# Patient Record
Sex: MALE | Race: ASIAN | NOT HISPANIC OR LATINO | Employment: OTHER | URBAN - METROPOLITAN AREA
[De-identification: names, ages, dates, MRNs, and addresses within clinical notes are randomized per-mention and may not be internally consistent; named-entity substitution may affect disease eponyms.]

---

## 2019-11-17 ENCOUNTER — HOSPITAL ENCOUNTER (EMERGENCY)
Facility: MEDICAL CENTER | Age: 24
End: 2019-11-17
Attending: EMERGENCY MEDICINE
Payer: MEDICAID

## 2019-11-17 VITALS
WEIGHT: 171.96 LBS | HEART RATE: 74 BPM | OXYGEN SATURATION: 96 % | SYSTOLIC BLOOD PRESSURE: 106 MMHG | DIASTOLIC BLOOD PRESSURE: 86 MMHG

## 2019-11-17 DIAGNOSIS — Z43.0 TRACHEOSTOMY CARE (HCC): ICD-10-CM

## 2019-11-17 PROCEDURE — 99283 EMERGENCY DEPT VISIT LOW MDM: CPT

## 2019-11-17 NOTE — ED NOTES
Trache re-placed by RT, cleaned prior to reinsertion. ERP at BS, family education given on trache reinsertion

## 2019-11-17 NOTE — DISCHARGE INSTRUCTIONS
Keep the area around the trach clean and dry.  Call first thing Monday morning to establish a doctor-patient relationship so that he has follow-up and further care.  Continue current medications.

## 2019-11-17 NOTE — ED PROVIDER NOTES
CHIEF COMPLAINT  Chief Complaint   Patient presents with   • Tracheostomy Tube Change     Pt coughing while sleeping and accidentally pulled out trache.       HPI  Rey Medina is a 24 y.o. male who presents tonight via [facility] from home with his mother and family members secondary to tracheostomy displacement.  Apparently the patient was coughing and accidentally pulled his trach out.  They do not know the timeframe of how long it had been out in the mother did not know how to put it back in.  Patient had a hemorrhagic stroke from an AV malformation and has indwelling tracheostomy and PEG tube for that reason.  They just moved here from the [facility] and have not established medical care here yet.    REVIEW OF SYSTEMS  See HPI for further details. All other system reviews are negative.    PAST MEDICAL HISTORY  Cerebrovascular accident secondary to AV malformation  Indwelling tracheostomy      FAMILY HISTORY  No family history on file.    SOCIAL HISTORY  Social History     Socioeconomic History   • Marital status: Single     Spouse name: Not on file   • Number of children: Not on file   • Years of education: Not on file   • Highest education level: Not on file   Occupational History   • Not on file   Social Needs   • Financial resource strain: Not on file   • Food insecurity:     Worry: Not on file     Inability: Not on file   • Transportation needs:     Medical: Not on file     Non-medical: Not on file   Tobacco Use   • Smoking status: Not on file   Substance and Sexual Activity   • Alcohol use: Not on file   • Drug use: Not on file   • Sexual activity: Not on file   Lifestyle   • Physical activity:     Days per week: Not on file     Minutes per session: Not on file   • Stress: Not on file   Relationships   • Social connections:     Talks on phone: Not on file     Gets together: Not on file     Attends Gnosticist service: Not on file     Active member of club or organization: Not on file     Attends meetings  of clubs or organizations: Not on file     Relationship status: Not on file   • Intimate partner violence:     Fear of current or ex partner: Not on file     Emotionally abused: Not on file     Physically abused: Not on file     Forced sexual activity: Not on file   Other Topics Concern   • Not on file   Social History Narrative   • Not on file       SURGICAL HISTORY  Tracheostomy  SRS  PEG tube   shunt  CURRENT MEDICATIONS  See nurse's note    ALLERGIES  No known drug allergies    PHYSICAL EXAM  VITAL SIGNS: /87   Pulse 83   Wt 78 kg (171 lb 15.3 oz)   SpO2 96%     Constitutional: Patient is well developed, well nourished in mild distress.  HENT: Normocephalic, atraumatic, Oropharynx moist, nose normal with no drainage.   Eyes: PERRL, EOMI, Conjunctiva without erythema.   Neck: Supple with tracheostomy present.  The trach itself was displaced.  There is no bleeding around the ostomy site.  It does appear to be narrowed with moderate difficulty replacing the trach.  Normal range of motion in flexion, extension and lateral rotation.  Lymphatic: No lymphadenopathy noted.   Cardiovascular: Normal heart rate and rhythm. No murmur  Thorax & Lungs: Clear and equal breath sounds with good excursion.  No rhonchi or wheezing.  Abdomen: Bowel sounds normal in all four quadrants. Soft,nontender.  Skin: Warm, Dry, No rashes.   Back: No cervical, thoracic, or lumbosacral tenderness.  Extremities: Peripheral pulses 4/4 No edema, No tenderness.  Neurologic: Alert , patient is nonverbal at this time and bedridden.  He has motor deficits secondary to previous stroke from AV malformation  Psychiatric: Affect     COURSE & MEDICAL DECISION MAKING  Pertinent Labs & Imaging studies reviewed. (See chart for details)  Tracheostomy was replaced by respiratory therapy and education was given to the patient's mother.  He will be discharged home via [facility] in stable and improved condition.    FINAL IMPRESSION  1.  Tracheostomy  displacement with replacement  2.  History of hemorrhagic stroke secondary to AV malformation  3.         Electronically signed by: Tammi Del Valle, 11/17/2019 2:41 [facility] Provider Note

## 2019-11-17 NOTE — ED TRIAGE NOTES
Chief Complaint   Patient presents with   • Tracheostomy Tube Change     Pt coughing while sleeping and accidentally pulled out trache.     /87   Pulse 62   Wt 78 kg (171 lb 15.3 oz)     Pt BIB [facility] for above concern, pt placed on 10L oxy mask over stoma by EMS, some suction done by EMS; pt at baseline for condition - non verbal but follows commands - pt family is at BS    Pt has hx of CVA that occurred in April of 2019.

## 2019-11-17 NOTE — ED NOTES
Pt's family given d/c paperwork and f/u instruction. Pt family verbalized understanding all information given. Pt awaiting transport back to home via [facility], family aware of d/c plan

## 2020-01-13 ENCOUNTER — HOSPITAL ENCOUNTER (OUTPATIENT)
Dept: RADIOLOGY | Facility: MEDICAL CENTER | Age: 25
End: 2020-01-13
Attending: PHYSICIAN ASSISTANT
Payer: MEDICAID

## 2020-01-13 DIAGNOSIS — G91.9 INTERNAL HYDROCEPHALUS (HCC): ICD-10-CM

## 2020-01-13 PROCEDURE — 72040 X-RAY EXAM NECK SPINE 2-3 VW: CPT

## 2020-01-13 PROCEDURE — 70250 X-RAY EXAM OF SKULL: CPT

## 2020-01-13 PROCEDURE — 71045 X-RAY EXAM CHEST 1 VIEW: CPT

## 2020-01-13 PROCEDURE — 74018 RADEX ABDOMEN 1 VIEW: CPT

## 2020-01-24 ENCOUNTER — OFFICE VISIT (OUTPATIENT)
Dept: PHYSICAL MEDICINE AND REHAB | Facility: REHABILITATION | Age: 25
End: 2020-01-24
Payer: MEDICAID

## 2020-01-24 VITALS
SYSTOLIC BLOOD PRESSURE: 128 MMHG | DIASTOLIC BLOOD PRESSURE: 82 MMHG | BODY MASS INDEX: 26.99 KG/M2 | OXYGEN SATURATION: 95 % | HEIGHT: 67 IN | HEART RATE: 91 BPM | WEIGHT: 171.96 LBS | TEMPERATURE: 99.1 F

## 2020-01-24 DIAGNOSIS — G31.89 COGNITIVE AND NEUROBEHAVIORAL DYSFUNCTION FOLLOWING BRAIN INJURY (HCC): ICD-10-CM

## 2020-01-24 DIAGNOSIS — Z93.0 TRACHEOSTOMY DEPENDENT (HCC): ICD-10-CM

## 2020-01-24 DIAGNOSIS — R39.81 URINARY INCONTINENCE DUE TO COGNITIVE IMPAIRMENT: ICD-10-CM

## 2020-01-24 DIAGNOSIS — Z93.1 GASTROSTOMY TUBE DEPENDENT (HCC): ICD-10-CM

## 2020-01-24 DIAGNOSIS — F09 COGNITIVE AND NEUROBEHAVIORAL DYSFUNCTION FOLLOWING BRAIN INJURY (HCC): ICD-10-CM

## 2020-01-24 DIAGNOSIS — I69.191 DYSPHAGIA FOLLOWING NONTRAUMATIC INTRACEREBRAL HEMORRHAGE: ICD-10-CM

## 2020-01-24 DIAGNOSIS — S06.9XAS COGNITIVE AND NEUROBEHAVIORAL DYSFUNCTION FOLLOWING BRAIN INJURY (HCC): ICD-10-CM

## 2020-01-24 DIAGNOSIS — I61.5 NONTRAUMATIC INTRAVENTRICULAR INTRACEREBRAL HEMORRHAGE, UNSPECIFIED LATERALITY (HCC): ICD-10-CM

## 2020-01-24 PROCEDURE — 99203 OFFICE O/P NEW LOW 30 MIN: CPT | Performed by: PHYSICAL MEDICINE & REHABILITATION

## 2020-01-24 RX ORDER — MONTELUKAST SODIUM 10 MG/1
10 TABLET ORAL
Status: ON HOLD | COMMUNITY
Start: 2020-01-09 | End: 2020-03-12

## 2020-01-24 RX ORDER — LANSOPRAZOLE 30 MG/1
CAPSULE, DELAYED RELEASE ORAL
Status: ON HOLD | COMMUNITY
Start: 2019-11-25 | End: 2020-03-12

## 2020-01-24 RX ORDER — CALCIUM/D3/MAG OX/COP/MANG/ZN 300 MG-20
TABLET ORAL
Status: ON HOLD | COMMUNITY
Start: 2019-11-25 | End: 2020-03-12

## 2020-01-24 RX ORDER — IVABRADINE 5 MG/1
TABLET, FILM COATED ORAL
Status: ON HOLD | COMMUNITY
Start: 2020-01-09 | End: 2020-03-12

## 2020-01-24 RX ORDER — BROMOCRIPTINE MESYLATE 2.5 MG/1
TABLET ORAL
Status: ON HOLD | COMMUNITY
Start: 2019-11-25 | End: 2020-03-12

## 2020-01-24 RX ORDER — POLYETHYLENE GLYCOL 3350 17 G/17G
17 POWDER, FOR SOLUTION ORAL DAILY
Status: ON HOLD | COMMUNITY
End: 2020-03-12 | Stop reason: SDUPTHER

## 2020-01-24 RX ORDER — APIXABAN 2.5 MG/1
2.5 TABLET, FILM COATED ORAL
Status: ON HOLD | COMMUNITY
Start: 2020-01-09 | End: 2020-03-12

## 2020-01-24 RX ORDER — LANSOPRAZOLE 30 MG/1
30 CAPSULE, DELAYED RELEASE ORAL
Status: ON HOLD | COMMUNITY
Start: 2020-01-09 | End: 2020-03-12

## 2020-01-24 RX ORDER — MONTELUKAST SODIUM 10 MG/1
TABLET ORAL
Status: ON HOLD | COMMUNITY
Start: 2019-11-25 | End: 2020-03-12 | Stop reason: SDUPTHER

## 2020-01-24 RX ORDER — LEVETIRACETAM 500 MG/1
TABLET ORAL
Status: ON HOLD | COMMUNITY
Start: 2020-01-09 | End: 2020-03-12 | Stop reason: SDUPTHER

## 2020-01-24 RX ORDER — BROMOCRIPTINE MESYLATE 2.5 MG/1
TABLET ORAL
Status: ON HOLD | COMMUNITY
Start: 2020-01-13 | End: 2020-03-12

## 2020-01-24 RX ORDER — LEVETIRACETAM 500 MG/1
TABLET ORAL
Status: ON HOLD | COMMUNITY
Start: 2019-11-25 | End: 2020-03-12

## 2020-01-24 SDOH — HEALTH STABILITY: MENTAL HEALTH: HOW OFTEN DO YOU HAVE A DRINK CONTAINING ALCOHOL?: NEVER

## 2020-01-24 ASSESSMENT — PATIENT HEALTH QUESTIONNAIRE - PHQ9: CLINICAL INTERPRETATION OF PHQ2 SCORE: 0

## 2020-01-24 NOTE — PROGRESS NOTES
[facility], [place]  [facility]  [facility] [place] [address]  Ph: [phone]    NEW PATIENT EVALUATION    Patient Name: Rey Medina   Patient : [id number]  Patient Age: 24 y.o.   PCP: Nirmala Man M.D.    Referring Physician: No referring provider defined for this encounter.    Reason for Referral: intracerebral hemorrhage   Examining Physician: Nasim García MD  Date of Service: 2020      SUBJECTIVE:     Patient Identification: Rey Medina is a 24 y.o. RHD male with no sig PMH, in [facility] [place] suffered a vermian intracerebral hemorrhage with intraventricular bleeding and hydrocephalus on 2019, s/p  shunt placement. He was initially taken to [facility] for additional care, then moved back to  in 2019.       Chief Complaint: Post stroke complications      Accompanied by Today: Mother      History of Present Illness:   # Stroke:  vermian intracerebral hemorrhage with intraventricular bleeding and hydrocephalus on 2019. In [place], had some home therapies, but never inpatient hospital   -Mother reports that his function has been slowly improving, but hopeful that with professional therapists his progress can improve faster; has not been able to see any physical therapist and occupational therapist since 2019, when they moved from [facility] to the ; she has been very stable with home exercises with Rey  -Intermittently follows commands  -Able to extend his right arm to 0 degrees volitionally, does not respond to request for moving left arm/lower extremities today  -Following commands slightly improves with repetition  -Had PT, OT, speech therapy evaluation around 2019.  All 3 disciplines found deficits that can improve with inpatient therapy    # Respiratory: Continues with a tracheostomy due to prolonged ventilator use, no longer on vent.  Mother has been using the PMV on and off with good  tolerance.  -Would like to progress to decannulation, but needs assistance    # Nutrition: Has a G-tube, n.p.o.  -Speech recommended modified barium swallow study    # Spasticity: No difficulty with exercises or tone    # Other providers:  -Pending neurosurgery follow-up, second visit, on February 5, 2020  -Repeat MRI reportedly pending    # Bowel: suppository, about every 3-4 days; miralax every day and dulocolax every 3-4 days    # Bladder: improvement in ability to communicate need to use the urinal; continues on diaper    # Seizures: As far as mother is concerned, he's never had any seizures; currently on Keppra    # Skin: Reports no no pressure ulcers, some pimples on chest and back of the ear    # Sleep: doing okay, no complaints    # Pain: Denies any pain, mother denies any grimacing palpation or other similar pain related behaviors    # Living: mom with patient 24/7, sister is sometimes there, 1 story apartment, 0 steps to enter          Review of Systems: Unable to obtain, patient is nonverbal    Per mother:   - No previous medical history prior to this intra cerebral hemorrhage  - No blood pressure or cholesterol issues  - Denies any symptoms of sweating, fever, chills      Past Medical History:  -vermian intracerebral hemorrhage with intraventricular bleeding and hydrocephalus on April 21, 2019, s/p  shunt placement       Current Outpatient Medications:   •  ELIQUIS 2.5 MG Tab, Take 2.5 mg by mouth., Disp: , Rfl:   •  bromocriptine (PARLODEL) 2.5 MG Tab, BROMOCRIPTINE MESYLATE 2.5 MG TABS, Disp: , Rfl:   •  bromocriptine (PARLODEL) 2.5 MG Tab, , Disp: , Rfl:   •  Incontinence Supply Disposable (ASSURANCE FITTED BRIEF LARGE) [facility] ASSURANCE FITTED BRIEF LARGE, Disp: , Rfl:   •  Incontinence Supply Disposable (ADVOCATE UNDERPADS) Misc, ADVOCATE UNDERPADS, Disp: , Rfl:   •  CORLANOR 5 MG Tab tablet, TAKE 1 TABLET BY MOUTH ONCE DAILY AFTER MEALS, Disp: , Rfl:   •  ivabradine (CORLANOR) 5 MG Tab tablet,  CORLANOR 5 MG TABS, Disp: , Rfl:   •  lansoprazole (PREVACID) 30 MG CAPSULE DELAYED RELEASE, LANSOPRAZOLE 30 MG CPDR, Disp: , Rfl:   •  lansoprazole (PREVACID) 30 MG CAPSULE DELAYED RELEASE, Take 30 mg by mouth. ONCE DAILY BEFORE MEALS, Disp: , Rfl:   •  levETIRAcetam (KEPPRA) 500 MG Tab, KEPPRA 500 MG TABS, Disp: , Rfl:   •  montelukast (SINGULAIR) 10 MG Tab, Take 10 mg by mouth., Disp: , Rfl:   •  polyethylene glycol/lytes (MIRALAX) Pack, Take 17 g by mouth every day., Disp: , Rfl:   •  vitamin D (CHOLECALCIFEROL) 1000 UNIT Tab, Take 1,000 Units by mouth every day., Disp: , Rfl:   •  apixaban (ELIQUIS) 2.5mg Tab, ELIQUIS 2.5 MG TABS, Disp: , Rfl:   •  Calcium Carbonate-Vit D-Min (CALTRATE MINIS PLUS MINERALS) 300-800 MG-UNIT Tab, CALTRATE MINIS PLUS MINERALS 300-800 MG-UNIT TABS, Disp: , Rfl:   •  levETIRAcetam (KEPPRA) 500 MG Tab, TAKE 1 TABLET BY MOUTH TWICE DAILY AFTER MEALS, Disp: , Rfl:   •  montelukast (SINGULAIR) 10 MG Tab, MONTELUKAST SODIUM 10 MG TABS, Disp: , Rfl:   Allergies   Allergen Reactions   • Vancomycin Swelling        Past Social History:  Social History     Socioeconomic History   • Marital status: Single     Spouse name: Not on file   • Number of children: Not on file   • Years of education: Not on file   • Highest education level: Not on file   Occupational History   • Not on file   Social Needs   • Financial resource strain: Not on file   • Food insecurity:     Worry: Not on file     Inability: Not on file   • Transportation needs:     Medical: Not on file     Non-medical: Not on file   Tobacco Use   • Smoking status: Not on file   Substance and Sexual Activity   • Alcohol use: Not on file   • Drug use: Not on file   • Sexual activity: Not on file   Lifestyle   • Physical activity:     Days per week: Not on file     Minutes per session: Not on file   • Stress: Not on file   Relationships   • Social connections:     Talks on phone: Not on file     Gets together: Not on file     Attends  [employer] service: Not on file     Active member of club or organization: Not on file     Attends meetings of clubs or organizations: Not on file     Relationship status: Not on file   • Intimate partner violence:     Fear of current or ex partner: Not on file     Emotionally abused: Not on file     Physically abused: Not on file     Forced sexual activity: Not on file   Other Topics Concern   • Not on file   Social History Narrative   • Not on file        Family History: Not relevant to today's visit      OBJECTIVE:   Vital Signs:  Vitals:    01/24/20 1435   BP: 128/82   Pulse: 91   Temp: 37.3 °C (99.1 °F)   SpO2: 95%          Physical Exam:   Gen: not appear to be in distress, well dressed, thin male; non verbal, inconsistently able to follow simple commands  HEENT: mostly keeps his eyes closed, intermittently moves his head side to side  Skin: pimple like lesion on his chest  CV: well perfused extremities  Pulm: unlabored on room air, tracheostomy in place; intermittent coughing  GI: G tube in place, soft abdomen  MSK:  - no spasticity noted in all 4 limbs  - able to move his right upper extremity flexion/extension with repeated requests, able to extend to 0 deg  - able to hold his wrist in extension at request  - does not follow directions with his left upper extremity or bilateral lower extremities  Neuro:   - Negative Paredes's bilaterally  - Negative clonus bilaterally  - Good trunk control sitting balance  - Unable to reliably test his sensation  - At least 3/5 with right elbow flexion/extension, unable to test strength of other limbs         ASSESSMENT/PLAN: Rey Medina  is a 24 y.o. male with rehabilitation history significant for intracerebral hemorrhage, in PM&R clinic on 1/24/2020 for inpatient rehab candidacy evaluation. The following plan was discussed with the patient who is in agreement.     Visit Diagnoses     ICD-10-CM   1. Nontraumatic intraventricular intracerebral hemorrhage,  unspecified laterality (HCC) I61.5   2. 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 Dysphagia following nontraumatic intracerebral hemorrhage I69.191   4. Gastrostomy tube dependent ([facility]) Z93.1   5. Urinary incontinence due to cognitive impairment R39.81   6. 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    F09    S06.9X0S        Rehab/Neuro:   - Recommend inpatient acute rehabilitation, given that patient has both rehab and medical care needs (see below for additional details)  - Outpatient 1x time PT, OT, SLP evaluation from 12/2019 to 1/2020 also support inpatient rehabilitation  - No concern for storming, spasticity, or neuropathic pain at this time  - Keppra: likely prophylaxis per patient's mother, never told patient had seizures; refer to epilepsy neuro clinic for evaluation to assess if he continues to need this prophylaxis       Neurogenic Bladder/Incontinence: Currently using diaper  - Timed void and PVR monitoring to be monitored during inpatient rehab      Neurogenic Bowel: Constipated with bowel movements every 3-4 days  - Continue with miralax and dulcolax suppository  - Consider adding docusate or senna S for a goal of BM every 1-2 days      GI/Diet: Currently NPO and on G tube, needs additional assessment  - SLP evaluation and consider modified Barium swallow, as recommended by outpatient SLP evaluation  - Improve oral feeding abilities and goal of removing G tube, if possible      Skin: Fortunately no active issues at this time        Discussed with patient about recommendations for and plan for rehabilitation as follows. Patient with multiple co-morbidities(including but not limited to NPO/dysphagia, tracheostomy/respiratory management, bladder incontinence, and constipation); with cognitive/swallowing/speech deficits and functional deficits in mobility/self-care, and Moderate de-conditioning.     This patient lives in a single level apartment with 0 steps to enter,  mother is able to help 24/7. The patient was evaluated by acute care Physical Therapy, Occupational Therapy and Speech Language Pathology; currently requiring maximal assistance for mobility and maximal assistance for ADLs, also with ongoing cognitive, speech and swallowing deficits.     The patient is a Very Good candidate for an acute inpatient rehabilitation program with a coordinated program of care at an intensity and frequency not available at a lower level of care.     This recommendation is substantiated by the patient's current medical condition with intervention and assessment of medical issues requiring an acute level of care for patient's safety and maximum outcome. A coordinated program of care will be provided by an interdisciplinary team including physical therapy, occupational therapy, speech language pathology, hospitalist, physiatry, rehab nursing and rehab psychology. Rehab goals include improved cognition, speech and swallowing, mobility, self-care management, strength and conditioning/endurance, pain management, bowel and bladder management, mood and affect, and safety with independent home management including caregiver training.     Estimated length of stay is approximately 21 days. Rehab potential: Very Good. Disposition: to pre-morbid independent living setting with supportive care of patient's mother.           Nasim García MD  Department of Physical Medicine & Rehabilitation  Neuro Rehabilitation Clinic  [facility]  1/24/2020 2:10 PM

## 2020-02-03 PROCEDURE — 99358 PROLONG SERVICE W/O CONTACT: CPT | Performed by: PHYSICAL MEDICINE & REHABILITATION

## 2020-02-05 ENCOUNTER — HOSPITAL ENCOUNTER (INPATIENT)
Facility: REHABILITATION | Age: 25
LOS: 16 days | DRG: 056 | End: 2020-02-21
Attending: PHYSICAL MEDICINE & REHABILITATION | Admitting: PHYSICAL MEDICINE & REHABILITATION
Payer: MEDICAID

## 2020-02-05 PROCEDURE — 94640 AIRWAY INHALATION TREATMENT: CPT

## 2020-02-05 PROCEDURE — 770010 HCHG ROOM/CARE - REHAB SEMI PRIVAT*

## 2020-02-05 PROCEDURE — 700102 HCHG RX REV CODE 250 W/ 637 OVERRIDE(OP): Performed by: PHYSICAL MEDICINE & REHABILITATION

## 2020-02-05 PROCEDURE — 99223 1ST HOSP IP/OBS HIGH 75: CPT | Performed by: PHYSICAL MEDICINE & REHABILITATION

## 2020-02-05 PROCEDURE — A9270 NON-COVERED ITEM OR SERVICE: HCPCS | Performed by: PHYSICAL MEDICINE & REHABILITATION

## 2020-02-05 PROCEDURE — 94760 N-INVAS EAR/PLS OXIMETRY 1: CPT

## 2020-02-05 RX ORDER — MONTELUKAST SODIUM 10 MG/1
10 TABLET ORAL DAILY
Status: DISCONTINUED | OUTPATIENT
Start: 2020-02-05 | End: 2020-02-05

## 2020-02-05 RX ORDER — ONDANSETRON 2 MG/ML
4 INJECTION INTRAMUSCULAR; INTRAVENOUS 4 TIMES DAILY PRN
Status: DISCONTINUED | OUTPATIENT
Start: 2020-02-05 | End: 2020-02-21 | Stop reason: HOSPADM

## 2020-02-05 RX ORDER — POLYVINYL ALCOHOL 14 MG/ML
1 SOLUTION/ DROPS OPHTHALMIC PRN
Status: DISCONTINUED | OUTPATIENT
Start: 2020-02-05 | End: 2020-02-21 | Stop reason: HOSPADM

## 2020-02-05 RX ORDER — ONDANSETRON 4 MG/1
4 TABLET, ORALLY DISINTEGRATING ORAL 4 TIMES DAILY PRN
Status: DISCONTINUED | OUTPATIENT
Start: 2020-02-05 | End: 2020-02-21 | Stop reason: HOSPADM

## 2020-02-05 RX ORDER — TRAZODONE HYDROCHLORIDE 50 MG/1
50 TABLET ORAL
Status: DISCONTINUED | OUTPATIENT
Start: 2020-02-05 | End: 2020-02-21 | Stop reason: HOSPADM

## 2020-02-05 RX ORDER — BISACODYL 10 MG
10 SUPPOSITORY, RECTAL RECTAL
Status: DISCONTINUED | OUTPATIENT
Start: 2020-02-05 | End: 2020-02-11

## 2020-02-05 RX ORDER — OMEPRAZOLE 20 MG/1
20 CAPSULE, DELAYED RELEASE ORAL DAILY
Status: DISCONTINUED | OUTPATIENT
Start: 2020-02-05 | End: 2020-02-07

## 2020-02-05 RX ORDER — MONTELUKAST SODIUM 10 MG/1
10 TABLET ORAL NIGHTLY
Status: DISCONTINUED | OUTPATIENT
Start: 2020-02-05 | End: 2020-02-21 | Stop reason: HOSPADM

## 2020-02-05 RX ORDER — IPRATROPIUM BROMIDE AND ALBUTEROL SULFATE 2.5; .5 MG/3ML; MG/3ML
3 SOLUTION RESPIRATORY (INHALATION)
Status: ACTIVE | OUTPATIENT
Start: 2020-02-05 | End: 2020-02-06

## 2020-02-05 RX ORDER — ECHINACEA PURPUREA EXTRACT 125 MG
2 TABLET ORAL PRN
Status: DISCONTINUED | OUTPATIENT
Start: 2020-02-05 | End: 2020-02-21 | Stop reason: HOSPADM

## 2020-02-05 RX ORDER — TRAMADOL HYDROCHLORIDE 50 MG/1
50 TABLET ORAL EVERY 4 HOURS PRN
Status: DISCONTINUED | OUTPATIENT
Start: 2020-02-05 | End: 2020-02-21 | Stop reason: HOSPADM

## 2020-02-05 RX ORDER — BACLOFEN 10 MG/1
5 TABLET ORAL 3 TIMES DAILY
Status: DISCONTINUED | OUTPATIENT
Start: 2020-02-05 | End: 2020-02-11

## 2020-02-05 RX ORDER — ACETAMINOPHEN 325 MG/1
650 TABLET ORAL EVERY 4 HOURS PRN
Status: DISCONTINUED | OUTPATIENT
Start: 2020-02-05 | End: 2020-02-21 | Stop reason: HOSPADM

## 2020-02-05 RX ORDER — HYDRALAZINE HYDROCHLORIDE 25 MG/1
25 TABLET, FILM COATED ORAL EVERY 8 HOURS PRN
Status: DISCONTINUED | OUTPATIENT
Start: 2020-02-05 | End: 2020-02-21 | Stop reason: HOSPADM

## 2020-02-05 RX ORDER — VITAMIN B COMPLEX
1000 TABLET ORAL DAILY
Status: DISCONTINUED | OUTPATIENT
Start: 2020-02-05 | End: 2020-02-21 | Stop reason: HOSPADM

## 2020-02-05 RX ORDER — ALUMINA, MAGNESIA, AND SIMETHICONE 2400; 2400; 240 MG/30ML; MG/30ML; MG/30ML
20 SUSPENSION ORAL
Status: DISCONTINUED | OUTPATIENT
Start: 2020-02-05 | End: 2020-02-21 | Stop reason: HOSPADM

## 2020-02-05 RX ORDER — POLYETHYLENE GLYCOL 3350 17 G/17G
1 POWDER, FOR SOLUTION ORAL
Status: DISCONTINUED | OUTPATIENT
Start: 2020-02-05 | End: 2020-02-05

## 2020-02-05 RX ORDER — LANSOPRAZOLE 30 MG/1
30 CAPSULE, DELAYED RELEASE ORAL DAILY
Status: DISCONTINUED | OUTPATIENT
Start: 2020-02-06 | End: 2020-02-07

## 2020-02-05 RX ORDER — LEVETIRACETAM 500 MG/1
500 TABLET ORAL 2 TIMES DAILY
Status: DISCONTINUED | OUTPATIENT
Start: 2020-02-05 | End: 2020-02-07

## 2020-02-05 RX ORDER — AMOXICILLIN 250 MG
2 CAPSULE ORAL 2 TIMES DAILY
Status: DISCONTINUED | OUTPATIENT
Start: 2020-02-05 | End: 2020-02-05

## 2020-02-05 RX ORDER — POLYETHYLENE GLYCOL 3350 17 G/17G
1 POWDER, FOR SOLUTION ORAL EVERY 24 HOURS
Status: DISCONTINUED | OUTPATIENT
Start: 2020-02-05 | End: 2020-02-11

## 2020-02-05 RX ORDER — BISACODYL 10 MG
10 SUPPOSITORY, RECTAL RECTAL
Status: DISCONTINUED | OUTPATIENT
Start: 2020-02-05 | End: 2020-02-05

## 2020-02-05 RX ORDER — LANSOPRAZOLE 30 MG/1
30 CAPSULE, DELAYED RELEASE ORAL DAILY
Status: DISCONTINUED | OUTPATIENT
Start: 2020-02-05 | End: 2020-02-05

## 2020-02-05 RX ORDER — IPRATROPIUM BROMIDE AND ALBUTEROL SULFATE 2.5; .5 MG/3ML; MG/3ML
3 SOLUTION RESPIRATORY (INHALATION)
Status: DISCONTINUED | OUTPATIENT
Start: 2020-02-05 | End: 2020-02-21 | Stop reason: HOSPADM

## 2020-02-05 RX ADMIN — POLYETHYLENE GLYCOL 3350 1 PACKET: 17 POWDER, FOR SOLUTION ORAL at 21:13

## 2020-02-05 RX ADMIN — BACLOFEN 5 MG: 10 TABLET ORAL at 21:06

## 2020-02-05 RX ADMIN — MONTELUKAST 10 MG: 10 TABLET, FILM COATED ORAL at 21:06

## 2020-02-05 RX ADMIN — BACLOFEN 5 MG: 10 TABLET ORAL at 16:24

## 2020-02-05 RX ADMIN — APIXABAN 2.5 MG: 5 TABLET, FILM COATED ORAL at 21:06

## 2020-02-05 RX ADMIN — LEVETIRACETAM 500 MG: 500 TABLET ORAL at 21:07

## 2020-02-05 ASSESSMENT — PATIENT HEALTH QUESTIONNAIRE - PHQ9
1. LITTLE INTEREST OR PLEASURE IN DOING THINGS: NOT AT ALL
SUM OF ALL RESPONSES TO PHQ9 QUESTIONS 1 AND 2: 0
2. FEELING DOWN, DEPRESSED, IRRITABLE, OR HOPELESS: NOT AT ALL

## 2020-02-05 ASSESSMENT — LIFESTYLE VARIABLES: EVER_SMOKED: NEVER

## 2020-02-05 ASSESSMENT — COPD QUESTIONNAIRES
COPD SCREENING SCORE: 1
DO YOU EVER COUGH UP ANY MUCUS OR PHLEGM?: NO/ONLY WITH OCCASIONAL COLDS OR INFECTIONS
HAVE YOU SMOKED AT LEAST 100 CIGARETTES IN YOUR ENTIRE LIFE: NO/DON'T KNOW
DURING THE PAST 4 WEEKS HOW MUCH DID YOU FEEL SHORT OF BREATH: SOME OF THE TIME

## 2020-02-05 NOTE — PROGRESS NOTES
1105 Pt arrived at [facility] from home via w/c. Dr David to follow for diagnosis of hemorrhagic stroke. Initial assessment initiated. Pt oriented to room and facility routine and safety measures; pt education binder provided and discussed. Pt alert, continent/incontinent of bowel and bladder. Max x 2  assist for transfers. All wounds photographed and documented; photos uploaded to . Pt denies pain or discomfort at this time. Pt positioned for comfort in bed. Call light within reach, safety measures in place. Will continue to monitor.

## 2020-02-05 NOTE — REHAB-RESPIRATORY IDT TEAM NOTE
Respiratory Therapy   Respiratory Therapy      Section completed by:  Kathie Bosch, RRT   new admit, protocol done. Pt resting well on room air at this time. Will place pt on aerosol 28% 4 liters at noc per protocol.  Pts trache was placed in the [facility] in may 2019. Mother is at bedside and helps with care, states its a size 7. Her goal is to get it downsized and decannulated. Placed him on snv prn.

## 2020-02-05 NOTE — H&P
REHABILITATION HISTORY AND PHYSICAL/POST ADMISSION PHYSICAL EVALUATION    Date of Admission: 2/5/2020  Rey Medina  [id number]    [facility] Code / Diagnosis to Support: 01.3 Bilateral Involvement  Etiologic Diagnosis: Intracerebral hemorrhage, intraventricular (HCC)    CC: Trach dependent, Dysphagia, minimally responsive    HPI:  The patient is a 24 y.o. 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  Patient was living on [facility] at the time. Per patient's mom she does not know all that happened after his AVM ruptured including why he is on Eliquis. She reports he had a tracheostomy and G tube placed two weeks later. He was initially getting care in the [facility] and then moved to the  in 11/19. She reports she thinks he takes bromocriptine for muscle spasms. She reports occasional small amounts of dried blood are coughed up from the tracheostomy. She has not had the trach exchanged since she has been in the . She has a PMV valve but he often coughs it off. One of his mothers goals is to get the trach removed. He has been on tube feeds which are given via bolus. Patient is non-verbal but recently he has been grabbing himself when he needs to void and she can place a urinal.  She reports otherwise they use a diaper. Patient can follow some simple commands on the right intermittently. He has been getting PT and OT at home. He was evaluated in our PM&R office on 1/24/20 and thought to benefit from inpatient rehabilitation to work on trach, swallow and trial of neurostimulants. Mother does not know if he was ever tried on neurostimulants (bromocriptine is often used in [place] as a neurostimulant).     Patient not following commands, actively resisting opening his eyes.       REVIEW OF SYSTEMS:     Patient non-verbal    PMH:  Past Medical History:   Diagnosis Date   • Stroke (HCC)        PSH:  History  reviewed. No pertinent surgical history.    FAMILY HISTORY:  Family History   Problem Relation Age of Onset   • Hypertension Mother    • Hypertension Brother    No family history of AVM rupture      MEDICATIONS:  Current Facility-Administered Medications   Medication Dose   • vitamin D (cholecalciferol) tablet 1,000 Units  1,000 Units   • levETIRAcetam (KEPPRA) tablet 500 mg  500 mg   • ivabradine (CORLANOR) tablet 5 mg  5 mg   • apixaban (ELIQUIS) tablet 2.5 mg  2.5 mg   • Respiratory Care per Protocol     • Pharmacy Consult Request ...Pain Management Review 1 Each  1 Each   • tramadol (ULTRAM) 50 MG tablet 50 mg  50 mg   • hydrALAZINE (APRESOLINE) tablet 25 mg  25 mg   • acetaminophen (TYLENOL) tablet 650 mg  650 mg   • artificial tears ophthalmic solution 1 Drop  1 Drop   • benzocaine-menthol (CEPACOL) lozenge 1 Lozenge  1 Lozenge   • mag hydrox-al hydrox-simeth (MAALOX PLUS ES or MYLANTA DS) suspension 20 mL  20 mL   • ondansetron (ZOFRAN ODT) dispertab 4 mg  4 mg    Or   • ondansetron (ZOFRAN) syringe/vial injection 4 mg  4 mg   • traZODone (DESYREL) tablet 50 mg  50 mg   • sodium chloride (OCEAN) 0.65 % nasal spray 2 Spray  2 Spray   • omeprazole (PRILOSEC) capsule 20 mg  20 mg   • ipratropium-albuterol (DUONEB) nebulizer solution  3 mL   • [START ON 2/6/2020] lansoprazole (PREVACID) capsule 30 mg  30 mg   • ipratropium-albuterol (DUONEB) nebulizer solution  3 mL   • polyethylene glycol/lytes (MIRALAX) PACKET 1 Packet  1 Packet    And   • magnesium hydroxide (MILK OF MAGNESIA) suspension 30 mL  30 mL    And   • bisacodyl (DULCOLAX) suppository 10 mg  10 mg   • montelukast (SINGULAIR) tablet 10 mg  10 mg       ALLERGIES:  Vancomycin    PSYCHOSOCIAL HISTORY:  Living Site:  Home  Living With:  family  Caregiver's availability:  Mom  Number of stairs:  Not Applicable  Substance use history:  None    LEVEL OF FUNCTION PRIOR TO DISABILTY:  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 Mod-maxA for transfers.  He reportedly would follow 1 step commands with PT at times.  OT note he is also mod-maxA for all ADLs    CURRENT LEVEL OF FUNCTION:   Same as level of function prior to admission to [facility]    PHYSICAL EXAM:     VITAL SIGNS:   height is 1.702 m (5' 7\") and weight is 75 kg (165 lb 5.5 oz). His temporal temperature is 37.1 °C (98.8 °F). His blood pressure is 123/80 and his pulse is 80. His respiration is 18 and oxygen saturation is 96%.     GENERAL: No apparent distress  HEENT: Normocephalic/atraumatic,  size 7 trach in place  CARDIAC: Regular rate and rhythm, normal S1, S2   LUNGS: Clear to auscultation   ABDOMINAL: bowel sounds present, soft and nontender    EXTREMITIES: no contractures, spasticity, or edema.  No calf tenderness bilaterally  NEURO:  Mental status: Does not follow commands, actively resists opening eyes   Actively moved RUE x1. Mom reports he moves all extremities.    RADIOLOGY:  No recent imaging.    LABS:          Pending admission labs          PRIMARY REHAB DIAGNOSIS:    This patient is a 24 y.o. male admitted for acute inpatient rehabilitation with Intracerebral hemorrhage, intraventricular (HCC).    IMPAIRMENTS:   Cognitive  ADLs/IADLs  Mobility  Speech  Swallow    SECONDARY DIAGNOSIS/MEDICAL CO-MORBIDITIES AFFECTING FUNCTION:  Dysphagia  Tracheostomy  GERD  Seizure?    RELEVANT CHANGES SINCE PREADMISSION EVALUATION:    Status unchanged    The patient's rehabilitation potential is Fair  The patient's medical prognosis is fair    PLAN:   Discussion and Recommendations:   1. The patient requires an acute inpatient rehabilitation program with a coordinated program of care at an intensity and frequency not available at a lower level of care. This recommendation is substantiated by the patient's medical physicians who recommend that the " patient's intervention and assessment of medical issues needs to be done at an acute level of care for patient's safety and maximum outcome.   2. A coordinated program of care will be supplied by an interdisciplinary team of physical therapy, occupational therapy, rehab physician, rehab nursing, and, if needed, speech therapy and rehab psychology. Rehab team presents a patient-specific rehabilitation and education program concentrating on prevention of future problems related to accessibility, mobility, skin, bowel, bladder, sexuality, and psychosocial and medical/surgical problems.   3. Need for Rehabilitation Physician: The rehab physician will be evaluating the patient on a multi-weekly basis to help coordinate the program of care. The rehab physician communicates between medical physicians, therapists, and nurses to maximize the patient's potential outcome. Specific areas in which the rehab physician will be providing daily assessment include the following:   A. Assessing the patient's heart rate and blood pressure response (vitals monitoring) to activity and making adjustments in medications or conservative measures as needed.   B. The rehab physician will be assessing the frequency at which the program can be increased to allow the patient to reach optimal functional outcome.   C. The rehab physician will also provide assessments in daily skin care, especially in light of patient's impairments in mobility.   D. The rehab physician will provide special expertise in understanding how to work with functional impairment and recommend appropriate interventions, compensatory techniques, and education that will facilitate the patient's outcome.   4. Rehab R.N.   The rehab RN will be working with patient to carry over in room mobility and activities of daily living when the patient is not in 3 hours of skilled therapy. Rehab nursing will be working in conjunction with rehab physician to address all the medical issues  above and continue to assess laboratory work and discuss abnormalities with the treating physicians, assess vitals, and response to activity, and discuss and report abnormalities with the rehab physician. Rehab RN will also continue daily skin care, supervise bladder/bowel program, instruct in medication administration, and ensure patient safety.   5. Rehab Therapy: Therapies to treat at intensity and frequency of (may change after completion of evaluation by all therapeutic disciplines):       PT:  Physical therapy to address mobility, transfer, gait training and evaluation for adaptive equipment needs 1 hour/day at least 5 days/week for the duration of the ELOS (see below)       OT:  Occupational therapy to address ADLs, self-care, home management training, functional mobility/transfers and assistive device evaluation, and community re-integration 1  hour/day at least 5 days/week for the duration of the ELOS (see below).        ST/Dysphagia:  Speech therapy to address speech, language, and cognitive deficits as well as swallowing difficulties with retraining/dysphagia management and community re-integration with comprehension, expression, cognitive training 1  hour/day at least 5 days/week for the duration of the ELOS (see below).     Medical management / Rehabilitation Issues/ Adverse Potential as part of rehabilitation plan     REHABILITATION ISSUES/ADVERSE POTENTIAL:  1. AVM rupture - s/p AVM repair in the [facility] s/p  Shunt.  Patient demonstrates functional deficits in cognition, behavior, strength, balance, coordination, and ADL's. The patient requires therapy to correct these deficits prior to discharge. Patient is admitted to [facility] for comprehensive rehabilitation therapy, including physical, occupational and speech therapy.     Rehabilitation nursing monitors bowel and bladder control, educates on medication administration, co-morbidities and monitors patient safety.    2.   Neurostimulants: None at this time but continue to assess daily for need to initiate should status change.    3.  DVT prophylaxis:  Patient is on Eliquis for anticoagulation upon transfer. Encourage OOB. Monitor daily for signs and symptoms of DVT including but not limited to swelling and pain to prevent the development of DVT that may interfere with therapies.    4.  GI prophylaxis:  On prilosec to prevent gastritis/dyspepsia which may interfere with therapies.    5.  Pain: No issues with pain currently / Controlled with APAP/Tramadol    6.  Nutrition/Dysphagia: Dietician monitors nutrient intake, recommend supplements prn and provide nutrition education to pt/family to promote optimal nutrition for wound healing/recovery.     7.  Bladder/bowel:  Start bowel and bladder program as described below, to prevent constipation, urinary retention (which may lead to UTI), and urinary incontinence (which will impact upon pt's functional independence).   - TV Q3h while awake with post void bladder scans, I&O cath for PVRs >400  - up to commode after meal     8.  Skin/dermal ulcer prophylaxis: Monitor for new skin conditions with q.2 h. turns as required to prevent the development of skin breakdown.     9.  Cognition/Behavior:  Psychologist Dr. Sainz provides adjustment counseling to illness and psychosocial barriers that may be potential barriers to rehabilitation.     10. Respiratory therapy: RT performs O2 management prn, breathing retraining, pulmonary hygiene and bronchospasm management prn to optimize participation in therapies.     MEDICAL CO-MORBIDITIES/ADVERSE POTENTIAL AFFECTING FUNCTION:  AVM rupture - s/p AVM repair in the [facility] s/p  Shunt. Patient very low functioning but per mother becoming more purposeful  -PT and OT for mobility and ADLs  -SLP for cognition   -Will need trial of neurostimulants. Will get baseline therapy evaluations first  -Continue Keppra. Unclear if had seizure or was continued on  prophylaxis    Muscle spasms - mother claims Bromocriptine helps, typically used for dopamine side effects/neurostimulant. Will start low dose Baclofen and monitor    Dysphagia - Patient with G Tube in place. SLP for swallow. Would benefit from MBSS    CV - Patient is on Ivabradine 5 mg daily. Unclear reason why    Respiratory - Patient with size 7 trach on admission. Patient on Duonebs and Singulair  -Plan for capping trials and decannulation if possible.     GI Ppx - Patient on Prilosec and Prevacid. Unclear why two PPIs    DVT Ppx - Patient on Eliquis 2.5 mg BID. Unknown reason why.     I personally performed a complete drug regimen review and no potential clinically significant medication issues were identified.     Pt was seen today for 73 min, and entire time spent in face-to-face contact was >50% in counseling and coordination of care as detailed in A/P above.        GOALS/EXPECTED LEVEL OF FUNCTION BASED ON CURRENT MEDICAL AND FUNCTIONAL STATUS (may change based on patient's medical status and rate of impairment recovery):  Transfers:   Moderate Assistance  Mobility/Gait:   Moderate Assistance  ADL's:   Moderate Assistance  Cognition:  maxA  Swallowing:  Dysphagia 1     DISPOSITION: Discharge to pre-morbid independent living setting with the supportive care of patient's family.    ELOS: 2-3 weeks

## 2020-02-05 NOTE — CARE PLAN
Problem: Safety  Goal: Will remain free from injury  Note:   Patient is alert, non-verbal at this time has a trach in place. Patient and Mother Melinda oriented to unit and unit routine. Oriented to call light and how to call for assistance when assistance is needed as Melinda will be staying and helping with patient care. Patient has non-skid socks in place, bed in low position, alarms set on bed and w/c, patient is close to the nurse's station, frequently rounded on to make sure needs are being met. Will continue to educate as needed.     Problem: Skin Integrity  Goal: Risk for impaired skin integrity will decrease  Intervention: Assess risk factors for impaired skin integrity and/or pressure ulcers  Note:   2 RN skin check with Robert. Patient skin in general is clean, dry and intact. Patient has a trach in place that is clean and dry, RT Toyin came in and performed trach care. Patient has G-tube in place with clean dressing around site.

## 2020-02-06 LAB
25(OH)D3 SERPL-MCNC: 30 NG/ML (ref 30–100)
ALBUMIN SERPL BCP-MCNC: 4.5 G/DL (ref 3.2–4.9)
ALBUMIN/GLOB SERPL: 1.3 G/DL
ALP SERPL-CCNC: 93 U/L (ref 30–99)
ALT SERPL-CCNC: 31 U/L (ref 2–50)
ANION GAP SERPL CALC-SCNC: 9 MMOL/L (ref 0–11.9)
AST SERPL-CCNC: 12 U/L (ref 12–45)
BASOPHILS # BLD AUTO: 1.3 % (ref 0–1.8)
BASOPHILS # BLD: 0.12 K/UL (ref 0–0.12)
BILIRUB SERPL-MCNC: 0.5 MG/DL (ref 0.1–1.5)
BUN SERPL-MCNC: 9 MG/DL (ref 8–22)
CALCIUM SERPL-MCNC: 9.4 MG/DL (ref 8.5–10.5)
CHLORIDE SERPL-SCNC: 105 MMOL/L (ref 96–112)
CO2 SERPL-SCNC: 24 MMOL/L (ref 20–33)
CREAT SERPL-MCNC: 0.73 MG/DL (ref 0.5–1.4)
EOSINOPHIL # BLD AUTO: 0.18 K/UL (ref 0–0.51)
EOSINOPHIL NFR BLD: 2 % (ref 0–6.9)
ERYTHROCYTE [DISTWIDTH] IN BLOOD BY AUTOMATED COUNT: 43.2 FL (ref 35.9–50)
EST. AVERAGE GLUCOSE BLD GHB EST-MCNC: 117 MG/DL
GLOBULIN SER CALC-MCNC: 3.4 G/DL (ref 1.9–3.5)
GLUCOSE SERPL-MCNC: 93 MG/DL (ref 65–99)
HBA1C MFR BLD: 5.7 % (ref 0–5.6)
HCT VFR BLD AUTO: 48.8 % (ref 42–52)
HGB BLD-MCNC: 16.3 G/DL (ref 14–18)
IMM GRANULOCYTES # BLD AUTO: 0.04 K/UL (ref 0–0.11)
IMM GRANULOCYTES NFR BLD AUTO: 0.4 % (ref 0–0.9)
LYMPHOCYTES # BLD AUTO: 2.42 K/UL (ref 1–4.8)
LYMPHOCYTES NFR BLD: 27 % (ref 22–41)
MCH RBC QN AUTO: 27.3 PG (ref 27–33)
MCHC RBC AUTO-ENTMCNC: 33.4 G/DL (ref 33.7–35.3)
MCV RBC AUTO: 81.6 FL (ref 81.4–97.8)
MONOCYTES # BLD AUTO: 0.56 K/UL (ref 0–0.85)
MONOCYTES NFR BLD AUTO: 6.3 % (ref 0–13.4)
NEUTROPHILS # BLD AUTO: 5.64 K/UL (ref 1.82–7.42)
NEUTROPHILS NFR BLD: 63 % (ref 44–72)
NRBC # BLD AUTO: 0 K/UL
NRBC BLD-RTO: 0 /100 WBC
PLATELET # BLD AUTO: 286 K/UL (ref 164–446)
PMV BLD AUTO: 11.3 FL (ref 9–12.9)
POTASSIUM SERPL-SCNC: 3.8 MMOL/L (ref 3.6–5.5)
PROT SERPL-MCNC: 7.9 G/DL (ref 6–8.2)
RBC # BLD AUTO: 5.98 M/UL (ref 4.7–6.1)
SODIUM SERPL-SCNC: 138 MMOL/L (ref 135–145)
TSH SERPL DL<=0.005 MIU/L-ACNC: 2.85 UIU/ML (ref 0.38–5.33)
WBC # BLD AUTO: 9 K/UL (ref 4.8–10.8)

## 2020-02-06 PROCEDURE — 92610 EVALUATE SWALLOWING FUNCTION: CPT

## 2020-02-06 PROCEDURE — 80053 COMPREHEN METABOLIC PANEL: CPT

## 2020-02-06 PROCEDURE — 97163 PT EVAL HIGH COMPLEX 45 MIN: CPT

## 2020-02-06 PROCEDURE — 700102 HCHG RX REV CODE 250 W/ 637 OVERRIDE(OP): Performed by: PHYSICAL MEDICINE & REHABILITATION

## 2020-02-06 PROCEDURE — 97530 THERAPEUTIC ACTIVITIES: CPT

## 2020-02-06 PROCEDURE — 770010 HCHG ROOM/CARE - REHAB SEMI PRIVAT*

## 2020-02-06 PROCEDURE — 84443 ASSAY THYROID STIM HORMONE: CPT

## 2020-02-06 PROCEDURE — 94760 N-INVAS EAR/PLS OXIMETRY 1: CPT

## 2020-02-06 PROCEDURE — 83036 HEMOGLOBIN GLYCOSYLATED A1C: CPT

## 2020-02-06 PROCEDURE — 85025 COMPLETE CBC W/AUTO DIFF WBC: CPT

## 2020-02-06 PROCEDURE — A9270 NON-COVERED ITEM OR SERVICE: HCPCS | Performed by: PHYSICAL MEDICINE & REHABILITATION

## 2020-02-06 PROCEDURE — 36415 COLL VENOUS BLD VENIPUNCTURE: CPT

## 2020-02-06 PROCEDURE — 94640 AIRWAY INHALATION TREATMENT: CPT

## 2020-02-06 PROCEDURE — 92523 SPEECH SOUND LANG COMPREHEN: CPT

## 2020-02-06 PROCEDURE — 82306 VITAMIN D 25 HYDROXY: CPT

## 2020-02-06 PROCEDURE — 97167 OT EVAL HIGH COMPLEX 60 MIN: CPT

## 2020-02-06 PROCEDURE — 99233 SBSQ HOSP IP/OBS HIGH 50: CPT | Performed by: PHYSICAL MEDICINE & REHABILITATION

## 2020-02-06 RX ORDER — AMANTADINE HYDROCHLORIDE 100 MG/1
100 CAPSULE, GELATIN COATED ORAL 2 TIMES DAILY
Status: DISCONTINUED | OUTPATIENT
Start: 2020-02-07 | End: 2020-02-07

## 2020-02-06 RX ADMIN — MELATONIN 1000 UNITS: at 09:40

## 2020-02-06 RX ADMIN — BACLOFEN 5 MG: 10 TABLET ORAL at 09:40

## 2020-02-06 RX ADMIN — LEVETIRACETAM 500 MG: 500 TABLET ORAL at 09:41

## 2020-02-06 RX ADMIN — OMEPRAZOLE 20 MG: 20 CAPSULE, DELAYED RELEASE ORAL at 09:40

## 2020-02-06 RX ADMIN — LEVETIRACETAM 500 MG: 500 TABLET ORAL at 21:29

## 2020-02-06 RX ADMIN — POLYETHYLENE GLYCOL 3350 1 PACKET: 17 POWDER, FOR SOLUTION ORAL at 21:32

## 2020-02-06 RX ADMIN — MONTELUKAST 10 MG: 10 TABLET, FILM COATED ORAL at 21:28

## 2020-02-06 RX ADMIN — APIXABAN 2.5 MG: 5 TABLET, FILM COATED ORAL at 21:28

## 2020-02-06 RX ADMIN — LANSOPRAZOLE 30 MG: 30 CAPSULE, DELAYED RELEASE ORAL at 09:00

## 2020-02-06 RX ADMIN — BACLOFEN 5 MG: 10 TABLET ORAL at 14:38

## 2020-02-06 RX ADMIN — APIXABAN 2.5 MG: 5 TABLET, FILM COATED ORAL at 09:40

## 2020-02-06 RX ADMIN — BACLOFEN 5 MG: 10 TABLET ORAL at 21:29

## 2020-02-06 ASSESSMENT — BRIEF INTERVIEW FOR MENTAL STATUS (BIMS)
GENERAL MEMORY AND RECALL ABILITY: NONE OF THE GIVEN OPTIONS
GENERAL MEMORY AND RECALL ABILITY: NONE OF THE GIVEN OPTIONS

## 2020-02-06 ASSESSMENT — ACTIVITIES OF DAILY LIVING (ADL): TOILETING: REQUIRES ASSIST

## 2020-02-06 NOTE — CARE PLAN
Problem: Safety  Goal: Will remain free from injury  Note:   Informed mother to call for assistance and not attempt to transfer on her own.     Problem: Infection  Goal: Will remain free from infection  Note:   Education reinforced to wash hands thoroughly after using the restroom, prior to eating and throughout the day to minimize the potential of acquiring germs while in a facility setting. Patient engaged in conversation and verbalizes understanding.

## 2020-02-06 NOTE — CARE PLAN
Problem: Other Problem (see comments)  Goal: Other Goal  Description  Nutrition Support tolerated and meeting greater than 85% of estimated needs.   Outcome: MET   Pt tolerating bolus feeds of 325 ml plus water flushes.

## 2020-02-06 NOTE — THERAPY
Speech Language Pathology   Initial Assessment     Patient Name: Rey Medina  AGE:  24 y.o., SEX:  male  Medical Record #: [id number]  Today's Date: 2/6/2020     Subjective    Patient is a 24 y.o. 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  Patient was living on [facility] and subsequently moved to [place] where he has been living with his mother.    Objective       02/06/20 1032   Prior Living Situation   Prior Services Continuous (24 Hour) Care Giving Family;Skilled Home Health Services   Housing / Facility 1 Story Apartment / Condo   Lives with - Patient's Self Care Capacity Parents   Prior Level Of Function   Communication Within Functional Limits   Swallow Within Functional Limits   Dentition Intact   Dentures None   Hearing Within Functional Limits for Evaluation   Hearing Aid None   Vision Within Functional Limits for Evaluation   Patient's Primary Language Bhutanese/Tag   Occupation (Pre-Hospital Vocational) Other (Comments)   Receptive Language / Auditory Comprehension   Answers Yes / No Personal Questions Moderate (3)   Follows One Unit Commands Severe (2)   Expressive Language   Automatic Language Appropriate Profound (1)   Cognition   Rancho Scale Level 3   IRF-MAX:  Cognitive Pattern Assessment   Cognitive Pattern Assessment Used Staff Assessment   IRF-MAX:  Staff Assessment for Mental Status   Memory/Recall Ability (3-Day Assessment Period) None of the given options   Social / Pragmatic Communication   Eye Contact Profound (1)   Tracheostomy   Tracheostomy Yes   Speech Mechanisms / Voice Production   Voice Quality Other (Comment)   Labial Function   Labial Structure At Rest Within Functional Limits   Lingual Function   Lingual Structure At Rest Within Functional Limits   Lingual Protrude Within Functional Limits   Lingual Retract Within Functional Limits   Laryngeal Function   Voice Quality Severe   Volutional Cough Within Functional  Limits   Excursion Upon Swallow Complete   Swallowing   Ice Chips Minimal   Puree Minimal   Dysphagia Strategies / Recommendations   Compensatory Strategies To Be Assessed;Single Sips / Bites   Diet / Liquid Recommendation NPO;Pre-Feeding Trials with SLP Only   Medication Administration  Via Gastric Tube   Therapy Interventions Dysphagia Therapy By Speech Language Pathologist;MBSS Evaluation   Follow Up SLP Evaluation MBSS   Barriers To Oral Feeding   Barriers To Oral Feeding Impaired Cognition / Attention  (Rancho level 3)   Cervical Ausculatation Not Tested   Pre-Feeding Oral Stimulation Trial Intact   Problem List   Problem List Cognitive-Linguistic Deficits;Dysphagia   Current Discharge Plan   Current Discharge Plan Return to Prior Living Situation   Benefit   Therapy Benefit Patient would benefit from Inpatient Rehab Speech-Language Pathology to address above identified deficits.   Interdisciplinary Plan of Care Collaboration   IDT Collaboration with  Physician;Respiratory Therapist;Physical Therapist   Collaboration Comments POC   Speech Language Pathologist Assigned   Assigned SLP / Pager # [id number]/MP [phone]+   SLP Total Time Spent   SLP Individual Total Time Spent (Mins) 90   SLP Charge Group   SLP Speech Language Evaluation Speech Sound Language Comprehension   SLP Oral Pharyngeal Evaluation Oral Pharyngeal Evaluation       FIM Eating Score:  1 - Total Assistance  Eating Description:       FIM Comprehension Score:  2 - Max Assistance  Comprehension Description:       FIM Expression Score:  1 - Total Assistance  Expression Description:       FIM Social Interaction Score:  2 - Max Assistance  Social Interaction Description:       FIM Problem Solving Score:  1 - Total Assistance  Problem Solving Description:       FIM Memory Score:  1 - Total Assistance  Memory Description:       Assessment    Patient is 24 y.o. male with a diagnosis of ICH.  Additional factors influencing patient status/progress (ie: cognitive  "factors, co-morbidities, social support, etc): supportive family.      Patient presents at Rancho level 3. Intermittently able to open eyes on command and able to follow some basic oral motor directives (open, close mouth, stick out tongue, swallow). With cues was able to squeeze hand for \"yes\" response to basic personal questions with 60% accuracy. PMSV was placed, however patient did not provide any vocalizations or voicing when placed.     Patient participated in clinical swallow evaluation. PMSV was placed for entirely of swallow evaluation and was tolerated for 50 minutes without a noted change in vitals. Patient followed basic oral motor directives and oral musculature appears WFL. Given oral stimulation, patient was able to provide swallow response. Patient was assessed with PO trials of ice chips and 1/4 tsp purees tinged blue. Was able to consistently provide swallows with all trials and intermittently followed command to perform second swallow. Consistent coughing post trials, however patient was able to provide strong productive cough. Recommend continue NPO with trials 1:1 with SLP only as tolerated. Patient would benefit from MBSS early next week to further assess swallow function, monitor for blue tinged secretion from trache.     Plan  Recommend Speech Therapy  minutes per day 5-6 days per week for 2-3 weeks for the following treatments:  SLP Speech Language Treatment, SLP Swallowing Dysfunction Treatment, SLP Oral Pharyngeal Evaluation, SLP Video Swallow Evaluation, SLP Self Care / ADL Training , SLP Cognitive Skill Development and SLP Group Treatment.    Goals:  Long term and short term goals have been discussed with patient and mother and they are in agreement.    Speech Therapy Problems     Problem: Comprehension STGs     Dates: Start: 02/06/20       Description:     Goal: STG-Within one week, patient will     Dates: Start: 02/06/20       Description: 1) Individualized goal:  Answer personal " y/n questions with use of hand squeeze with 75% accuracy, given mod verbal cues.   2) Interventions:  SLP Speech Language Treatment, SLP Self Care / ADL Training , SLP Cognitive Skill Development and SLP Group Treatment                   Problem: Expression STGs     Dates: Start: 02/06/20       Description:     Goal: STG-Within one week, patient will     Dates: Start: 02/06/20       Description: 1) Individualized goal: Imitate vowel sounds on 50% of attempts during PMSV placement.   2) Interventions:  SLP Speech Language Treatment, SLP Self Care / ADL Training , SLP Cognitive Skill Development and SLP Group Treatment                 Problem: Speech/Swallowing LTGs     Dates: Start: 02/06/20       Description:     Goal: LTG-By discharge, patient will safely swallow     Dates: Start: 02/06/20       Description: 1) Individualized goal:  Dysphagia I/NTL diet without clinical s/sx of aspiration  2) Interventions:  SLP Speech Language Treatment, SLP Swallowing Dysfunction Treatment, SLP Oral Pharyngeal Evaluation, SLP Video Swallow Evaluation, SLP Self Care / ADL Training , SLP Cognitive Skill Development and SLP Group Treatment             Goal: LTG-By discharge, patient will     Dates: Start: 02/06/20       Description:                 Problem: Swallowing STGs     Dates: Start: 02/06/20       Description:     Goal: STG-Within one week, patient will     Dates: Start: 02/06/20       Description: 1) Individualized goal: participate in MBSS to further assess swallow function and rule out silent aspiration  2) Interventions:  SLP Speech Language Treatment, SLP Swallowing Dysfunction Treatment, SLP Oral Pharyngeal Evaluation, SLP Video Swallow Evaluation, SLP Self Care / ADL Training , SLP Cognitive Skill Development and SLP Group Treatment           Goal: STG-Within one week, patient will     Dates: Start: 02/06/20       Description: 1) Individualized goal: tolerate prefeeding trials of puree textures without overt s/sx of  aspiration.   2) Interventions:  SLP Speech Language Treatment, SLP Swallowing Dysfunction Treatment, SLP Oral Pharyngeal Evaluation, SLP Video Swallow Evaluation, SLP Self Care / ADL Training , SLP Cognitive Skill Development and SLP Group Treatment

## 2020-02-06 NOTE — THERAPY
Occupational Therapy   Initial Evaluation     Patient Name: Rey Medina  Age:  24 y.o., Sex:  male  Medical Record #: [id number]  Today's Date: 2/6/2020     Subjective    Patient in bed, asleep when undersigned therapist arrived. Non-verbal, Rancho Level 3. Patient's mother assisted with answering questions re: PLOF, home set-up, etc.      Objective     02/06/20 0701   Prior Living Situation   Prior Services Continuous (24 Hour) Care Giving Family  (Mother assisted patient with all ADLs)   Housing / Facility 1 Story Apartment / Condo  (Ground floor)   Steps Into Home 0   Steps In Home 0   Bathroom Set up Bathtub / Shower Combination;Shower Curtain  (per mother, she assisted pt with completing sponge baths)   Equipment Owned Other (Comments)  (Transport wheelchair)   Lives with - Patient's Self Care Capacity Other (Comments)  (mother)   Prior Level of ADL Function   Self Feeding Dependent   Grooming / Hygiene Requires Assist   Bathing Dependent   Dressing Requires Assist   Toileting Requires Assist   Prior Level of IADL Function   Medication Management Dependent   Laundry Dependent   Kitchen Mobility Dependent   Finances Dependent   Home Management Dependent   Shopping Dependent   Prior Level Of Mobility   (w/c dependent)   Driving / Transportation Other (Comments)  (Per mother, patient has not driven since AVM rupture)   Occupation (Pre-Hospital Vocational) Other (Comments)  (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, patient worked in IT prior to AVM rupture)   Leisure Interests Other (Comments);Computer  (Video games)   IRF-MAX:  Prior Functioning: Everyday Activities   Self Care Dependent   Indoor Mobility (Ambulation) Dependent   Stairs Dependent   Functional Cognition Dependent   Prior Device Use Manual wheelchair   Non Verbal Descriptors   Non Verbal Scale  Sleeping;Calm   Cognition    Level of Consciousness   (Non-verbal, eyes closed majority of session)   Ability To Follow Commands Unable to Follow 1 Step Commands  (inconsistently  followed 1-step command)   R[place]o Scale Level 3   IRF-MAX:  Cognitive Pattern Assessment   Cognitive Pattern Assessment Used Staff Assessment   IRF-MAX:  Staff Assessment for Mental Status   Memory/Recall Ability (3-Day Assessment Period) None of the given options   Vision Screen   Vision Not tested  (UA to participate in formal testing, per mother wears glasse)   Passive ROM Upper Body   Passive ROM Upper Body WDL   Active ROM Upper Body   Dominant Hand Right   Comments Unable to follow testing procedures   Strength Upper Body   Upper Body Strength  Not Tested   Comments Appears significantly impaired based on functional observation   Balance Assessment   Sitting Balance (Static) Poor  (while seated EOB)   Sitting Balance (Dynamic) Dependent   Bed Mobility    Supine to Sit Total Assist   Sit to Supine Total Assist   Scooting Total Assist   IRF-MAX:  Eating   Assistance Needed physical assistance   [place] Physical Assistance Level Total assistance   CARE Score 1   Discharge Goal:  Assistance Needed Physical assistance   Discharge Goal:  Physical Assistance Level 25%-49%   Discharge Goal:  Score 3   IRF-MAX:  Oral Hygiene   Assistance Needed Physical assistance   Physical Assistance Level Total assistance   CARE Score 1   Discharge Goal:  Assistance Needed Physical assistance   Discharge Goal:  Physical Assistance Level 25%-49%   Discharge Goal:  Score 3   IRF-MXA:  Shower/Bathe Self   Reason if not Attempted Medical concerns   CARE Score 88   Discharge Goal:  Assistance Needed Physical assistance   Discharge Goal:  Physical Assistance Level 25%-49%   Discharge Goal Score 3   IRF-MAX:  Upper Body Dressing   Assistance Needed Physical assistance   Physical Assistance Level Total assistance   CARE Score 1   Discharge Goal:  Assistance Needed Physical assistance   Discharge Goal:  Physical Assistance Level 25%-49%   Dischage Goal:  Score 3   IRF-MAX:  Lower Body Dressing   Assistance Needed Physical assistance    Physical Assistance Level Total assistance   CARE Score 1   Discharge Goal:  Assistance Needed Physical assistance   Discharge Goal:  Physical Assistance Level 25%-49%   Discharge Goal:  Score 3   IRF MAX:  Putting On/Taking Off Footwear   Assistance Needed Physical assistance   Physical Assistance Level Total assistance   CARE Score 1   Discharge Goal:  Assistance Needed Physical assistance   Discharge Goal:  Physical Assistance Level 25%-49%   Discharge Goal:  Score 3   IRF-MAX:  Toileting Hygiene   Assistance Needed Physical assistance   Physical Assistance Level Total assistance   CARE Score 1   Discharge Goal:  Assistance Needed Physical assistance   Discharge Goal:  Physical Assistance Level 25%-49%   Discharge Goal:  Score 3   IRF-MAX:  Toilet Transfer   Reason if not Attempted Medical concerns   CARE Score 88   IRF-MAX:  Hearing, Speech, and Vision   Expression of Ideas and Wants 1   Understanding Verbal and Non-Verbal Content 1   Problem List   Problem List Decreased Active Daily Living Skills;Decreased Homemaking Skills;Decreased Upper Extremity Strength Right;Decreased Upper Extremity AROM Right;Decreased Upper Extremity AROM Left;Decreased Upper Extremity Strength Left;Decreased Functional Mobility;Decreased Activity Tolerance;Safety Awareness Deficits / Cognition;Impaired Posture / Trunk Alignment;Impaired Coordination Right Upper Extremity;Impaired Coordination Left Upper Extremity;Impaired Cognitive Function;Impaired Upper Extremity Tone Right;Impaired Upper Extremity Tone Left;Impaired Postural Control / Balance   Precautions   Precautions Other (See Comments);Fall Risk;Tracheostomy ;PEG Tube   Comments Non-verbal   Current Discharge Plan   Current Discharge Plan Return to Prior Living Situation   Benefit    Therapy Benefit Patient Would Benefit from Inpatient Rehab Occupational Therapy to Maximize [place] with ADLs, IADLs and Functional Mobility.   Interdisciplinary Plan of Care Collaboration    IDT Collaboration with  Nursing;Family / Caregiver;Respiratory Therapist   Patient Position at End of Therapy Family / Friend in Room;In Bed   Collaboration Comments RT performed suctioning, Mother answered questions re: PLOF/CLOF   Equipment Needs   Assistive Device / DME Other (Comments)  (TBA)   Adaptive Equipment Other (Comments)  (TBA)   OT Total Time Spent   OT Individual Total Time Spent (Mins) 60   OT Charge Group   OT Evaluation OT Evaluation High       FIM Eating Score:  1 - Total Assistance  Eating Description:  Set-up of equipment or meal/tube feeding, Staff administers tube feed/parenteral nutrition/IVF    FIM Grooming Score:  1 - Total Assistance  Grooming Description:  (Total assist for washing face)    FIM Bathing Score:  0 - Not tested, medical condition  Bathing Description:       FIM Upper Body Dressin - Total Assistance  Upper Body Dressing Description:  Set-up of equipment, Supervision for safety, Increased time    FIM Lower Body Dressing Score:  1 - Total Assistance  Lower Body Dressing Description:  Requires 2 people to assist, Increased time, Initial preparation for task, Set-up of equipment(Total assist (2 person assist) to don elastic waist pants in bed)    FIM Toileting Body Dressin - Total Assistance  Toileting Description:  Increased time, Set-up of equipment(Total assist for toileting in bed (using urinal))    FIM Tub/Shower Transfers Score:  0 - Not tested,unsafe activity  Tub/Shower Transfers Description:       Assessment  Patient is 24 y.o. 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     Plan  Recommend Occupational Therapy  minutes per day 5-7  days per week for 2-3 weeks for the following treatments:  OT Group Therapy, OT Self Care/ADL, OT Cognitive Skill Dev, OT Community Reintegration, OT Manual Ther Technique, OT Neuro Re-Ed/Balance, OT Sensory Int Techniques, OT Evaluation and OT Therapeutic Exercise.    Goals:  Long term and short term goals have been discussed with patient and they are in agreement.    Occupational Therapy Goals     Problem: Dressing     Dates: Start: 02/06/20       Description:     Goal: STG-Within one week, patient will dress UB     Dates: Start: 02/06/20       Description: 1) Individualized Goal:  Max assist   2) Interventions:  OT Group Therapy, OT Self Care/ADL, OT Cognitive Skill Dev, OT Manual Ther Technique, OT Neuro Re-Ed/Balance, OT Sensory Int Techniques, OT Therapeutic Activity, OT Evaluation and OT Therapeutic Exercise                 Problem: Functional Cognition     Dates: Start: 02/06/20       Description:           Problem: Grooming     Dates: Start: 02/06/20       Description:     Goal: STG-Within one week, patient will complete grooming     Dates: Start: 02/06/20       Description: 1) Individualized Goal:  Max assist  2) Interventions:  OT Group Therapy, OT Self Care/ADL, OT Cognitive Skill Dev, OT Manual Ther Technique, OT Neuro Re-Ed/Balance, OT Sensory Int Techniques, OT Therapeutic Activity, OT Evaluation and OT Therapeutic Exercise                   Problem: OT Long Term Goals     Dates: Start: 02/06/20       Description:     Goal: LTG-By discharge, patient will complete basic self care tasks     Dates: Start: 02/06/20       Description: 1) Individualized Goal:  Mod assist with AE as needed  2) Interventions:  OT Group Therapy, OT Self Care/ADL, OT Cognitive Skill Dev, OT Manual Ther Technique, OT Neuro Re-Ed/Balance, OT Sensory Int Techniques, OT Therapeutic Activity, OT Evaluation and OT Therapeutic Exercise           Goal: LTG-By discharge, patient will perform bathroom transfers     Dates: Start: 02/06/20        Description: 1) Individualized Goal:  Mod assist with AE as needed    2) Interventions:  OT Group Therapy, OT Self Care/ADL, OT Cognitive Skill Dev, OT Manual Ther Technique, OT Neuro Re-Ed/Balance, OT Sensory Int Techniques, OT Therapeutic Activity, OT Evaluation and OT Therapeutic Exercise

## 2020-02-06 NOTE — THERAPY
"Physical Therapy   Initial Evaluation     Patient Name: Rey Medina  Age:  24 y.o., Sex:  male  Medical Record #: [id number]  Today's Date: 2/6/2020     Subjective    Pt in bed at start of therapy, mother present for PT session and agreeable to participating. Pt's mother: \"Give me a kiss, Rey.\" Pt leaned his head forward to touch his mouth to her cheek. Pt's mother reports that he was previously spending ~4 hours/day in his lightweight transport chair.     Objective       02/06/20 0931   Prior Living Situation   Prior Services Continuous (24 Hour) Care Giving Family;Skilled Home Health Services  (pt's mother was providing care alongside home health nursing)   Housing / Facility 1 Story Apartment / Condo  (ground floor)   Steps Into Home 0   Steps In Home 0   Lives with - Patient's Self Care Capacity Other (Comments)  (lives with mother)   Prior Level of Functional Mobility   Bed Mobility Dependent   Transfer Status Dependent   Ambulation Dependent   Assistive Devices Used Wheelchair   Wheelchair Dependent   Stairs Dependent   Comments Independent PLOF before April 2019; Pt has been dependent and living with his mother since November 2019   IRF-MAX:  Prior Functioning: Everyday Activities   Self Care Dependent   Indoor Mobility (Ambulation) Dependent   Stairs Dependent   Functional Cognition Dependent   Prior Device Use Manual wheelchair  (lightweight transfer WC)   Cognition    Level of Consciousness Obtunded   Ability To Follow Commands Other (See Comments)  (able to follow some 1 step commands, unresponsive to others)   Rancho Scale Level 3   Passive ROM Lower Body   Passive ROM Lower Body WDL   Active ROM Lower Body    Active ROM Lower Body  Not Tested   Strength Lower Body   Lower Body Strength  Not Tested   Sensation Lower Body   Lower Extremity Sensation   Not Tested   Lower Body Muscle Tone   Lower Body Muscle Tone  WDL   Balance Assessment   Sitting Balance (Static) Poor +   Sitting Balance (Dynamic) " Dependent   Standing Balance (Static) Dependent   Standing Balance (Dynamic) Dependent   Bed Mobility    Supine to Sit Total Assist X 2   Sit to Stand Total Assist X 2   Scooting Total Assist   Neurological Concerns   Neurological Concerns Yes   Clonus Intermittent   Coordination Lower Body    Coordination Lower Body  Not Tested   IRF-MAX:  Roll Left and Right   Assistance Needed Physical assistance   Physical Assistance Level Total assistance   CARE Score 1   Discharge Goal:  Assistance Needed Physical assistance   Discharge Goal:  Physical Assistance Level 50%-74%   Discharge Goal:  Score 2   IRF-MAX:  Sit to Lying   Assistance Needed Physical assistance   Physical Assistance Level Total assistance   CARE Score 1   Discharge Goal:  Assistance Needed Physical assistance   Discharge Goal:  Physical Assistance Level 50%-74%   Discharge Goal:  Score 2   IRF-MAX:  Lying to Sitting on Side of Bed   Assistance Needed Physical assistance   Physical Assistance Level Total assistance   CARE Score 1   Discharge Goal:  Assistance Needed Physical assistance   Discharge Goal:  Physical Assistance Level 50%-74%   Discharge Goal:  Score 2   IRF-MAX:  Sit to Stand   Assistance Needed Physical assistance   Physical Assistance Level Total assistance   CARE Score 1   Discharge Goal:  Assistance Needed Physical assistance   Discharge Goal:  Physical Assistance Level 50%-74%   Discahrge Goal:  Score 2   IRF-MAX:  Chair/Bed-to-Chair Transfer   Assistance Needed Physical assistance   Physical Assistance Level Total assistance   CARE Score 1   Discharge Goal:  Assistance Needed Physical assistance   Discharge Goal:  Physical Assistance Level 50%-74%   Discharge Goal:  Score 2   IRF-MAX:  Car Transfer   Reason if not Attempted Safety concerns   CARE Score 88   Discharge Goal:  Assistance Needed Physical assistance   Discharge Goal:  Physical Assistance Level 50%-74%   Discharge Goal:  Score 2   IRF MAX:  Walking   Does the Patient Walk? No    Is Walking a Clinically Indicated Goal? Yes   Uses a Wheelchair/Scooter? Yes   IRF MAX:  Walk 10 Feet   Reason if not Attempted Safety concerns   CARE Score 88   Discharge Goal:  Assistance Needed Adaptive equipment;Physical assistance   Discharge Goal:  Physical Assistance Level 50%-74%   Discharge Goal:  Score 2   IRF-MAX:  Walk 50 Feet with Two Turns   Reason if not Attempted Safety concerns   CARE Score 88   Discharge Goal:  Assistance Needed Adaptive equipment   Discharge Goal:  Physical Assistance Level 50%-74%   Discharge Goal:  Score 2   IRF-MAX:  Walk 150 Feet   Reason if not Attempted Safety concerns   CARE Score 88   Discharge Goal:  Assistance Needed Physical assistance;Adaptive equipment   Discharge Goal:  Physical Assistance Level 50%-74%   Discharge Goal:  Score 2   IRF MAX:  Walking 10 Feet on Uneven Surfaces   Reason if not Attempted Safety concerns   CARE Score 88   Discharge Goal:  Assistance Needed Adaptive equipment;Physical assistance   Discharge Goal:  Physical Assistance Level 50%-74%   Discharge Goal:  Score 2   IRF MAX:  1 Step (Curb)   Reason if not Attempted Safety concerns   CARE Score 88   Discharge Goal:  Assistance Needed Physical assistance;Adaptive equipment   Discharge Goal:  Physical Assistance Level 50%-74%   Discharge Goal:  Score 2   IRF-MAX:  4 Steps   Reason if not Attempted Safety concerns   CARE Score 88   Discharge Goal:  Assistance Needed Physical assistance;Adaptive equipment   Discharge Goal:  Physical Assistance Level 50%-74%   Discharge Goal:  Score 2   IRF MAX:  12 Steps   Reason if not Attempted Safety concerns   CARE Score 88   Discharge Goal:  Assistance Needed Physical assistance;Adaptive equipment   Discharge Goal:  Physical Assistance Level 75% or more   Discharge Goal:  Score 2   IRF MAX:  Picking Up Object   Reason if not Attempted Safety concerns   CARE Score 88   Discharge Goal:  Assistance Needed Adaptive equipment;Physical assistance   Discharge Goal:   Physical Assistance Level 50%-74%   Discharge Goal:  Score 2   IRF-MAX:  Wheel 50 Feet with Two Turns   Indicate the Type of Wheelchair or Scooter Used Manual   Reason if not Attempted Safety concerns   CARE Score 88   Discharge Goal:  Assistance Needed Supervision   Discharge Goal:  Physical Assistance Level No physical assistance or only touching/steadying assist   Discharge Goal:  Score 4   IRF-MAX:  Wheel 150 Feet   Indicate the Type of Wheelchair or Scooter Used Manual   Reason if not Attempted Safety concerns   CARE Score 88   Discharge Goal:  Assistance Needed Adaptive equipment;Supervision   Discharge Goal:  Physical Assistance Level No physical assistance or only touching/steadying assist   Discharge Goal:  Score 4   Problem List    Problems Unable To Determine At This Time   Precautions   Precautions PEG Tube;Tracheostomy ;Fall Risk;Other (See Comments)   Comments Non-verbal   Current Discharge Plan   Current Discharge Plan Return to Prior Living Situation   Interdisciplinary Plan of Care Collaboration   IDT Collaboration with  Nursing;Family / Caregiver;Physician;Respiratory Therapist   Patient Position at End of Therapy Seated;Self Releasing Lap Belt Applied;Family / Friend in Room   Collaboration Comments Nursing giving meds and assisting with toileting needs, pt's mother in room for session and speaking for the pt, MD present for pt transfer, respiratory therapy educating staff on pt's humidifier and trach care during therapy   Benefit   Therapy Benefit Patient Would Benefit from Inpatient Rehabilitation Physical Therapy to Maximize Functional [place] with ADLs, IADLs and Mobility.     Pt transferred transport chair > WC, 1 person mod A and 1 person min A SPT without AD.     Pt provided a standard WC and pressure relieving cushion. Mother educated on importance of pressure relief.    FIM Bed/Chair/Wheelchair Transfers Score: 1 - Total Assistance  Bed/Chair/Wheelchair Transfers Description:   Increased time, Supervision for safety, Verbal cueing, Set-up of equipment, Assist with two limbs, Requires 2 people to assist(bed > transfer WC mod-max assist x2, B LE assist for supine>sit )    FIM Walking Score:  0 - Not tested,unsafe activity  Walking Description:       FIM Wheelchair Score:  0 - Not tested,unsafe activity  Wheelchair Description:       FIM Stairs Score:  0 - Not tested,unsafe activity  Stairs Description:       Assessment  Patient is 24 y.o. male with a diagnosis of AVM rupture/ intracranial hemorrhage on 4/21/19 s/p AVM repair.  Additional factors influencing patient status / progress (ie: cognitive factors, co-morbidities, social support, etc): PMH significant for hydrocephalus s/p  shunt, tracheostomy and G tube placement. He lived independently in the [facility] prior to his AVM rupture and worked in IT. He was brought from [facility] by his mother in November and has been dependent on her care and home health services since. His mother is very supportive and motivated to participate in his rehabilitation. Since he is currently non-verbal, she provided all subjective information at this time.    Plan  Recommend Physical Therapy 30-60 minutes per day 5-7 days per week for 2-3 weeks for the following treatments:  PT E Stim Attended, PT Orthotics Training, PT Gait Training, PT Self Care/Home Eval, PT Therapeutic Exercises, PT Ultrasound, PT TENS Application, PT Neuro Re-Ed/Balance, PT Therapeutic Activity, PT Manual Therapy and PT Evaluation.    Goals:  Long term and short term goals have been discussed with patient and his mother and they are in agreement.    Physical Therapy Problems     Problem: Balance     Dates: Start: 02/06/20       Description:     Goal: STG-Within one week, patient will maintain static sitting     Dates: Start: 02/06/20       Description: 1) Individualized goal:  SBA for >3 min   2) Interventions:  PT E Stim Attended, PT Orthotics Training, PT Gait  Training, PT Self Care/Home Eval, PT Therapeutic Exercises, PT Ultrasound, PT TENS Application, PT Neuro Re-Ed/Balance, PT Therapeutic Activity, PT Manual Therapy and PT Evaluation                   Problem: Mobility Transfers     Dates: Start: 02/06/20       Description:     Goal: STG-Within one week, patient will roll     Dates: Start: 02/06/20       Description: 1) Individualized goal:  To left or right, mod A with use of bed features as needed.  2) Interventions:  PT E Stim Attended, PT Orthotics Training, PT Gait Training, PT Self Care/Home Eval, PT Therapeutic Exercises, PT Ultrasound, PT TENS Application, PT Neuro Re-Ed/Balance, PT Therapeutic Activity, PT Manual Therapy and PT Evaluation             Goal: STG-Within one week, patient will sit to stand     Dates: Start: 02/06/20       Description: 1) Individualized goal:  Min A with use of AD/bed rail as needed  2) Interventions: PT E Stim Attended, PT Orthotics Training, PT Gait Training, PT Self Care/Home Eval, PT Therapeutic Exercises, PT Ultrasound, PT TENS Application, PT Neuro Re-Ed/Balance, PT Therapeutic Activity, PT Manual Therapy and PT Evaluation             Goal: STG-Within one week, patient will transfer bed to chair     Dates: Start: 02/06/20       Description: 1) Individualized goal:  Mod A with LRAD  2) Interventions:  PT E Stim Attended, PT Orthotics Training, PT Gait Training, PT Self Care/Home Eval, PT Therapeutic Exercises, PT Ultrasound, PT TENS Application, PT Neuro Re-Ed/Balance, PT Therapeutic Activity, PT Manual Therapy and PT Evaluation                   Problem: PT-Long Term Goals     Dates: Start: 02/06/20       Description:     Goal: LTG-By discharge, patient will maintain balance     Dates: Start: 02/06/20       Description: 1) Individualized goal:  In sitting with use of UE for assist at a SPV level.  2) Interventions:  PT E Stim Attended, PT Orthotics Training, PT Gait Training, PT Self Care/Home Eval, PT Therapeutic Exercises,  PT Ultrasound, PT TENS Application, PT Neuro Re-Ed/Balance, PT Therapeutic Activity, PT Manual Therapy and PT Evaluation             Goal: LTG-By discharge, patient will tolerate standing     Dates: Start: 02/06/20       Description: 1) Individualized goal:  2 min, mod A with use of LRAD  2) Interventions:  PT E Stim Attended, PT Orthotics Training, PT Gait Training, PT Self Care/Home Eval, PT Therapeutic Exercises, PT Ultrasound, PT TENS Application, PT Neuro Re-Ed/Balance, PT Therapeutic Activity, PT Manual Therapy and PT Evaluation           Goal: LTG-By discharge, patient will ambulate     Dates: Start: 02/06/20       Description: 1) Individualized goal:  10 ft, mod A with LRAD  2) Interventions:  PT E Stim Attended, PT Orthotics Training, PT Gait Training, PT Self Care/Home Eval, PT Therapeutic Exercises, PT Ultrasound, PT TENS Application, PT Neuro Re-Ed/Balance, PT Therapeutic Activity, PT Manual Therapy and PT Evaluation             Goal: LTG-By discharge, patient will transfer one surface to another     Dates: Start: 02/06/20       Description: 1) Individualized goal:  Min A with LRAD  2) Interventions:  PT E Stim Attended, PT Orthotics Training, PT Gait Training, PT Self Care/Home Eval, PT Therapeutic Exercises, PT Ultrasound, PT TENS Application, PT Neuro Re-Ed/Balance, PT Therapeutic Activity, PT Manual Therapy and PT Evaluation

## 2020-02-06 NOTE — PROGRESS NOTES
"Rehab Progress Note     Encounter Date: 2/6/2020    CC: ICH, AMS    Interval Events (Subjective)  Patient working with therapy during evaluation. Patient intermittently following commands such as when prompted to stand he was able to ballistically stand upright from EOB. Otherwise he is not communicative. Discussed with RT and plan to switch to Shiley size 6. He did require some suction. Discussed would start on capping trial. Otherwise discussed labs with mother which were all normal.    ROS: Unable to perform    Objective:  VITAL SIGNS: /73   Pulse 98   Temp 36.9 °C (98.4 °F) (Temporal)   Resp 18   Ht 1.702 m (5' 7\")   Wt 75 kg (165 lb 5.5 oz)   SpO2 96%   BMI 25.90 kg/m²   Gen: NAD  Psych: Mood and affect flat  CV: RRR, no edema  Resp: CTAB, no upper airway sounds  Abd: NTND  Neuro: following occasional commands, refusing to open eyes at time    Recent Results (from the past 72 hour(s))   CBC with Differential    Collection Time: 02/06/20  6:07 AM   Result Value Ref Range    WBC 9.0 4.8 - 10.8 K/uL    RBC 5.98 4.70 - 6.10 M/uL    Hemoglobin 16.3 14.0 - 18.0 g/dL    Hematocrit 48.8 42.0 - 52.0 %    MCV 81.6 81.4 - 97.8 fL    MCH 27.3 27.0 - 33.0 pg    MCHC 33.4 (L) 33.7 - 35.3 g/dL    RDW 43.2 35.9 - 50.0 fL    Platelet Count 286 164 - 446 K/uL    MPV 11.3 9.0 - 12.9 fL    Neutrophils-Polys 63.00 44.00 - 72.00 %    Lymphocytes 27.00 22.00 - 41.00 %    Monocytes 6.30 0.00 - 13.40 %    Eosinophils 2.00 0.00 - 6.90 %    Basophils 1.30 0.00 - 1.80 %    Immature Granulocytes 0.40 0.00 - 0.90 %    Nucleated RBC 0.00 /100 WBC    Neutrophils (Absolute) 5.64 1.82 - 7.42 K/uL    Lymphs (Absolute) 2.42 1.00 - 4.80 K/uL    Monos (Absolute) 0.56 0.00 - 0.85 K/uL    Eos (Absolute) 0.18 0.00 - 0.51 K/uL    Baso (Absolute) 0.12 0.00 - 0.12 K/uL    Immature Granulocytes (abs) 0.04 0.00 - 0.11 K/uL    NRBC (Absolute) 0.00 K/uL   Comp Metabolic Panel (CMP)    Collection Time: 02/06/20  6:07 AM   Result Value Ref Range "    Sodium 138 135 - 145 mmol/L    Potassium 3.8 3.6 - 5.5 mmol/L    Chloride 105 96 - 112 mmol/L    Co2 24 20 - 33 mmol/L    Anion Gap 9.0 0.0 - 11.9    Glucose 93 65 - 99 mg/dL    Bun 9 8 - 22 mg/dL    Creatinine 0.73 0.50 - 1.40 mg/dL    Calcium 9.4 8.5 - 10.5 mg/dL    AST(SGOT) 12 12 - 45 U/L    ALT(SGPT) 31 2 - 50 U/L    Alkaline Phosphatase 93 30 - 99 U/L    Total Bilirubin 0.5 0.1 - 1.5 mg/dL    Albumin 4.5 3.2 - 4.9 g/dL    Total Protein 7.9 6.0 - 8.2 g/dL    Globulin 3.4 1.9 - 3.5 g/dL    A-G Ratio 1.3 g/dL   TSH with Reflex to FT4    Collection Time: 02/06/20  6:07 AM   Result Value Ref Range    TSH 2.850 0.380 - 5.330 uIU/mL   Vitamin D, 25-hydroxy (blood)    Collection Time: 02/06/20  6:07 AM   Result Value Ref Range    25-Hydroxy   Vitamin D 25 30 30 - 100 ng/mL   ESTIMATED GFR    Collection Time: 02/06/20  6:07 AM   Result Value Ref Range    GFR If African American >60 >60 mL/min/1.73 m 2    GFR If Non African American >60 >60 mL/min/1.73 m 2       Current Facility-Administered Medications   Medication Frequency   • vitamin D (cholecalciferol) tablet 1,000 Units DAILY   • levETIRAcetam (KEPPRA) tablet 500 mg BID   • ivabradine (CORLANOR) tablet 5 mg DAILY   • apixaban (ELIQUIS) tablet 2.5 mg BID   • Respiratory Care per Protocol Continuous RT   • Pharmacy Consult Request ...Pain Management Review 1 Each PHARMACY TO DOSE   • tramadol (ULTRAM) 50 MG tablet 50 mg Q4HRS PRN   • hydrALAZINE (APRESOLINE) tablet 25 mg Q8HRS PRN   • acetaminophen (TYLENOL) tablet 650 mg Q4HRS PRN   • artificial tears ophthalmic solution 1 Drop PRN   • benzocaine-menthol (CEPACOL) lozenge 1 Lozenge Q2HRS PRN   • mag hydrox-al hydrox-simeth (MAALOX PLUS ES or MYLANTA DS) suspension 20 mL Q2HRS PRN   • ondansetron (ZOFRAN ODT) dispertab 4 mg 4X/DAY PRN    Or   • ondansetron (ZOFRAN) syringe/vial injection 4 mg 4X/DAY PRN   • traZODone (DESYREL) tablet 50 mg QHS PRN   • sodium chloride (OCEAN) 0.65 % nasal spray 2 Spray PRN   •  omeprazole (PRILOSEC) capsule 20 mg DAILY   • lansoprazole (PREVACID) capsule 30 mg DAILY   • ipratropium-albuterol (DUONEB) nebulizer solution Q4H PRN (RT)   • polyethylene glycol/lytes (MIRALAX) PACKET 1 Packet Q24HR    And   • magnesium hydroxide (MILK OF MAGNESIA) suspension 30 mL QDAY PRN    And   • bisacodyl (DULCOLAX) suppository 10 mg QDAY PRN   • montelukast (SINGULAIR) tablet 10 mg Nightly   • baclofen (LIORESAL) tablet 5 mg TID       Orders Placed This Encounter   Procedures   • Diet NPO     Standing Status:   Standing     Number of Occurrences:   1     Order Specific Question:   Restrict to:     Answer:   Strict [1]       Assessment:  Active Hospital Problems    Diagnosis   • *Intracerebral hemorrhage, intraventricular (HCC)   • 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 Patient very low functioning but per mother becoming more purposeful  -PT and OT for mobility and ADLs  -SLP for cognition   -Start Neurostimulants, amantadine 100 mg BID (0800 and 1300)  -Continue Keppra. Unclear if had seizure or was continued on prophylaxis     Muscle spasms - mother claims Bromocriptine helps, typically used for dopamine side effects/neurostimulant. Will start low dose Baclofen and monitor    Dysphagia - Patient with G Tube in place. SLP for swallow. Would benefit from MBSS if following more commands     CV - Patient is on Ivabradine 5 mg daily. Unclear reason why     Respiratory - Patient with size 7 trach on admission. Patient on Duonebs and Singulair  -Exchanged for size 6 shiley. Will start capping trials. Discussed with RT at length     GI Ppx - Patient on Prilosec and Prevacid. Unclear why two PPIs     DVT Ppx - Patient on Eliquis 2.5 mg BID. Unknown  reason why.     Total time:  35 minutes.  I spent greater than 50% of the time for patient care and coordination on this date, including unit/floor time, and face-to-face time with the patient as per assessment and plan above.  Discussion included trach plan including exchange, start Amantadine BID and reviewed admission labs with mother.     Greer David M.D.

## 2020-02-06 NOTE — REHAB-DIETARY IDT TEAM NOTE
"Dietary   Nutrition  Dietary Problems     Problem: Other Problem (see comments)     Description:     Goal: Other Goal     Description: Nutrition Support tolerated and meeting greater than 85% of estimated needs.                   Nutrition Support Assessment:  Day 0 of admit.  Rey Medina is a 24 y.o. male with admitting DX of ICH     Current problem list:  1. Dysphagia  2. Tracheostomy  3. GERD  4. Seizure?  5. 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 Hydrocephalus S/P  shunt     Assessment:  Estimated Nutritional Needs based on:   Height: 170.2 cm (5' 7\")  Weight: 75 kg (165 lb 5.5 oz)  Weight to Use in Calculations: 75 kg (165 lb 5.5 oz)  Ideal Body Weight: 67.1 kg (148 lb)  Percent Ideal Body Weight: 111.7  Body mass index is 25.9 kg/m²., BMI classification: Overweight    Calculation/Equation: REE with MSJ = 1694 kcal  Total Calories / day: 1695 - 1875  (Calories / k.6 - 25)  Total Grams Protein / day: 75.1 - 90  (Grams Protein / k - 1.2)     Evaluation:   1. Pt NPO due to dysphagia.  2. G - tube in place.  3. Pt's home tube feed ordered with [employer], 300 ml q 4 hours from 0600 to 1000, plus one scoop beneprotein with each feed.  4. Pt's Mother at bedside and requesting to continue the home tube feed formula.  She plans to provide the formula during admission.  Pt's Mother states they were providing Nutren for bolus feeds, but changed to Carina [employer] in December.  She states pt has tolerated this formula well and his previous problem with constipation resolved.  She states he has regular stools, no diarrhea or constipation.  5. She provides 5 feeds per day of 300 ml.  This provides 1500 kcal's.  She is receptive to providing the complete carton of 325 ml at each feed as she noted pt has possibly lost some weight.  She states his weight was 78 kg in November, and she noted his current weight on bed scale is 75 kg.  Five full cartons per day will provide 1625 kcal, 80 gm protein, and " 1235 ml free water.    6. She provides the additional beneprotein at home with the first 4 feeds, but not the last feed.  The additional beneprotein will provide 24 gm protein.  7. She provides free water flushes 120 ml before and after each feed, for a total of 1200 ml per day.  8. No labs yet available to assess.  9. Medications include keppra, prilosec, Vit D, miralax.   10. Skin: intact per nursing note.     Malnutrition Risk: Criteria not met.     Recommendations/Plan:  1. Continue home tube feed as desired by family: Carina Sarkar, 325 ml bolus q 4 hours (5 x day), plus additional scoop of beneprotein with first 4 feeds.  Total will provide 1725 kcal, 104 gm protein, 1235 ml free water.  2. Continue 240 ml free water q 4 hours.  3. Monitor weight, labs.  4. RD following.    Section completed by:  Amy Chapman R.D.

## 2020-02-06 NOTE — FLOWSHEET NOTE
02/06/20 0750   Events/Summary/Plan   Events/Summary/Plan Aerosol check, suction   Interdisciplinary Plan of Care-Goals (Indications)   Bronchodilator Indications History / Diagnosis;Physical Exam / Hyperinflation / Wheezing (bronchospasm)   Bronchopulmonary Hygiene Indications Difficulty with Secretion Clearance   Interdisciplinary Plan of Care-Outcomes    Bronchodilator Outcome Diminished Wheezing and Volume of Air Movement Increased;Improvement in Airflow (peak flow, PFT)   Bronchopulmonary Hygiene Outcome Optimal Hydration with Moderate or Less Sputum Production;Breath Sounds from Diminished to Adventitious with Rhonchi Cleared by Cough   Education   Education Yes - Pt. / Family has been Instructed in use of Respiratory Equipment;Yes - Pt. / Family has been Instructed in use of Respiratory Medications and Adverse Reactions   RT Assessment of Delivered Medications   Evaluation of Medication Delivery Daily Yes-- Pt /Family has been Instructed in use of Respiratory Medications and Adverse Reactions   Aerosol Therapy / Airway Management   #Aerosol Therapy / Airway Management Trache Collar   Aerosol Humidity Temp (celsius) 35   Chest Exam   Respiration 18   Pulse 98   Secretions   Cough Productive   Sputum Amount Moderate   Sputum Color White   Sputum Consistency Thick   Suction Frequency Suctioned Once or Twice Per Encounter   Airway  7.0   No Placement Date or Time found.   LDA Placed Prior to Arrival: Yes  Airway Type: (c)   Airway Size: 7.0   Site Assessment Clean;Dry;Intact   Airway Tube Secured Other (Comment)  (Fabric trach tie)   Extra Tracheostomy Tube at Bedside Yes   Oximetry   #Pulse Oximetry (Single Determination) Yes   Oxygen   Home O2 Use Prior To Admission? No   Pulse Oximetry 96 %   FiO2% 28 %

## 2020-02-06 NOTE — PROGRESS NOTES
-----------Non-Face-to-Face------------  Prolonged non-face-to-face time was spent on 2/3/2020 from 1605 to 1650 by this reviewer.  This time included medical chart review, medication review, and decision making for the appropriateness of transfer to inpatient rehabilitation.  Notified by outpatient clinic of patient for possible acute rehabilitation admission, reviewed faxed paperwork from the [facility], reviewed clinic note from 1/24/20, and reviewed ER progress notes from last year.  Limited records available from prior to [facility] the [facility] [place].  Accepted to [facility], plan for admission on 2/5/20 from home. In addition to the above, time was spent coordinating care with case management as well as transitional care coordination team in the acceptance and transfer of the patient to the inpatient rehabilitation facility setting.

## 2020-02-06 NOTE — CARE PLAN
Problem: Other Problem (see comments)  Goal: Other Goal  Description  Nutrition Support tolerated and meeting greater than 85% of estimated needs.   Outcome: PROGRESSING AS EXPECTED

## 2020-02-07 PROCEDURE — 700101 HCHG RX REV CODE 250: Performed by: PHYSICAL MEDICINE & REHABILITATION

## 2020-02-07 PROCEDURE — 700102 HCHG RX REV CODE 250 W/ 637 OVERRIDE(OP): Performed by: PHYSICAL MEDICINE & REHABILITATION

## 2020-02-07 PROCEDURE — 99356 PR PROLONGED SVC I/P OR OBS SETTING 1ST HOUR: CPT | Performed by: PHYSICAL MEDICINE & REHABILITATION

## 2020-02-07 PROCEDURE — 99233 SBSQ HOSP IP/OBS HIGH 50: CPT | Mod: 25 | Performed by: PHYSICAL MEDICINE & REHABILITATION

## 2020-02-07 PROCEDURE — 97530 THERAPEUTIC ACTIVITIES: CPT

## 2020-02-07 PROCEDURE — 92507 TX SP LANG VOICE COMM INDIV: CPT

## 2020-02-07 PROCEDURE — 770010 HCHG ROOM/CARE - REHAB SEMI PRIVAT*

## 2020-02-07 PROCEDURE — 97112 NEUROMUSCULAR REEDUCATION: CPT

## 2020-02-07 PROCEDURE — A9270 NON-COVERED ITEM OR SERVICE: HCPCS | Performed by: PHYSICAL MEDICINE & REHABILITATION

## 2020-02-07 PROCEDURE — 97535 SELF CARE MNGMENT TRAINING: CPT

## 2020-02-07 PROCEDURE — 94760 N-INVAS EAR/PLS OXIMETRY 1: CPT

## 2020-02-07 PROCEDURE — 94640 AIRWAY INHALATION TREATMENT: CPT

## 2020-02-07 RX ORDER — LEVETIRACETAM 100 MG/ML
500 SOLUTION ORAL EVERY 12 HOURS
Status: DISCONTINUED | OUTPATIENT
Start: 2020-02-07 | End: 2020-02-21 | Stop reason: HOSPADM

## 2020-02-07 RX ADMIN — BACLOFEN 5 MG: 10 TABLET ORAL at 10:57

## 2020-02-07 RX ADMIN — AMANTADINE HYDROCHLORIDE 100 MG: 50 SOLUTION ORAL at 10:55

## 2020-02-07 RX ADMIN — AMANTADINE HYDROCHLORIDE 100 MG: 50 SOLUTION ORAL at 15:52

## 2020-02-07 RX ADMIN — APIXABAN 2.5 MG: 5 TABLET, FILM COATED ORAL at 20:19

## 2020-02-07 RX ADMIN — MELATONIN 1000 UNITS: at 10:58

## 2020-02-07 RX ADMIN — OMEPRAZOLE 40 MG: KIT at 10:54

## 2020-02-07 RX ADMIN — LEVETIRACETAM 500 MG: 500 SOLUTION ORAL at 10:54

## 2020-02-07 RX ADMIN — POLYETHYLENE GLYCOL 3350 1 PACKET: 17 POWDER, FOR SOLUTION ORAL at 20:16

## 2020-02-07 RX ADMIN — IPRATROPIUM BROMIDE AND ALBUTEROL SULFATE 3 ML: .5; 3 SOLUTION RESPIRATORY (INHALATION) at 10:06

## 2020-02-07 RX ADMIN — MONTELUKAST 10 MG: 10 TABLET, FILM COATED ORAL at 20:19

## 2020-02-07 RX ADMIN — BACLOFEN 5 MG: 10 TABLET ORAL at 15:52

## 2020-02-07 RX ADMIN — BACLOFEN 5 MG: 10 TABLET ORAL at 20:20

## 2020-02-07 RX ADMIN — LEVETIRACETAM 500 MG: 500 SOLUTION ORAL at 20:18

## 2020-02-07 RX ADMIN — APIXABAN 2.5 MG: 5 TABLET, FILM COATED ORAL at 10:57

## 2020-02-07 NOTE — FLOWSHEET NOTE
02/06/20 1530   Events/Summary/Plan   Events/Summary/Plan Suctioned in anticipation of decannulation.  pt regurgitated feeding.  Will wait till tomorrow to decannulate.  Dr. shahid

## 2020-02-07 NOTE — FLOWSHEET NOTE
02/07/20 1103   Type of Assessment   Reassessment Yes   Patient History   Pulmonary Diagnosis Post decannulation   Level Of Consciousness   Level of Consciousness   (Can follow some commands)   Chest Exam   Respiration 16   Pulse 72   Breath Sounds   RUL Breath Sounds Clear   RML Breath Sounds Clear   RLL Breath Sounds Clear   FORTINO Breath Sounds Clear   LLL Breath Sounds Clear   Secretions   Cough Moist;Strong   Oximetry   #Pulse Oximetry (Single Determination) Yes   Oxygen   Home O2 Use Prior To Admission? No   Pulse Oximetry 95 %   O2 Daily Delivery Respiratory  Room Air with O2 Available   Bronchodilator Protocol   Med Order With RCP No   Is this an exacerbation of COPD/Asthma? No   Bronchodilator Indications History / Diagnosis   Breath Sounds Criteria 0    Pulse Criteria 0    Respiratory Rate Criteria 0    S.O.B. Criteria 0    Point Values 0-3 - PRN   Bronchodilator Goals/Outcome Diminished Wheezing and Volume of Air Movement Increased

## 2020-02-07 NOTE — FLOWSHEET NOTE
02/07/20 1006   Events/Summary/Plan   Events/Summary/Plan SVN   Interdisciplinary Plan of Care-Goals (Indications)   Bronchodilator Indications History / Diagnosis   Bronchopulmonary Hygiene Indications Difficulty with Secretion Clearance   Interdisciplinary Plan of Care-Outcomes    Bronchodilator Outcome Diminished Wheezing and Volume of Air Movement Increased   Bronchopulmonary Hygiene Outcome Optimal Hydration with Moderate or Less Sputum Production   Aerosol Therapy / Airway Management   #Aerosol Therapy / Airway Management Trache Collar   Aerosol Humidity Temp (celsius) 34   Trache/Stoma Care   Trache/Stoma Care Yes   Chest Exam   Respiration 18   Pulse 84   Breath Sounds   RUL Breath Sounds Clear   RML Breath Sounds Clear   RLL Breath Sounds Clear   FORTINO Breath Sounds Clear   LLL Breath Sounds Clear   Secretions   Cough Non Productive   Oximetry   #Pulse Oximetry (Single Determination) Yes   Oxygen   Pulse Oximetry 97 %   FiO2% 28 %

## 2020-02-07 NOTE — DISCHARGE PLANNING
CASE MANAGEMENT INITIAL ASSESSMENT    Admit Date:  2/5/2020     I met with patient to discuss role of case management / discharge planning / team conference. Patient is a  24 y.o. male admitted from home.  He and his mother recently moved to [place] from the [facility].  He suffered a AVM rupture and had surgery there.  His mother has scheduled with a local neurosurgeon and gastroenterologist.  He has a trach and peg that she has been managing at home.  She states he has improved to the point that they no longer need to use the sai lift.  She is hopeful that he can be decanulated during this admission.    Diagnosis: 02.1 non traumatic  ICH (intracerebral hemorrhage) (Former[facility])    Co-morbidities:   Patient Active Problem List    Diagnosis Date Noted   • Intracerebral hemorrhage, intraventricular (HCC) 01/24/2020   • Tracheostomy dependent ([facility]) 01/24/2020   • Dysphagia following nontraumatic intracerebral hemorrhage 01/24/2020   • Gastrostomy tube dependent ([facility]) 01/24/2020   • Urinary incontinence due to cognitive impairment 01/24/2020   • Cognitive and neurobehavioral dysfunction following brain injury ([facility]) 01/24/2020     Prior Living Situation:  Housing / Facility: 1 Story Apartment / Condo  Lives with - Patient's Self Care Capacity: Parents    Prior Level of Function:  Medication Management: Dependent  Finances: Dependent  Home Management: Dependent  Shopping: Dependent  Prior Level Of Mobility: (w/c dependent)  Driving / Transportation: Other (Comments)(Per mother, patient has not driven since AVM rupture)    Support Systems:  Primary : Mother is Melinda Mansfield at [phone]  Other support systems: aunt is Kasandra Goddard at [phone]     Previous Services Utilized:   Equipment Owned: Wheelchair, Portable Patient Lift, Hospital Bed(transport)  Prior Services: Continuous (24 Hour) Care Giving Family    Other Information:  Occupation (Pre-Hospital Vocational): Other (Comments)(currently  disabled)  Primary Payor Source: Medicaid(anthem)  Primary Care Practitioner : Nirmala Man M.D.  Other MDs: Dr. Walton for neurosurgery    Additional Case Management Questions:  Have you ever received case management services for yourself or a family member? Yes, Patient's mother is working with a  from division of aging.    Do you feel you have and an understanding of what services  provide? yes    Do you have any additional questions regarding case management?    Not at this time      CASE MANAGEMENT PLAN OF CARE   Individualized Goals:   1. Patient's mother hopes he can be decanulated before discharge  2. Patient's mother wants to obtain something with handles for the toilet.  3. Patient's mother would like to obtain PCS services from Medicaid.    Barriers:   1. Severe deficits.    Patient / Family Goal:  Patient / Family Goal: Patient will return home with his mother.  She is his full time caregiver.  She has applied for Medicaid PCS services.  They will likely need a tub transfer bench and drop arm comode.  She obtains his trach supplies from [employer].  He does not require home oxygen.    Plan:  1. Continue to follow patient through hospitalization and provide discharge planning in collaboration with patient, family, physicians and ancillary services.     2. Utilize community resources to ensure a safe discharge.

## 2020-02-07 NOTE — FLOWSHEET NOTE
02/07/20 1030   Events/Summary/Plan   Events/Summary/Plan Decannulation - Trach tube removed with Dr. David in room. Trach removed with some resistance at the cuff exiting the stoma.  After removal, there was no evidence of bleeding. Procedure tolerated well.    Trache/Stoma Care   Trache/Stoma Care Yes  (Stoma site clean, no bleeding, covered with tagaderm/gauze)   Chest Exam   Work Of Breathing / Effort Mild   Respiration 16   Pulse 72   Breath Sounds   Pre/Post Intervention Post Intervention Assessment  (Post decannulation)   RUL Breath Sounds Clear   RML Breath Sounds Clear   RLL Breath Sounds Clear   FORTINO Breath Sounds Clear   LLL Breath Sounds Clear   Secretions   Cough Moist;Strong   Sputum Amount Unable to Evaluate   Oximetry   #Pulse Oximetry (Single Determination) Yes   Oxygen   Pulse Oximetry 95 %   O2 Daily Delivery Respiratory  Room Air with O2 Available

## 2020-02-07 NOTE — PROGRESS NOTES
Received patient during shift change, report rec'd from day shift RN. Resting in bed, VS stable on room air. Per report incontinent of B&B, max assist for transfers. Alert, follows some commands, non-verbal Mother at bedside. Bed in low position, call light within reach.

## 2020-02-07 NOTE — THERAPY
"Physical Therapy   Daily Treatment     Patient Name: Rey Medina  Age:  24 y.o., Sex:  male  Medical Record #: [id number]  Today's Date: 2/7/2020     Precautions  Precautions: PEG Tube, Tracheostomy , Fall Risk  Comments: Non verbal    Subjective    Pt seated in room with mother present, able to squeeze PT's hand in agreement to therapy.      Objective       02/07/20 0831   Precautions   Precautions PEG Tube;Tracheostomy ;Fall Risk   Vitals   Pulse 85  (post standing)   Pulse Oximetry 95 %  (post standing)   Bed Mobility    Sit to Stand Total Assist X 2  (min A x2, intermittent need for 2 person assist for trunk)   Neuro-Muscular Treatments   Neuro-Muscular Treatments Weight Shift Right;Weight Shift Left;Tactile Cuing;Verbal Cuing;Postural Changes   Comments Seated balance activities while seated EOM: 1 min static sitting without UE support and close SBA, active assist shoulder flexion with medium \"beach ball\" in UEs 2x10, modified \"sit-ups\" with physioball posterior and min A for anterior lean 1x10    Interdisciplinary Plan of Care Collaboration   IDT Collaboration with  Family / Caregiver   Patient Position at End of Therapy Seated;Family / Friend in Room;Self Releasing Lap Belt Applied   Collaboration Comments pt's mother present for PT session    PT Total Time Spent   PT Individual Total Time Spent (Mins) 60   PT Charge Group   PT Neuromuscular Re-Education / Balance 2   PT Therapeutic Activities 2     Standing in // bars ~30 sec with min A. Sit <> stand 3x with min- mod A in // bars and 3x from EOM with min-mod A x2 (second person needed for trunk stability without // bars).    FIM Walking Score:  1 - Total Assistance  Walking Description:  Requires wheelchair follow, Extra time, Two hand rails(5 steps in // bars with min- mod A for balance and WC follow (poor foot clearance, \"sliding\" feet on floor)      Assessment    Pt able to inconsistently follow simple commands this session, improved from evaluation " yesterday. Fatigues quickly with standing and seated balance.      Plan    Continue standing tolerance in // bars, sit <>  // bars and from EOM, seated balance and core stability exercises, ambulation and LE strengthening in // bars.

## 2020-02-07 NOTE — CARE PLAN
Problem: Safety  Goal: Will remain free from injury  Outcome: PROGRESSING AS EXPECTED     Problem: Bowel/Gastric:  Goal: Normal bowel function is maintained or improved  Outcome: PROGRESSING AS EXPECTED     Problem: Skin Integrity  Goal: Risk for impaired skin integrity will decrease  Outcome: PROGRESSING AS EXPECTED     Problem: Respiratory:  Goal: Respiratory status will improve  Outcome: PROGRESSING AS EXPECTED     Problem: Urinary Elimination:  Goal: Ability to reestablish a normal urinary elimination pattern will improve  Outcome: PROGRESSING AS EXPECTED

## 2020-02-07 NOTE — FLOWSHEET NOTE
02/07/20 0705   Events/Summary/Plan   Events/Summary/Plan Aerosol check, suction, SpO2 check   Aerosol Therapy / Airway Management   #Aerosol Therapy / Airway Management Trache Collar   Aerosol Humidity Temp (celsius) 34   Chest Exam   Respiration 18   Pulse 68   Secretions   Cough Productive   How Sputum Obtained Suction   Sputum Amount Small   Sputum Color White   Sputum Consistency Thick   Suction Frequency Suctioned Once or Twice Per Encounter   Airway  7.0   No Placement Date or Time found.   LDA Placed Prior to Arrival: Yes  Airway Type: (c)   Airway Size: 7.0   Site Assessment Clean;Dry;Intact   Airway Tube Secured   (Fabric tie)   Date Securing Device changed? 02/06/20   Date Securing Device to be changed (Q 7 days) 02/13/20   Cuff Pressure cmH2O (R.C.)   (Fully deflated)   Tracheostomy/Cannula Changed (Date) 02/06/20   Next Tracheostomy/Cannula Change Due (Date) 02/07/20   Oximetry   #Pulse Oximetry (Single Determination) Yes   Oxygen   Home O2 Use Prior To Admission? No   Pulse Oximetry 96 %   FiO2%   (Room air during Occupational therapy)

## 2020-02-07 NOTE — PROGRESS NOTES
"Rehab Progress Note     Encounter Date: 2/7/2020    CC: I[facility], AMS    Interval Events (Subjective)  Patient sitting up in wheelchair. Mom reports he is doing well with therapy. Patient with nice strong cough. Discussed that we did not decannulate yesterday as he regurgitated his feedings on suctioning prior to removal.  Discussed that we held his morning feed and would decannulate when RT available. She reports she is encouraged by his progress. Otherwise she has questions about the amantadine that was started this morning. Discussed will trial throughout the weekend and increase on Monday if no improvement. Discussed about dopamine effect. Discussed can also trial other classes including Ritalin and Modafinil.      ROS: Unable to perform    1000: RT available for decannulation, decannulated with some difficulty due to crusting around cuff. Patient's trach was fenestrated. Patient with good cough reflex, clear lungs on examination afterwards. Covered in tegaderm.  Notified by mother that [place] [facility] [place] RT to monitor for an hour and then will be cleared for transport.  Discussed with mother about precautions and replacing bandage if he coughs it off.     Objective:  VITAL SIGNS: /76   Pulse 72   Temp 36.3 °C (97.3 °F) (Temporal)   Resp 16   Ht 1.702 m (5' 7\")   Wt 75 kg (165 lb 5.5 oz)   SpO2 95%   BMI 25.90 kg/m²   Gen: NAD  Psych: Mood and affect flat  CV: RRR, no edema  Resp: CTAB, no upper airway sounds  Abd: NTND  Neuro: following occasional commands, refusing to open eyes at time  Unchanged from 2/6/20    Recent Results (from the past 72 hour(s))   CBC with Differential    Collection Time: 02/06/20  6:07 AM   Result Value Ref Range    WBC 9.0 4.8 - 10.8 K/uL    RBC 5.98 4.70 - 6.10 M/uL    Hemoglobin 16.3 14.0 - 18.0 g/dL    Hematocrit 48.8 42.0 - 52.0 %    MCV 81.6 81.4 - 97.8 fL    MCH 27.3 27.0 - 33.0 pg    MCHC 33.4 (L) 33.7 - 35.3 g/dL    RDW 43.2 35.9 - 50.0 fL    " Platelet Count 286 164 - 446 K/uL    MPV 11.3 9.0 - 12.9 fL    Neutrophils-Polys 63.00 44.00 - 72.00 %    Lymphocytes 27.00 22.00 - 41.00 %    Monocytes 6.30 0.00 - 13.40 %    Eosinophils 2.00 0.00 - 6.90 %    Basophils 1.30 0.00 - 1.80 %    Immature Granulocytes 0.40 0.00 - 0.90 %    Nucleated RBC 0.00 /100 WBC    Neutrophils (Absolute) 5.64 1.82 - 7.42 K/uL    Lymphs (Absolute) 2.42 1.00 - 4.80 K/uL    Monos (Absolute) 0.56 0.00 - 0.85 K/uL    Eos (Absolute) 0.18 0.00 - 0.51 K/uL    Baso (Absolute) 0.12 0.00 - 0.12 K/uL    Immature Granulocytes (abs) 0.04 0.00 - 0.11 K/uL    NRBC (Absolute) 0.00 K/uL   Comp Metabolic Panel (CMP)    Collection Time: 02/06/20  6:07 AM   Result Value Ref Range    Sodium 138 135 - 145 mmol/L    Potassium 3.8 3.6 - 5.5 mmol/L    Chloride 105 96 - 112 mmol/L    Co2 24 20 - 33 mmol/L    Anion Gap 9.0 0.0 - 11.9    Glucose 93 65 - 99 mg/dL    Bun 9 8 - 22 mg/dL    Creatinine 0.73 0.50 - 1.40 mg/dL    Calcium 9.4 8.5 - 10.5 mg/dL    AST(SGOT) 12 12 - 45 U/L    ALT(SGPT) 31 2 - 50 U/L    Alkaline Phosphatase 93 30 - 99 U/L    Total Bilirubin 0.5 0.1 - 1.5 mg/dL    Albumin 4.5 3.2 - 4.9 g/dL    Total Protein 7.9 6.0 - 8.2 g/dL    Globulin 3.4 1.9 - 3.5 g/dL    A-G Ratio 1.3 g/dL   HEMOGLOBIN A1C    Collection Time: 02/06/20  6:07 AM   Result Value Ref Range    Glycohemoglobin 5.7 (H) 0.0 - 5.6 %    Est Avg Glucose 117 mg/dL   TSH with Reflex to FT4    Collection Time: 02/06/20  6:07 AM   Result Value Ref Range    TSH 2.850 0.380 - 5.330 uIU/mL   Vitamin D, 25-hydroxy (blood)    Collection Time: 02/06/20  6:07 AM   Result Value Ref Range    25-Hydroxy   Vitamin D 25 30 30 - 100 ng/mL   ESTIMATED GFR    Collection Time: 02/06/20  6:07 AM   Result Value Ref Range    GFR If African American >60 >60 mL/min/1.73 m 2    GFR If Non African American >60 >60 mL/min/1.73 m 2       Current Facility-Administered Medications   Medication Frequency   • levETIRAcetam (KEPPRA) 100 MG/ML solution 500 mg  Q12HRS   • omeprazole (FIRST-OMEPRAZOLE) 2 mg/mL oral susp 40 mg DAILY   • amantadine (SYMMETREL) 50 MG/5ML syrup 100 mg BID   • vitamin D (cholecalciferol) tablet 1,000 Units DAILY   • ivabradine (CORLANOR) tablet 5 mg DAILY   • apixaban (ELIQUIS) tablet 2.5 mg BID   • Respiratory Care per Protocol Continuous RT   • tramadol (ULTRAM) 50 MG tablet 50 mg Q4HRS PRN   • hydrALAZINE (APRESOLINE) tablet 25 mg Q8HRS PRN   • acetaminophen (TYLENOL) tablet 650 mg Q4HRS PRN   • artificial tears ophthalmic solution 1 Drop PRN   • benzocaine-menthol (CEPACOL) lozenge 1 Lozenge Q2HRS PRN   • mag hydrox-al hydrox-simeth (MAALOX PLUS ES or MYLANTA DS) suspension 20 mL Q2HRS PRN   • ondansetron (ZOFRAN ODT) dispertab 4 mg 4X/DAY PRN    Or   • ondansetron (ZOFRAN) syringe/vial injection 4 mg 4X/DAY PRN   • traZODone (DESYREL) tablet 50 mg QHS PRN   • sodium chloride (OCEAN) 0.65 % nasal spray 2 Spray PRN   • ipratropium-albuterol (DUONEB) nebulizer solution Q4H PRN (RT)   • polyethylene glycol/lytes (MIRALAX) PACKET 1 Packet Q24HR    And   • magnesium hydroxide (MILK OF MAGNESIA) suspension 30 mL QDAY PRN    And   • bisacodyl (DULCOLAX) suppository 10 mg QDAY PRN   • montelukast (SINGULAIR) tablet 10 mg Nightly   • baclofen (LIORESAL) tablet 5 mg TID       Orders Placed This Encounter   Procedures   • Diet NPO     Standing Status:   Standing     Number of Occurrences:   1     Order Specific Question:   Restrict to:     Answer:   Strict [1]       Assessment:  Active Hospital Problems    Diagnosis   • *Intracerebral hemorrhage, intraventricular (HCC)   • 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 Patient very low functioning but per mother becoming more purposeful  -PT and OT  for mobility and ADLs  -SLP for cognition   -Start Neurostimulants, amantadine 100 mg BID (0800 and 1300). Monitor throughout weekend. Discussed about trial with mother including alternatives.  -Continue Keppra. Unclear if had seizure or was continued on prophylaxis  -NSG appointment on 2/7/20 to evaluation  shunt.      Muscle spasms - mother claims Bromocriptine helps, typically used for dopamine side effects/neurostimulant. Will start low dose Baclofen and monitor    Dysphagia - Patient with G Tube in place. SLP for swallow. Would benefit from MBSS if following more commands     CV - Patient is on Ivabradine 5 mg daily. Unclear reason why     Respiratory - Patient with size 7 trach on admission. Patient on Duonebs and Singulair  -Exchange aborted on 2/6/20 due to emesis on suction. Tolerated overnight. Decannulated AM 2/7/20     GI Ppx - Patient on Prilosec and Prevacid. Unclear why two PPIs     DVT Ppx - Patient on Eliquis 2.5 mg BID. Unknown reason why, mother denies clot or DVT.     Total time:  35 minutes.  I spent greater than 50% of the time for patient care, counseling, and coordination on this date, including unit/floor time, and face-to-face time with the patient as per interval events and assessment and plan above. Topics discussed included no change on amantadine, discussed neurostimulants and plan to decannulate with mother.     Greer David M.D.    Additional Time: 1000 to 1037 spent face to face with patient. Oversaw decannulation by RT, stoma clean without bleeding. Mother reports a little dried blood so will continue to monitor. Tegaderm placed.

## 2020-02-07 NOTE — THERAPY
Occupational Therapy  Daily Treatment     Patient Name: Rey Medina  Age:  24 y.o., Sex:  male  Medical Record #: [id number]  Today's Date: 2020     Precautions  Precautions: PEG Tube, Tracheostomy , Fall Risk, Other (See Comments)  Comments: Non verbal    Safety   ADL Safety : Requires Physical Assist for Safety  Bathroom Safety: Requires Physical Assist for Safety    Subjective    Patient presents non-verbal, Rancho Level 3.      Objective     20 0701   Precautions   Precautions PEG Tube;Tracheostomy ;Fall Risk;Other (See Comments)   Comments Non verbal   Safety    ADL Safety  Requires Physical Assist for Safety   Bathroom Safety Requires Physical Assist for Safety   Cognition    Rancho Scale Level 3   Interdisciplinary Plan of Care Collaboration   IDT Collaboration with  Other (See Comments);Family / Caregiver;Respiratory Therapist   Patient Position at End of Therapy Seated;Self Releasing Lap Belt Applied;Family / Friend in Room   Collaboration Comments Mother present during session, additional OT (in training) present during session, RT performed suctioning at start of session   OT Total Time Spent   OT Individual Total Time Spent (Mins) 60   OT Charge Group   OT Self Care / ADL 3   OT Therapy Activity 1     FIM Grooming Score:  1 - Total Assistance  Grooming Description:  Increased time, Verbal cueing, Set-up of equipment, Supervision for safety, Initial preparation for task(Patient required total assist to brush teeth and wash face while seated. )    FIM Upper Body Dressin - Total Assistance  Upper Body Dressing Description:  Increased time, Supervision for safety, Set-up of equipment, Initial preparation for task(Patient required total assist to don t-shirt while seated in w/c.  )    FIM Lower Body Dressing Score:  1 - Total Assistance  Lower Body Dressing Description:  Increased time, Supervision for safety, Initial preparation for task(Patient required total assist to don elastic waist  pants, socks and lace shoes .)    PROM: patient tolerated PROM exercises to BUEs (proximal and distal). No indication of pain noted. PROM WFLs bilaterally.     Assessment    Patient tolerated OT session well with focus on ADLs and PROM. No pain noted during PROM exercises; PROM WFLs in BUEs. Patient demonstrates ability to inconsistently follow 1-step commands (ie squeeze hand, stand-up, straighten leg/arm).     Plan    Low-stim environment, consistent schedule, sensory stimulation, ADLs, sitting balance

## 2020-02-07 NOTE — FLOWSHEET NOTE
02/06/20 1415   Events/Summary/Plan   Events/Summary/Plan Aerosol check   Aerosol Therapy / Airway Management   #Aerosol Therapy / Airway Management Trache Collar   Aerosol Humidity Temp (celsius) 34   Trache/Stoma Care   Trache/Stoma Care Yes   Chest Exam   Respiration 16   Pulse 86   Breath Sounds   RUL Breath Sounds Clear   RML Breath Sounds Clear   RLL Breath Sounds Clear   FORTINO Breath Sounds Clear   LLL Breath Sounds Clear   Secretions   Cough Productive;Moist   Sputum Amount Small   Sputum Color White   Sputum Consistency Thick   Suction Frequency Suctioned Once or Twice Per Encounter   Airway  7.0   No Placement Date or Time found.   LDA Placed Prior to Arrival: Yes  Airway Type: (c)   Airway Size: 7.0   Site Assessment Clean;Dry;Intact   Airway Tube Secured   (Fabric tie)   Cuffless No   Tracheostomy/Stoma Care Clean Stoma Site;Inner Cannula Cleaned   Extra Tracheostomy Tube at Bedside Yes   Oximetry   #Pulse Oximetry (Single Determination) Yes   Oxygen   Home O2 Use Prior To Admission? No   Pulse Oximetry 95 %   FiO2% 28 %

## 2020-02-08 PROCEDURE — 770010 HCHG ROOM/CARE - REHAB SEMI PRIVAT*

## 2020-02-08 PROCEDURE — 700102 HCHG RX REV CODE 250 W/ 637 OVERRIDE(OP): Performed by: PHYSICAL MEDICINE & REHABILITATION

## 2020-02-08 PROCEDURE — A9270 NON-COVERED ITEM OR SERVICE: HCPCS | Performed by: PHYSICAL MEDICINE & REHABILITATION

## 2020-02-08 RX ADMIN — LEVETIRACETAM 500 MG: 500 SOLUTION ORAL at 20:29

## 2020-02-08 RX ADMIN — POLYETHYLENE GLYCOL 3350 1 PACKET: 17 POWDER, FOR SOLUTION ORAL at 20:33

## 2020-02-08 RX ADMIN — AMANTADINE HYDROCHLORIDE 100 MG: 50 SOLUTION ORAL at 09:02

## 2020-02-08 RX ADMIN — MONTELUKAST 10 MG: 10 TABLET, FILM COATED ORAL at 20:29

## 2020-02-08 RX ADMIN — APIXABAN 2.5 MG: 5 TABLET, FILM COATED ORAL at 20:30

## 2020-02-08 RX ADMIN — LEVETIRACETAM 500 MG: 500 SOLUTION ORAL at 09:02

## 2020-02-08 RX ADMIN — APIXABAN 2.5 MG: 5 TABLET, FILM COATED ORAL at 09:02

## 2020-02-08 RX ADMIN — BACLOFEN 5 MG: 10 TABLET ORAL at 20:30

## 2020-02-08 RX ADMIN — BACLOFEN 5 MG: 10 TABLET ORAL at 15:57

## 2020-02-08 RX ADMIN — OMEPRAZOLE 40 MG: KIT at 09:03

## 2020-02-08 RX ADMIN — MELATONIN 1000 UNITS: at 09:05

## 2020-02-08 RX ADMIN — BACLOFEN 5 MG: 10 TABLET ORAL at 09:02

## 2020-02-08 RX ADMIN — AMANTADINE HYDROCHLORIDE 100 MG: 50 SOLUTION ORAL at 12:43

## 2020-02-08 NOTE — PROGRESS NOTES
Received patient during shift change, report rec'd from day shift RN. Resting in bed, VS stable on room air. Incontinent of B&B, using urinal at times. Max assist for transfers. Non-verbal, mother at bedside, no s/s of distress. Bed in low position, call light within reach.

## 2020-02-08 NOTE — FLOWSHEET NOTE
02/08/20 1030   Events/Summary/Plan   Events/Summary/Plan Trach  stoma care   Trache/Stoma Care   Trache/Stoma Care Yes  (Stoma closing, no redness, swelling.)   Breath Sounds   Pre/Post Intervention Post Intervention Assessment   RUL Breath Sounds Rhonchi   RML Breath Sounds Rhonchi   RLL Breath Sounds Rhonchi   FORTINO Breath Sounds Clear   LLL Breath Sounds Clear   Secretions   Cough Moist   How Sputum Obtained   (Lge amt of bronchial secretions collected under stoma dress)

## 2020-02-08 NOTE — CARE PLAN
Problem: Respiratory:  Goal: Respiratory status will improve  Outcome: PROGRESSING AS EXPECTED  Decanulated successfully by RT and MD today without any compllications

## 2020-02-08 NOTE — THERAPY
"Speech Language Pathology  Daily Treatment     Patient Name: Rey Medina  Age:  24 y.o., Sex:  male  Medical Record #: [id number]  Today's Date: 2/7/2020     Subjective    Pt up in chair for tx.  Pt decannulated this day.  Pt consistently blinking eyes.       Objective       02/07/20 1101   Receptive Language / Auditory Comprehension   Answers Yes / No Personal Questions Moderate (3)   Follows One Unit Commands Severe (2)   Social / Pragmatic Communication   Eye Contact Profound (1)   Interdisciplinary Plan of Care Collaboration   IDT Collaboration with  Nursing;Family / Caregiver;Respiratory Therapist   Collaboration Comments regarding stoma; respiratory status, communication.    SLP Total Time Spent   SLP Individual Total Time Spent (Mins) 60   Charge Group   SLP Treatment - Individual Speech Language Treatment - Individual       FIM Comprehension Score:  2 - Max Assistance  Comprehension Description:       FIM Expression Score:  2 - Max Assistance  Expression Description:  (squeezes hand for \"yes\" response)    FIM Social Interaction Score:  2 - Max Assistance  Social Interaction Description:       FIM Problem Solving Score:  1 - Total Assistance  Problem Solving Description:       FIM Memory Score:  1 - Total Assistance  Memory Description:         Assessment    Pt scored a 2 on the Coma Recovery Scale.  Pt demonstrated response to auditory startle, and demonstrated approximately 70% accuracy when asked yes/no questions.  Pt answers a yes/no questions by squeezing hand for yes, and 'no response' for \"no.\"   Hand over hand cues required for 1 step directives (ex: touch your ear, nose, etc. ).  Oral motor ROM maxA with use of tongue blade, and sucker (for taste).      Plan    Continue Coma Recovery Scale, Oral motor stimulation.     "

## 2020-02-08 NOTE — CARE PLAN
Problem: Safety  Goal: Will remain free from injury  Outcome: PROGRESSING AS EXPECTED     Problem: Bowel/Gastric:  Goal: Normal bowel function is maintained or improved  Outcome: PROGRESSING AS EXPECTED     Problem: Skin Integrity  Goal: Risk for impaired skin integrity will decrease  Outcome: PROGRESSING AS EXPECTED     Problem: Respiratory:  Goal: Respiratory status will improve  Outcome: PROGRESSING AS EXPECTED

## 2020-02-09 PROCEDURE — 92507 TX SP LANG VOICE COMM INDIV: CPT

## 2020-02-09 PROCEDURE — 97112 NEUROMUSCULAR REEDUCATION: CPT

## 2020-02-09 PROCEDURE — 97530 THERAPEUTIC ACTIVITIES: CPT

## 2020-02-09 PROCEDURE — 97535 SELF CARE MNGMENT TRAINING: CPT

## 2020-02-09 PROCEDURE — 770010 HCHG ROOM/CARE - REHAB SEMI PRIVAT*

## 2020-02-09 PROCEDURE — 700102 HCHG RX REV CODE 250 W/ 637 OVERRIDE(OP): Performed by: PHYSICAL MEDICINE & REHABILITATION

## 2020-02-09 PROCEDURE — 94760 N-INVAS EAR/PLS OXIMETRY 1: CPT

## 2020-02-09 PROCEDURE — A9270 NON-COVERED ITEM OR SERVICE: HCPCS | Performed by: PHYSICAL MEDICINE & REHABILITATION

## 2020-02-09 PROCEDURE — 92526 ORAL FUNCTION THERAPY: CPT

## 2020-02-09 RX ADMIN — APIXABAN 2.5 MG: 5 TABLET, FILM COATED ORAL at 21:15

## 2020-02-09 RX ADMIN — MONTELUKAST 10 MG: 10 TABLET, FILM COATED ORAL at 21:14

## 2020-02-09 RX ADMIN — BACLOFEN 5 MG: 10 TABLET ORAL at 21:14

## 2020-02-09 RX ADMIN — AMANTADINE HYDROCHLORIDE 100 MG: 50 SOLUTION ORAL at 14:05

## 2020-02-09 RX ADMIN — BACLOFEN 5 MG: 10 TABLET ORAL at 14:07

## 2020-02-09 RX ADMIN — MELATONIN 1000 UNITS: at 09:42

## 2020-02-09 RX ADMIN — BACLOFEN 5 MG: 10 TABLET ORAL at 09:42

## 2020-02-09 RX ADMIN — POLYETHYLENE GLYCOL 3350 1 PACKET: 17 POWDER, FOR SOLUTION ORAL at 21:14

## 2020-02-09 RX ADMIN — LEVETIRACETAM 500 MG: 500 SOLUTION ORAL at 09:48

## 2020-02-09 RX ADMIN — LEVETIRACETAM 500 MG: 500 SOLUTION ORAL at 21:14

## 2020-02-09 RX ADMIN — OMEPRAZOLE 40 MG: KIT at 09:41

## 2020-02-09 RX ADMIN — APIXABAN 2.5 MG: 5 TABLET, FILM COATED ORAL at 09:42

## 2020-02-09 RX ADMIN — AMANTADINE HYDROCHLORIDE 100 MG: 50 SOLUTION ORAL at 09:41

## 2020-02-09 NOTE — THERAPY
Physical Therapy   Daily Treatment     Patient Name: Rey Medina  Age:  24 y.o., Sex:  male  Medical Record #: [id number]  Today's Date: 2/9/2020     Precautions  Precautions: (P) PEG Tube, Tracheostomy , Fall Risk  Comments: (P) Non verbal    Subjective    Per mother, patient is ready for exercises. Per nursing coughing is normal and expected during exercises.     Objective     02/09/20 0931   Precautions   Precautions PEG Tube;Tracheostomy ;Fall Risk   Comments Non verbal   Vitals   Pulse 97  (during exercise)   Pulse Oximetry 92 %   Room Air Oximetry 92   O2 (LPM) 0   O2 Delivery None (Room Air)   Non Verbal Descriptors   Non Verbal Scale  Calm;Unlabored Breathing   Cognition    Level of Consciousness Alert   Ability To Follow Commands 1 Step   Comments delayed response to instructions   Sitting Lower Body Exercises   Other Exercises unsupported sitting EOM x 3, 30 sec reps; sitting EOM x 22 min with Mikhail for support as needed during exercsies; reciprocal reaching, shoulder flexion and sit ups with ball- Mikhail for support posteriorly and anteriorly   Standing Lower Body Exercises   Other Exercises standing reps in // bars x 3: 30 sec, 45 sec, 60 sec with Mikhail with gait belt   Neuro-Muscular Treatments   Neuro-Muscular Treatments Weight Shift Right;Weight Shift Left;Tactile Cuing;Sequencing;Postural Facilitation;Verbal Cuing   Comments during EOM activities, transfers, // bars    Interdisciplinary Plan of Care Collaboration   IDT Collaboration with  Nursing;Family / Caregiver;Speech Therapist   Patient Position at End of Therapy Seated;Call Light within Reach;Self Releasing Lap Belt Applied   Collaboration Comments CLOF   PT Total Time Spent   PT Individual Total Time Spent (Mins) 60   PT Charge Group   PT Neuromuscular Re-Education / Balance 2   PT Therapeutic Activities 2     Core stability exercises completed EOM followed by // bar activities. Vitals monitored throughout and stable during exercises.    FIM  Walking Score:  1 - Total Assistance  Walking Description:  Two hand rails, Verbal cueing, Extra time, Requires wheelchair follow(2 ft in // bars with wc follow during standing reps, vc for sequecing of stepping)    FIM Wheelchair Score:  1 - Total Assistance  Wheelchair Description:         Assessment    Unsupported sitting balance improved with exercises and time spent edge of mat. Improved sit-ups and reaching with reps. Patient able to communicate via squeezing and minor vocalization. Good endurance demonstrated in unsupported sitting. Second assist required for occasional LOB and to allow patient to get warmed up for exercises and sitting.    Plan    Continue standing tolerance in // bars, sit <>  // bars and from EOM, seated balance and core stability exercises, ambulation and LE strengthening in // bars.

## 2020-02-09 NOTE — CARE PLAN
Problem: Bowel/Gastric:  Goal: Normal bowel function is maintained or improved  Outcome: PROGRESSING AS EXPECTED  Goal: Will not experience complications related to bowel motility  Outcome: PROGRESSING AS EXPECTED

## 2020-02-09 NOTE — FLOWSHEET NOTE
02/09/20 0849   Events/Summary/Plan   Events/Summary/Plan Trach stoma care, 02 spot check   Interdisciplinary Plan of Care-Goals (Indications)   Bronchopulmonary Hygiene Indications Difficulty with Secretion Clearance   Interdisciplinary Plan of Care-Outcomes    Bronchopulmonary Hygiene Outcome Optimal Hydration with Moderate or Less Sputum Production   Education   Education Yes - Pt. / Family has been Instructed in Trach Care;Yes - Pt. / Family has been Instructed in use of Respiratory Equipment   Trache/Stoma Care   Trache/Stoma Care Yes  (stoma clean, some discharge, no redness)   Respiratory WDL   Respiratory (WDL) X   Chest Exam   Respiration 16   Pulse 74   Breath Sounds   RUL Breath Sounds Coarse Crackles   RML Breath Sounds Diminished   RLL Breath Sounds Diminished   FORTINO Breath Sounds Coarse Crackles   LLL Breath Sounds Diminished   Secretions   Cough Non Productive   Oximetry   #Pulse Oximetry (Single Determination) Yes   Oxygen   Home O2 Use Prior To Admission? No   Pulse Oximetry 92 %   O2 Daily Delivery Respiratory  Room Air with O2 Available

## 2020-02-09 NOTE — PROGRESS NOTES
Received patient during shift change, report rec'd from day shift RN. Resting in bed, VS stable on room air. Continent of B&B w/episodes of incontinence. Max assist for transfers. Non-verbal, mother at bedside, no s/s of distress.Bed in low position, call light within reach.

## 2020-02-10 ENCOUNTER — APPOINTMENT (OUTPATIENT)
Dept: PAIN MANAGEMENT | Facility: REHABILITATION | Age: 25
DRG: 056 | End: 2020-02-10
Attending: PHYSICAL MEDICINE & REHABILITATION
Payer: MEDICAID

## 2020-02-10 ENCOUNTER — APPOINTMENT (OUTPATIENT)
Dept: RADIOLOGY | Facility: REHABILITATION | Age: 25
DRG: 056 | End: 2020-02-10
Attending: PHYSICAL MEDICINE & REHABILITATION
Payer: MEDICAID

## 2020-02-10 PROCEDURE — A9270 NON-COVERED ITEM OR SERVICE: HCPCS | Performed by: PHYSICAL MEDICINE & REHABILITATION

## 2020-02-10 PROCEDURE — 92526 ORAL FUNCTION THERAPY: CPT

## 2020-02-10 PROCEDURE — 99232 SBSQ HOSP IP/OBS MODERATE 35: CPT | Performed by: PHYSICAL MEDICINE & REHABILITATION

## 2020-02-10 PROCEDURE — 74230 X-RAY XM SWLNG FUNCJ C+: CPT

## 2020-02-10 PROCEDURE — 92507 TX SP LANG VOICE COMM INDIV: CPT

## 2020-02-10 PROCEDURE — 97112 NEUROMUSCULAR REEDUCATION: CPT

## 2020-02-10 PROCEDURE — 97530 THERAPEUTIC ACTIVITIES: CPT

## 2020-02-10 PROCEDURE — 92611 MOTION FLUOROSCOPY/SWALLOW: CPT

## 2020-02-10 PROCEDURE — 770010 HCHG ROOM/CARE - REHAB SEMI PRIVAT*

## 2020-02-10 PROCEDURE — 700102 HCHG RX REV CODE 250 W/ 637 OVERRIDE(OP): Performed by: PHYSICAL MEDICINE & REHABILITATION

## 2020-02-10 RX ADMIN — LEVETIRACETAM 500 MG: 500 SOLUTION ORAL at 20:44

## 2020-02-10 RX ADMIN — AMANTADINE HYDROCHLORIDE 100 MG: 50 SOLUTION ORAL at 13:46

## 2020-02-10 RX ADMIN — BACLOFEN 5 MG: 10 TABLET ORAL at 07:54

## 2020-02-10 RX ADMIN — APIXABAN 2.5 MG: 5 TABLET, FILM COATED ORAL at 20:44

## 2020-02-10 RX ADMIN — LEVETIRACETAM 500 MG: 500 SOLUTION ORAL at 07:54

## 2020-02-10 RX ADMIN — MELATONIN 1000 UNITS: at 07:54

## 2020-02-10 RX ADMIN — BACLOFEN 5 MG: 10 TABLET ORAL at 20:44

## 2020-02-10 RX ADMIN — APIXABAN 2.5 MG: 5 TABLET, FILM COATED ORAL at 07:54

## 2020-02-10 RX ADMIN — BACLOFEN 5 MG: 10 TABLET ORAL at 15:06

## 2020-02-10 RX ADMIN — AMANTADINE HYDROCHLORIDE 100 MG: 50 SOLUTION ORAL at 07:54

## 2020-02-10 RX ADMIN — OMEPRAZOLE 40 MG: KIT at 07:54

## 2020-02-10 RX ADMIN — MONTELUKAST 10 MG: 10 TABLET, FILM COATED ORAL at 20:44

## 2020-02-10 RX ADMIN — POLYETHYLENE GLYCOL 3350 1 PACKET: 17 POWDER, FOR SOLUTION ORAL at 20:44

## 2020-02-10 NOTE — CARE PLAN
Problem: Communication  Goal: The ability to communicate needs accurately and effectively will improve  Note:   Encouraged to make needs known to staff and to mother who stays with patient and to use call light for staff assist.  Pt makes gestures to let mother know his needs and with toileting.     Problem: Safety  Goal: Will remain free from falls  Note:   Call light kept within reach and encouraged to use for assistance and with toileting needs. Encouraged to call staff and to wait for staff before transfers.  Bed alarm is in place.  Room close to nurse's station.  Pt is tv monitored.  Hourly rounding in effect.

## 2020-02-10 NOTE — THERAPY
Speech Language Pathology  Daily Treatment     Patient Name: Rey Medina  Age:  24 y.o., Sex:  male  Medical Record #: [id number]  Today's Date: 2/10/2020     Subjective    Pt pleasant and cooperative during tx.  Pt's mother present and supportive.      Objective       02/10/20 0831   Receptive Language / Auditory Comprehension   Answers Yes / No Personal Questions Moderate (3)   Follows One Unit Commands Severe (2)   Social / Pragmatic Communication   Eye Contact Severe (2)   Dysphagia    Other Treatments pre-feeding trials of NTL/puree by spoon.  Lingual ROM.     Diet / Liquid Recommendation Pre-Feeding Trials with SLP Only;NPO   SLP Total Time Spent   SLP Individual Total Time Spent (Mins) 60   Charge Group   SLP Treatment - Individual Speech Language Treatment - Individual   SLP Swallowing Dysfunction Treatment Swallowing Dysfunction Treatment         Assessment    Pt scored 5/23 on the Coma Recovery Scale (decreased from 12).  Pt was less consistent with eye opening this day, and was unable to visual locate objects on the table (which he demonstrated yesterday).  With use of sucker, pt demonstrated lingual protrusion, elevation, and lateralization to achieve contact with sucker.  Pt tolerated NTL, and pureed textures, by spoon without overt s/s of aspiration.      Plan    [facility] [place] scheduled this day.

## 2020-02-10 NOTE — THERAPY
"Physical Therapy   Daily Treatment     Patient Name: Rey Medina  Age:  24 y.o., Sex:  male  Medical Record #: [id number]  Today's Date: 2/10/2020     Precautions  Precautions: Fall Risk, PEG Tube  Comments: non verbal, recent decannulation of tracheostomy     Subjective    Pt seated in room with mother present, attempting to \"use\" urinal and unwilling to let go of urinal and transport to gym.      Objective       02/10/20 1031   Precautions   Precautions Fall Risk;PEG Tube   Comments non verbal, recent decannulation of tracheostomy    Standing Lower Body Exercises   Other Exercises Standing tolerance in // bars: 0:30, 1:00 and 1:15 with BUE support on // bars and min A, seated rest breaks between reps; therapist verbally and tactilly cuing for upright posture    Neuro-Muscular Treatments   Neuro-Muscular Treatments Weight Shift Right;Weight Shift Left;Postural Changes;Verbal Cuing;Tactile Cuing   Comments Pt sat EOB with SBA- min A for stability while participating in medium \"beach ball\" lift SBA- min a for lift of ball after max A for hand placement on ball.    Interdisciplinary Plan of Care Collaboration   IDT Collaboration with  Certified Nursing Assistant;Nursing;Family / Caregiver   Patient Position at End of Therapy Seated;Call Light within Reach;Tray Table within Reach   Collaboration Comments CNA aware pt in room alone after PT session, RN and mother assisted with attempt to convince pt to participate at beginning of session    Therapy Missed   Missed Therapy (Minutes) 30   Reason For Missed Therapy Medical - Patient Agitated  (Pt unwilling to let go of urinal )   PT Total Time Spent   PT Individual Total Time Spent (Mins) 30   PT Charge Group   PT Neuromuscular Re-Education / Balance 2       FIM Bed/Chair/Wheelchair Transfers Score: 1 - Total Assistance  Bed/Chair/Wheelchair Transfers Description:  Increased time, Set-up of equipment, Requires 2 people to assist(WC <> seated EOM, min A with second " person for trunk stability management )      Assessment    Pt participatory with second half of therapy session after initially not participating due to mild agitation. Improved eye opening and following single step commands compared to last week. L knee hyperextension with standing. Inability to stand with full urpight posture despite verbal and tactile cuing.     Plan    Continue standing tolerance, Trial sit <> stands with FWW and no UE support, gait training in // bars as able, seated balance and core strengthening.

## 2020-02-10 NOTE — THERAPY
Occupational Therapy  Daily Treatment     Patient Name: Rey Medina  Age:  24 y.o., Sex:  male  Medical Record #: [id number]  Today's Date: 2/9/2020     Precautions  Precautions: (P) PEG Tube, Tracheostomy , Fall Risk  Comments: (P) Non verbal    Safety   ADL Safety : (P) Requires Physical Assist for Safety  Bathroom Safety: (P) Requires Physical Assist for Safety    Subjective    Patient attentive to treatment with consistent following of 1 step verbal commands.       Objective       02/09/20 1331   Precautions   Precautions PEG Tube;Tracheostomy ;Fall Risk   Comments Non verbal   Safety    ADL Safety  Requires Physical Assist for Safety   Bathroom Safety Requires Physical Assist for Safety   Cognition    Level of Consciousness Alert   Ability To Follow Commands 1 Step   Rancho Scale Level 3   Sitting Upper Body Exercises   Sitting Upper Body Exercises Yes   Upper Extremity Bike Minutes / Rest Breaks (See Comments)  (10/1)   Comments Motomed completed with incidental wrist support, 44/56 arm symmetry, 87% active participation   Neuro-Muscular Treatments   Neuro-Muscular Treatments Weight Shift Right;Weight Shift Left;Tactile Cuing;Sequencing;Postural Facilitation;Verbal Cuing   Comments Slight displacement (~10 degree) in right, left, anterior, posterior direction seated EOM with verbal cues and tactile assurance of body in space. Min A to lower to bilateral elbow and return to upright position.  2 person assist for safety during initial transition and during // bar static stand and R/L weight shift.    Balance   Sitting Balance (Static) Poor +   Sitting Balance (Dynamic) Poor   Standing Balance (Static) Poor -   Standing Balance (Dynamic) Trace +   Weight Shift Sitting Poor   Weight Shift Standing Poor   Skilled Intervention Tactile Cuing;Verbal Cuing;Postural Facilitation   Comments Patient demonstrated unsupported static seated balance EOM for 3x30 seconds at a close SBA level  CGA static standing in //  bars for 2x1 minutes.    Bed Mobility    Supine to Sit Maximal Assist   Scooting Maximal Assist   Interdisciplinary Plan of Care Collaboration   IDT Collaboration with  Nursing;Family / Caregiver   Patient Position at End of Therapy In Bed;Bed Alarm On   Collaboration Comments CLOF    OT Total Time Spent   OT Individual Total Time Spent (Mins) 60   OT Charge Group   Charges Yes   OT Self Care / ADL 1   OT Therapy Activity 3       FIM Eating Score:     Eating Description:       FIM Grooming Score:  3 - Moderate Assistance  Grooming Description:  Verbal cueing, Set-up of equipment, Increased time, Supervision for safety(Initial hand over hand provided with patietnt completing hand/face washing with single verbal cue)    FIM Bathing Score:     Bathing Description:       FIM Upper Body Dressing:     Upper Body Dressing Description:       FIM Lower Body Dressing Score:     Lower Body Dressing Description:       FIM Toileting Body Dressing:     Toileting Description:       FIM Bed/Chair/Wheelchair Transfers Score: 3 - Moderate Assistance  Bed/Chair/Wheelchair Transfers Description:  Increased time, Supervision for safety, Verbal cueing(Therapist directed patient to utilize gait belt on therapist to initiate/complete SPT from w/c to EOB.  Patient able to maintain BUE gross grasp and initiate stand with therapist command at a Min A level.  Mod A to complete pivot transfer. )    FIM Toilet Transfer Score:     Toilet Transfer Description:       FIM Tub/Shower Transfers Score:     Tub/Shower Transfers Description:         Assessment    Patient tolerated unsupported EOM, BUE closed kinetic chain, and standing balance activities with consistent adherence to 1-step verbal commands.  Hand over hand used to initiate rote hand washing/face washing seated at sinkside with patient completing tasks without external cues.  During functional transfers, patient is able to grasp gait belt at therapist waist to increase patient's awareness  of body in space and decrease posterior weight shift during STS transition.  Right left weight shift with directional tapping and bilateral knee block utilized to increase patient's safety with SPT without use of second person assist at a Mod A level due to extent of cues.       Plan  Low-stim environment,  sensory stimulation, ADLs, sitting balance/standing balance

## 2020-02-10 NOTE — THERAPY
"Speech Language Pathology  Daily Treatment     Patient Name: Rey Medina  Age:  24 y.o., Sex:  male  Medical Record #: [id number]  Today's Date: 2/9/2020     Subjective    Pt more alert this day.  More consistent in keeping eyes open. (previously eyes would be closed or continuously blink).      Objective       02/09/20 1031   Receptive Language / Auditory Comprehension   Answers Yes / No Personal Questions Moderate (3)   Follows One Unit Commands Severe (2)   Social / Pragmatic Communication   Eye Contact Severe (2)   Dysphagia    Other Treatments initiation of swallow   Diet / Liquid Recommendation Pre-Feeding Trials with SLP Only   SLP Total Time Spent   SLP Individual Total Time Spent (Mins) 60   Charge Group   SLP Treatment - Individual Speech Language Treatment - Individual   SLP Swallowing Dysfunction Treatment Swallowing Dysfunction Treatment       FIM Comprehension Score:  2 - Max Assistance  Comprehension Description:  Verbal cues    FIM Expression Score:  2 - Max Assistance  Expression Description:       FIM Social Interaction Score:  2 - Max Assistance  Social Interaction Description:  Increased time, Verbal cues    FIM Problem Solving Score:  1 - Total Assistance  Problem Solving Description:       FIM Memory Score:  1 - Total Assistance  Memory Description:         Assessment    Pt scored 12/23 on the CRS this day (improved from 2/23).  Pt presented with oral motor response to sucker (lingual protrusion).  Pt completed volitional swallow, and completed a swallow following presentation of sucker.   Yes/no (with hand squeeze): 20% mod max (decreased from 70%).   Pt located object on table.  Pt picked up a cup from the table and brought it to his mouth (functional object use).  When arm was raised by SLP, Pt able to touch \"nose\" \"mouth\" and \"chin\" with auditory stimuli.  Oral care completed via toothette.     Plan    Continue CRS.  Dysphagia management.      "

## 2020-02-10 NOTE — THERAPY
"Occupational Therapy  Daily Treatment     Patient Name: Rey Medina  Age:  24 y.o., Sex:  male  Medical Record #: [id number]  Today's Date: 2/10/2020     Precautions  Precautions: Fall Risk, PEG Tube  Comments: non verbal, recent decannulation of tracheostomy     Safety   ADL Safety : (P) Requires Physical Assist for Safety  Bathroom Safety: (P) Requires Physical Assist for Safety    Subjective    Patient seated in w/c upon therapist arrival with patient's mother present. Patient continues to present non-verbal.      Objective     02/10/20 0931   Precautions   Precautions Fall Risk;PEG Tube   Comments Non-verbal, recently decannulated    Safety    ADL Safety  Requires Physical Assist for Safety   Bathroom Safety Requires Physical Assist for Safety   Cognition    Ability To Follow Commands Other (See Comments)  (Able to follow one-step commands inconsistently)   Rancho Scale Level 3   Interdisciplinary Plan of Care Collaboration   IDT Collaboration with  Family / Caregiver   Patient Position at End of Therapy Seated;Family / Friend in Room  (with mother present)   Collaboration Comments Patient's mother present during OT session.    OT Total Time Spent   OT Individual Total Time Spent (Mins) 30   OT Charge Group   OT Therapy Activity 2     Session completed in patient's room with minimal lighting to promote low-stim environment. Vibration applied to BUEs during this session ( levator scapulae/upper trap area, biceps, triceps, wrist flexors and extensors) to facilitate sensory input and BUE muscle activation.  Patient instructed to \"bend, straighten, shrug, etc\" as vibration was applied to each muscle group. Initially undersigned therapist provided total assist with during vibration-assist PROM exercises however after continuous repetition, patient began actively engaging L biceps and triceps (against gravity).     Patient demonstrated ability to squeeze undersigned therapist's hand when instructed to (x5). "     Assessment    Patient tolerated OT session well with focus on sensory stimulation and BUE muscle activation. Increased level of alertness noted during today's session (compared to last week) and improved consistency with following 1-step directions.     Plan    Low-stim environment,  sensory stimulation, ADLs, sitting balance/standing balance

## 2020-02-10 NOTE — PROGRESS NOTES
Received bedside shift report from Moon YOUSSEF RN regarding patient and assumed care. Patient awake, calm and stable, currently positioned in bed for comfort and safety; call light within reach. Denies pain or discomfort at this time. Will continue to monitor.

## 2020-02-10 NOTE — FLOWSHEET NOTE
02/10/20 0945   Events/Summary/Plan   Events/Summary/Plan trach stoma care   Interdisciplinary Plan of Care-Goals (Indications)   Bronchopulmonary Hygiene Indications Difficulty with Secretion Clearance   Interdisciplinary Plan of Care-Outcomes    Bronchopulmonary Hygiene Outcome Breath Sounds from Diminished to Adventitious with Rhonchi Cleared by Cough   Education   Education Yes - Pt. / Family has been Instructed in Trach Care;Yes - Pt. / Family has been Instructed in use of Respiratory Equipment   Trache/Stoma Care   Trache/Stoma Care Yes  (clear thick discharge but 0 redness)   Respiratory WDL   Respiratory (WDL) X   Chest Exam   Respiration 20   Oxygen   O2 Daily Delivery Respiratory  Room Air with O2 Available

## 2020-02-11 PROCEDURE — 700102 HCHG RX REV CODE 250 W/ 637 OVERRIDE(OP): Performed by: PHYSICAL MEDICINE & REHABILITATION

## 2020-02-11 PROCEDURE — 770010 HCHG ROOM/CARE - REHAB SEMI PRIVAT*

## 2020-02-11 PROCEDURE — 97535 SELF CARE MNGMENT TRAINING: CPT

## 2020-02-11 PROCEDURE — 99233 SBSQ HOSP IP/OBS HIGH 50: CPT | Performed by: PHYSICAL MEDICINE & REHABILITATION

## 2020-02-11 PROCEDURE — 81001 URINALYSIS AUTO W/SCOPE: CPT

## 2020-02-11 PROCEDURE — 97530 THERAPEUTIC ACTIVITIES: CPT

## 2020-02-11 PROCEDURE — A9270 NON-COVERED ITEM OR SERVICE: HCPCS | Performed by: PHYSICAL MEDICINE & REHABILITATION

## 2020-02-11 PROCEDURE — 92526 ORAL FUNCTION THERAPY: CPT

## 2020-02-11 PROCEDURE — 94760 N-INVAS EAR/PLS OXIMETRY 1: CPT

## 2020-02-11 PROCEDURE — 92507 TX SP LANG VOICE COMM INDIV: CPT

## 2020-02-11 RX ORDER — BACLOFEN 10 MG/1
5 TABLET ORAL 3 TIMES DAILY
Status: DISCONTINUED | OUTPATIENT
Start: 2020-02-11 | End: 2020-02-21 | Stop reason: HOSPADM

## 2020-02-11 RX ORDER — LANOLIN ALCOHOL/MO/W.PET/CERES
CREAM (GRAM) TOPICAL 3 TIMES DAILY PRN
Status: DISCONTINUED | OUTPATIENT
Start: 2020-02-11 | End: 2020-02-21 | Stop reason: HOSPADM

## 2020-02-11 RX ORDER — BISACODYL 10 MG
10 SUPPOSITORY, RECTAL RECTAL
Status: DISCONTINUED | OUTPATIENT
Start: 2020-02-11 | End: 2020-02-13

## 2020-02-11 RX ORDER — POLYETHYLENE GLYCOL 3350 17 G/17G
1 POWDER, FOR SOLUTION ORAL EVERY 24 HOURS
Status: DISCONTINUED | OUTPATIENT
Start: 2020-02-11 | End: 2020-02-13

## 2020-02-11 RX ADMIN — BACLOFEN 5 MG: 10 TABLET ORAL at 20:21

## 2020-02-11 RX ADMIN — APIXABAN 2.5 MG: 5 TABLET, FILM COATED ORAL at 20:21

## 2020-02-11 RX ADMIN — LEVETIRACETAM 500 MG: 500 SOLUTION ORAL at 20:22

## 2020-02-11 RX ADMIN — MONTELUKAST 10 MG: 10 TABLET, FILM COATED ORAL at 20:20

## 2020-02-11 RX ADMIN — BACLOFEN 5 MG: 10 TABLET ORAL at 08:04

## 2020-02-11 RX ADMIN — BACLOFEN 5 MG: 10 TABLET ORAL at 15:59

## 2020-02-11 RX ADMIN — MELATONIN 1000 UNITS: at 08:04

## 2020-02-11 RX ADMIN — LEVETIRACETAM 500 MG: 500 SOLUTION ORAL at 08:06

## 2020-02-11 RX ADMIN — OMEPRAZOLE 40 MG: KIT at 08:05

## 2020-02-11 RX ADMIN — AMANTADINE HYDROCHLORIDE 200 MG: 50 SOLUTION ORAL at 13:00

## 2020-02-11 RX ADMIN — AMANTADINE HYDROCHLORIDE 200 MG: 50 SOLUTION ORAL at 08:05

## 2020-02-11 RX ADMIN — POLYETHYLENE GLYCOL 3350 1 PACKET: 17 POWDER, FOR SOLUTION ORAL at 20:20

## 2020-02-11 RX ADMIN — APIXABAN 2.5 MG: 5 TABLET, FILM COATED ORAL at 08:04

## 2020-02-11 ASSESSMENT — PATIENT HEALTH QUESTIONNAIRE - PHQ9
1. LITTLE INTEREST OR PLEASURE IN DOING THINGS: NOT AT ALL
2. FEELING DOWN, DEPRESSED, IRRITABLE, OR HOPELESS: NOT AT ALL
SUM OF ALL RESPONSES TO PHQ9 QUESTIONS 1 AND 2: 0

## 2020-02-11 NOTE — FLOWSHEET NOTE
02/11/20 1225   Events/Summary/Plan   Events/Summary/Plan rapid response for emesis, VSS, no current signs or symptoms of aspiration.  Re-do of soiled trach stoma covering   Trache/Stoma Care   Trache/Stoma Care Yes   Respiratory WDL   Respiratory (WDL) X   Chest Exam   Respiration 20   Pulse (!) 105   Secretions   Cough Dry   Oximetry   #Pulse Oximetry (Single Determination) Yes   Oxygen   Pulse Oximetry 95 %   O2 Daily Delivery Respiratory  Room Air with O2 Available

## 2020-02-11 NOTE — REHAB-RESPIRATORY IDT TEAM NOTE
Respiratory Therapy   Respiratory Therapy      Pt is stable on RA with SATS  In mid 90s.  He is not coughing up any secretions.  Trach stoma is healing slowly with less discharge but still has air leak.  Section completed by:  Ana Becker, RRT

## 2020-02-11 NOTE — THERAPY
Missed Therapy     Patient Name: Rey Medina  Age:  24 y.o., Sex:  male  Medical Record #: [id number]  Today's Date: 2/11/2020    Discussed missed therapy with Dr David, pt placed on medical hold due to agitation.

## 2020-02-11 NOTE — PROGRESS NOTES
Received bedside shift report from Alejo ROWE RN regarding patient and assumed care. Patient awake, calm and stable, currently positioned in bed for comfort and safety; call light within reach. Denies pain or discomfort at this time. Will continue to monitor.

## 2020-02-11 NOTE — THERAPY
Occupational Therapy  Daily Treatment     Patient Name: Rey Medina  Age:  24 y.o., Sex:  male  Medical Record #: [id number]  Today's Date: 2/11/2020     Precautions  Precautions: Fall Risk, PEG Tube, Swallow Precautions ( See Comments)  Comments: non verbal, recent decannulation of tracheostomy, Dys 1 with NTL     Safety   ADL Safety : Requires Physical Assist for Safety  Bathroom Safety: Requires Physical Assist for Safety    Subjective    Patient presents non-verbal, Rancho level 3.     Objective       02/11/20 0901   Precautions   Precautions Fall Risk;PEG Tube;Swallow Precautions ( See Comments)   Comments non verbal, recent decannulation of tracheostomy, Dys 1 with NTL    Interdisciplinary Plan of Care Collaboration   IDT Collaboration with  Family / Caregiver;Therapy Tech   Patient Position at End of Therapy Seated;Self Releasing Lap Belt Applied   Collaboration Comments Patient's mother present at start of session, therapy tech assisted during self-feeding session   OT Total Time Spent   OT Individual Total Time Spent (Mins) 30   OT Charge Group   OT Self Care / ADL 2     FIM Eating Score:  2 - Max Assistance  Eating Description:  Increased time, Modified diet, Supervision for safety, Verbal cueing, Set-up of equipment or meal/tube feeding, Adaptive equipment(Patient performed self-feeding tasks with max assist, max VCs, and AE (built-up utensil) using dominant RUE. )    Received patient in T-Dine, mother present. Patient participated in self-feeding session for the first time with OT. Initially patient required total assist to perform self-feeding task with hand-over -hand assist, however with repetition, tactile and verbal cues, patient began actively bringing spoon to mouth (with min-mod assist).    Assessment    Patient tolerated OT session well with focus on progression with self-feeding independence. Improved initiation of automatic tasks noted during this session (given repetitive hand-over-hand  assist).     Plan    Low-stim environment,  sensory stimulation, ADLs, sitting balance/standing balance

## 2020-02-11 NOTE — REHAB-CM IDT TEAM NOTE
Case Management    DC Planning  DC destination/dispostion:  Patient lives with his mother in a single level apartment.    Referrals: Medicaid PCS services.    DC Needs: Patient has been decannulated.  He has a peg tube.  He has only a hospital bed and transfer chair at home.  He will likely need a tub transfer bench and possible other dme.  He will likely need home health therapy and PCS services.     Barriers to discharge:   Limited family members in the area.    Strengths: mother is proficient in his care.    Section completed by:  Anahi Anderson R.N.

## 2020-02-11 NOTE — THERAPY
"Speech Language Pathology  Daily Treatment     Patient Name: Rey Medina  Age:  24 y.o., Sex:  male  Medical Record #: [id number]  Today's Date: 2/11/2020     Subjective    Patient was seen in his room with mother present.      Objective       02/11/20 1432   Receptive Language / Auditory Comprehension   Answers Yes / No Personal Questions Minimal (4)   Follows One Unit Commands Moderate (3)   Expressive Language   Automatic Language Appropriate Severe (2)   SLP Total Time Spent   SLP Individual Total Time Spent (Mins) 30   Charge Group   SLP Treatment - Individual Speech Language Treatment - Individual       Assessment    Patient was able to reach for objects (spoon, brush, cup) and demonstrate use after command. Shook head for \"no\" response and began verbalizations towards end of session. Able to verbalize name, mother's and sister's names. Verbalized \"yes\" and \"no\" responses to personal questions with 90% accuracy. Opened eyes on command but did not track mirror.   CRS completed with an improved score of 16.     Plan    Continue CRS, target verbal expression and following directives.     "

## 2020-02-11 NOTE — PROGRESS NOTES
"Rehab Progress Note     Encounter Date: 2/11/2020    CC: [facility], AMS    Interval Events (Subjective)  Patient sitting up in chair. Patient's mom does not notice any difference on higher dose of Amantadine. She reports he does have a little more spasms in his right leg. She reports he continues to want to put the urinal up to him to see if he can pee. She reports he sometimes leaves it there for hours. Discussed that may be uncomfortable or may be unaware that it is still there.     Patient with emesis at lunch time brought back to room (ate estimated 50%). Tachycardic in low 100s. Rapid response called. Discussed baseline status and oxygen remained in high 90s. RT to suction mouth and stoma. No changes in lung fields.     ROS: Cannot participate in conversation. No grimacing.     IDT Team Meeting 2/11/2020    IGreer M.D., was present and led the interdisciplinary team conference on 2/11/2020.  I led the IDT conference and agree with the IDT conference documentation and plan of care as noted below.     RN:  Diet PEG tube/ started on diet   % Meal  75% then emesis   Pain    Sleep    Bowel Incontinent   Bladder Incontinent   In's & Out's      PT:  Bed Mobility totA   Transfers modA   Mobility totA 2 feet in parallel bars   Stairs    Limited by fixation on holding urinal  Legs are strong, balance poor    OT:  Eating    Grooming modA   Bathing    UB Dressing totA   LB Dressing totA   Toileting totA   Shower & Transfer    Increased alertness; vibration   Trial of self feeding    SLP:  maxA for comprehension  maxA for expression  totA for executive  MBS - Dysphagia 1 with NTL, emesis in T dine  Follows some direction, 70% for hand squeeze    Resp:  Trach stoma healing slowly  Limited secretions    CM:  Continues to work on disposition and DME needs.      DC/Disposition:  2/26/20    Objective:  VITAL SIGNS: /80   Pulse 92   Temp 36.1 °C (97 °F) (Temporal)   Resp 20   Ht 1.702 m (5' 7\")   Wt 74.5 " kg (164 lb 3.9 oz)   SpO2 95%   BMI 25.72 kg/m²   Gen: NAD  Psych: Mood and affect flat  CV: RRR, no edema  Resp: CTAB, no upper airway sounds  Abd: NTND  Neuro: non-verbal, rarely opens eyes, will do automatic things like chewing and swallowing  Unchanged from 2/10/20    No results found for this or any previous visit (from the past 72 hour(s)).    Current Facility-Administered Medications   Medication Frequency   • baclofen (LIORESAL) tablet 5 mg TID   • magnesium hydroxide (MILK OF MAGNESIA) suspension 30 mL QDAY PRN    And   • polyethylene glycol/lytes (MIRALAX) PACKET 1 Packet Q24HR    And   • bisacodyl (DULCOLAX) suppository 10 mg QDAY PRN   • eucerin cream TID PRN   • amantadine (SYMMETREL) 50 MG/5ML syrup 200 mg BID   • levETIRAcetam (KEPPRA) 100 MG/ML solution 500 mg Q12HRS   • omeprazole (FIRST-OMEPRAZOLE) 2 mg/mL oral susp 40 mg DAILY   • vitamin D (cholecalciferol) tablet 1,000 Units DAILY   • ivabradine (CORLANOR) tablet 5 mg DAILY   • apixaban (ELIQUIS) tablet 2.5 mg BID   • Respiratory Care per Protocol Continuous RT   • tramadol (ULTRAM) 50 MG tablet 50 mg Q4HRS PRN   • hydrALAZINE (APRESOLINE) tablet 25 mg Q8HRS PRN   • acetaminophen (TYLENOL) tablet 650 mg Q4HRS PRN   • artificial tears ophthalmic solution 1 Drop PRN   • benzocaine-menthol (CEPACOL) lozenge 1 Lozenge Q2HRS PRN   • mag hydrox-al hydrox-simeth (MAALOX PLUS ES or MYLANTA DS) suspension 20 mL Q2HRS PRN   • ondansetron (ZOFRAN ODT) dispertab 4 mg 4X/DAY PRN    Or   • ondansetron (ZOFRAN) syringe/vial injection 4 mg 4X/DAY PRN   • traZODone (DESYREL) tablet 50 mg QHS PRN   • sodium chloride (OCEAN) 0.65 % nasal spray 2 Spray PRN   • ipratropium-albuterol (DUONEB) nebulizer solution Q4H PRN (RT)   • montelukast (SINGULAIR) tablet 10 mg Nightly       Orders Placed This Encounter   Procedures   • Diet Order Regular     Standing Status:   Standing     Number of Occurrences:   1     Order Specific Question:   Diet:     Answer:   Regular [1]      Order Specific Question:   Texture/Fiber modifications:     Answer:   Dysphagia 1(Pureed)specify fluid consistency(question 6) [1]     Comments:   1:1 feeding meds crushed with puree or via g-tube.      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 Patient very low functioning but per mother becoming more purposeful  -PT and OT for mobility and ADLs  -SLP for cognition   -Start Neurostimulants, amantadine 100 mg BID (0800 and 1300). Monitor throughout weekend. Increase to 200 mg BID, unchanged except more spasms in RLE  -Continue Keppra. Unclear if had seizure or was continued on prophylaxis  -NSG appointment on 2/7/20 to evaluation  shunt. Will follow-up in 2 months      Muscle spasms - mother claims Bromocriptine helps, typically used for dopamine side effects/neurostimulant. Will start low dose Baclofen and monitor. Increased spasms on higher dose Amantadine    Dysphagia - Patient with G Tube in place. SLP for swallow. MBSS on 2/10/20, start on dysphagia 1 with NTL  -Tolerated first meal but emesis on 2nd meal at higher percentage. May be due to distention, no lung changes. Continue to monitor.      CV - Patient is on Ivabradine 5 mg daily. Unclear reason why     Respiratory - Patient with size 7 trach on admission. Patient on Duonebs and Singulair  -Exchange aborted on 2/6/20 due to emesis on suction. Tolerated overnight. Decannulated AM 2/7/20. Tolerated and stoma closing.      GI Ppx - Patient on Prilosec and Prevacid. Unclear why two PPIs.  Discontinue Prevacid.      DVT Ppx - Patient on Eliquis 2.5 mg BID. Unknown reason why, mother denies clot or DVT.     Total time:  37 minutes.  I spent greater than 50% of the time for patient care, counseling, and coordination on this date, including unit/floor time, and face-to-face time with the patient as per interval events and assessment and plan above. Topics discussed included discharge planning, new emesis with rapid response, oxygen status fine, and monitor input from feedings. Patient was discussed separately in IDT today; please see details above.      Greer David M.D.

## 2020-02-11 NOTE — REHAB-NURSING IDT TEAM NOTE
Nursing   Nursing  Diet/Nutrition:  Regular, Dysphagia I-Pureed, Nectar Thick Liquids and Tube Feed  Medication Administration:  Crush all Medications in Puree and Via Gastric Tube  % consumed at meals in last 24 hours:  Consumed ate < 25% for dinner 2/10/20 of meals per documentation.  New to diet, still on tube feedings.  Meal/Snack Consumption for the past 24 hrs:   Oral Nutrition   02/10/20 1800 Dinner;Less than 25% Consumed     Snack schedule:  None  Supplement:  Other: N/A  Appetite:  Fair  Fluid Intake/Output in past 24 hours: In: 695 [P.O.:30; Other:340]  Out: 1191   Admit Weight:  Weight: 75 kg (165 lb 5.5 oz)  Weight Last 7 Days   [74.5 kg (164 lb 3.9 oz)-75 kg (165 lb 5.5 oz)] 74.5 kg (164 lb 3.9 oz) (02/09 0400)    Pain Issues:    Location:  --  --         Severity:  Denies   Scheduled pain medications:  None     PRN pain medications used in last 24 hours:  None   Non Pharmacologic Interventions:  Denies Pain  Effectiveness of pain management plan:  Denies Pain  Bowel:    Bowel Assist Initial Score:  1 - Total Assistance  Bowel Assist Current Score:  1 - Total Assistance  Bowl Accidents in last 7 days:     Last bowel movement: 02/10/20  Stool Description: Large, Soft     Usual bowel pattern:  daily  Scheduled bowel medications:  polyethylene glycol/lytes (MIRALAX)   PRN bowel medications used in last 24 hours:  None  Nursing Interventions:  Increased time, Scheduled medication, Positioning on commode/toilet, Brief, Supervision, Verbal cueing, Set-up, Requires 2 people to assist  Effectiveness of bowel program:   good=regular, predictable, controlled emptying of bowel  Bladder:    Bladder Assist Initial Score:  1 - Total Assistance  Bladder Assist Current Score:  1 - Total Assistance  Bladder Accidents in last 7 days:     PVR range for past 24-48 hours: -- ()  Intermittent Catheterization:  N/A  Medications affecting bladder:  None    Time void schedule/voiding pattern:  Voiding every 2-4  hours  Interventions:  Increased time, Verbal cueing, Emptying of device, Urinal, Brief, Time void patient initiated, Supervision  Effectiveness of bladder training:  Good=regular, predictable, emptying of bladder, patient initiates time voiding    Sleep/wake cycle:   Average hours slept:  Sleeps 3-4 hours without waking  Sleep medication usage:  None    Patient/Family Training/Education:  Bladder Management/Training, Bowel Management/Training, Diet/Nutrition, Fall Prevention, General Self Care, Medication Management, Safe Transfers and Safety  Discharge Supply Recommendations:  Feeding Tube Supplies and Other: Adaptive equipment.  Strengths: Able to follow instructions and Supportive family   Barriers:   Generalized weakness, Limited mobility and Tube feeding       Nursing Problems     Problem: Bowel/Gastric:     Description:     Goal: Normal bowel function is maintained or improved     Description:           Goal: Will not experience complications related to bowel motility     Description:                 Problem: Communication     Description:     Goal: The ability to communicate needs accurately and effectively will improve     Description:                 Problem: Discharge Barriers/Planning     Description:     Goal: Patient's continuum of care needs will be met     Description:                 Problem: Infection     Description:     Goal: Will remain free from infection     Description:                 Problem: Knowledge Deficit     Description:     Goal: Knowledge of disease process/condition, treatment plan, diagnostic tests, and medications will improve     Description:           Goal: Knowledge of the prescribed therapeutic regimen will improve     Description:                 Problem: Mobility     Description:     Goal: Risk for activity intolerance will decrease     Description:                 Problem: Pain Management     Description:     Goal: Pain level will decrease to patient's comfort goal      Description:                 Problem: Psychosocial Needs:     Description:     Goal: Level of anxiety will decrease     Description:                 Problem: Respiratory:     Description:     Goal: Respiratory status will improve     Description:                 Problem: Safety     Description:     Goal: Will remain free from injury     Description:           Goal: Will remain free from falls     Description:                 Problem: Skin Integrity     Description:     Goal: Risk for impaired skin integrity will decrease     Description:                 Problem: Urinary Elimination:     Description:     Goal: Ability to reestablish a normal urinary elimination pattern will improve     Description:                 Problem: Venous Thromboembolism (VTW)/Deep Vein Thrombosis (DVT) Prevention:     Description:     Goal: Patient will participate in Venous Thrombosis (VTE)/Deep Vein Thrombosis (DVT)Prevention Measures     Description:                        Long Term Goals:   At discharge patient will be able to function safely at home and in the community with support.    Section completed by:  Nimco Das L.P.N. 2

## 2020-02-11 NOTE — THERAPY
Speech Language Pathology  Daily Treatment     Patient Name: Rey Medina  Age:  24 y.o., Sex:  male  Medical Record #: [id number]  Today's Date: 2/10/2020     Subjective    Pt pleasant and cooperative.  Pt's mother present and supportive.       Objective       02/10/20 1501   Dysphagia    Other Treatments dysphagia education   SLP Total Time Spent   SLP Individual Total Time Spent (Mins) 30   Charge Group   SLP Swallowing Dysfunction Treatment Swallowing Dysfunction Treatment         Assessment    Tx focused on dysphagia education this day.  Pt, and mother, were shown MBS video.  Pt was lethargic, and did not respond.  Pt's mother verbalized understanding of the video, and recommendations for diet initiation.  Recommend dys1/NTL by spoon, meds crushed with puree, or via g-tube 1:1 feed (pt unable to feed self at this time)     Plan    Continue Coma response scale, and dysphagia management.

## 2020-02-11 NOTE — THERAPY
"Physical Therapy   Daily Treatment     Patient Name: Rey Medina  Age:  24 y.o., Sex:  male  Medical Record #: [id number]  Today's Date: 2/11/2020     Precautions  Precautions: Fall Risk, PEG Tube, Swallow Precautions ( See Comments)  Comments: non verbal, recent decannulation of tracheostomy, Dys 1 with NTL     Subjective    Pt seated in room with mom present, mom reports \"He just fed himself a little bit!\" Agreeable to PT family training session.      Objective       02/11/20 0931   Precautions   Precautions Fall Risk;PEG Tube;Swallow Precautions ( See Comments)   Comments non verbal, recent decannulation of tracheostomy, Dys 1 with NTL    Neuro-Muscular Treatments   Neuro-Muscular Treatments Postural Changes;Tactile Cuing;Verbal Cuing   Comments Pt completed sit <> stand from WC 2x with use of FWW and max A, 2x with  on PT's forearms and mod A    Interdisciplinary Plan of Care Collaboration   IDT Collaboration with  Family / Caregiver   Patient Position at End of Therapy Seated;Family / Friend in Room   Collaboration Comments pt's mother participatory in family training session   PT Total Time Spent   PT Individual Total Time Spent (Mins) 30   PT Charge Group   PT Therapeutic Activities 2     Family training/ education provided to mother regarding safe transfer techniques. PT recommended to eliminate use of pt pulling on neck and attempt forearm  instead. Mother not comfortable with forearm  technique and completed transfer with pt holding onto hips instead. Also provided education on POC and progression to more independence with transfers; possible use of FWW to increase independence.     FIM Bed/Chair/Wheelchair Transfers Score: 1 - Total Assistance  Bed/Chair/Wheelchair Transfers Description:  Verbal cueing, Set-up of equipment, Assist with two limbs(Mom completed SPT without AD mod- max A with PT close SBA- CGA)      Assessment    Pt able to follow single step commands this session. Pt's " mother receptive to education provided.     Plan    Standing tolerance and posture, Sit <> stand training with use of FWW or no AD, seated balance/ core stability exercise, continue transfer training with focus on increasing independence, gait training in // bars as pt is able.

## 2020-02-11 NOTE — CARE PLAN
Problem: Bowel/Gastric:  Goal: Normal bowel function is maintained or improved  Outcome: PROGRESSING AS EXPECTED  Note:   Patient having regular bowel movements; last BM 2/10.  Denies s/s constipation; bowel meds available if needed.  Will continue to monitor.      Problem: Skin Integrity  Goal: Risk for impaired skin integrity will decrease  Outcome: PROGRESSING AS EXPECTED  Note:   Patient educated on the importance of skin care and repositioning to maintain skin integrity. Patient understands the importance and is receptive to interventions such as repositioning and lotion/barrier cream.

## 2020-02-11 NOTE — THERAPY
Speech Language Pathology  Daily Treatment     Patient Name: Rey Medina  Age:  24 y.o., Sex:  male  Medical Record #: [id number]  Today's Date: 2/11/2020     Subjective    Patient arrived on time to  with assistance.      Objective       02/11/20 1132   Dysphagia    Diet / Liquid Recommendation Dysphagia I;Nectar Thick Liquid   Nutritional Liquid Intake Rating Scale 2   Nutritional Food Intake Rating Scale 4   Tracheostomy   Tracheostomy No   SLP Total Time Spent   SLP Individual Total Time Spent (Mins) 30   Charge Group   SLP Swallowing Dysfunction Treatment Swallowing Dysfunction Treatment         Assessment    Patient was assessed with current dysphagia I/NTL diet. One strong cough noted during meal, otherwise, no overt s/sx of aspiration were noted. With use of angled spoon, patient engaged in self-feeding with max tactile cues.      Plan    Continue dysphagia I/NTL diet.

## 2020-02-11 NOTE — CARE PLAN
Problem: Bowel/Gastric:  Goal: Normal bowel function is maintained or improved  2/11/2020 0800 by Alejo Ferguson REzioNEzio  Outcome: PROGRESSING AS EXPECTED  Note:   Patient having regular bowel movements; last BM 2/10.  Denies s/s constipation; bowel meds available if needed.  Will continue to monitor.        Problem: Skin Integrity  Goal: Risk for impaired skin integrity will decrease  2/11/2020 0800 by Alejo Ferguson REzioN.  Outcome: PROGRESSING AS EXPECTED    Note:   Patient educated on the importance of skin care and repositioning to maintain skin integrity. Patient understands the importance and is receptive to interventions such as repositioning and lotion/barrier cream.

## 2020-02-11 NOTE — REHAB-SLP IDT TEAM NOTE
"Speech Therapy   Cognitive Linquistic Functions  Comprehension Initial:  2 - Max Assistance  Comprehension Current:  2 - Max Assistance   Comprehension Description:  Verbal cues  Expression Initial:  1 - Total Assistance  Expression Current:  2 - Max Assistance   Expression Description:  (squeezes hand for \"yes\" response)  Social Interaction Initial:  2 - Max Assistance  Social Interaction Current:  2 - Max Assistance   Social Interaction Description:  Increased time, Verbal cues  Problem Solving Initial:  1 - Total Assistance  Problem Solving Current:  1 - Total Assistance   Problem Solving Description:     Memory Initial:  1 - Total Assistance  Memory Current:  1 - Total Assistance   Memory Description:     Executive Functioning / Organization Initial:     Executive Functioning / Organization Current:      Executive Functioning Desciption:  Total A  Swallowing  Swallowing Status:  Dysphagia I/NTL. MBSS completed  Orders Placed This Encounter   Procedures   • Diet Order Regular     Standing Status:   Standing     Number of Occurrences:   1     Order Specific Question:   Diet:     Answer:   Regular [1]     Order Specific Question:   Texture/Fiber modifications:     Answer:   Dysphagia 1(Pureed)specify fluid consistency(question 6) [1]     Comments:   1:1 feeding meds crushed with puree or via g-tube.      Order Specific Question:   Consistency/Fluid modifications:     Answer:   Nectar Thick [2]     Behavior Modification Plan  Keep the environment simple to avoid over stimulatiom/agitation, Allow for rest time, Be calm and Analyze tasks (break down into smaller steps)  Assistive Technology  Low tech: Calendar, planner, schedule, alarms/timers, pill organizer, post-it notes, lists  Family Training/Education:  Ongoing with mother  DC Recommendations:    speech therapy  Strengths:  Making steady progress towards goals and Supportive family  Barriers:  Aspiration risk, Unable to follow instructions, Generalized " weakness, Poor activity tolerance and Poor carryover of learning  # of short term goals set=2  # of short term goals met=3  Speech Therapy Problems     Problem: Comprehension STGs     Dates: Start: 02/06/20       Description:     Goal: STG-Within one week, patient will     Dates: Start: 02/06/20       Description: 1) Individualized goal:  Answer personal y/n questions with use of hand squeeze with 75% accuracy, given mod verbal cues.   2) Interventions:  SLP Speech Language Treatment, SLP Self Care / ADL Training , SLP Cognitive Skill Development and SLP Group Treatment                   Problem: Expression STGs     Dates: Start: 02/11/20       Description:     Goal: STG-Within one week, patient will     Dates: Start: 02/11/20       Description: 1) Individualized goal:  Imitate vowel sounds on 50% of attempts.   2) Interventions:  SLP Speech Language Treatment, SLP Swallowing Dysfunction Treatment, SLP Oral Pharyngeal Evaluation, SLP Prosthesis Checkout, SLP Self Care / ADL Training , SLP Cognitive Skill Development and SLP Group Treatment                   Problem: Speech/Swallowing LTGs     Dates: Start: 02/06/20       Description:     Goal: LTG-By discharge, patient will safely swallow     Dates: Start: 02/06/20       Description: 1) Individualized goal:  Dysphagia I/NTL diet without clinical s/sx of aspiration  2) Interventions:  SLP Speech Language Treatment, SLP Swallowing Dysfunction Treatment, SLP Oral Pharyngeal Evaluation, SLP Video Swallow Evaluation, SLP Self Care / ADL Training , SLP Cognitive Skill Development and SLP Group Treatment             Goal: LTG-By discharge, patient will     Dates: Start: 02/06/20       Description:                        Section completed by:  Katia Doss MS,CCC-SLP

## 2020-02-11 NOTE — REHAB-PT IDT TEAM NOTE
Physical Therapy   Mobility  Bed mobility:   Total x2 (at eval, needs to be reassessed)   Bed /Chair/Wheelchair Transfer Initial:  1 - Total Assistance  Bed /Chair/Wheelchair Transfer Current:  1 - Total Assistance   Bed/Chair/Wheelchair Transfer Description:  Increased time, Set-up of equipment, Requires 2 people to assist(WC <> seated EOM, min A with second person for trunk stability management )  Walk Initial:  0 - Not tested,unsafe activity  Walk Current:  1 - Total Assistance   Walk Description:  Two hand rails, Verbal cueing, Extra time, Requires wheelchair follow(2 ft in // bars with wc follow during standing reps, vc for sequecing of stepping)  Wheelchair Initial:  0 - Not tested,unsafe activity  Wheelchair Current:  1 - Total Assistance   Wheelchair Description:     Stairs Initial:  0 - Not tested,unsafe activity  Stairs Current: 0 - Not tested,unsafe activity   Stairs Description:    Patient/Family Training/Education:  Ongoing with mother   DME/DC Recommendations:  Manual WC, possible FWW  Strengths:  Adequate strength and Supportive family  Barriers:   Limited mobility, Poor activity tolerance, Poor balance and Other: inconsistent ability to follow single step commands, UE weakness, non-verbal, intermittent agitation   # of short term goals set= 4  # of short term goals met=1  Physical Therapy Problems     Problem: Mobility Transfers     Dates: Start: 02/06/20       Description:     Goal: STG-Within one week, patient will roll     Dates: Start: 02/06/20       Description: 1) Individualized goal:  To left or right, max A with use of bed features as needed.  2) Interventions:  PT E Stim Attended, PT Orthotics Training, PT Gait Training, PT Self Care/Home Eval, PT Therapeutic Exercises, PT Ultrasound, PT TENS Application, PT Neuro Re-Ed/Balance, PT Therapeutic Activity, PT Manual Therapy and PT Evaluation        Note:     Goal Note filed on 02/11/20 0815 by Charmaine Chawla, PT    To be assessed                   Goal: STG-Within one week, patient will sit to stand     Dates: Start: 02/06/20       Description: 1) Individualized goal:  Min A x1 with use of AD/bed rail as needed  2) Interventions: PT E Stim Attended, PT Orthotics Training, PT Gait Training, PT Self Care/Home Eval, PT Therapeutic Exercises, PT Ultrasound, PT TENS Application, PT Neuro Re-Ed/Balance, PT Therapeutic Activity, PT Manual Therapy and PT Evaluation        Note:     Goal Note filed on 02/11/20 0815 by Charmaine Chawla, PT    2nd person for safety SBA- min A                   Goal: STG-Within one week, patient will transfer bed to chair     Dates: Start: 02/06/20       Description: 1) Individualized goal:  Mod A x1 with LRAD  2) Interventions:  PT E Stim Attended, PT Orthotics Training, PT Gait Training, PT Self Care/Home Eval, PT Therapeutic Exercises, PT Ultrasound, PT TENS Application, PT Neuro Re-Ed/Balance, PT Therapeutic Activity, PT Manual Therapy and PT Evaluation        Note:     Goal Note filed on 02/11/20 0815 by Charmaine Chawla, PT    2nd person for safety SBA - min A                         Problem: PT-Long Term Goals     Dates: Start: 02/06/20       Description:     Goal: LTG-By discharge, patient will maintain balance     Dates: Start: 02/06/20       Description: 1) Individualized goal:  In sitting with use of UE for assist at a SPV level, 5 min   2) Interventions:  PT E Stim Attended, PT Orthotics Training, PT Gait Training, PT Self Care/Home Eval, PT Therapeutic Exercises, PT Ultrasound, PT TENS Application, PT Neuro Re-Ed/Balance, PT Therapeutic Activity, PT Manual Therapy and PT Evaluation              Goal: LTG-By discharge, patient will tolerate standing     Dates: Start: 02/06/20       Description: 1) Individualized goal:  2 min, SBA with use of LRAD  2) Interventions:  PT E Stim Attended, PT Orthotics Training, PT Gait Training, PT Self Care/Home Eval, PT Therapeutic Exercises, PT Ultrasound, PT TENS Application, PT Neuro Re-Ed/Balance, PT  Therapeutic Activity, PT Manual Therapy and PT Evaluation            Goal: LTG-By discharge, patient will ambulate     Dates: Start: 02/06/20       Description: 1) Individualized goal:  10 ft, mod A with LRAD  2) Interventions:  PT E Stim Attended, PT Orthotics Training, PT Gait Training, PT Self Care/Home Eval, PT Therapeutic Exercises, PT Ultrasound, PT TENS Application, PT Neuro Re-Ed/Balance, PT Therapeutic Activity, PT Manual Therapy and PT Evaluation             Goal: LTG-By discharge, patient will transfer one surface to another     Dates: Start: 02/06/20       Description: 1) Individualized goal:  Min A with LRAD  2) Interventions:  PT E Stim Attended, PT Orthotics Training, PT Gait Training, PT Self Care/Home Eval, PT Therapeutic Exercises, PT Ultrasound, PT TENS Application, PT Neuro Re-Ed/Balance, PT Therapeutic Activity, PT Manual Therapy and PT Evaluation                         Section completed by:  Charmaine Chawla, PT, DPT

## 2020-02-11 NOTE — PROGRESS NOTES
"Rehab Progress Note     Encounter Date: 2/10/2020    CC: I[facility], AMS    Interval Events (Subjective)  Patient sitting up in room. He remains non-verbal. No cognitive changes seen over the weekend with amantadine. Discussed increasing which his mother is in agreement. Discussed MBSS with speech and he can be started on a diet with dysphagia 1 with NTL. His mother is very encouraged.    ROS: Cannot participate in conversation. No grimacing.     Objective:  VITAL SIGNS: /89   Pulse (!) 111   Temp 36.6 °C (97.9 °F) (Temporal)   Resp 18   Ht 1.702 m (5' 7\")   Wt 74.5 kg (164 lb 3.9 oz)   SpO2 97%   BMI 25.72 kg/m²   Gen: NAD  Psych: Mood and affect flat  CV: RRR, no edema  Resp: CTAB, no upper airway sounds  Abd: NTND  Neuro: non-verbal, rarely opens eyes, will do automatic things like chewing and swallowing    No results found for this or any previous visit (from the past 72 hour(s)).    Current Facility-Administered Medications   Medication Frequency   • levETIRAcetam (KEPPRA) 100 MG/ML solution 500 mg Q12HRS   • omeprazole (FIRST-OMEPRAZOLE) 2 mg/mL oral susp 40 mg DAILY   • amantadine (SYMMETREL) 50 MG/5ML syrup 100 mg BID   • vitamin D (cholecalciferol) tablet 1,000 Units DAILY   • ivabradine (CORLANOR) tablet 5 mg DAILY   • apixaban (ELIQUIS) tablet 2.5 mg BID   • Respiratory Care per Protocol Continuous RT   • tramadol (ULTRAM) 50 MG tablet 50 mg Q4HRS PRN   • hydrALAZINE (APRESOLINE) tablet 25 mg Q8HRS PRN   • acetaminophen (TYLENOL) tablet 650 mg Q4HRS PRN   • artificial tears ophthalmic solution 1 Drop PRN   • benzocaine-menthol (CEPACOL) lozenge 1 Lozenge Q2HRS PRN   • mag hydrox-al hydrox-simeth (MAALOX PLUS ES or MYLANTA DS) suspension 20 mL Q2HRS PRN   • ondansetron (ZOFRAN ODT) dispertab 4 mg 4X/DAY PRN    Or   • ondansetron (ZOFRAN) syringe/vial injection 4 mg 4X/DAY PRN   • traZODone (DESYREL) tablet 50 mg QHS PRN   • sodium chloride (OCEAN) 0.65 % nasal spray 2 Spray PRN   • " ipratropium-albuterol (DUONEB) nebulizer solution Q4H PRN (RT)   • polyethylene glycol/lytes (MIRALAX) PACKET 1 Packet Q24HR    And   • magnesium hydroxide (MILK OF MAGNESIA) suspension 30 mL QDAY PRN    And   • bisacodyl (DULCOLAX) suppository 10 mg QDAY PRN   • montelukast (SINGULAIR) tablet 10 mg Nightly   • baclofen (LIORESAL) tablet 5 mg TID       Orders Placed This Encounter   Procedures   • Diet Order Regular     Standing Status:   Standing     Number of Occurrences:   1     Order Specific Question:   Diet:     Answer:   Regular [1]     Order Specific Question:   Texture/Fiber modifications:     Answer:   Dysphagia 1(Pureed)specify fluid consistency(question 6) [1]     Comments:   1:1 feeding meds crushed with puree or via g-tube.      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 Patient very low functioning but per mother becoming more purposeful  -PT and OT for mobility and ADLs  -SLP for cognition   -Start Neurostimulants, amantadine 100 mg BID (0800 and 1300). Monitor throughout weekend. Increase to 200 mg BID  -Continue Keppra. Unclear if had seizure or was continued on prophylaxis  -NSG appointment on 2/7/20 to evaluation  shunt. Will follow-up in 2 months      Muscle spasms - mother claims Bromocriptine helps, typically used for dopamine side effects/neurostimulant. Will start low dose Baclofen and monitor    Dysphagia - Patient with G Tube in place. SLP for swallow. MBSS on 2/10/20, start on dysphagia 1 with NTL     CV - Patient is on  Ivabradine 5 mg daily. Unclear reason why     Respiratory - Patient with size 7 trach on admission. Patient on Duonebs and Singulair  -Exchange aborted on 2/6/20 due to emesis on suction. Tolerated overnight. Decannulated AM 2/7/20. Tolerated and stoma closing.      GI Ppx - Patient on Prilosec and Prevacid. Unclear why two PPIs. Discontinue Prevacid.      DVT Ppx - Patient on Eliquis 2.5 mg BID. Unknown reason why, mother denies clot or DVT.     Total time:  26 minutes.  I spent greater than 50% of the time for patient care, counseling, and coordination on this date, including unit/floor time, and face-to-face time with the patient as per interval events and assessment and plan above. Topics discussed included no change in cognition, increase Amantadine to 200 mg, MBSS, and advance diet. Continue PEG tube for now.     Greer David M.D.

## 2020-02-11 NOTE — REHAB-PHARMACY IDT TEAM NOTE
Pharmacy   Pharmacy  Antibiotics/Antifungals/Antivirals:  Medication:      Active Orders (From admission, onward)    None        Route:        NA  Stop Date:  NA  Reason:      NA  Antihypertensives/Cardiac:  Medication:    Orders (72h ago, onward)     Start     Ordered    02/05/20 1200  ivabradine (CORLANOR) tablet 5 mg  DAILY      02/05/20 1141    02/05/20 1141  hydrALAZINE (APRESOLINE) tablet 25 mg  EVERY 8 HOURS PRN      02/05/20 1141              Patient Vitals for the past 24 hrs:   BP Pulse   02/10/20 1500 132/89 (!) 111   02/10/20 1031 -- (!) 110   02/10/20 0648 138/85 99   02/09/20 1835 136/92 (!) 113     Anticoagulation:  Medication: Eliquis    Other key medications: A review of the medication list reveals no issues at this time.    Section completed by: Nemesio Chris [facility]

## 2020-02-11 NOTE — REHAB-OT IDT TEAM NOTE
Occupational Therapy   Activities of Daily Living  Eating Initial:  1 - Total Assistance  Eating Current:  2 - Max Assistance  Eating Description:  Increased time, Modified diet, Supervision for safety, Verbal cueing, Set-up of equipment or meal/tube feeding, Adaptive equipment(Patient performed self-feeding tasks with max assist, max VCs, and AE (built-up utensil) using dominant RUE. )  Grooming Initial:  1 - Total Assistance  Grooming Current:  3 - Moderate Assistance   Grooming Description:  Verbal cueing, Set-up of equipment, Increased time, Supervision for safety(Initial hand over hand provided with patietnt completing hand/face washing with single verbal cue)  Bathing Initial:  0 - Not tested, medical condition  Bathing Current:  0 - Not tested, medical condition   Bathing Description:     Upper Body Dressing Initial:  1 - Total Assistance  Upper Body Dressing Current:  1 - Total Assistance   Upper Body Dressing Description:  Increased time, Supervision for safety, Set-up of equipment, Initial preparation for task(Patient required total assist to don t-shirt while seated in w/c.  )  Lower Body Dressing Initial:  1 - Total Assistance  Lower Body Dressing Current:  1 - Total Assistance   Lower Body Dressing Description:  1 - Total Assistance  Toileting Initial:  1 - Total Assistance  Toileting Current:  1 - Total Assistance   Toileting Description:  Increased time, Supervision for safety, Set-up of equipment  Toilet Transfer Initial:     Toilet Transfer Current:      Toilet Transfer Description:     Tub / Shower Transfer Initial:  0 - Not tested,unsafe activity  Tub / Shower Transfer Current:  0 - Not tested,unsafe activity   Tub / Shower Transfer Description:     IADL:  NA at this time, [facility] level 3  Family Training/Education:  Ongoing with patient's mother   DME/DC Recommendations:  TBD; new patient      Strengths:  Supportive family  Barriers:  Other: Inconsistent ability to follow 1-step instructions,  non-verbal, [place] level 3, decreased strength, decreased balance, decreased functional mobility     # of short term goals set= 2   # of short term goals met= 1  Occupational Therapy Goals     Problem: Dressing     Dates: Start: 02/06/20       Description:     Goal: STG-Within one week, patient will dress UB     Dates: Start: 02/06/20       Description: 1) Individualized Goal:  Max assist   2) Interventions:  OT Group Therapy, OT Self Care/ADL, OT Cognitive Skill Dev, OT Manual Ther Technique, OT Neuro Re-Ed/Balance, OT Sensory Int Techniques, OT Therapeutic Activity, OT Evaluation and OT Therapeutic Exercise                 Problem: Functional Cognition     Dates: Start: 02/06/20       Description:           Problem: OT Long Term Goals     Dates: Start: 02/06/20       Description:     Goal: LTG-By discharge, patient will complete basic self care tasks     Dates: Start: 02/06/20       Description: 1) Individualized Goal:  Mod assist with AE as needed  2) Interventions:  OT Group Therapy, OT Self Care/ADL, OT Cognitive Skill Dev, OT Manual Ther Technique, OT Neuro Re-Ed/Balance, OT Sensory Int Techniques, OT Therapeutic Activity, OT Evaluation and OT Therapeutic Exercise           Goal: LTG-By discharge, patient will perform bathroom transfers     Dates: Start: 02/06/20       Description: 1) Individualized Goal:  Mod assist with AE as needed    2) Interventions:  OT Group Therapy, OT Self Care/ADL, OT Cognitive Skill Dev, OT Manual Ther Technique, OT Neuro Re-Ed/Balance, OT Sensory Int Techniques, OT Therapeutic Activity, OT Evaluation and OT Therapeutic Exercise                       Section completed by:  Minnie Luke MS,OTR/L

## 2020-02-11 NOTE — REHAB-DIETARY IDT TEAM NOTE
Dietary   Nutrition  Dietary Problems     Problem: Other Problem (see comments)     Description:     Goal: Other Goal (Resolved)     Description: Nutrition Support tolerated and meeting greater than 85% of estimated needs.                     Nutrition support weekly update:  Day 6 of admit.  Rey Medina is a 24 y.o. male with admitting DX of non-traumatic ICH (intracerebral hemorrhage).  Pt was receiving tube feed prior to admit with AVM rupture occurring on 4/21/19. Current TF via G-tube is tolerated and at goal.    Assessment:  Weight 74.5 kg. Weight has been stable since admit  Re-estimate of nutritional needs as indicated: not indicated     Diet Order/intake: regular, dysphagia 1 with NTL/<25% x 2 meals    Evaluation:   1. RD met with pt's mother who is providing tube feed boluses and water flushes. She is bringing in pt's formula, [employer], from home and providing boluses of 325 ml 5x per day at 0600,1000,1400, 1800 and 2200. He is also receiving 240 ml water every 4 hours. Pt is now receiving a diet with SLP. Recorded intake at meals has been < 25% so far. RD and pt's mother discussed tube feed plan. Pt will receive his full tube feed bolus of 325 ml with 1 scoop of Beneprotein at 0600 and 2200. If pt eats > 50% of his meals, he will receive 1/2 of a carton of tube feed. If he eats < 50% he will receive the full bolus. Water flushes will continue as ordered. He will receive 1 scoop of Beneprotein mixed in his water BID. Total scoops of Beneprotein per day is 4. Based on pt's PO intake, pt will receive between 4272-3177 kcals per day,  gm of protein and 866-1235 ml free water from tube feed. With water flushes, pt will receive between 0685-2556 ml water per day.   2. Pt's weight has been stable since admit.   3. GI: LBM on 2/10/20 (large, soft)  4. Labs: reviewed  5. Meds: Miralax ordered today, vitamin D, omeprazole    Malnutrition risk: Criteria not met    Recommendations/Plan:  1. Continue  with boluses of 1 carton of [employer] TF as ordered 5 x a day at 0600, 1000, 1400, 1800, 2200. If PO is > 50% provide 1/2 a bolus at feeding following the meal (either 1000, 1400, 1800). If PO is < 50%, provide whole bolus. Continue with 1 scoop of Beneprotein QID.  2. Continue with water flushes of 240 ml every 4 hours.  3. Monitor weights  4. Diet advancement per SLP  5. RD will monitor.             Section completed by:  Ayana Jolley R.D.

## 2020-02-11 NOTE — THERAPY
Speech Language Pathology  Video Swallow     Patient Name:  Rey Medina  AGE:  24 y.o., SEX:  male  Medical Record #:  [id number]  Today's Date: 2/10/2020     Objective    Pt pleasant and cooperative during MBS.    Assessment       02/10/20 1301   History / Background Information   Prior Level of Function for Eating / Swallowing Regular/thin   Diagnosis dysphagia   Dysphagia Symptoms Warranting Video Swallow intermittent throat clear/cough following p.o. trials.    Dentition Intact   Procedure   Patient Seated in  WC   Seated at (Degrees) 90   Views Completed Lateral   Consistencies / Presentation Method   Puree Teaspoon   Nectar Thick Liquids Cup;Teaspoon   Thin Liquids Cup;Teaspoon   Oral Phase   Puree Delayed Oral Transit;Premature Spillage Into Valleculae;Premature Spillage Into Pyriform Sinus   Nectar Thick Liquids Premature Spillage Into Valleculae;Premature Spillage Into Lateral Channels;Premature Spillage Into Pyriform Sinus;Delayed Oral Transit   Thin Liquids Premature Spillage Into Valleculea;Premature Spillage Into Lateral Channels;Premature Spillage Into  Pyriform Sinus;Delayed Oral Transit   Pharyngeal Phase   Puree Residue In Valleculae;Residue In Pyriform Sinus   Nectar Thick Liquids Residue In Valleculae;Residue In Pyriform Sinus   Thin Liquids Penetration During Swallow;Delayed Cough Response to Penetration / Aspiration   (immediate cough response to penetration level of vocal folds)   Compensatory Strategies Attempted   Compensatory Strategies Attempted Yes   Multiple Swallows effective in clearing residue in valleculae and pyriform sinuses   Impression   Dysphagia Present Yes   Oral - Pharyngeal Mild Impairment   Prognosis   Prognosis for Improvement Good   Barriers to Improvement decreased alertness; decressed comprehension of instructions   Positive Indicators for Improvement limited deficit; strong family support.   Recommendations   Diet / Liquid Recommendation Dysphagia I;Nectar Thick  Liquid   Medication Administration  Crush all Medications in Puree;Via Gastric Tube   Strategies / Precautions Small Bites;Small Sips;Other (See Comments)   Interventions Dysphagia Therapy by SLP;Therapeutic Dining Program for Meals;Pharyngeal - Laryngeal Strengthening Exercises;Patient / Caregiver Education / Training   SLP Contact Information (Name / Extension) MP [phone]   Video Tape Number [id number]   Interdisciplinary Plan of Care Collaboration   IDT Collaboration with  Physician;Nursing;Family / Caregiver   SLP Total Time Spent   SLP Individual Total Time Spent (Mins) 30   Charge Group   SLP Video Swallow / FEES Videofluoroscopic Evaluation     Modified Barium Swallow study completed this day.  Pt presented with mild destiny-pharyngeal dysphagia.  Oral dysphagia was characterized by decreased oral transit time, and premature spillage to the valleculae and pyriform sinuses.  Pharyngeal dysphagia was characterized by decreased hyolaryngeal excursion.  Pt benefits from cued second swallow to clear residue in valleculae and pyriform sinuses.  Pt presented with deep penetration x1 of three trials, following thin liquid by cup.  Pt presented with immediate cough, and effectively cleared residue from the laryngeal vestibule.  No aspiration or penetration observed following thin/NTL or puree by spoon. No aspiration or penetration observed following NTL by cup.  Recommend upgrade to dys1/NTL by spoon, meds crushed with puree, or via g-tube.  Results and recommendations discussed with MD, nursing, and mother.  MD in agreement with recommendations.     Plan    Recommend upgrade to dys1/NTL by spoon, meds crushed with puree, or via g-tube.

## 2020-02-11 NOTE — REHAB-COLLABORATIVE ONGOING IDT TEAM NOTE
Weekly Interdisciplinary Team Conference Note    Weekly Interdisciplinary Team Conference # 1  Date:  2/11/2020    Clinicians present and reporting at team conference include the following:   MD: CHARLENE David MD    RN:  Chu Nagy RN   PT:   Charmaine Chawla PT, DPT  OT:  [place] Shayna OTD, OTR/L   ST:  Katia Doss MS, CCC-SLP  CM:  Anahi Anderson RN [facility]  REC:  None  RT:  Ana Becker RRT  RPh:  Ro Villasenor [facility]  Other:   None  All reporting clinicians have a working knowledge of this patient's plan of care.    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 **        Nursing  Diet/Nutrition:  Regular, Dysphagia I-Pureed, Nectar Thick Liquids and Tube Feed  Medication Administration:  Crush all Medications in Puree and Via Gastric Tube  % consumed at meals in last 24 hours:  Consumed ate < 25% for dinner 2/10/20 of meals per documentation.  New to diet, still on tube feedings.  Meal/Snack Consumption for the past 24 hrs:   Oral Nutrition   02/10/20 1800 Dinner;Less than 25% Consumed     Snack schedule:  None  Supplement:  Other: N/A  Appetite:  Fair  Fluid Intake/Output in past 24 hours: In: 695 [P.O.:30; Other:340]  Out: 1191   Admit Weight:  Weight: 75 kg (165 lb 5.5 oz)  Weight Last 7 Days   [74.5 kg (164 lb 3.9 oz)-75 kg (165 lb 5.5 oz)] 74.5 kg (164 lb 3.9 oz) (02/09 0400)    Pain Issues:    Location:  --  --         Severity:  Denies   Scheduled pain medications:  None     PRN pain medications used in last 24 hours:   None   Non Pharmacologic Interventions:  Denies Pain  Effectiveness of pain management plan:  Denies Pain  Bowel:    Bowel Assist Initial Score:  1 - Total Assistance  Bowel Assist Current Score:  1 - Total Assistance  Bowl Accidents in last 7 days:     Last bowel movement: 02/10/20  Stool Description: Large, Soft     Usual bowel pattern:  daily  Scheduled bowel medications:  polyethylene glycol/lytes (MIRALAX)   PRN bowel medications used in last 24 hours:  None  Nursing Interventions:  Increased time, Scheduled medication, Positioning on commode/toilet, Brief, Supervision, Verbal cueing, Set-up, Requires 2 people to assist  Effectiveness of bowel program:   good=regular, predictable, controlled emptying of bowel  Bladder:    Bladder Assist Initial Score:  1 - Total Assistance  Bladder Assist Current Score:  1 - Total Assistance  Bladder Accidents in last 7 days:     PVR range for past 24-48 hours: -- ()  Intermittent Catheterization:  N/A  Medications affecting bladder:  None    Time void schedule/voiding pattern:  Voiding every 2-4 hours  Interventions:  Increased time, Verbal cueing, Emptying of device, Urinal, Brief, Time void patient initiated, Supervision  Effectiveness of bladder training:  Good=regular, predictable, emptying of bladder, patient initiates time voiding    Sleep/wake cycle:   Average hours slept:  Sleeps 3-4 hours without waking  Sleep medication usage:  None    Patient/Family Training/Education:  Bladder Management/Training, Bowel Management/Training, Diet/Nutrition, Fall Prevention, General Self Care, Medication Management, Safe Transfers and Safety  Discharge Supply Recommendations:  Feeding Tube Supplies and Other: Adaptive equipment.  Strengths: Able to follow instructions and Supportive family   Barriers:   Generalized weakness, Limited mobility and Tube feeding       Nursing Problems     Problem: Bowel/Gastric:     Description:     Goal: Normal bowel function is maintained or improved      Description:           Goal: Will not experience complications related to bowel motility     Description:                 Problem: Communication     Description:     Goal: The ability to communicate needs accurately and effectively will improve     Description:                 Problem: Discharge Barriers/Planning     Description:     Goal: Patient's continuum of care needs will be met     Description:                 Problem: Infection     Description:     Goal: Will remain free from infection     Description:                 Problem: Knowledge Deficit     Description:     Goal: Knowledge of disease process/condition, treatment plan, diagnostic tests, and medications will improve     Description:           Goal: Knowledge of the prescribed therapeutic regimen will improve     Description:                 Problem: Mobility     Description:     Goal: Risk for activity intolerance will decrease     Description:                 Problem: Pain Management     Description:     Goal: Pain level will decrease to patient's comfort goal     Description:                 Problem: Psychosocial Needs:     Description:     Goal: Level of anxiety will decrease     Description:                 Problem: Respiratory:     Description:     Goal: Respiratory status will improve     Description:                 Problem: Safety     Description:     Goal: Will remain free from injury     Description:           Goal: Will remain free from falls     Description:                 Problem: Skin Integrity     Description:     Goal: Risk for impaired skin integrity will decrease     Description:                 Problem: Urinary Elimination:     Description:     Goal: Ability to reestablish a normal urinary elimination pattern will improve     Description:                 Problem: Venous Thromboembolism (VTW)/Deep Vein Thrombosis (DVT) Prevention:     Description:     Goal: Patient will participate in Venous Thrombosis (VTE)/Deep Vein Thrombosis  (DVT)Prevention Measures     Description:                        Long Term Goals:   At discharge patient will be able to function safely at home and in the community with support.    Section completed by:  Nimco Das L.P.N. 2       Respiratory Therapy      Pt is stable on RA with SATS  In mid 90s.  He is not coughing up any secretions.  Trach stoma is healing slowly with less discharge but still has air leak.  Section completed by:  Ana Becker, RRT    Mobility  Bed mobility:   Total x2 (at eval, needs to be reassessed)   Bed /Chair/Wheelchair Transfer Initial:  1 - Total Assistance  Bed /Chair/Wheelchair Transfer Current:  1 - Total Assistance   Bed/Chair/Wheelchair Transfer Description:  Increased time, Set-up of equipment, Requires 2 people to assist(WC <> seated EOM, min A with second person for trunk stability management )  Walk Initial:  0 - Not tested,unsafe activity  Walk Current:  1 - Total Assistance   Walk Description:  Two hand rails, Verbal cueing, Extra time, Requires wheelchair follow(2 ft in // bars with wc follow during standing reps, vc for sequecing of stepping)  Wheelchair Initial:  0 - Not tested,unsafe activity  Wheelchair Current:  1 - Total Assistance   Wheelchair Description:     Stairs Initial:  0 - Not tested,unsafe activity  Stairs Current: 0 - Not tested,unsafe activity   Stairs Description:    Patient/Family Training/Education:  Ongoing with mother   DME/DC Recommendations:  Manual WC, possible FWW  Strengths:  Adequate strength and Supportive family  Barriers:   Limited mobility, Poor activity tolerance, Poor balance and Other: inconsistent ability to follow single step commands, UE weakness, non-verbal, intermittent agitation   # of short term goals set= 4  # of short term goals met=1  Physical Therapy Problems     Problem: Mobility Transfers     Dates: Start: 02/06/20       Description:     Goal: STG-Within one week, patient will roll     Dates: Start: 02/06/20        Description: 1) Individualized goal:  To left or right, max A with use of bed features as needed.  2) Interventions:  PT E Stim Attended, PT Orthotics Training, PT Gait Training, PT Self Care/Home Eval, PT Therapeutic Exercises, PT Ultrasound, PT TENS Application, PT Neuro Re-Ed/Balance, PT Therapeutic Activity, PT Manual Therapy and PT Evaluation        Note:     Goal Note filed on 02/11/20 0815 by Charmaine Chawla, PT    To be assessed                  Goal: STG-Within one week, patient will sit to stand     Dates: Start: 02/06/20       Description: 1) Individualized goal:  Min A x1 with use of AD/bed rail as needed  2) Interventions: PT E Stim Attended, PT Orthotics Training, PT Gait Training, PT Self Care/Home Eval, PT Therapeutic Exercises, PT Ultrasound, PT TENS Application, PT Neuro Re-Ed/Balance, PT Therapeutic Activity, PT Manual Therapy and PT Evaluation        Note:     Goal Note filed on 02/11/20 0815 by Charmaine Chawla, PT    2nd person for safety SBA- min A                   Goal: STG-Within one week, patient will transfer bed to chair     Dates: Start: 02/06/20       Description: 1) Individualized goal:  Mod A x1 with LRAD  2) Interventions:  PT E Stim Attended, PT Orthotics Training, PT Gait Training, PT Self Care/Home Eval, PT Therapeutic Exercises, PT Ultrasound, PT TENS Application, PT Neuro Re-Ed/Balance, PT Therapeutic Activity, PT Manual Therapy and PT Evaluation        Note:     Goal Note filed on 02/11/20 0815 by Charmaine Chawla, PT    2nd person for safety SBA - min A                         Problem: PT-Long Term Goals     Dates: Start: 02/06/20       Description:     Goal: LTG-By discharge, patient will maintain balance     Dates: Start: 02/06/20       Description: 1) Individualized goal:  In sitting with use of UE for assist at a SPV level, 5 min   2) Interventions:  PT E Stim Attended, PT Orthotics Training, PT Gait Training, PT Self Care/Home Eval, PT Therapeutic Exercises, PT Ultrasound, PT TENS  Application, PT Neuro Re-Ed/Balance, PT Therapeutic Activity, PT Manual Therapy and PT Evaluation              Goal: LTG-By discharge, patient will tolerate standing     Dates: Start: 02/06/20       Description: 1) Individualized goal:  2 min, SBA with use of LRAD  2) Interventions:  PT E Stim Attended, PT Orthotics Training, PT Gait Training, PT Self Care/Home Eval, PT Therapeutic Exercises, PT Ultrasound, PT TENS Application, PT Neuro Re-Ed/Balance, PT Therapeutic Activity, PT Manual Therapy and PT Evaluation            Goal: LTG-By discharge, patient will ambulate     Dates: Start: 02/06/20       Description: 1) Individualized goal:  10 ft, mod A with LRAD  2) Interventions:  PT E Stim Attended, PT Orthotics Training, PT Gait Training, PT Self Care/Home Eval, PT Therapeutic Exercises, PT Ultrasound, PT TENS Application, PT Neuro Re-Ed/Balance, PT Therapeutic Activity, PT Manual Therapy and PT Evaluation             Goal: LTG-By discharge, patient will transfer one surface to another     Dates: Start: 02/06/20       Description: 1) Individualized goal:  Min A with LRAD  2) Interventions:  PT E Stim Attended, PT Orthotics Training, PT Gait Training, PT Self Care/Home Eval, PT Therapeutic Exercises, PT Ultrasound, PT TENS Application, PT Neuro Re-Ed/Balance, PT Therapeutic Activity, PT Manual Therapy and PT Evaluation                         Section completed by:  Charmaine Chawla, PT, DPT    Activities of Daily Living  Eating Initial:  1 - Total Assistance  Eating Current:  2 - Max Assistance  Eating Description:  Increased time, Modified diet, Supervision for safety, Verbal cueing, Set-up of equipment or meal/tube feeding, Adaptive equipment(Patient performed self-feeding tasks with max assist, max VCs, and AE (built-up utensil) using dominant RUE. )  Grooming Initial:  1 - Total Assistance  Grooming Current:  3 - Moderate Assistance   Grooming Description:  Verbal cueing, Set-up of equipment, Increased time,  Supervision for safety(Initial hand over hand provided with patietnt completing hand/face washing with single verbal cue)  Bathing Initial:  0 - Not tested, medical condition  Bathing Current:  0 - Not tested, medical condition   Bathing Description:     Upper Body Dressing Initial:  1 - Total Assistance  Upper Body Dressing Current:  1 - Total Assistance   Upper Body Dressing Description:  Increased time, Supervision for safety, Set-up of equipment, Initial preparation for task(Patient required total assist to don t-shirt while seated in w/c.  )  Lower Body Dressing Initial:  1 - Total Assistance  Lower Body Dressing Current:  1 - Total Assistance   Lower Body Dressing Description:  1 - Total Assistance  Toileting Initial:  1 - Total Assistance  Toileting Current:  1 - Total Assistance   Toileting Description:  Increased time, Supervision for safety, Set-up of equipment  Toilet Transfer Initial:     Toilet Transfer Current:      Toilet Transfer Description:     Tub / Shower Transfer Initial:  0 - Not tested,unsafe activity  Tub / Shower Transfer Current:  0 - Not tested,unsafe activity   Tub / Shower Transfer Description:     IADL:  NA at this time, Rancho level 3  Family Training/Education:  Ongoing with patient's mother   DME/DC Recommendations:  TBD; new patient      Strengths:  Supportive family  Barriers:  Other: Inconsistent ability to follow 1-step instructions, non-verbal, Rancho level 3, decreased strength, decreased balance, decreased functional mobility     # of short term goals set= 2   # of short term goals met= 1  Occupational Therapy Goals     Problem: Dressing     Dates: Start: 02/06/20       Description:     Goal: STG-Within one week, patient will dress UB     Dates: Start: 02/06/20       Description: 1) Individualized Goal:  Max assist   2) Interventions:  OT Group Therapy, OT Self Care/ADL, OT Cognitive Skill Dev, OT Manual Ther Technique, OT Neuro Re-Ed/Balance, OT Sensory Int Techniques, OT  "Therapeutic Activity, OT Evaluation and OT Therapeutic Exercise                 Problem: Functional Cognition     Dates: Start: 02/06/20       Description:           Problem: OT Long Term Goals     Dates: Start: 02/06/20       Description:     Goal: LTG-By discharge, patient will complete basic self care tasks     Dates: Start: 02/06/20       Description: 1) Individualized Goal:  Mod assist with AE as needed  2) Interventions:  OT Group Therapy, OT Self Care/ADL, OT Cognitive Skill Dev, OT Manual Ther Technique, OT Neuro Re-Ed/Balance, OT Sensory Int Techniques, OT Therapeutic Activity, OT Evaluation and OT Therapeutic Exercise           Goal: LTG-By discharge, patient will perform bathroom transfers     Dates: Start: 02/06/20       Description: 1) Individualized Goal:  Mod assist with AE as needed    2) Interventions:  OT Group Therapy, OT Self Care/ADL, OT Cognitive Skill Dev, OT Manual Ther Technique, OT Neuro Re-Ed/Balance, OT Sensory Int Techniques, OT Therapeutic Activity, OT Evaluation and OT Therapeutic Exercise                       Section completed by:  Minnie Luke MS,OTR/L    Cognitive Linquistic Functions  Comprehension Initial:  2 - Max Assistance  Comprehension Current:  2 - Max Assistance   Comprehension Description:  Verbal cues  Expression Initial:  1 - Total Assistance  Expression Current:  2 - Max Assistance   Expression Description:  (squeezes hand for \"yes\" response)  Social Interaction Initial:  2 - Max Assistance  Social Interaction Current:  2 - Max Assistance   Social Interaction Description:  Increased time, Verbal cues  Problem Solving Initial:  1 - Total Assistance  Problem Solving Current:  1 - Total Assistance   Problem Solving Description:     Memory Initial:  1 - Total Assistance  Memory Current:  1 - Total Assistance   Memory Description:     Executive Functioning / Organization Initial:     Executive Functioning / Organization Current:      Executive Functioning Desciption:  " Total A  Swallowing  Swallowing Status:  Dysphagia I/NTL. [facility]  Orders Placed This Encounter   Procedures   • Diet Order Regular     Standing Status:   Standing     Number of Occurrences:   1     Order Specific Question:   Diet:     Answer:   Regular [1]     Order Specific Question:   Texture/Fiber modifications:     Answer:   Dysphagia 1(Pureed)specify fluid consistency(question 6) [1]     Comments:   1:1 feeding meds crushed with puree or via g-tube.      Order Specific Question:   Consistency/Fluid modifications:     Answer:   Nectar Thick [2]     Behavior Modification Plan  Keep the environment simple to avoid over stimulatiom/agitation, Allow for rest time, Be calm and Analyze tasks (break down into smaller steps)  Assistive Technology  Low tech: Calendar, planner, schedule, alarms/timers, pill organizer, post-it notes, lists  Family Training/Education:  Ongoing with mother  DC Recommendations:    speech therapy  Strengths:  Making steady progress towards goals and Supportive family  Barriers:  Aspiration risk, Unable to follow instructions, Generalized weakness, Poor activity tolerance and Poor carryover of learning  # of short term goals set=2  # of short term goals met=3  Speech Therapy Problems     Problem: Comprehension STGs     Dates: Start: 02/06/20       Description:     Goal: STG-Within one week, patient will     Dates: Start: 02/06/20       Description: 1) Individualized goal:  Answer personal y/n questions with use of hand squeeze with 75% accuracy, given mod verbal cues.   2) Interventions:  SLP Speech Language Treatment, SLP Self Care / ADL Training , SLP Cognitive Skill Development and SLP Group Treatment                   Problem: Expression STGs     Dates: Start: 02/11/20       Description:     Goal: STG-Within one week, patient will     Dates: Start: 02/11/20       Description: 1) Individualized goal:  Imitate vowel sounds on 50% of attempts.   2) Interventions:  SLP Speech Language  Treatment, SLP Swallowing Dysfunction Treatment, SLP Oral Pharyngeal Evaluation, SLP Prosthesis Checkout, SLP Self Care / ADL Training , SLP Cognitive Skill Development and SLP Group Treatment                   Problem: Speech/Swallowing LTGs     Dates: Start: 02/06/20       Description:     Goal: LTG-By discharge, patient will safely swallow     Dates: Start: 02/06/20       Description: 1) Individualized goal:  Dysphagia I/NTL diet without clinical s/sx of aspiration  2) Interventions:  SLP Speech Language Treatment, SLP Swallowing Dysfunction Treatment, SLP Oral Pharyngeal Evaluation, SLP Video Swallow Evaluation, SLP Self Care / ADL Training , SLP Cognitive Skill Development and SLP Group Treatment             Goal: LTG-By discharge, patient will     Dates: Start: 02/06/20       Description:                        Section completed by:  Katia Doss MS,CCC-SLP       Nutrition  Dietary Problems     Problem: Other Problem (see comments)     Description:     Goal: Other Goal (Resolved)     Description: Nutrition Support tolerated and meeting greater than 85% of estimated needs.                     Nutrition support weekly update:  Day 6 of admit.  Rey Medina is a 24 y.o. male with admitting DX of non-traumatic ICH (intracerebral hemorrhage).  Pt was receiving tube feed prior to admit with AVM rupture occurring on 4/21/19. Current TF via G-tube is tolerated and at goal.    Assessment:  Weight 74.5 kg. Weight has been stable since admit  Re-estimate of nutritional needs as indicated: not indicated     Diet Order/intake: regular, dysphagia 1 with NTL/<25% x 2 meals    Evaluation:   1. RD met with pt's mother who is providing tube feed boluses and water flushes. She is bringing in pt's formula, Victiv, from home and providing boluses of 325 ml 5x per day at 0600,1000,1400, 1800 and 2200. He is also receiving 240 ml water every 4 hours. Pt is now receiving a diet with SLP. Recorded intake at meals  has been < 25% so far. RD and pt's mother discussed tube feed plan. Pt will receive his full tube feed bolus of 325 ml with 1 scoop of Beneprotein at 0600 and 2200. If pt eats > 50% of his meals, he will receive 1/2 of a carton of tube feed. If he eats < 50% he will receive the full bolus. Water flushes will continue as ordered. He will receive 1 scoop of Beneprotein mixed in his water BID. Total scoops of Beneprotein per day is 4. Based on pt's PO intake, pt will receive between 4366-2712 kcals per day,  gm of protein and 866-1235 ml free water from tube feed. With water flushes, pt will receive between 2756-3586 ml water per day.   2. Pt's weight has been stable since admit.   3. GI: LBM on 2/10/20 (large, soft)  4. Labs: reviewed  5. Meds: Miralax ordered today, vitamin D, omeprazole    Malnutrition risk: Criteria not met    Recommendations/Plan:  1. Continue with boluses of 1 carton of [employer] TF as ordered 5 x a day at 0600, 1000, 1400, 1800, 2200. If PO is > 50% provide 1/2 a bolus at feeding following the meal (either 1000, 1400, 1800). If PO is < 50%, provide whole bolus. Continue with 1 scoop of Beneprotein QID.  2. Continue with water flushes of 240 ml every 4 hours.  3. Monitor weights  4. Diet advancement per SLP  5. RD will monitor.             Section completed by:  Ayana Jolley R.D.    REHAB-Pharmacy IDT Team Note by Nemesio Chris RPH at 2/10/2020  5:43 PM  Version 1 of 1    Author:  Nemesio Chris RPH Service:  -- Author Type:  Pharmacist    Filed:  2/10/2020  5:44 PM Date of Service:  2/10/2020  5:43 PM Status:  Signed    :  Nemesio Chris RPH (Pharmacist)         Pharmacy[AW.1]   Pharmacy  Antibiotics/Antifungals/Antivirals:  Medication:      Active Orders (From admission, onward)    None        Route:        NA  Stop Date:  NA  Reason:      NA  Antihypertensives/Cardiac:  Medication:    Orders (72h ago, onward)     Start     Ordered    02/05/20 1200  ivabradine  (CORLANOR) tablet 5 mg  DAILY      02/05/20 1141    02/05/20 1141  hydrALAZINE (APRESOLINE) tablet 25 mg  EVERY 8 HOURS PRN      02/05/20 1141              Patient Vitals for the past 24 hrs:   BP Pulse   02/10/20 1500 132/89 (!) 111   02/10/20 1031 -- (!) 110   02/10/20 0648 138/85 99   02/09/20 1835 136/92 (!) 113     Anticoagulation:  Medication: Eliquis    Other key medications: A review of the medication list reveals no issues at this time.    Section completed by: Nemesio Chris [facility][AW.2]     Attribution [employer]     AW.1 - Nemesio Chris [facility] on 2/10/2020  5:44 PM   AW.2 - Nemesio Chris [facility] on 2/10/2020  5:43 PM                  DC Planning  DC destination/dispostion:  Patient lives with his mother in a single level apartment.    Referrals: Medicaid PCS services.    DC Needs: Patient has been decannulated.  He has a peg tube.  He has only a hospital bed and transfer chair at home.  He will likely need a tub transfer bench and possible other dme.  He will likely need home health therapy and PCS services.     Barriers to discharge:   Limited family members in the area.    Strengths: mother is proficient in his care.    Section completed by:  Anahi Anderson R.N.      Physician Summary  CHARLENE David MD  participated and led team conference discussion.

## 2020-02-11 NOTE — FLOWSHEET NOTE
02/11/20 1012   Events/Summary/Plan   Events/Summary/Plan RN did trach stoma care this morning.  02 spot check   Interdisciplinary Plan of Care-Goals (Indications)   Bronchopulmonary Hygiene Indications Difficulty with Secretion Clearance   Interdisciplinary Plan of Care-Outcomes    Bronchopulmonary Hygiene Outcome Breath Sounds from Diminished to Adventitious with Rhonchi Cleared by Cough   Education   Education Yes - Pt. / Family has been Instructed in use of Respiratory Equipment   Respiratory WDL   Respiratory (WDL) X   Chest Exam   Respiration 20   Pulse 92   Oximetry   #Pulse Oximetry (Single Determination) Yes   Oxygen   Pulse Oximetry 95 %   O2 Daily Delivery Respiratory  Room Air with O2 Available

## 2020-02-11 NOTE — CARE PLAN
Problem: Dressing  Goal: STG-Within one week, patient will dress UB  Description  1) Individualized Goal:  Max assist   2) Interventions:  OT Group Therapy, OT Self Care/ADL, OT Cognitive Skill Dev, OT Manual Ther Technique, OT Neuro Re-Ed/Balance, OT Sensory Int Techniques, OT Therapeutic Activity, OT Evaluation and OT Therapeutic Exercise   Outcome: NOT MET     Problem: Grooming  Goal: STG-Within one week, patient will complete grooming  Description  1) Individualized Goal:  Max assist  2) Interventions:  OT Group Therapy, OT Self Care/ADL, OT Cognitive Skill Dev, OT Manual Ther Technique, OT Neuro Re-Ed/Balance, OT Sensory Int Techniques, OT Therapeutic Activity, OT Evaluation and OT Therapeutic Exercise     Outcome: MET  Note:   Mod assist for washing face, with initial hand over hand

## 2020-02-12 LAB
APPEARANCE UR: CLEAR
BACTERIA #/AREA URNS HPF: NEGATIVE /HPF
BILIRUB UR QL STRIP.AUTO: NEGATIVE
CAOX CRY #/AREA URNS HPF: ABNORMAL /HPF
COLOR UR: YELLOW
EPI CELLS #/AREA URNS HPF: NEGATIVE /HPF
GLUCOSE UR STRIP.AUTO-MCNC: NEGATIVE MG/DL
KETONES UR STRIP.AUTO-MCNC: NEGATIVE MG/DL
LEUKOCYTE ESTERASE UR QL STRIP.AUTO: NEGATIVE
MICRO URNS: ABNORMAL
NITRITE UR QL STRIP.AUTO: NEGATIVE
PH UR STRIP.AUTO: 7 [PH] (ref 5–8)
PROT UR QL STRIP: NEGATIVE MG/DL
RBC # URNS HPF: ABNORMAL /HPF
RBC UR QL AUTO: ABNORMAL
SP GR UR STRIP.AUTO: 1.02
UROBILINOGEN UR STRIP.AUTO-MCNC: 1 MG/DL
WBC #/AREA URNS HPF: ABNORMAL /HPF

## 2020-02-12 PROCEDURE — 97110 THERAPEUTIC EXERCISES: CPT

## 2020-02-12 PROCEDURE — A9270 NON-COVERED ITEM OR SERVICE: HCPCS | Performed by: PHYSICAL MEDICINE & REHABILITATION

## 2020-02-12 PROCEDURE — 97530 THERAPEUTIC ACTIVITIES: CPT

## 2020-02-12 PROCEDURE — 97535 SELF CARE MNGMENT TRAINING: CPT

## 2020-02-12 PROCEDURE — 700102 HCHG RX REV CODE 250 W/ 637 OVERRIDE(OP): Performed by: PHYSICAL MEDICINE & REHABILITATION

## 2020-02-12 PROCEDURE — 770010 HCHG ROOM/CARE - REHAB SEMI PRIVAT*

## 2020-02-12 PROCEDURE — 94760 N-INVAS EAR/PLS OXIMETRY 1: CPT

## 2020-02-12 PROCEDURE — 99233 SBSQ HOSP IP/OBS HIGH 50: CPT | Performed by: PHYSICAL MEDICINE & REHABILITATION

## 2020-02-12 PROCEDURE — 92507 TX SP LANG VOICE COMM INDIV: CPT

## 2020-02-12 PROCEDURE — 92526 ORAL FUNCTION THERAPY: CPT

## 2020-02-12 RX ADMIN — MELATONIN 1000 UNITS: at 09:00

## 2020-02-12 RX ADMIN — POLYETHYLENE GLYCOL 3350 1 PACKET: 17 POWDER, FOR SOLUTION ORAL at 21:06

## 2020-02-12 RX ADMIN — BACLOFEN 5 MG: 10 TABLET ORAL at 13:57

## 2020-02-12 RX ADMIN — AMANTADINE HYDROCHLORIDE 200 MG: 50 SOLUTION ORAL at 09:00

## 2020-02-12 RX ADMIN — APIXABAN 2.5 MG: 5 TABLET, FILM COATED ORAL at 09:00

## 2020-02-12 RX ADMIN — ACETAMINOPHEN 650 MG: 325 TABLET, FILM COATED ORAL at 02:39

## 2020-02-12 RX ADMIN — OMEPRAZOLE 40 MG: KIT at 09:02

## 2020-02-12 RX ADMIN — LEVETIRACETAM 500 MG: 500 SOLUTION ORAL at 21:06

## 2020-02-12 RX ADMIN — BACLOFEN 5 MG: 10 TABLET ORAL at 09:00

## 2020-02-12 RX ADMIN — MONTELUKAST 10 MG: 10 TABLET, FILM COATED ORAL at 21:06

## 2020-02-12 RX ADMIN — BACLOFEN 5 MG: 10 TABLET ORAL at 21:06

## 2020-02-12 RX ADMIN — LEVETIRACETAM 500 MG: 500 SOLUTION ORAL at 09:00

## 2020-02-12 ASSESSMENT — PATIENT HEALTH QUESTIONNAIRE - PHQ9
2. FEELING DOWN, DEPRESSED, IRRITABLE, OR HOPELESS: NOT AT ALL
1. LITTLE INTEREST OR PLEASURE IN DOING THINGS: NOT AT ALL
SUM OF ALL RESPONSES TO PHQ9 QUESTIONS 1 AND 2: 0

## 2020-02-12 NOTE — THERAPY
Missed Therapy     Patient Name: Rey Medina  Age:  24 y.o., Sex:  male  Medical Record #: [id number]  Today's Date: 2/11/2020    Discussed missed therapy with MD and noted on scheduling communication board.       02/11/20 1331   Precautions   Precautions Fall Risk;PEG Tube;Swallow Precautions ( See Comments)   Comments non verbal, recent decannulation of tracheostomy, Dys 1 with NTL    Therapy Missed   Missed Therapy (Minutes) 30   Reason For Missed Therapy Medical - Patient on Hold from Therapy  (Pt UA to participate/be redirected from grasping urinal)

## 2020-02-12 NOTE — PROGRESS NOTES
"Rehab Progress Note     Encounter Date: 2/12/2020    CC: ICH, AMS    Interval Events (Subjective)  Patient doing well today. Patient is now answering short questions including his name and how many fingers are being held up. Patient is somewhat more agitated per his mom and is obsessively holding with the urinal. Discussed that we checked a UA and it was negative. His PVRs have also been minimal.  Discussed would hold the amantadine for this afternoon and reduce back to 100 mg in AM. Mother also has questions about the Eliquis. Discussed it was most likely for protection from clots and can be stopped as he is 10 months post-bleed. She is in agreement.  Discussed we are also holding the tube feeds as he is eating more and had emesis most likely from distended stomach.     ROS: Cannot participate in conversation. No grimacing.     IDT Team Meeting 2/11/2020  DC/Disposition:  2/26/20    Objective:  VITAL SIGNS: /88   Pulse 100   Temp 37.6 °C (99.7 °F) (Temporal)   Resp 18   Ht 1.702 m (5' 7\")   Wt 74.5 kg (164 lb 3.9 oz)   SpO2 95%   BMI 25.72 kg/m²   Gen: NAD  Psych: Mood and affect appropriate  CV: RRR, no edema  Resp: CTAB, no upper airway sounds  Abd: NTND  Neuro: following simple commands, mostly keeps eye closed, but will answer his name    Recent Results (from the past 72 hour(s))   URINALYSIS    Collection Time: 02/11/20 10:30 PM   Result Value Ref Range    Color Yellow     Character Clear     Specific Gravity 1.025 <1.035    Ph 7.0 5.0 - 8.0    Glucose Negative Negative mg/dL    Ketones Negative Negative mg/dL    Protein Negative Negative mg/dL    Bilirubin Negative Negative    Urobilinogen, Urine 1.0 Negative    Nitrite Negative Negative    Leukocyte Esterase Negative Negative    Occult Blood Trace (A) Negative    Micro Urine Req Microscopic    URINE MICROSCOPIC (W/UA)    Collection Time: 02/11/20 10:30 PM   Result Value Ref Range    WBC 0-2 (A) /hpf    RBC 0-2 (A) /hpf    Bacteria Negative None " /hpf    Epithelial Cells Negative /hpf    Ca Oxalate Crystal Many /hpf       Current Facility-Administered Medications   Medication Frequency   • [START ON 2/13/2020] amantadine (SYMMETREL) 50 MG/5ML syrup 100 mg BID   • baclofen (LIORESAL) tablet 5 mg TID   • magnesium hydroxide (MILK OF MAGNESIA) suspension 30 mL QDAY PRN    And   • polyethylene glycol/lytes (MIRALAX) PACKET 1 Packet Q24HR    And   • bisacodyl (DULCOLAX) suppository 10 mg QDAY PRN   • eucerin cream TID PRN   • levETIRAcetam (KEPPRA) 100 MG/ML solution 500 mg Q12HRS   • omeprazole (FIRST-OMEPRAZOLE) 2 mg/mL oral susp 40 mg DAILY   • vitamin D (cholecalciferol) tablet 1,000 Units DAILY   • ivabradine (CORLANOR) tablet 5 mg DAILY   • Respiratory Care per Protocol Continuous RT   • tramadol (ULTRAM) 50 MG tablet 50 mg Q4HRS PRN   • hydrALAZINE (APRESOLINE) tablet 25 mg Q8HRS PRN   • acetaminophen (TYLENOL) tablet 650 mg Q4HRS PRN   • artificial tears ophthalmic solution 1 Drop PRN   • benzocaine-menthol (CEPACOL) lozenge 1 Lozenge Q2HRS PRN   • mag hydrox-al hydrox-simeth (MAALOX PLUS ES or MYLANTA DS) suspension 20 mL Q2HRS PRN   • ondansetron (ZOFRAN ODT) dispertab 4 mg 4X/DAY PRN    Or   • ondansetron (ZOFRAN) syringe/vial injection 4 mg 4X/DAY PRN   • traZODone (DESYREL) tablet 50 mg QHS PRN   • sodium chloride (OCEAN) 0.65 % nasal spray 2 Spray PRN   • ipratropium-albuterol (DUONEB) nebulizer solution Q4H PRN (RT)   • montelukast (SINGULAIR) tablet 10 mg Nightly       Orders Placed This Encounter   Procedures   • Diet Order Regular     Standing Status:   Standing     Number of Occurrences:   1     Order Specific Question:   Diet:     Answer:   Regular [1]     Order Specific Question:   Texture/Fiber modifications:     Answer:   Dysphagia 1(Pureed)specify fluid consistency(question 6) [1]     Comments:   1:1 feeding meds crushed with puree or via g-tube.      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 Patient very low functioning but per mother becoming more purposeful  -PT and OT for mobility and ADLs  -SLP for cognition   -Start Neurostimulants, amantadine 100 mg BID (0800 and 1300). Monitor throughout weekend. Reduce back to 100 mg and hold today. Patient too agitated on higher dose  -Continue Keppra. Unclear if had seizure or was continued on prophylaxis  -NSG appointment on 2/7/20 to evaluation  shunt. Will follow-up in 2 months      Muscle spasms - mother claims Bromocriptine helps, typically used for dopamine side effects/neurostimulant. Will start low dose Baclofen and monitor. Increased spasms on higher dose Amantadine, reduce amantadine    Dysphagia - Patient with G Tube in place. SLP for swallow. MBSS on 2/10/20, start on dysphagia 1 with NTL  -Tolerated first meal but emesis on 2nd meal at higher percentage. May be due to distention, no lung changes. Continue to monitor.      CV - Patient is on Ivabradine 5 mg daily. Unclear reason why     Respiratory - Patient with size 7 trach on admission. Patient on Duonebs and Singulair  -Exchange aborted on 2/6/20 due to emesis on suction. Tolerated overnight. Decannulated AM 2/7/20. Tolerated and stoma closing.      GI Ppx - Patient on Prilosec and Prevacid. Unclear why two PPIs. Discontinue Prevacid.      DVT Ppx - Patient on Eliquis 2.5 mg BID. Unknown reason why, mother denies clot or DVT. Discontinue Eliquis as 10 months post-ICH    Total time:  36 minutes.  I spent greater than 50% of the time for patient care, counseling, and  coordination on this date, including unit/floor time, and face-to-face time with the patient as per interval events and assessment and plan above. Topics discussed included increased agitation, decrease Amantadine, monitor cognition, and discontinue Eliquis. Long discussion with mother about Eliquis and DVTs.     Greer David M.D.

## 2020-02-12 NOTE — THERAPY
Speech Language Pathology  Daily Treatment     Patient Name: Rey Medina  Age:  24 y.o., Sex:  male  Medical Record #: [id number]  Today's Date: 2/12/2020     Subjective    Pt had his right hand fixated on his groin this entire session, unable to self feed.  Pt with his eyes closed the majority of this session with occasional coughing, gagging and gasping for air.  O2 stats remained above 90% this session (ranged between 90 and 95) with his heart rate ranging between 125 and 135.  Pt's mother was present, nursing and RT were called to assess pt's current status.  PT's mother and RT reported that pt looks as if he is at his baseline (grunting, occasional coughing and holly heart rate normal for pt per RT and pt's mother).       Objective       02/12/20 0801   Interdisciplinary Plan of Care Collaboration   IDT Collaboration with  Family / Caregiver;Nursing;Respiratory Therapist   Patient Position at End of Therapy Seated   Collaboration Comments Nursing and RT present during this session to assess pt as his heart rate ranged between 125-135 throughout this session    SLP Total Time Spent   SLP Individual Total Time Spent (Mins) 30   Charge Group   SLP Swallowing Dysfunction Treatment Swallowing Dysfunction Treatment       Assessment    Pt was seen in t-dine with dysphagia 1 textures and NTL's.  Pt required total assistance for self-feeding as his right hand was fixated on his groin area this session as mentioned above.  Pt demonstrated coughing x2 during PO; however pt was noted to cough on secretions before he began eating.  Pt had his eyes closed the majority of this session.  O2 and heart rate were monitored throughout this meal, as mentioned above O2 ranged between 90% and 95% and heart rate ranged from 125-135.     Plan    Continue dysphagia 1 and NTL's

## 2020-02-12 NOTE — REHAB-DIETARY IDT TEAM NOTE
Dietary   Nutrition  Dietary Problems     Problem: Other Problem (see comments)     Description:     Goal: Other Goal (Resolved)     Description: Nutrition Support tolerated and meeting greater than 85% of estimated needs.                   Nutrition support update:  Day 7 of admit.  Rey Medina is a 24 y.o. male with admitting DX of non traumatic, ICH (intracerebral hemorrhage).     Per nurse and medical record, pt with large emesis yesterday right after lunch. Pt ate 65% of his lunch prior to emesis. Mom provided bolus at 1000 prior to pt's lunch as planned (PO at breakfast was recorded as < 25%). Emesis may be related to pt being too full due to bolus of TF and PO of lunch. RD discussed with pt's mom and nurse. Based on discussion, TF order changed to boluses QID at meal time and HS with bolus held if PO is > 50% of meal. Thickened Boost Plus will be added to pt's meals TID for additional kcals and protein. PO intake at dinner last night was %. At this rate, PO intake has been adequate. Water flushes will continue as ordered.     Recommendations/Plan:  1. Change TF to boluses of 1 carton of [employer] tube feed QID at meals and HS. Hold bolus if PO is > 50%.   2. Add thickened Boost Plus to meals TID.  3. Continue with water flushes of 240 ml every 4 hours  4. Monitor weights  5. Diet texture advancement per SLP.   6. RD will continue to monitor.                 Section completed by:  Ayana Jolley R.D.

## 2020-02-12 NOTE — DISCHARGE PLANNING
Met with patient and his mother.  Reviewed team conference discussion.  She tells me he was able to speak for the first time today.  Discussed GI appointment.  She is agreeable with cancelling this per Dr. David recommendation.  I have cancelled this appointment per her request.

## 2020-02-12 NOTE — THERAPY
"Occupational Therapy  Daily Treatment     Patient Name: Rey Medina  Age:  24 y.o., Sex:  male  Medical Record #: [id number]  Today's Date: 2020     Precautions  Precautions: Fall Risk, PEG Tube, Swallow Precautions ( See Comments)  Comments:  recent decannulation of tracheostomy, Dys 1 with NTL     Safety   ADL Safety : Requires Physical Assist for Safety  Bathroom Safety: Requires Physical Assist for Safety    Subjective    \"Rey,\" patient stated when asked \"what is your name?\"    \"Melinda\" patient stated when asked \"what is your mom's name?\"    \"Five, two, four,\" patient stated when asked \"how many fingers am I holding?\" Patient able to correctly identify how many fingers therapist holding in front of him (12\"- 18\" away) with 100 % accuracy     Objective     20 0701   Precautions   Precautions Fall Risk;PEG Tube;Swallow Precautions ( See Comments)   Comments  recent decannulation of tracheostomy, Dys 1 with NTL    Safety    ADL Safety  Requires Physical Assist for Safety   Bathroom Safety Requires Physical Assist for Safety   Interdisciplinary Plan of Care Collaboration   IDT Collaboration with  Speech Therapist;Nursing;Family / Caregiver   Patient Position at End of Therapy Seated;Self Releasing Lap Belt Applied;Family / Friend in Room   Collaboration Comments RN assisted with bed>w/c txfr; mother present during OT session; transferred care to    OT Total Time Spent   OT Individual Total Time Spent (Mins) 60   OT Charge Group   OT Self Care / ADL 4     FIM Upper Body Dressin - Total Assistance  Upper Body Dressing Description:  (Total assist to don undershirt/t-shirt while seated EOB (due to poor sitting balance))    FIM Lower Body Dressing Score:  2 - Max Assistance  Lower Body Dressing Description:  Set-up of equipment, Verbal cueing, Supervision for safety, Increased time(Patient required max-total assist to don underwear and elastic waist shorts (for threading BLEs through " underwear/shorts, and mod-max assist for underwear/pants over hips pursuits), Able to don socks with min assist and max cues.)    Patient in bed when undersigned therapist arrived with BUEs fixated on holding urinal. Unable to redirect initially, however when asked patient to give therapist high five, he was able to follow this one-step command and therapist able to remove urinal. Patient verbalized for the first time during OT session today, stating his and mother's name, able to repeat therapists name, and able to identify how many fingers therapist was holding (with 100% accuracy).     Assessment    Patient tolerated OT session well with focus on progression with ADLs. Significant improvement noted with level of alertness and initiation of automatic/procedural tasks (ie donning socks) with tactile/verbal cues.     Plan    Low-stim environment,  sensory stimulation, ADLs, sitting balance/standing balance

## 2020-02-12 NOTE — THERAPY
Speech Language Pathology  Daily Treatment     Patient Name: Rey Medina  Age:  24 y.o., Sex:  male  Medical Record #: [id number]  Today's Date: 2/12/2020     Subjective    Assumed care from PT. Pt in w/c, family member present.      Objective     02/12/20 1504   Receptive Language / Auditory Comprehension   Answers Yes / No Personal Questions Minimal (4)   Expressive Language   Expressive Language (WDL) X   Gestures Minimal (4)   Naming Minimal (4)   Repeats Speech Accurately Minimal (4)   Automatic Language Appropriate Moderate (3)   Interdisciplinary Plan of Care Collaboration   IDT Collaboration with  Family / Caregiver;Physical Therapist   Patient Position at End of Therapy Seated;Family / Friend in Room   Collaboration Comments family member present for session   SLP Total Time Spent   SLP Individual Total Time Spent (Mins) 30   Charge Group   SLP Treatment - Individual Speech Language Treatment - Individual         Assessment    CRS administered, pt received score of 19 (improvement from 16 day prior). Pt able to scan appropriate to locate objects in FO2 in 4/4 trials, pt reaching for objects appropriately as well as demonstrate/gesture use. Pt independently naming objects (used objects from WAB-R) 15/20 trials. With model pt able to increase to 17/20. Pt appropriately answering Y/N questions via head nod or verbal response. Pt able to shake hand and give this SLP high five at end of session.     Plan    Continue administration of CRS, continue targeting simple attention, expressive language, following single step commands.

## 2020-02-12 NOTE — PROGRESS NOTES
At 1215 nursing was called into T-dining room to assess pt as he was vomiting up his lunch. Pt's airway did not appear to be obstructed, LOC unaltered. Primary nurse and care team brought pt back to his room to perform suction and assess vitals. When in pt's room pt continued to vomit. Vitals WNR other than HR which read 222 BPM on vitals cart. The primary nurse called a rapid response. The hospital team retook pt's vital and HR was 113 BPM. MD cleared rapid. Charge nurse notified.

## 2020-02-12 NOTE — THERAPY
Speech Language Pathology  Daily Treatment     Patient Name: Rey Medina  Age:  24 y.o., Sex:  male  Medical Record #: [id number]  Today's Date: 2/12/2020     Subjective    Pt in w/c in room with mother present. Pt's eyes open, not tracking. Pt participatory for first 30 minutes of session, however, pt with increased agitation noted, perseveration on grabbing groin area, finger tapping on table, grunting. Pt returned to room, urinal placed, pt minimally voiding,  Unwilling to let go of urinal for remainder of session.      Objective     02/12/20 1104   Receptive Language / Auditory Comprehension   Follows One Unit Commands Moderate (3)   Expressive Language   Expressive Language (WDL) X   Automatic Language Appropriate Severe (2)   Dysphagia    Diet / Liquid Recommendation Mildly Thick (2) - (Nectar Thick);Puree (4)   Nutritional Liquid Intake Rating Scale Thickened beverages (mildly thick unless otherwise specified)   Nutritional Food Intake Rating Scale Tube dependent with consistent oral intake   Interdisciplinary Plan of Care Collaboration   IDT Collaboration with  Family / Caregiver;Therapy Tech;Nursing   Patient Position at End of Therapy Seated;Family / Friend in Room   Collaboration Comments CLOF with nursing, Transfer of care to therapy tech and pt's mother   SLP Total Time Spent   SLP Individual Total Time Spent (Mins) 60   Charge Group   SLP Treatment - Individual Speech Language Treatment - Individual   SLP Swallowing Dysfunction Treatment Swallowing Dysfunction Treatment         Assessment    Pt consumed 3 oz NT water via green handled cup with straw handle and nosey cup. Pt verbalizing his own name, mother's name, 2 sister's names when prompted by SLP or mother on 2 occassions. Pt able to follow single step commands with hand over hand assist for bringing cup to mouth, eliciting 2nd swallow during oral intake of Dys 1 (IDDSI Level 4) and NTL (IDDSI level 2) when prompted by SLP in 50% of  attempts. Pt fixated on use of urinal, gripping with both hands, pressing into groin area. Verbal cues and encouragement from mother helpful this session in decreasing agitation, however, pt unwilling to let go of urinal for remainder of session once back in room.     Plan    Continue administration of CRS

## 2020-02-13 PROCEDURE — 700102 HCHG RX REV CODE 250 W/ 637 OVERRIDE(OP): Performed by: PHYSICAL MEDICINE & REHABILITATION

## 2020-02-13 PROCEDURE — 99232 SBSQ HOSP IP/OBS MODERATE 35: CPT | Performed by: PHYSICAL MEDICINE & REHABILITATION

## 2020-02-13 PROCEDURE — 770010 HCHG ROOM/CARE - REHAB SEMI PRIVAT*

## 2020-02-13 PROCEDURE — 92507 TX SP LANG VOICE COMM INDIV: CPT

## 2020-02-13 PROCEDURE — A9270 NON-COVERED ITEM OR SERVICE: HCPCS | Performed by: PHYSICAL MEDICINE & REHABILITATION

## 2020-02-13 PROCEDURE — 92526 ORAL FUNCTION THERAPY: CPT

## 2020-02-13 RX ORDER — BISACODYL 10 MG
10 SUPPOSITORY, RECTAL RECTAL
Status: DISCONTINUED | OUTPATIENT
Start: 2020-02-13 | End: 2020-02-21 | Stop reason: HOSPADM

## 2020-02-13 RX ORDER — POLYETHYLENE GLYCOL 3350 17 G/17G
1 POWDER, FOR SOLUTION ORAL
Status: DISCONTINUED | OUTPATIENT
Start: 2020-02-13 | End: 2020-02-21 | Stop reason: HOSPADM

## 2020-02-13 RX ADMIN — BACLOFEN 5 MG: 10 TABLET ORAL at 09:30

## 2020-02-13 RX ADMIN — BACLOFEN 5 MG: 10 TABLET ORAL at 16:10

## 2020-02-13 RX ADMIN — LEVETIRACETAM 500 MG: 500 SOLUTION ORAL at 09:30

## 2020-02-13 RX ADMIN — MELATONIN 1000 UNITS: at 09:30

## 2020-02-13 RX ADMIN — MONTELUKAST 10 MG: 10 TABLET, FILM COATED ORAL at 21:27

## 2020-02-13 RX ADMIN — OMEPRAZOLE 40 MG: KIT at 09:31

## 2020-02-13 RX ADMIN — AMANTADINE HYDROCHLORIDE 100 MG: 50 SOLUTION ORAL at 09:30

## 2020-02-13 RX ADMIN — BACLOFEN 5 MG: 10 TABLET ORAL at 21:27

## 2020-02-13 RX ADMIN — LEVETIRACETAM 500 MG: 500 SOLUTION ORAL at 21:28

## 2020-02-13 NOTE — THERAPY
Speech Language Pathology  Daily Treatment     Patient Name: Rey Medina  Age:  24 y.o., Sex:  male  Medical Record #: [id number]  Today's Date: 2/13/2020     Subjective    Patient arrived on time to  with his mother.      Objective       02/13/20 0802   Social / Pragmatic Communication   Eye Contact Severe (2)   Dysphagia    Diet / Liquid Recommendation Mildly Thick (2) - (Nectar Thick);Puree (4)   Nutritional Liquid Intake Rating Scale Thickened beverages (mildly thick unless otherwise specified)   Nutritional Food Intake Rating Scale Tube dependent with consistent oral intake   SLP Total Time Spent   SLP Individual Total Time Spent (Mins) 60   Charge Group   SLP Swallowing Dysfunction Treatment Swallowing Dysfunction Treatment         Assessment    Patient would not open eyes on command during session, not following any single step directives. Clenched jaw and would not release given max verbal and tactile cues. Did not consume any of meal. Oral care and stimulation was not successful in relaxing oral musculature. Patient did provide spontaneous swallows after throat clears.    CRS with a score of 6 decreased from yesterday's score of 19.     Plan    Continue to assess diet tolerance, CRS.

## 2020-02-13 NOTE — FLOWSHEET NOTE
02/13/20 0745   Events/Summary/Plan   Events/Summary/Plan Trach stoma care   Trache/Stoma Care   Trache/Stoma Care Yes  (incomplete closure, no redness, swelling, or discharge)

## 2020-02-13 NOTE — FLOWSHEET NOTE
02/12/20 0934   Events/Summary/Plan   Events/Summary/Plan 02 spot check, RN already did trach care   Respiratory WDL   Respiratory (WDL) X   Chest Exam   Respiration 18   Pulse (!) 104   Breath Sounds   RUL Breath Sounds Coarse Crackles   RML Breath Sounds Diminished   RLL Breath Sounds Diminished   FORTINO Breath Sounds Diminished   LLL Breath Sounds Diminished   Secretions   Cough Dry;Non Productive   Oximetry   $ Pulse Oximetry (Spot Check) Yes   Oxygen   O2 Delivery Device None - Room Air   Pulse Oximetry 96 %   O2 Daily Delivery Respiratory  Room Air with O2 Available

## 2020-02-13 NOTE — CARE PLAN
Problem: Safety  Goal: Will remain free from injury  Note: Pt remains free of accidental injury at this time.Pt's mom at bedside. Bed rails x2 secured for safety. Call light and personal belongings within reach. Hourly rounds done by staff to ensure safety and check if needs are met. Pt's room in close proximity to nurses station. Will continue to assess and monitor for safety.       Problem: Bowel/Gastric:  Goal: Normal bowel function is maintained or improved  Note: Pt continent of bowels this shift. Bowel meds given as scheduled. Bowel sounds present in all quadrants.      Problem: NUTRITION  Goal: Adequate Food and Fluid Intake  Note: Pt on Regular dys 1 NTL diet and ate more than 50% of meals today. Pt's mom refuses for staff to give pt's HS bolus and refuses to give the every 4 hours gastric flushes. Educated pt's mom regarding the order but still refuses as she dont want her son to feel bloated. Charge RN informed. Meds crushed via PEG tube and maintained HOB >30 degrees to prevent aspiration.

## 2020-02-13 NOTE — CARE PLAN
Problem: Safety  Goal: Will remain free from injury  Note: Pt remains free of accidental injury at this time.Pt's mom at bedside. Bed rails x2 secured for safety. Call light and personal belongings within reach. Hourly rounds done by staff to ensure safety and check if needs are met. Pt's room in close proximity to nurses station. Will continue to assess and monitor for safety.       Problem: Bowel/Gastric:  Goal: Normal bowel function is maintained or improved  Note: Pt continent of bowels this shift. Bowel meds given as scheduled. Bowel sounds present in all quadrants.

## 2020-02-13 NOTE — CARE PLAN
Problem: Communication  Goal: The ability to communicate needs accurately and effectively will improve  Outcome: PROGRESSING AS EXPECTED  Note: Patient is nonverbal. Able to follow simple commands. Mother is by bedside.      Problem: Bowel/Gastric:  Goal: Normal bowel function is maintained or improved  Outcome: PROGRESSING AS EXPECTED  Note: Patient was assisted to bathroom and had BM in toilet. No s/s of constipation.

## 2020-02-13 NOTE — THERAPY
Physical Therapy   Daily Treatment     Patient Name: Rey Medina  Age:  24 y.o., Sex:  male  Medical Record #: [id number]  Today's Date: 2/12/2020     Precautions  Precautions: PEG Tube, Fall Risk, Swallow Precautions ( See Comments)  Comments: recent decannulation of tracheostomy, Dys 1 with NTL     Subjective    Pt seated in room with mother present, naked from waist down and holding urinal between legs, mother reports he has not let go of urinal for last 2 hours.      Objective       02/12/20 1401   Precautions   Precautions PEG Tube;Fall Risk;Swallow Precautions ( See Comments)   Comments recent decannulation of tracheostomy, Dys 1 with NTL    Sitting Lower Body Exercises   Other Exercises Motomed for BLEs: 8 min, gear 1, 0.07 miles active, 0.37 miles passive   Bed Mobility    Sit to Stand Total Assist X 2  (mod A x1 with second person min A for trunk stability)   Interdisciplinary Plan of Care Collaboration   IDT Collaboration with  Physician;Nursing;Speech Therapist   Patient Position at End of Therapy Seated;Self Releasing Lap Belt Applied   Collaboration Comments Spoke with Dr David and RN regarding missed minutes and medical hold. Pt care passed to SLP    Therapy Missed   Missed Therapy (Minutes) 30   Reason For Missed Therapy Medical - Patient on Hold from Therapy  (secondary to agitation )   PT Total Time Spent   PT Individual Total Time Spent (Mins) 30   PT Charge Group   PT Therapeutic Exercise 1   PT Therapeutic Activities 1     Pt stood holding onto PT's forearms with 2 person assist and maintained standing with 1 person min A while second person donned underwear and pants.      Assessment    Pt not willing to participate and become agitated with PT's attempt to encourage first half of session. Agreeable for second half, however decreased command following and cooperation compared to yesterday.     Plan    Standing tolerance and posture, Sit <> stand training with use of FWW or no AD, seated  balance/ core stability exercise, continue transfer training with focus on increasing independence, gait training in // bars as pt is able.

## 2020-02-13 NOTE — THERAPY
Missed Therapy     Patient Name: Rey Medina  Age:  24 y.o., Sex:  male  Medical Record #: [id number]  Today's Date: 2/13/2020    Discussed missed therapy with  and MD.      02/13/20 1301   Precautions   Precautions PEG Tube;Fall Risk;Swallow Precautions ( See Comments)   Comments recent decannulation of tracheostomy, Dys 1 with NTL    Therapy Missed   Missed Therapy (Minutes) 60   Reason For Missed Therapy Medical - Patient on Hold from Therapy  (Agitated, unable to participate, Medical hold per MD)

## 2020-02-14 PROCEDURE — 700102 HCHG RX REV CODE 250 W/ 637 OVERRIDE(OP): Performed by: PHYSICAL MEDICINE & REHABILITATION

## 2020-02-14 PROCEDURE — 97110 THERAPEUTIC EXERCISES: CPT

## 2020-02-14 PROCEDURE — 92526 ORAL FUNCTION THERAPY: CPT

## 2020-02-14 PROCEDURE — 99232 SBSQ HOSP IP/OBS MODERATE 35: CPT | Performed by: PHYSICAL MEDICINE & REHABILITATION

## 2020-02-14 PROCEDURE — 97530 THERAPEUTIC ACTIVITIES: CPT

## 2020-02-14 PROCEDURE — 770010 HCHG ROOM/CARE - REHAB SEMI PRIVAT*

## 2020-02-14 PROCEDURE — 92507 TX SP LANG VOICE COMM INDIV: CPT

## 2020-02-14 PROCEDURE — A9270 NON-COVERED ITEM OR SERVICE: HCPCS | Performed by: PHYSICAL MEDICINE & REHABILITATION

## 2020-02-14 PROCEDURE — 97112 NEUROMUSCULAR REEDUCATION: CPT

## 2020-02-14 PROCEDURE — 97535 SELF CARE MNGMENT TRAINING: CPT

## 2020-02-14 RX ORDER — MODAFINIL 100 MG/1
100 TABLET ORAL EVERY MORNING
Status: DISCONTINUED | OUTPATIENT
Start: 2020-02-14 | End: 2020-02-14

## 2020-02-14 RX ORDER — MODAFINIL 100 MG/1
100 TABLET ORAL EVERY MORNING
Status: DISCONTINUED | OUTPATIENT
Start: 2020-02-15 | End: 2020-02-17

## 2020-02-14 RX ADMIN — BACLOFEN 5 MG: 10 TABLET ORAL at 20:24

## 2020-02-14 RX ADMIN — MONTELUKAST 10 MG: 10 TABLET, FILM COATED ORAL at 20:24

## 2020-02-14 RX ADMIN — BACLOFEN 5 MG: 10 TABLET ORAL at 14:33

## 2020-02-14 RX ADMIN — BACLOFEN 5 MG: 10 TABLET ORAL at 08:48

## 2020-02-14 RX ADMIN — LEVETIRACETAM 500 MG: 500 SOLUTION ORAL at 08:48

## 2020-02-14 RX ADMIN — OMEPRAZOLE 40 MG: KIT at 08:48

## 2020-02-14 RX ADMIN — MELATONIN 1000 UNITS: at 08:48

## 2020-02-14 RX ADMIN — LEVETIRACETAM 500 MG: 500 SOLUTION ORAL at 20:24

## 2020-02-14 ASSESSMENT — PATIENT HEALTH QUESTIONNAIRE - PHQ9
SUM OF ALL RESPONSES TO PHQ9 QUESTIONS 1 AND 2: 0
1. LITTLE INTEREST OR PLEASURE IN DOING THINGS: NOT AT ALL
2. FEELING DOWN, DEPRESSED, IRRITABLE, OR HOPELESS: NOT AT ALL

## 2020-02-14 NOTE — THERAPY
Speech Language Pathology  Daily Treatment     Patient Name: Rey Medina  Age:  24 y.o., Sex:  male  Medical Record #: [id number]  Today's Date: 2/14/2020     Subjective    Pt upright in w/c in room, mother present. Mother reports participation has been minimal the past few days.      Objective     02/14/20 1334   Receptive Language / Auditory Comprehension   Follows One Unit Commands Profound (1)   Social / Pragmatic Communication   Eye Contact Severe (2)   Interdisciplinary Plan of Care Collaboration   IDT Collaboration with  Family / Caregiver   Patient Position at End of Therapy Seated;Self Releasing Lap Belt Applied;Tray Table within Reach;Family / Friend in Room   Collaboration Comments mother in room for session   SLP Total Time Spent   SLP Individual Total Time Spent (Mins) 60   Charge Group   SLP Treatment - Individual Speech Language Treatment - Individual   SLP Swallowing Dysfunction Treatment Swallowing Dysfunction Treatment         Assessment    CRS score of 5 this date (decrease from day prior of 6). Pt with intermittent eye opening, not responding to cues from SLP. Pt with clenched LUE, with MAX cues (tactile, verbal) pt able to relax and open fingers on one occasion. Unable to repeat.     Plan    Continue CRS

## 2020-02-14 NOTE — CARE PLAN
Problem: Bowel/Gastric:  Goal: Normal bowel function is maintained or improved  Outcome: PROGRESSING SLOWER THAN EXPECTED  Note: Patient was coughing while RT was providing stoma care. He then had a small amount of tube feeding like emesis while coughing. Patient appeared better after stoma care was done. Will continue to monitor.     Problem: Knowledge Deficit  Goal: Knowledge of disease process/condition, treatment plan, diagnostic tests, and medications will improve  Outcome: PROGRESSING SLOWER THAN EXPECTED  Note: This am, patient was in bed holding urinal for quite a while. Less agitated compared to yesterday. Refused to eat breakfast and lunch. Bolus feeding given for eating < 50%. Water flushes given. Will continue to monitor.

## 2020-02-14 NOTE — PROGRESS NOTES
"Rehab Progress Note     Encounter Date: 2/13/2020    CC: ICH, AMS    Interval Events (Subjective)  Patient sitting up in chair. Per family he is much more agitated again today. They report he had a fine morning at first. They are concerned it is the stimulant. Discussed will discontinue stimulant for now and possibly try another one in a day or so. Otherwise he continues to fixate on holding urine to his groin.  Does not appear to be in pain. No recent emesis.    ROS: Cannot participate in conversation. No grimacing.     IDT Team Meeting 2/11/2020  DC/Disposition:  2/26/20    Objective:  VITAL SIGNS: /83   Pulse 88   Temp 36.9 °C (98.4 °F) (Temporal)   Resp 18   Ht 1.702 m (5' 7\")   Wt 74.5 kg (164 lb 3.9 oz)   SpO2 94%   BMI 25.72 kg/m²   Gen: NAD  Psych: Mood and affect appropriate  CV: RRR, no edema  Resp: CTAB, no upper airway sounds  Abd: NTND  Neuro: AOx1, does not follow commands, holds urinal to groin    Recent Results (from the past 72 hour(s))   URINALYSIS    Collection Time: 02/11/20 10:30 PM   Result Value Ref Range    Color Yellow     Character Clear     Specific Gravity 1.025 <1.035    Ph 7.0 5.0 - 8.0    Glucose Negative Negative mg/dL    Ketones Negative Negative mg/dL    Protein Negative Negative mg/dL    Bilirubin Negative Negative    Urobilinogen, Urine 1.0 Negative    Nitrite Negative Negative    Leukocyte Esterase Negative Negative    Occult Blood Trace (A) Negative    Micro Urine Req Microscopic    URINE MICROSCOPIC (W/UA)    Collection Time: 02/11/20 10:30 PM   Result Value Ref Range    WBC 0-2 (A) /hpf    RBC 0-2 (A) /hpf    Bacteria Negative None /hpf    Epithelial Cells Negative /hpf    Ca Oxalate Crystal Many /hpf       Current Facility-Administered Medications   Medication Frequency   • baclofen (LIORESAL) tablet 5 mg TID   • magnesium hydroxide (MILK OF MAGNESIA) suspension 30 mL QDAY PRN    And   • polyethylene glycol/lytes (MIRALAX) PACKET 1 Packet Q24HR    And   • " bisacodyl (DULCOLAX) suppository 10 mg QDAY PRN   • eucerin cream TID PRN   • levETIRAcetam (KEPPRA) 100 MG/ML solution 500 mg Q12HRS   • omeprazole (FIRST-OMEPRAZOLE) 2 mg/mL oral susp 40 mg DAILY   • vitamin D (cholecalciferol) tablet 1,000 Units DAILY   • ivabradine (CORLANOR) tablet 5 mg DAILY   • Respiratory Care per Protocol Continuous RT   • tramadol (ULTRAM) 50 MG tablet 50 mg Q4HRS PRN   • hydrALAZINE (APRESOLINE) tablet 25 mg Q8HRS PRN   • acetaminophen (TYLENOL) tablet 650 mg Q4HRS PRN   • artificial tears ophthalmic solution 1 Drop PRN   • benzocaine-menthol (CEPACOL) lozenge 1 Lozenge Q2HRS PRN   • mag hydrox-al hydrox-simeth (MAALOX PLUS ES or MYLANTA DS) suspension 20 mL Q2HRS PRN   • ondansetron (ZOFRAN ODT) dispertab 4 mg 4X/DAY PRN    Or   • ondansetron (ZOFRAN) syringe/vial injection 4 mg 4X/DAY PRN   • traZODone (DESYREL) tablet 50 mg QHS PRN   • sodium chloride (OCEAN) 0.65 % nasal spray 2 Spray PRN   • ipratropium-albuterol (DUONEB) nebulizer solution Q4H PRN (RT)   • montelukast (SINGULAIR) tablet 10 mg Nightly       Orders Placed This Encounter   Procedures   • Diet Order Regular     Standing Status:   Standing     Number of Occurrences:   1     Order Specific Question:   Diet:     Answer:   Regular [1]     Order Specific Question:   Texture/Fiber modifications:     Answer:   Dysphagia 1(Pureed)specify fluid consistency(question 6) [1]     Comments:   1:1 feeding meds crushed with puree or via g-tube.      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 Patient very low functioning but per mother becoming more purposeful  -PT and OT for mobility and ADLs  -SLP for cognition   -Start Neurostimulants, amantadine 100 mg BID (0800 and 1300). Monitor throughout weekend. Reduce back to 100 mg and hold today. Patient too agitated, will discontinue Amantadine for now.   -Continue Keppra. Unclear if had seizure or was continued on prophylaxis  -NSG appointment on 2/7/20 to evaluation  shunt. Will follow-up in 2 months      Muscle spasms - mother claims Bromocriptine helps, typically used for dopamine side effects/neurostimulant. Will start low dose Baclofen and monitor. Increased spasms on higher dose Amantadine, reduce amantadine    Dysphagia - Patient with G Tube in place. SLP for swallow. MBSS on 2/10/20, start on dysphagia 1 with NTL  -Tolerated first meal but emesis on 2nd meal at higher percentage. May be due to distention, no lung changes. Continue to monitor.      CV - Patient is on Ivabradine 5 mg daily. Unclear reason why     Respiratory - Patient with size 7 trach on admission. Patient on Duonebs and Singulair  -Exchange aborted on 2/6/20 due to emesis on suction. Tolerated overnight. Decannulated AM 2/7/20. Tolerated and stoma closing.      GI Ppx - Patient on Prilosec and Prevacid. Unclear why two PPIs. Discontinue Prevacid.      DVT Ppx - Patient on Eliquis 2.5 mg BID. Unknown reason why, mother denies clot or DVT. Discontinue Eliquis as 10 months post-ICH    Total time:  26 minutes.  I spent greater than 50% of the time for patient care, counseling, and coordination on this date, including unit/floor time, and face-to-face time with the patient as per interval events and assessment and plan above. Topics discussed included ongoing agitation back on amantadine, discussed with mother, and discontinue Amantadine.     Greer David M.D.

## 2020-02-14 NOTE — THERAPY
Occupational Therapy  Daily Treatment     Patient Name: Rey Medina  Age:  24 y.o., Sex:  male  Medical Record #: [id number]  Today's Date: 2020     Precautions  Precautions: Fall Risk, Swallow Precautions ( See Comments), PEG Tube  Comments: recent decannulation of tracheostomy, Dys 1 with NTL     Safety   ADL Safety : Requires Physical Assist for Safety  Bathroom Safety: Requires Physical Assist for Safety  Comments: See FIMs for additional ADL performance details    Subjective    Patient non-verbal during this session.      Objective       20 1001   Precautions   Precautions Fall Risk;Swallow Precautions ( See Comments);PEG Tube   Comments recent decannulation of tracheostomy, Dys 1 with NTL    Safety    ADL Safety  Requires Physical Assist for Safety   Bathroom Safety Requires Physical Assist for Safety   Comments See FIMs for additional ADL performance details   Neuro-Muscular Treatments   Neuro-Muscular Treatments Vibration;Tapping;Verbal Cuing;Facilitation;Tactile Cuing   Comments Vibration applied to all major muscle groups in BUEs and patient instructed to activate each muscle group as vibration was applied. No active motor control observed during this session/patient unable to follow 1-step commands.    Interdisciplinary Plan of Care Collaboration   Patient Position at End of Therapy Seated;Self Releasing Lap Belt Applied   OT Total Time Spent   OT Individual Total Time Spent (Mins) 60   OT Charge Group   OT Self Care / ADL 2   OT Neuromuscular Re-education / Balance 2       FIM Bathing Score:  1 - Total Assistance  Bathing Description:       FIM Upper Body Dressin - Total Assistance  Upper Body Dressing Description:  Increased time, Set-up of equipment, Initial preparation for task, Verbal cueing(Patient required total assist to don t-shirt and undershirt  while seated in w/c)    FIM Lower Body Dressing Score:  1 - Total Assistance  Lower Body Dressing Description:  Increased time,  Set-up of equipment, Initial preparation for task, Verbal cueing, Requires 2 people to assist(Total assist of two to don underwear, pajama bottom, socks and lace shoes. )    Patient required total assist x 2 to perform bed mobility and bed > w/c transfer.     Assessment    Patient presented non-verbal during this session and unable to follow any 1-step directions. Initially presented mildly agitated and undersigned therapist unable to remove urinal (due to firm grasp), however able to remove during sponge bath.     Plan    Low-stim environment,  sensory stimulation, ADLs, sitting balance/standing balance

## 2020-02-14 NOTE — PROGRESS NOTES
"Rehab Progress Note     Encounter Date: 2/14/2020    CC: ICH, AMS    Interval Events (Subjective)  Patient sitting up in wheelchair. Appears less agitated today. Patient's mother reports he is less agitated and fixated on the urinal today but was not as interactive in therapy sessions. Discussed with therapists who agree that he is not as interactive. Discussed with mother and agree to trial different neurostimulant, Modafinil.  Discussed with mother that would be given in the morning only at first but could be increased. Patient does not phonate to questions. No grimacing.     ROS: Cannot participate in conversation. No grimacing.     IDT Team Meeting 2/11/2020  DC/Disposition:  2/26/20    Objective:  VITAL SIGNS: /73   Pulse 86   Temp 36.7 °C (98 °F) (Temporal)   Resp 18   Ht 1.702 m (5' 7\")   Wt 74.5 kg (164 lb 3.9 oz)   SpO2 90%   BMI 25.72 kg/m²   Gen: NAD  Psych: Mood and affect appropriate  CV: RRR, no edema  Resp: CTAB, no upper airway sounds  Abd: NTND  Neuro: Not responding to questions, withdraws from attempts at ROM    Recent Results (from the past 72 hour(s))   URINALYSIS    Collection Time: 02/11/20 10:30 PM   Result Value Ref Range    Color Yellow     Character Clear     Specific Gravity 1.025 <1.035    Ph 7.0 5.0 - 8.0    Glucose Negative Negative mg/dL    Ketones Negative Negative mg/dL    Protein Negative Negative mg/dL    Bilirubin Negative Negative    Urobilinogen, Urine 1.0 Negative    Nitrite Negative Negative    Leukocyte Esterase Negative Negative    Occult Blood Trace (A) Negative    Micro Urine Req Microscopic    URINE MICROSCOPIC (W/UA)    Collection Time: 02/11/20 10:30 PM   Result Value Ref Range    WBC 0-2 (A) /hpf    RBC 0-2 (A) /hpf    Bacteria Negative None /hpf    Epithelial Cells Negative /hpf    Ca Oxalate Crystal Many /hpf       Current Facility-Administered Medications   Medication Frequency   • [START ON 2/15/2020] modafinil (PROVIGIL) tablet 100 mg QAM   • " polyethylene glycol/lytes (MIRALAX) PACKET 1 Packet Q24HRS PRN    And   • magnesium hydroxide (MILK OF MAGNESIA) suspension 30 mL QDAY PRN    And   • bisacodyl (DULCOLAX) suppository 10 mg QDAY PRN   • baclofen (LIORESAL) tablet 5 mg TID   • eucerin cream TID PRN   • levETIRAcetam (KEPPRA) 100 MG/ML solution 500 mg Q12HRS   • omeprazole (FIRST-OMEPRAZOLE) 2 mg/mL oral susp 40 mg DAILY   • vitamin D (cholecalciferol) tablet 1,000 Units DAILY   • ivabradine (CORLANOR) tablet 5 mg DAILY   • Respiratory Care per Protocol Continuous RT   • tramadol (ULTRAM) 50 MG tablet 50 mg Q4HRS PRN   • hydrALAZINE (APRESOLINE) tablet 25 mg Q8HRS PRN   • acetaminophen (TYLENOL) tablet 650 mg Q4HRS PRN   • artificial tears ophthalmic solution 1 Drop PRN   • benzocaine-menthol (CEPACOL) lozenge 1 Lozenge Q2HRS PRN   • mag hydrox-al hydrox-simeth (MAALOX PLUS ES or MYLANTA DS) suspension 20 mL Q2HRS PRN   • ondansetron (ZOFRAN ODT) dispertab 4 mg 4X/DAY PRN    Or   • ondansetron (ZOFRAN) syringe/vial injection 4 mg 4X/DAY PRN   • traZODone (DESYREL) tablet 50 mg QHS PRN   • sodium chloride (OCEAN) 0.65 % nasal spray 2 Spray PRN   • ipratropium-albuterol (DUONEB) nebulizer solution Q4H PRN (RT)   • montelukast (SINGULAIR) tablet 10 mg Nightly       Orders Placed This Encounter   Procedures   • Diet Order Regular     Standing Status:   Standing     Number of Occurrences:   1     Order Specific Question:   Diet:     Answer:   Regular [1]     Order Specific Question:   Texture/Fiber modifications:     Answer:   Dysphagia 1(Pureed)specify fluid consistency(question 6) [1]     Comments:   1:1 feeding meds crushed with puree or via g-tube.      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 Patient very low functioning but per mother becoming more purposeful  -PT and OT for mobility and ADLs  -SLP for cognition   -Start Neurostimulants, amantadine 100 mg BID (0800 and 1300). Monitor throughout weekend. Reduce back to 100 mg and hold today. Patient too agitated, will discontinue Amantadine on 2/13  -Trial of Modafinil 100 mg only in the morning over the weekend.   -Continue Keppra. Unclear if had seizure or was continued on prophylaxis  -NSG appointment on 2/7/20 to evaluation  shunt. Will follow-up in 2 months      Muscle spasms - mother claims Bromocriptine helps, typically used for dopamine side effects/neurostimulant. Will start low dose Baclofen and monitor.     Dysphagia - Patient with G Tube in place. SLP for swallow. MBSS on 2/10/20, start on dysphagia 1 with NTL  -Tolerated first meal but emesis on 2nd meal at higher percentage. May be due to distention, no lung changes. Continue to monitor.   -Decreased oral intake on less stimulant, mother is monitoring his intake.      CV - Patient is on Ivabradine 5 mg daily. Unclear reason why     Respiratory - Patient with size 7 trach on admission. Patient on Duonebs and Singulair  -Exchange aborted on 2/6/20 due to emesis on suction. Tolerated overnight. Decannulated AM 2/7/20. Tolerated and stoma closing.      GI Ppx - Patient on Prilosec and Prevacid. Unclear why two PPIs. Discontinue Prevacid.      DVT Ppx - Patient on Eliquis 2.5 mg BID. Unknown reason why, mother denies clot or DVT. Discontinue Eliquis as 10 months post-ICH    Total time:  28 minutes.  I spent greater than 50% of the time for patient care, counseling, and coordination on this date, including unit/floor time, and face-to-face time with the patient as per interval events and  assessment and plan above. Topics discussed included decreased interaction on no stimulant, trial of Modafinil over the weekend, and monitor for agitation.     Greer David M.D.

## 2020-02-14 NOTE — CARE PLAN
Problem: Psychosocial Needs:  Goal: Level of anxiety will decrease  Outcome: PROGRESSING SLOWER THAN EXPECTED  Became very agitated today and missed 2 therapies. Dr. David notified and new orders received.

## 2020-02-14 NOTE — CARE PLAN
Problem: Communication  Goal: The ability to communicate needs accurately and effectively will improve  Outcome: PROGRESSING AS EXPECTED  Patient is non-verbal.  His mother is with him, she states that he answers yes and no questions appropriately fo rher.       Problem: Safety  Goal: Will remain free from injury  Outcome: PROGRESSING AS EXPECTED  Patient is located in a room close to the nurses station.       Problem: NUTRITION  Goal: Adequate Food and Fluid Intake  Outcome: PROGRESSING AS EXPECTED  Tube feedings per orders.  Tolerates well.

## 2020-02-15 ENCOUNTER — APPOINTMENT (OUTPATIENT)
Dept: RADIOLOGY | Facility: REHABILITATION | Age: 25
DRG: 056 | End: 2020-02-15
Attending: PHYSICAL MEDICINE & REHABILITATION
Payer: MEDICAID

## 2020-02-15 PROBLEM — A41.9 SEPSIS (HCC): Status: ACTIVE | Noted: 2020-02-15

## 2020-02-15 LAB
ALBUMIN SERPL BCP-MCNC: 4.3 G/DL (ref 3.2–4.9)
ALBUMIN/GLOB SERPL: 1.1 G/DL
ALP SERPL-CCNC: 96 U/L (ref 30–99)
ALT SERPL-CCNC: 46 U/L (ref 2–50)
AMORPH CRY #/AREA URNS HPF: PRESENT /HPF
ANION GAP SERPL CALC-SCNC: 11 MMOL/L (ref 0–11.9)
APPEARANCE UR: ABNORMAL
AST SERPL-CCNC: 17 U/L (ref 12–45)
BACTERIA #/AREA URNS HPF: NEGATIVE /HPF
BASOPHILS # BLD AUTO: 0.6 % (ref 0–1.8)
BASOPHILS # BLD: 0.1 K/UL (ref 0–0.12)
BILIRUB SERPL-MCNC: 0.6 MG/DL (ref 0.1–1.5)
BILIRUB UR QL STRIP.AUTO: NEGATIVE
BUN SERPL-MCNC: 13 MG/DL (ref 8–22)
CALCIUM SERPL-MCNC: 9.1 MG/DL (ref 8.5–10.5)
CHLORIDE SERPL-SCNC: 103 MMOL/L (ref 96–112)
CO2 SERPL-SCNC: 21 MMOL/L (ref 20–33)
COLOR UR: YELLOW
CREAT SERPL-MCNC: 0.6 MG/DL (ref 0.5–1.4)
EOSINOPHIL # BLD AUTO: 0.04 K/UL (ref 0–0.51)
EOSINOPHIL NFR BLD: 0.2 % (ref 0–6.9)
EPI CELLS #/AREA URNS HPF: ABNORMAL /HPF
ERYTHROCYTE [DISTWIDTH] IN BLOOD BY AUTOMATED COUNT: 42.5 FL (ref 35.9–50)
GLOBULIN SER CALC-MCNC: 3.8 G/DL (ref 1.9–3.5)
GLUCOSE SERPL-MCNC: 126 MG/DL (ref 65–99)
GLUCOSE UR STRIP.AUTO-MCNC: NEGATIVE MG/DL
HCT VFR BLD AUTO: 48.6 % (ref 42–52)
HGB BLD-MCNC: 16.2 G/DL (ref 14–18)
IMM GRANULOCYTES # BLD AUTO: 0.05 K/UL (ref 0–0.11)
IMM GRANULOCYTES NFR BLD AUTO: 0.3 % (ref 0–0.9)
KETONES UR STRIP.AUTO-MCNC: NEGATIVE MG/DL
LEUKOCYTE ESTERASE UR QL STRIP.AUTO: NEGATIVE
LYMPHOCYTES # BLD AUTO: 1.27 K/UL (ref 1–4.8)
LYMPHOCYTES NFR BLD: 7.1 % (ref 22–41)
MCH RBC QN AUTO: 27 PG (ref 27–33)
MCHC RBC AUTO-ENTMCNC: 33.3 G/DL (ref 33.7–35.3)
MCV RBC AUTO: 81.1 FL (ref 81.4–97.8)
MICRO URNS: ABNORMAL
MONOCYTES # BLD AUTO: 1.15 K/UL (ref 0–0.85)
MONOCYTES NFR BLD AUTO: 6.4 % (ref 0–13.4)
NEUTROPHILS # BLD AUTO: 15.32 K/UL (ref 1.82–7.42)
NEUTROPHILS NFR BLD: 85.4 % (ref 44–72)
NITRITE UR QL STRIP.AUTO: NEGATIVE
NRBC # BLD AUTO: 0 K/UL
NRBC BLD-RTO: 0 /100 WBC
PH UR STRIP.AUTO: 8.5 [PH] (ref 5–8)
PLATELET # BLD AUTO: 304 K/UL (ref 164–446)
PMV BLD AUTO: 11 FL (ref 9–12.9)
POTASSIUM SERPL-SCNC: 4.1 MMOL/L (ref 3.6–5.5)
PROT SERPL-MCNC: 8.1 G/DL (ref 6–8.2)
PROT UR QL STRIP: NEGATIVE MG/DL
RBC # BLD AUTO: 5.99 M/UL (ref 4.7–6.1)
RBC # URNS HPF: ABNORMAL /HPF
RBC UR QL AUTO: ABNORMAL
SODIUM SERPL-SCNC: 135 MMOL/L (ref 135–145)
SP GR UR STRIP.AUTO: 1.01
UROBILINOGEN UR STRIP.AUTO-MCNC: 0.2 MG/DL
WBC # BLD AUTO: 17.9 K/UL (ref 4.8–10.8)
WBC #/AREA URNS HPF: ABNORMAL /HPF

## 2020-02-15 PROCEDURE — 99233 SBSQ HOSP IP/OBS HIGH 50: CPT | Performed by: PHYSICAL MEDICINE & REHABILITATION

## 2020-02-15 PROCEDURE — A9270 NON-COVERED ITEM OR SERVICE: HCPCS | Performed by: PHYSICAL MEDICINE & REHABILITATION

## 2020-02-15 PROCEDURE — A9270 NON-COVERED ITEM OR SERVICE: HCPCS | Performed by: HOSPITALIST

## 2020-02-15 PROCEDURE — 74018 RADEX ABDOMEN 1 VIEW: CPT

## 2020-02-15 PROCEDURE — 71045 X-RAY EXAM CHEST 1 VIEW: CPT

## 2020-02-15 PROCEDURE — 700111 HCHG RX REV CODE 636 W/ 250 OVERRIDE (IP): Performed by: HOSPITALIST

## 2020-02-15 PROCEDURE — 700102 HCHG RX REV CODE 250 W/ 637 OVERRIDE(OP): Performed by: HOSPITALIST

## 2020-02-15 PROCEDURE — 94760 N-INVAS EAR/PLS OXIMETRY 1: CPT

## 2020-02-15 PROCEDURE — 99255 IP/OBS CONSLTJ NEW/EST HI 80: CPT | Performed by: HOSPITALIST

## 2020-02-15 PROCEDURE — 80053 COMPREHEN METABOLIC PANEL: CPT

## 2020-02-15 PROCEDURE — 85025 COMPLETE CBC W/AUTO DIFF WBC: CPT

## 2020-02-15 PROCEDURE — 87040 BLOOD CULTURE FOR BACTERIA: CPT

## 2020-02-15 PROCEDURE — 700101 HCHG RX REV CODE 250: Performed by: PHYSICAL MEDICINE & REHABILITATION

## 2020-02-15 PROCEDURE — 81001 URINALYSIS AUTO W/SCOPE: CPT

## 2020-02-15 PROCEDURE — 700105 HCHG RX REV CODE 258: Performed by: HOSPITALIST

## 2020-02-15 PROCEDURE — 770010 HCHG ROOM/CARE - REHAB SEMI PRIVAT*

## 2020-02-15 PROCEDURE — 700102 HCHG RX REV CODE 250 W/ 637 OVERRIDE(OP): Performed by: PHYSICAL MEDICINE & REHABILITATION

## 2020-02-15 RX ORDER — QUETIAPINE FUMARATE 25 MG/1
25 TABLET, FILM COATED ORAL ONCE
Status: DISCONTINUED | OUTPATIENT
Start: 2020-02-15 | End: 2020-02-15

## 2020-02-15 RX ORDER — SODIUM CHLORIDE 9 MG/ML
INJECTION, SOLUTION INTRAVENOUS CONTINUOUS
Status: DISCONTINUED | OUTPATIENT
Start: 2020-02-15 | End: 2020-02-17

## 2020-02-15 RX ORDER — LEVOFLOXACIN 250 MG/1
500 TABLET, FILM COATED ORAL DAILY
Status: DISCONTINUED | OUTPATIENT
Start: 2020-02-15 | End: 2020-02-15

## 2020-02-15 RX ORDER — QUETIAPINE FUMARATE 25 MG/1
25 TABLET, FILM COATED ORAL 3 TIMES DAILY PRN
Status: DISCONTINUED | OUTPATIENT
Start: 2020-02-15 | End: 2020-02-21 | Stop reason: HOSPADM

## 2020-02-15 RX ORDER — SIMETHICONE 80 MG
120 TABLET,CHEWABLE ORAL 3 TIMES DAILY
Status: DISCONTINUED | OUTPATIENT
Start: 2020-02-15 | End: 2020-02-21 | Stop reason: HOSPADM

## 2020-02-15 RX ORDER — METRONIDAZOLE 500 MG/1
500 TABLET ORAL EVERY 8 HOURS
Status: DISCONTINUED | OUTPATIENT
Start: 2020-02-15 | End: 2020-02-15

## 2020-02-15 RX ADMIN — AMPICILLIN SODIUM AND SULBACTAM SODIUM 3 G: 2; 1 INJECTION, POWDER, FOR SOLUTION INTRAMUSCULAR; INTRAVENOUS at 23:17

## 2020-02-15 RX ADMIN — BACLOFEN 5 MG: 10 TABLET ORAL at 15:35

## 2020-02-15 RX ADMIN — BACLOFEN 5 MG: 10 TABLET ORAL at 21:42

## 2020-02-15 RX ADMIN — SIMETHICONE CHEW TAB 80 MG 120 MG: 80 TABLET ORAL at 15:35

## 2020-02-15 RX ADMIN — LEVETIRACETAM 500 MG: 500 SOLUTION ORAL at 10:07

## 2020-02-15 RX ADMIN — MODAFINIL 100 MG: 100 TABLET ORAL at 10:08

## 2020-02-15 RX ADMIN — MELATONIN 1000 UNITS: at 10:08

## 2020-02-15 RX ADMIN — SODIUM CHLORIDE: 9 INJECTION, SOLUTION INTRAVENOUS at 16:16

## 2020-02-15 RX ADMIN — METRONIDAZOLE 500 MG: 500 TABLET, FILM COATED ORAL at 13:59

## 2020-02-15 RX ADMIN — ACETAMINOPHEN 650 MG: 325 TABLET, FILM COATED ORAL at 23:10

## 2020-02-15 RX ADMIN — OMEPRAZOLE 40 MG: KIT at 10:08

## 2020-02-15 RX ADMIN — LEVETIRACETAM 500 MG: 500 SOLUTION ORAL at 21:41

## 2020-02-15 RX ADMIN — SIMETHICONE CHEW TAB 80 MG 120 MG: 80 TABLET ORAL at 21:42

## 2020-02-15 RX ADMIN — BACLOFEN 5 MG: 10 TABLET ORAL at 10:08

## 2020-02-15 RX ADMIN — MONTELUKAST 10 MG: 10 TABLET, FILM COATED ORAL at 21:42

## 2020-02-15 RX ADMIN — AMPICILLIN SODIUM AND SULBACTAM SODIUM 3 G: 2; 1 INJECTION, POWDER, FOR SOLUTION INTRAMUSCULAR; INTRAVENOUS at 17:33

## 2020-02-15 RX ADMIN — ACETAMINOPHEN 650 MG: 325 TABLET, FILM COATED ORAL at 14:35

## 2020-02-15 RX ADMIN — METOPROLOL TARTRATE 25 MG: 25 TABLET ORAL at 17:39

## 2020-02-15 RX ADMIN — IPRATROPIUM BROMIDE AND ALBUTEROL SULFATE 3 ML: .5; 3 SOLUTION RESPIRATORY (INHALATION) at 03:03

## 2020-02-15 RX ADMIN — LEVOFLOXACIN 500 MG: 250 TABLET, FILM COATED ORAL at 13:59

## 2020-02-15 NOTE — ASSESSMENT & PLAN NOTE
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, s/p AVM repair  Also had hydrocephalus, s/p  shunt  On Keppra  Now on Amantadine

## 2020-02-15 NOTE — FLOWSHEET NOTE
02/14/20 2024   Events/Summary/Plan   Events/Summary/Plan Trach stoma Care, clean, no swelling, normal discharge from airway on sponge

## 2020-02-15 NOTE — FLOWSHEET NOTE
02/15/20 0850   Events/Summary/Plan   Events/Summary/Plan Ct, stoma care, 02check   Vital Signs   Pulse (!) 128   Respiration (!) 24   Pulse Oximetry 96 %   $ Pulse Oximetry (Spot Check) Yes   Respiratory Assessment   Level of Consciousness Lethargic   Breath Sounds   RUL Breath Sounds Diminished   RML Breath Sounds Diminished   RLL Breath Sounds Diminished   FORTINO Breath Sounds Diminished   LLL Breath Sounds Diminished   Secretions   Cough Dry;Non Productive   Oxygen   O2 (LPM) 0

## 2020-02-15 NOTE — THERAPY
Physical Therapy   Daily Treatment     Patient Name: Rey Medina  Age:  24 y.o., Sex:  male  Medical Record #: [id number]  Today's Date: 2/14/2020     Precautions  Precautions: Fall Risk, Swallow Precautions ( See Comments), PEG Tube  Comments: recent decannulation of tracheostomy, Dys 1 with NTL     Subjective    Pt seated in room with mother present.      Objective       02/14/20 1501   Precautions   Precautions Fall Risk;Swallow Precautions ( See Comments);PEG Tube   Comments recent decannulation of tracheostomy, Dys 1 with NTL    Sitting Lower Body Exercises   Other Exercises Motomed for BLEs: 15 min, 0.9 miles passive    Interdisciplinary Plan of Care Collaboration   IDT Collaboration with  Family / Caregiver   Patient Position at End of Therapy In Bed;Call Light within Reach;Family / Friend in Room   Collaboration Comments mother with pt before and after PT session   PT Total Time Spent   PT Individual Total Time Spent (Mins) 60   PT Charge Group   PT Therapeutic Exercise 1   PT Therapeutic Activities 3     FIM Bed/Chair/Wheelchair Transfers Score: 1 - Total Assistance  Bed/Chair/Wheelchair Transfers Description:  Increased time, Set-up of equipment, Requires 2 people to assist(WC <> seated EOM and WC > supine in bed, max A x2 squat pivot method )    Attempted standing in // bars, pt able to complete 3/4 stand with max A x2 and RUE support on bar after 3 attempts with max A x2.    Attempted seated balance EOM, total A to maintain upright position, unable to hold self upright and pushing posterior.     Assessment    Pt not following commands and not able to participate physically. Passive movement only on motomed.     Plan    Standing tolerance and posture, Sit <> stand training with use of FWW or no AD, seated balance/ core stability exercise, continue transfer training with focus on increasing independence, gait training in // bars as pt is able.

## 2020-02-15 NOTE — PROGRESS NOTES
Patient has had several episodes of coughing.  Seems to have a hard time clearing secretions.  Course Lung sounds noted in upper lobes and some wheezing in Left .  Nebulizer treatment given.  Wheezing and cough notably better after Neb tx.  Mother states patient has a history of asthma and she used neb txs at home for him.

## 2020-02-15 NOTE — CARE PLAN
Problem: Communication  Goal: The ability to communicate needs accurately and effectively will improve  Outcome: PROGRESSING AS EXPECTED  Patient is non verbal.  Unable to assess orientation.       Problem: Safety  Goal: Will remain free from injury  Outcome: PROGRESSING AS EXPECTED  Alarms in place.  Patient in room close to the nurses station.      Problem: Bowel/Gastric:  Goal: Normal bowel function is maintained or improved  Outcome: PROGRESSING AS EXPECTED  Bowel sounds are present in all quadrants.       Problem: Skin Integrity  Goal: Risk for impaired skin integrity will decrease  Outcome: PROGRESSING AS EXPECTED  Skin is intact.   Trach stoma site, dressing changed, area cleaned by RT.  Area healing.   Picture taken an uploaded to chart.

## 2020-02-15 NOTE — ASSESSMENT & PLAN NOTE
UA 0-2 WBC  BCx x 2 NGTD  CXR +basilar opacities, possible PNA  Elevated WBC improved on Zosyn and Zyvox  Vancomycin allergy noted  F/U CXR negative acute  But would continue current abx for total 5 days  Follow WBC to resolution

## 2020-02-15 NOTE — CONSULTS
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  Patient was living on [facility] at the time.  Per patient's mom she does not know all that happened after his AVM ruptured including why he is on Eliquis.  She reports he had a tracheostomy and G tube placed two weeks later. He was initially getting care in the [facility] and then moved to the  in 11/19.  Patient's mom reports occasional small amounts of dried blood are coughed up from the tracheostomy. She has not had the trach exchanged since she has been in the [place].  She has a PMV valve but he often coughs it off.  One of his mothers goals is to get the trach removed.  He has been on tube feeds which are given via bolus.  Patient is non-verbal but recently he has been grabbing himself when he needs to void and she can place a urinal.  Patient can follow some simple commands on the right intermittently.  He has been getting PT and OT at home.  He was evaluated in the PM&R office on 1/24/20 and thought to benefit from inpatient rehabilitation to work on trach and swallow.    Because of the patient's weakness and debility, Rehab was consulted, evaluated the patient, and was deemed a good Rehab candidate.  The patient was transferred over to the Rehab facility on 2/5/2020.      REVIEW OF SYSTEMS: could not be obtained 2nd to medical condition.  All review of systems are negative pre AMA and CMS criteria except for that stated in the HPI.    PAST MEDICAL HISTORY:  Past Medical History:   Diagnosis Date   • Stroke (HCC)        PAST SURGICAL HISTORY:  History reviewed. No pertinent surgical history.    Allergies   Allergen Reactions   • Vancomycin Swelling    • Other Misc Rash     Allergic to name band.       CURRENT MEDICATIONS:    Current Facility-Administered Medications:   •  modafinil  •  [DISCONTINUED] senna-docusate **AND** polyethylene glycol/lytes **AND** magnesium hydroxide **AND** bisacodyl  •  baclofen  •  eucerin  •  levETIRAcetam  •  omeprazole  •  vitamin D  •  ivabradine  •  Respiratory Therapy Consult  •  tramadol  •  hydrALAZINE  •  acetaminophen  •  artificial tears  •  benzocaine-menthol  •  mag hydrox-al hydrox-simeth  •  ondansetron **OR** ondansetron  •  traZODone  •  sodium chloride  •  ipratropium-albuterol  •  montelukast    Social History     Socioeconomic History   • Marital status: Single     Spouse name: Not on file   • Number of children: Not on file   • Years of education: Not on file   • Highest education level: Not on file   Occupational History   • Not on file   Social Needs   • Financial resource strain: Not on file   • Food insecurity     Worry: Not on file     Inability: Not on file   • Transportation needs     Medical: Not on file     Non-medical: Not on file   Tobacco Use   • Smoking status: Never Smoker   • Smokeless tobacco: Never Used   Substance and Sexual Activity   • Alcohol use: Never     Frequency: Never   • Drug use: Never   • Sexual activity: Not on file   Lifestyle   • Physical activity     Days per week: Not on file     Minutes per session: Not on file   • Stress: Not on file   Relationships   • Social connections     Talks on phone: Not on file     Gets together: Not on file     Attends Taoist service: Not on file     Active member of club or organization: Not on file     Attends meetings of clubs or organizations: Not on file     Relationship status: Not on file   • Intimate partner violence     Fear of current or ex partner: Not on file     Emotionally abused: Not on file     Physically abused: Not on file     Forced sexual activity: Not on file   Other Topics Concern   •  Service No   • Blood  Transfusions No   • Caffeine Concern No   • Occupational Exposure No   • Hobby Hazards No   • Sleep Concern No   • Stress Concern No   • Weight Concern No   • Special Diet Yes   • Back Care No   • Exercise Yes   • Bike Helmet No   • Seat Belt Yes   • Self-Exams No   Social History Narrative   • Not on file       FAMILY HISTORY:  was reviewed and is not pertinent to this consultation.    PHYSICAL EXAMINATION:  VITAL SIGNS:  Temp is 99.4, blood pressure is 141/48, heart rate is , respiratory rate is 17-24.  O2 sats good on RA.  Has a cough.  GENERAL:  Patient was lying in bed in no distress.  HEENT:  Pupils were equal, round and reactive to light and accomodation.  Oral mucosa was pink and moist.  NECK:  Soft.  Supple.  No JVD.  HEART:  Tacycardic.  Normal S1 and S2.  No murmurs were appreciated.  LUNGS:  Shows some coarse breath sounds.  ABDOMEN:  Soft, non tender, non distended.  Bowels sound were positive in all four quadrants.  EXTREMITIES:  No clubbing, cyanosis.  There was no lower extremity edema.  NEUROLOGIC:  Cranial nerves two through twelve were grossly intact.    LABS:  Lab Results   Component Value Date/Time    SODIUM 135 02/15/2020 09:45 AM    POTASSIUM 4.1 02/15/2020 09:45 AM    CHLORIDE 103 02/15/2020 09:45 AM    CO2 21 02/15/2020 09:45 AM    GLUCOSE 126 (H) 02/15/2020 09:45 AM    BUN 13 02/15/2020 09:45 AM    CREATININE 0.60 02/15/2020 09:45 AM      Lab Results   Component Value Date/Time    WBC 17.9 (H) 02/15/2020 09:45 AM    RBC 5.99 02/15/2020 09:45 AM    HEMOGLOBIN 16.2 02/15/2020 09:45 AM    HEMATOCRIT 48.6 02/15/2020 09:45 AM    MCV 81.1 (L) 02/15/2020 09:45 AM    MCH 27.0 02/15/2020 09:45 AM    MCHC 33.3 (L) 02/15/2020 09:45 AM    MPV 11.0 02/15/2020 09:45 AM    NEUTSPOLYS 85.40 (H) 02/15/2020 09:45 AM    LYMPHOCYTES 7.10 (L) 02/15/2020 09:45 AM    MONOCYTES 6.40 02/15/2020 09:45 AM    EOSINOPHILS 0.20 02/15/2020 09:45 AM    BASOPHILS 0.60 02/15/2020 09:45 AM      No results found for:  PROTHROMBTM, INR       Sepsis (HCC)  Has been having on & off low grade fevers  Had 1 episode of N/V recently  Has a cough  Has leukocytosis: 9.0 (2/6) --> 17.9 (2/15)  Has tachycardia:   O2 sats good on RA  No anemia  Bun/Cr wnl  TSH (2/6): wnl  AXR (2/15): nonobstructive bowel gas pattern  CXR (2/15): hypoaeration changes with hazy basilar opacities likely atelectasis  F/U U/A  F/U BC x 2  Gave Levaquin & Flagyl po x 1 -- 2nd to hard to obtain an IV access (2/15) --> IV access obtained --> now on Unasyn (2/15)  Will give IVF's NS: 125 cc/hr  Will give Lopressor: 25 mg bid  Will give Simethicone  Suspect 2nd to aspiration pneumonia (has dysphagia and N/V)  Monitor closely    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    Thank you for the consultation.  Will follow the patient with you.

## 2020-02-15 NOTE — THERAPY
Missed Therapy     Patient Name: Rey Medina  Age:  24 y.o., Sex:  male  Medical Record #: [id number]  Today's Date: 2/15/2020    Discussed missed therapy with therapy tech, nursing, and Dr. Perez.       02/15/20 1331   Therapy Missed   Missed Therapy (Minutes) 60   Reason For Missed Therapy Medical - Patient on Hold from Therapy

## 2020-02-16 LAB
BASOPHILS # BLD AUTO: 1 % (ref 0–1.8)
BASOPHILS # BLD: 0.13 K/UL (ref 0–0.12)
EOSINOPHIL # BLD AUTO: 0.09 K/UL (ref 0–0.51)
EOSINOPHIL NFR BLD: 0.7 % (ref 0–6.9)
ERYTHROCYTE [DISTWIDTH] IN BLOOD BY AUTOMATED COUNT: 45 FL (ref 35.9–50)
HCT VFR BLD AUTO: 50.7 % (ref 42–52)
HGB BLD-MCNC: 15.9 G/DL (ref 14–18)
IMM GRANULOCYTES # BLD AUTO: 0.08 K/UL (ref 0–0.11)
IMM GRANULOCYTES NFR BLD AUTO: 0.6 % (ref 0–0.9)
LYMPHOCYTES # BLD AUTO: 2.02 K/UL (ref 1–4.8)
LYMPHOCYTES NFR BLD: 15.8 % (ref 22–41)
MCH RBC QN AUTO: 26.4 PG (ref 27–33)
MCHC RBC AUTO-ENTMCNC: 31.4 G/DL (ref 33.7–35.3)
MCV RBC AUTO: 84.2 FL (ref 81.4–97.8)
MONOCYTES # BLD AUTO: 0.57 K/UL (ref 0–0.85)
MONOCYTES NFR BLD AUTO: 4.5 % (ref 0–13.4)
NEUTROPHILS # BLD AUTO: 9.91 K/UL (ref 1.82–7.42)
NEUTROPHILS NFR BLD: 77.4 % (ref 44–72)
NRBC # BLD AUTO: 0 K/UL
NRBC BLD-RTO: 0 /100 WBC
PLATELET # BLD AUTO: 294 K/UL (ref 164–446)
PMV BLD AUTO: 11.2 FL (ref 9–12.9)
RBC # BLD AUTO: 6.02 M/UL (ref 4.7–6.1)
WBC # BLD AUTO: 12.8 K/UL (ref 4.8–10.8)

## 2020-02-16 PROCEDURE — 770010 HCHG ROOM/CARE - REHAB SEMI PRIVAT*

## 2020-02-16 PROCEDURE — 700102 HCHG RX REV CODE 250 W/ 637 OVERRIDE(OP): Performed by: PHYSICAL MEDICINE & REHABILITATION

## 2020-02-16 PROCEDURE — 36415 COLL VENOUS BLD VENIPUNCTURE: CPT

## 2020-02-16 PROCEDURE — 99232 SBSQ HOSP IP/OBS MODERATE 35: CPT | Performed by: HOSPITALIST

## 2020-02-16 PROCEDURE — 700111 HCHG RX REV CODE 636 W/ 250 OVERRIDE (IP): Performed by: HOSPITALIST

## 2020-02-16 PROCEDURE — 99233 SBSQ HOSP IP/OBS HIGH 50: CPT | Performed by: PHYSICAL MEDICINE & REHABILITATION

## 2020-02-16 PROCEDURE — 85025 COMPLETE CBC W/AUTO DIFF WBC: CPT

## 2020-02-16 PROCEDURE — A9270 NON-COVERED ITEM OR SERVICE: HCPCS | Performed by: HOSPITALIST

## 2020-02-16 PROCEDURE — A9270 NON-COVERED ITEM OR SERVICE: HCPCS | Performed by: PHYSICAL MEDICINE & REHABILITATION

## 2020-02-16 PROCEDURE — 700105 HCHG RX REV CODE 258: Performed by: HOSPITALIST

## 2020-02-16 PROCEDURE — 700102 HCHG RX REV CODE 250 W/ 637 OVERRIDE(OP): Performed by: HOSPITALIST

## 2020-02-16 RX ADMIN — TRAMADOL HYDROCHLORIDE 50 MG: 50 TABLET, COATED ORAL at 17:26

## 2020-02-16 RX ADMIN — METOPROLOL TARTRATE 25 MG: 25 TABLET ORAL at 17:30

## 2020-02-16 RX ADMIN — AMPICILLIN SODIUM AND SULBACTAM SODIUM 3 G: 2; 1 INJECTION, POWDER, FOR SOLUTION INTRAMUSCULAR; INTRAVENOUS at 11:52

## 2020-02-16 RX ADMIN — TRAZODONE HYDROCHLORIDE 50 MG: 50 TABLET ORAL at 21:25

## 2020-02-16 RX ADMIN — OMEPRAZOLE 40 MG: KIT at 08:55

## 2020-02-16 RX ADMIN — TRAMADOL HYDROCHLORIDE 50 MG: 50 TABLET, COATED ORAL at 21:27

## 2020-02-16 RX ADMIN — MONTELUKAST 10 MG: 10 TABLET, FILM COATED ORAL at 21:25

## 2020-02-16 RX ADMIN — SODIUM CHLORIDE: 9 INJECTION, SOLUTION INTRAVENOUS at 00:27

## 2020-02-16 RX ADMIN — SIMETHICONE CHEW TAB 80 MG 120 MG: 80 TABLET ORAL at 14:34

## 2020-02-16 RX ADMIN — AMPICILLIN SODIUM AND SULBACTAM SODIUM 3 G: 2; 1 INJECTION, POWDER, FOR SOLUTION INTRAMUSCULAR; INTRAVENOUS at 05:13

## 2020-02-16 RX ADMIN — LEVETIRACETAM 500 MG: 500 SOLUTION ORAL at 21:25

## 2020-02-16 RX ADMIN — QUETIAPINE 25 MG: 25 TABLET ORAL at 21:25

## 2020-02-16 RX ADMIN — BACLOFEN 5 MG: 10 TABLET ORAL at 21:25

## 2020-02-16 RX ADMIN — BACLOFEN 5 MG: 10 TABLET ORAL at 14:34

## 2020-02-16 RX ADMIN — BACLOFEN 5 MG: 10 TABLET ORAL at 08:56

## 2020-02-16 RX ADMIN — BISACODYL 10 MG: 10 SUPPOSITORY RECTAL at 18:28

## 2020-02-16 RX ADMIN — SIMETHICONE CHEW TAB 80 MG 120 MG: 80 TABLET ORAL at 08:55

## 2020-02-16 RX ADMIN — AMPICILLIN SODIUM AND SULBACTAM SODIUM 3 G: 2; 1 INJECTION, POWDER, FOR SOLUTION INTRAMUSCULAR; INTRAVENOUS at 18:08

## 2020-02-16 RX ADMIN — LEVETIRACETAM 500 MG: 500 SOLUTION ORAL at 08:55

## 2020-02-16 RX ADMIN — SODIUM CHLORIDE: 9 INJECTION, SOLUTION INTRAVENOUS at 09:00

## 2020-02-16 RX ADMIN — ACETAMINOPHEN 650 MG: 325 TABLET, FILM COATED ORAL at 14:34

## 2020-02-16 RX ADMIN — ACETAMINOPHEN 650 MG: 325 TABLET, FILM COATED ORAL at 10:32

## 2020-02-16 RX ADMIN — SIMETHICONE CHEW TAB 80 MG 120 MG: 80 TABLET ORAL at 21:25

## 2020-02-16 RX ADMIN — ACETAMINOPHEN 650 MG: 325 TABLET, FILM COATED ORAL at 05:37

## 2020-02-16 RX ADMIN — AMPICILLIN SODIUM AND SULBACTAM SODIUM 3 G: 2; 1 INJECTION, POWDER, FOR SOLUTION INTRAMUSCULAR; INTRAVENOUS at 23:58

## 2020-02-16 RX ADMIN — POLYETHYLENE GLYCOL 3350 1 PACKET: 17 POWDER, FOR SOLUTION ORAL at 18:28

## 2020-02-16 RX ADMIN — SODIUM CHLORIDE: 9 INJECTION, SOLUTION INTRAVENOUS at 17:21

## 2020-02-16 RX ADMIN — MODAFINIL 100 MG: 100 TABLET ORAL at 08:55

## 2020-02-16 RX ADMIN — MELATONIN 1000 UNITS: at 08:55

## 2020-02-16 RX ADMIN — METOPROLOL TARTRATE 25 MG: 25 TABLET ORAL at 05:18

## 2020-02-16 NOTE — CARE PLAN
2236 Patient coughing up large amounts of mucous.  Patient appears very anxious.   Not allowing bed to be changed, swatting at and grabbing Nurse and CNA.  Trach dressing changed due to soiling with mucous.      2310  Tylenol 2 tabs for general discomfort.  Patient very restless.  Will not allow temp to be taken.  Charge nurse Pablo VARELA notified and in to assess patient as well.      0000 Patient calm at this time.   Continues IV ABT and fluids per orders.     0330  Patient incontinent, attempting to change his bed, he is tense, crying, continually batting his eyes.  Uncooperative kicking his legs.  His mother is assisting with linen change, she states he is only upset. Patient did calm down within a few minutes.      0539 Tylenol given for general discomfort.

## 2020-02-16 NOTE — THERAPY
Missed Therapy     Patient Name: Rey Medina  Age:  24 y.o., Sex:  male  Medical Record #: [id number]  Today's Date: 2/16/2020    Discussed missed therapy with charisma jo MD, nursing.  Pt too agitated to participate.  Pt kicking legs when ST spoke to him.         02/16/20 1031   Therapy Missed   Missed Therapy (Minutes) 60   Reason For Missed Therapy Medical - Patient on Hold from Therapy

## 2020-02-16 NOTE — CARE PLAN
Problem: Infection  Goal: Will remain free from infection  Outcome: PROGRESSING SLOWER THAN EXPECTED  MEWs today...high was seven...wbc 17.9. up from 9.0 on the 6th, blood pressure fairly stable, pulse in the 100s respirations in the 20s, pt loc is same, resists care most often no communication  and question if he understands anything said to him regarding care and tests, temp high 100.7 low 99.4, pulse ox always in the mid to high 90s on room air. Labs drawn and sent, urine sent, chest and abd xrays taken see reports. Fluids started, antibiotics started po and IV (po given and then dc'd) mom at bedside tube feeding given thru out the day therapies held.

## 2020-02-16 NOTE — PROGRESS NOTES
[facility] Medicine Daily Progress Note    Date of Service  2/16/2020    Chief Complaint:  Fever  Tachycardia  Leukocytosis  N/V    Interval History:  No significant events or changes since last visit    Review of Systems  Review of Systems   Unable to perform ROS: Medical condition        Physical Exam  Temp:  [36.9 °C (98.5 °F)-38.2 °C (100.7 °F)] 36.9 °C (98.5 °F)  Pulse:  [] 96  Resp:  [18-24] 20  BP: (113-138)/(65-89) 138/89  SpO2:  [95 %] 95 %    Physical Exam  Vitals signs and nursing note reviewed.   Constitutional:       Appearance: He is not diaphoretic.   HENT:      Mouth/Throat:      Pharynx: No oropharyngeal exudate or posterior oropharyngeal erythema.   Eyes:      Extraocular Movements: Extraocular movements intact.   Neck:      Vascular: No carotid bruit.   Cardiovascular:      Rate and Rhythm: Normal rate and regular rhythm.   Pulmonary:      Effort: Pulmonary effort is normal.      Breath sounds: No wheezing or rales.      Comments: Has some coarse breath sounds  Abdominal:      General: There is no distension.      Palpations: Abdomen is soft.      Tenderness: There is no abdominal tenderness.   Musculoskeletal:      Right lower leg: No edema.      Left lower leg: No edema.   Skin:     General: Skin is warm and dry.   Neurological:      Mental Status: He is alert and oriented to person, place, and time.   Psychiatric:         Mood and Affect: Mood normal.         Behavior: Behavior normal.         Fluids    Intake/Output Summary (Last 24 hours) at 2/16/2020 1032  Last data filed at 2/16/2020 0600  Gross per 24 hour   Intake 850 ml   Output 1325 ml   Net -475 ml       Laboratory  Recent Labs     02/15/20  0945 02/16/20  0623   WBC 17.9* 12.8*   RBC 5.99 6.02   HEMOGLOBIN 16.2 15.9   HEMATOCRIT 48.6 50.7   MCV 81.1* 84.2   MCH 27.0 26.4*   MCHC 33.3* 31.4*   RDW 42.5 45.0   PLATELETCT 304 294   MPV 11.0 11.2     Recent Labs     02/15/20  0945   SODIUM 135   POTASSIUM 4.1   CHLORIDE 103   CO2 21    GLUCOSE 126*   BUN 13   CREATININE 0.60   CALCIUM 9.1                   Imaging    Assessment/Plan  * Intracerebral hemorrhage, intraventricular (HCC)- (present on admission)  Assessment & Plan  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, s/p AVM repair  Also had hydrocephalus, s/p  shunt     Sepsis (HCC)  Assessment & Plan  Has been having on & off low grade fevers  Had 1 episode of N/V recently  Has a cough  Has leukocytosis: 9.0 (2/6) --> 17.9 (2/15) --> 12.8 (2/16)  Has tachycardia: better at   O2 sats good on RA  No anemia  Bun/Cr wnl  TSH (2/6): wnl  AXR (2/15): nonobstructive bowel gas pattern  CXR (2/15): hypoaeration changes with hazy basilar opacities likely atelectasis  U/A (-)  BC x 2: NTD; continue to follow  Off Levaquin & Flagyl po x 1 -- 2nd to hard to obtain an IV access (2/15) --> IV access obtained  On Unasyn (2/15)  On IVF's NS: 125 cc/hr  On Lopressor: 25 mg bid  On Simethicone  Suspect 2nd to aspiration pneumonia (had dysphagia and N/V)  Monitor closely    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

## 2020-02-16 NOTE — PROGRESS NOTES
"Rehab Progress Note     Date of Service: 2/16/2020  Chief Complaint: follow up TBI    Interval Events (Subjective)    Patient seen and examined today in his room.  While his vital signs and labs look slightly improved after starting IV fluids antibiotic yesterday, he continues to be intermittently agitated.  Pattern discussed with the respiratory therapist who reports he was breathing like this on admission though intermittently and now it is constant.  Patient refusing to be touched today.    Objective:  VITAL SIGNS: /89   Pulse 96   Temp 36.9 °C (98.5 °F) (Axillary)   Resp 20   Ht 1.702 m (5' 7\")   Wt 77.5 kg (170 lb 13.7 oz)   SpO2 95%   BMI 26.76 kg/m²   Gen: keeps eyes closed  Neuro: pulls arms away when attempting to touch patient      Recent Results (from the past 72 hour(s))   CBC WITH DIFFERENTIAL    Collection Time: 02/15/20  9:45 AM   Result Value Ref Range    WBC 17.9 (H) 4.8 - 10.8 K/uL    RBC 5.99 4.70 - 6.10 M/uL    Hemoglobin 16.2 14.0 - 18.0 g/dL    Hematocrit 48.6 42.0 - 52.0 %    MCV 81.1 (L) 81.4 - 97.8 fL    MCH 27.0 27.0 - 33.0 pg    MCHC 33.3 (L) 33.7 - 35.3 g/dL    RDW 42.5 35.9 - 50.0 fL    Platelet Count 304 164 - 446 K/uL    MPV 11.0 9.0 - 12.9 fL    Neutrophils-Polys 85.40 (H) 44.00 - 72.00 %    Lymphocytes 7.10 (L) 22.00 - 41.00 %    Monocytes 6.40 0.00 - 13.40 %    Eosinophils 0.20 0.00 - 6.90 %    Basophils 0.60 0.00 - 1.80 %    Immature Granulocytes 0.30 0.00 - 0.90 %    Nucleated RBC 0.00 /100 WBC    Neutrophils (Absolute) 15.32 (H) 1.82 - 7.42 K/uL    Lymphs (Absolute) 1.27 1.00 - 4.80 K/uL    Monos (Absolute) 1.15 (H) 0.00 - 0.85 K/uL    Eos (Absolute) 0.04 0.00 - 0.51 K/uL    Baso (Absolute) 0.10 0.00 - 0.12 K/uL    Immature Granulocytes (abs) 0.05 0.00 - 0.11 K/uL    NRBC (Absolute) 0.00 K/uL   Comp Metabolic Panel    Collection Time: 02/15/20  9:45 AM   Result Value Ref Range    Sodium 135 135 - 145 mmol/L    Potassium 4.1 3.6 - 5.5 mmol/L    Chloride 103 96 - 112 " mmol/L    Co2 21 20 - 33 mmol/L    Anion Gap 11.0 0.0 - 11.9    Glucose 126 (H) 65 - 99 mg/dL    Bun 13 8 - 22 mg/dL    Creatinine 0.60 0.50 - 1.40 mg/dL    Calcium 9.1 8.5 - 10.5 mg/dL    AST(SGOT) 17 12 - 45 U/L    ALT(SGPT) 46 2 - 50 U/L    Alkaline Phosphatase 96 30 - 99 U/L    Total Bilirubin 0.6 0.1 - 1.5 mg/dL    Albumin 4.3 3.2 - 4.9 g/dL    Total Protein 8.1 6.0 - 8.2 g/dL    Globulin 3.8 (H) 1.9 - 3.5 g/dL    A-G Ratio 1.1 g/dL   BLOOD CULTURE    Collection Time: 02/15/20  9:45 AM   Result Value Ref Range    Significant Indicator NEG     Source BLD     Site PERIPHERAL     Culture Result       No Growth  Note: Blood cultures are incubated for 5 days and  are monitored continuously.Positive blood cultures  are called to the RN and reported as soon as  they are identified.     BLOOD CULTURE    Collection Time: 02/15/20  9:45 AM   Result Value Ref Range    Significant Indicator NEG     Source BLD     Site PERIPHERAL     Culture Result       No Growth  Note: Blood cultures are incubated for 5 days and  are monitored continuously.Positive blood cultures  are called to the RN and reported as soon as  they are identified.     ESTIMATED GFR    Collection Time: 02/15/20  9:45 AM   Result Value Ref Range    GFR If African American >60 >60 mL/min/1.73 m 2    GFR If Non African American >60 >60 mL/min/1.73 m 2   URINALYSIS    Collection Time: 02/15/20  3:10 PM   Result Value Ref Range    Color Yellow     Character Cloudy (A)     Specific Gravity 1.010 <1.035    Ph 8.5 (A) 5.0 - 8.0    Glucose Negative Negative mg/dL    Ketones Negative Negative mg/dL    Protein Negative Negative mg/dL    Bilirubin Negative Negative    Urobilinogen, Urine 0.2 Negative    Nitrite Negative Negative    Leukocyte Esterase Negative Negative    Occult Blood Small (A) Negative    Micro Urine Req Microscopic    URINE MICROSCOPIC (W/UA)    Collection Time: 02/15/20  3:10 PM   Result Value Ref Range    WBC 0-2 (A) /hpf    RBC 2-5 (A) /hpf     Bacteria Negative None /hpf    Epithelial Cells Few /hpf    Amorphous Crystal Present /hpf   CBC WITH DIFFERENTIAL    Collection Time: 02/16/20  6:23 AM   Result Value Ref Range    WBC 12.8 (H) 4.8 - 10.8 K/uL    RBC 6.02 4.70 - 6.10 M/uL    Hemoglobin 15.9 14.0 - 18.0 g/dL    Hematocrit 50.7 42.0 - 52.0 %    MCV 84.2 81.4 - 97.8 fL    MCH 26.4 (L) 27.0 - 33.0 pg    MCHC 31.4 (L) 33.7 - 35.3 g/dL    RDW 45.0 35.9 - 50.0 fL    Platelet Count 294 164 - 446 K/uL    MPV 11.2 9.0 - 12.9 fL    Neutrophils-Polys 77.40 (H) 44.00 - 72.00 %    Lymphocytes 15.80 (L) 22.00 - 41.00 %    Monocytes 4.50 0.00 - 13.40 %    Eosinophils 0.70 0.00 - 6.90 %    Basophils 1.00 0.00 - 1.80 %    Immature Granulocytes 0.60 0.00 - 0.90 %    Nucleated RBC 0.00 /100 WBC    Neutrophils (Absolute) 9.91 (H) 1.82 - 7.42 K/uL    Lymphs (Absolute) 2.02 1.00 - 4.80 K/uL    Monos (Absolute) 0.57 0.00 - 0.85 K/uL    Eos (Absolute) 0.09 0.00 - 0.51 K/uL    Baso (Absolute) 0.13 (H) 0.00 - 0.12 K/uL    Immature Granulocytes (abs) 0.08 0.00 - 0.11 K/uL    NRBC (Absolute) 0.00 K/uL       Current Facility-Administered Medications   Medication Frequency   • simethicone (MYLICON) chewable tab 120 mg TID   • QUEtiapine (SEROQUEL) tablet 25 mg TID PRN   • NS infusion Continuous   • metoprolol (LOPRESSOR) tablet 25 mg TWICE DAILY   • ampicillin/sulbactam (UNASYN) 3 g in  mL IVPB Q6HRS   • modafinil (PROVIGIL) tablet 100 mg QAM   • polyethylene glycol/lytes (MIRALAX) PACKET 1 Packet Q24HRS PRN    And   • magnesium hydroxide (MILK OF MAGNESIA) suspension 30 mL QDAY PRN    And   • bisacodyl (DULCOLAX) suppository 10 mg QDAY PRN   • baclofen (LIORESAL) tablet 5 mg TID   • eucerin cream TID PRN   • levETIRAcetam (KEPPRA) 100 MG/ML solution 500 mg Q12HRS   • omeprazole (FIRST-OMEPRAZOLE) 2 mg/mL oral susp 40 mg DAILY   • vitamin D (cholecalciferol) tablet 1,000 Units DAILY   • ivabradine (CORLANOR) tablet 5 mg DAILY   • Respiratory Care per Protocol Continuous RT    • tramadol (ULTRAM) 50 MG tablet 50 mg Q4HRS PRN   • hydrALAZINE (APRESOLINE) tablet 25 mg Q8HRS PRN   • acetaminophen (TYLENOL) tablet 650 mg Q4HRS PRN   • artificial tears ophthalmic solution 1 Drop PRN   • benzocaine-menthol (CEPACOL) lozenge 1 Lozenge Q2HRS PRN   • mag hydrox-al hydrox-simeth (MAALOX PLUS ES or MYLANTA DS) suspension 20 mL Q2HRS PRN   • ondansetron (ZOFRAN ODT) dispertab 4 mg 4X/DAY PRN    Or   • ondansetron (ZOFRAN) syringe/vial injection 4 mg 4X/DAY PRN   • traZODone (DESYREL) tablet 50 mg QHS PRN   • sodium chloride (OCEAN) 0.65 % nasal spray 2 Spray PRN   • ipratropium-albuterol (DUONEB) nebulizer solution Q4H PRN (RT)   • montelukast (SINGULAIR) tablet 10 mg Nightly       Orders Placed This Encounter   Procedures   • Diet NPO     Standing Status:   Standing     Number of Occurrences:   1     Order Specific Question:   Restrict to:     Answer:   Strict [1]       Assessment:  Active Hospital Problems    Diagnosis   • *Intracerebral hemorrhage, intraventricular (HCC)   • Sepsis (HCC)   • 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  I spent greater than 50% of the time for patient care, counseling, and coordination on this date, including patient face-to face time, unit/floor time with review of records/pertinent lab data and studies, as well as discussing diagnostic evaluation/work up, planned therapeutic  interventions, and future disposition of care, as per the interval events/subjective and the assessment and plan as noted above.    I have performed a physical exam, reviewed and updated ROS, as well as the assessment and plan today 2/16/2020. In review of note from 2/15/2020 there are no new changes except as documented above.            Zoila Perez M.D.   Physical Medicine and Rehabilitation

## 2020-02-16 NOTE — PROGRESS NOTES
"Rehab Progress Note     Date of Service: 2/15/2020  Chief Complaint: follow up TBI    Interval Events (Subjective)    Patient seen and examined today at the request of the nursing staff.  Ever since the patient had an episode of emesis 2 days ago his family at bedside report he has been eating much less.  This morning also he had the acute onset of tachycardia and fever and appears very uncomfortable.  Hospitalist was consulted.    Objective:  VITAL SIGNS: /84   Pulse (!) 118   Temp 37.8 °C (100.1 °F) (Temporal)   Resp (!) 24   Ht 1.702 m (5' 7\")   Wt 74.5 kg (164 lb 3.9 oz)   SpO2 95%   BMI 25.72 kg/m²     Gen: alert, no apparent distress  CV: tachycardia, regular rhythm, no murmurs, no peripheral edema  Resp: clear to ascultation bilaterally, mild increased breathing GI: soft, non-tender abdomen, bowel sounds present  Neuro: notable for does not open eyes, does withdraw limbs to stimulation      Recent Results (from the past 72 hour(s))   CBC WITH DIFFERENTIAL    Collection Time: 02/15/20  9:45 AM   Result Value Ref Range    WBC 17.9 (H) 4.8 - 10.8 K/uL    RBC 5.99 4.70 - 6.10 M/uL    Hemoglobin 16.2 14.0 - 18.0 g/dL    Hematocrit 48.6 42.0 - 52.0 %    MCV 81.1 (L) 81.4 - 97.8 fL    MCH 27.0 27.0 - 33.0 pg    MCHC 33.3 (L) 33.7 - 35.3 g/dL    RDW 42.5 35.9 - 50.0 fL    Platelet Count 304 164 - 446 K/uL    MPV 11.0 9.0 - 12.9 fL    Neutrophils-Polys 85.40 (H) 44.00 - 72.00 %    Lymphocytes 7.10 (L) 22.00 - 41.00 %    Monocytes 6.40 0.00 - 13.40 %    Eosinophils 0.20 0.00 - 6.90 %    Basophils 0.60 0.00 - 1.80 %    Immature Granulocytes 0.30 0.00 - 0.90 %    Nucleated RBC 0.00 /100 WBC    Neutrophils (Absolute) 15.32 (H) 1.82 - 7.42 K/uL    Lymphs (Absolute) 1.27 1.00 - 4.80 K/uL    Monos (Absolute) 1.15 (H) 0.00 - 0.85 K/uL    Eos (Absolute) 0.04 0.00 - 0.51 K/uL    Baso (Absolute) 0.10 0.00 - 0.12 K/uL    Immature Granulocytes (abs) 0.05 0.00 - 0.11 K/uL    NRBC (Absolute) 0.00 K/uL   Comp Metabolic " Panel    Collection Time: 02/15/20  9:45 AM   Result Value Ref Range    Sodium 135 135 - 145 mmol/L    Potassium 4.1 3.6 - 5.5 mmol/L    Chloride 103 96 - 112 mmol/L    Co2 21 20 - 33 mmol/L    Anion Gap 11.0 0.0 - 11.9    Glucose 126 (H) 65 - 99 mg/dL    Bun 13 8 - 22 mg/dL    Creatinine 0.60 0.50 - 1.40 mg/dL    Calcium 9.1 8.5 - 10.5 mg/dL    AST(SGOT) 17 12 - 45 U/L    ALT(SGPT) 46 2 - 50 U/L    Alkaline Phosphatase 96 30 - 99 U/L    Total Bilirubin 0.6 0.1 - 1.5 mg/dL    Albumin 4.3 3.2 - 4.9 g/dL    Total Protein 8.1 6.0 - 8.2 g/dL    Globulin 3.8 (H) 1.9 - 3.5 g/dL    A-G Ratio 1.1 g/dL   ESTIMATED GFR    Collection Time: 02/15/20  9:45 AM   Result Value Ref Range    GFR If African American >60 >60 mL/min/1.73 m 2    GFR If Non African American >60 >60 mL/min/1.73 m 2       Current Facility-Administered Medications   Medication Frequency   • simethicone (MYLICON) chewable tab 120 mg TID   • QUEtiapine (SEROQUEL) tablet 25 mg TID PRN   • NS infusion Continuous   • metoprolol (LOPRESSOR) tablet 25 mg TWICE DAILY   • ampicillin/sulbactam (UNASYN) 3 g in  mL IVPB Q6HRS   • modafinil (PROVIGIL) tablet 100 mg QAM   • polyethylene glycol/lytes (MIRALAX) PACKET 1 Packet Q24HRS PRN    And   • magnesium hydroxide (MILK OF MAGNESIA) suspension 30 mL QDAY PRN    And   • bisacodyl (DULCOLAX) suppository 10 mg QDAY PRN   • baclofen (LIORESAL) tablet 5 mg TID   • eucerin cream TID PRN   • levETIRAcetam (KEPPRA) 100 MG/ML solution 500 mg Q12HRS   • omeprazole (FIRST-OMEPRAZOLE) 2 mg/mL oral susp 40 mg DAILY   • vitamin D (cholecalciferol) tablet 1,000 Units DAILY   • ivabradine (CORLANOR) tablet 5 mg DAILY   • Respiratory Care per Protocol Continuous RT   • tramadol (ULTRAM) 50 MG tablet 50 mg Q4HRS PRN   • hydrALAZINE (APRESOLINE) tablet 25 mg Q8HRS PRN   • acetaminophen (TYLENOL) tablet 650 mg Q4HRS PRN   • artificial tears ophthalmic solution 1 Drop PRN   • benzocaine-menthol (CEPACOL) lozenge 1 Lozenge Q2HRS PRN    • mag hydrox-al hydrox-simeth (MAALOX PLUS ES or MYLANTA DS) suspension 20 mL Q2HRS PRN   • ondansetron (ZOFRAN ODT) dispertab 4 mg 4X/DAY PRN    Or   • ondansetron (ZOFRAN) syringe/vial injection 4 mg 4X/DAY PRN   • traZODone (DESYREL) tablet 50 mg QHS PRN   • sodium chloride (OCEAN) 0.65 % nasal spray 2 Spray PRN   • ipratropium-albuterol (DUONEB) nebulizer solution Q4H PRN (RT)   • montelukast (SINGULAIR) tablet 10 mg Nightly       Orders Placed This Encounter   Procedures   • Diet Order Regular     Standing Status:   Standing     Number of Occurrences:   1     Order Specific Question:   Diet:     Answer:   Regular [1]     Order Specific Question:   Texture/Fiber modifications:     Answer:   Dysphagia 1(Pureed)specify fluid consistency(question 6) [1]     Comments:   1:1 feeding meds crushed with puree or via g-tube.      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  I spent greater than 50% of the time for patient care, counseling, and coordination on this date, including patient face-to face time, unit/floor time with review of records/pertinent lab data and studies, as well as discussing diagnostic evaluation/work up, planned therapeutic interventions, and future disposition of care, as  per the interval events/subjective and the assessment and plan as noted above.      Zoila Perez M.D.   Physical Medicine and Rehabilitation

## 2020-02-17 PROBLEM — R00.0 TACHYCARDIA: Status: ACTIVE | Noted: 2020-02-17

## 2020-02-17 PROCEDURE — 99232 SBSQ HOSP IP/OBS MODERATE 35: CPT | Performed by: HOSPITALIST

## 2020-02-17 PROCEDURE — A9270 NON-COVERED ITEM OR SERVICE: HCPCS | Performed by: PHYSICAL MEDICINE & REHABILITATION

## 2020-02-17 PROCEDURE — 700111 HCHG RX REV CODE 636 W/ 250 OVERRIDE (IP): Performed by: HOSPITALIST

## 2020-02-17 PROCEDURE — 94760 N-INVAS EAR/PLS OXIMETRY 1: CPT

## 2020-02-17 PROCEDURE — 99233 SBSQ HOSP IP/OBS HIGH 50: CPT | Performed by: PHYSICAL MEDICINE & REHABILITATION

## 2020-02-17 PROCEDURE — A9270 NON-COVERED ITEM OR SERVICE: HCPCS | Performed by: HOSPITALIST

## 2020-02-17 PROCEDURE — 700105 HCHG RX REV CODE 258: Performed by: HOSPITALIST

## 2020-02-17 PROCEDURE — 700101 HCHG RX REV CODE 250: Performed by: PHYSICAL MEDICINE & REHABILITATION

## 2020-02-17 PROCEDURE — 700102 HCHG RX REV CODE 250 W/ 637 OVERRIDE(OP): Performed by: PHYSICAL MEDICINE & REHABILITATION

## 2020-02-17 PROCEDURE — 700102 HCHG RX REV CODE 250 W/ 637 OVERRIDE(OP): Performed by: HOSPITALIST

## 2020-02-17 PROCEDURE — 770010 HCHG ROOM/CARE - REHAB SEMI PRIVAT*

## 2020-02-17 RX ORDER — POLYETHYLENE GLYCOL 3350 17 G/17G
1 POWDER, FOR SOLUTION ORAL DAILY
Status: DISCONTINUED | OUTPATIENT
Start: 2020-02-17 | End: 2020-02-21 | Stop reason: HOSPADM

## 2020-02-17 RX ORDER — TRAZODONE HYDROCHLORIDE 50 MG/1
100 TABLET ORAL
Status: DISCONTINUED | OUTPATIENT
Start: 2020-02-17 | End: 2020-02-18

## 2020-02-17 RX ADMIN — AMPICILLIN SODIUM AND SULBACTAM SODIUM 3 G: 2; 1 INJECTION, POWDER, FOR SOLUTION INTRAMUSCULAR; INTRAVENOUS at 12:40

## 2020-02-17 RX ADMIN — TRAMADOL HYDROCHLORIDE 50 MG: 50 TABLET, COATED ORAL at 02:22

## 2020-02-17 RX ADMIN — AMPICILLIN SODIUM AND SULBACTAM SODIUM 3 G: 2; 1 INJECTION, POWDER, FOR SOLUTION INTRAMUSCULAR; INTRAVENOUS at 18:25

## 2020-02-17 RX ADMIN — OMEPRAZOLE 40 MG: KIT at 09:15

## 2020-02-17 RX ADMIN — METOPROLOL TARTRATE 25 MG: 25 TABLET ORAL at 05:51

## 2020-02-17 RX ADMIN — LEVETIRACETAM 500 MG: 500 SOLUTION ORAL at 09:15

## 2020-02-17 RX ADMIN — BACLOFEN 5 MG: 10 TABLET ORAL at 09:15

## 2020-02-17 RX ADMIN — SIMETHICONE CHEW TAB 80 MG 120 MG: 80 TABLET ORAL at 09:16

## 2020-02-17 RX ADMIN — QUETIAPINE 25 MG: 25 TABLET ORAL at 02:22

## 2020-02-17 RX ADMIN — METOPROLOL TARTRATE 25 MG: 25 TABLET ORAL at 18:23

## 2020-02-17 RX ADMIN — LEVETIRACETAM 500 MG: 500 SOLUTION ORAL at 20:22

## 2020-02-17 RX ADMIN — SIMETHICONE CHEW TAB 80 MG 120 MG: 80 TABLET ORAL at 20:22

## 2020-02-17 RX ADMIN — BACLOFEN 5 MG: 10 TABLET ORAL at 16:00

## 2020-02-17 RX ADMIN — POLYETHYLENE GLYCOL 3350 1 PACKET: 17 POWDER, FOR SOLUTION ORAL at 18:25

## 2020-02-17 RX ADMIN — IPRATROPIUM BROMIDE AND ALBUTEROL SULFATE 3 ML: .5; 3 SOLUTION RESPIRATORY (INHALATION) at 19:51

## 2020-02-17 RX ADMIN — TRAZODONE HYDROCHLORIDE 100 MG: 50 TABLET ORAL at 20:23

## 2020-02-17 RX ADMIN — MONTELUKAST 10 MG: 10 TABLET, FILM COATED ORAL at 20:22

## 2020-02-17 RX ADMIN — BACLOFEN 5 MG: 10 TABLET ORAL at 20:22

## 2020-02-17 RX ADMIN — AMPICILLIN SODIUM AND SULBACTAM SODIUM 3 G: 2; 1 INJECTION, POWDER, FOR SOLUTION INTRAMUSCULAR; INTRAVENOUS at 05:54

## 2020-02-17 RX ADMIN — SIMETHICONE CHEW TAB 80 MG 120 MG: 80 TABLET ORAL at 18:25

## 2020-02-17 NOTE — PROGRESS NOTES
"Rehab Progress Note     Encounter Date: 2/17/2020    CC: [facility], AMS    Interval Events (Subjective)  Patient sitting up in room. Patient agitated and holding urinal to crotch. Patient febrile over the weekend and started on antibiotics. Leukocytosis has improved from 17.9 to 12.8. Discussed with nursing and patient with very poor sleep at night. Discussed would start scheduled medication as he is unable to ask for it.  Patient also started on Metoprolol by Hospitalist. Will discuss with hospitalist as propranolol may be better alternative.    ROS: Cannot participate in conversation. Occasional grimacing.     IDT Team Meeting 2/11/2020  DC/Disposition:  2/26/20    Objective:  VITAL SIGNS: /91   Pulse (!) 114   Temp 37.1 °C (98.8 °F) (Temporal)   Resp 18   Ht 1.702 m (5' 7\")   Wt 77.5 kg (170 lb 13.7 oz)   SpO2 96%   BMI 26.76 kg/m²   Gen: NAD  Psych: Mood and affect appropriate  CV: Tachycardic, no edema  Resp: CTAB, no upper airway sounds  Abd: NTND  Neuro: Holding urinal to crotch, not responding to questions    Recent Results (from the past 72 hour(s))   CBC WITH DIFFERENTIAL    Collection Time: 02/15/20  9:45 AM   Result Value Ref Range    WBC 17.9 (H) 4.8 - 10.8 K/uL    RBC 5.99 4.70 - 6.10 M/uL    Hemoglobin 16.2 14.0 - 18.0 g/dL    Hematocrit 48.6 42.0 - 52.0 %    MCV 81.1 (L) 81.4 - 97.8 fL    MCH 27.0 27.0 - 33.0 pg    MCHC 33.3 (L) 33.7 - 35.3 g/dL    RDW 42.5 35.9 - 50.0 fL    Platelet Count 304 164 - 446 K/uL    MPV 11.0 9.0 - 12.9 fL    Neutrophils-Polys 85.40 (H) 44.00 - 72.00 %    Lymphocytes 7.10 (L) 22.00 - 41.00 %    Monocytes 6.40 0.00 - 13.40 %    Eosinophils 0.20 0.00 - 6.90 %    Basophils 0.60 0.00 - 1.80 %    Immature Granulocytes 0.30 0.00 - 0.90 %    Nucleated RBC 0.00 /100 WBC    Neutrophils (Absolute) 15.32 (H) 1.82 - 7.42 K/uL    Lymphs (Absolute) 1.27 1.00 - 4.80 K/uL    Monos (Absolute) 1.15 (H) 0.00 - 0.85 K/uL    Eos (Absolute) 0.04 0.00 - 0.51 K/uL    Baso (Absolute) 0.10 " 0.00 - 0.12 K/uL    Immature Granulocytes (abs) 0.05 0.00 - 0.11 K/uL    NRBC (Absolute) 0.00 K/uL   Comp Metabolic Panel    Collection Time: 02/15/20  9:45 AM   Result Value Ref Range    Sodium 135 135 - 145 mmol/L    Potassium 4.1 3.6 - 5.5 mmol/L    Chloride 103 96 - 112 mmol/L    Co2 21 20 - 33 mmol/L    Anion Gap 11.0 0.0 - 11.9    Glucose 126 (H) 65 - 99 mg/dL    Bun 13 8 - 22 mg/dL    Creatinine 0.60 0.50 - 1.40 mg/dL    Calcium 9.1 8.5 - 10.5 mg/dL    AST(SGOT) 17 12 - 45 U/L    ALT(SGPT) 46 2 - 50 U/L    Alkaline Phosphatase 96 30 - 99 U/L    Total Bilirubin 0.6 0.1 - 1.5 mg/dL    Albumin 4.3 3.2 - 4.9 g/dL    Total Protein 8.1 6.0 - 8.2 g/dL    Globulin 3.8 (H) 1.9 - 3.5 g/dL    A-G Ratio 1.1 g/dL   BLOOD CULTURE    Collection Time: 02/15/20  9:45 AM   Result Value Ref Range    Significant Indicator NEG     Source BLD     Site PERIPHERAL     Culture Result       No Growth  Note: Blood cultures are incubated for 5 days and  are monitored continuously.Positive blood cultures  are called to the RN and reported as soon as  they are identified.     BLOOD CULTURE    Collection Time: 02/15/20  9:45 AM   Result Value Ref Range    Significant Indicator NEG     Source BLD     Site PERIPHERAL     Culture Result       No Growth  Note: Blood cultures are incubated for 5 days and  are monitored continuously.Positive blood cultures  are called to the RN and reported as soon as  they are identified.     ESTIMATED GFR    Collection Time: 02/15/20  9:45 AM   Result Value Ref Range    GFR If African American >60 >60 mL/min/1.73 m 2    GFR If Non African American >60 >60 mL/min/1.73 m 2   URINALYSIS    Collection Time: 02/15/20  3:10 PM   Result Value Ref Range    Color Yellow     Character Cloudy (A)     Specific Gravity 1.010 <1.035    Ph 8.5 (A) 5.0 - 8.0    Glucose Negative Negative mg/dL    Ketones Negative Negative mg/dL    Protein Negative Negative mg/dL    Bilirubin Negative Negative    Urobilinogen, Urine 0.2  Negative    Nitrite Negative Negative    Leukocyte Esterase Negative Negative    Occult Blood Small (A) Negative    Micro Urine Req Microscopic    URINE MICROSCOPIC (W/UA)    Collection Time: 02/15/20  3:10 PM   Result Value Ref Range    WBC 0-2 (A) /hpf    RBC 2-5 (A) /hpf    Bacteria Negative None /hpf    Epithelial Cells Few /hpf    Amorphous Crystal Present /hpf   CBC WITH DIFFERENTIAL    Collection Time: 02/16/20  6:23 AM   Result Value Ref Range    WBC 12.8 (H) 4.8 - 10.8 K/uL    RBC 6.02 4.70 - 6.10 M/uL    Hemoglobin 15.9 14.0 - 18.0 g/dL    Hematocrit 50.7 42.0 - 52.0 %    MCV 84.2 81.4 - 97.8 fL    MCH 26.4 (L) 27.0 - 33.0 pg    MCHC 31.4 (L) 33.7 - 35.3 g/dL    RDW 45.0 35.9 - 50.0 fL    Platelet Count 294 164 - 446 K/uL    MPV 11.2 9.0 - 12.9 fL    Neutrophils-Polys 77.40 (H) 44.00 - 72.00 %    Lymphocytes 15.80 (L) 22.00 - 41.00 %    Monocytes 4.50 0.00 - 13.40 %    Eosinophils 0.70 0.00 - 6.90 %    Basophils 1.00 0.00 - 1.80 %    Immature Granulocytes 0.60 0.00 - 0.90 %    Nucleated RBC 0.00 /100 WBC    Neutrophils (Absolute) 9.91 (H) 1.82 - 7.42 K/uL    Lymphs (Absolute) 2.02 1.00 - 4.80 K/uL    Monos (Absolute) 0.57 0.00 - 0.85 K/uL    Eos (Absolute) 0.09 0.00 - 0.51 K/uL    Baso (Absolute) 0.13 (H) 0.00 - 0.12 K/uL    Immature Granulocytes (abs) 0.08 0.00 - 0.11 K/uL    NRBC (Absolute) 0.00 K/uL       Current Facility-Administered Medications   Medication Frequency   • polyethylene glycol/lytes (MIRALAX) PACKET 1 Packet DAILY   • traZODone (DESYREL) tablet 100 mg QHS   • simethicone (MYLICON) chewable tab 120 mg TID   • QUEtiapine (SEROQUEL) tablet 25 mg TID PRN   • metoprolol (LOPRESSOR) tablet 25 mg TWICE DAILY   • ampicillin/sulbactam (UNASYN) 3 g in  mL IVPB Q6HRS   • polyethylene glycol/lytes (MIRALAX) PACKET 1 Packet Q24HRS PRN    And   • magnesium hydroxide (MILK OF MAGNESIA) suspension 30 mL QDAY PRN    And   • bisacodyl (DULCOLAX) suppository 10 mg QDAY PRN   • baclofen (LIORESAL)  tablet 5 mg TID   • eucerin cream TID PRN   • levETIRAcetam (KEPPRA) 100 MG/ML solution 500 mg Q12HRS   • omeprazole (FIRST-OMEPRAZOLE) 2 mg/mL oral susp 40 mg DAILY   • vitamin D (cholecalciferol) tablet 1,000 Units DAILY   • ivabradine (CORLANOR) tablet 5 mg DAILY   • Respiratory Care per Protocol Continuous RT   • tramadol (ULTRAM) 50 MG tablet 50 mg Q4HRS PRN   • hydrALAZINE (APRESOLINE) tablet 25 mg Q8HRS PRN   • acetaminophen (TYLENOL) tablet 650 mg Q4HRS PRN   • artificial tears ophthalmic solution 1 Drop PRN   • benzocaine-menthol (CEPACOL) lozenge 1 Lozenge Q2HRS PRN   • mag hydrox-al hydrox-simeth (MAALOX PLUS ES or MYLANTA DS) suspension 20 mL Q2HRS PRN   • ondansetron (ZOFRAN ODT) dispertab 4 mg 4X/DAY PRN    Or   • ondansetron (ZOFRAN) syringe/vial injection 4 mg 4X/DAY PRN   • traZODone (DESYREL) tablet 50 mg QHS PRN   • sodium chloride (OCEAN) 0.65 % nasal spray 2 Spray PRN   • ipratropium-albuterol (DUONEB) nebulizer solution Q4H PRN (RT)   • montelukast (SINGULAIR) tablet 10 mg Nightly       Orders Placed This Encounter   Procedures   • Diet NPO     Standing Status:   Standing     Number of Occurrences:   1     Order Specific Question:   Restrict to:     Answer:   Strict [1]       Assessment:  Active Hospital Problems    Diagnosis   • *Intracerebral hemorrhage, intraventricular (HCC)   • 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 Patient very low functioning but per mother becoming more purposeful  -PT and OT for mobility and ADLs  -SLP for cognition   -Patient agitated on Amantadine and Modafinil. Both trials halted due to agitation  -Continue Keppra. Unclear if had seizure or was continued on prophylaxis  -NSG appointment on 2/7/20 to  evaluation  shunt. Will follow-up in 2 months      Muscle spasms - mother claims Bromocriptine helps, typically used for dopamine side effects/neurostimulant. Will start low dose Baclofen and monitor.     Dysphagia - Patient with G Tube in place. SLP for swallow. MBSS on 2/10/20, start on dysphagia 1 with NTL  -Tolerated first meal but emesis on 2nd meal at higher percentage. May be due to distention, no lung changes. Continue to monitor.   -Decreased oral intake on less stimulant, mother is monitoring his intake.      Aspiration pneumonia - Currently on Unasyn.    CV - Patient is on Ivabradine 5 mg daily. Unclear reason why. Discontinue Ivabradine, on Metoprolol for tachycardia     Respiratory - Patient with size 7 trach on admission. Patient on Duonebs and Singulair  -Exchange aborted on 2/6/20 due to emesis on suction. Tolerated overnight. Decannulated AM 2/7/20. Tolerated and stoma closing.      GI Ppx - Patient on Prilosec and Prevacid. Unclear why two PPIs. Discontinue Prevacid.      DVT Ppx - Patient on Eliquis 2.5 mg BID. Unknown reason why, mother denies clot or DVT. Discontinue Eliquis as 10 months post-ICH    Total time:  37 minutes.  I spent greater than 50% of the time for patient care, counseling, and coordination on this date, including unit/floor time, and face-to-face time with the patient as per interval events and assessment and plan above. Topics discussed included ongoing agitation, discontinue Modafinil, ongoing pneumonia treatment, and discontinue Ivabradine for metoprolol.     Greer David M.D.

## 2020-02-17 NOTE — THERAPY
Missed Therapy     Patient Name: Rey Medina  Age:  24 y.o., Sex:  male  Medical Record #: [id number]  Today's Date: 2/17/2020    Discussed missed therapy with MD, RN, OT,  and supervisor.        02/17/20 0903   Therapy Missed   Missed Therapy (Minutes) 30   Reason For Missed Therapy Medical - Patient on Hold from Therapy;Medical - Patient Agitated;Medical - Other (Please Comment)  (pt agitated, family refusal, MD aware with med hold placed)

## 2020-02-17 NOTE — THERAPY
Missed Therapy     Patient Name: Rey Medina  Age:  24 y.o., Sex:  male  Medical Record #: [id number]  Today's Date: 2/17/2020    Discussed missed therapy with  and MD. Patient agitated when undersigned therapist attempted to work with patient during 7am session. Returned at 12:30 and patient's mother reported patient finally able to fall asleep, requested not to disturb. MD notified; patient on medical hold at this time.        02/17/20 0701   Precautions   Precautions Fall Risk;Swallow Precautions ( See Comments);PEG Tube   Comments recent decannulation of tracheostomy, Dys 1 with NTL    Non Verbal Descriptors   Non Verbal Scale  Tense Body Language;Restlessness   Cognition    Level of Consciousness Agitated   Therapy Missed   Missed Therapy (Minutes) 60   Reason For Missed Therapy Medical - Patient on Hold from Therapy  (Patient agitated at 7am, reattempted at 12:30 patient asleep)

## 2020-02-17 NOTE — FLOWSHEET NOTE
02/16/20 1515   Events/Summary/Plan   Events/Summary/Plan Stoma care- site without redness, swelling. Slight drainage noted,. Slight leak noted. Site cleaned, sterile pressure dressing applied.Sp02 reported in acceptable range. Not assessed by RT at this time due to pt agitation.

## 2020-02-17 NOTE — CARE PLAN
Problem: Infection  Goal: Will remain free from infection  Note: Education reinforced to mom and patient to wash hands thoroughly after using the restroom, prior to eating and throughout the day to minimize the potential of acquiring germs while in a facility setting. Patient engaged in conversation and verbalizes understanding.       Problem: Bowel/Gastric:  Goal: Normal bowel function is maintained or improved  Note: Patient educated regarding diet, fluids, medications and mobilization to maximize potential for regular bowel movements. Patient engages in education and verbalizes understanding.

## 2020-02-17 NOTE — PROGRESS NOTES
"Talked with mom tonight, I asked her which stool softeners/ laxatives work best she stated they have been giving him miralax every night, but no BM, checked the chart last bm was the 14th and miralax was ordered but as a prn on the 13th and had never been given. miralax given now also gave suppository per mom's request as miralax doesn't always work and feels this may be why pt has been so restless, tachycardic, agitated, \"miserable\" IV fluids continue at 125cc hour. Remains on IV Unasyn. Urine output has been great; grossly incontinent x 3 today. Respiration unchanged. MEWs has been 3 most of day and is now 5. Reported same to night shift RN.   "

## 2020-02-17 NOTE — PROGRESS NOTES
[facility] Medicine Daily Progress Note    Date of Service  2/17/2020    Chief Complaint:  Fever  Tachycardia  Leukocytosis  N/V    Interval History:  No significant events or changes since last visit    Review of Systems  Review of Systems   Unable to perform ROS: Medical condition        Physical Exam  Temp:  [37.1 °C (98.8 °F)-37.7 °C (99.8 °F)] 37.1 °C (98.8 °F)  Pulse:  [104-135] 117  Resp:  [18-22] 18  BP: (132-146)/(76-98) 141/98  SpO2:  [96 %] 96 %    Physical Exam  Vitals signs and nursing note reviewed.   Constitutional:       Appearance: Normal appearance.   HENT:      Head: Atraumatic.   Eyes:      Conjunctiva/sclera: Conjunctivae normal.      Pupils: Pupils are equal, round, and reactive to light.   Neck:      Musculoskeletal: Normal range of motion and neck supple.   Cardiovascular:      Rate and Rhythm: Regular rhythm. Tachycardia present.      Heart sounds: No murmur.   Pulmonary:      Effort: Pulmonary effort is normal.      Breath sounds: No wheezing or rales.      Comments: Has some coarse breath sounds  Abdominal:      General: There is no distension.      Palpations: Abdomen is soft.      Tenderness: There is no abdominal tenderness.   Musculoskeletal:      Right lower leg: No edema.      Left lower leg: No edema.   Skin:     General: Skin is warm and dry.      Findings: No rash.   Neurological:      Mental Status: He is alert and oriented to person, place, and time.   Psychiatric:         Mood and Affect: Mood normal.         Behavior: Behavior normal.         Fluids    Intake/Output Summary (Last 24 hours) at 2/17/2020 1050  Last data filed at 2/17/2020 0900  Gross per 24 hour   Intake --   Output 1800 ml   Net -1800 ml       Laboratory  Recent Labs     02/15/20  0945 02/16/20  0623   WBC 17.9* 12.8*   RBC 5.99 6.02   HEMOGLOBIN 16.2 15.9   HEMATOCRIT 48.6 50.7   MCV 81.1* 84.2   MCH 27.0 26.4*   MCHC 33.3* 31.4*   RDW 42.5 45.0   PLATELETCT 304 294   MPV 11.0 11.2     Recent Labs      02/15/20  0945   SODIUM 135   POTASSIUM 4.1   CHLORIDE 103   CO2 21   GLUCOSE 126*   BUN 13   CREATININE 0.60   CALCIUM 9.1                   Imaging    Assessment/Plan  * Intracerebral hemorrhage, intraventricular (HCC)- (present on admission)  Assessment & Plan  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, s/p AVM repair  Also had hydrocephalus, s/p  shunt     Tachycardia  Assessment & Plan  HR still elevated  Likely to infection and agitation  On Lopressor: 25 mg bid --> will increase to 50 mg bid (2/17)  Cont to monitor    Sepsis ([facility])  Assessment & Plan  Has been having on & off low grade fevers  Had 1 episode of N/V recently  Has a cough  Has leukocytosis: 9.0 (2/6) --> 17.9 (2/15) --> 12.8 (2/16)  Has tachycardia -- see other assessment  O2 sats good on RA  No anemia  Bun/Cr wnl  TSH (2/6): wnl  AXR (2/15): nonobstructive bowel gas pattern  CXR (2/15): hypoaeration changes with hazy basilar opacities likely atelectasis  U/A (-)  BC x 2: NTD; continue to follow  Off Levaquin & Flagyl po x 1 -- 2nd to hard to obtain an IV access (2/15) --> IV access obtained  On Unasyn (2/15)  On IVF's NS: 125 cc/hr --> will d/c and observe (2/17)  On Simethicone  Suspect 2nd to aspiration pneumonia (had dysphagia and N/V)  Monitor closely    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

## 2020-02-17 NOTE — RESPIRATORY CARE
Conscious Sedation Respiratory Update    FiO2%: 28 % (02/07/20 1006)  O2 (LPM): 0 (02/17/20 1105)  O2 Daily Delivery Respiratory : Room Air with O2 Available (02/12/20 0980)    Events/Summary/Plan: Stoma care done.  Site looks good.  SpO2 on RA checked.  Mother with pt. (02/17/20 1105)

## 2020-02-17 NOTE — THERAPY
Missed Therapy     Patient Name: Rey Medina  Age:  24 y.o., Sex:  male  Medical Record #: [id number]  Today's Date: 2/17/2020    Discussed missed therapy with therapy supervisor, and OT.       02/17/20 1531   Therapy Missed   Missed Therapy (Minutes) 60   Reason For Missed Therapy Medical - Patient on Hold from Therapy

## 2020-02-17 NOTE — REHAB-DIETARY IDT TEAM NOTE
Dietary   Nutrition  Dietary Problems     Problem: Other Problem (see comments)     Description:     Goal: Other Goal (Resolved)     Description: Nutrition Support tolerated and meeting greater than 85% of estimated needs.                     Patient now NPO due to possible aspiration PNA. Pt had episode of emesis after eating large meal and given TF bolus.  Patient on 2/14 was being evaluated by RT and found to be coughing.  Unfortunately he also coughed up some TF.  Pt is now S/p IVF which are being discontinued today ( initiated 2/15).  Patient on IV Unasyn for antibiotics.   Per chart, patient's mother was refusing free water flushes on 2/13 s/t worrying about bloating/ abdominal distention.   Pt has not been on any type of bowel regimen since admission per review of chart.  Apparently, he responds well to Miralax which was added today.  His lack of BMs could be contributing to his delayed gastric emptying and frequent coughing/ emesis of TF after feedings.  Patient is now missing therapy due to his agitation/ restlessness.      Diet; now NPO  TF: [employer] 325mL QID - at meal times + HS; 240mL free water flush q 4 hours is ordered currently  TF+ Free water= 1300 kcals, 64 grams/protein, 988mL free water + flush= 2428mL free water per day    Pertinent Labs: 2/16: WBC 12.8  Pertinent Medications: Omeprazole, Miralax, Simethicone, Trazodone, Vitamin D, Unasyn IV, Baclofen, Corlanor, Keppra, Metoprolol, Singulair  PRN Medications: noted  IVF discontinued today    Weight: 77.5kg- taken via bedscale up 2.5kg since admission but close to usual bodyweight; could be slight inaccuracy due to scale itself  Skin: CDI  Yellow UOP  GI: last BM was 2/14  I/Os: -1500mL x 24 hours with no input recorded   Vitals: tachcyardia noted, RA, /98    Plan: Consider adding 4 ounces of prune juice to daily TF regimen for bowel regularity.  Pt notably non-ambulatory which can delay gastric emptying.  Patient's lack of consistent  BMs is likely the reasoning for most of his emesis issues.  Going to adjust TF back to old regimen of [employer] 325mL, 5 times per day + 1 scoop of Beneprotein 4 times per day as patient now NPO.  Going to adjust free water to 150mL q 4 hours to decrease overall volume intake.  This does not count the 30mL free water flushes patient receives before and after feedings, medications, and intermittently for various reasons.  TF+ Free water+ Beneprotein= 1725 kcals, 104 grams/protein, 1235mL free water + flush= 1835mL free water per day.            Section completed by:  Magdalena Jiang R.D.

## 2020-02-18 PROBLEM — D72.829 LEUKOCYTOSIS: Status: ACTIVE | Noted: 2020-02-15

## 2020-02-18 LAB
ANION GAP SERPL CALC-SCNC: 14 MMOL/L (ref 0–11.9)
BASOPHILS # BLD AUTO: 0.8 % (ref 0–1.8)
BASOPHILS # BLD: 0.12 K/UL (ref 0–0.12)
BUN SERPL-MCNC: 12 MG/DL (ref 8–22)
CALCIUM SERPL-MCNC: 9.2 MG/DL (ref 8.5–10.5)
CHLORIDE SERPL-SCNC: 104 MMOL/L (ref 96–112)
CO2 SERPL-SCNC: 21 MMOL/L (ref 20–33)
CREAT SERPL-MCNC: 0.66 MG/DL (ref 0.5–1.4)
EOSINOPHIL # BLD AUTO: 0.16 K/UL (ref 0–0.51)
EOSINOPHIL NFR BLD: 1 % (ref 0–6.9)
ERYTHROCYTE [DISTWIDTH] IN BLOOD BY AUTOMATED COUNT: 44.4 FL (ref 35.9–50)
GLUCOSE SERPL-MCNC: 111 MG/DL (ref 65–99)
HCT VFR BLD AUTO: 48 % (ref 42–52)
HGB BLD-MCNC: 16 G/DL (ref 14–18)
IMM GRANULOCYTES # BLD AUTO: 0.06 K/UL (ref 0–0.11)
IMM GRANULOCYTES NFR BLD AUTO: 0.4 % (ref 0–0.9)
LYMPHOCYTES # BLD AUTO: 2.45 K/UL (ref 1–4.8)
LYMPHOCYTES NFR BLD: 15.7 % (ref 22–41)
MAGNESIUM SERPL-MCNC: 2.4 MG/DL (ref 1.5–2.5)
MCH RBC QN AUTO: 27.2 PG (ref 27–33)
MCHC RBC AUTO-ENTMCNC: 33.3 G/DL (ref 33.7–35.3)
MCV RBC AUTO: 81.6 FL (ref 81.4–97.8)
MONOCYTES # BLD AUTO: 0.94 K/UL (ref 0–0.85)
MONOCYTES NFR BLD AUTO: 6 % (ref 0–13.4)
NEUTROPHILS # BLD AUTO: 11.85 K/UL (ref 1.82–7.42)
NEUTROPHILS NFR BLD: 76.1 % (ref 44–72)
NRBC # BLD AUTO: 0 K/UL
NRBC BLD-RTO: 0 /100 WBC
PHOSPHATE SERPL-MCNC: 4.4 MG/DL (ref 2.5–4.5)
PLATELET # BLD AUTO: 288 K/UL (ref 164–446)
PMV BLD AUTO: 11.4 FL (ref 9–12.9)
POTASSIUM SERPL-SCNC: 4 MMOL/L (ref 3.6–5.5)
RBC # BLD AUTO: 5.88 M/UL (ref 4.7–6.1)
SODIUM SERPL-SCNC: 139 MMOL/L (ref 135–145)
WBC # BLD AUTO: 15.6 K/UL (ref 4.8–10.8)

## 2020-02-18 PROCEDURE — 85025 COMPLETE CBC W/AUTO DIFF WBC: CPT

## 2020-02-18 PROCEDURE — 99233 SBSQ HOSP IP/OBS HIGH 50: CPT | Performed by: PHYSICAL MEDICINE & REHABILITATION

## 2020-02-18 PROCEDURE — 97130 THER IVNTJ EA ADDL 15 MIN: CPT

## 2020-02-18 PROCEDURE — 700102 HCHG RX REV CODE 250 W/ 637 OVERRIDE(OP): Performed by: PHYSICAL MEDICINE & REHABILITATION

## 2020-02-18 PROCEDURE — A9270 NON-COVERED ITEM OR SERVICE: HCPCS | Performed by: HOSPITALIST

## 2020-02-18 PROCEDURE — 83735 ASSAY OF MAGNESIUM: CPT

## 2020-02-18 PROCEDURE — A9270 NON-COVERED ITEM OR SERVICE: HCPCS | Performed by: PHYSICAL MEDICINE & REHABILITATION

## 2020-02-18 PROCEDURE — 700102 HCHG RX REV CODE 250 W/ 637 OVERRIDE(OP): Performed by: HOSPITALIST

## 2020-02-18 PROCEDURE — 80048 BASIC METABOLIC PNL TOTAL CA: CPT

## 2020-02-18 PROCEDURE — 700111 HCHG RX REV CODE 636 W/ 250 OVERRIDE (IP): Performed by: HOSPITALIST

## 2020-02-18 PROCEDURE — 97110 THERAPEUTIC EXERCISES: CPT

## 2020-02-18 PROCEDURE — 770010 HCHG ROOM/CARE - REHAB SEMI PRIVAT*

## 2020-02-18 PROCEDURE — 94760 N-INVAS EAR/PLS OXIMETRY 1: CPT

## 2020-02-18 PROCEDURE — 700105 HCHG RX REV CODE 258: Performed by: HOSPITALIST

## 2020-02-18 PROCEDURE — 97535 SELF CARE MNGMENT TRAINING: CPT

## 2020-02-18 PROCEDURE — 97129 THER IVNTJ 1ST 15 MIN: CPT

## 2020-02-18 PROCEDURE — 36415 COLL VENOUS BLD VENIPUNCTURE: CPT

## 2020-02-18 PROCEDURE — 97530 THERAPEUTIC ACTIVITIES: CPT

## 2020-02-18 PROCEDURE — 84100 ASSAY OF PHOSPHORUS: CPT

## 2020-02-18 PROCEDURE — 99233 SBSQ HOSP IP/OBS HIGH 50: CPT | Performed by: HOSPITALIST

## 2020-02-18 RX ORDER — QUETIAPINE FUMARATE 25 MG/1
25 TABLET, FILM COATED ORAL 3 TIMES DAILY
Status: DISCONTINUED | OUTPATIENT
Start: 2020-02-18 | End: 2020-02-19

## 2020-02-18 RX ORDER — TRAZODONE HYDROCHLORIDE 50 MG/1
50 TABLET ORAL
Status: DISCONTINUED | OUTPATIENT
Start: 2020-02-18 | End: 2020-02-21 | Stop reason: HOSPADM

## 2020-02-18 RX ORDER — PROPRANOLOL HYDROCHLORIDE 10 MG/1
10 TABLET ORAL 3 TIMES DAILY
Status: DISCONTINUED | OUTPATIENT
Start: 2020-02-18 | End: 2020-02-21 | Stop reason: HOSPADM

## 2020-02-18 RX ORDER — LINEZOLID 600 MG/1
600 TABLET, FILM COATED ORAL EVERY 12 HOURS
Status: DISCONTINUED | OUTPATIENT
Start: 2020-02-18 | End: 2020-02-21 | Stop reason: HOSPADM

## 2020-02-18 RX ADMIN — MELATONIN 1000 UNITS: at 08:55

## 2020-02-18 RX ADMIN — AMPICILLIN SODIUM AND SULBACTAM SODIUM 3 G: 2; 1 INJECTION, POWDER, FOR SOLUTION INTRAMUSCULAR; INTRAVENOUS at 05:33

## 2020-02-18 RX ADMIN — AMPICILLIN SODIUM AND SULBACTAM SODIUM 3 G: 2; 1 INJECTION, POWDER, FOR SOLUTION INTRAMUSCULAR; INTRAVENOUS at 11:51

## 2020-02-18 RX ADMIN — BACLOFEN 5 MG: 10 TABLET ORAL at 08:55

## 2020-02-18 RX ADMIN — MONTELUKAST 10 MG: 10 TABLET, FILM COATED ORAL at 20:20

## 2020-02-18 RX ADMIN — QUETIAPINE 25 MG: 25 TABLET ORAL at 09:30

## 2020-02-18 RX ADMIN — BACLOFEN 5 MG: 10 TABLET ORAL at 14:38

## 2020-02-18 RX ADMIN — METOPROLOL TARTRATE 25 MG: 25 TABLET ORAL at 05:33

## 2020-02-18 RX ADMIN — LEVETIRACETAM 500 MG: 500 SOLUTION ORAL at 08:54

## 2020-02-18 RX ADMIN — LINEZOLID 600 MG: 600 TABLET, FILM COATED ORAL at 20:19

## 2020-02-18 RX ADMIN — SIMETHICONE CHEW TAB 80 MG 120 MG: 80 TABLET ORAL at 20:22

## 2020-02-18 RX ADMIN — SIMETHICONE CHEW TAB 80 MG 120 MG: 80 TABLET ORAL at 08:54

## 2020-02-18 RX ADMIN — AMPICILLIN SODIUM AND SULBACTAM SODIUM 3 G: 2; 1 INJECTION, POWDER, FOR SOLUTION INTRAMUSCULAR; INTRAVENOUS at 00:23

## 2020-02-18 RX ADMIN — BACLOFEN 5 MG: 10 TABLET ORAL at 20:20

## 2020-02-18 RX ADMIN — QUETIAPINE 25 MG: 25 TABLET ORAL at 20:22

## 2020-02-18 RX ADMIN — PROPRANOLOL HYDROCHLORIDE 10 MG: 10 TABLET ORAL at 14:38

## 2020-02-18 RX ADMIN — LEVETIRACETAM 500 MG: 500 SOLUTION ORAL at 20:19

## 2020-02-18 RX ADMIN — POLYETHYLENE GLYCOL 3350 1 PACKET: 17 POWDER, FOR SOLUTION ORAL at 18:02

## 2020-02-18 RX ADMIN — PROPRANOLOL HYDROCHLORIDE 10 MG: 10 TABLET ORAL at 20:19

## 2020-02-18 RX ADMIN — SIMETHICONE CHEW TAB 80 MG 120 MG: 80 TABLET ORAL at 14:37

## 2020-02-18 RX ADMIN — OMEPRAZOLE 40 MG: KIT at 08:54

## 2020-02-18 RX ADMIN — QUETIAPINE 25 MG: 25 TABLET ORAL at 14:37

## 2020-02-18 RX ADMIN — TRAZODONE HYDROCHLORIDE 50 MG: 50 TABLET ORAL at 20:22

## 2020-02-18 RX ADMIN — PIPERACILLIN AND TAZOBACTAM 3.38 G: 3; .375 INJECTION, POWDER, LYOPHILIZED, FOR SOLUTION INTRAVENOUS; PARENTERAL at 17:59

## 2020-02-18 RX ADMIN — PIPERACILLIN AND TAZOBACTAM 3.38 G: 3; .375 INJECTION, POWDER, LYOPHILIZED, FOR SOLUTION INTRAVENOUS; PARENTERAL at 23:56

## 2020-02-18 NOTE — PROGRESS NOTES
Patient care assumed. Report received from [facility] CORI Chaney.  Patient is alert and calm, resting in bed. Call light and bedside table within reach. Will continue to monitor.

## 2020-02-18 NOTE — CARE PLAN
Problem: Dressing  Goal: STG-Within one week, patient will dress UB  Description: 1) Individualized Goal:  Max assist   2) Interventions:  OT Group Therapy, OT Self Care/ADL, OT Cognitive Skill Dev, OT Manual Ther Technique, OT Neuro Re-Ed/Balance, OT Sensory Int Techniques, OT Therapeutic Activity, OT Evaluation and OT Therapeutic Exercise   Outcome: NOT MET

## 2020-02-18 NOTE — PROGRESS NOTES
Patient slept well through the night - allowed care to be given without resistance including lab draw. Patient continues to grasp urinal to groin area tightly.

## 2020-02-18 NOTE — FLOWSHEET NOTE
02/18/20 0954   Events/Summary/Plan   Events/Summary/Plan trach stoma care done with some difficulty due to pt unwilling to turn from side to back   Trache/Stoma Care   Trache/Stoma Care Yes  (no secretions and gauze clean and dry)   Respiratory WDL   Respiratory (WDL) X   Oximetry   $ Pulse Oximetry (Spot Check) Yes   Oxygen   O2 Delivery Device None - Room Air

## 2020-02-18 NOTE — THERAPY
Speech Language Pathology  Daily Treatment     Patient Name: Rey Medina  Age:  24 y.o., Sex:  male  Medical Record #: [id number]  Today's Date: 2/18/2020     Subjective    Patient was in room with mother at time of ST.      Objective       02/18/20 1132   Social / Pragmatic Communication   Eye Contact Severe (2)   SLP Total Time Spent   SLP Individual Total Time Spent (Mins) 45   Charge Group   SLP Cognitive Skill Development First 15 Minutes 1   SLP Cognitive Skill Development Additional 15 Minutes 2         Assessment    Patient was not able to follow any directives during session. Attempted oral stim with patient turning head, and eventually swatting at clinician. Further attempts of tactile or auditory stimulation with evasive movements to avoid and withdrawal. Education provided to mother regarding sleep/wake cycle and POC for therapies.      Plan    Continue ABS, CRS. Initiate sleep wake cycle.

## 2020-02-18 NOTE — THERAPY
Speech Language Pathology  Daily Treatment     Patient Name: Rey Medina  Age:  24 y.o., Sex:  male  Medical Record #: [id number]  Today's Date: 2/18/2020     Subjective    Patient was in room with mother and RN. RN reports increased agitation and provided Seroquel.      Objective       02/18/20 0932   Outcome Measures   Outcome Measures Utilized ABS   ABS (Agitated Behavior Scale)   Short Attention Span, Easy Distractibility, Inability to Concentrate 4   Impulsive, Impatient, Low Tolerance for Pain or Frustration 4   Uncooperative, Resistant to Care, Demanding 4   Violent and/or Threatening Violence Toward People or Property 2   Explosive and/or Unpredictable Anger 2   Rocking, Rubbing, Moaning, Other Self-Stimulating Behavior 4   Pulling at Tubes, Restraints, etc. 4   Wandering from Treatment Area 1   Restlessness, Pacing, Excessive Movement 4   Repetitive Behaviors, Motor and/or Verbal 4   Rapid, Loud or Excessive Talking 1   Sudden Changes of Mood 1   Easily Initiated - Excessive Crying and/or Laughter 1   Self-Abusiveness, Physical and/or Verbal 1   Agitated Behavior Scale Total Score 37   Level of Severity Severe Agitation   Interdisciplinary Plan of Care Collaboration   IDT Collaboration with  Nursing   Speech Language Pathologist Assigned   Assigned SLP / Pager # [phone]   Therapy Missed   Missed Therapy (Minutes) 15   SLP Total Time Spent   SLP Individual Total Time Spent (Mins) 15   Charge Group   SLP Cognitive Skill Development First 15 Minutes 1          Assessment    Patient with an ABS score of 37 (severe agitation) and a CRS of 3. Attempted to decrease stimulation and provide foam rollers for fidgeting and grabbing. Patient eventually took one roller in right hand. Session ended after 15 minutes d/t increased agitation ( rocking, moaning, grabbing self and clinician)    Plan    Continue CRS, ABS as appropriate.

## 2020-02-18 NOTE — REHAB-OT IDT TEAM NOTE
Occupational Therapy   Activities of Daily Living  Eating Initial:  1 - Total Assistance  Eating Current:  1 - Total Assistance   Eating Description:  Set-up of equipment or meal/tube feeding, Tube feed bolus  Grooming Initial:  1 - Total Assistance  Grooming Current:  1 - Total Assistance   Grooming Description:  Set-up of equipment, Verbal cueing, Supervision for safety(Totala assist to wash face with wash cloth. )  Bathing Initial:  0 - Not tested, medical condition  Bathing Current:  1 - Total Assistance   Bathing Description:  (Total assist for partial sponge bath in bed (due to agitation /restlessness))  Upper Body Dressing Initial:  1 - Total Assistance  Upper Body Dressing Current:  0 - Not tested, medical condition   Upper Body Dressing Description:  Increased time, Set-up of equipment, Initial preparation for task, Verbal cueing(Patient required total assist to don t-shirt and undershirt  while seated in w/c)  Lower Body Dressing Initial:  1 - Total Assistance  Lower Body Dressing Current:  0 - Not tested, medical condition   Lower Body Dressing Description:  0 - Not tested, medical condition  Toileting Initial:  1 - Total Assistance  Toileting Current:  1 - Total Assistance   Toileting Description:  Increased time, Supervision for safety, Set-up of equipment  Toilet Transfer Initial:     Toilet Transfer Current:      Toilet Transfer Description:     Tub / Shower Transfer Initial:  0 - Not tested,unsafe activity  Tub / Shower Transfer Current:  0 - Not tested,unsafe activity   Tub / Shower Transfer Description:     IADL:  NA at this time  Family Training/Education:  Ongoing with mother   DME/DC Recommendations:  TBD     Strengths:  Supportive family  Barriers:  Other: [place] Level 3, Agitation/restlessness, unable to follow directions     # of short term goals set= 1  # of short term goals met= 0    Occupational Therapy Goals     Problem: Dressing     Dates: Start: 02/06/20       Description:     Goal:  STG-Within one week, patient will dress UB     Dates: Start: 02/06/20       Description: 1) Individualized Goal:  Max assist   2) Interventions:  OT Group Therapy, OT Self Care/ADL, OT Cognitive Skill Dev, OT Manual Ther Technique, OT Neuro Re-Ed/Balance, OT Sensory Int Techniques, OT Therapeutic Activity, OT Evaluation and OT Therapeutic Exercise                 Problem: Functional Cognition     Dates: Start: 02/06/20       Description:           Problem: OT Long Term Goals     Dates: Start: 02/06/20       Description:     Goal: LTG-By discharge, patient will complete basic self care tasks     Dates: Start: 02/06/20       Description: 1) Individualized Goal:  Mod assist with AE as needed  2) Interventions:  OT Group Therapy, OT Self Care/ADL, OT Cognitive Skill Dev, OT Manual Ther Technique, OT Neuro Re-Ed/Balance, OT Sensory Int Techniques, OT Therapeutic Activity, OT Evaluation and OT Therapeutic Exercise           Goal: LTG-By discharge, patient will perform bathroom transfers     Dates: Start: 02/06/20       Description: 1) Individualized Goal:  Mod assist with AE as needed    2) Interventions:  OT Group Therapy, OT Self Care/ADL, OT Cognitive Skill Dev, OT Manual Ther Technique, OT Neuro Re-Ed/Balance, OT Sensory Int Techniques, OT Therapeutic Activity, OT Evaluation and OT Therapeutic Exercise                       Section completed by:  Minnie Luke MS,OTR/L

## 2020-02-18 NOTE — CARE PLAN
Problem: Infection  Goal: Will remain free from infection  Note: Education reinforced to wash hands thoroughly after using the restroom, prior to eating and throughout the day to minimize the potential of acquiring germs while in a facility setting. Patient engaged in conversation and verbalizes understanding. Education reinforced to mom,       Problem: Skin Integrity  Goal: Risk for impaired skin integrity will decrease  Note: Education reinforced to mom to change positions to minimize the potential of skin break down and possible further complications of open wounds due to pressure, when sitting or laying for periods over half of an hour. Mom engaged in education and verbalizes and demonstrates understanding.

## 2020-02-18 NOTE — CARE PLAN
Problem: Infection  Goal: Will remain free from infection  Intervention: Assess signs and symptoms of infection  Note: Patient WBC count elevated this am from previous. Dr Sorenson and Dr David are aware. Patient on unasyn every 6 hours. See v/s in docflowsheet v/s heart rate has been elevated today. Propranolol started today to manage BP , seroquel scheduled 3 x a day starting today for agitation. will continue to monitor.     Problem: Urinary Elimination:  Goal: Ability to reestablish a normal urinary elimination pattern will improve  Intervention: Implement bladder training program  Note: Patient holding urinal to groin area non-stop, taken away by this RN. Patient has been incontinent and spilling urinal and clutching urinal causing spillage when staff trying to empty. Diaper was placed on patient along with blue pads for low air loss mattress. Patient being time voided at intervals and staff maintaining control of urinal.

## 2020-02-18 NOTE — REHAB-SLP IDT TEAM NOTE
"Speech Therapy   Cognitive Linquistic Functions  Comprehension Initial:  2 - Max Assistance  Comprehension Current:  2 - Max Assistance   Comprehension Description:  Verbal cues  Expression Initial:  1 - Total Assistance  Expression Current:  1 - Total Assistance   Expression Description:  (squeezes hand for \"yes\" response)  Social Interaction Initial:  2 - Max Assistance  Social Interaction Current:  2 - Max Assistance   Social Interaction Description:  Increased time, Verbal cues  Problem Solving Initial:  1 - Total Assistance  Problem Solving Current:  1 - Total Assistance   Problem Solving Description:     Memory Initial:  1 - Total Assistance  Memory Current:  1 - Total Assistance   Memory Description:     Executive Functioning / Organization Initial:     Executive Functioning / Organization Current:      Executive Functioning Desciption:  Total A  Swallowing  Swallowing Status:  NPO  Orders Placed This Encounter   Procedures   • Diet NPO     Standing Status:   Standing     Number of Occurrences:   1     Order Specific Question:   Restrict to:     Answer:   Strict [1]     Behavior Modification Plan  Keep the environment simple to avoid over stimulatiom/agitation, Allow for rest time and Keep instructions simple/concrete  Assistive Technology  Other: Patient not utilizing assistive technology for communication at this time.   Family Training/Education:  Ongoing with mother  DC Recommendations:   Ongoing speech therapy post discharge  Strengths:  Supportive family  Barriers:  Agitation, Aspiration risk, Unable to follow instructions and Impulsive  # of short term goals set=2  # of short term goals met=0  Speech Therapy Problems     Problem: Comprehension STGs     Dates: Start: 02/06/20       Description:     Goal: STG-Within one week, patient will     Dates: Start: 02/06/20       Description: 1) Individualized goal:  Answer personal y/n questions with use of hand squeeze with 75% accuracy, given mod verbal cues. "   2) Interventions:  SLP Speech Language Treatment, SLP Self Care / ADL Training , SLP Cognitive Skill Development and SLP Group Treatment                   Problem: Expression STGs     Dates: Start: 02/11/20       Description:     Goal: STG-Within one week, patient will     Dates: Start: 02/11/20       Description: 1) Individualized goal:  Imitate vowel sounds on 50% of attempts.   2) Interventions:  SLP Speech Language Treatment, SLP Swallowing Dysfunction Treatment, SLP Oral Pharyngeal Evaluation, SLP Prosthesis Checkout, SLP Self Care / ADL Training , SLP Cognitive Skill Development and SLP Group Treatment                   Problem: Speech/Swallowing LTGs     Dates: Start: 02/06/20       Description:     Goal: LTG-By discharge, patient will safely swallow     Dates: Start: 02/06/20       Description: 1) Individualized goal:  Dysphagia I/NTL diet without clinical s/sx of aspiration  2) Interventions:  SLP Speech Language Treatment, SLP Swallowing Dysfunction Treatment, SLP Oral Pharyngeal Evaluation, SLP Video Swallow Evaluation, SLP Self Care / ADL Training , SLP Cognitive Skill Development and SLP Group Treatment             Goal: LTG-By discharge, patient will     Dates: Start: 02/06/20       Description:                        Section completed by:  Katia Doss MS,CCC-SLP

## 2020-02-18 NOTE — PROGRESS NOTES
Patient agitated when changing sheets this am, holding urinal, urine all over bed and clothing. Patient agitated when urinal was taken away. Patient hitting and kicking at staff. Patient given prn seroquel. Will continue to monitor.

## 2020-02-18 NOTE — THERAPY
Occupational Therapy  Daily Treatment     Patient Name: Rey Medina  Age:  24 y.o., Sex:  male  Medical Record #: [id number]  Today's Date: 2/18/2020     Precautions  Precautions: Fall Risk, Other (See Comments), PEG Tube  Comments: recent decannulation of tracheostomy    Safety   ADL Safety : Requires Physical Assist for Safety  Bathroom Safety: Requires Physical Assist for Safety  Comments: Total assist; see FIMs for additional ADL performance details    Subjective    Patient presents non-verbal and agitated/restless.      Objective       02/18/20 1331   Precautions   Precautions Fall Risk;Other (See Comments);PEG Tube   Comments recent decannulation of tracheostomy   Interdisciplinary Plan of Care Collaboration   IDT Collaboration with  Speech Therapist   Patient Position at End of Therapy In Bed;Family / Friend in Room   Collaboration Comments Speech therapist/supervisor assisted with this session in attempt to facilitate therapeutic participation.    OT Total Time Spent   OT Individual Total Time Spent (Mins) 30   OT Charge Group   OT Self Care / ADL 1   OT Therapy Activity 1     FIM Grooming Score:  1 - Total Assistance  Grooming Description:  (Total assist for washing face while in bed. )     Sensory stimulation provided in attempt to facilitate increased alertness (placed orange and lemon in front of patient's nose).     Attempted to complete PROM exercises for BUEs however unable due to agitation, restlessness, and withdrawal.     Assessment    Patient unable to follow any commands during this session. Continues to display withdrawal behaviors/evasive movements throughout session and fixates on groin area (despite various attempts to redirect).     Plan    Low-stim environment,  sensory stimulation, ADLs, sitting balance/standing balance

## 2020-02-18 NOTE — CARE PLAN
Problem: Bowel/Gastric:  Goal: Normal bowel function is maintained or improved  Outcome: PROGRESSING AS EXPECTED   Miralax started to increase regularity of bowels    Problem: Skin Integrity  Goal: Risk for impaired skin integrity will decrease  Outcome: PROGRESSING AS EXPECTED  Moved onto an airloss mattress due to increased risk of skin breakdown.    Problem: Urinary Elimination:  Goal: Ability to reestablish a normal urinary elimination pattern will improve  Outcome: PROGRESSING SLOWER THAN EXPECTED  Incontinent of bladder with bed soaked. Mom changed him with assistance from staff several times today.

## 2020-02-18 NOTE — PROGRESS NOTES
"Rehab Progress Note     Encounter Date: 2/18/2020    CC: [facility], AMS    Interval Events (Subjective)  Patient sitting up in room. He is less agitated this morning. He does breathe in when requested but not opening eyes. Per therapy very agitated once over stimulated. Discussed continue to try to keep him awake in the day. Therapy supervisor to discuss with family. Trach stoma < 0.5 cm.    ROS: Cannot participate in conversation. No grimacing.     IDT Team Meeting 2/18/2020    IGreer M.D., was present and led the interdisciplinary team conference on 2/18/2020.  I led the IDT conference and agree with the IDT conference documentation and plan of care as noted below.     RN:  Diet Tube feedings   % Meal     Pain    Sleep    Bowel Incontinent   Bladder Incontinent   In's & Out's    Seroquel helped     PT:  Bed Mobility    Transfers totA   Mobility totA   Stairs    Working on ROM due to agitation    OT:  Eating    Grooming totA   Bathing totA   UB Dressing    LB Dressing totA   Toileting totA   Shower & Transfer    Not following any commands    SLP:  Guilherme for social  totA for problem solving  Very agitated and combative    Resp:  Agitated, improving     CM:  Continues to work on disposition and DME needs.      DC/Disposition:  2/26/20    Objective:  VITAL SIGNS: /83   Pulse 95   Temp 37.4 °C (99.3 °F) (Temporal)   Resp 18   Ht 1.702 m (5' 7\")   Wt 77.5 kg (170 lb 13.7 oz)   SpO2 92%   BMI 26.76 kg/m²   Gen: NAD  Psych: Mood and affect appropriate  CV: RRR, no edema  Resp: CTAB, no upper airway sounds  Abd: NTND  Neuro: does follow some commands like deep breathing, does not open eyes  Skin: Stoma without erythema or drainage, < 0.5 cm    Recent Results (from the past 72 hour(s))   URINALYSIS    Collection Time: 02/15/20  3:10 PM   Result Value Ref Range    Color Yellow     Character Cloudy (A)     Specific Gravity 1.010 <1.035    Ph 8.5 (A) 5.0 - 8.0    Glucose Negative Negative mg/dL    " Ketones Negative Negative mg/dL    Protein Negative Negative mg/dL    Bilirubin Negative Negative    Urobilinogen, Urine 0.2 Negative    Nitrite Negative Negative    Leukocyte Esterase Negative Negative    Occult Blood Small (A) Negative    Micro Urine Req Microscopic    URINE MICROSCOPIC (W/UA)    Collection Time: 02/15/20  3:10 PM   Result Value Ref Range    WBC 0-2 (A) /hpf    RBC 2-5 (A) /hpf    Bacteria Negative None /hpf    Epithelial Cells Few /hpf    Amorphous Crystal Present /hpf   CBC WITH DIFFERENTIAL    Collection Time: 02/16/20  6:23 AM   Result Value Ref Range    WBC 12.8 (H) 4.8 - 10.8 K/uL    RBC 6.02 4.70 - 6.10 M/uL    Hemoglobin 15.9 14.0 - 18.0 g/dL    Hematocrit 50.7 42.0 - 52.0 %    MCV 84.2 81.4 - 97.8 fL    MCH 26.4 (L) 27.0 - 33.0 pg    MCHC 31.4 (L) 33.7 - 35.3 g/dL    RDW 45.0 35.9 - 50.0 fL    Platelet Count 294 164 - 446 K/uL    MPV 11.2 9.0 - 12.9 fL    Neutrophils-Polys 77.40 (H) 44.00 - 72.00 %    Lymphocytes 15.80 (L) 22.00 - 41.00 %    Monocytes 4.50 0.00 - 13.40 %    Eosinophils 0.70 0.00 - 6.90 %    Basophils 1.00 0.00 - 1.80 %    Immature Granulocytes 0.60 0.00 - 0.90 %    Nucleated RBC 0.00 /100 WBC    Neutrophils (Absolute) 9.91 (H) 1.82 - 7.42 K/uL    Lymphs (Absolute) 2.02 1.00 - 4.80 K/uL    Monos (Absolute) 0.57 0.00 - 0.85 K/uL    Eos (Absolute) 0.09 0.00 - 0.51 K/uL    Baso (Absolute) 0.13 (H) 0.00 - 0.12 K/uL    Immature Granulocytes (abs) 0.08 0.00 - 0.11 K/uL    NRBC (Absolute) 0.00 K/uL   Basic Metabolic Panel    Collection Time: 02/18/20  5:46 AM   Result Value Ref Range    Sodium 139 135 - 145 mmol/L    Potassium 4.0 3.6 - 5.5 mmol/L    Chloride 104 96 - 112 mmol/L    Co2 21 20 - 33 mmol/L    Glucose 111 (H) 65 - 99 mg/dL    Bun 12 8 - 22 mg/dL    Creatinine 0.66 0.50 - 1.40 mg/dL    Calcium 9.2 8.5 - 10.5 mg/dL    Anion Gap 14.0 (H) 0.0 - 11.9   CBC WITH DIFFERENTIAL    Collection Time: 02/18/20  5:46 AM   Result Value Ref Range    WBC 15.6 (H) 4.8 - 10.8 K/uL     RBC 5.88 4.70 - 6.10 M/uL    Hemoglobin 16.0 14.0 - 18.0 g/dL    Hematocrit 48.0 42.0 - 52.0 %    MCV 81.6 81.4 - 97.8 fL    MCH 27.2 27.0 - 33.0 pg    MCHC 33.3 (L) 33.7 - 35.3 g/dL    RDW 44.4 35.9 - 50.0 fL    Platelet Count 288 164 - 446 K/uL    MPV 11.4 9.0 - 12.9 fL    Neutrophils-Polys 76.10 (H) 44.00 - 72.00 %    Lymphocytes 15.70 (L) 22.00 - 41.00 %    Monocytes 6.00 0.00 - 13.40 %    Eosinophils 1.00 0.00 - 6.90 %    Basophils 0.80 0.00 - 1.80 %    Immature Granulocytes 0.40 0.00 - 0.90 %    Nucleated RBC 0.00 /100 WBC    Neutrophils (Absolute) 11.85 (H) 1.82 - 7.42 K/uL    Lymphs (Absolute) 2.45 1.00 - 4.80 K/uL    Monos (Absolute) 0.94 (H) 0.00 - 0.85 K/uL    Eos (Absolute) 0.16 0.00 - 0.51 K/uL    Baso (Absolute) 0.12 0.00 - 0.12 K/uL    Immature Granulocytes (abs) 0.06 0.00 - 0.11 K/uL    NRBC (Absolute) 0.00 K/uL   MAGNESIUM    Collection Time: 02/18/20  5:46 AM   Result Value Ref Range    Magnesium 2.4 1.5 - 2.5 mg/dL   PHOSPHORUS    Collection Time: 02/18/20  5:46 AM   Result Value Ref Range    Phosphorus 4.4 2.5 - 4.5 mg/dL   ESTIMATED GFR    Collection Time: 02/18/20  5:46 AM   Result Value Ref Range    GFR If African American >60 >60 mL/min/1.73 m 2    GFR If Non African American >60 >60 mL/min/1.73 m 2       Current Facility-Administered Medications   Medication Frequency   • polyethylene glycol/lytes (MIRALAX) PACKET 1 Packet DAILY   • traZODone (DESYREL) tablet 100 mg QHS   • simethicone (MYLICON) chewable tab 120 mg TID   • QUEtiapine (SEROQUEL) tablet 25 mg TID PRN   • metoprolol (LOPRESSOR) tablet 25 mg TWICE DAILY   • ampicillin/sulbactam (UNASYN) 3 g in  mL IVPB Q6HRS   • polyethylene glycol/lytes (MIRALAX) PACKET 1 Packet Q24HRS PRN    And   • magnesium hydroxide (MILK OF MAGNESIA) suspension 30 mL QDAY PRN    And   • bisacodyl (DULCOLAX) suppository 10 mg QDAY PRN   • baclofen (LIORESAL) tablet 5 mg TID   • eucerin cream TID PRN   • levETIRAcetam (KEPPRA) 100 MG/ML solution 500 mg  Q12HRS   • omeprazole (FIRST-OMEPRAZOLE) 2 mg/mL oral susp 40 mg DAILY   • vitamin D (cholecalciferol) tablet 1,000 Units DAILY   • Respiratory Care per Protocol Continuous RT   • tramadol (ULTRAM) 50 MG tablet 50 mg Q4HRS PRN   • hydrALAZINE (APRESOLINE) tablet 25 mg Q8HRS PRN   • acetaminophen (TYLENOL) tablet 650 mg Q4HRS PRN   • artificial tears ophthalmic solution 1 Drop PRN   • benzocaine-menthol (CEPACOL) lozenge 1 Lozenge Q2HRS PRN   • mag hydrox-al hydrox-simeth (MAALOX PLUS ES or MYLANTA DS) suspension 20 mL Q2HRS PRN   • ondansetron (ZOFRAN ODT) dispertab 4 mg 4X/DAY PRN    Or   • ondansetron (ZOFRAN) syringe/vial injection 4 mg 4X/DAY PRN   • traZODone (DESYREL) tablet 50 mg QHS PRN   • sodium chloride (OCEAN) 0.65 % nasal spray 2 Spray PRN   • ipratropium-albuterol (DUONEB) nebulizer solution Q4H PRN (RT)   • montelukast (SINGULAIR) tablet 10 mg Nightly       Orders Placed This Encounter   Procedures   • Diet NPO     Standing Status:   Standing     Number of Occurrences:   1     Order Specific Question:   Restrict to:     Answer:   Strict [1]       Assessment:  Active Hospital Problems    Diagnosis   • *Intracerebral hemorrhage, intraventricular (HCC)   • 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 Patient very low functioning but per mother becoming more purposeful  -PT and OT for mobility and ADLs  -SLP for cognition   -Patient agitated on Amantadine and Modafinil. Both trials halted due to agitation.   -Continue Keppra. Unclear if had seizure or was continued on prophylaxis  -NSG appointment on 2/7/20 to evaluation  shunt. Will follow-up in 2 months      Agitation - Appears like TBI with agitation worsened from neurostimulant. Change  Metoprolol to Propranolol. Start Seroquel PRN with seroquel scheduled TID 25 mg    Muscle spasms - mother claims Bromocriptine helps, typically used for dopamine side effects/neurostimulant. Will start low dose Baclofen and monitor.     Dysphagia - Patient with G Tube in place. SLP for swallow. MBSS on 2/10/20, start on dysphagia 1 with NTL  -Tolerated first meal but emesis on 2nd meal at higher percentage. May be due to distention, no lung changes. Continue to monitor.   -Decreased oral intake on less stimulant, mother is monitoring his intake.      Aspiration pneumonia - Currently on Unasyn.    CV - Patient is on Ivabradine 5 mg daily. Unclear reason why. Discontinue Ivabradine, on Metoprolol for tachycardia     Respiratory - Patient with size 7 trach on admission. Patient on Duonebs and Singulair  -Exchange aborted on 2/6/20 due to emesis on suction. Tolerated overnight. Decannulated AM 2/7/20. Tolerated and stoma closing.      GI Ppx - Patient on Prilosec and Prevacid. Unclear why two PPIs. Discontinue Prevacid.      DVT Ppx - Patient on Eliquis 2.5 mg BID. Unknown reason why, mother denies clot or DVT. Discontinue Eliquis as 10 months post-ICH    Total time:  36 minutes.  I spent greater than 50% of the time for patient care, counseling, and coordination on this date, including unit/floor time, and face-to-face time with the patient as per interval events and assessment and plan above. Topics discussed included discharge planning, agitation, schedule Seroquel, and change metoprolol to propranolol. Patient was discussed separately in IDT today; please see details above.     Greer David M.D.

## 2020-02-18 NOTE — REHAB-PT IDT TEAM NOTE
Physical Therapy   Mobility  Bed mobility:   1- Total Assistance  Bed /Chair/Wheelchair Transfer Initial:  1 - Total Assistance  Bed /Chair/Wheelchair Transfer Current:  1 - Total Assistance   Bed/Chair/Wheelchair Transfer Description:  Increased time, Set-up of equipment, Requires 2 people to assist (WC <> seated EOM and WC > supine in bed, max A x2 squat pivot method )  Walk Initial:  0 - Not tested,unsafe activity  Walk Current:  1 - Total Assistance   Walk Description:  Two hand rails, Verbal cueing, Extra time, Requires wheelchair follow (2 ft in // bars with wc follow during standing reps, vc for sequecing of stepping)  Wheelchair Initial:  0 - Not tested,unsafe activity  Wheelchair Current:  1 - Total Assistance   Wheelchair Description:     Stairs Initial:  0 - Not tested,unsafe activity  Stairs Current: 0 - Not tested,unsafe activity   Stairs Description:    Patient/Family Training/Education:  Ongoing pt and family training. Performed transfer training with mother and reviewed safety.  DME/DC Recommendations:  TBD  Strengths:  Adequate strength and Supportive family   Barriers:   Agitation, Limited mobility, Poor activity tolerance, Poor balance and Other: Inconsistent ability to follow single step commands, UE weakness, non-verbal, increasing agitation  # of short term goals set= 3  # of short term goals met= 0  Physical Therapy Problems     Problem: Mobility Transfers     Dates: Start: 02/06/20       Description:     Goal: STG-Within one week, patient will roll     Dates: Start: 02/06/20       Description: 1) Individualized goal:  To left or right, max A with use of bed features as needed.  2) Interventions:  PT E Stim Attended, PT Orthotics Training, PT Gait Training, PT Self Care/Home Eval, PT Therapeutic Exercises, PT Ultrasound, PT TENS Application, PT Neuro Re-Ed/Balance, PT Therapeutic Activity, PT Manual Therapy and PT Evaluation        Note:     Goal Note filed on 02/18/20 1139 by Jennifer Dunne  Student    To be assessed                  Goal: STG-Within one week, patient will sit to stand     Dates: Start: 02/06/20       Description: 1) Individualized goal:  Min A x1 with use of AD/bed rail as needed  2) Interventions: PT E Stim Attended, PT Orthotics Training, PT Gait Training, PT Self Care/Home Eval, PT Therapeutic Exercises, PT Ultrasound, PT TENS Application, PT Neuro Re-Ed/Balance, PT Therapeutic Activity, PT Manual Therapy and PT Evaluation        Note:     Goal Note filed on 02/18/20 1139 by Jennifer Dunne, Student    To be assessed                  Goal: STG-Within one week, patient will transfer bed to chair     Dates: Start: 02/06/20       Description: 1) Individualized goal:  Mod A x1 with LRAD  2) Interventions:  PT E Stim Attended, PT Orthotics Training, PT Gait Training, PT Self Care/Home Eval, PT Therapeutic Exercises, PT Ultrasound, PT TENS Application, PT Neuro Re-Ed/Balance, PT Therapeutic Activity, PT Manual Therapy and PT Evaluation        Note:     Goal Note filed on 02/18/20 1139 by Jennifer Dunne, Student    To be assessed                        Problem: PT-Long Term Goals     Dates: Start: 02/06/20       Description:     Goal: LTG-By discharge, patient will maintain balance     Dates: Start: 02/06/20       Description: 1) Individualized goal:  In sitting with use of UE for assist at a SPV level, 5 min   2) Interventions:  PT E Stim Attended, PT Orthotics Training, PT Gait Training, PT Self Care/Home Eval, PT Therapeutic Exercises, PT Ultrasound, PT TENS Application, PT Neuro Re-Ed/Balance, PT Therapeutic Activity, PT Manual Therapy and PT Evaluation              Goal: LTG-By discharge, patient will tolerate standing     Dates: Start: 02/06/20       Description: 1) Individualized goal:  2 min, SBA with use of LRAD  2) Interventions:  PT E Stim Attended, PT Orthotics Training, PT Gait Training, PT Self Care/Home Eval, PT Therapeutic Exercises, PT Ultrasound, PT TENS Application, PT  Neuro Re-Ed/Balance, PT Therapeutic Activity, PT Manual Therapy and PT Evaluation            Goal: LTG-By discharge, patient will ambulate     Dates: Start: 02/06/20       Description: 1) Individualized goal:  10 ft, mod A with LRAD  2) Interventions:  PT E Stim Attended, PT Orthotics Training, PT Gait Training, PT Self Care/Home Eval, PT Therapeutic Exercises, PT Ultrasound, PT TENS Application, PT Neuro Re-Ed/Balance, PT Therapeutic Activity, PT Manual Therapy and PT Evaluation             Goal: LTG-By discharge, patient will transfer one surface to another     Dates: Start: 02/06/20       Description: 1) Individualized goal:  Min A with LRAD  2) Interventions:  PT E Stim Attended, PT Orthotics Training, PT Gait Training, PT Self Care/Home Eval, PT Therapeutic Exercises, PT Ultrasound, PT TENS Application, PT Neuro Re-Ed/Balance, PT Therapeutic Activity, PT Manual Therapy and PT Evaluation                         Section completed by: Charmaine Chawla PT, DPT;  Jennifer Dunne, Student

## 2020-02-18 NOTE — PROGRESS NOTES
yohannes RN Moon and [facility] nurse Vandana Carr observed pt's right groin right lower leg incision perfusedly bleeding, staples were just taken out by yohannes RN, unable to stop bleeding, wound appeared dehisced. On call internist Dr Adamaris Hanson gavenverbal order to send pt to [facility] ER.

## 2020-02-18 NOTE — CARE PLAN
Problem: Mobility Transfers  Goal: STG-Within one week, patient will roll  Description: 1) Individualized goal:  To left or right, max A with use of bed features as needed.  2) Interventions:  PT E Stim Attended, PT Orthotics Training, PT Gait Training, PT Self Care/Home Eval, PT Therapeutic Exercises, PT Ultrasound, PT TENS Application, PT Neuro Re-Ed/Balance, PT Therapeutic Activity, PT Manual Therapy and PT Evaluation      Outcome: NOT MET  Note: To be assessed  Goal: STG-Within one week, patient will sit to stand  Description: 1) Individualized goal:  Min A x1 with use of AD/bed rail as needed  2) Interventions: PT E Stim Attended, PT Orthotics Training, PT Gait Training, PT Self Care/Home Eval, PT Therapeutic Exercises, PT Ultrasound, PT TENS Application, PT Neuro Re-Ed/Balance, PT Therapeutic Activity, PT Manual Therapy and PT Evaluation      Outcome: NOT MET  Note: To be assessed  Goal: STG-Within one week, patient will transfer bed to chair  Description: 1) Individualized goal:  Mod A x1 with LRAD  2) Interventions:  PT E Stim Attended, PT Orthotics Training, PT Gait Training, PT Self Care/Home Eval, PT Therapeutic Exercises, PT Ultrasound, PT TENS Application, PT Neuro Re-Ed/Balance, PT Therapeutic Activity, PT Manual Therapy and PT Evaluation      Outcome: NOT MET  Note: To be assessed

## 2020-02-18 NOTE — THERAPY
Occupational Therapy  Daily Treatment     Patient Name: Rey Medina  Age:  24 y.o., Sex:  male  Medical Record #: [id number]  Today's Date: 2020     Precautions  Precautions: Fall Risk, Other (See Comments), PEG Tube  Comments: recent decannulation of tracheostomy, Dys 1 with NTL     Safety   ADL Safety : Requires Physical Assist for Safety  Bathroom Safety: Requires Physical Assist for Safety  Comments: Total assist; see FIMs for additional ADL performance details    Subjective    Patient continues to present non-verbal. Mother present during session.      Objective     20 0831   Precautions   Precautions Fall Risk;Other (See Comments);PEG Tube   Comments recent decannulation of tracheostomy, Dys 1 with NTL    Safety    ADL Safety  Requires Physical Assist for Safety   Bathroom Safety Requires Physical Assist for Safety   Comments Total assist; see FIMs for additional ADL performance details   Neuro-Muscular Treatments   Neuro-Muscular Treatments Vibration;Verbal Cuing;Tactile Cuing   Comments Vibration applied to BUEs,; patient unable to follow instructions of activating each muscle group as vibration was applied    OT Total Time Spent   OT Individual Total Time Spent (Mins) 30   OT Charge Group   OT Self Care / ADL 2       FIM Grooming Score:  1 - Total Assistance  Grooming Description:  Set-up of equipment, Verbal cueing, Supervision for safety(Total assist to wash face with wash cloth. )    FIM Bathing Score:  1 - Total Assistance  Bathing Description:  (Total assist for partial sponge bath in bed (due to agitation /restlessness))    FIM Upper Body Dressin - Not tested, medical condition  Upper Body Dressing Description:       FIM Lower Body Dressing Score:  0 - Not tested, medical condition  Lower Body Dressing Description:     Dimmed lights to facilitate low-stim environment.       Assessment    Patient agitated/ restless during this session. Undersigned therapist attempted to assist  patient with UB/LB dressing 2x however patient presented agitated and resistant to movement (ie digging LEs into bed). Unable to incorporate PROM with vibration due to agitation/restlessness. Total assist for partial sponge bath.     Plan    Low-stim environment,  sensory stimulation, ADLs, sitting balance/standing balance

## 2020-02-18 NOTE — REHAB-NURSING IDT TEAM NOTE
Nursing   Nursing  Diet/Nutrition:  Tube Feed  Medication Administration:  Via Gastric Tube    Fluid Intake/Output in past 24 hours: In: -   Out: 1800   Admit Weight:  Weight: 75 kg (165 lb 5.5 oz)  Weight Last 7 Days   [77.5 kg (170 lb 13.7 oz)] 77.5 kg (170 lb 13.7 oz) (02/16 0500)    Pain Issues:    Location:  --  --         Severity:  Patient non verbal - calm last night   Scheduled pain medications:  None     PRN pain medications used in last 24 hours:  Tramadol - did not seem to comfort pt   Non Pharmacologic Interventions:  emotional support, relaxation and repositioned  Effectiveness of pain management plan:  fair=improved comfort with interventions but does not always meet goal    Bowel:    Bowel Assist Initial Score:  1 - Total Assistance  Bowel Assist Current Score:  1 - Total Assistance  Bowl Accidents in last 7 days:     Last bowel movement: 02/14/20  Stool Description: Soft, Large, Green     Usual bowel pattern:  every other day  Scheduled bowel medications:  polyethylene glycol/lytes (MIRALAX)   PRN bowel medications used in last 24 hours:  None  Nursing Interventions:  Increased time, Scheduled medication, Positioning on commode/toilet, Supervision, Verbal cueing, Set-up, Brief, Requires 2 people to assist  Effectiveness of bowel program:   fair=sometimes needs prn bowel meds for constipation  Bladder:    Bladder Assist Initial Score:  1 - Total Assistance  Bladder Assist Current Score:  1 - Total Assistance  Bladder Accidents in last 7 days:     PVR range for past 24-48 hours: --    Medications affecting bladder:  None      Time void schedule/voiding pattern:  Patient holds urinal to penis all night - most of the day - becomes agitated if you attempt to remove it from his hands  Interventions:  Increased time, Supervision, Verbal cueing, Emptying of device, Urinal, Time void patient initiated, Brief  Effectiveness of bladder training:  Patient urinates in the urinal - however does not communicate  when he urinates, d/t non verbal status it is difficult to assess - bed is frequently wet from spilling urinal.    Wound:         Patient Lines/Drains/Airways Status    Active Current Wounds     Name: Placement date: Placement time: Site: Days:    Wound 02/14/20 neck stoma site  02/14/20   1059   --  3                           Sleep/wake cycle:   Average hours slept:  Restless throughout night  Sleep medication usage:  trazdone    Patient/Family Training/Education:  Aspiration Precautions, General Self Care, Medication Management and Safe Transfers  Discharge Supply Recommendations:  Other: none at this time  Strengths: Other:  none noted at this time   Barriers:   Aphasia expressive, Aphasia receptive, Aspiration risk, Unable to follow instructions, Bladder incontinence, Limited mobility, Poor sleep pattern, Tube feeding and Uncooperative           Long Term Goals:   At discharge patient will be able to function safely at home with support.    Section completed by:  Ritu Rutledge R.N.

## 2020-02-18 NOTE — REHAB-COLLABORATIVE ONGOING IDT TEAM NOTE
Weekly Interdisciplinary Team Conference Note    Weekly Interdisciplinary Team Conference # 2  Date:  2/18/2020    Clinicians present and reporting at team conference include the following:   MD: CHARLENE David MD    RN:  Mere Hart RN   PT:   Charmaine Chawla PT, DPT  OT:  Minnie Luke MS, OTR/L   ST:  Katia Doss MS, CCC-SLP  CM:  Anahi Anderson RN [facility]  REC:  None  RT:  Ana Becker RRT  RPh:  [facility]  Other:   None  All reporting clinicians have a working knowledge of this patient's plan of care.    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     Results     Procedure Component Value Ref Range Date/Time    ESTIMATED GFR [[id number]] Collected:  02/18/20 0546    Order Status:  Completed Lab Status:  Final result Updated:  02/18/20 0707     GFR If African American >60 >60 mL/min/1.73 m 2      GFR If Non African American >60 >60 mL/min/1.73 m 2     MAGNESIUM [[id number]] Collected:  02/18/20 0546    Order Status:  Completed Lab Status:  Final result Updated:  02/18/20 0706    Specimen:  Blood      Magnesium 2.4 1.5 - 2.5 mg/dL     PHOSPHORUS [[id number]] Collected:  02/18/20 0546    Order Status:  Completed Lab Status:  Final result Updated:  02/18/20 0706    Specimen:  Blood      Phosphorus 4.4 2.5 - 4.5 mg/dL     Basic Metabolic Panel [[id number]]  (Abnormal) Collected:  02/18/20 0546    Order Status:  Completed Lab Status:  Final result Updated:  02/18/20 0706    Specimen:  Blood      Sodium 139 135 - 145 mmol/L       Potassium 4.0 3.6 - 5.5 mmol/L      Chloride 104 96 - 112 mmol/L      Co2 21 20 - 33 mmol/L      Glucose 111 65 - 99 mg/dL      Bun 12 8 - 22 mg/dL      Creatinine 0.66 0.50 - 1.40 mg/dL      Calcium 9.2 8.5 - 10.5 mg/dL      Anion Gap 14.0 0.0 - 11.9     CBC WITH DIFFERENTIAL [[id number]]  (Abnormal) Collected:  02/18/20 0548    Order Status:  Completed Lab Status:  Final result Updated:  02/18/20 0640    Specimen:  Blood      WBC 15.6 4.8 - 10.8 K/uL      RBC 5.88 4.70 - 6.10 M/uL      Hemoglobin 16.0 14.0 - 18.0 g/dL      Hematocrit 48.0 42.0 - 52.0 %      MCV 81.6 81.4 - 97.8 fL      MCH 27.2 27.0 - 33.0 pg      MCHC 33.3 33.7 - 35.3 g/dL      RDW 44.4 35.9 - 50.0 fL      Platelet Count 288 164 - 446 K/uL      MPV 11.4 9.0 - 12.9 fL      Neutrophils-Polys 76.10 44.00 - 72.00 %      Lymphocytes 15.70 22.00 - 41.00 %      Monocytes 6.00 0.00 - 13.40 %      Eosinophils 1.00 0.00 - 6.90 %      Basophils 0.80 0.00 - 1.80 %      Immature Granulocytes 0.40 0.00 - 0.90 %      Nucleated RBC 0.00 /100 WBC      Neutrophils (Absolute) 11.85 1.82 - 7.42 K/uL      Comment: Includes immature neutrophils, if present.        Lymphs (Absolute) 2.45 1.00 - 4.80 K/uL      Monos (Absolute) 0.94 0.00 - 0.85 K/uL      Eos (Absolute) 0.16 0.00 - 0.51 K/uL      Baso (Absolute) 0.12 0.00 - 0.12 K/uL      Immature Granulocytes (abs) 0.06 0.00 - 0.11 K/uL      NRBC (Absolute) 0.00 K/uL         Nursing  Diet/Nutrition:  Tube Feed  Medication Administration:  Via Gastric Tube    Fluid Intake/Output in past 24 hours: In: -   Out: 1800   Admit Weight:  Weight: 75 kg (165 lb 5.5 oz)  Weight Last 7 Days   [77.5 kg (170 lb 13.7 oz)] 77.5 kg (170 lb 13.7 oz) (02/16 0500)    Pain Issues:    Location:  --  --         Severity:  Patient non verbal - calm last night   Scheduled pain medications:  None     PRN pain medications used in last 24 hours:  Tramadol - did not seem to comfort pt   Non Pharmacologic Interventions:  emotional support, relaxation  and repositioned  Effectiveness of pain management plan:  fair=improved comfort with interventions but does not always meet goal    Bowel:    Bowel Assist Initial Score:  1 - Total Assistance  Bowel Assist Current Score:  1 - Total Assistance  Bowl Accidents in last 7 days:     Last bowel movement: 02/14/20  Stool Description: Soft, Large, Green     Usual bowel pattern:  every other day  Scheduled bowel medications:  polyethylene glycol/lytes (MIRALAX)   PRN bowel medications used in last 24 hours:  None  Nursing Interventions:  Increased time, Scheduled medication, Positioning on commode/toilet, Supervision, Verbal cueing, Set-up, Brief, Requires 2 people to assist  Effectiveness of bowel program:   fair=sometimes needs prn bowel meds for constipation  Bladder:    Bladder Assist Initial Score:  1 - Total Assistance  Bladder Assist Current Score:  1 - Total Assistance  Bladder Accidents in last 7 days:     PVR range for past 24-48 hours: --    Medications affecting bladder:  None      Time void schedule/voiding pattern:  Patient holds urinal to penis all night - most of the day - becomes agitated if you attempt to remove it from his hands  Interventions:  Increased time, Supervision, Verbal cueing, Emptying of device, Urinal, Time void patient initiated, Brief  Effectiveness of bladder training:  Patient urinates in the urinal - however does not communicate when he urinates, d/t non verbal status it is difficult to assess - bed is frequently wet from spilling urinal.    Wound:         Patient Lines/Drains/Airways Status    Active Current Wounds     Name: Placement date: Placement time: Site: Days:    Wound 02/14/20 neck stoma site  02/14/20   1059   --  3                           Sleep/wake cycle:   Average hours slept:  Restless throughout night  Sleep medication usage:  trazdone    Patient/Family Training/Education:  Aspiration Precautions, General Self Care, Medication Management and Safe Transfers  Discharge  Supply Recommendations:  Other: none at this time  Strengths: Other:  none noted at this time   Barriers:   Aphasia expressive, Aphasia receptive, Aspiration risk, Unable to follow instructions, Bladder incontinence, Limited mobility, Poor sleep pattern, Tube feeding and Uncooperative           Long Term Goals:   At discharge patient will be able to function safely at home with support.    Section completed by:  Ritu Rutledge R.N.        Respiratory Therapy    Pt is stable on RA with SATS in the high 90s.  Pt cough produces no discharge from trach stoma.  When gauze changed, it is clean and dry.  Small airleak still present.    Section completed by:  Ana Becker, RRT    Mobility  Bed mobility:   1- Total Assistance  Bed /Chair/Wheelchair Transfer Initial:  1 - Total Assistance  Bed /Chair/Wheelchair Transfer Current:  1 - Total Assistance   Bed/Chair/Wheelchair Transfer Description:  Increased time, Set-up of equipment, Requires 2 people to assist (WC <> seated EOM and WC > supine in bed, max A x2 squat pivot method )  Walk Initial:  0 - Not tested,unsafe activity  Walk Current:  1 - Total Assistance   Walk Description:  Two hand rails, Verbal cueing, Extra time, Requires wheelchair follow (2 ft in // bars with wc follow during standing reps, vc for sequecing of stepping)  Wheelchair Initial:  0 - Not tested,unsafe activity  Wheelchair Current:  1 - Total Assistance   Wheelchair Description:     Stairs Initial:  0 - Not tested,unsafe activity  Stairs Current: 0 - Not tested,unsafe activity   Stairs Description:    Patient/Family Training/Education:  Ongoing pt and family training. Performed transfer training with mother and reviewed safety.  DME/DC Recommendations:  TBD  Strengths:  Adequate strength and Supportive family   Barriers:   Agitation, Limited mobility, Poor activity tolerance, Poor balance and Other: Inconsistent ability to follow single step commands, UE weakness, non-verbal, increasing  agitation  # of short term goals set= 3  # of short term goals met= 0  Physical Therapy Problems     Problem: Mobility Transfers     Dates: Start: 02/06/20       Description:     Goal: STG-Within one week, patient will roll     Dates: Start: 02/06/20       Description: 1) Individualized goal:  To left or right, max A with use of bed features as needed.  2) Interventions:  PT E Stim Attended, PT Orthotics Training, PT Gait Training, PT Self Care/Home Eval, PT Therapeutic Exercises, PT Ultrasound, PT TENS Application, PT Neuro Re-Ed/Balance, PT Therapeutic Activity, PT Manual Therapy and PT Evaluation        Note:     Goal Note filed on 02/18/20 1139 by Jennifer Dunne, Student    To be assessed                  Goal: STG-Within one week, patient will sit to stand     Dates: Start: 02/06/20       Description: 1) Individualized goal:  Min A x1 with use of AD/bed rail as needed  2) Interventions: PT E Stim Attended, PT Orthotics Training, PT Gait Training, PT Self Care/Home Eval, PT Therapeutic Exercises, PT Ultrasound, PT TENS Application, PT Neuro Re-Ed/Balance, PT Therapeutic Activity, PT Manual Therapy and PT Evaluation        Note:     Goal Note filed on 02/18/20 1139 by Jennifer Dunne, Student    To be assessed                  Goal: STG-Within one week, patient will transfer bed to chair     Dates: Start: 02/06/20       Description: 1) Individualized goal:  Mod A x1 with LRAD  2) Interventions:  PT E Stim Attended, PT Orthotics Training, PT Gait Training, PT Self Care/Home Eval, PT Therapeutic Exercises, PT Ultrasound, PT TENS Application, PT Neuro Re-Ed/Balance, PT Therapeutic Activity, PT Manual Therapy and PT Evaluation        Note:     Goal Note filed on 02/18/20 1139 by Jennifer Dunne, Student    To be assessed                        Problem: PT-Long Term Goals     Dates: Start: 02/06/20       Description:     Goal: LTG-By discharge, patient will maintain balance     Dates: Start: 02/06/20        Description: 1) Individualized goal:  In sitting with use of UE for assist at a SPV level, 5 min   2) Interventions:  PT E Stim Attended, PT Orthotics Training, PT Gait Training, PT Self Care/Home Eval, PT Therapeutic Exercises, PT Ultrasound, PT TENS Application, PT Neuro Re-Ed/Balance, PT Therapeutic Activity, PT Manual Therapy and PT Evaluation              Goal: LTG-By discharge, patient will tolerate standing     Dates: Start: 02/06/20       Description: 1) Individualized goal:  2 min, SBA with use of LRAD  2) Interventions:  PT E Stim Attended, PT Orthotics Training, PT Gait Training, PT Self Care/Home Eval, PT Therapeutic Exercises, PT Ultrasound, PT TENS Application, PT Neuro Re-Ed/Balance, PT Therapeutic Activity, PT Manual Therapy and PT Evaluation            Goal: LTG-By discharge, patient will ambulate     Dates: Start: 02/06/20       Description: 1) Individualized goal:  10 ft, mod A with LRAD  2) Interventions:  PT E Stim Attended, PT Orthotics Training, PT Gait Training, PT Self Care/Home Eval, PT Therapeutic Exercises, PT Ultrasound, PT TENS Application, PT Neuro Re-Ed/Balance, PT Therapeutic Activity, PT Manual Therapy and PT Evaluation             Goal: LTG-By discharge, patient will transfer one surface to another     Dates: Start: 02/06/20       Description: 1) Individualized goal:  Min A with LRAD  2) Interventions:  PT E Stim Attended, PT Orthotics Training, PT Gait Training, PT Self Care/Home Eval, PT Therapeutic Exercises, PT Ultrasound, PT TENS Application, PT Neuro Re-Ed/Balance, PT Therapeutic Activity, PT Manual Therapy and PT Evaluation                         Section completed by: Charmaine Chawla PT, DPT;  Jennifer Dunne, Student    Activities of Daily Living  Eating Initial:  1 - Total Assistance  Eating Current:  1 - Total Assistance   Eating Description:  Set-up of equipment or meal/tube feeding, Tube feed bolus  Grooming Initial:  1 - Total Assistance  Grooming Current:  1 - Total  Assistance   Grooming Description:  Set-up of equipment, Verbal cueing, Supervision for safety(Totala assist to wash face with wash cloth. )  Bathing Initial:  0 - Not tested, medical condition  Bathing Current:  1 - Total Assistance   Bathing Description:  (Total assist for partial sponge bath in bed (due to agitation /restlessness))  Upper Body Dressing Initial:  1 - Total Assistance  Upper Body Dressing Current:  0 - Not tested, medical condition   Upper Body Dressing Description:  Increased time, Set-up of equipment, Initial preparation for task, Verbal cueing(Patient required total assist to don t-shirt and undershirt  while seated in w/c)  Lower Body Dressing Initial:  1 - Total Assistance  Lower Body Dressing Current:  0 - Not tested, medical condition   Lower Body Dressing Description:  0 - Not tested, medical condition  Toileting Initial:  1 - Total Assistance  Toileting Current:  1 - Total Assistance   Toileting Description:  Increased time, Supervision for safety, Set-up of equipment  Toilet Transfer Initial:     Toilet Transfer Current:      Toilet Transfer Description:     Tub / Shower Transfer Initial:  0 - Not tested,unsafe activity  Tub / Shower Transfer Current:  0 - Not tested,unsafe activity   Tub / Shower Transfer Description:     IADL:  NA at this time  Family Training/Education:  Ongoing with mother   DME/DC Recommendations:  TBD     Strengths:  Supportive family  Barriers:  Other: [place] Level 3, Agitation/restlessness, unable to follow directions     # of short term goals set= 1  # of short term goals met= 0    Occupational Therapy Goals     Problem: Dressing     Dates: Start: 02/06/20       Description:     Goal: STG-Within one week, patient will dress UB     Dates: Start: 02/06/20       Description: 1) Individualized Goal:  Max assist   2) Interventions:  OT Group Therapy, OT Self Care/ADL, OT Cognitive Skill Dev, OT Manual Ther Technique, OT Neuro Re-Ed/Balance, OT Sensory Int Techniques,  "OT Therapeutic Activity, OT Evaluation and OT Therapeutic Exercise                 Problem: Functional Cognition     Dates: Start: 02/06/20       Description:           Problem: OT Long Term Goals     Dates: Start: 02/06/20       Description:     Goal: LTG-By discharge, patient will complete basic self care tasks     Dates: Start: 02/06/20       Description: 1) Individualized Goal:  Mod assist with AE as needed  2) Interventions:  OT Group Therapy, OT Self Care/ADL, OT Cognitive Skill Dev, OT Manual Ther Technique, OT Neuro Re-Ed/Balance, OT Sensory Int Techniques, OT Therapeutic Activity, OT Evaluation and OT Therapeutic Exercise           Goal: LTG-By discharge, patient will perform bathroom transfers     Dates: Start: 02/06/20       Description: 1) Individualized Goal:  Mod assist with AE as needed    2) Interventions:  OT Group Therapy, OT Self Care/ADL, OT Cognitive Skill Dev, OT Manual Ther Technique, OT Neuro Re-Ed/Balance, OT Sensory Int Techniques, OT Therapeutic Activity, OT Evaluation and OT Therapeutic Exercise                       Section completed by:  Minnie Luke MS,OTR/L    Cognitive Linquistic Functions  Comprehension Initial:  2 - Max Assistance  Comprehension Current:  2 - Max Assistance   Comprehension Description:  Verbal cues  Expression Initial:  1 - Total Assistance  Expression Current:  1 - Total Assistance   Expression Description:  (squeezes hand for \"yes\" response)  Social Interaction Initial:  2 - Max Assistance  Social Interaction Current:  2 - Max Assistance   Social Interaction Description:  Increased time, Verbal cues  Problem Solving Initial:  1 - Total Assistance  Problem Solving Current:  1 - Total Assistance   Problem Solving Description:     Memory Initial:  1 - Total Assistance  Memory Current:  1 - Total Assistance   Memory Description:     Executive Functioning / Organization Initial:     Executive Functioning / Organization Current:      Executive Functioning Desciption:  " Total A  Swallowing  Swallowing Status:  NPO  Orders Placed This Encounter   Procedures   • Diet NPO     Standing Status:   Standing     Number of Occurrences:   1     Order Specific Question:   Restrict to:     Answer:   Strict [1]     Behavior Modification Plan  Keep the environment simple to avoid over stimulatiom/agitation, Allow for rest time and Keep instructions simple/concrete  Assistive Technology  Other: Patient not utilizing assistive technology for communication at this time.   Family Training/Education:  Ongoing with mother  DC Recommendations:   Ongoing speech therapy post discharge  Strengths:  Supportive family  Barriers:  Agitation, Aspiration risk, Unable to follow instructions and Impulsive  # of short term goals set=2  # of short term goals met=0  Speech Therapy Problems     Problem: Comprehension STGs     Dates: Start: 02/06/20       Description:     Goal: STG-Within one week, patient will     Dates: Start: 02/06/20       Description: 1) Individualized goal:  Answer personal y/n questions with use of hand squeeze with 75% accuracy, given mod verbal cues.   2) Interventions:  SLP Speech Language Treatment, SLP Self Care / ADL Training , SLP Cognitive Skill Development and SLP Group Treatment                   Problem: Expression STGs     Dates: Start: 02/11/20       Description:     Goal: STG-Within one week, patient will     Dates: Start: 02/11/20       Description: 1) Individualized goal:  Imitate vowel sounds on 50% of attempts.   2) Interventions:  SLP Speech Language Treatment, SLP Swallowing Dysfunction Treatment, SLP Oral Pharyngeal Evaluation, SLP Prosthesis Checkout, SLP Self Care / ADL Training , SLP Cognitive Skill Development and SLP Group Treatment                   Problem: Speech/Swallowing LTGs     Dates: Start: 02/06/20       Description:     Goal: LTG-By discharge, patient will safely swallow     Dates: Start: 02/06/20       Description: 1) Individualized goal:  Dysphagia I/NTL  diet without clinical s/sx of aspiration  2) Interventions:  SLP Speech Language Treatment, SLP Swallowing Dysfunction Treatment, SLP Oral Pharyngeal Evaluation, SLP Video Swallow Evaluation, SLP Self Care / ADL Training , SLP Cognitive Skill Development and SLP Group Treatment             Goal: LTG-By discharge, patient will     Dates: Start: 02/06/20       Description:                        Section completed by:  Katia Doss MS,CCC-SLP       Nutrition  Dietary Problems     Problem: Other Problem (see comments)     Description:     Goal: Other Goal (Resolved)     Description: Nutrition Support tolerated and meeting greater than 85% of estimated needs.                     Patient now NPO due to possible aspiration PNA. Pt had episode of emesis after eating large meal and given TF bolus.  Patient on 2/14 was being evaluated by RT and found to be coughing.  Unfortunately he also coughed up some TF.  Pt is now S/p IVF which are being discontinued today ( initiated 2/15).  Patient on IV Unasyn for antibiotics.   Per chart, patient's mother was refusing free water flushes on 2/13 s/t worrying about bloating/ abdominal distention.   Pt has not been on any type of bowel regimen since admission per review of chart.  Apparently, he responds well to Miralax which was added today.  His lack of BMs could be contributing to his delayed gastric emptying and frequent coughing/ emesis of TF after feedings.  Patient is now missing therapy due to his agitation/ restlessness.      Diet; now NPO  TF: [employer] 325mL QID - at meal times + HS; 240mL free water flush q 4 hours is ordered currently  TF+ Free water= 1300 kcals, 64 grams/protein, 988mL free water + flush= 2428mL free water per day    Pertinent Labs: 2/16: WBC 12.8  Pertinent Medications: Omeprazole, Miralax, Simethicone, Trazodone, Vitamin D, Unasyn IV, Baclofen, Corlanor, Keppra, Metoprolol, Singulair  PRN Medications: noted  IVF discontinued today    Weight:  77.5kg- taken via bedscale up 2.5kg since admission but close to usual bodyweight; could be slight inaccuracy due to scale itself  Skin: CDI  Yellow UOP  GI: last BM was 2/14  I/Os: -1500mL x 24 hours with no input recorded   Vitals: tachcyardia noted, RA, /98    Plan: Consider adding 4 ounces of prune juice to daily TF regimen for bowel regularity.  Pt notably non-ambulatory which can delay gastric emptying.  Patient's lack of consistent BMs is likely the reasoning for most of his emesis issues.  Going to adjust TF back to old regimen of [employer] 325mL, 5 times per day + 1 scoop of Beneprotein 4 times per day as patient now NPO.  Going to adjust free water to 150mL q 4 hours to decrease overall volume intake.  This does not count the 30mL free water flushes patient receives before and after feedings, medications, and intermittently for various reasons.  TF+ Free water+ Beneprotein= 1725 kcals, 104 grams/protein, 1235mL free water + flush= 1835mL free water per day.            Section completed by:  Magdalena Jiang R.D.    REHAB-Pharmacy IDT Team Note by Nemesio Chris RP at 2/17/2020  5:49 PM  Version 1 of 1    Author:  Nemesio Chris RPH Service:  -- Author Type:  Pharmacist    Filed:  2/17/2020  5:50 PM Date of Service:  2/17/2020  5:49 PM Status:  Signed    :  Nemesio Chris RPH (Pharmacist)         Pharmacy   Pharmacy  Antibiotics/Antifungals/Antivirals:  Medication:      Active Orders (From admission, onward)    Ordered     Ordering Provider       Sat Feb 15, 2020  3:38 PM    02/15/20 1538  ampicillin/sulbactam (UNASYN) 3 g in  mL IVPB  EVERY 6 HOURS      Slava Negro M.D.        Route:         IV  Stop Date:  02/22/20  Reason: Sepsis  Antihypertensives/Cardiac:  Medication:    Orders (72h ago, onward)     Start     Ordered    02/15/20 1600  metoprolol (LOPRESSOR) tablet 25 mg  TWICE DAILY      02/15/20 1538    02/05/20 1200  ivabradine (CORLANOR) tablet 5 mg  DAILY,   Status:   Discontinued      02/05/20 1141    02/05/20 1141  hydrALAZINE (APRESOLINE) tablet 25 mg  EVERY 8 HOURS PRN      02/05/20 1141              Patient Vitals for the past 24 hrs:   BP Pulse   02/17/20 1530 140/91 (!) 114   02/17/20 1105 -- (!) 112   02/17/20 1008 141/98 (!) 117   02/17/20 0551 146/86 (!) 112   02/17/20 0246 -- (!) 104   02/17/20 0207 138/79 (!) 135   02/16/20 1800 132/88 (!) 118     Anticoagulation:  Medication: None                                 .    Other key medications: A review of the medication list reveals no issues at this time. Patient is currently on antihypertensive(s). Recommend home blood pressure monitoring/recording if antihypertensive(s) regimen(s) continue.    Section completed by: Nemesio Chris [facility][AW.1]     Attribution Key     AW.1 - Nemesio Chris [facility] on 2/17/2020  5:49 PM                  DC Planning  DC destination/dispostion:  Patient lives with his mother in a single level apartment.     Referrals: Medicaid PCS services.     DC Needs: Patient has been decannulated.  He has a peg tube.  He has only a hospital bed and transfer chair at home.  He will likely need a tub transfer bench and possible other dme.  He will likely need home health therapy and PCS services.      Barriers to discharge:   Limited family members in the area.     Strengths: mother is proficient in his care.     Section completed by:  Anahi Anderson R.N.         Physician Summary  CHARLENE David MD  participated and led team conference discussion.

## 2020-02-18 NOTE — THERAPY
Physical Therapy   Daily Treatment     Patient Name: Rey Medina  Age:  24 y.o., Sex:  male  Medical Record #: [id number]  Today's Date: 2/18/2020     Precautions  Precautions: (P) Fall Risk, Other (See Comments), PEG Tube  Comments: (P) recent decannulation of tracheostomy, Dys 1 with NTL     Subjective    Pt sleeping in bed at start of therapy, difficult to arouse. Mother reporting he was agitated this morning but he gets slightly better without a lot of people coming in and out of the room.     Objective       02/18/20 1101   Precautions   Precautions Fall Risk;Other (See Comments);PEG Tube   Comments recent decannulation of tracheostomy, Dys 1 with NTL    Cognition    Level of Consciousness Agitated   Supine Lower Body Exercise   Other Exercises Passive ROM stretching of BLE: Dorsiflexors, hamstrings, hip IR, hip ER  (1 x 20-30 sec in supine)   Bed Mobility    Scooting Total Assist X 2   Interdisciplinary Plan of Care Collaboration   IDT Collaboration with  Family / Caregiver;Nursing   Patient Position at End of Therapy In Bed;Call Light within Reach;Tray Table within Reach;Family / Friend in Room   Collaboration Comments Mother present during therapy session; RN checked in on CLOF during passive stretching     Attempted to seat pt at EOB, however, unable to perform 2/2 pt agitation.   Pt non-responsive when asked to squeeze therapist's hand in response to yes/no questions.     Assessment    Pt agitated and non-responsive to all 1-step commands this session. Pt tolerated BLE PROM and stretching, however became more agitated throughout the session.     Plan    Transfer training, LE ROM / Stretching, standing tolerance and posture, sit <> stand from EOB or // bars, mobility as pt tolerates.

## 2020-02-19 ENCOUNTER — APPOINTMENT (OUTPATIENT)
Dept: RADIOLOGY | Facility: REHABILITATION | Age: 25
DRG: 056 | End: 2020-02-19
Attending: HOSPITALIST
Payer: MEDICAID

## 2020-02-19 LAB
CK SERPL-CCNC: 114 U/L (ref 0–154)
ERYTHROCYTE [DISTWIDTH] IN BLOOD BY AUTOMATED COUNT: 44.5 FL (ref 35.9–50)
HCT VFR BLD AUTO: 50.1 % (ref 42–52)
HGB BLD-MCNC: 16.5 G/DL (ref 14–18)
MCH RBC QN AUTO: 27 PG (ref 27–33)
MCHC RBC AUTO-ENTMCNC: 32.9 G/DL (ref 33.7–35.3)
MCV RBC AUTO: 82 FL (ref 81.4–97.8)
PLATELET # BLD AUTO: 297 K/UL (ref 164–446)
PMV BLD AUTO: 11.1 FL (ref 9–12.9)
RBC # BLD AUTO: 6.11 M/UL (ref 4.7–6.1)
WBC # BLD AUTO: 12 K/UL (ref 4.8–10.8)

## 2020-02-19 PROCEDURE — 700101 HCHG RX REV CODE 250: Performed by: PHYSICAL MEDICINE & REHABILITATION

## 2020-02-19 PROCEDURE — 97535 SELF CARE MNGMENT TRAINING: CPT

## 2020-02-19 PROCEDURE — 92507 TX SP LANG VOICE COMM INDIV: CPT

## 2020-02-19 PROCEDURE — A9270 NON-COVERED ITEM OR SERVICE: HCPCS | Performed by: HOSPITALIST

## 2020-02-19 PROCEDURE — 700102 HCHG RX REV CODE 250 W/ 637 OVERRIDE(OP): Performed by: PHYSICAL MEDICINE & REHABILITATION

## 2020-02-19 PROCEDURE — 82550 ASSAY OF CK (CPK): CPT

## 2020-02-19 PROCEDURE — 700102 HCHG RX REV CODE 250 W/ 637 OVERRIDE(OP): Performed by: HOSPITALIST

## 2020-02-19 PROCEDURE — 770010 HCHG ROOM/CARE - REHAB SEMI PRIVAT*

## 2020-02-19 PROCEDURE — 97112 NEUROMUSCULAR REEDUCATION: CPT

## 2020-02-19 PROCEDURE — 97110 THERAPEUTIC EXERCISES: CPT

## 2020-02-19 PROCEDURE — 99232 SBSQ HOSP IP/OBS MODERATE 35: CPT | Performed by: PHYSICAL MEDICINE & REHABILITATION

## 2020-02-19 PROCEDURE — 92526 ORAL FUNCTION THERAPY: CPT

## 2020-02-19 PROCEDURE — A9270 NON-COVERED ITEM OR SERVICE: HCPCS | Performed by: PHYSICAL MEDICINE & REHABILITATION

## 2020-02-19 PROCEDURE — 700111 HCHG RX REV CODE 636 W/ 250 OVERRIDE (IP): Performed by: HOSPITALIST

## 2020-02-19 PROCEDURE — 85027 COMPLETE CBC AUTOMATED: CPT

## 2020-02-19 PROCEDURE — 36415 COLL VENOUS BLD VENIPUNCTURE: CPT

## 2020-02-19 PROCEDURE — 71045 X-RAY EXAM CHEST 1 VIEW: CPT

## 2020-02-19 PROCEDURE — 97530 THERAPEUTIC ACTIVITIES: CPT

## 2020-02-19 PROCEDURE — 700105 HCHG RX REV CODE 258: Performed by: HOSPITALIST

## 2020-02-19 PROCEDURE — 99233 SBSQ HOSP IP/OBS HIGH 50: CPT | Performed by: HOSPITALIST

## 2020-02-19 RX ADMIN — PROPRANOLOL HYDROCHLORIDE 10 MG: 10 TABLET ORAL at 20:55

## 2020-02-19 RX ADMIN — LEVETIRACETAM 500 MG: 500 SOLUTION ORAL at 08:38

## 2020-02-19 RX ADMIN — BACLOFEN 5 MG: 10 TABLET ORAL at 08:39

## 2020-02-19 RX ADMIN — LINEZOLID 600 MG: 600 TABLET, FILM COATED ORAL at 08:39

## 2020-02-19 RX ADMIN — MONTELUKAST 10 MG: 10 TABLET, FILM COATED ORAL at 20:55

## 2020-02-19 RX ADMIN — SIMETHICONE CHEW TAB 80 MG 120 MG: 80 TABLET ORAL at 08:39

## 2020-02-19 RX ADMIN — IPRATROPIUM BROMIDE AND ALBUTEROL SULFATE 3 ML: .5; 3 SOLUTION RESPIRATORY (INHALATION) at 02:08

## 2020-02-19 RX ADMIN — MELATONIN 1000 UNITS: at 08:39

## 2020-02-19 RX ADMIN — LINEZOLID 600 MG: 600 TABLET, FILM COATED ORAL at 20:54

## 2020-02-19 RX ADMIN — PIPERACILLIN AND TAZOBACTAM 3.38 G: 3; .375 INJECTION, POWDER, LYOPHILIZED, FOR SOLUTION INTRAVENOUS; PARENTERAL at 05:49

## 2020-02-19 RX ADMIN — LEVETIRACETAM 500 MG: 500 SOLUTION ORAL at 20:53

## 2020-02-19 RX ADMIN — PROPRANOLOL HYDROCHLORIDE 10 MG: 10 TABLET ORAL at 08:39

## 2020-02-19 RX ADMIN — BACLOFEN 5 MG: 10 TABLET ORAL at 14:56

## 2020-02-19 RX ADMIN — SIMETHICONE CHEW TAB 80 MG 120 MG: 80 TABLET ORAL at 14:56

## 2020-02-19 RX ADMIN — SIMETHICONE CHEW TAB 80 MG 120 MG: 80 TABLET ORAL at 20:53

## 2020-02-19 RX ADMIN — OMEPRAZOLE 40 MG: KIT at 08:39

## 2020-02-19 RX ADMIN — PIPERACILLIN AND TAZOBACTAM 3.38 G: 3; .375 INJECTION, POWDER, LYOPHILIZED, FOR SOLUTION INTRAVENOUS; PARENTERAL at 17:51

## 2020-02-19 RX ADMIN — BACLOFEN 5 MG: 10 TABLET ORAL at 20:54

## 2020-02-19 RX ADMIN — TRAZODONE HYDROCHLORIDE 50 MG: 50 TABLET ORAL at 20:54

## 2020-02-19 RX ADMIN — PIPERACILLIN AND TAZOBACTAM 3.38 G: 3; .375 INJECTION, POWDER, LYOPHILIZED, FOR SOLUTION INTRAVENOUS; PARENTERAL at 11:33

## 2020-02-19 RX ADMIN — POLYETHYLENE GLYCOL 3350 1 PACKET: 17 POWDER, FOR SOLUTION ORAL at 17:50

## 2020-02-19 RX ADMIN — PROPRANOLOL HYDROCHLORIDE 10 MG: 10 TABLET ORAL at 14:56

## 2020-02-19 NOTE — CARE PLAN
Problem: Communication  Goal: The ability to communicate needs accurately and effectively will improve  Note: This writer attempts conversation with patient when providing care asking him questions with one word answers. Patient non verbal today. Mother encouraged to ask patients simple questions while providing care to encourage speech.     Problem: Safety  Goal: Will remain free from injury  Note: Mother encouraged to call when changing patient to minimize the potential for injury. Mother verbalizes understanding.

## 2020-02-19 NOTE — PROGRESS NOTES
Hospital Medicine Daily Progress Note      Chief Complaint:  Fever  Tachycardia  Leukocytosis    Interval History:  Jerky movements improving.  Tmax 98.8.    Review of Systems  Review of Systems   Unable to perform ROS: Medical condition        Physical Exam  Temp:  [36.4 °C (97.5 °F)-37.1 °C (98.8 °F)] 36.4 °C (97.5 °F)  Pulse:  [] 102  Resp:  [18-22] 20  BP: (112-127)/(74-85) 127/85  SpO2:  [96 %] 96 %    Physical Exam  Vitals signs reviewed.   Constitutional:       Appearance: He is not toxic-appearing or diaphoretic.   HENT:      Right Ear: External ear normal.      Left Ear: External ear normal.      Nose: Nose normal.   Eyes:      General:         Right eye: No discharge.         Left eye: No discharge.      Extraocular Movements: Extraocular movements intact.      Conjunctiva/sclera: Conjunctivae normal.   Neck:      Musculoskeletal: Normal range of motion and neck supple.   Cardiovascular:      Rate and Rhythm: Regular rhythm. Tachycardia present.   Pulmonary:      Effort: No respiratory distress.      Breath sounds: No wheezing.      Comments: Decreased BS  Abdominal:      General: Bowel sounds are normal. There is no distension.      Palpations: Abdomen is soft.      Tenderness: There is no abdominal tenderness.      Comments: PEG site dressed   Musculoskeletal:      Right lower leg: No edema.      Left lower leg: No edema.   Skin:     General: Skin is warm and dry.   Neurological:      Comments: Awake but unresponsive         Fluids    Intake/Output Summary (Last 24 hours) at 2/19/2020 1554  Last data filed at 2/19/2020 1511  Gross per 24 hour   Intake 3105 ml   Output 0 ml   Net 3105 ml       Laboratory  Recent Labs     02/18/20  0546 02/19/20  0609   WBC 15.6* 12.0*   RBC 5.88 6.11*   HEMOGLOBIN 16.0 16.5   HEMATOCRIT 48.0 50.1   MCV 81.6 82.0   MCH 27.2 27.0   MCHC 33.3* 32.9*   RDW 44.4 44.5   PLATELETCT 288 297   MPV 11.4 11.1     Recent Labs     02/18/20  0546   SODIUM 139   POTASSIUM 4.0    CHLORIDE 104   CO2 21   GLUCOSE 111*   BUN 12   CREATININE 0.66   CALCIUM 9.2                 Assessment/Plan  * Intracerebral hemorrhage, intraventricular (HCC)- (present on admission)  Assessment & Plan  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, s/p AVM repair  Also had hydrocephalus, s/p  shunt  On Keppra    Tachycardia  Assessment & Plan  Suspect 2/2 agitation and possible infxn  Continue Propranol for HR control    Leukocytosis  Assessment & Plan  UA 0-2 WBC  BCx x 2 NGTD  CXR +basilar opacities, possible PNA  Elevated WBC improved on Zosyn and Zyvox  Vancomycin allergy noted  Check F/U CXR    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

## 2020-02-19 NOTE — PROGRESS NOTES
Patient care assumed. Report received from [facility] COIR Chaney. Patient is alert and calm, resting in bed. Call light and bedside table within reach. Will continue to monitor.

## 2020-02-19 NOTE — THERAPY
Physical Therapy   Daily Treatment     Patient Name: Rey Medina  Age:  24 y.o., Sex:  male  Medical Record #: [id number]  Today's Date: 2/19/2020     Precautions  Precautions: (P) Fall Risk, PEG Tube, Other (See Comments)  Comments: (P) recent decannulation of tracheostomy    Subjective    Pt in bed, difficult to arouse, however able to intermittently open eyes. Pt agitated and unwilling to release urinal for therapy.     Objective       02/19/20 1101   Precautions   Precautions Fall Risk;PEG Tube;Other (See Comments)   Comments recent decannulation of tracheostomy   Cognition    Level of Consciousness Agitated   Interdisciplinary Plan of Care Collaboration   IDT Collaboration with  Speech Therapist   Patient Position at End of Therapy In Bed;Call Light within Reach;Other (Comments)   Collaboration Comments Co-treatment with speech therapist in attempt to facilitate participation     Able to get pt to hold foam cylinder, however, pt's agitation increased significantly with attempts to remove urinal. Pt withdrew his LE with attempted PROM and stretching and had multiple bouts of kicking with his leg if the other was lightly touched. Use of lemon for olfactory stimulation and wet cloth on lips to attempt to calm.     Assessment    Pt agitated and unwilling to participate this session. Increased agitation with attempted PROM and stretching activities and no calming strategies were successful.     Plan    Attempt PROM, LE stretching, bed mobility, decreasing agitation as able.

## 2020-02-19 NOTE — PROGRESS NOTES
"Rehab Progress Note     Encounter Date: 2/19/2020    CC: [facility], AMS    Interval Events (Subjective)  Patient sitting up in room. He is much more calm this morning with occasional grunting. Per mom they just checked a CXR for follow-up on possible pneumonia. She reports less air leak through stoma. She thinks the Seroquel is making him too tired for therapy. She would like to have the Seroquel PRN and trial half dose of Amantadine. Denies SOB. Denies NVD     ROS: Cannot participate in conversation. No grimacing.     IDT Team Meeting 2/18/2020  DC/Disposition:  2/26/20    Objective:  VITAL SIGNS: /85   Pulse (!) 102   Temp 36.4 °C (97.5 °F) (Temporal)   Resp 20   Ht 1.702 m (5' 7\")   Wt 77.5 kg (170 lb 13.7 oz)   SpO2 96%   BMI 26.76 kg/m²   Gen: NAD  Psych: Mood and affect appropriate  CV: RRR, no edema  Resp: CTAB, no upper airway sounds  Abd: NTND  Neuro: does follow some commands like deep breathing, does not open eyes  Skin: Stoma without erythema or drainage, < 0.5 cm  Unchanged from 2/18/20     Recent Results (from the past 72 hour(s))   Basic Metabolic Panel    Collection Time: 02/18/20  5:46 AM   Result Value Ref Range    Sodium 139 135 - 145 mmol/L    Potassium 4.0 3.6 - 5.5 mmol/L    Chloride 104 96 - 112 mmol/L    Co2 21 20 - 33 mmol/L    Glucose 111 (H) 65 - 99 mg/dL    Bun 12 8 - 22 mg/dL    Creatinine 0.66 0.50 - 1.40 mg/dL    Calcium 9.2 8.5 - 10.5 mg/dL    Anion Gap 14.0 (H) 0.0 - 11.9   CBC WITH DIFFERENTIAL    Collection Time: 02/18/20  5:46 AM   Result Value Ref Range    WBC 15.6 (H) 4.8 - 10.8 K/uL    RBC 5.88 4.70 - 6.10 M/uL    Hemoglobin 16.0 14.0 - 18.0 g/dL    Hematocrit 48.0 42.0 - 52.0 %    MCV 81.6 81.4 - 97.8 fL    MCH 27.2 27.0 - 33.0 pg    MCHC 33.3 (L) 33.7 - 35.3 g/dL    RDW 44.4 35.9 - 50.0 fL    Platelet Count 288 164 - 446 K/uL    MPV 11.4 9.0 - 12.9 fL    Neutrophils-Polys 76.10 (H) 44.00 - 72.00 %    Lymphocytes 15.70 (L) 22.00 - 41.00 %    Monocytes 6.00 0.00 - 13.40 " %    Eosinophils 1.00 0.00 - 6.90 %    Basophils 0.80 0.00 - 1.80 %    Immature Granulocytes 0.40 0.00 - 0.90 %    Nucleated RBC 0.00 /100 WBC    Neutrophils (Absolute) 11.85 (H) 1.82 - 7.42 K/uL    Lymphs (Absolute) 2.45 1.00 - 4.80 K/uL    Monos (Absolute) 0.94 (H) 0.00 - 0.85 K/uL    Eos (Absolute) 0.16 0.00 - 0.51 K/uL    Baso (Absolute) 0.12 0.00 - 0.12 K/uL    Immature Granulocytes (abs) 0.06 0.00 - 0.11 K/uL    NRBC (Absolute) 0.00 K/uL   MAGNESIUM    Collection Time: 02/18/20  5:46 AM   Result Value Ref Range    Magnesium 2.4 1.5 - 2.5 mg/dL   PHOSPHORUS    Collection Time: 02/18/20  5:46 AM   Result Value Ref Range    Phosphorus 4.4 2.5 - 4.5 mg/dL   ESTIMATED GFR    Collection Time: 02/18/20  5:46 AM   Result Value Ref Range    GFR If African American >60 >60 mL/min/1.73 m 2    GFR If Non African American >60 >60 mL/min/1.73 m 2   CBC WITHOUT DIFFERENTIAL    Collection Time: 02/19/20  6:09 AM   Result Value Ref Range    WBC 12.0 (H) 4.8 - 10.8 K/uL    RBC 6.11 (H) 4.70 - 6.10 M/uL    Hemoglobin 16.5 14.0 - 18.0 g/dL    Hematocrit 50.1 42.0 - 52.0 %    MCV 82.0 81.4 - 97.8 fL    MCH 27.0 27.0 - 33.0 pg    MCHC 32.9 (L) 33.7 - 35.3 g/dL    RDW 44.5 35.9 - 50.0 fL    Platelet Count 297 164 - 446 K/uL    MPV 11.1 9.0 - 12.9 fL   CREATINE KINASE    Collection Time: 02/19/20  6:09 AM   Result Value Ref Range    CPK Total 114 0 - 154 U/L       Current Facility-Administered Medications   Medication Frequency   • propranolol (INDERAL) tablet 10 mg TID   • piperacillin-tazobactam (ZOSYN) 3.375 g in  mL IVPB Q6HRS   • linezolid (ZYVOX) tablet 600 mg Q12HRS   • traZODone (DESYREL) tablet 50 mg QHS   • polyethylene glycol/lytes (MIRALAX) PACKET 1 Packet DAILY   • simethicone (MYLICON) chewable tab 120 mg TID   • QUEtiapine (SEROQUEL) tablet 25 mg TID PRN   • polyethylene glycol/lytes (MIRALAX) PACKET 1 Packet Q24HRS PRN    And   • magnesium hydroxide (MILK OF MAGNESIA) suspension 30 mL QDAY PRN    And   •  bisacodyl (DULCOLAX) suppository 10 mg QDAY PRN   • baclofen (LIORESAL) tablet 5 mg TID   • eucerin cream TID PRN   • levETIRAcetam (KEPPRA) 100 MG/ML solution 500 mg Q12HRS   • omeprazole (FIRST-OMEPRAZOLE) 2 mg/mL oral susp 40 mg DAILY   • vitamin D (cholecalciferol) tablet 1,000 Units DAILY   • Respiratory Care per Protocol Continuous RT   • tramadol (ULTRAM) 50 MG tablet 50 mg Q4HRS PRN   • hydrALAZINE (APRESOLINE) tablet 25 mg Q8HRS PRN   • acetaminophen (TYLENOL) tablet 650 mg Q4HRS PRN   • artificial tears ophthalmic solution 1 Drop PRN   • benzocaine-menthol (CEPACOL) lozenge 1 Lozenge Q2HRS PRN   • mag hydrox-al hydrox-simeth (MAALOX PLUS ES or MYLANTA DS) suspension 20 mL Q2HRS PRN   • ondansetron (ZOFRAN ODT) dispertab 4 mg 4X/DAY PRN    Or   • ondansetron (ZOFRAN) syringe/vial injection 4 mg 4X/DAY PRN   • traZODone (DESYREL) tablet 50 mg QHS PRN   • sodium chloride (OCEAN) 0.65 % nasal spray 2 Spray PRN   • ipratropium-albuterol (DUONEB) nebulizer solution Q4H PRN (RT)   • montelukast (SINGULAIR) tablet 10 mg Nightly       Orders Placed This Encounter   Procedures   • Diet NPO     Standing Status:   Standing     Number of Occurrences:   1     Order Specific Question:   Restrict to:     Answer:   Strict [1]       Assessment:  Active Hospital Problems    Diagnosis   • *Intracerebral hemorrhage, intraventricular (HCC)   • 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 Patient very low functioning but per mother becoming more purposeful  -PT and OT for mobility and ADLs  -SLP for cognition   -Patient agitated on Amantadine and Modafinil. Both trials halted due to agitation. Restart Amantadine 50 mg as he was too agitated on 100 mg but was speaking and  helping with ADLs  -Continue Keppra. Unclear if had seizure or was continued on prophylaxis  -NSG appointment on 2/7/20 to evaluation  shunt. Will follow-up in 2 months      Agitation - Appears like TBI with agitation worsened from neurostimulant. Change Metoprolol to Propranolol. Start Seroquel PRN, too sedated on scheduled    Muscle spasms - mother claims Bromocriptine helps, typically used for dopamine side effects/neurostimulant. Will start low dose Baclofen and monitor.     Dysphagia - Patient with G Tube in place. SLP for swallow. MBSS on 2/10/20, start on dysphagia 1 with NTL  -Tolerated first meal but emesis on 2nd meal at higher percentage. May be due to distention, no lung changes. Continue to monitor.   -Decreased oral intake on less stimulant, mother is monitoring his intake.      Aspiration pneumonia - Currently on Unasyn.  Elevated WBC, broadened to Zyvox and Zosyn    CV - Patient is on Ivabradine 5 mg daily. Unclear reason why. Discontinue Ivabradine, on Metoprolol for tachycardia     Respiratory - Patient with size 7 trach on admission. Patient on Duonebs and Singulair  -Exchange aborted on 2/6/20 due to emesis on suction. Tolerated overnight. Decannulated AM 2/7/20. Tolerated and stoma closing.      GI Ppx - Patient on Prilosec and Prevacid. Unclear why two PPIs. Discontinue Prevacid.      DVT Ppx - Patient on Eliquis 2.5 mg BID. Unknown reason why, mother denies clot or DVT. Discontinue Eliquis as 10 months post-ICH    Total time:  27 minutes.  I spent greater than 50% of the time for patient care, counseling, and coordination on this date, including unit/floor time, and face-to-face time with the patient as per interval events and assessment and plan above. Topics discussed included change seroquel to PRN. Trial of Amantadine at lowest dose     Greer David M.D.

## 2020-02-19 NOTE — FLOWSHEET NOTE
02/19/20 1501   Events/Summary/Plan   Events/Summary/Plan trach stoma care done by Mom to avoid agitation,  SATS have been WNL

## 2020-02-19 NOTE — CARE PLAN
Problem: Infection  Goal: Will remain free from infection  Intervention: Assess signs and symptoms of infection  Note: Patient WBC has decreased, IV antibiotic given per order q 6 hours. Patient continues to be agitated. Patient Mom refusing for staff to give scheduled seroquel. Chest xray ordered, will continue to monitor.     Problem: NUTRITION  Goal: Adequate Food and Fluid Intake  Intervention: Enteral and Parenteral Nutrition per MD order  Note: Patient tolerating tube feedings every 4 hours during the day and free water flushes. No nausea/vomiting, will continue to monitor.

## 2020-02-19 NOTE — THERAPY
Speech Language Pathology  Daily Treatment     Patient Name: Rey Medina  Age:  24 y.o., Sex:  male  Medical Record #: [id number]  Today's Date: 2/19/2020     Subjective    Patient was in bed at time of ST.      Objective       02/19/20 1116   SLP Total Time Spent   SLP Individual Total Time Spent (Mins) 15   Charge Group   SLP Swallowing Dysfunction Treatment Swallowing Dysfunction Treatment       Assessment    Attempted oral stimulation. Patient with clenched lips, but allowed swabbing around lips. Slight lip puckering when asked to lick lips. One spontaneous swallow during stim. Occasionally turns head away.     Plan    Continue POC

## 2020-02-19 NOTE — PROGRESS NOTES
Hospital Medicine Daily Progress Note      Chief Complaint:  Fever  Tachycardia  Leukocytosis  N/V    Interval History:  Pt unable to lye still, has continual jerky movements.  Tmax 99.5.    Review of Systems  Review of Systems   Unable to perform ROS: Medical condition        Physical Exam  Temp:  [37.1 °C (98.7 °F)-37.5 °C (99.5 °F)] 37.5 °C (99.5 °F)  Pulse:  [] 100  Resp:  [18-20] 20  BP: (114-144)/(73-86) 144/78  SpO2:  [92 %] 92 %    Physical Exam  Vitals signs reviewed.   Constitutional:       Appearance: He is not toxic-appearing or diaphoretic.   HENT:      Right Ear: External ear normal.      Left Ear: External ear normal.      Nose: Nose normal.   Eyes:      General:         Right eye: No discharge.         Left eye: No discharge.      Extraocular Movements: Extraocular movements intact.      Conjunctiva/sclera: Conjunctivae normal.   Neck:      Musculoskeletal: Normal range of motion and neck supple.   Cardiovascular:      Rate and Rhythm: Regular rhythm. Tachycardia present.   Pulmonary:      Effort: No respiratory distress.      Breath sounds: No wheezing.      Comments: Coarse BS  Abdominal:      General: Bowel sounds are normal. There is no distension.      Palpations: Abdomen is soft.      Tenderness: There is no abdominal tenderness.      Comments: PEG site dressed   Musculoskeletal:      Right lower leg: No edema.      Left lower leg: No edema.   Skin:     General: Skin is warm and dry.   Neurological:      Comments: Awake but unresponsive         Fluids    Intake/Output Summary (Last 24 hours) at 2/18/2020 1729  Last data filed at 2/18/2020 1448  Gross per 24 hour   Intake 2610 ml   Output 300 ml   Net 2310 ml       Laboratory  Recent Labs     02/16/20  0623 02/18/20  0546   WBC 12.8* 15.6*   RBC 6.02 5.88   HEMOGLOBIN 15.9 16.0   HEMATOCRIT 50.7 48.0   MCV 84.2 81.6   MCH 26.4* 27.2   MCHC 31.4* 33.3*   RDW 45.0 44.4   PLATELETCT 294 288   MPV 11.2 11.4     Recent Labs     02/18/20  0546    SODIUM 139   POTASSIUM 4.0   CHLORIDE 104   CO2 21   GLUCOSE 111*   BUN 12   CREATININE 0.66   CALCIUM 9.2                 Assessment/Plan  * Intracerebral hemorrhage, intraventricular (HCC)- (present on admission)  Assessment & Plan  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, s/p AVM repair  Also had hydrocephalus, s/p  shunt  On Keppra    Tachycardia  Assessment & Plan  Suspect 2/2 agitation and possible infxn  Agree w/ changing Metoprolol to Propranol for HR control    Leukocytosis  Assessment & Plan  UA 0-2 WBC  BCx x 2 NGTD  CXR +basilar opacities, possible PNA  Will escalate Unasyn to Zosyn and Zyvox given worsening leukocytosis and persistent low grade temp  Vancomycin allergy noted    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 David

## 2020-02-19 NOTE — THERAPY
Speech Language Pathology  Daily Treatment     Patient Name: Rey Medina  Age:  24 y.o., Sex:  male  Medical Record #: [id number]  Today's Date: 2/19/2020     Subjective    Patient was in room with mother at time of ST.      Objective       02/19/20 0902   Receptive Language / Auditory Comprehension   Follows One Unit Commands Profound (1)   Social / Pragmatic Communication   Eye Contact Severe (2)   Outcome Measures   Outcome Measures Utilized ABS   ABS (Agitated Behavior Scale)   Short Attention Span, Easy Distractibility, Inability to Concentrate 4   Impulsive, Impatient, Low Tolerance for Pain or Frustration 3   Uncooperative, Resistant to Care, Demanding 3   Violent and/or Threatening Violence Toward People or Property 1   Explosive and/or Unpredictable Anger 1   Rocking, Rubbing, Moaning, Other Self-Stimulating Behavior 2   Pulling at Tubes, Restraints, etc. 1   Wandering from Treatment Area 1   Restlessness, Pacing, Excessive Movement 3   Repetitive Behaviors, Motor and/or Verbal 3   Rapid, Loud or Excessive Talking 1   Sudden Changes of Mood 1   Easily Initiated - Excessive Crying and/or Laughter 1   Self-Abusiveness, Physical and/or Verbal 1   Agitated Behavior Scale Total Score 26   Level of Severity Mild Agitation   SLP Total Time Spent   SLP Individual Total Time Spent (Mins) 60   Charge Group   SLP Treatment - Individual Speech Language Treatment - Individual       Assessment    Completed CRS with a score of 6 (improved from yesterday's 3) and ABS score of 26 (improved from 37). Patient with improved tolerance for tactile, auditory, and olfactory stim. Intermittently withdrawals from stim or attempts to move clinicians hands, but improved tolerance overall.     Plan    Continue CRS, ABS, oral stim as tolerated, PO trials if appropriate and patient is willing.

## 2020-02-19 NOTE — THERAPY
Missed Therapy     Patient Name: Rey Medina  Age:  24 y.o., Sex:  male  Medical Record #: [id number]  Today's Date: 2/18/2020    Discussed missed therapy with Dr. David.    Pt sleeping and snoring with mother in room. Attempted supine stretching, however, pt became increasingly agitated and refused to allow therapists to assist with PROM. Pt perspiring after multiple attempts to arouse.

## 2020-02-19 NOTE — THERAPY
Physical Therapy   Daily Treatment     Patient Name: Rey Medina  Age:  24 y.o., Sex:  male  Medical Record #: [id number]  Today's Date: 2/19/2020     Precautions  Precautions: Fall Risk, PEG Tube  Comments: recent decannulation of tracheostomy    Subjective    Pt in bed, mother in room. Mother agreeable for PT session attempt.      Objective       02/19/20 1531   Precautions   Precautions Fall Risk;PEG Tube   Comments recent decannulation of tracheostomy   Bed Mobility    Rolling Total Assist X 2 to Rt.;Total Assist X 2 to Lt.   Interdisciplinary Plan of Care Collaboration   IDT Collaboration with  Family / Caregiver   Patient Position at End of Therapy Seated   Collaboration Comments mother present for PT session    Therapy Missed   Missed Therapy (Minutes) 15   Reason For Missed Therapy Medical - Patient Agitated;Medical - Patient not Able To Participate   PT Total Time Spent   PT Individual Total Time Spent (Mins) 15   PT Charge Group   PT Therapeutic Activities 1     PT assisted mother with clean up after urinary incontinence in bed, total A x2 to roll bilaterally.     Attempt to passively range BLEs, pt resistive to movement.     Assessment    Pt continues with agitation that increases with touch and stimulus of any kind. Unable to participate in full session.    Plan    PROM, bed mobility, and movement as pt tolerates.

## 2020-02-19 NOTE — THERAPY
Occupational Therapy  Daily Treatment     Patient Name: Rey Medina  Age:  24 y.o., Sex:  male  Medical Record #: [id number]  Today's Date: 2/19/2020     Precautions  Precautions: Fall Risk, PEG Tube, Other (See Comments)  Comments: recent decannulation of tracheostomy    Safety   ADL Safety : Requires Physical Assist for Safety  Bathroom Safety: Requires Physical Assist for Safety  Comments: Total assist; see FIMs for additional ADL performance details    Subjective    Patient continues to present non-verbal.      Objective     02/19/20 0831   Precautions   Precautions Fall Risk;PEG Tube;Other (See Comments)   Comments recent decannulation of tracheostomy   Interdisciplinary Plan of Care Collaboration   IDT Collaboration with  Nursing;Speech Therapist   Patient Position at End of Therapy In Bed;Family / Friend in Room   Collaboration Comments Co-treatment to facilitate functional participation and sensory stimulation    OT Total Time Spent   OT Individual Total Time Spent (Mins) 30   OT Charge Group   OT Self Care / ADL 1   OT Neuromuscular Re-education / Balance 1     FIM Grooming Score:  1 - Total Assistance  Grooming Description:  (Total assist for washing face)    Vibration applied to BUEs with goal of facilitating motor control and sensory stimulation. Attempted to complete PROM for BUEs in conjunction with vibration, however unable due to agitation, restlessness, and withdrawal behavior.     Lighting turned off (with natural sunlight only) and patient's favorite music playing to facilitate low-stim, calming environment.     Assessment    Patient unable to follow any one-step commands during this session and continues to present with evasive movements/withdrawal behaviors.     Plan    Low-stim environment,  sensory stimulation, ADLs, sitting balance/standing balance

## 2020-02-19 NOTE — THERAPY
Missed Therapy     Patient Name: Rey Medina  Age:  24 y.o., Sex:  male  Medical Record #: [id number]  Today's Date: 2/19/2020    Discussed missed therapy with  and supervisor.     FIM Bathing Score:  1 - Total Assistance  Bathing Description:  (Total assist for partial sponge bath in bed)     02/19/20 1301   Precautions   Precautions Fall Risk;PEG Tube;Other (See Comments)   Comments recent decannulation of tracheostomy   Interdisciplinary Plan of Care Collaboration   IDT Collaboration with  Speech Therapist;Family / Caregiver   Patient Position at End of Therapy In Bed;Family / Friend in Room   Collaboration Comments Mother present during OT session, ST present at start of session   Therapy Missed   Missed Therapy (Minutes) 15   Reason For Missed Therapy Medical - Patient Agitated;Medical - Patient not Able To Participate   OT Total Time Spent   OT Individual Total Time Spent (Mins) 15   OT Charge Group   OT Self Care / ADL 1

## 2020-02-20 LAB
BACTERIA BLD CULT: NORMAL
BACTERIA BLD CULT: NORMAL
SIGNIFICANT IND 70042: NORMAL
SIGNIFICANT IND 70042: NORMAL
SITE SITE: NORMAL
SITE SITE: NORMAL
SOURCE SOURCE: NORMAL
SOURCE SOURCE: NORMAL

## 2020-02-20 PROCEDURE — A9270 NON-COVERED ITEM OR SERVICE: HCPCS | Performed by: PHYSICAL MEDICINE & REHABILITATION

## 2020-02-20 PROCEDURE — 700101 HCHG RX REV CODE 250: Performed by: PHYSICAL MEDICINE & REHABILITATION

## 2020-02-20 PROCEDURE — 99233 SBSQ HOSP IP/OBS HIGH 50: CPT | Performed by: PHYSICAL MEDICINE & REHABILITATION

## 2020-02-20 PROCEDURE — 99232 SBSQ HOSP IP/OBS MODERATE 35: CPT | Performed by: HOSPITALIST

## 2020-02-20 PROCEDURE — 97535 SELF CARE MNGMENT TRAINING: CPT

## 2020-02-20 PROCEDURE — 700102 HCHG RX REV CODE 250 W/ 637 OVERRIDE(OP): Performed by: HOSPITALIST

## 2020-02-20 PROCEDURE — 92507 TX SP LANG VOICE COMM INDIV: CPT

## 2020-02-20 PROCEDURE — 700111 HCHG RX REV CODE 636 W/ 250 OVERRIDE (IP): Performed by: HOSPITALIST

## 2020-02-20 PROCEDURE — 97110 THERAPEUTIC EXERCISES: CPT

## 2020-02-20 PROCEDURE — 770010 HCHG ROOM/CARE - REHAB SEMI PRIVAT*

## 2020-02-20 PROCEDURE — A9270 NON-COVERED ITEM OR SERVICE: HCPCS | Performed by: HOSPITALIST

## 2020-02-20 PROCEDURE — 92526 ORAL FUNCTION THERAPY: CPT

## 2020-02-20 PROCEDURE — 700102 HCHG RX REV CODE 250 W/ 637 OVERRIDE(OP): Performed by: PHYSICAL MEDICINE & REHABILITATION

## 2020-02-20 PROCEDURE — 97530 THERAPEUTIC ACTIVITIES: CPT

## 2020-02-20 PROCEDURE — 700105 HCHG RX REV CODE 258: Performed by: HOSPITALIST

## 2020-02-20 RX ORDER — LORAZEPAM 2 MG/ML
1 INJECTION INTRAMUSCULAR ONCE
Status: COMPLETED | OUTPATIENT
Start: 2020-02-21 | End: 2020-02-21

## 2020-02-20 RX ADMIN — LEVETIRACETAM 500 MG: 500 SOLUTION ORAL at 21:26

## 2020-02-20 RX ADMIN — PIPERACILLIN AND TAZOBACTAM 3.38 G: 3; .375 INJECTION, POWDER, LYOPHILIZED, FOR SOLUTION INTRAVENOUS; PARENTERAL at 12:09

## 2020-02-20 RX ADMIN — PIPERACILLIN AND TAZOBACTAM 3.38 G: 3; .375 INJECTION, POWDER, LYOPHILIZED, FOR SOLUTION INTRAVENOUS; PARENTERAL at 05:22

## 2020-02-20 RX ADMIN — BACLOFEN 5 MG: 10 TABLET ORAL at 21:26

## 2020-02-20 RX ADMIN — LINEZOLID 600 MG: 600 TABLET, FILM COATED ORAL at 09:03

## 2020-02-20 RX ADMIN — SIMETHICONE CHEW TAB 80 MG 120 MG: 80 TABLET ORAL at 09:03

## 2020-02-20 RX ADMIN — BACLOFEN 5 MG: 10 TABLET ORAL at 14:27

## 2020-02-20 RX ADMIN — LINEZOLID 600 MG: 600 TABLET, FILM COATED ORAL at 21:26

## 2020-02-20 RX ADMIN — PIPERACILLIN AND TAZOBACTAM 3.38 G: 3; .375 INJECTION, POWDER, LYOPHILIZED, FOR SOLUTION INTRAVENOUS; PARENTERAL at 17:48

## 2020-02-20 RX ADMIN — SIMETHICONE CHEW TAB 80 MG 120 MG: 80 TABLET ORAL at 21:26

## 2020-02-20 RX ADMIN — PIPERACILLIN AND TAZOBACTAM 3.38 G: 3; .375 INJECTION, POWDER, LYOPHILIZED, FOR SOLUTION INTRAVENOUS; PARENTERAL at 23:59

## 2020-02-20 RX ADMIN — IPRATROPIUM BROMIDE AND ALBUTEROL SULFATE 3 ML: .5; 3 SOLUTION RESPIRATORY (INHALATION) at 22:18

## 2020-02-20 RX ADMIN — POLYETHYLENE GLYCOL 3350 1 PACKET: 17 POWDER, FOR SOLUTION ORAL at 17:49

## 2020-02-20 RX ADMIN — PROPRANOLOL HYDROCHLORIDE 10 MG: 10 TABLET ORAL at 09:03

## 2020-02-20 RX ADMIN — MONTELUKAST 10 MG: 10 TABLET, FILM COATED ORAL at 21:27

## 2020-02-20 RX ADMIN — PIPERACILLIN AND TAZOBACTAM 3.38 G: 3; .375 INJECTION, POWDER, LYOPHILIZED, FOR SOLUTION INTRAVENOUS; PARENTERAL at 00:14

## 2020-02-20 RX ADMIN — AMANTADINE HYDROCHLORIDE 50 MG: 50 SOLUTION ORAL at 14:16

## 2020-02-20 RX ADMIN — BACLOFEN 5 MG: 10 TABLET ORAL at 09:03

## 2020-02-20 RX ADMIN — OMEPRAZOLE 40 MG: KIT at 09:03

## 2020-02-20 RX ADMIN — SIMETHICONE CHEW TAB 80 MG 120 MG: 80 TABLET ORAL at 14:25

## 2020-02-20 RX ADMIN — PROPRANOLOL HYDROCHLORIDE 10 MG: 10 TABLET ORAL at 21:26

## 2020-02-20 RX ADMIN — MELATONIN 1000 UNITS: at 09:03

## 2020-02-20 RX ADMIN — LEVETIRACETAM 500 MG: 500 SOLUTION ORAL at 09:02

## 2020-02-20 RX ADMIN — TRAZODONE HYDROCHLORIDE 50 MG: 50 TABLET ORAL at 21:26

## 2020-02-20 RX ADMIN — AMANTADINE HYDROCHLORIDE 50 MG: 50 SOLUTION ORAL at 09:02

## 2020-02-20 RX ADMIN — PROPRANOLOL HYDROCHLORIDE 10 MG: 10 TABLET ORAL at 14:25

## 2020-02-20 NOTE — THERAPY
Occupational Therapy  Daily Treatment     Patient Name: Rey Medina  Age:  24 y.o., Sex:  male  Medical Record #: [id number]  Today's Date: 2/20/2020     Precautions  Precautions: (P) Fall Risk, PEG Tube  Comments: (P) agitated, recent decannulation of tracheostomy    Safety   ADL Safety : Requires Physical Assist for Safety  Bathroom Safety: Requires Physical Assist for Safety  Comments: Total assist; see FIMs for additional ADL performance details    Subjective    Patient continues to present non-verbal.      Objective     02/20/20 1031   Precautions   Precautions Fall Risk;PEG Tube   Comments agitated, recent decannulation of tracheostomy   Interdisciplinary Plan of Care Collaboration   IDT Collaboration with  Family / Caregiver;Physician   Patient Position at End of Therapy In Bed;Family / Friend in Room   Collaboration Comments Mother present during OT session; MD checked in with patient/mother during session, discussed plan for CT scan    OT Total Time Spent   OT Individual Total Time Spent (Mins) 30   OT Charge Group   OT Self Care / ADL 2       FIM Grooming Score:  1 - Total Assistance  Grooming Description:  (Total assist for washing face while in bed )    FIM Bathing Score:  1 - Total Assistance  Bathing Description:  (Total assist for partial sponge bath in bed. )    Attempted to perform PROM (BUEs) however patient resisted movement and rolled to contralateral direction.     Assessment    Patient continues to present non-verbal, with evasive/withdrawal behaviors and unable to follow any one-step commands.     Plan    Low-stim environment,  sensory stimulation, ADLs/functional participation, sitting balance

## 2020-02-20 NOTE — CARE PLAN
Problem: Knowledge Deficit  Goal: Knowledge of disease process/condition, treatment plan, diagnostic tests, and medications will improve  Note: Educated mom to medications changes this pm. Mom verbalizes understanding.     Problem: Skin Integrity  Goal: Risk for impaired skin integrity will decrease  Note: Mom educated to change positions to minimize the potential of skin break down and possible further complications of open wounds due to pressure, when sitting or laying for periods over half of an hour. Momengaged in education and demonstrates understanding.

## 2020-02-20 NOTE — PROGRESS NOTES
Patient care assumed. Report received from [facility] CORI Chaney. Patient is alert and calm, resting in bed. Call light and bedside table within reach. Will continue to monitor.

## 2020-02-20 NOTE — CARE PLAN
Problem: Communication  Goal: The ability to communicate needs accurately and effectively will improve  Intervention: Educate patient and significant other/support system about the plan of care, procedures, treatments, medications and allow for questions  Note: Patient opening eyes more today and appears to be more relaxed. Patient up in w/c this afternoon with therapy, seems to be tolerating ok, trialing applesauce with speech therapy.     Problem: Infection  Goal: Will remain free from infection  Intervention: Assess signs and symptoms of infection  Note: Patient on zyvox and zosyn, v/s documented (see flowsheets, v/s). IV site is clean dry and intact, no obvious s/s of infection at site.

## 2020-02-20 NOTE — PROGRESS NOTES
"Rehab Progress Note     Encounter Date: 2/20/2020    CC: [facility], AMS    Interval Events (Subjective)  Patient sitting up in bed. He reportedly is more calm this morning, he was able to place the urinal and void and then released the urinal without obsessively holding on. Mother is still concerned about his waxing and waning condition. Discussed may be 2/2 to infection or the change in medications. Discussed he also has a very poor sleep/wake cycle.  Long discussion with mother about it and agree to check CT. We have no previous CT to characterize his bleed. Discussed that he may have to be sedated for it so would preferably have it done at the end of a therapy day. Mother is in agreement.     ROS: Cannot participate in conversation. No grimacing.     IDT Team Meeting 2/18/2020  DC/Disposition:  2/26/20    Objective:  VITAL SIGNS: /82   Pulse (!) 110   Temp 36.7 °C (98 °F) (Oral)   Resp 18   Ht 1.702 m (5' 7\")   Wt 77.5 kg (170 lb 13.7 oz)   SpO2 96%   BMI 26.76 kg/m²   Gen: NAD  Psych: Mood and affect flat  CV: RRR, no edema  Resp: CTAB, no upper airway sounds  Abd: NTND  Neuro: following very few commands, using BUE to roll around in bed, eyes closed     Recent Results (from the past 72 hour(s))   Basic Metabolic Panel    Collection Time: 02/18/20  5:46 AM   Result Value Ref Range    Sodium 139 135 - 145 mmol/L    Potassium 4.0 3.6 - 5.5 mmol/L    Chloride 104 96 - 112 mmol/L    Co2 21 20 - 33 mmol/L    Glucose 111 (H) 65 - 99 mg/dL    Bun 12 8 - 22 mg/dL    Creatinine 0.66 0.50 - 1.40 mg/dL    Calcium 9.2 8.5 - 10.5 mg/dL    Anion Gap 14.0 (H) 0.0 - 11.9   CBC WITH DIFFERENTIAL    Collection Time: 02/18/20  5:46 AM   Result Value Ref Range    WBC 15.6 (H) 4.8 - 10.8 K/uL    RBC 5.88 4.70 - 6.10 M/uL    Hemoglobin 16.0 14.0 - 18.0 g/dL    Hematocrit 48.0 42.0 - 52.0 %    MCV 81.6 81.4 - 97.8 fL    MCH 27.2 27.0 - 33.0 pg    MCHC 33.3 (L) 33.7 - 35.3 g/dL    RDW 44.4 35.9 - 50.0 fL    Platelet Count 288 " 164 - 446 K/uL    MPV 11.4 9.0 - 12.9 fL    Neutrophils-Polys 76.10 (H) 44.00 - 72.00 %    Lymphocytes 15.70 (L) 22.00 - 41.00 %    Monocytes 6.00 0.00 - 13.40 %    Eosinophils 1.00 0.00 - 6.90 %    Basophils 0.80 0.00 - 1.80 %    Immature Granulocytes 0.40 0.00 - 0.90 %    Nucleated RBC 0.00 /100 WBC    Neutrophils (Absolute) 11.85 (H) 1.82 - 7.42 K/uL    Lymphs (Absolute) 2.45 1.00 - 4.80 K/uL    Monos (Absolute) 0.94 (H) 0.00 - 0.85 K/uL    Eos (Absolute) 0.16 0.00 - 0.51 K/uL    Baso (Absolute) 0.12 0.00 - 0.12 K/uL    Immature Granulocytes (abs) 0.06 0.00 - 0.11 K/uL    NRBC (Absolute) 0.00 K/uL   MAGNESIUM    Collection Time: 02/18/20  5:46 AM   Result Value Ref Range    Magnesium 2.4 1.5 - 2.5 mg/dL   PHOSPHORUS    Collection Time: 02/18/20  5:46 AM   Result Value Ref Range    Phosphorus 4.4 2.5 - 4.5 mg/dL   ESTIMATED GFR    Collection Time: 02/18/20  5:46 AM   Result Value Ref Range    GFR If African American >60 >60 mL/min/1.73 m 2    GFR If Non African American >60 >60 mL/min/1.73 m 2   CBC WITHOUT DIFFERENTIAL    Collection Time: 02/19/20  6:09 AM   Result Value Ref Range    WBC 12.0 (H) 4.8 - 10.8 K/uL    RBC 6.11 (H) 4.70 - 6.10 M/uL    Hemoglobin 16.5 14.0 - 18.0 g/dL    Hematocrit 50.1 42.0 - 52.0 %    MCV 82.0 81.4 - 97.8 fL    MCH 27.0 27.0 - 33.0 pg    MCHC 32.9 (L) 33.7 - 35.3 g/dL    RDW 44.5 35.9 - 50.0 fL    Platelet Count 297 164 - 446 K/uL    MPV 11.1 9.0 - 12.9 fL   CREATINE KINASE    Collection Time: 02/19/20  6:09 AM   Result Value Ref Range    CPK Total 114 0 - 154 U/L       Current Facility-Administered Medications   Medication Frequency   • amantadine (SYMMETREL) 50 MG/5ML syrup 50 mg BID   • propranolol (INDERAL) tablet 10 mg TID   • piperacillin-tazobactam (ZOSYN) 3.375 g in  mL IVPB Q6HRS   • linezolid (ZYVOX) tablet 600 mg Q12HRS   • traZODone (DESYREL) tablet 50 mg QHS   • polyethylene glycol/lytes (MIRALAX) PACKET 1 Packet DAILY   • simethicone (MYLICON) chewable tab 120 mg  TID   • QUEtiapine (SEROQUEL) tablet 25 mg TID PRN   • polyethylene glycol/lytes (MIRALAX) PACKET 1 Packet Q24HRS PRN    And   • magnesium hydroxide (MILK OF MAGNESIA) suspension 30 mL QDAY PRN    And   • bisacodyl (DULCOLAX) suppository 10 mg QDAY PRN   • baclofen (LIORESAL) tablet 5 mg TID   • eucerin cream TID PRN   • levETIRAcetam (KEPPRA) 100 MG/ML solution 500 mg Q12HRS   • omeprazole (FIRST-OMEPRAZOLE) 2 mg/mL oral susp 40 mg DAILY   • vitamin D (cholecalciferol) tablet 1,000 Units DAILY   • Respiratory Care per Protocol Continuous RT   • tramadol (ULTRAM) 50 MG tablet 50 mg Q4HRS PRN   • hydrALAZINE (APRESOLINE) tablet 25 mg Q8HRS PRN   • acetaminophen (TYLENOL) tablet 650 mg Q4HRS PRN   • artificial tears ophthalmic solution 1 Drop PRN   • benzocaine-menthol (CEPACOL) lozenge 1 Lozenge Q2HRS PRN   • mag hydrox-al hydrox-simeth (MAALOX PLUS ES or MYLANTA DS) suspension 20 mL Q2HRS PRN   • ondansetron (ZOFRAN ODT) dispertab 4 mg 4X/DAY PRN    Or   • ondansetron (ZOFRAN) syringe/vial injection 4 mg 4X/DAY PRN   • traZODone (DESYREL) tablet 50 mg QHS PRN   • sodium chloride (OCEAN) 0.65 % nasal spray 2 Spray PRN   • ipratropium-albuterol (DUONEB) nebulizer solution Q4H PRN (RT)   • montelukast (SINGULAIR) tablet 10 mg Nightly       Orders Placed This Encounter   Procedures   • Diet NPO     Standing Status:   Standing     Number of Occurrences:   1     Order Specific Question:   Restrict to:     Answer:   Strict [1]       Assessment:  Active Hospital Problems    Diagnosis   • *Intracerebral hemorrhage, intraventricular (HCC)   • 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 Patient very low functioning but per mother becoming more purposeful  -PT and OT for  mobility and ADLs  -SLP for cognition   -Patient agitated on Amantadine and Modafinil. Both trials halted due to agitation. Restart Amantadine 50 mg as he was too agitated on 100 mg but was speaking and helping with ADLs  -Continue Keppra. Unclear if had seizure or was continued on prophylaxis  -NSG appointment on 2/7/20 to evaluation  shunt. Will follow-up in 2 months   -CT head as waxing and waning status. At times he voices including his names, at other times no response and compulsive grabbing of bed rail and urinal.      Agitation - Appears like TBI with agitation worsened from neurostimulant. Change Metoprolol to Propranolol. Start Seroquel PRN, too sedated on scheduled    Muscle spasms - mother claims Bromocriptine helps, typically used for dopamine side effects/neurostimulant. Will start low dose Baclofen and monitor.     Dysphagia - Patient with G Tube in place. SLP for swallow. MBSS on 2/10/20, start on dysphagia 1 with NTL  -Tolerated first meal but emesis on 2nd meal at higher percentage. May be due to distention, no lung changes. Continue to monitor.   -Decreased oral intake on less stimulant, mother is monitoring his intake.      Aspiration pneumonia - Currently on Unasyn.  Elevated WBC, broadened to Zyvox and Zosyn    CV - Patient is on Ivabradine 5 mg daily. Unclear reason why. Discontinue Ivabradine, on Metoprolol for tachycardia. Switch metoprolol to Propranolol     Respiratory - Patient with size 7 trach on admission. Patient on Duonebs and Singulair  -Exchange aborted on 2/6/20 due to emesis on suction. Tolerated overnight. Decannulated AM 2/7/20. Tolerated and stoma closing.      GI Ppx - Patient on Prilosec and Prevacid. Unclear why two PPIs. Discontinue Prevacid.      DVT Ppx - Patient on Eliquis 2.5 mg BID. Unknown reason why, mother denies clot or DVT. Discontinue Eliquis as 10 months post-ICH    Total time:  35 minutes.  I spent greater than 50% of the time for patient care, counseling,  and coordination on this date, including unit/floor time, and face-to-face time with the patient as per interval events and assessment and plan above. Topics discussed included improved agitation on Propranolol switch and low dose amantadine, discussion with mother about cognitive status, and check CT head.     Greer David M.D.

## 2020-02-20 NOTE — THERAPY
Occupational Therapy  Daily Treatment     Patient Name: Rey Medina  Age:  24 y.o., Sex:  male  Medical Record #: [id number]  Today's Date: 2/20/2020     Precautions  Precautions: (P) Fall Risk, PEG Tube  Comments: (P) recent decannulation of tracheostomy    Safety   ADL Safety : (P) Requires Physical Assist for Safety  Bathroom Safety: (P) Requires Physical Assist for Safety  Comments: (P) Total assist; see FIMs for additional ADL performance details    Subjective    Patient presents non-verbal. Increased eye opening noted today.      Objective     02/20/20 1431   Precautions   Precautions Fall Risk;PEG Tube   Comments recent decannulation of tracheostomy   Safety    ADL Safety  Requires Physical Assist for Safety   Bathroom Safety Requires Physical Assist for Safety   Comments Total assist; see FIMs for additional ADL performance details   Passive ROM Upper Body   Comments Patient tolerated PROM exercises to BUEs (proximal and distal) during this session with no noted agitation/restlessness   Neuro-Muscular Treatments   Neuro-Muscular Treatments Tapping;Verbal Cuing;Tactile Cuing;Facilitation;Vibration   Comments Vibration applied to BUEs,; patient unable to follow instructions of activating each muscle group as vibration was applied    Bed Mobility    Supine to Sit Total Assist X 2   Scooting Total Assist X 2   Rolling Total Assist X 2 to Lt.;Total Assist X 2 to Rt.   Skilled Intervention Facilitation;Postural Facilitation;Sequencing;Tactile Cuing;Verbal Cuing   Interdisciplinary Plan of Care Collaboration   IDT Collaboration with  Speech Therapist   Patient Position at End of Therapy Seated;Self Releasing Lap Belt Applied   Collaboration Comments ST assisted with bed>w/c txfr   OT Total Time Spent   OT Individual Total Time Spent (Mins) 30   OT Charge Group   OT Self Care / ADL 1   OT Therapy Activity 1       FIM Grooming Score:  1 - Total Assistance  Grooming Description:  (Total assist for washing hands  at sink. Patient unable to initiate or actively participate in grooming task. )    FIM Lower Body Dressing Score:  1 - Total Assistance  Lower Body Dressing Description:  (Total assist required to don elastic waist pants in bed. No volitional movement noted during task at this time. )    Assessment    No to minimal agitation noted during this session. Patient briefly fixated on groin area prior to OT/ST assisted with bed > w/c txfr, however remained calm when therapist redirected/repositioned UEs. Patient tolerated PROM exercises for BUEs with no agitation/restlessness. No evasive/withdrawal behavior noted when undersigned therapist assisted with donning elastic waist pants in bed (total assist). Total assist x2 required to complete safe bed>w/c txfr. MD and RN notified re: calmer affect/decreased agitation.     Plan    Low-stim environment,  sensory stimulation, ADLs/functional participation, sitting balance

## 2020-02-20 NOTE — THERAPY
Speech Language Pathology  Daily Treatment     Patient Name: Rey Medina  Age:  24 y.o., Sex:  male  Medical Record #: [id number]  Today's Date: 2/20/2020     Subjective    Patient was in room with OT. Transferred from bed to chair with OT and ST.      Objective       02/20/20 1502   Dysphagia    Other Treatments oral care and stim.    SLP Total Time Spent   SLP Individual Total Time Spent (Mins) 30   Charge Group   SLP Swallowing Dysfunction Treatment Swallowing Dysfunction Treatment         Assessment    Continued oral stim to lips. Patient tolerated without clenching lips, but would not open mouth on command or when provided stim. Spoon dipped in coke placed at tips which triggered swallows. Patient also provided subsequent swallows on command. Did not open eyes during session.     Plan    Sensory stimulation, PO Trials as patient is able and willing.

## 2020-02-20 NOTE — THERAPY
"Missed Therapy     Patient Name: Rey Medina  Age:  24 y.o., Sex:  male  Medical Record #: [id number]  Today's Date: 2/20/2020    Discussed missed therapy with scheduling.    Pt laying in bed at start of therapy with eyes opening intermittently. Pt's mother reporting that pt is \"a little bit calmer today,\" however pt refused to allow PT to maintain light touch on LE. Attempted to perform PROM/stretching of BLE. Pt pulled blankets over his LE and assumed a fetal position.   "

## 2020-02-20 NOTE — THERAPY
Speech Language Pathology  Daily Treatment     Patient Name: Rey Medina  Age:  24 y.o., Sex:  male  Medical Record #: [id number]  Today's Date: 2/20/2020     Subjective    Patient was in room with mother.      Objective       02/20/20 1002   Outcome Measures   Outcome Measures Utilized ABS   ABS (Agitated Behavior Scale)   Short Attention Span, Easy Distractibility, Inability to Concentrate 4   Impulsive, Impatient, Low Tolerance for Pain or Frustration 2   Uncooperative, Resistant to Care, Demanding 2   Violent and/or Threatening Violence Toward People or Property 1   Explosive and/or Unpredictable Anger 1   Rocking, Rubbing, Moaning, Other Self-Stimulating Behavior 1   Pulling at Tubes, Restraints, etc. 1   Wandering from Treatment Area 1   Restlessness, Pacing, Excessive Movement 2   Repetitive Behaviors, Motor and/or Verbal 1   Rapid, Loud or Excessive Talking 1   Sudden Changes of Mood 1   Easily Initiated - Excessive Crying and/or Laughter 1   Self-Abusiveness, Physical and/or Verbal 1   Agitated Behavior Scale Total Score 20   Level of Severity No Agitation   SLP Total Time Spent   SLP Individual Total Time Spent (Mins) 30   Charge Group   SLP Treatment - Individual Speech Language Treatment - Individual         Assessment    ABS score of 20 (no agitation) and CRS score of 8 (improved from 6). Tolerated stim to lips, but with continued clenching. Was able to swallow on demand, but did not follow any other directives.     Plan    Continue POC

## 2020-02-20 NOTE — PROGRESS NOTES
Hospital Medicine Daily Progress Note      Chief Complaint:  Fever  Tachycardia  Leukocytosis    Interval History:  Pt appears much calmer today and has no recurrent low grade temps.    Review of Systems  Review of Systems   Unable to perform ROS: Medical condition        Physical Exam  Temp:  [36.4 °C (97.5 °F)-37.1 °C (98.8 °F)] 36.7 °C (98 °F)  Pulse:  [102-120] 110  Resp:  [18-20] 18  BP: (127-138)/(82-86) 138/82  SpO2:  [96 %] 96 %    Physical Exam  Vitals signs reviewed.   Constitutional:       Appearance: He is not toxic-appearing or diaphoretic.   HENT:      Right Ear: External ear normal.      Left Ear: External ear normal.      Nose: Nose normal.   Eyes:      General:         Right eye: No discharge.         Left eye: No discharge.      Extraocular Movements: Extraocular movements intact.      Conjunctiva/sclera: Conjunctivae normal.   Neck:      Musculoskeletal: Normal range of motion and neck supple.   Cardiovascular:      Rate and Rhythm: Regular rhythm. Tachycardia present.   Pulmonary:      Effort: No respiratory distress.      Breath sounds: No wheezing.      Comments: Decreased BS  Abdominal:      General: Bowel sounds are normal. There is no distension.      Palpations: Abdomen is soft.      Tenderness: There is no abdominal tenderness.      Comments: PEG site dressed   Musculoskeletal:      Right lower leg: No edema.      Left lower leg: No edema.   Skin:     General: Skin is warm and dry.   Neurological:      Comments: Awake but unresponsive         Fluids    Intake/Output Summary (Last 24 hours) at 2/20/2020 1131  Last data filed at 2/20/2020 1000  Gross per 24 hour   Intake 3165 ml   Output 0 ml   Net 3165 ml       Laboratory  Recent Labs     02/18/20  0546 02/19/20  0609   WBC 15.6* 12.0*   RBC 5.88 6.11*   HEMOGLOBIN 16.0 16.5   HEMATOCRIT 48.0 50.1   MCV 81.6 82.0   MCH 27.2 27.0   MCHC 33.3* 32.9*   RDW 44.4 44.5   PLATELETCT 288 297   MPV 11.4 11.1     Recent Labs     02/18/20  0546    SODIUM 139   POTASSIUM 4.0   CHLORIDE 104   CO2 21   GLUCOSE 111*   BUN 12   CREATININE 0.66   CALCIUM 9.2                 Assessment/Plan  * Intracerebral hemorrhage, intraventricular (HCC)- (present on admission)  Assessment & Plan  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, s/p AVM repair  Also had hydrocephalus, s/p  shunt  On Keppra  Now on Amantadine    Tachycardia  Assessment & Plan  Suspect 2/2 agitation and possible infxn  Continue Propranol for HR control    Leukocytosis  Assessment & Plan  UA 0-2 WBC  BCx x 2 NGTD  CXR +basilar opacities, possible PNA  Elevated WBC improved on Zosyn and Zyvox  Vancomycin allergy noted  F/U CXR negative acute  But would continue current abx for total 5 days  Follow WBC to resolution    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

## 2020-02-21 ENCOUNTER — HOSPITAL ENCOUNTER (INPATIENT)
Facility: REHABILITATION | Age: 25
LOS: 20 days | DRG: 056 | End: 2020-03-12
Attending: PHYSICAL MEDICINE & REHABILITATION | Admitting: PHYSICAL MEDICINE & REHABILITATION
Payer: MEDICAID

## 2020-02-21 ENCOUNTER — HOSPITAL ENCOUNTER (EMERGENCY)
Facility: MEDICAL CENTER | Age: 25
End: 2020-02-21
Attending: EMERGENCY MEDICINE
Payer: MEDICAID

## 2020-02-21 ENCOUNTER — APPOINTMENT (OUTPATIENT)
Dept: RADIOLOGY | Facility: MEDICAL CENTER | Age: 25
End: 2020-02-21
Attending: PHYSICAL MEDICINE & REHABILITATION
Payer: MEDICAID

## 2020-02-21 VITALS
BODY MASS INDEX: 26.59 KG/M2 | TEMPERATURE: 98.4 F | OXYGEN SATURATION: 96 % | HEART RATE: 102 BPM | WEIGHT: 169.75 LBS | DIASTOLIC BLOOD PRESSURE: 91 MMHG | RESPIRATION RATE: 22 BRPM | SYSTOLIC BLOOD PRESSURE: 140 MMHG

## 2020-02-21 VITALS
HEART RATE: 100 BPM | WEIGHT: 170.86 LBS | SYSTOLIC BLOOD PRESSURE: 115 MMHG | TEMPERATURE: 98.3 F | HEIGHT: 67 IN | BODY MASS INDEX: 26.82 KG/M2 | RESPIRATION RATE: 22 BRPM | OXYGEN SATURATION: 95 % | DIASTOLIC BLOOD PRESSURE: 76 MMHG

## 2020-02-21 DIAGNOSIS — I61.5 NONTRAUMATIC INTRAVENTRICULAR INTRACEREBRAL HEMORRHAGE, UNSPECIFIED LATERALITY (HCC): ICD-10-CM

## 2020-02-21 DIAGNOSIS — Z93.0 TRACHEOSTOMY DEPENDENT (HCC): ICD-10-CM

## 2020-02-21 DIAGNOSIS — Z86.73 HISTORY OF STROKE IN PRIOR 3 MONTHS: ICD-10-CM

## 2020-02-21 LAB
ANION GAP SERPL CALC-SCNC: 9 MMOL/L (ref 0–11.9)
BUN SERPL-MCNC: 11 MG/DL (ref 8–22)
CALCIUM SERPL-MCNC: 9 MG/DL (ref 8.5–10.5)
CHLORIDE SERPL-SCNC: 104 MMOL/L (ref 96–112)
CO2 SERPL-SCNC: 24 MMOL/L (ref 20–33)
CREAT SERPL-MCNC: 0.8 MG/DL (ref 0.5–1.4)
ERYTHROCYTE [DISTWIDTH] IN BLOOD BY AUTOMATED COUNT: 45.1 FL (ref 35.9–50)
GLUCOSE SERPL-MCNC: 104 MG/DL (ref 65–99)
HCT VFR BLD AUTO: 47.5 % (ref 42–52)
HGB BLD-MCNC: 15.8 G/DL (ref 14–18)
MCH RBC QN AUTO: 27.3 PG (ref 27–33)
MCHC RBC AUTO-ENTMCNC: 33.3 G/DL (ref 33.7–35.3)
MCV RBC AUTO: 82 FL (ref 81.4–97.8)
PLATELET # BLD AUTO: 271 K/UL (ref 164–446)
PMV BLD AUTO: 11.6 FL (ref 9–12.9)
POTASSIUM SERPL-SCNC: 4.2 MMOL/L (ref 3.6–5.5)
RBC # BLD AUTO: 5.79 M/UL (ref 4.7–6.1)
SODIUM SERPL-SCNC: 137 MMOL/L (ref 135–145)
WBC # BLD AUTO: 9.2 K/UL (ref 4.8–10.8)

## 2020-02-21 PROCEDURE — 94640 AIRWAY INHALATION TREATMENT: CPT

## 2020-02-21 PROCEDURE — 700111 HCHG RX REV CODE 636 W/ 250 OVERRIDE (IP): Performed by: HOSPITALIST

## 2020-02-21 PROCEDURE — 80048 BASIC METABOLIC PNL TOTAL CA: CPT

## 2020-02-21 PROCEDURE — A9270 NON-COVERED ITEM OR SERVICE: HCPCS | Performed by: PHYSICAL MEDICINE & REHABILITATION

## 2020-02-21 PROCEDURE — 92526 ORAL FUNCTION THERAPY: CPT

## 2020-02-21 PROCEDURE — 97535 SELF CARE MNGMENT TRAINING: CPT

## 2020-02-21 PROCEDURE — 700111 HCHG RX REV CODE 636 W/ 250 OVERRIDE (IP): Performed by: PHYSICAL MEDICINE & REHABILITATION

## 2020-02-21 PROCEDURE — 700105 HCHG RX REV CODE 258: Performed by: HOSPITALIST

## 2020-02-21 PROCEDURE — 700105 HCHG RX REV CODE 258: Performed by: PHYSICAL MEDICINE & REHABILITATION

## 2020-02-21 PROCEDURE — 770010 HCHG ROOM/CARE - REHAB SEMI PRIVAT*

## 2020-02-21 PROCEDURE — 700102 HCHG RX REV CODE 250 W/ 637 OVERRIDE(OP): Performed by: PHYSICAL MEDICINE & REHABILITATION

## 2020-02-21 PROCEDURE — 700101 HCHG RX REV CODE 250: Performed by: PHYSICAL MEDICINE & REHABILITATION

## 2020-02-21 PROCEDURE — 99283 EMERGENCY DEPT VISIT LOW MDM: CPT

## 2020-02-21 PROCEDURE — 85027 COMPLETE CBC AUTOMATED: CPT

## 2020-02-21 PROCEDURE — 99232 SBSQ HOSP IP/OBS MODERATE 35: CPT | Performed by: HOSPITALIST

## 2020-02-21 PROCEDURE — 70450 CT HEAD/BRAIN W/O DYE: CPT

## 2020-02-21 PROCEDURE — 99232 SBSQ HOSP IP/OBS MODERATE 35: CPT | Performed by: PHYSICAL MEDICINE & REHABILITATION

## 2020-02-21 PROCEDURE — 36415 COLL VENOUS BLD VENIPUNCTURE: CPT

## 2020-02-21 RX ORDER — POLYETHYLENE GLYCOL 3350 17 G/17G
1 POWDER, FOR SOLUTION ORAL DAILY
Status: CANCELLED | OUTPATIENT
Start: 2020-02-21

## 2020-02-21 RX ORDER — LANOLIN ALCOHOL/MO/W.PET/CERES
CREAM (GRAM) TOPICAL 3 TIMES DAILY PRN
Status: DISCONTINUED | OUTPATIENT
Start: 2020-02-21 | End: 2020-03-12 | Stop reason: HOSPADM

## 2020-02-21 RX ORDER — TRAMADOL HYDROCHLORIDE 50 MG/1
50 TABLET ORAL EVERY 4 HOURS PRN
Status: CANCELLED | OUTPATIENT
Start: 2020-02-21

## 2020-02-21 RX ORDER — HYDRALAZINE HYDROCHLORIDE 25 MG/1
25 TABLET, FILM COATED ORAL EVERY 8 HOURS PRN
Status: DISCONTINUED | OUTPATIENT
Start: 2020-02-21 | End: 2020-03-12 | Stop reason: HOSPADM

## 2020-02-21 RX ORDER — IPRATROPIUM BROMIDE AND ALBUTEROL SULFATE 2.5; .5 MG/3ML; MG/3ML
3 SOLUTION RESPIRATORY (INHALATION)
Status: DISCONTINUED | OUTPATIENT
Start: 2020-02-21 | End: 2020-03-12 | Stop reason: HOSPADM

## 2020-02-21 RX ORDER — PROPRANOLOL HYDROCHLORIDE 10 MG/1
10 TABLET ORAL 3 TIMES DAILY
Status: DISCONTINUED | OUTPATIENT
Start: 2020-02-21 | End: 2020-02-22

## 2020-02-21 RX ORDER — SIMETHICONE 80 MG
120 TABLET,CHEWABLE ORAL 3 TIMES DAILY
Status: CANCELLED | OUTPATIENT
Start: 2020-02-21

## 2020-02-21 RX ORDER — BACLOFEN 10 MG/1
5 TABLET ORAL 3 TIMES DAILY
Status: CANCELLED | OUTPATIENT
Start: 2020-02-21

## 2020-02-21 RX ORDER — MONTELUKAST SODIUM 10 MG/1
10 TABLET ORAL NIGHTLY
Status: DISCONTINUED | OUTPATIENT
Start: 2020-02-21 | End: 2020-03-12 | Stop reason: HOSPADM

## 2020-02-21 RX ORDER — ALUMINA, MAGNESIA, AND SIMETHICONE 2400; 2400; 240 MG/30ML; MG/30ML; MG/30ML
20 SUSPENSION ORAL
Status: CANCELLED | OUTPATIENT
Start: 2020-02-21

## 2020-02-21 RX ORDER — LINEZOLID 600 MG/1
600 TABLET, FILM COATED ORAL EVERY 12 HOURS
Status: COMPLETED | OUTPATIENT
Start: 2020-02-21 | End: 2020-02-22

## 2020-02-21 RX ORDER — VITAMIN B COMPLEX
1000 TABLET ORAL DAILY
Status: CANCELLED | OUTPATIENT
Start: 2020-02-22

## 2020-02-21 RX ORDER — BACLOFEN 10 MG/1
5 TABLET ORAL 3 TIMES DAILY
Status: DISCONTINUED | OUTPATIENT
Start: 2020-02-21 | End: 2020-03-03

## 2020-02-21 RX ORDER — TRAZODONE HYDROCHLORIDE 50 MG/1
50 TABLET ORAL
Status: DISCONTINUED | OUTPATIENT
Start: 2020-02-21 | End: 2020-02-24

## 2020-02-21 RX ORDER — LEVETIRACETAM 100 MG/ML
500 SOLUTION ORAL EVERY 12 HOURS
Status: CANCELLED | OUTPATIENT
Start: 2020-02-21

## 2020-02-21 RX ORDER — ALUMINA, MAGNESIA, AND SIMETHICONE 2400; 2400; 240 MG/30ML; MG/30ML; MG/30ML
20 SUSPENSION ORAL
Status: DISCONTINUED | OUTPATIENT
Start: 2020-02-21 | End: 2020-03-12 | Stop reason: HOSPADM

## 2020-02-21 RX ORDER — ONDANSETRON 2 MG/ML
4 INJECTION INTRAMUSCULAR; INTRAVENOUS 4 TIMES DAILY PRN
Status: CANCELLED | OUTPATIENT
Start: 2020-02-21

## 2020-02-21 RX ORDER — ACETAMINOPHEN 325 MG/1
650 TABLET ORAL EVERY 4 HOURS PRN
Status: DISCONTINUED | OUTPATIENT
Start: 2020-02-21 | End: 2020-03-12 | Stop reason: HOSPADM

## 2020-02-21 RX ORDER — ONDANSETRON 4 MG/1
4 TABLET, ORALLY DISINTEGRATING ORAL 4 TIMES DAILY PRN
Status: CANCELLED | OUTPATIENT
Start: 2020-02-21

## 2020-02-21 RX ORDER — ACETAMINOPHEN 325 MG/1
650 TABLET ORAL EVERY 4 HOURS PRN
Status: CANCELLED | OUTPATIENT
Start: 2020-02-21

## 2020-02-21 RX ORDER — TRAZODONE HYDROCHLORIDE 50 MG/1
50 TABLET ORAL
Status: CANCELLED | OUTPATIENT
Start: 2020-02-21

## 2020-02-21 RX ORDER — HYDRALAZINE HYDROCHLORIDE 25 MG/1
25 TABLET, FILM COATED ORAL EVERY 8 HOURS PRN
Status: CANCELLED | OUTPATIENT
Start: 2020-02-21

## 2020-02-21 RX ORDER — IPRATROPIUM BROMIDE AND ALBUTEROL SULFATE 2.5; .5 MG/3ML; MG/3ML
3 SOLUTION RESPIRATORY (INHALATION)
Status: CANCELLED | OUTPATIENT
Start: 2020-02-21

## 2020-02-21 RX ORDER — SIMETHICONE 80 MG
120 TABLET,CHEWABLE ORAL 3 TIMES DAILY
Status: DISCONTINUED | OUTPATIENT
Start: 2020-02-21 | End: 2020-02-25

## 2020-02-21 RX ORDER — LINEZOLID 600 MG/1
600 TABLET, FILM COATED ORAL EVERY 12 HOURS
Status: CANCELLED | OUTPATIENT
Start: 2020-02-21 | End: 2020-02-25

## 2020-02-21 RX ORDER — BISACODYL 10 MG
10 SUPPOSITORY, RECTAL RECTAL
Status: CANCELLED | OUTPATIENT
Start: 2020-02-21

## 2020-02-21 RX ORDER — POLYETHYLENE GLYCOL 3350 17 G/17G
1 POWDER, FOR SOLUTION ORAL DAILY
Status: DISCONTINUED | OUTPATIENT
Start: 2020-02-21 | End: 2020-03-12 | Stop reason: HOSPADM

## 2020-02-21 RX ORDER — BISACODYL 10 MG
10 SUPPOSITORY, RECTAL RECTAL
Status: DISCONTINUED | OUTPATIENT
Start: 2020-02-21 | End: 2020-03-12 | Stop reason: HOSPADM

## 2020-02-21 RX ORDER — ONDANSETRON 2 MG/ML
4 INJECTION INTRAMUSCULAR; INTRAVENOUS 4 TIMES DAILY PRN
Status: DISCONTINUED | OUTPATIENT
Start: 2020-02-21 | End: 2020-03-12 | Stop reason: HOSPADM

## 2020-02-21 RX ORDER — QUETIAPINE FUMARATE 25 MG/1
25 TABLET, FILM COATED ORAL 3 TIMES DAILY PRN
Status: DISCONTINUED | OUTPATIENT
Start: 2020-02-21 | End: 2020-03-12 | Stop reason: HOSPADM

## 2020-02-21 RX ORDER — LEVETIRACETAM 100 MG/ML
500 SOLUTION ORAL EVERY 12 HOURS
Status: DISCONTINUED | OUTPATIENT
Start: 2020-02-21 | End: 2020-03-12 | Stop reason: HOSPADM

## 2020-02-21 RX ORDER — POLYVINYL ALCOHOL 14 MG/ML
1 SOLUTION/ DROPS OPHTHALMIC PRN
Status: CANCELLED | OUTPATIENT
Start: 2020-02-21

## 2020-02-21 RX ORDER — POLYETHYLENE GLYCOL 3350 17 G/17G
1 POWDER, FOR SOLUTION ORAL
Status: CANCELLED | OUTPATIENT
Start: 2020-02-21

## 2020-02-21 RX ORDER — MONTELUKAST SODIUM 10 MG/1
10 TABLET ORAL NIGHTLY
Status: CANCELLED | OUTPATIENT
Start: 2020-02-21

## 2020-02-21 RX ORDER — PROPRANOLOL HYDROCHLORIDE 10 MG/1
10 TABLET ORAL 3 TIMES DAILY
Status: CANCELLED | OUTPATIENT
Start: 2020-02-21

## 2020-02-21 RX ORDER — ONDANSETRON 4 MG/1
4 TABLET, ORALLY DISINTEGRATING ORAL 4 TIMES DAILY PRN
Status: DISCONTINUED | OUTPATIENT
Start: 2020-02-21 | End: 2020-03-12 | Stop reason: HOSPADM

## 2020-02-21 RX ORDER — LANOLIN ALCOHOL/MO/W.PET/CERES
CREAM (GRAM) TOPICAL 3 TIMES DAILY PRN
Status: CANCELLED | OUTPATIENT
Start: 2020-02-21

## 2020-02-21 RX ORDER — POLYVINYL ALCOHOL 14 MG/ML
1 SOLUTION/ DROPS OPHTHALMIC PRN
Status: DISCONTINUED | OUTPATIENT
Start: 2020-02-21 | End: 2020-03-12 | Stop reason: HOSPADM

## 2020-02-21 RX ORDER — ECHINACEA PURPUREA EXTRACT 125 MG
2 TABLET ORAL PRN
Status: CANCELLED | OUTPATIENT
Start: 2020-02-21

## 2020-02-21 RX ORDER — VITAMIN B COMPLEX
1000 TABLET ORAL DAILY
Status: DISCONTINUED | OUTPATIENT
Start: 2020-02-22 | End: 2020-03-12 | Stop reason: HOSPADM

## 2020-02-21 RX ORDER — POLYETHYLENE GLYCOL 3350 17 G/17G
1 POWDER, FOR SOLUTION ORAL
Status: DISCONTINUED | OUTPATIENT
Start: 2020-02-21 | End: 2020-03-12 | Stop reason: HOSPADM

## 2020-02-21 RX ORDER — TRAMADOL HYDROCHLORIDE 50 MG/1
50 TABLET ORAL EVERY 4 HOURS PRN
Status: DISCONTINUED | OUTPATIENT
Start: 2020-02-21 | End: 2020-03-12 | Stop reason: HOSPADM

## 2020-02-21 RX ORDER — ECHINACEA PURPUREA EXTRACT 125 MG
2 TABLET ORAL PRN
Status: DISCONTINUED | OUTPATIENT
Start: 2020-02-21 | End: 2020-03-12 | Stop reason: HOSPADM

## 2020-02-21 RX ORDER — QUETIAPINE FUMARATE 25 MG/1
25 TABLET, FILM COATED ORAL 3 TIMES DAILY PRN
Status: CANCELLED | OUTPATIENT
Start: 2020-02-21

## 2020-02-21 RX ADMIN — MONTELUKAST 10 MG: 10 TABLET, FILM COATED ORAL at 20:55

## 2020-02-21 RX ADMIN — PROPRANOLOL HYDROCHLORIDE 10 MG: 10 TABLET ORAL at 20:55

## 2020-02-21 RX ADMIN — LINEZOLID 600 MG: 600 TABLET, FILM COATED ORAL at 20:55

## 2020-02-21 RX ADMIN — AMANTADINE HYDROCHLORIDE 50 MG: 50 SOLUTION ORAL at 15:56

## 2020-02-21 RX ADMIN — SIMETHICONE CHEW TAB 80 MG 120 MG: 80 TABLET ORAL at 15:56

## 2020-02-21 RX ADMIN — AMANTADINE HYDROCHLORIDE 50 MG: 50 SOLUTION ORAL at 08:09

## 2020-02-21 RX ADMIN — IPRATROPIUM BROMIDE AND ALBUTEROL SULFATE 3 ML: .5; 3 SOLUTION RESPIRATORY (INHALATION) at 08:08

## 2020-02-21 RX ADMIN — PIPERACILLIN AND TAZOBACTAM 3.38 G: 3; .375 INJECTION, POWDER, LYOPHILIZED, FOR SOLUTION INTRAVENOUS; PARENTERAL at 19:15

## 2020-02-21 RX ADMIN — PROPRANOLOL HYDROCHLORIDE 10 MG: 10 TABLET ORAL at 15:57

## 2020-02-21 RX ADMIN — PIPERACILLIN AND TAZOBACTAM 3.38 G: 3; .375 INJECTION, POWDER, LYOPHILIZED, FOR SOLUTION INTRAVENOUS; PARENTERAL at 14:09

## 2020-02-21 RX ADMIN — SIMETHICONE CHEW TAB 80 MG 120 MG: 80 TABLET ORAL at 20:54

## 2020-02-21 RX ADMIN — BACLOFEN 5 MG: 10 TABLET ORAL at 20:55

## 2020-02-21 RX ADMIN — TRAZODONE HYDROCHLORIDE 50 MG: 50 TABLET ORAL at 20:55

## 2020-02-21 RX ADMIN — LEVETIRACETAM 500 MG: 500 SOLUTION ORAL at 20:54

## 2020-02-21 RX ADMIN — PIPERACILLIN AND TAZOBACTAM 3.38 G: 3; .375 INJECTION, POWDER, LYOPHILIZED, FOR SOLUTION INTRAVENOUS; PARENTERAL at 05:50

## 2020-02-21 RX ADMIN — BACLOFEN 5 MG: 10 TABLET ORAL at 15:56

## 2020-02-21 RX ADMIN — LORAZEPAM 1 MG: 2 INJECTION INTRAMUSCULAR; INTRAVENOUS at 08:09

## 2020-02-21 NOTE — THERAPY
Occupational Therapy  Daily Treatment     Patient Name: Rey Medina  Age:  24 y.o., Sex:  male  Medical Record #: [id number]  Today's Date: 2/21/2020     Precautions  Precautions: Fall Risk, PEG Tube  Comments: Recent decannulation of tracheostomy    Safety   Comments: (P) Total assist at this time due to agitation, level of alertness    Subjective    Patient presents non-verbal.      Objective     02/21/20 1331   Precautions   Precautions Fall Risk;PEG Tube;Other (See Comments)   Comments Recent decannulation of tracheostomy   Safety    Comments Total assist at this time due to agitation, level of alertness   Interdisciplinary Plan of Care Collaboration   IDT Collaboration with  Family / Caregiver;Physician   Patient Position at End of Therapy In Bed   Collaboration Comments Patient's mother present during session; MD checked in with mother re: CT scan   OT Total Time Spent   OT Individual Total Time Spent (Mins) 15   OT Charge Group   OT Self Care / ADL 1     FIM Grooming Score:  1 - Total Assistance  Grooming Description:  (Total assistance for washing face)    FIM Bathing Score:  1 - Total Assistance  Bathing Description:  (Total assist for sponge bath in bed)    Attempted to complete PROM exercises with BUEs and UB dressing however patient unable to tolerate AEB fixation on groin area/urinal, agitation and evasive/withdrawn behavior.     Assessment    Increased agitation noted today (compared to yesterday's session). Patient unable to tolerate PROM exercises nor assist for ADLs due to fixation on groin area/urinal, agitation and evasive/withdrawn behavior.       Plan    Low-stim environment,  sensory stimulation, ADLs/functional participation, sitting balance

## 2020-02-21 NOTE — ASSESSMENT & PLAN NOTE
WBC's: 13.3 (3/8)  Has been afebrile  UA (-) WBC  CXR (3/6): unremarkable  Leukocytosis seems to come and go  ? 2nd to reactive demargination from agitation  ? 2nd to Amantadine  Monitor for now

## 2020-02-21 NOTE — THERAPY
Missed Therapy     Patient Name: Rey Medina  Age:  24 y.o., Sex:  male  Medical Record #: [id number]  Today's Date: 2/21/2020    Discussed missed therapy with Dr David and therapy .    Attempted PT, pt resistive to all movement and increased agitation with tactile stimulus. Not able to participate.

## 2020-02-21 NOTE — CARE PLAN
Problem: Skin Integrity  Goal: Risk for impaired skin integrity will decrease  Outcome: PROGRESSING AS EXPECTED

## 2020-02-21 NOTE — FLOWSHEET NOTE
02/20/20 174   Events/Summary/Plan   Events/Summary/Plan Trach stoma care done by pt's mother.  Dressing clean and dry.

## 2020-02-21 NOTE — THERAPY
Missed Therapy     Patient Name: Rey Medina  Age:  24 y.o., Sex:  male  Medical Record #: [id number]  Today's Date: 2/21/2020    Discussed missed therapy with Dr. David.    Pt out of facility for CT scan and had not yet returned.

## 2020-02-21 NOTE — THERAPY
Missed Therapy     Patient Name: Rey Medina  Age:  24 y.o., Sex:  male  Medical Record #: [id number]  Today's Date: 2/21/2020    Discussed missed therapy with Dr. David. Medical hold placed.      Pt out of facility for CT scan and had not yet returned.

## 2020-02-21 NOTE — CARE PLAN
Problem: Other Problem (see comments)  Goal: Other Goal  Description: 1. Tolerance to EN regimen 2. Maintain adequate enteral nutrient/fluid intake (PO vs TF) to promote nutrition optimization/healing 3.  Transition to PO pending clinical outcomes    Outcome: MET

## 2020-02-21 NOTE — THERAPY
Speech Language Pathology  Daily Treatment     Patient Name: Rey Medina  Age:  24 y.o., Sex:  male  Medical Record #: [id number]  Today's Date: 2/21/2020     Subjective    Pt pleasant and cooperative during tx.       Objective       02/21/20 0801   Dysphagia    Other Treatments oral care/stim   Interdisciplinary Plan of Care Collaboration   IDT Collaboration with  Nursing;Respiratory Therapist;Family / Caregiver   Collaboration Comments regarding current status/status of lungs    SLP Total Time Spent   SLP Individual Total Time Spent (Mins) 30   Charge Group   SLP Swallowing Dysfunction Treatment Swallowing Dysfunction Treatment       FIM Eating Score:  1 - Total Assistance  Eating Description:  Staff administers tube feed/parenteral nutrition/IVF      Assessment    Pt keeping eyes open more consistently, when compared to previous notes.  Pt clenched lips tightly when oral care was attempted.  Pt clenched lips with presentation of sucker, and tongue depressor.  Per RT, rhonchi in both lungs.  Pt's mother reported that he was more cooperative yesterday afternoon.      Plan    Continue oral care/stim.

## 2020-02-21 NOTE — THERAPY
Missed Therapy     Patient Name: Rey Medina  Age:  24 y.o., Sex:  male  Medical Record #: [id number]  Today's Date: 2/21/2020    Patient out of facility for CT scan during scheduled 10:30 am OT session, thus unable to participate and on medical hold.

## 2020-02-21 NOTE — THERAPY
Physical Therapy   Daily Treatment     Patient Name: Rey Medina  Age:  24 y.o., Sex:  male  Medical Record #: [id number]  Today's Date: 2/20/2020     Precautions  Precautions: Fall Risk, PEG Tube  Comments: recent decannulation of tracheostomy    Subjective    Pt in therapy gym with mother pushing WC, mother reports he needs to use the urinal.      Objective       02/20/20 1531   Precautions   Precautions Fall Risk;PEG Tube   Comments recent decannulation of tracheostomy   Interdisciplinary Plan of Care Collaboration   IDT Collaboration with  Nursing;Family / Caregiver   Patient Position at End of Therapy Seated;Family / Friend in Room   Collaboration Comments RN checked on pt and spoke with mother regarding CT scan this evening. Pt's mother present and involved during PT session    PT Total Time Spent   PT Individual Total Time Spent (Mins) 30   PT Charge Group   PT Therapeutic Exercise 2     Pt's mother assisted with use of urinal and attempted to retrieve urinal after use. Discussed with mother and RN need to remove urinal between uses in order to promote continence. PT attempted to assist removal of urinal, however pt unwilling to let go and swatting at PT.     PT attempted BLE PROM with pt seated in WC: pt tolerated partial range on RLE moving into knee extension, small increments to increase pt tolerance. No tolerance for LLE ROM.     FIM Wheelchair Score:  1 - Total Assistance  Wheelchair Description:  (pt requires staff or family to propel WC )      Assessment    Pt with slightly improved participation, able to tolerate partial PROM on RLE. Time spent educating and discussing CLOF with pt's mother and RN.     Plan    PROM and mobility as pt tolerates.

## 2020-02-21 NOTE — ASSESSMENT & PLAN NOTE
Had AVM rupture with large bleed on 4/21/19, s/p AVM repair  Also had hydrocephalus, s/p  shunt  On Keppra  On Amantadine  Repeat CT negative acute

## 2020-02-21 NOTE — FLOWSHEET NOTE
02/21/20 0821   Events/Summary/Plan   Events/Summary/Plan called by rn to give prn tx, pt needs to cough he is a bit agitated    Vital Signs   Pulse 100   Respiration (!) 22   Pulse Oximetry 95 %   Respiratory Assessment   Level of Consciousness Agitated   Chest Exam   Work Of Breathing / Effort Moderate   Breath Sounds   RUL Breath Sounds Clear;Rhonchi   RML Breath Sounds Rhonchi   RLL Breath Sounds Diminished   FORTINO Breath Sounds Rhonchi   LLL Breath Sounds Diminished   Secretions   Cough   (pt needs to cough )   Airways   Airway LDA   (mom at bedside, she did stoma care this morning )   Oxygen   O2 (LPM) 0   FiO2% 21 %   O2 Delivery Device None - Room Air

## 2020-02-21 NOTE — ED PROVIDER NOTES
ED Provider Note    CHIEF COMPLAINT  Chief Complaint   Patient presents with   • Other     out pt ct       HPI  Rey Medina is a 24 y.o. male who presents for outpatient CT.  The patient resides at [facility].  He is status post AVM rupture and hemorrhagic stroke while in the [facility].  He had a  shunt placed at that time.  He was sent for an outpatient CT however they wound up getting admitted through the ER.    REVIEW OF SYSTEMS  See HPI for further details. All other systems negative.    PAST MEDICAL HISTORY  Past Medical History:   Diagnosis Date   • Stroke (HCC)        FAMILY HISTORY  Family History   Problem Relation Age of Onset   • Hypertension Mother    • Hypertension Brother        SOCIAL HISTORY  Social History     Socioeconomic History   • Marital status: Single     Spouse name: Not on file   • Number of children: Not on file   • Years of education: Not on file   • Highest education level: Not on file   Occupational History   • Not on file   Social Needs   • Financial resource strain: Not on file   • Food insecurity     Worry: Not on file     Inability: Not on file   • Transportation needs     Medical: Not on file     Non-medical: Not on file   Tobacco Use   • Smoking status: Never Smoker   • Smokeless tobacco: Never Used   Substance and Sexual Activity   • Alcohol use: Never     Frequency: Never   • Drug use: Never   • Sexual activity: Not on file   Lifestyle   • Physical activity     Days per week: Not on file     Minutes per session: Not on file   • Stress: Not on file   Relationships   • Social connections     Talks on phone: Not on file     Gets together: Not on file     Attends Mandaeism service: Not on file     Active member of club or organization: Not on file     Attends meetings of clubs or organizations: Not on file     Relationship status: Not on file   • Intimate partner violence     Fear of current or ex partner: Not on file     Emotionally abused: Not on file     Physically  abused: Not on file     Forced sexual activity: Not on file   Other Topics Concern   •  Service No   • Blood Transfusions No   • Caffeine Concern No   • Occupational Exposure No   • Hobby Hazards No   • Sleep Concern No   • Stress Concern No   • Weight Concern No   • Special Diet Yes   • Back Care No   • Exercise Yes   • Bike Helmet No   • Seat Belt Yes   • Self-Exams No   Social History Narrative   • Not on file       SURGICAL HISTORY  History reviewed. No pertinent surgical history.    CURRENT MEDICATIONS  Home Medications    **Home medications have not yet been reviewed for this encounter**         ALLERGIES  Allergies   Allergen Reactions   • Vancomycin Swelling   • Other Misc Rash     Allergic to name band.       PHYSICAL EXAM  VITAL SIGNS: /77   Pulse 90   Temp 36 °C (96.8 °F)   Resp 20   Wt 77 kg (169 lb 12.1 oz)   BMI 26.59 kg/m²   Constitutional: Well developed, Well nourished.   HENT: Normocephalic, Atraumatic.  Cardiovascular: Normal heart rate.   Thorax & Lungs: No respiratory distress.  Skin: Warm, Dry.  Neurologic: Awake and sitting up on the gurney.    RADIOLOGY/PROCEDURES  Outpatient CT scan demonstrates ventriculostomy but no evidence of acute abnormalities.    COURSE & MEDICAL DECISION MAKING  Pertinent Labs & Imaging studies reviewed. (See chart for details)  This is a 24-year-old who is actually sent to this hospital for an outpatient CT scan but inadvertently was brought to the emergency department.  He has had his CT scan performed and I have reviewed the study and discussed the results with the family.  At this point he will be transferred back to [facility] rehab for long-term care.    FINAL IMPRESSION  1.  Follow-up CT status post hemorrhagic stroke  2.   3.         Electronically signed by: Pepe Allen M.D., 2/21/2020 10:58 AM

## 2020-02-21 NOTE — ED NOTES
Pt back from ct , lani and MTM contacted for transfer back to [facility]

## 2020-02-21 NOTE — PROGRESS NOTES
Hospital Medicine Daily Progress Note      Chief Complaint:  Fever  Tachycardia  Leukocytosis    Interval History:  No overnight events.  Had CT Head done earlier this morning.    Review of Systems  Review of Systems   Unable to perform ROS: Medical condition        Physical Exam  Temp:  [36.6 °C (97.9 °F)-36.8 °C (98.3 °F)] 36.8 °C (98.3 °F)  Pulse:  [] 100  Resp:  [18-22] 22  BP: (115-128)/(61-79) 115/76  SpO2:  [94 %-95 %] 95 %    Physical Exam  Vitals signs reviewed.   Constitutional:       Appearance: He is not toxic-appearing or diaphoretic.   HENT:      Right Ear: External ear normal.      Left Ear: External ear normal.      Nose: Nose normal.   Eyes:      General:         Right eye: No discharge.         Left eye: No discharge.      Extraocular Movements: Extraocular movements intact.      Conjunctiva/sclera: Conjunctivae normal.   Neck:      Musculoskeletal: Normal range of motion and neck supple.      Comments: Trach stoma dressed  Cardiovascular:      Rate and Rhythm: Regular rhythm. Tachycardia present.   Pulmonary:      Effort: No respiratory distress.      Breath sounds: No wheezing.      Comments: Decreased BS  Abdominal:      General: Bowel sounds are normal. There is no distension.      Palpations: Abdomen is soft.      Tenderness: There is no abdominal tenderness.      Comments: PEG site dressed   Musculoskeletal:      Right lower leg: No edema.      Left lower leg: No edema.   Skin:     General: Skin is warm and dry.   Neurological:      Comments: Awake but unresponsive         Fluids  No intake or output data in the 24 hours ending 02/21/20 1407    Laboratory  Recent Labs     02/19/20  0609 02/21/20  0611   WBC 12.0* 9.2   RBC 6.11* 5.79   HEMOGLOBIN 16.5 15.8   HEMATOCRIT 50.1 47.5   MCV 82.0 82.0   MCH 27.0 27.3   MCHC 32.9* 33.3*   RDW 44.5 45.1   PLATELETCT 297 271   MPV 11.1 11.6     Recent Labs     02/21/20  0611   SODIUM 137   POTASSIUM 4.2   CHLORIDE 104   CO2 24   GLUCOSE 104*    BUN 11   CREATININE 0.80   CALCIUM 9.0                 Assessment/Plan  Tachycardia- (present on admission)  Assessment & Plan  Suspect 2/2 agitation and possible infxn  Continue Propranol for HR control    Leukocytosis- (present on admission)  Assessment & Plan  UA 0-2 WBC  BCx x 2 NGTD  CXR +basilar opacities, possible PNA  Elevated WBC improved on Zosyn and Zyvox  Vancomycin allergy noted  F/U CXR negative acute  WBC normalized  But would continue current abx for total 5 days    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)  Assessment & Plan  Nonverbal  Gets agitated and combative at times  Now off Seroquel    Dysphagia following nontraumatic intracerebral hemorrhage- (present on admission)  Assessment & Plan  Has PEG tube and tolerating TF    Tracheostomy dependent (HCC)- (present on admission)  Assessment & Plan  Chronic trach since having AVM rupture and bleed  Recently decannulated at Rehab  Now on RA    Intracerebral hemorrhage, intraventricular (HCC)- (present on admission)  Assessment & Plan  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

## 2020-02-21 NOTE — PROGRESS NOTES
Rehab Progress Note     Encounter Date: 2/21/2020    CC: ICH, AMS    Interval Events (Subjective)  Patient sitting up in room. Patient just returned from CT head. Per mother he was a little more agitated this morning. Now he is holding onto the urinal and not relaxing. She reports he had some incontinence while at the CT head so it may have caused some skin irritation. Reviewed CT head with mother with normal ventricles and no noticeable encephalomalacia. She reports she will discuss with NSG when she follows up about the shunt. Otherwise patient doing well with therapies. No change in stimulant.     ROS: Cannot participate in conversation. No grimacing.     IDT Team Meeting 2/18/2020  DC/Disposition:  2/26/20    Objective:  VITAL SIGNS: There were no vitals taken for this visit. HR: 100, /86, T: 98.3: RR: 22  Gen: NAD  Psych: Mood and affect flat  CV: RRR, no edema  Resp: CTAB, no upper airway sounds  Abd: NTND  Neuro: holding urinal without releasing, eyes closed.      Recent Results (from the past 72 hour(s))   CBC WITHOUT DIFFERENTIAL    Collection Time: 02/19/20  6:09 AM   Result Value Ref Range    WBC 12.0 (H) 4.8 - 10.8 K/uL    RBC 6.11 (H) 4.70 - 6.10 M/uL    Hemoglobin 16.5 14.0 - 18.0 g/dL    Hematocrit 50.1 42.0 - 52.0 %    MCV 82.0 81.4 - 97.8 fL    MCH 27.0 27.0 - 33.0 pg    MCHC 32.9 (L) 33.7 - 35.3 g/dL    RDW 44.5 35.9 - 50.0 fL    Platelet Count 297 164 - 446 K/uL    MPV 11.1 9.0 - 12.9 fL   CREATINE KINASE    Collection Time: 02/19/20  6:09 AM   Result Value Ref Range    CPK Total 114 0 - 154 U/L   CBC WITHOUT DIFFERENTIAL    Collection Time: 02/21/20  6:11 AM   Result Value Ref Range    WBC 9.2 4.8 - 10.8 K/uL    RBC 5.79 4.70 - 6.10 M/uL    Hemoglobin 15.8 14.0 - 18.0 g/dL    Hematocrit 47.5 42.0 - 52.0 %    MCV 82.0 81.4 - 97.8 fL    MCH 27.3 27.0 - 33.0 pg    MCHC 33.3 (L) 33.7 - 35.3 g/dL    RDW 45.1 35.9 - 50.0 fL    Platelet Count 271 164 - 446 K/uL    MPV 11.6 9.0 - 12.9 fL   Basic  Metabolic Panel    Collection Time: 02/21/20  6:11 AM   Result Value Ref Range    Sodium 137 135 - 145 mmol/L    Potassium 4.2 3.6 - 5.5 mmol/L    Chloride 104 96 - 112 mmol/L    Co2 24 20 - 33 mmol/L    Glucose 104 (H) 65 - 99 mg/dL    Bun 11 8 - 22 mg/dL    Creatinine 0.80 0.50 - 1.40 mg/dL    Calcium 9.0 8.5 - 10.5 mg/dL    Anion Gap 9.0 0.0 - 11.9   ESTIMATED GFR    Collection Time: 02/21/20  6:11 AM   Result Value Ref Range    GFR If African American >60 >60 mL/min/1.73 m 2    GFR If Non African American >60 >60 mL/min/1.73 m 2       Current Facility-Administered Medications   Medication Frequency   • acetaminophen (TYLENOL) tablet 650 mg Q4HRS PRN   • artificial tears ophthalmic solution 1 Drop PRN   • benzocaine-menthol (CEPACOL) lozenge 1 Lozenge Q2HRS PRN   • hydrALAZINE (APRESOLINE) tablet 25 mg Q8HRS PRN   • mag hydrox-al hydrox-simeth (MAALOX PLUS ES or MYLANTA DS) suspension 20 mL Q2HRS PRN   • ondansetron (ZOFRAN ODT) dispertab 4 mg 4X/DAY PRN    Or   • ondansetron (ZOFRAN) syringe/vial injection 4 mg 4X/DAY PRN   • polyethylene glycol/lytes (MIRALAX) PACKET 1 Packet Q24HRS PRN    And   • magnesium hydroxide (MILK OF MAGNESIA) suspension 30 mL QDAY PRN    And   • bisacodyl (DULCOLAX) suppository 10 mg QDAY PRN   • sodium chloride (OCEAN) 0.65 % nasal spray 2 Spray PRN   • traZODone (DESYREL) tablet 50 mg QHS PRN   • tramadol (ULTRAM) 50 MG tablet 50 mg Q4HRS PRN   • Respiratory Therapy Consult Continuous RT   • amantadine (SYMMETREL) 50 MG/5ML syrup 50 mg BID   • baclofen (LIORESAL) tablet 5 mg TID   • eucerin cream TID PRN   • ipratropium-albuterol (DUONEB) nebulizer solution Q4H PRN (RT)   • levETIRAcetam (KEPPRA) 100 MG/ML solution 500 mg Q12HRS   • linezolid (ZYVOX) tablet 600 mg Q12HRS   • montelukast (SINGULAIR) tablet 10 mg Nightly   • [START ON 2/22/2020] omeprazole (FIRST-OMEPRAZOLE) 2 mg/mL oral susp 40 mg DAILY   • piperacillin-tazobactam (ZOSYN) 3.375 g in  mL IVPB Q6HRS   •  polyethylene glycol/lytes (MIRALAX) PACKET 1 Packet DAILY   • propranolol (INDERAL) tablet 10 mg TID   • QUEtiapine (SEROQUEL) tablet 25 mg TID PRN   • simethicone (MYLICON) chewable tab 120 mg TID   • traZODone (DESYREL) tablet 50 mg QHS   • [START ON 2/22/2020] vitamin D (cholecalciferol) tablet 1,000 Units DAILY       Orders Placed This Encounter   Procedures   • Diet NPO     Standing Status:   Standing     Number of Occurrences:   1     Order Specific Question:   Restrict to:     Answer:   Strict [1]       Assessment:  Active Hospital Problems    Diagnosis   • *Intracerebral hemorrhage, intraventricular (HCC)   • 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 Patient very low functioning but per mother becoming more purposeful  -PT and OT for mobility and ADLs  -SLP for cognition   -Patient agitated on Amantadine and Modafinil. Both trials halted due to agitation. Restart Amantadine 50 mg as he was too agitated on 100 mg but was speaking and helping with ADLs  -Continue Keppra. Unclear if had seizure or was continued on prophylaxis  -NSG appointment on 2/7/20 to evaluation  shunt. Will follow-up in 2 months   -CT head as waxing and waning status. At times he voices including his names, at other times no response and compulsive grabbing of bed rail and urinal. CT head - negative.      Agitation - Appears like TBI with agitation worsened from neurostimulant. Change Metoprolol to Propranolol. Start Seroquel PRN, too sedated on scheduled    Muscle spasms - mother claims Bromocriptine helps, typically used for dopamine side effects/neurostimulant. Will start low dose Baclofen and monitor.     Dysphagia - Patient with G Tube in place. SLP for swallow. MBSS on  2/10/20, start on dysphagia 1 with NTL  -Tolerated first meal but emesis on 2nd meal at higher percentage. May be due to distention, no lung changes. Continue to monitor.   -Decreased oral intake on less stimulant, mother is monitoring his intake.      Aspiration pneumonia - Currently on Unasyn.  Elevated WBC, broadened to Zyvox and Zosyn  -Hospitalist consulted    CV - Patient is on Ivabradine 5 mg daily. Unclear reason why. Discontinue Ivabradine, on Metoprolol for tachycardia. Switch metoprolol to Propranolol     Respiratory - Patient with size 7 trach on admission. Patient on Duonebs and Singulair  -Exchange aborted on 2/6/20 due to emesis on suction. Tolerated overnight. Decannulated AM 2/7/20. Tolerated and stoma closing.      GI Ppx - Patient on Prilosec and Prevacid. Unclear why two PPIs. Discontinue Prevacid.      DVT Ppx - Patient on Eliquis 2.5 mg BID. Unknown reason why, mother denies clot or DVT. Discontinue Eliquis as 10 months post-ICH    Total time:  27 minutes.  I spent greater than 50% of the time for patient care, counseling, and coordination on this date, including unit/floor time, and face-to-face time with the patient as per interval events and assessment and plan above. Topics discussed included CT head, reviewed results with mother including imaging, and increased agitation. No change in neurostimulant at this time.     Greer David M.D.

## 2020-02-22 PROCEDURE — 94640 AIRWAY INHALATION TREATMENT: CPT

## 2020-02-22 PROCEDURE — 700101 HCHG RX REV CODE 250: Performed by: PHYSICAL MEDICINE & REHABILITATION

## 2020-02-22 PROCEDURE — 99232 SBSQ HOSP IP/OBS MODERATE 35: CPT | Performed by: HOSPITALIST

## 2020-02-22 PROCEDURE — 700105 HCHG RX REV CODE 258: Performed by: PHYSICAL MEDICINE & REHABILITATION

## 2020-02-22 PROCEDURE — 700111 HCHG RX REV CODE 636 W/ 250 OVERRIDE (IP): Performed by: HOSPITALIST

## 2020-02-22 PROCEDURE — 700105 HCHG RX REV CODE 258: Performed by: HOSPITALIST

## 2020-02-22 PROCEDURE — 700102 HCHG RX REV CODE 250 W/ 637 OVERRIDE(OP): Performed by: HOSPITALIST

## 2020-02-22 PROCEDURE — 92507 TX SP LANG VOICE COMM INDIV: CPT

## 2020-02-22 PROCEDURE — 770010 HCHG ROOM/CARE - REHAB SEMI PRIVAT*

## 2020-02-22 PROCEDURE — 94760 N-INVAS EAR/PLS OXIMETRY 1: CPT

## 2020-02-22 PROCEDURE — 92526 ORAL FUNCTION THERAPY: CPT

## 2020-02-22 PROCEDURE — 700111 HCHG RX REV CODE 636 W/ 250 OVERRIDE (IP): Performed by: PHYSICAL MEDICINE & REHABILITATION

## 2020-02-22 PROCEDURE — 700102 HCHG RX REV CODE 250 W/ 637 OVERRIDE(OP): Performed by: PHYSICAL MEDICINE & REHABILITATION

## 2020-02-22 PROCEDURE — A9270 NON-COVERED ITEM OR SERVICE: HCPCS | Performed by: HOSPITALIST

## 2020-02-22 PROCEDURE — A9270 NON-COVERED ITEM OR SERVICE: HCPCS | Performed by: PHYSICAL MEDICINE & REHABILITATION

## 2020-02-22 RX ORDER — PROPRANOLOL HYDROCHLORIDE 10 MG/1
20 TABLET ORAL 3 TIMES DAILY
Status: DISCONTINUED | OUTPATIENT
Start: 2020-02-22 | End: 2020-03-12 | Stop reason: HOSPADM

## 2020-02-22 RX ADMIN — SIMETHICONE CHEW TAB 80 MG 120 MG: 80 TABLET ORAL at 09:17

## 2020-02-22 RX ADMIN — LINEZOLID 600 MG: 600 TABLET, FILM COATED ORAL at 21:13

## 2020-02-22 RX ADMIN — PIPERACILLIN AND TAZOBACTAM 3.38 G: 3; .375 INJECTION, POWDER, LYOPHILIZED, FOR SOLUTION INTRAVENOUS; PARENTERAL at 00:20

## 2020-02-22 RX ADMIN — CHOLECALCIFEROL TAB 25 MCG (1000 UNIT) 1000 UNITS: 25 TAB at 09:17

## 2020-02-22 RX ADMIN — OMEPRAZOLE 40 MG: KIT at 09:18

## 2020-02-22 RX ADMIN — LEVETIRACETAM 500 MG: 500 SOLUTION ORAL at 09:17

## 2020-02-22 RX ADMIN — PIPERACILLIN AND TAZOBACTAM 3.38 G: 3; .375 INJECTION, POWDER, LYOPHILIZED, FOR SOLUTION INTRAVENOUS; PARENTERAL at 12:15

## 2020-02-22 RX ADMIN — PIPERACILLIN AND TAZOBACTAM 3.38 G: 3; .375 INJECTION, POWDER, LYOPHILIZED, FOR SOLUTION INTRAVENOUS; PARENTERAL at 05:50

## 2020-02-22 RX ADMIN — BACLOFEN 5 MG: 10 TABLET ORAL at 15:21

## 2020-02-22 RX ADMIN — LEVETIRACETAM 500 MG: 500 SOLUTION ORAL at 21:13

## 2020-02-22 RX ADMIN — SIMETHICONE CHEW TAB 80 MG 120 MG: 80 TABLET ORAL at 21:18

## 2020-02-22 RX ADMIN — LINEZOLID 600 MG: 600 TABLET, FILM COATED ORAL at 09:17

## 2020-02-22 RX ADMIN — PIPERACILLIN AND TAZOBACTAM 3.38 G: 3; .375 INJECTION, POWDER, LYOPHILIZED, FOR SOLUTION INTRAVENOUS; PARENTERAL at 17:55

## 2020-02-22 RX ADMIN — PIPERACILLIN AND TAZOBACTAM 3.38 G: 3; .375 INJECTION, POWDER, LYOPHILIZED, FOR SOLUTION INTRAVENOUS; PARENTERAL at 23:39

## 2020-02-22 RX ADMIN — AMANTADINE HYDROCHLORIDE 50 MG: 50 SOLUTION ORAL at 08:06

## 2020-02-22 RX ADMIN — MONTELUKAST 10 MG: 10 TABLET, FILM COATED ORAL at 21:13

## 2020-02-22 RX ADMIN — AMANTADINE HYDROCHLORIDE 50 MG: 50 SOLUTION ORAL at 13:51

## 2020-02-22 RX ADMIN — PROPRANOLOL HYDROCHLORIDE 20 MG: 10 TABLET ORAL at 21:14

## 2020-02-22 RX ADMIN — BACLOFEN 5 MG: 10 TABLET ORAL at 21:13

## 2020-02-22 RX ADMIN — BACLOFEN 5 MG: 10 TABLET ORAL at 09:17

## 2020-02-22 RX ADMIN — SIMETHICONE CHEW TAB 80 MG 120 MG: 80 TABLET ORAL at 15:20

## 2020-02-22 RX ADMIN — IPRATROPIUM BROMIDE AND ALBUTEROL SULFATE 3 ML: .5; 3 SOLUTION RESPIRATORY (INHALATION) at 08:12

## 2020-02-22 RX ADMIN — PROPRANOLOL HYDROCHLORIDE 10 MG: 10 TABLET ORAL at 09:17

## 2020-02-22 RX ADMIN — PROPRANOLOL HYDROCHLORIDE 20 MG: 10 TABLET ORAL at 15:20

## 2020-02-22 NOTE — PROGRESS NOTES
To ct today about 0915 after 1mg dose of ativan IV, mom accompanied him and ct was done without issues. See report. Returned from [facility] at 1230 almost 1pm. Pt is calm eyes more opened then closed, meds given, tube feeding given and pt resting in bed. Tonight pt is awake watching tv? Getting vs has not been a struggle today. bp is stable pulse is anywhere from 81 to 99 resp 18 to 22. IV in rt hand patent and antibiotics infused with issue.

## 2020-02-22 NOTE — DISCHARGE PLANNING
Patient has had good and bad days.  Mother remains at bedside.  Aware of CT head today.  Discharge still planned for 2/26/20.  I will continue to follow to assist.  He will need home health, medicaid PCS program, probably standard w/c and possible tub bench and comode.  I will continue to follow to assist.

## 2020-02-22 NOTE — PROGRESS NOTES
Hospital Medicine Daily Progress Note      Chief Complaint:  Fever  Tachycardia  Leukocytosis    Interval History:  Pt calm today and tolerating being pushed in wheelchair w/o agitation.    Review of Systems  Review of Systems   Unable to perform ROS: Medical condition        Physical Exam  Temp:  [36.6 °C (97.8 °F)-37.2 °C (98.9 °F)] 36.6 °C (97.8 °F)  Pulse:  [] 102  Resp:  [18] 18  BP: (120-151)/() 151/104  SpO2:  [94 %-96 %] 96 %    Physical Exam  Vitals signs reviewed.   Constitutional:       Appearance: He is not toxic-appearing or diaphoretic.   HENT:      Right Ear: External ear normal.      Left Ear: External ear normal.      Nose: Nose normal.   Eyes:      General:         Right eye: No discharge.         Left eye: No discharge.      Extraocular Movements: Extraocular movements intact.      Conjunctiva/sclera: Conjunctivae normal.   Neck:      Musculoskeletal: Normal range of motion and neck supple.      Comments: Trach stoma dressed  Cardiovascular:      Rate and Rhythm: Regular rhythm. Tachycardia present.   Pulmonary:      Effort: No respiratory distress.      Breath sounds: No wheezing.      Comments: Decreased BS  Abdominal:      General: Bowel sounds are normal. There is no distension.      Palpations: Abdomen is soft.      Tenderness: There is no abdominal tenderness.      Comments: PEG site dressed   Musculoskeletal:      Right lower leg: No edema.      Left lower leg: No edema.   Skin:     General: Skin is warm and dry.   Neurological:      Comments: Awake but unresponsive         Fluids    Intake/Output Summary (Last 24 hours) at 2/22/2020 1233  Last data filed at 2/22/2020 0315  Gross per 24 hour   Intake --   Output 302 ml   Net -302 ml       Laboratory  Recent Labs     02/21/20  0611   WBC 9.2   RBC 5.79   HEMOGLOBIN 15.8   HEMATOCRIT 47.5   MCV 82.0   MCH 27.3   MCHC 33.3*   RDW 45.1   PLATELETCT 271   MPV 11.6     Recent Labs     02/21/20  0611   SODIUM 137   POTASSIUM 4.2    CHLORIDE 104   CO2 24   GLUCOSE 104*   BUN 11   CREATININE 0.80   CALCIUM 9.0                 Assessment/Plan  Tachycardia- (present on admission)  Assessment & Plan  Suspect 2/2 agitation and possible infxn  Increase Propranol for HR and BP control    Leukocytosis- (present on admission)  Assessment & Plan  UA 0-2 WBC  BCx x 2 NGTD  CXR +basilar opacities, possible PNA  Elevated WBC improved on Zosyn and Zyvox  Vancomycin allergy noted  F/U CXR negative acute  WBC normalized  Complete empiric abx today    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)  Assessment & Plan  Nonverbal  Gets agitated and combative at times  Now off Seroquel    Dysphagia following nontraumatic intracerebral hemorrhage- (present on admission)  Assessment & Plan  Has PEG tube and tolerating TF    Tracheostomy dependent (HCC)- (present on admission)  Assessment & Plan  Chronic trach since having AVM rupture and bleed  Recently decannulated at Rehab  Now on RA    Intracerebral hemorrhage, intraventricular (HCC)- (present on admission)  Assessment & Plan  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

## 2020-02-22 NOTE — THERAPY
Missed Therapy     Patient Name: Rey Medina  Age:  24 y.o., Sex:  male  Medical Record #: [id number]  Today's Date: 2/21/2020    Discussed missed therapy with MD and therapy .    Pt agitated.  Pt with lips pressed during attempt for oral care, introduction of tongue depressor, and sucker.  Pt holding arm over mouth, turning away from therapist.

## 2020-02-22 NOTE — THERAPY
Speech Language Pathology  Daily Treatment     Patient Name: Rey Medina  Age:  24 y.o., Sex:  male  Medical Record #: [id number]  Today's Date: 2/22/2020     Subjective    Patient was in room with mother.      Objective       02/22/20 1002   Outcome Measures   Outcome Measures Utilized ABS   ABS (Agitated Behavior Scale)   Short Attention Span, Easy Distractibility, Inability to Concentrate 4   Impulsive, Impatient, Low Tolerance for Pain or Frustration 4   Uncooperative, Resistant to Care, Demanding 4   Violent and/or Threatening Violence Toward People or Property 1   Explosive and/or Unpredictable Anger 1   Rocking, Rubbing, Moaning, Other Self-Stimulating Behavior 3   Pulling at Tubes, Restraints, etc. 2   Wandering from Treatment Area 1   Restlessness, Pacing, Excessive Movement 2   Repetitive Behaviors, Motor and/or Verbal 2   Rapid, Loud or Excessive Talking 1   Sudden Changes of Mood 1   Easily Initiated - Excessive Crying and/or Laughter 1   Self-Abusiveness, Physical and/or Verbal 1   Agitated Behavior Scale Total Score 28   Level of Severity Mild Agitation   SLP Total Time Spent   SLP Individual Total Time Spent (Mins) 30   Charge Group   SLP Treatment - Individual Speech Language Treatment - Individual         Assessment    ABS completed with score indicating mild agitation. Patient would not tolerate tactile stim to face of limbs. Turned away from clinician, covered face, groaning. Not following any commands.     Plan    Continue with sensory stim, monitor agitation.

## 2020-02-22 NOTE — THERAPY
Speech Language Pathology  Daily Treatment     Patient Name: Rey Medina  Age:  24 y.o., Sex:  male  Medical Record #: [id number]  Today's Date: 2/22/2020     Subjective    Pt in bed and resistant to tx, and evidenced by pursing lips, turning away, placing hand over mouth.     Objective       02/22/20 1401   Dysphagia    Other Treatments oral care/stim attempted.     ABS (Agitated Behavior Scale)   Short Attention Span, Easy Distractibility, Inability to Concentrate 4   Impulsive, Impatient, Low Tolerance for Pain or Frustration 4   Uncooperative, Resistant to Care, Demanding 4   Violent and/or Threatening Violence Toward People or Property 1   Explosive and/or Unpredictable Anger 1   Rocking, Rubbing, Moaning, Other Self-Stimulating Behavior 2   Pulling at Tubes, Restraints, etc. 2   Wandering from Treatment Area 1   Restlessness, Pacing, Excessive Movement 2   Repetitive Behaviors, Motor and/or Verbal 2   Rapid, Loud or Excessive Talking 1   Sudden Changes of Mood 1   Easily Initiated - Excessive Crying and/or Laughter 1   Self-Abusiveness, Physical and/or Verbal 1   Agitated Behavior Scale Total Score 27   Level of Severity Mild Agitation   SLP Total Time Spent   SLP Individual Total Time Spent (Mins) 30   Charge Group   SLP Swallowing Dysfunction Treatment Swallowing Dysfunction Treatment       FIM Eating Score:  1 - Total Assistance  Eating Description:  Staff administers tube feed/parenteral nutrition/IVF      Assessment    Pt scored 27 on the ABS.  Pt attempted drops of soda by spoon x2 to elicit oral response.  Pt kept lips closed, and wiped drop of soda from lips.  Unable to tolerate presentation of tongue depressor.  Did not respond to presentation of sucker.  Intermittent eye opening throughout session.  Pt unable to follow directives.  Unable/unwilling to squeeze hand in response to yes/no, as he did earlier this admission.      Plan    Target dysphagia management, attn

## 2020-02-22 NOTE — CARE PLAN
Problem: Safety  Goal: Will remain free from injury  Outcome: PROGRESSING AS EXPECTED     Problem: Infection  Goal: Will remain free from infection  Outcome: PROGRESSING AS EXPECTED  Note:

## 2020-02-22 NOTE — FLOWSHEET NOTE
02/22/20 0800   Events/Summary/Plan   Events/Summary/Plan Stoma care,SVN,02 check   Skin Inspection Respiratory Device   (stoma site without redness, swelling. Slight leak.)   Vital Signs   Pulse 99   Respiration 18   Pulse Oximetry 96 %   $ Pulse Oximetry (Spot Check) Yes   Respiratory Assessment   Level of Consciousness Lethargic   Breath Sounds   RUL Breath Sounds Diminished;Rhonchi   RML Breath Sounds Diminished;Rhonchi   RLL Breath Sounds Diminished   FORTINO Breath Sounds Diminished   LLL Breath Sounds Diminished   Secretions   Cough Moist;Productive   How Sputum Obtained   (spontanious cough, stoma)   Sputum Amount Small   Sputum Color Clear   Sputum Consistency Thick   Oxygen   O2 (LPM) 0

## 2020-02-22 NOTE — CARE PLAN
Problem: Infection  Goal: Will remain free from infection  Outcome: PROGRESSING AS EXPECTED  Remains on zosyn nearing the end date. Pt is more alert today then last weekend and even more aware then yesterday. MEWs 2 today generally due to pulse temp has been stable respirations easier and stable at 18 - 20. Pt is more aware eyes open more but loc contributes to MEWs score.  WBCs  9.2  down from 12.0. continue to monitor increase therapies as pt tolerates.

## 2020-02-22 NOTE — FLOWSHEET NOTE
02/22/20 0800   Inhalation Therapy Treatment   $ SVN Performed Yes   Given By: John   Date SVN Next Change Due 02/28/20

## 2020-02-23 PROCEDURE — 92526 ORAL FUNCTION THERAPY: CPT

## 2020-02-23 PROCEDURE — 99232 SBSQ HOSP IP/OBS MODERATE 35: CPT | Performed by: HOSPITALIST

## 2020-02-23 PROCEDURE — 700102 HCHG RX REV CODE 250 W/ 637 OVERRIDE(OP): Performed by: HOSPITALIST

## 2020-02-23 PROCEDURE — A9270 NON-COVERED ITEM OR SERVICE: HCPCS | Performed by: PHYSICAL MEDICINE & REHABILITATION

## 2020-02-23 PROCEDURE — A9270 NON-COVERED ITEM OR SERVICE: HCPCS | Performed by: HOSPITALIST

## 2020-02-23 PROCEDURE — 700102 HCHG RX REV CODE 250 W/ 637 OVERRIDE(OP): Performed by: PHYSICAL MEDICINE & REHABILITATION

## 2020-02-23 PROCEDURE — 92507 TX SP LANG VOICE COMM INDIV: CPT

## 2020-02-23 PROCEDURE — 770010 HCHG ROOM/CARE - REHAB SEMI PRIVAT*

## 2020-02-23 RX ADMIN — SIMETHICONE CHEW TAB 80 MG 120 MG: 80 TABLET ORAL at 09:07

## 2020-02-23 RX ADMIN — OMEPRAZOLE 40 MG: KIT at 09:08

## 2020-02-23 RX ADMIN — AMANTADINE HYDROCHLORIDE 50 MG: 50 SOLUTION ORAL at 08:06

## 2020-02-23 RX ADMIN — SIMETHICONE CHEW TAB 80 MG 120 MG: 80 TABLET ORAL at 20:40

## 2020-02-23 RX ADMIN — LEVETIRACETAM 500 MG: 500 SOLUTION ORAL at 20:40

## 2020-02-23 RX ADMIN — AMANTADINE HYDROCHLORIDE 50 MG: 50 SOLUTION ORAL at 13:23

## 2020-02-23 RX ADMIN — CHOLECALCIFEROL TAB 25 MCG (1000 UNIT) 1000 UNITS: 25 TAB at 09:08

## 2020-02-23 RX ADMIN — PROPRANOLOL HYDROCHLORIDE 20 MG: 10 TABLET ORAL at 15:12

## 2020-02-23 RX ADMIN — LEVETIRACETAM 500 MG: 500 SOLUTION ORAL at 09:08

## 2020-02-23 RX ADMIN — PROPRANOLOL HYDROCHLORIDE 20 MG: 10 TABLET ORAL at 20:41

## 2020-02-23 RX ADMIN — BACLOFEN 5 MG: 10 TABLET ORAL at 09:07

## 2020-02-23 RX ADMIN — PROPRANOLOL HYDROCHLORIDE 20 MG: 10 TABLET ORAL at 09:08

## 2020-02-23 RX ADMIN — BACLOFEN 5 MG: 10 TABLET ORAL at 15:12

## 2020-02-23 RX ADMIN — BACLOFEN 5 MG: 10 TABLET ORAL at 20:42

## 2020-02-23 RX ADMIN — MONTELUKAST 10 MG: 10 TABLET, FILM COATED ORAL at 20:41

## 2020-02-23 RX ADMIN — SIMETHICONE CHEW TAB 80 MG 120 MG: 80 TABLET ORAL at 15:12

## 2020-02-23 NOTE — THERAPY
Missed Therapy     Patient Name: Rey Medina  Age:  24 y.o., Sex:  male  Medical Record #: [id number]  Today's Date: 2/23/2020    Discussed missed therapy with therapy .     PT session attempted. Pt pulling away from all light touch stimulus, resistive to any movement, agitation and breathing increased with attempt. Unable to participate in therapy.        02/23/20 1501   Precautions   Precautions Fall Risk;PEG Tube   Comments Recent decannulation of tracheostomy   Interdisciplinary Plan of Care Collaboration   Patient Position at End of Therapy In Bed;Family / Friend in Room   Therapy Missed   Missed Therapy (Minutes) 60   Reason For Missed Therapy Medical - Patient Agitated;Medical - Patient not Able To Participate

## 2020-02-23 NOTE — PROGRESS NOTES
Received patient on bed. HOB at 30 degrees. Patient asleep but easily awakeable.  Patient PEG tube has no residual upon aspiration and patent and infusing well. Patient has signs of SOB. Patient is calm and comfortable. Patient mother at bedside. Mother said no concerns for now. Safety and fall precaution reinforced.

## 2020-02-23 NOTE — FLOWSHEET NOTE
02/23/20 1512   Events/Summary/Plan   Events/Summary/Plan Mom changed trach stoma dressing with RN for picture.  Pt still with air leak.   Vital Signs   Pulse 86

## 2020-02-23 NOTE — THERAPY
"Speech Language Pathology  Daily Treatment     Patient Name: Rey Medina  Age:  24 y.o., Sex:  male  Medical Record #: [id number]  Today's Date: 2/23/2020     Subjective    Pt presented with agitation, and was resistant to tx.  Pt's mother, present and encouraging.  Pt was taken outside for change of scenery and fresh air, in hopes to stimulate participation.  Pt's mom stated, \"José Miguel is in a bad mood because I took the urinal away from him, when he was done peeing.\"       Objective       02/23/20 0801   Dysphagia    Other Treatments attempt NTL trail.   ABS (Agitated Behavior Scale)   Short Attention Span, Easy Distractibility, Inability to Concentrate 4   Impulsive, Impatient, Low Tolerance for Pain or Frustration 4   Uncooperative, Resistant to Care, Demanding 4   Violent and/or Threatening Violence Toward People or Property 1   Explosive and/or Unpredictable Anger 1   Rocking, Rubbing, Moaning, Other Self-Stimulating Behavior 3   Pulling at Tubes, Restraints, etc. 2   Wandering from Treatment Area 1   Restlessness, Pacing, Excessive Movement 2   Repetitive Behaviors, Motor and/or Verbal 2   Rapid, Loud or Excessive Talking 1   Sudden Changes of Mood 1   Easily Initiated - Excessive Crying and/or Laughter 1   Self-Abusiveness, Physical and/or Verbal 1   Agitated Behavior Scale Total Score 28   Level of Severity Mild Agitation   Therapy Missed   Missed Therapy (Minutes) 15   Reason For Missed Therapy Medical - Patient Agitated;Medical - Patient not Able To Participate   SLP Total Time Spent   SLP Individual Total Time Spent (Mins) 45   Charge Group   SLP Treatment - Individual Speech Language Treatment - Individual   SLP Swallowing Dysfunction Treatment Swallowing Dysfunction Treatment           Assessment    Pt scored 28 on the ABS.  Pt demonstrated increased restlessness.  Oral stimulation attempted.  Pt pursing lips.  One quarter teaspoon, NTL presented.  Pt pursed lips, and rubbed NTL off lips with " wrist.  Inconsistent eye opening throughout session.  Pt became more agitated toward end of session.  Pt started to pull at IV.  Tx was discontinued, in hopes to relieve agitation.      Plan    Continue to target oral stim, ABS, and attn.

## 2020-02-23 NOTE — PROGRESS NOTES
Hospital Medicine Daily Progress Note      Chief Complaint:  Fever  Tachycardia  Leukocytosis    Interval History:  Pt seems a bit more agitated today than yesterday.    Review of Systems  Review of Systems   Unable to perform ROS: Medical condition        Physical Exam  Temp:  [36.6 °C (97.8 °F)-36.8 °C (98.3 °F)] 36.8 °C (98.3 °F)  Pulse:  [] 86  Resp:  [16-19] 16  BP: (102-139)/(52-88) 124/81  SpO2:  [95 %] 95 %    Physical Exam  Vitals signs reviewed.   Constitutional:       Appearance: He is not toxic-appearing or diaphoretic.   HENT:      Right Ear: External ear normal.      Left Ear: External ear normal.      Nose: Nose normal.   Eyes:      General:         Right eye: No discharge.         Left eye: No discharge.      Extraocular Movements: Extraocular movements intact.      Conjunctiva/sclera: Conjunctivae normal.   Neck:      Musculoskeletal: Normal range of motion and neck supple.      Comments: Trach stoma dressed  Cardiovascular:      Rate and Rhythm: Regular rhythm. Tachycardia present.   Pulmonary:      Effort: No respiratory distress.      Breath sounds: No wheezing.      Comments: Decreased BS  Abdominal:      General: Bowel sounds are normal. There is no distension.      Palpations: Abdomen is soft.      Tenderness: There is no abdominal tenderness.      Comments: PEG site dressed   Musculoskeletal:      Right lower leg: No edema.      Left lower leg: No edema.   Skin:     General: Skin is warm and dry.   Neurological:      Comments: Awake but unresponsive         Fluids    Intake/Output Summary (Last 24 hours) at 2/23/2020 7029  Last data filed at 2/23/2020 0800  Gross per 24 hour   Intake 150 ml   Output 100 ml   Net 50 ml       Laboratory  Recent Labs     02/21/20  0611   WBC 9.2   RBC 5.79   HEMOGLOBIN 15.8   HEMATOCRIT 47.5   MCV 82.0   MCH 27.3   MCHC 33.3*   RDW 45.1   PLATELETCT 271   MPV 11.6     Recent Labs     02/21/20  0611   SODIUM 137   POTASSIUM 4.2   CHLORIDE 104   CO2 24    GLUCOSE 104*   BUN 11   CREATININE 0.80   CALCIUM 9.0                 Assessment/Plan  Tachycardia- (present on admission)  Assessment & Plan  Suspect 2/2 agitation and possible infxn  Continue Propranol for HR and BP control    Leukocytosis- (present on admission)  Assessment & Plan  UA 0-2 WBC  BCx x 2 NGTD  CXR +basilar opacities, possible PNA  Elevated WBC improved on Zosyn and Zyvox  Vancomycin allergy noted  F/U CXR negative acute  WBC normalized  Now off empiric abx  Check F/U labs in am    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)  Assessment & Plan  Nonverbal  Agitation much improved    Dysphagia following nontraumatic intracerebral hemorrhage- (present on admission)  Assessment & Plan  Has PEG tube and tolerating TF    Tracheostomy dependent (HCC)- (present on admission)  Assessment & Plan  Chronic trach since having AVM rupture and bleed  Recently decannulated at Rehab  Now on RA    Intracerebral hemorrhage, intraventricular (HCC)- (present on admission)  Assessment & Plan  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

## 2020-02-23 NOTE — CARE PLAN
Problem: Infection  Goal: Will remain free from infection  Outcome: PROGRESSING AS EXPECTED  MEWs is

## 2020-02-24 LAB
ANION GAP SERPL CALC-SCNC: 11 MMOL/L (ref 0–11.9)
BUN SERPL-MCNC: 12 MG/DL (ref 8–22)
CALCIUM SERPL-MCNC: 9.7 MG/DL (ref 8.5–10.5)
CHLORIDE SERPL-SCNC: 104 MMOL/L (ref 96–112)
CO2 SERPL-SCNC: 21 MMOL/L (ref 20–33)
CREAT SERPL-MCNC: 0.68 MG/DL (ref 0.5–1.4)
ERYTHROCYTE [DISTWIDTH] IN BLOOD BY AUTOMATED COUNT: 46.9 FL (ref 35.9–50)
GLUCOSE SERPL-MCNC: 142 MG/DL (ref 65–99)
HCT VFR BLD AUTO: 52.4 % (ref 42–52)
HGB BLD-MCNC: 17.4 G/DL (ref 14–18)
MCH RBC QN AUTO: 27.6 PG (ref 27–33)
MCHC RBC AUTO-ENTMCNC: 33.2 G/DL (ref 33.7–35.3)
MCV RBC AUTO: 83.2 FL (ref 81.4–97.8)
PLATELET # BLD AUTO: 347 K/UL (ref 164–446)
PMV BLD AUTO: 11.6 FL (ref 9–12.9)
POTASSIUM SERPL-SCNC: 4.2 MMOL/L (ref 3.6–5.5)
RBC # BLD AUTO: 6.3 M/UL (ref 4.7–6.1)
SODIUM SERPL-SCNC: 136 MMOL/L (ref 135–145)
WBC # BLD AUTO: 8.9 K/UL (ref 4.8–10.8)

## 2020-02-24 PROCEDURE — 92526 ORAL FUNCTION THERAPY: CPT

## 2020-02-24 PROCEDURE — 36415 COLL VENOUS BLD VENIPUNCTURE: CPT

## 2020-02-24 PROCEDURE — 99231 SBSQ HOSP IP/OBS SF/LOW 25: CPT | Performed by: PHYSICAL MEDICINE & REHABILITATION

## 2020-02-24 PROCEDURE — 97530 THERAPEUTIC ACTIVITIES: CPT

## 2020-02-24 PROCEDURE — 99232 SBSQ HOSP IP/OBS MODERATE 35: CPT | Performed by: HOSPITALIST

## 2020-02-24 PROCEDURE — 770010 HCHG ROOM/CARE - REHAB SEMI PRIVAT*

## 2020-02-24 PROCEDURE — A9270 NON-COVERED ITEM OR SERVICE: HCPCS | Performed by: PHYSICAL MEDICINE & REHABILITATION

## 2020-02-24 PROCEDURE — 700102 HCHG RX REV CODE 250 W/ 637 OVERRIDE(OP): Performed by: PHYSICAL MEDICINE & REHABILITATION

## 2020-02-24 PROCEDURE — 97535 SELF CARE MNGMENT TRAINING: CPT

## 2020-02-24 PROCEDURE — 94760 N-INVAS EAR/PLS OXIMETRY 1: CPT

## 2020-02-24 PROCEDURE — 97110 THERAPEUTIC EXERCISES: CPT

## 2020-02-24 PROCEDURE — 80048 BASIC METABOLIC PNL TOTAL CA: CPT

## 2020-02-24 PROCEDURE — 700102 HCHG RX REV CODE 250 W/ 637 OVERRIDE(OP): Performed by: HOSPITALIST

## 2020-02-24 PROCEDURE — 92507 TX SP LANG VOICE COMM INDIV: CPT

## 2020-02-24 PROCEDURE — A9270 NON-COVERED ITEM OR SERVICE: HCPCS | Performed by: HOSPITALIST

## 2020-02-24 PROCEDURE — 85027 COMPLETE CBC AUTOMATED: CPT

## 2020-02-24 RX ORDER — TRAZODONE HYDROCHLORIDE 50 MG/1
50 TABLET ORAL
Status: DISCONTINUED | OUTPATIENT
Start: 2020-02-24 | End: 2020-03-12 | Stop reason: HOSPADM

## 2020-02-24 RX ADMIN — BACLOFEN 5 MG: 10 TABLET ORAL at 20:53

## 2020-02-24 RX ADMIN — CHOLECALCIFEROL TAB 25 MCG (1000 UNIT) 1000 UNITS: 25 TAB at 10:02

## 2020-02-24 RX ADMIN — LEVETIRACETAM 500 MG: 500 SOLUTION ORAL at 20:53

## 2020-02-24 RX ADMIN — PROPRANOLOL HYDROCHLORIDE 20 MG: 10 TABLET ORAL at 10:03

## 2020-02-24 RX ADMIN — AMANTADINE HYDROCHLORIDE 50 MG: 50 SOLUTION ORAL at 14:05

## 2020-02-24 RX ADMIN — SIMETHICONE CHEW TAB 80 MG 120 MG: 80 TABLET ORAL at 20:53

## 2020-02-24 RX ADMIN — MONTELUKAST 10 MG: 10 TABLET, FILM COATED ORAL at 20:53

## 2020-02-24 RX ADMIN — PROPRANOLOL HYDROCHLORIDE 20 MG: 10 TABLET ORAL at 20:53

## 2020-02-24 RX ADMIN — BACLOFEN 5 MG: 10 TABLET ORAL at 10:03

## 2020-02-24 RX ADMIN — BACLOFEN 5 MG: 10 TABLET ORAL at 15:47

## 2020-02-24 RX ADMIN — SIMETHICONE CHEW TAB 80 MG 120 MG: 80 TABLET ORAL at 10:02

## 2020-02-24 RX ADMIN — AMANTADINE HYDROCHLORIDE 50 MG: 50 SOLUTION ORAL at 09:01

## 2020-02-24 RX ADMIN — PROPRANOLOL HYDROCHLORIDE 20 MG: 10 TABLET ORAL at 15:47

## 2020-02-24 RX ADMIN — LEVETIRACETAM 500 MG: 500 SOLUTION ORAL at 10:02

## 2020-02-24 RX ADMIN — SIMETHICONE CHEW TAB 80 MG 120 MG: 80 TABLET ORAL at 15:47

## 2020-02-24 RX ADMIN — OMEPRAZOLE 40 MG: KIT at 10:02

## 2020-02-24 NOTE — REHAB-DIETARY IDT TEAM NOTE
Dietary   Nutrition  Dietary Problems     Problem: Other Problem (see comments)     Description: Difficulty swallowing r/t secondary dysphagia s/p Intracerebral hemorrhage, intraventricular as evidenced by NPO, G-tube for alternate route of nutrition/hydraiton/ medications.      Goal: Other Goal (Resolved)     Description: 1. Tolerance to EN regimen 2. Maintain adequate enteral nutrient/fluid intake (PO vs TF) to promote nutrition optimization/healing 3.  Transition to PO pending clinical outcomes                      Patient continues to tolerate TF of [employer] + supplemental beneprotein with no issues per home regimen.  Getting 150mL free water flushes q 4 hours providing 1725 kcals 104g/protein, 1835mL free wtaer per day.     Plans to discharge home 2/26 with mother.  Still NPO.    Weight noted to be 73.1kg indicating weight loss, however, was taken via wheel chair.  Monitor trends.  Patient does not appear to have lost weight.  Overall weight has been stable.  Labs from today are excellent.  Serum BG noted to be 142.  Pertinent Medications: noted- has completed abx   GI: BM today  : Yellow UOP  Skin intact. + PEG  Vitals: HR elevated at times, /96   I/Os: n/a- documentation inaccurate\    Plan: Continue current nutritional POC.  RD following weekly per protocol.       Section completed by:  Magdalena Jiang R.D.

## 2020-02-24 NOTE — PROGRESS NOTES
Change of shift report obtained, pt rested comfortably all night, pts mom bedside. Lab draws in AM required 2 person assist to hold pts limb down, he became upset and agitated in attempts to draw blood. Sample was successfully collected.

## 2020-02-24 NOTE — FLOWSHEET NOTE
02/24/20 1339   Events/Summary/Plan   Events/Summary/Plan Mom changed trach stoma bandage.     Vital Signs   Pulse 98   Respiration 18   Pulse Oximetry 96 %   $ Pulse Oximetry (Spot Check) Yes   Oxygen   O2 Delivery Device None - Room Air

## 2020-02-24 NOTE — THERAPY
Occupational Therapy  Daily Treatment     Patient Name: Rey eMdina  Age:  24 y.o., Sex:  male  Medical Record #: [id number]  Today's Date: 2020     Precautions  Precautions: (P) Fall Risk, PEG Tube  Comments: (P) Recent decannulation of tracheostomy    Safety   Comments: Total assist at this time due to agitation, level of alertness    Subjective    Patient presents non-verbal however significantly more alert/less agitated compared to last week.      Objective     20 1031   Precautions   Precautions Fall Risk;PEG Tube   Comments Recent decannulation of tracheostomy   Passive ROM Upper Body   Passive ROM Upper Body WDL   Comments Patient tolerated PROM exercises (shoulder flexion/extension, abd/adduction, internal/external rotation, horizontal abd/adduction, elbow flexion/extension, forearm supination/pronation, wrist flexion/extension, finger flexion/extension) with BUEs. PROM WFL.    Interdisciplinary Plan of Care Collaboration   IDT Collaboration with  Family / Caregiver   Patient Position at End of Therapy Seated   Collaboration Comments Patient's mother present during OT session    OT Total Time Spent   OT Individual Total Time Spent (Mins) 30   OT Charge Group   OT Self Care / ADL 1   OT Therapy Activity 1     FIM Upper Body Dressin - Total Assistance  Upper Body Dressing Description:  (Total assist for donning t-shirt while seated in w/c)    Patient squeezed undersigned therapist's hand 1/4 times when instructed to do so (using R hand).      Assessment    Patient tolerated OT session well with focus on progression with ADL participation and PROM (BUEs). Improved level of alertness noted compared to last week. Patient required total assist to don t-shirt however noted assisting undersigned therapist during shirt over head pursuit (AEB neck flexion during task). Patient seated in transport w/c during this session. No agitation noted.     Plan    Low-stim environment,  sensory stimulation,  ADLs/functional participation, sitting balance

## 2020-02-24 NOTE — THERAPY
Occupational Therapy  Daily Treatment     Patient Name: Rey Medina  Age:  24 y.o., Sex:  male  Medical Record #: [id number]  Today's Date: 2/24/2020     Precautions  Precautions: (P) Fall Risk, PEG Tube  Comments: (P) Recent decannulation of tracheostomy, non-verbal    Safety   Comments: Total assist at this time due to agitation, level of alertness    Subjective    Patient presents non-verbal.      Objective     02/24/20 1401   Precautions   Precautions Fall Risk;PEG Tube   Comments Recent decannulation of tracheostomy, non-verbal   Interdisciplinary Plan of Care Collaboration   IDT Collaboration with  Family / Caregiver   Patient Position at End of Therapy Seated;Family / Friend in Room   Collaboration Comments Patient's mother present during OT session    OT Total Time Spent   OT Individual Total Time Spent (Mins) 30   OT Charge Group   OT Therapy Activity 2     FIM Grooming Score:  1 - Total Assistance  Grooming Description:  (Total assist to wash face while seated in w/c)    Patient tolerated vibration application to BUEs however no active motor control noted during this therapeutic activity despite hand-over-hand assist and max VCs.     Assessment    Patient tolerated sitting in w/c during this session however no volitional movement noted at this time. Total assist for grooming. No agitation/restlessness noted during this session.     Plan    Low-stim environment,  sensory stimulation, ADLs/functional participation, sitting balance

## 2020-02-24 NOTE — PROGRESS NOTES
Hospital Medicine Daily Progress Note      Chief Complaint:  Fever  Tachycardia  Leukocytosis    Interval History:  No overnight problems.  Labs reviewed.    Review of Systems  Review of Systems   Unable to perform ROS: Medical condition        Physical Exam  Temp:  [36.3 °C (97.3 °F)-36.8 °C (98.3 °F)] 36.3 °C (97.3 °F)  Pulse:  [] 98  Resp:  [16-18] 18  BP: (106-137)/(60-96) 137/96  SpO2:  [94 %-96 %] 96 %    Physical Exam  Vitals signs reviewed.   Constitutional:       Appearance: He is not toxic-appearing or diaphoretic.   HENT:      Right Ear: External ear normal.      Left Ear: External ear normal.      Nose: Nose normal.   Eyes:      General:         Right eye: No discharge.         Left eye: No discharge.      Extraocular Movements: Extraocular movements intact.      Conjunctiva/sclera: Conjunctivae normal.   Neck:      Musculoskeletal: Normal range of motion and neck supple.      Comments: Trach stoma dressed  Cardiovascular:      Rate and Rhythm: Regular rhythm. Tachycardia present.   Pulmonary:      Effort: No respiratory distress.      Breath sounds: No wheezing.      Comments: Decreased BS  Abdominal:      General: Bowel sounds are normal. There is no distension.      Palpations: Abdomen is soft.      Tenderness: There is no abdominal tenderness.      Comments: PEG site dressed   Musculoskeletal:      Right lower leg: No edema.      Left lower leg: No edema.   Skin:     General: Skin is warm and dry.   Neurological:      Comments: Awake but unresponsive         Fluids  No intake or output data in the 24 hours ending 02/24/20 1400    Laboratory  Recent Labs     02/24/20  0605   WBC 8.9   RBC 6.30*   HEMOGLOBIN 17.4   HEMATOCRIT 52.4*   MCV 83.2   MCH 27.6   MCHC 33.2*   RDW 46.9   PLATELETCT 347   MPV 11.6     Recent Labs     02/24/20  0605   SODIUM 136   POTASSIUM 4.2   CHLORIDE 104   CO2 21   GLUCOSE 142*   BUN 12   CREATININE 0.68   CALCIUM 9.7                 Assessment/Plan  Tachycardia-  (present on admission)  Assessment & Plan  Suspect 2/2 agitation and possible infxn  Continue Propranol for HR and BP control    Leukocytosis- (present on admission)  Assessment & Plan  UA 0-2 WBC  BCx x 2 NGTD  CXR +basilar opacities, possible PNA  Elevated WBC improved on Zosyn and Zyvox  Vancomycin allergy noted  F/U CXR negative acute  WBC normalized  Now off empiric abx    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)  Assessment & Plan  Nonverbal  Agitation much improved    Dysphagia following nontraumatic intracerebral hemorrhage- (present on admission)  Assessment & Plan  Has PEG tube and tolerating TF    Tracheostomy dependent (HCC)- (present on admission)  Assessment & Plan  Chronic trach since having AVM rupture and bleed  Recently decannulated at Rehab  Now on RA    Intracerebral hemorrhage, intraventricular (HCC)- (present on admission)  Assessment & Plan  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

## 2020-02-24 NOTE — THERAPY
"Speech Language Pathology  Daily Treatment     Patient Name: Rey Medina  Age:  24 y.o., Sex:  male  Medical Record #: [id number]  Today's Date: 2/24/2020     Subjective    Pt more alert when compared to previous sessions.  Pt presented with more consistent eye-opening, and participation.      Objective       02/24/20 1131   Receptive Language / Auditory Comprehension   Answers Yes / No Personal Questions Profound (1)   Follows One Unit Commands Severe (2)   Dysphagia    Other Treatments NTL trials   Interdisciplinary Plan of Care Collaboration   IDT Collaboration with  Family / Caregiver   Collaboration Comments Pt's mother present during tx.   SLP Total Time Spent   SLP Individual Total Time Spent (Mins) 30   Charge Group   SLP Treatment - Individual Speech Language Treatment - Individual   SLP Swallowing Dysfunction Treatment Swallowing Dysfunction Treatment       FIM Comprehension Score:  1 - Total Assistance  Comprehension Description:       FIM Expression Score:  1 - Total Assistance  Expression Description:       FIM Social Interaction Score:  2 - Max Assistance  Social Interaction Description:       FIM Problem Solving Score:  1 - Total Assistance  Problem Solving Description:       FIM Memory Score:  1 - Total Assistance  Memory Description:         Assessment    NTL trials by spoon: 3 trials of less than 1/4 teaspoon presented.  Pt would not accept more than that amount.  No overt s/s of aspiration observed following NTL by spoon.  Pt would not accept pureed trials (pursed lips, hand pushed trial away).  1 step directives: TotalA - hand over hand to touch nose, ears, chin, hair.  When pt's mother placed tissue over nose, pt demonstrated ability to \"blow his nose\" on command.  Pt unable to squeeze hand to respond to yes/no questions this day: 0% maxA.      Plan    Target dysphagia management, receptive expressive language.      "

## 2020-02-24 NOTE — THERAPY
Physical Therapy   Daily Treatment     Patient Name: Rey Medina  Age:  24 y.o., Sex:  male  Medical Record #: [id number]  Today's Date: 2/24/2020     Precautions  Precautions: Fall Risk, PEG Tube  Comments: Recent decannulation of tracheostomy    Subjective    Pt seated in room with mother present, mother agreeable to PT session.      Objective       02/24/20 0931   Precautions   Precautions Fall Risk;PEG Tube   Comments Recent decannulation of tracheostomy   Sitting Lower Body Exercises   Other Exercises Motomed for BLEs: 15 min, 0.91 miles passive    Bed Mobility    Sit to Stand Total Assist X 2  (mod- max A x2 in // bars with BUE support )   Interdisciplinary Plan of Care Collaboration   Patient Position at End of Therapy Seated;Family / Friend in Room;Self Releasing Lap Belt Applied   Collaboration Comments pt's mother propelled pt back to room after PT    PT Total Time Spent   PT Individual Total Time Spent (Mins) 30   PT Charge Group   PT Therapeutic Exercise 1   PT Therapeutic Activities 1     Pt required assist for upright posture in standing this session, max A at hips to maintain standing with mod manual cuing for posture.     Assessment    Pt able to tolerate PROM on motomed this session. Decreased agitation with light touch sensation and movement.     Plan    Continue PROM/ stretching, standing in // bars as tolerated, seated balance and transfer training as able.

## 2020-02-24 NOTE — PROGRESS NOTES
Rehab Progress Note     Encounter Date: 2/24/2020    CC: Patient nonverbal, mom reports runny nose    Interval Events (Subjective)  No acute events overnight.  Agitation improved.  Mom at bedside.  Okay with increase in amantadine.  Reports runny nose, clear.  No fevers.  Mom would like nasal spray for nose.  Would like trazodone to be made as needed.    14 point ROS reviewed and negative except as stated above.       Objective:  VITAL SIGNS: /96   Pulse 98   Temp 36.3 °C (97.3 °F) (Temporal)   Resp 18   Wt 73.1 kg (161 lb 3.2 oz)   SpO2 96%   BMI 25.25 kg/m²     GEN: No apparent distress  HEENT: Head normocephalic, atraumatic.  Sclera nonicteric bilaterally, no ocular discharge appreciated bilaterally.  CV: Extremities warm and well-perfused, no peripheral edema appreciated bilaterally.  PULMONARY: Breathing nonlabored on room air, no respiratory accessory muscle use.  Not requiring supplemental oxygen.  ABD: Soft, nontender.  SKIN: No appreciable skin breakdown on exposed areas of skin.  NEURO: Awake alert.  Nonverbal.  Follows very minor commands from mom.      Recent Results (from the past 72 hour(s))   CBC WITHOUT DIFFERENTIAL    Collection Time: 02/24/20  6:05 AM   Result Value Ref Range    WBC 8.9 4.8 - 10.8 K/uL    RBC 6.30 (H) 4.70 - 6.10 M/uL    Hemoglobin 17.4 14.0 - 18.0 g/dL    Hematocrit 52.4 (H) 42.0 - 52.0 %    MCV 83.2 81.4 - 97.8 fL    MCH 27.6 27.0 - 33.0 pg    MCHC 33.2 (L) 33.7 - 35.3 g/dL    RDW 46.9 35.9 - 50.0 fL    Platelet Count 347 164 - 446 K/uL    MPV 11.6 9.0 - 12.9 fL   Basic Metabolic Panel    Collection Time: 02/24/20  6:05 AM   Result Value Ref Range    Sodium 136 135 - 145 mmol/L    Potassium 4.2 3.6 - 5.5 mmol/L    Chloride 104 96 - 112 mmol/L    Co2 21 20 - 33 mmol/L    Glucose 142 (H) 65 - 99 mg/dL    Bun 12 8 - 22 mg/dL    Creatinine 0.68 0.50 - 1.40 mg/dL    Calcium 9.7 8.5 - 10.5 mg/dL    Anion Gap 11.0 0.0 - 11.9   ESTIMATED GFR    Collection Time: 02/24/20  6:05  AM   Result Value Ref Range    GFR If African American >60 >60 mL/min/1.73 m 2    GFR If Non African American >60 >60 mL/min/1.73 m 2       Current Facility-Administered Medications   Medication Frequency   • [START ON 2/25/2020] amantadine (SYMMETREL) 50 MG/5ML syrup 100 mg BID   • traZODone (DESYREL) tablet 50 mg QHS PRN   • propranolol (INDERAL) tablet 20 mg TID   • acetaminophen (TYLENOL) tablet 650 mg Q4HRS PRN   • artificial tears ophthalmic solution 1 Drop PRN   • benzocaine-menthol (CEPACOL) lozenge 1 Lozenge Q2HRS PRN   • hydrALAZINE (APRESOLINE) tablet 25 mg Q8HRS PRN   • mag hydrox-al hydrox-simeth (MAALOX PLUS ES or MYLANTA DS) suspension 20 mL Q2HRS PRN   • ondansetron (ZOFRAN ODT) dispertab 4 mg 4X/DAY PRN    Or   • ondansetron (ZOFRAN) syringe/vial injection 4 mg 4X/DAY PRN   • polyethylene glycol/lytes (MIRALAX) PACKET 1 Packet Q24HRS PRN    And   • magnesium hydroxide (MILK OF MAGNESIA) suspension 30 mL QDAY PRN    And   • bisacodyl (DULCOLAX) suppository 10 mg QDAY PRN   • sodium chloride (OCEAN) 0.65 % nasal spray 2 Spray PRN   • tramadol (ULTRAM) 50 MG tablet 50 mg Q4HRS PRN   • Respiratory Therapy Consult Continuous RT   • baclofen (LIORESAL) tablet 5 mg TID   • eucerin cream TID PRN   • ipratropium-albuterol (DUONEB) nebulizer solution Q4H PRN (RT)   • levETIRAcetam (KEPPRA) 100 MG/ML solution 500 mg Q12HRS   • montelukast (SINGULAIR) tablet 10 mg Nightly   • omeprazole (FIRST-OMEPRAZOLE) 2 mg/mL oral susp 40 mg DAILY   • polyethylene glycol/lytes (MIRALAX) PACKET 1 Packet DAILY   • QUEtiapine (SEROQUEL) tablet 25 mg TID PRN   • simethicone (MYLICON) chewable tab 120 mg TID   • vitamin D (cholecalciferol) tablet 1,000 Units DAILY       Orders Placed This Encounter   Procedures   • Diet NPO     Standing Status:   Standing     Number of Occurrences:   1     Order Specific Question:   Restrict to:     Answer:   Strict [1]       Assessment:  Active Hospital Problems    Diagnosis   • Tachycardia   •  Leukocytosis   • Tracheostomy dependent (HCC)   • Intracerebral hemorrhage, intraventricular (HCC)   • Dysphagia following nontraumatic intracerebral hemorrhage   • 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 Patient very low functioning but per mother becoming more purposeful  -PT and OT for mobility and ADLs  -SLP for cognition   -Patient agitated on Amantadine and Modafinil. Both trials halted due to agitation. Restart Amantadine 50 mg as he was too agitated on 100 mg but was speaking and helping with ADLs --> 2/24 mom okay with increased to amantadine 100 mg twice daily.  -Continue Keppra. Unclear if had seizure or was continued on prophylaxis  -NSG appointment on 2/7/20 to evaluation  shunt. Will follow-up in 2 months   -CT head as waxing and waning status. At times he voices including his names, at other times no response and compulsive grabbing of bed rail and urinal. CT head - negative.     Sleep: 2/24 trazodone to as needed    Agitation - Appears like TBI with agitation worsened from neurostimulant. Change Metoprolol to Propranolol. Start Seroquel PRN, too sedated on scheduled     Muscle spasms - mother claims Bromocriptine helps, typically used for dopamine side effects/neurostimulant. Will start low dose Baclofen and monitor.      Dysphagia - Patient with G Tube in place. SLP for swallow. MBSS on 2/10/20, start on dysphagia 1 with NTL  -Tolerated first meal but emesis on 2nd meal at higher percentage. May be due to distention, no lung changes. Continue to monitor.   -Decreased oral intake on less stimulant, mother is monitoring his intake.       Aspiration pneumonia - Currently on Unasyn.  Elevated WBC, broadened to Zyvox and Zosyn  -Hospitalist consulted     CV - Patient is on Ivabradine 5 mg daily. Unclear reason why. Discontinue Ivabradine, on Metoprolol for tachycardia.  Switch metoprolol to Propranolol     Respiratory - Patient with size 7 trach on admission. Patient on Duonebs and Singulair  -Exchange aborted on 2/6/20 due to emesis on suction. Tolerated overnight. Decannulated AM 2/7/20. Tolerated and stoma closing.      GI Ppx - Patient on Prilosec and Prevacid. Unclear why two PPIs. Discontinue Prevacid.      DVT Ppx - Patient on Eliquis 2.5 mg BID. Unknown reason why, mother denies clot or DVT. Discontinue Eliquis as 10 months post-ICH    Total time:  22 minutes.  I spent greater than 50% of the time for patient care and coordination on this date, including unit/floor time, and face-to-face time with the patient as per assessment and plan above.    Mandy Luke D.O.

## 2020-02-25 ENCOUNTER — HOME HEALTH ADMISSION (OUTPATIENT)
Dept: HOME HEALTH SERVICES | Facility: HOME HEALTHCARE | Age: 25
End: 2020-02-25
Payer: MEDICAID

## 2020-02-25 PROCEDURE — A9270 NON-COVERED ITEM OR SERVICE: HCPCS | Performed by: PHYSICAL MEDICINE & REHABILITATION

## 2020-02-25 PROCEDURE — 97112 NEUROMUSCULAR REEDUCATION: CPT

## 2020-02-25 PROCEDURE — 92507 TX SP LANG VOICE COMM INDIV: CPT

## 2020-02-25 PROCEDURE — 700102 HCHG RX REV CODE 250 W/ 637 OVERRIDE(OP): Performed by: HOSPITALIST

## 2020-02-25 PROCEDURE — 92526 ORAL FUNCTION THERAPY: CPT

## 2020-02-25 PROCEDURE — 700102 HCHG RX REV CODE 250 W/ 637 OVERRIDE(OP): Performed by: PHYSICAL MEDICINE & REHABILITATION

## 2020-02-25 PROCEDURE — 97530 THERAPEUTIC ACTIVITIES: CPT

## 2020-02-25 PROCEDURE — 97129 THER IVNTJ 1ST 15 MIN: CPT

## 2020-02-25 PROCEDURE — 97130 THER IVNTJ EA ADDL 15 MIN: CPT

## 2020-02-25 PROCEDURE — 99232 SBSQ HOSP IP/OBS MODERATE 35: CPT | Performed by: HOSPITALIST

## 2020-02-25 PROCEDURE — A9270 NON-COVERED ITEM OR SERVICE: HCPCS | Performed by: HOSPITALIST

## 2020-02-25 PROCEDURE — 97110 THERAPEUTIC EXERCISES: CPT

## 2020-02-25 PROCEDURE — 99231 SBSQ HOSP IP/OBS SF/LOW 25: CPT | Performed by: PHYSICAL MEDICINE & REHABILITATION

## 2020-02-25 PROCEDURE — 770010 HCHG ROOM/CARE - REHAB SEMI PRIVAT*

## 2020-02-25 RX ORDER — SIMETHICONE 80 MG
80 TABLET,CHEWABLE ORAL 3 TIMES DAILY
Status: DISCONTINUED | OUTPATIENT
Start: 2020-02-25 | End: 2020-03-02

## 2020-02-25 RX ADMIN — SALINE NASAL SPRAY 2 SPRAY: 1.5 SOLUTION NASAL at 13:57

## 2020-02-25 RX ADMIN — BACLOFEN 5 MG: 10 TABLET ORAL at 20:36

## 2020-02-25 RX ADMIN — PROPRANOLOL HYDROCHLORIDE 20 MG: 10 TABLET ORAL at 07:49

## 2020-02-25 RX ADMIN — SIMETHICONE CHEW TAB 80 MG 120 MG: 80 TABLET ORAL at 07:50

## 2020-02-25 RX ADMIN — AMANTADINE HYDROCHLORIDE 100 MG: 50 SOLUTION ORAL at 13:56

## 2020-02-25 RX ADMIN — MONTELUKAST 10 MG: 10 TABLET, FILM COATED ORAL at 20:37

## 2020-02-25 RX ADMIN — SIMETHICONE CHEW TAB 80 MG 80 MG: 80 TABLET ORAL at 20:34

## 2020-02-25 RX ADMIN — SIMETHICONE CHEW TAB 80 MG 80 MG: 80 TABLET ORAL at 13:56

## 2020-02-25 RX ADMIN — CHOLECALCIFEROL TAB 25 MCG (1000 UNIT) 1000 UNITS: 25 TAB at 07:49

## 2020-02-25 RX ADMIN — PROPRANOLOL HYDROCHLORIDE 20 MG: 10 TABLET ORAL at 20:34

## 2020-02-25 RX ADMIN — BACLOFEN 5 MG: 10 TABLET ORAL at 07:49

## 2020-02-25 RX ADMIN — PROPRANOLOL HYDROCHLORIDE 20 MG: 10 TABLET ORAL at 13:56

## 2020-02-25 RX ADMIN — LEVETIRACETAM 500 MG: 500 SOLUTION ORAL at 20:34

## 2020-02-25 RX ADMIN — OMEPRAZOLE 40 MG: KIT at 07:51

## 2020-02-25 RX ADMIN — LEVETIRACETAM 500 MG: 500 SOLUTION ORAL at 07:49

## 2020-02-25 RX ADMIN — BACLOFEN 5 MG: 10 TABLET ORAL at 13:56

## 2020-02-25 RX ADMIN — AMANTADINE HYDROCHLORIDE 100 MG: 50 SOLUTION ORAL at 07:49

## 2020-02-25 RX ADMIN — POLYETHYLENE GLYCOL 3350 1 PACKET: 17 POWDER, FOR SOLUTION ORAL at 17:15

## 2020-02-25 ASSESSMENT — PATIENT HEALTH QUESTIONNAIRE - PHQ9
SUM OF ALL RESPONSES TO PHQ9 QUESTIONS 1 AND 2: 0
2. FEELING DOWN, DEPRESSED, IRRITABLE, OR HOPELESS: NOT AT ALL
1. LITTLE INTEREST OR PLEASURE IN DOING THINGS: NOT AT ALL
SUM OF ALL RESPONSES TO PHQ9 QUESTIONS 1 AND 2: 0
2. FEELING DOWN, DEPRESSED, IRRITABLE, OR HOPELESS: NOT AT ALL
1. LITTLE INTEREST OR PLEASURE IN DOING THINGS: NOT AT ALL

## 2020-02-25 NOTE — CARE PLAN
Problem: Comprehension STGs  Goal: STG-Within one week, patient will  Description: 1) Individualized goal:  Answer personal y/n questions with use of hand squeeze with 75% accuracy, given mod verbal cues.   2) Interventions:  SLP Speech Language Treatment, SLP Self Care / ADL Training , SLP Cognitive Skill Development and SLP Group Treatment     Outcome: NOT MET  Note: Most recently 0% with max A.  Limited participation in this task in recent sessions.     Problem: Expression STGs  Goal: STG-Within one week, patient will  Description: 1) Individualized goal:  Imitate vowel sounds on 50% of attempts.   2) Interventions:  SLP Speech Language Treatment, SLP Swallowing Dysfunction Treatment, SLP Oral Pharyngeal Evaluation, SLP Prosthesis Checkout, SLP Self Care / ADL Training , SLP Cognitive Skill Development and SLP Group Treatment     Outcome: NOT MET  Note: To be addressed.

## 2020-02-25 NOTE — DISCHARGE PLANNING
DME referral sent to [facility].  HH referral sent to [facility] per choice form.  Awaiting responses.

## 2020-02-25 NOTE — REHAB-OT IDT TEAM NOTE
Occupational Therapy   Activities of Daily Living  Eating Initial:  1 - Total Assistance  Eating Current:  1 - Total Assistance   Eating Description:  Staff administers tube feed/parenteral nutrition/IVF  Grooming Initial:  1 - Total Assistance  Grooming Current:  1 - Total Assistance   Grooming Description:  (Total assist to wash face while seated in w/c)  Bathing Initial:  1 - Total Assistance  Bathing Current:  1 - Total Assistance   Bathing Description:  (Total assist for sponge bath in bed)  Upper Body Dressing Initial:  1 - Total Assistance  Upper Body Dressing Current:  1 - Total Assistance   Upper Body Dressing Description:  (Total assist for donning t-shirt while seated in w/c)  Lower Body Dressing Initial:  1 - Total Assistance  Lower Body Dressing Current:  1 - Total Assistance   Lower Body Dressing Description:  1 - Total Assistance  Toileting Initial:     Toileting Current:      Toileting Description:     Toilet Transfer Initial:  0 - Not tested,medical condition  Toilet Transfer Current:  0 - Not tested,medical condition   Toilet Transfer Description:  0 - Not tested,medical condition  Tub / Shower Transfer Initial:     Tub / Shower Transfer Current:   0 - Not tested, medical condition (Sponge bathing at this time for safety)     IADL:  NA  Family Training/Education:  Ongoing with mother  DME/DC Recommendations:  Tub transfer bench, home health OT (for PROM, ADLs, sensory stimulation)    Strengths:  Supportive family  Barriers:  Other: [place] Level 3, Agitation/restlessness, unable to follow directions     # of short term goals set= 1    # of short term goals met=0    Occupational Therapy Goals     Problem: Dressing     Dates: Start: 02/06/20       Description:     Goal: STG-Within one week, patient will dress UB     Dates: Start: 02/06/20       Description: 1) Individualized Goal:  Max assist   2) Interventions:  OT Group Therapy, OT Self Care/ADL, OT Cognitive Skill Dev, OT Manual Ther Technique, OT  Neuro Re-Ed/Balance, OT Sensory Int Techniques, OT Therapeutic Activity, OT Evaluation and OT Therapeutic Exercise                 Problem: Functional Cognition     Dates: Start: 02/06/20       Description:           Problem: OT Long Term Goals     Dates: Start: 02/06/20       Description:     Goal: LTG-By discharge, patient will complete basic self care tasks     Dates: Start: 02/06/20       Description: 1) Individualized Goal:  Mod assist with AE as needed  2) Interventions:  OT Group Therapy, OT Self Care/ADL, OT Cognitive Skill Dev, OT Manual Ther Technique, OT Neuro Re-Ed/Balance, OT Sensory Int Techniques, OT Therapeutic Activity, OT Evaluation and OT Therapeutic Exercise           Goal: LTG-By discharge, patient will perform bathroom transfers     Dates: Start: 02/06/20       Description: 1) Individualized Goal:  Mod assist with AE as needed    2) Interventions:  OT Group Therapy, OT Self Care/ADL, OT Cognitive Skill Dev, OT Manual Ther Technique, OT Neuro Re-Ed/Balance, OT Sensory Int Techniques, OT Therapeutic Activity, OT Evaluation and OT Therapeutic Exercise                       Section completed by:  Minnie Luke MS,OTR/L

## 2020-02-25 NOTE — REHAB-PHARMACY IDT TEAM NOTE
Pharmacy   Pharmacy  Antibiotics/Antifungals/Antivirals:  Medication:      Active Orders (From admission, onward)    None        Route:         n/a  Stop Date:  n/a  Reason:   Antihypertensives/Cardiac:  Medication:    Orders (72h ago, onward)     Start     Ordered    02/22/20 1500  propranolol (INDERAL) tablet 20 mg  3 TIMES DAILY      02/22/20 1224    02/21/20 1500  propranolol (INDERAL) tablet 10 mg  3 TIMES DAILY,   Status:  Discontinued      02/21/20 1258    02/21/20 1258  hydrALAZINE (APRESOLINE) tablet 25 mg  EVERY 8 HOURS PRN      02/21/20 1258              Patient Vitals for the past 24 hrs:   BP Pulse   02/25/20 0700 -- 83   02/25/20 0655 128/84 (!) 124   02/24/20 2053 124/78 94   02/24/20 1925 127/70 100   02/24/20 1339 -- 98   02/24/20 1325 137/96 95     Anticoagulation:  Medication:  n/a  INR:      Other key medications: amantadine, baclofen, levetiracetam, montelukast, omeprazole, quetiapine  A review of the medication list reveals no issues at this time. Patient is currently on an antihypertensive. Recommend home blood pressure monitoring/recording if antihypertensive regimen continues.  Section completed by:  Ro Villasenor RPH

## 2020-02-25 NOTE — CARE PLAN
Problem: Mobility Transfers  Goal: STG-Within one week, patient will roll  Description: 1) Individualized goal:  To left or right, max A with use of bed features as needed.  2) Interventions:  PT E Stim Attended, PT Orthotics Training, PT Gait Training, PT Self Care/Home Eval, PT Therapeutic Exercises, PT Ultrasound, PT TENS Application, PT Neuro Re-Ed/Balance, PT Therapeutic Activity, PT Manual Therapy and PT Evaluation      Outcome: NOT MET  Note: To be assessed  Goal: STG-Within one week, patient will sit to stand  Description: 1) Individualized goal:  Min A x1 with use of AD/bed rail as needed  2) Interventions: PT E Stim Attended, PT Orthotics Training, PT Gait Training, PT Self Care/Home Eval, PT Therapeutic Exercises, PT Ultrasound, PT TENS Application, PT Neuro Re-Ed/Balance, PT Therapeutic Activity, PT Manual Therapy and PT Evaluation      Outcome: NOT MET  Note: Mod- max A x2  Goal: STG-Within one week, patient will transfer bed to chair  Description: 1) Individualized goal:  Mod A x1 with LRAD  2) Interventions:  PT E Stim Attended, PT Orthotics Training, PT Gait Training, PT Self Care/Home Eval, PT Therapeutic Exercises, PT Ultrasound, PT TENS Application, PT Neuro Re-Ed/Balance, PT Therapeutic Activity, PT Manual Therapy and PT Evaluation      Outcome: NOT MET  Note: 2 person assist required for safety

## 2020-02-25 NOTE — REHAB-SLP IDT TEAM NOTE
Speech Therapy   Cognitive Linquistic Functions  Comprehension Initial:  1 - Total Assistance  Comprehension Current:  1 - Total Assistance   Comprehension Description:     Expression Initial:  1 - Total Assistance  Expression Current:  1 - Total Assistance   Expression Description:     Social Interaction Initial:  2 - Max Assistance  Social Interaction Current:  2 - Max Assistance   Social Interaction Description:     Problem Solving Initial:  1 - Total Assistance  Problem Solving Current:  1 - Total Assistance   Problem Solving Description:     Memory Initial:  1 - Total Assistance  Memory Current:  1 - Total Assistance   Memory Description:     Executive Functioning / Organization Initial:     Executive Functioning / Organization Current:    1 - Total Assistance   Executive Functioning Desciption:  Hand over hand required.  Swallowing  Swallowing Status:  Patient has been refusing attempts with NTL ( level 2) via teaspoon.  Orders Placed This Encounter   Procedures   • Diet NPO     Standing Status:   Standing     Number of Occurrences:   1     Order Specific Question:   Restrict to:     Answer:   Strict [1]     Behavior Modification Plan  Keep the environment simple to avoid over stimulatiom/agitation, Allow for rest time, Keep instructions simple/concrete, Use nonverbal cues (model/demonstrate or gesture), Give clear feedback, Set clear goals, Be calm, Redirect to task/topic, Provide reasonable choices, Decrease the chance of failure by offering activities that are within the patient's abilities, Analyze tasks (break down into smaller steps) and Reinforce participation in desired tasks  Assistive Technology  Other: continuing training for yes/no responses via hand squeeze  Family Training/Education:  Ongoing with patient and his mother.  DC Recommendations:   Patient will require additional ST services upon discharge from this facility.  Strengths:  Supportive family  Barriers:  Agitation, Unable to follow  instructions, Confused, Decreased endurance, Poor activity tolerance, Poor carryover of learning and Poor insight/denial of deficits  # of short term goals set=  2  # of short term goals met= 0  Speech Therapy Problems     Problem: Comprehension STGs     Dates: Start: 02/06/20       Description:     Goal: STG-Within one week, patient will     Dates: Start: 02/06/20       Description: 1) Individualized goal:  Answer personal y/n questions with use of hand squeeze with 75% accuracy, given mod verbal cues.   2) Interventions:  SLP Speech Language Treatment, SLP Self Care / ADL Training , SLP Cognitive Skill Development and SLP Group Treatment       Note:     Goal Note filed on 02/25/20 1339 by Tammi Fletcher MS,CCC-SLP    Most recently 0% with max A.  Limited participation in this task in recent sessions.                        Problem: Expression STGs     Dates: Start: 02/11/20       Description:     Goal: STG-Within one week, patient will     Dates: Start: 02/11/20       Description: 1) Individualized goal:  Imitate vowel sounds on 50% of attempts.   2) Interventions:  SLP Speech Language Treatment, SLP Swallowing Dysfunction Treatment, SLP Oral Pharyngeal Evaluation, SLP Prosthesis Checkout, SLP Self Care / ADL Training , SLP Cognitive Skill Development and SLP Group Treatment       Note:     Goal Note filed on 02/25/20 1339 by Tammi Fletcher MS,CCC-SLP    To be addressed.                        Problem: Speech/Swallowing LTGs     Dates: Start: 02/06/20       Description:     Goal: LTG-By discharge, patient will safely swallow     Dates: Start: 02/06/20       Description: 1) Individualized goal:  Dysphagia I/NTL diet without clinical s/sx of aspiration  2) Interventions:  SLP Speech Language Treatment, SLP Swallowing Dysfunction Treatment, SLP Oral Pharyngeal Evaluation, SLP Video Swallow Evaluation, SLP Self Care / ADL Training , SLP Cognitive Skill Development and SLP Group Treatment             Goal: LTG-By  discharge, patient will     Dates: Start: 02/06/20       Description:                 Problem: Swallowing STGs     Dates: Start: 02/25/20       Description:     Goal: STG-Within one week, patient will safely swallow     Dates: Start: 02/25/20       Description: 1) Individualized goal:  Therapeutic trials of nectar thick via tsp with no s/sx of aspiration.  2) Interventions:  SLP Swallowing Dysfunction Treatment and SLP Group Treatment                          Section completed by:  Tammi Fletcher MS,CCC-SLP

## 2020-02-25 NOTE — REHAB-CM IDT TEAM NOTE
Case Management    DC Planning  DC destination/dispostion:  Patient lives with his mother in a single level apartment.     Referrals: Medicaid PCS services.     DC Needs: Patient has been decannulated.  He has a peg tube.  He has only a hospital bed and transfer chair at home.  We will order a tub transfer bench and comode.  He will need home health therapy and PCS services.       Barriers to discharge:   Limited family members in the area.     Strengths: mother is proficient in his care.     Section completed by:  Anahi Anderson R.N.

## 2020-02-25 NOTE — DISCHARGE PLANNING
Per Jessica at [facility], DME referral accepted.  Notified her of discharge date change to 3/5.  Spoke with Becka at [facility], informed her that trach has been discontinued and patient is on tube feeds and was on previous to admission.  Mother has been obtaining formula.

## 2020-02-25 NOTE — THERAPY
Speech Language Pathology  Daily Treatment     Patient Name: Rey Medina  Age:  24 y.o., Sex:  male  Medical Record #: [id number]  Today's Date: 2/25/2020     Subjective    Assumed care from mother. ST conduced in back therapy gym per mother's request.      Objective     02/25/20 0804   Cognition   Rancho Scale Level 3   Outcome Measures   Outcome Measures Utilized ABS   ABS (Agitated Behavior Scale)   Short Attention Span, Easy Distractibility, Inability to Concentrate 4   Impulsive, Impatient, Low Tolerance for Pain or Frustration 3   Uncooperative, Resistant to Care, Demanding 4   Violent and/or Threatening Violence Toward People or Property 1   Explosive and/or Unpredictable Anger 1   Rocking, Rubbing, Moaning, Other Self-Stimulating Behavior 3   Pulling at Tubes, Restraints, etc. 1   Wandering from Treatment Area 1   Restlessness, Pacing, Excessive Movement 3   Repetitive Behaviors, Motor and/or Verbal 3   Rapid, Loud or Excessive Talking 1   Sudden Changes of Mood 1   Easily Initiated - Excessive Crying and/or Laughter 1   Self-Abusiveness, Physical and/or Verbal 1   Agitated Behavior Scale Total Score 28   Level of Severity Mild Agitation   Interdisciplinary Plan of Care Collaboration   IDT Collaboration with  Family / Caregiver   Patient Position at End of Therapy Seated;Self Releasing Lap Belt Applied;Family / Friend in Room   SLP Total Time Spent   SLP Individual Total Time Spent (Mins) 30   Charge Group   SLP Treatment - Individual Speech Language Treatment - Individual   SLP Swallowing Dysfunction Treatment Swallowing Dysfunction Treatment         Assessment    Pt refusing oral stim, PO trials, refusing to squeeze hand for yes/no response. Pt compulsively rubbing mouth and nose throughout session. When attempting to put tissue in pt's hand, pt waved hand away. Pushed away spoon with trials. Pt continues to demonstrate limited to no participation in structured therapy tasks. Communication to express  refusal clear by pushing away items, therapists hands, closing eyes.     Plan    Continue to target PO trials, task attention and increased participation

## 2020-02-25 NOTE — THERAPY
Physical Therapy   Daily Treatment     Patient Name: Rey Medina  Age:  24 y.o., Sex:  male  Medical Record #: [id number]  Today's Date: 2/25/2020     Precautions  Precautions: Fall Risk, PEG Tube  Comments: Recent decannulation of tracheostomy, non-verbal    Subjective    Pt seated in therapy gym with mother, mother encouraging to pt to participate.     Objective       02/25/20 0931   Precautions   Precautions Fall Risk;PEG Tube   Comments Recent decannulation of tracheostomy, non-verbal   Sitting Lower Body Exercises   Other Exercises Passive stretching for R gastroc: 3-4x 30-60 sec hold with pt seated in WC    Bed Mobility    Sit to Stand Total Assist X 2   Interdisciplinary Plan of Care Collaboration   IDT Collaboration with  Occupational Therapist   Patient Position at End of Therapy Seated;Self Releasing Lap Belt Applied  (in therapy gym)   Collaboration Comments pt care passed to OT   PT Total Time Spent   PT Individual Total Time Spent (Mins) 30   PT Charge Group   PT Neuromuscular Re-Education / Balance 1   PT Therapeutic Activities 1     Standing in // bars with max A x2 and BUE support, pt able to maintain standing with max- close SBA, 1x ~3 min, 2x ~1 min.     Assessment    Pt continues to have improved participation and decreased agitation. Increased standing tolerance this session compared to yesterday.     Plan    Continue PROM/ stretching, standing in // bars as tolerated, seated balance and transfer training as able.

## 2020-02-25 NOTE — THERAPY
Speech Language Pathology  Daily Treatment     Patient Name: Rey Medina  Age:  24 y.o., Sex:  male  Medical Record #: [id number]  Today's Date: 2/24/2020     Subjective    Pt less alert when compared to earlier session.      Objective       02/24/20 1431   Receptive Language / Auditory Comprehension   Answers Yes / No Personal Questions Profound (1)   Follows One Unit Commands Severe (2)   Dysphagia    Other Treatments NTL trials   SLP Total Time Spent   SLP Individual Total Time Spent (Mins) 30       Assessment    Pt refused NTL trials this session.  Pt refused oral care this day.  Pt completed 1 step directives with total hand-over-hand assistance.  Pt unable to complete hand squeeze for yes/no this day.      Plan    Target attn, oral care, dysphagia management.

## 2020-02-25 NOTE — THERAPY
Occupational Therapy  Daily Treatment     Patient Name: Rey Medina  Age:  24 y.o., Sex:  male  Medical Record #: [id number]  Today's Date: 2/25/2020     Precautions  Precautions: (P) Fall Risk, PEG Tube  Comments: (P) Recent decannulation of tracheostomy, non-verbal    Safety   Comments: Total assist at this time due to agitation, level of alertness    Subjective    Patient presents non-verbal.      Objective     02/25/20 1301   Precautions   Precautions Fall Risk;PEG Tube   Comments Recent decannulation of tracheostomy, non-verbal   Interdisciplinary Plan of Care Collaboration   Patient Position at End of Therapy Seated;Self Releasing Lap Belt Applied;Family / Friend in Room   OT Total Time Spent   OT Individual Total Time Spent (Mins) 30   OT Charge Group   OT Neuromuscular Re-education / Balance 1   OT Therapy Activity 1     Applied vibration to BUEs (shoulders, biceps/triceps, forearm and hand). No active motor control noted during this activity.     Assessment    Patient tolerated OT session fair with focus on sensory stimulation for UEs. Patient tolerated vibration on RUE well however briefly agitated/withdrawn behavior/evasive movements noted when initiated vibration for LUE. After several minutes patient presented calmer and able to tolerate vibration when applied to LUE.     Plan    Low-stim environment,  sensory stimulation, ADLs/functional participation, sitting balance

## 2020-02-25 NOTE — REHAB-PHARMACY IDT TEAM NOTE
Pharmacy   Pharmacy  Antibiotics/Antifungals/Antivirals:  Medication:      Active Orders (From admission, onward)    None        Route:        NA  Stop Date:  NA  Reason:      NA  Antihypertensives/Cardiac:  Medication:    Orders (72h ago, onward)     Start     Ordered    02/22/20 1500  propranolol (INDERAL) tablet 20 mg  3 TIMES DAILY      02/22/20 1224    02/21/20 1500  propranolol (INDERAL) tablet 10 mg  3 TIMES DAILY,   Status:  Discontinued      02/21/20 1258    02/21/20 1258  hydrALAZINE (APRESOLINE) tablet 25 mg  EVERY 8 HOURS PRN      02/21/20 1258              Patient Vitals for the past 24 hrs:   BP Pulse   02/24/20 1339 -- 98   02/24/20 1325 137/96 95   02/24/20 0645 123/73 (!) 103   02/23/20 2041 106/60 83   02/23/20 1920 124/82 97     Anticoagulation:  Medication: None                                     Other key medications: A review of the medication list reveals no issues at this time. Patient is currently on antihypertensive(s). Recommend home blood pressure monitoring/recording if antihypertensive(s) regimen(s) continue.    Section completed by: Nemesio Chris [facility]

## 2020-02-25 NOTE — FLOWSHEET NOTE
02/25/20 1241   Events/Summary/Plan   Events/Summary/Plan Mom has been changing trach dressing early morning.  Plan to see pt early tomorrow to check stoma.

## 2020-02-25 NOTE — THERAPY
Physical Therapy   Daily Treatment     Patient Name: Rey Medina  Age:  24 y.o., Sex:  male  Medical Record #: [id number]  Today's Date: 2/24/2020     Precautions  Precautions: Fall Risk, PEG Tube  Comments: Recent decannulation of tracheostomy, non-verbal    Subjective    Pt seated in room with mother present, mother agreeable to PT.      Objective       02/24/20 1531   Precautions   Precautions Fall Risk;PEG Tube   Comments Recent decannulation of tracheostomy, non-verbal   Supine Lower Body Exercise   Other Exercises Passive stretching performed with pt supine in bed: ankle DF and knee to chest 3x30 sec hold each, Hip IR and ER 1x30 sec hold each    Bed Mobility    Sit to Stand Total Assist X 2  (max A x2 in // bars )   Interdisciplinary Plan of Care Collaboration   IDT Collaboration with  Nursing   Patient Position at End of Therapy In Bed;Call Light within Reach;Family / Friend in Room   Collaboration Comments pt care passed to RN for medication administration    PT Total Time Spent   PT Individual Total Time Spent (Mins) 30   PT Charge Group   PT Therapeutic Exercise 1   PT Therapeutic Activities 1     Attempted standing in // bars 3x, pt successful 1x with 2 person max A and max tactile cuing for upright posture.     FIM Bed/Chair/Wheelchair Transfers Score: 1 - Total Assistance  Bed/Chair/Wheelchair Transfers Description:  Increased time, Set-up of equipment, Requires 2 people to assist(WC > bed, max A x2 squat pivot method )      Assessment    Pt participatory in standing 1/3 attempt this session and tolerated PROM in bed. Improved alertness/ decreased agitation compared to yesterday. Requires 2 people for safe transfer at this time.     Plan    Continue PROM/ stretching, standing in // bars as tolerated, seated balance and transfer training as able.

## 2020-02-25 NOTE — DISCHARGE PLANNING
ATTN: Case Management  RE: Referral for Home Health    As of 2/25/2020, we have accepted the Home Health referral for the patient listed above.    A [facility] clinician will be out to see the patient within 48 hours. If you have any questions or concerns regarding the patient’s transition to Home Health, please do not hesitate to contact us at [phone].      We look forward to collaborating with you,  [facility] Team

## 2020-02-25 NOTE — REHAB-RESPIRATORY IDT TEAM NOTE
Respiratory Therapy   Respiratory Therapy    Pt is stable on RA.  His trach stoma is closing very slowly with a small air leak and very small amount of secretions continue.  Section completed by:  Ana Becker, RRT

## 2020-02-25 NOTE — REHAB-NURSING IDT TEAM NOTE
Nursing   Nursing  Diet/Nutrition:  Tube Feed and NPO  Medication Administration:  Via Gastric Tube    Snack schedule:  None  Supplement:  Other: Beneprotein    Fluid Intake/Output in past 24 hours: No intake/output data recorded.  Admit Weight:  Weight: 73.1 kg (161 lb 3.2 oz)  Weight Last 7 Days   [73.1 kg (161 lb 3.2 oz)] 73.1 kg (161 lb 3.2 oz) (02/23 1400)    Pain Issues:    Location:  --  --         Severity:  Denies   Scheduled pain medications:  None     PRN pain medications used in last 24 hours:  None     Effectiveness of pain management plan:  good=patient states acceptable comfort after interventions    Bowel:    Bowel Assist Initial Score:  1 - Total Assistance  Bowel Assist Current Score:  1 - Total Assistance  Bowl Accidents in last 7 days:  3  Last bowel movement: 02/24/20  Stool Description: Medium, Brown     Usual bowel pattern:  daily  Scheduled bowel medications:  polyethylene glycol/lytes (MIRALAX)   PRN bowel medications used in last 24 hours:  None  Nursing Interventions:  Increased time, Scheduled medication, Brief  Effectiveness of bowel program:   poor=continues to have bowel accidents  Bladder:    Bladder Assist Initial Score:  1 - Total Assistance  Bladder Assist Current Score:  1 - Total Assistance  Bladder Accidents in last 7 days:     PVR range for past 24-48 hours: --    Time void schedule/voiding pattern:  Time void every 3 hours during the day and every 4 hours at night  Interventions:  Increased time, Brief, Urinal  Effectiveness of bladder training:  Fair=occasional unpredictable, incontinent emptying of bladder (< 1 time/day)      Sleep/wake cycle:   Average hours slept:  Sleeps 4-6 hours without waking  Sleep medication usage:  None    Patient/Family Training/Education:  Aspiration Precautions and Medication Management  Discharge Supply Recommendations:  Feeding Tube Supplies  Wound care  Strengths: Supportive family   Barriers:   Unable to follow instructions, Confused, Poor  carryover of learning and Tube feeding       Nursing Problems     Problem: Bowel/Gastric:     Description:     Goal: Normal bowel function is maintained or improved     Description:           Goal: Will not experience complications related to bowel motility     Description:                 Problem: Communication     Description:     Goal: The ability to communicate needs accurately and effectively will improve     Description:                 Problem: Discharge Barriers/Planning     Description:     Goal: Patient's continuum of care needs will be met     Description:                 Problem: Infection     Description:     Goal: Will remain free from infection     Description:                 Problem: Knowledge Deficit     Description:     Goal: Knowledge of disease process/condition, treatment plan, diagnostic tests, and medications will improve     Description:           Goal: Knowledge of the prescribed therapeutic regimen will improve     Description:                 Problem: Respiratory:     Description:     Goal: Respiratory status will improve     Description:                 Problem: Safety     Description:     Goal: Will remain free from injury     Description:           Goal: Will remain free from falls     Description:                 Problem: Skin Integrity     Description:     Goal: Risk for impaired skin integrity will decrease     Description:                 Problem: Urinary Elimination:     Description:     Goal: Ability to reestablish a normal urinary elimination pattern will improve     Description:                 Problem: Venous Thromboembolism (VTW)/Deep Vein Thrombosis (DVT) Prevention:     Description:     Goal: Patient will participate in Venous Thrombosis (VTE)/Deep Vein Thrombosis (DVT)Prevention Measures     Description:                        Long Term Goals:   At discharge patient will be able to function safely at home with support.    Section completed by:  Loiuse Melgoza R.N.

## 2020-02-25 NOTE — THERAPY
Occupational Therapy  Daily Treatment     Patient Name: Rey Medina  Age:  24 y.o., Sex:  male  Medical Record #: [id number]  Today's Date: 2/25/2020     Precautions  Precautions: Fall Risk, PEG Tube  Comments: Recent decannulation of tracheostomy, non-verbal    Safety   Comments: Total assist at this time due to agitation, level of alertness    Subjective    Patient presents non-verbal.      Objective     02/25/20 1001   Precautions   Precautions Fall Risk;PEG Tube   Comments Recent decannulation of tracheostomy, non-verbal   Passive ROM Upper Body   Passive ROM Upper Body WDL   Comments Patient tolerated PROM exercises (shoulder flexion/extension, abd/adduction, internal/external rotation, horizontal abd/adduction, elbow flexion/extension, forearm supination/pronation, wrist flexion/extension, finger flexion/extension) with BUEs. PROM WFL. No active motor control noted during PROM exercises.    Interdisciplinary Plan of Care Collaboration   IDT Collaboration with  Family / Caregiver   Patient Position at End of Therapy Seated;Self Releasing Lap Belt Applied;Family / Friend in Room   Collaboration Comments Patient's mother in room   OT Total Time Spent   OT Individual Total Time Spent (Mins) 30   OT Charge Group   OT Therapy Activity 2     FIM Lower Body Dressing Score:  1 - Total Assistance  Lower Body Dressing Description:  (Total assist for donning socks while seated in w/c)    Assessment    Patient tolerated sitting in w/c entire session with no agitation/restlessness. Tolerated PROM exercises for BUEs however no active motor control observed during this activity. Patient noted attempting to wipe his nose using LUE several times during this session and demonstrated ability to do so as well when tissue placed in LUE.     Plan    Low-stim environment,  sensory stimulation, ADLs/functional participation, sitting balance

## 2020-02-25 NOTE — REHAB-PT IDT TEAM NOTE
Physical Therapy   Mobility  Bed mobility:   Max- total A   Bed /Chair/Wheelchair Transfer Initial:  1 - Total Assistance  Bed /Chair/Wheelchair Transfer Current:  1 - Total Assistance   Bed/Chair/Wheelchair Transfer Description:  Increased time, Set-up of equipment, Requires 2 people to assist(WC > bed, max A x2 squat pivot method )  Walk Initial:     Walk Current:  N/A     Walk Description:   Unsafe activity   Wheelchair Initial:     Wheelchair Current: Total A   Wheelchair Description:   Pt unable to participate  Stairs Initial:     Stairs Current: N/A    Stairs Description: unsafe activity  Patient/Family Training/Education:  Ongoing with mother   DME/DC Recommendations:  Continued stay at inpatient facility to facilitate safe return home   Strengths:  Supportive family  Barriers:   Agitation, Unable to follow instructions, Generalized weakness, Limited mobility and Poor activity tolerance  # of short term goals set= 3  # of short term goals met= 0  Physical Therapy Problems     Problem: Mobility Transfers     Dates: Start: 02/06/20       Description:     Goal: STG-Within one week, patient will roll     Dates: Start: 02/06/20       Description: 1) Individualized goal:  To left or right, max A with use of bed features as needed.  2) Interventions:  PT E Stim Attended, PT Orthotics Training, PT Gait Training, PT Self Care/Home Eval, PT Therapeutic Exercises, PT Ultrasound, PT TENS Application, PT Neuro Re-Ed/Balance, PT Therapeutic Activity, PT Manual Therapy and PT Evaluation        Note:     Goal Note filed on 02/25/20 1140 by Charmaine Chawla, PT    To be assessed                  Goal: STG-Within one week, patient will sit to stand     Dates: Start: 02/06/20       Description: 1) Individualized goal:  Min A x1 with use of AD/bed rail as needed  2) Interventions: PT E Stim Attended, PT Orthotics Training, PT Gait Training, PT Self Care/Home Eval, PT Therapeutic Exercises, PT Ultrasound, PT TENS Application, PT Neuro  Re-Ed/Balance, PT Therapeutic Activity, PT Manual Therapy and PT Evaluation        Note:     Goal Note filed on 02/25/20 1140 by Charmaine Chawla, PT    Mod- max A x2                  Goal: STG-Within one week, patient will transfer bed to chair     Dates: Start: 02/06/20       Description: 1) Individualized goal:  Mod A x1 with LRAD  2) Interventions:  PT E Stim Attended, PT Orthotics Training, PT Gait Training, PT Self Care/Home Eval, PT Therapeutic Exercises, PT Ultrasound, PT TENS Application, PT Neuro Re-Ed/Balance, PT Therapeutic Activity, PT Manual Therapy and PT Evaluation        Note:     Goal Note filed on 02/25/20 1140 by Charmaine Chawla, PT    2 person assist required for safety                         Problem: PT-Long Term Goals     Dates: Start: 02/06/20       Description:     Goal: LTG-By discharge, patient will maintain balance     Dates: Start: 02/06/20       Description: 1) Individualized goal:  In sitting with use of UE for assist at a SPV level, 5 min   2) Interventions:  PT E Stim Attended, PT Orthotics Training, PT Gait Training, PT Self Care/Home Eval, PT Therapeutic Exercises, PT Ultrasound, PT TENS Application, PT Neuro Re-Ed/Balance, PT Therapeutic Activity, PT Manual Therapy and PT Evaluation              Goal: LTG-By discharge, patient will tolerate standing     Dates: Start: 02/06/20       Description: 1) Individualized goal:  2 min, SBA with use of LRAD  2) Interventions:  PT E Stim Attended, PT Orthotics Training, PT Gait Training, PT Self Care/Home Eval, PT Therapeutic Exercises, PT Ultrasound, PT TENS Application, PT Neuro Re-Ed/Balance, PT Therapeutic Activity, PT Manual Therapy and PT Evaluation            Goal: LTG-By discharge, patient will ambulate     Dates: Start: 02/06/20       Description: 1) Individualized goal:  10 ft, mod A with LRAD  2) Interventions:  PT E Stim Attended, PT Orthotics Training, PT Gait Training, PT Self Care/Home Eval, PT Therapeutic Exercises, PT Ultrasound, PT  TENS Application, PT Neuro Re-Ed/Balance, PT Therapeutic Activity, PT Manual Therapy and PT Evaluation             Goal: LTG-By discharge, patient will transfer one surface to another     Dates: Start: 02/06/20       Description: 1) Individualized goal:  Min A with LRAD  2) Interventions:  PT E Stim Attended, PT Orthotics Training, PT Gait Training, PT Self Care/Home Eval, PT Therapeutic Exercises, PT Ultrasound, PT TENS Application, PT Neuro Re-Ed/Balance, PT Therapeutic Activity, PT Manual Therapy and PT Evaluation                         Section completed by:  Charmaine Chawla, PT, DPT

## 2020-02-25 NOTE — DISCHARGE PLANNING
Pending clarification if home health will be providing trach care. If so we will need this added to the referral. Also patient is currently receiving tube feeds. Referral is pending review by a nursing supervisor so see if any additional orders will be needed for formula. MALLORIE Otto has been notified and will check on the requested information.       Thank you

## 2020-02-25 NOTE — PROGRESS NOTES
[facility] Medicine Daily Progress Note    Date of Service  2/25/2020    Chief Complaint:  Fever  Tachycardia  Leukocytosis      Interval History:  No significant events or changes since last visit    Review of Systems  Review of Systems   Unable to perform ROS: Medical condition        Physical Exam  Temp:  [36.3 °C (97.3 °F)-36.8 °C (98.3 °F)] 36.5 °C (97.7 °F)  Pulse:  [] 83  Resp:  [16-20] 19  BP: (124-137)/(70-96) 128/84  SpO2:  [94 %-96 %] 96 %    Physical Exam  Vitals signs and nursing note reviewed.   Constitutional:       Appearance: Normal appearance.   HENT:      Head: Atraumatic.   Eyes:      Conjunctiva/sclera: Conjunctivae normal.      Pupils: Pupils are equal, round, and reactive to light.   Neck:      Musculoskeletal: Normal range of motion and neck supple.   Cardiovascular:      Rate and Rhythm: Regular rhythm. Tachycardia present.      Heart sounds: No murmur.   Pulmonary:      Effort: Pulmonary effort is normal.      Breath sounds: No wheezing or rales.      Comments: Has some coarse breath sounds  Abdominal:      General: There is no distension.      Palpations: Abdomen is soft.      Tenderness: There is no abdominal tenderness.   Musculoskeletal:      Right lower leg: No edema.      Left lower leg: No edema.   Skin:     General: Skin is warm and dry.      Findings: No rash.   Neurological:      Mental Status: He is alert and oriented to person, place, and time.   Psychiatric:         Mood and Affect: Mood normal.         Behavior: Behavior normal.         Fluids    Intake/Output Summary (Last 24 hours) at 2/25/2020 1317  Last data filed at 2/25/2020 0900  Gross per 24 hour   Intake 290 ml   Output --   Net 290 ml       Laboratory  Recent Labs     02/24/20  0605   WBC 8.9   RBC 6.30*   HEMOGLOBIN 17.4   HEMATOCRIT 52.4*   MCV 83.2   MCH 27.6   MCHC 33.2*   RDW 46.9   PLATELETCT 347   MPV 11.6     Recent Labs     02/24/20  0605   SODIUM 136   POTASSIUM 4.2   CHLORIDE 104   CO2 21   GLUCOSE  142*   BUN 12   CREATININE 0.68   CALCIUM 9.7                   Imaging    Assessment/Plan  Tachycardia- (present on admission)  Assessment & Plan  HR occ rises up a little  Likely to agitation  Off Lopressor  On Propranolol: 20 mg tid  Cont to monitor    Leukocytosis- (present on admission)  Assessment & Plan  S/P fever  S/P leukocytosis  UA: neg  BC x 2: neg  CXR (2/19): unremarkable  S/P empiric abx of Zosyn & Zyvox    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)  Assessment & Plan  Nonverbal  Agitation much improved    Dysphagia following nontraumatic intracerebral hemorrhage- (present on admission)  Assessment & Plan  Has PEG tube and tolerating TF    Tracheostomy dependent (HCC)- (present on admission)  Assessment & Plan  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

## 2020-02-25 NOTE — PROGRESS NOTES
0715: Bedside report received, assumed care for this patient.  Patient is A&O x 0, non verbal.  VSS.  Communication board updated, call light and belongings are within reach.  Bed is in low position. Patient appears in no distress, and has no appearances of SOB and 0/10 of pain, sitting calmly with even respirations.  Will be medicated per MAR.  Plan of care discussed and agreed upon with patient's mom.

## 2020-02-25 NOTE — PROGRESS NOTES
Change of shift report obtained, vss, patients mother bedside and provided care. No issues noted, rested comfortably all night.

## 2020-02-25 NOTE — PROGRESS NOTES
Rehab Progress Note     Encounter Date: 2/25/2020    CC: Pt non-verbal    Interval Events (Subjective)  Patient with mom who reports that he has been doing better and better.  She hopes that his trajectory upwards continues.  She states that he seems much more alert and awake today.  Patient nonverbal for review of systems could not be completed.  Mom reports patient has a runny nose.    Objective:  VITAL SIGNS: /84   Pulse 94   Temp 36.6 °C (97.9 °F) (Temporal)   Resp 18   Wt 73.1 kg (161 lb 3.2 oz)   SpO2 96%   BMI 25.25 kg/m²       GEN: No apparent distress, sitting up in wheelchair with mother  HEENT: Head normocephalic, atraumatic.  Sclera nonicteric bilaterally, no ocular discharge appreciated bilaterally.  Bandage in place securely.  CV: Extremities warm and well-perfused, no peripheral edema appreciated bilaterally.  PULMONARY: Breathing nonlabored on room air, no respiratory accessory muscle use.  Not requiring supplemental oxygen.  ABD: Soft, nontender.  SKIN: No appreciable skin breakdown on exposed areas of skin.  NEURO: Awake alert.   PSYCH: Mood and affect within normal limits.      Recent Results (from the past 72 hour(s))   CBC WITHOUT DIFFERENTIAL    Collection Time: 02/24/20  6:05 AM   Result Value Ref Range    WBC 8.9 4.8 - 10.8 K/uL    RBC 6.30 (H) 4.70 - 6.10 M/uL    Hemoglobin 17.4 14.0 - 18.0 g/dL    Hematocrit 52.4 (H) 42.0 - 52.0 %    MCV 83.2 81.4 - 97.8 fL    MCH 27.6 27.0 - 33.0 pg    MCHC 33.2 (L) 33.7 - 35.3 g/dL    RDW 46.9 35.9 - 50.0 fL    Platelet Count 347 164 - 446 K/uL    MPV 11.6 9.0 - 12.9 fL   Basic Metabolic Panel    Collection Time: 02/24/20  6:05 AM   Result Value Ref Range    Sodium 136 135 - 145 mmol/L    Potassium 4.2 3.6 - 5.5 mmol/L    Chloride 104 96 - 112 mmol/L    Co2 21 20 - 33 mmol/L    Glucose 142 (H) 65 - 99 mg/dL    Bun 12 8 - 22 mg/dL    Creatinine 0.68 0.50 - 1.40 mg/dL    Calcium 9.7 8.5 - 10.5 mg/dL    Anion Gap 11.0 0.0 - 11.9   ESTIMATED GFR     Collection Time: 02/24/20  6:05 AM   Result Value Ref Range    GFR If African American >60 >60 mL/min/1.73 m 2    GFR If Non African American >60 >60 mL/min/1.73 m 2       Current Facility-Administered Medications   Medication Frequency   • simethicone (MYLICON) chewable tab 80 mg TID   • amantadine (SYMMETREL) 50 MG/5ML syrup 100 mg BID   • traZODone (DESYREL) tablet 50 mg QHS PRN   • propranolol (INDERAL) tablet 20 mg TID   • acetaminophen (TYLENOL) tablet 650 mg Q4HRS PRN   • artificial tears ophthalmic solution 1 Drop PRN   • benzocaine-menthol (CEPACOL) lozenge 1 Lozenge Q2HRS PRN   • hydrALAZINE (APRESOLINE) tablet 25 mg Q8HRS PRN   • mag hydrox-al hydrox-simeth (MAALOX PLUS ES or MYLANTA DS) suspension 20 mL Q2HRS PRN   • ondansetron (ZOFRAN ODT) dispertab 4 mg 4X/DAY PRN    Or   • ondansetron (ZOFRAN) syringe/vial injection 4 mg 4X/DAY PRN   • polyethylene glycol/lytes (MIRALAX) PACKET 1 Packet Q24HRS PRN    And   • magnesium hydroxide (MILK OF MAGNESIA) suspension 30 mL QDAY PRN    And   • bisacodyl (DULCOLAX) suppository 10 mg QDAY PRN   • sodium chloride (OCEAN) 0.65 % nasal spray 2 Spray PRN   • tramadol (ULTRAM) 50 MG tablet 50 mg Q4HRS PRN   • Respiratory Therapy Consult Continuous RT   • baclofen (LIORESAL) tablet 5 mg TID   • eucerin cream TID PRN   • ipratropium-albuterol (DUONEB) nebulizer solution Q4H PRN (RT)   • levETIRAcetam (KEPPRA) 100 MG/ML solution 500 mg Q12HRS   • montelukast (SINGULAIR) tablet 10 mg Nightly   • omeprazole (FIRST-OMEPRAZOLE) 2 mg/mL oral susp 40 mg DAILY   • polyethylene glycol/lytes (MIRALAX) PACKET 1 Packet DAILY   • QUEtiapine (SEROQUEL) tablet 25 mg TID PRN   • vitamin D (cholecalciferol) tablet 1,000 Units DAILY       Orders Placed This Encounter   Procedures   • Diet NPO     Standing Status:   Standing     Number of Occurrences:   1     Order Specific Question:   Restrict to:     Answer:   Strict [1]       Assessment:  Active Hospital Problems    Diagnosis   •  Tachycardia   • Leukocytosis   • Tracheostomy dependent (HCC)   • Intracerebral hemorrhage, intraventricular (HCC)   • Dysphagia following nontraumatic intracerebral hemorrhage   • 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 Patient very low functioning but per mother becoming more purposeful  -PT and OT for mobility and ADLs  -SLP for cognition   -Patient agitated on Amantadine and Modafinil. Both trials halted due to agitation. Restart Amantadine 50 mg as he was too agitated on 100 mg but was speaking and helping with ADLs --> 2/24 mom okay with increased to amantadine 100 mg twice daily.  -Continue Keppra. Unclear if had seizure or was continued on prophylaxis  -NSG appointment on 2/7/20 to evaluation  shunt. Will follow-up in 2 months   -CT head as waxing and waning status. At times he voices including his names, at other times no response and compulsive grabbing of bed rail and urinal. CT head - negative.      Sleep: 2/24 trazodone to as needed     Agitation - Appears like TBI with agitation worsened from neurostimulant. Change Metoprolol to Propranolol. Start Seroquel PRN, too sedated on scheduled     Muscle spasms - mother claims Bromocriptine helps, typically used for dopamine side effects/neurostimulant. Will start low dose Baclofen and monitor.      Dysphagia - Patient with G Tube in place. SLP for swallow. MBSS on 2/10/20, start on dysphagia 1 with NTL  -Tolerated first meal but emesis on 2nd meal at higher percentage. May be due to distention, no lung changes. Continue to monitor.   -Decreased oral intake on less stimulant, mother is monitoring his intake.       Aspiration pneumonia - Currently on Unasyn.  Elevated WBC, broadened to Zyvox and Zosyn  -Hospitalist consulted     CV - Patient is on Ivabradine 5 mg daily. Unclear reason why. Discontinue Ivabradine, on Metoprolol  for tachycardia. Switch metoprolol to Propranolol     Respiratory - Patient with size 7 trach on admission. Patient on Duonebs and Singulair  -Exchange aborted on 2/6/20 due to emesis on suction. Tolerated overnight. Decannulated AM 2/7/20. Tolerated and stoma closing.      GI Ppx - Patient on Prilosec and Prevacid. Unclear why two PPIs. Discontinue Prevacid.      DVT Ppx - Patient on Eliquis 2.5 mg BID. Unknown reason why, mother denies clot or DVT. Discontinue Eliquis as 10 months post-ICH    Total time:  18 minutes.  I spent greater than 50% of the time for patient care and coordination on this date, including unit/floor time, and face-to-face time with the patient as per assessment and plan above.  Discussed patient current state of health with mom.  Discussed discharge.    Mandy Luke D.O.    IDT Team Meeting 2/25/2020    I, Mandy Luke D.O., was present and led the interdisciplinary team conference on 2/25/2020.  I led the IDT conference and agree with the IDT conference documentation and plan of care as noted below.     RN: Mother is primary caregiver. Is doing most care inpatient. PRN nasal spray.   PT: Standing parallel bars. Mom transferring OOB. Improving since aspiration event last week.   OT: Had been doing automatic tasks. Total A for ADLs. Now initiating more and performing more automatic tasks.   SLP: Small trials nectar by ralf denied by patient. Total-Max A. Unsuccessful with hand squeeze for yes/no.  Resp: N/A  Rec: N/A  CM: Continues to work on disposition and DME needs.   DC/Disposition:  - JOSHUA: 2/26; request more time given acute illness and regression, however, patient making significant gains since acute illness resolution and expect patient to make significant gains if given time.

## 2020-02-25 NOTE — REHAB-COLLABORATIVE ONGOING IDT TEAM NOTE
Weekly Interdisciplinary Team Conference Note    Weekly Interdisciplinary Team Conference # 3  Date:  2/25/2020    Clinicians present and reporting at team conference include the following:   MD: Mandy Luke DO   RN:  Ewa Gonzales RN   PT:   Charmaine Chawla PT, DPT  OT:  Minnie Luke MS, OTR/L   ST:  Tammi Fletcher MS, CCC-SLP, CBIS  CM:  Anaih Anderson RN [facility]  REC:  None  RT:  None  RPh:  Nemesio Chris [facility]  Other:   None  All reporting clinicians have a working knowledge of this patient's plan of care.    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 **        Nursing  Diet/Nutrition:  Tube Feed and NPO  Medication Administration:  Via Gastric Tube    Snack schedule:  None  Supplement:  Other: Beneprotein    Fluid Intake/Output in past 24 hours: No intake/output data recorded.  Admit Weight:  Weight: 73.1 kg (161 lb 3.2 oz)  Weight Last 7 Days   [73.1 kg (161 lb 3.2 oz)] 73.1 kg (161 lb 3.2 oz) (02/23 1400)    Pain Issues:    Location:  --  --         Severity:  Denies   Scheduled pain medications:  None     PRN pain medications used in last 24 hours:  None     Effectiveness of pain management plan:  good=patient states acceptable comfort after interventions    Bowel:    Bowel Assist Initial Score:  1 - Total Assistance  Bowel Assist Current Score:  1 - Total Assistance  Bowl Accidents in last 7 days:  3  Last bowel movement: 02/24/20  Stool Description: Medium, Brown     Usual  bowel pattern:  daily  Scheduled bowel medications:  polyethylene glycol/lytes (MIRALAX)   PRN bowel medications used in last 24 hours:  None  Nursing Interventions:  Increased time, Scheduled medication, Brief  Effectiveness of bowel program:   poor=continues to have bowel accidents  Bladder:    Bladder Assist Initial Score:  1 - Total Assistance  Bladder Assist Current Score:  1 - Total Assistance  Bladder Accidents in last 7 days:     PVR range for past 24-48 hours: --    Time void schedule/voiding pattern:  Time void every 3 hours during the day and every 4 hours at night  Interventions:  Increased time, Brief, Urinal  Effectiveness of bladder training:  Fair=occasional unpredictable, incontinent emptying of bladder (< 1 time/day)      Sleep/wake cycle:   Average hours slept:  Sleeps 4-6 hours without waking  Sleep medication usage:  None    Patient/Family Training/Education:  Aspiration Precautions and Medication Management  Discharge Supply Recommendations:  Feeding Tube Supplies  Wound care  Strengths: Supportive family   Barriers:   Unable to follow instructions, Confused, Poor carryover of learning and Tube feeding       Nursing Problems     Problem: Bowel/Gastric:     Description:     Goal: Normal bowel function is maintained or improved     Description:           Goal: Will not experience complications related to bowel motility     Description:                 Problem: Communication     Description:     Goal: The ability to communicate needs accurately and effectively will improve     Description:                 Problem: Discharge Barriers/Planning     Description:     Goal: Patient's continuum of care needs will be met     Description:                 Problem: Infection     Description:     Goal: Will remain free from infection     Description:                 Problem: Knowledge Deficit     Description:     Goal: Knowledge of disease process/condition, treatment plan, diagnostic tests, and medications  will improve     Description:           Goal: Knowledge of the prescribed therapeutic regimen will improve     Description:                 Problem: Respiratory:     Description:     Goal: Respiratory status will improve     Description:                 Problem: Safety     Description:     Goal: Will remain free from injury     Description:           Goal: Will remain free from falls     Description:                 Problem: Skin Integrity     Description:     Goal: Risk for impaired skin integrity will decrease     Description:                 Problem: Urinary Elimination:     Description:     Goal: Ability to reestablish a normal urinary elimination pattern will improve     Description:                 Problem: Venous Thromboembolism (VTW)/Deep Vein Thrombosis (DVT) Prevention:     Description:     Goal: Patient will participate in Venous Thrombosis (VTE)/Deep Vein Thrombosis (DVT)Prevention Measures     Description:                        Long Term Goals:   At discharge patient will be able to function safely at home with support.    Section completed by:  Louise Melgoza R.N.        Respiratory Therapy    Pt is stable on RA.  His trach stoma is closing very slowly with a small air leak and very small amount of secretions continue.  Section completed by:  Ana Becker, RRT    Mobility  Bed mobility:   Max- total A   Bed /Chair/Wheelchair Transfer Initial:  1 - Total Assistance  Bed /Chair/Wheelchair Transfer Current:  1 - Total Assistance   Bed/Chair/Wheelchair Transfer Description:  Increased time, Set-up of equipment, Requires 2 people to assist(WC > bed, max A x2 squat pivot method )  Walk Initial:     Walk Current:  N/A     Walk Description:   Unsafe activity   Wheelchair Initial:     Wheelchair Current: Total A   Wheelchair Description:   Pt unable to participate  Stairs Initial:     Stairs Current: N/A    Stairs Description: unsafe activity  Patient/Family Training/Education:  Ongoing with mother    DME/DC Recommendations:  Continued stay at inpatient facility to facilitate safe return home   Strengths:  Supportive family  Barriers:   Agitation, Unable to follow instructions, Generalized weakness, Limited mobility and Poor activity tolerance  # of short term goals set= 3  # of short term goals met= 0  Physical Therapy Problems     Problem: Mobility Transfers     Dates: Start: 02/06/20       Description:     Goal: STG-Within one week, patient will roll     Dates: Start: 02/06/20       Description: 1) Individualized goal:  To left or right, max A with use of bed features as needed.  2) Interventions:  PT E Stim Attended, PT Orthotics Training, PT Gait Training, PT Self Care/Home Eval, PT Therapeutic Exercises, PT Ultrasound, PT TENS Application, PT Neuro Re-Ed/Balance, PT Therapeutic Activity, PT Manual Therapy and PT Evaluation        Note:     Goal Note filed on 02/25/20 1140 by Charmaine Chawla, PT    To be assessed                  Goal: STG-Within one week, patient will sit to stand     Dates: Start: 02/06/20       Description: 1) Individualized goal:  Min A x1 with use of AD/bed rail as needed  2) Interventions: PT E Stim Attended, PT Orthotics Training, PT Gait Training, PT Self Care/Home Eval, PT Therapeutic Exercises, PT Ultrasound, PT TENS Application, PT Neuro Re-Ed/Balance, PT Therapeutic Activity, PT Manual Therapy and PT Evaluation        Note:     Goal Note filed on 02/25/20 1140 by Charmaine Chawla, PT    Mod- max A x2                  Goal: STG-Within one week, patient will transfer bed to chair     Dates: Start: 02/06/20       Description: 1) Individualized goal:  Mod A x1 with LRAD  2) Interventions:  PT E Stim Attended, PT Orthotics Training, PT Gait Training, PT Self Care/Home Eval, PT Therapeutic Exercises, PT Ultrasound, PT TENS Application, PT Neuro Re-Ed/Balance, PT Therapeutic Activity, PT Manual Therapy and PT Evaluation        Note:     Goal Note filed on 02/25/20 1140 by Charmaine Chawla, PT    2  person assist required for safety                         Problem: PT-Long Term Goals     Dates: Start: 02/06/20       Description:     Goal: LTG-By discharge, patient will maintain balance     Dates: Start: 02/06/20       Description: 1) Individualized goal:  In sitting with use of UE for assist at a SPV level, 5 min   2) Interventions:  PT E Stim Attended, PT Orthotics Training, PT Gait Training, PT Self Care/Home Eval, PT Therapeutic Exercises, PT Ultrasound, PT TENS Application, PT Neuro Re-Ed/Balance, PT Therapeutic Activity, PT Manual Therapy and PT Evaluation              Goal: LTG-By discharge, patient will tolerate standing     Dates: Start: 02/06/20       Description: 1) Individualized goal:  2 min, SBA with use of LRAD  2) Interventions:  PT E Stim Attended, PT Orthotics Training, PT Gait Training, PT Self Care/Home Eval, PT Therapeutic Exercises, PT Ultrasound, PT TENS Application, PT Neuro Re-Ed/Balance, PT Therapeutic Activity, PT Manual Therapy and PT Evaluation            Goal: LTG-By discharge, patient will ambulate     Dates: Start: 02/06/20       Description: 1) Individualized goal:  10 ft, mod A with LRAD  2) Interventions:  PT E Stim Attended, PT Orthotics Training, PT Gait Training, PT Self Care/Home Eval, PT Therapeutic Exercises, PT Ultrasound, PT TENS Application, PT Neuro Re-Ed/Balance, PT Therapeutic Activity, PT Manual Therapy and PT Evaluation             Goal: LTG-By discharge, patient will transfer one surface to another     Dates: Start: 02/06/20       Description: 1) Individualized goal:  Min A with LRAD  2) Interventions:  PT E Stim Attended, PT Orthotics Training, PT Gait Training, PT Self Care/Home Eval, PT Therapeutic Exercises, PT Ultrasound, PT TENS Application, PT Neuro Re-Ed/Balance, PT Therapeutic Activity, PT Manual Therapy and PT Evaluation                         Section completed by:  Charmaine Chawla, PT, DPT    Activities of Daily Living  Eating Initial:  1 - Total  Assistance  Eating Current:  1 - Total Assistance   Eating Description:  Staff administers tube feed/parenteral nutrition/IVF  Grooming Initial:  1 - Total Assistance  Grooming Current:  1 - Total Assistance   Grooming Description:  (Total assist to wash face while seated in w/c)  Bathing Initial:  1 - Total Assistance  Bathing Current:  1 - Total Assistance   Bathing Description:  (Total assist for sponge bath in bed)  Upper Body Dressing Initial:  1 - Total Assistance  Upper Body Dressing Current:  1 - Total Assistance   Upper Body Dressing Description:  (Total assist for donning t-shirt while seated in w/c)  Lower Body Dressing Initial:  1 - Total Assistance  Lower Body Dressing Current:  1 - Total Assistance   Lower Body Dressing Description:  1 - Total Assistance  Toileting Initial:     Toileting Current:      Toileting Description:     Toilet Transfer Initial:  0 - Not tested,medical condition  Toilet Transfer Current:  0 - Not tested,medical condition   Toilet Transfer Description:  0 - Not tested,medical condition  Tub / Shower Transfer Initial:     Tub / Shower Transfer Current:   0 - Not tested, medical condition (Sponge bathing at this time for safety)     IADL:  NA  Family Training/Education:  Ongoing with mother  DME/DC Recommendations:  Tub transfer bench, home health OT (for PROM, ADLs, sensory stimulation)    Strengths:  Supportive family  Barriers:  Other: [place]o Level 3, Agitation/restlessness, unable to follow directions     # of short term goals set= 1    # of short term goals met=0    Occupational Therapy Goals     Problem: Dressing     Dates: Start: 02/06/20       Description:     Goal: STG-Within one week, patient will dress UB     Dates: Start: 02/06/20       Description: 1) Individualized Goal:  Max assist   2) Interventions:  OT Group Therapy, OT Self Care/ADL, OT Cognitive Skill Dev, OT Manual Ther Technique, OT Neuro Re-Ed/Balance, OT Sensory Int Techniques, OT Therapeutic Activity, OT  Evaluation and OT Therapeutic Exercise                 Problem: Functional Cognition     Dates: Start: 02/06/20       Description:           Problem: OT Long Term Goals     Dates: Start: 02/06/20       Description:     Goal: LTG-By discharge, patient will complete basic self care tasks     Dates: Start: 02/06/20       Description: 1) Individualized Goal:  Mod assist with AE as needed  2) Interventions:  OT Group Therapy, OT Self Care/ADL, OT Cognitive Skill Dev, OT Manual Ther Technique, OT Neuro Re-Ed/Balance, OT Sensory Int Techniques, OT Therapeutic Activity, OT Evaluation and OT Therapeutic Exercise           Goal: LTG-By discharge, patient will perform bathroom transfers     Dates: Start: 02/06/20       Description: 1) Individualized Goal:  Mod assist with AE as needed    2) Interventions:  OT Group Therapy, OT Self Care/ADL, OT Cognitive Skill Dev, OT Manual Ther Technique, OT Neuro Re-Ed/Balance, OT Sensory Int Techniques, OT Therapeutic Activity, OT Evaluation and OT Therapeutic Exercise                       Section completed by:  Minnie Luke MS,OTR/L    Cognitive Linquistic Functions  Comprehension Initial:  1 - Total Assistance  Comprehension Current:  1 - Total Assistance   Comprehension Description:     Expression Initial:  1 - Total Assistance  Expression Current:  1 - Total Assistance   Expression Description:     Social Interaction Initial:  2 - Max Assistance  Social Interaction Current:  2 - Max Assistance   Social Interaction Description:     Problem Solving Initial:  1 - Total Assistance  Problem Solving Current:  1 - Total Assistance   Problem Solving Description:     Memory Initial:  1 - Total Assistance  Memory Current:  1 - Total Assistance   Memory Description:     Executive Functioning / Organization Initial:     Executive Functioning / Organization Current:    1 - Total Assistance   Executive Functioning Desciption:  Hand over hand required.  Swallowing  Swallowing Status:  Patient has  been refusing attempts with NTL ( level 2) via teaspoon.  Orders Placed This Encounter   Procedures   • Diet NPO     Standing Status:   Standing     Number of Occurrences:   1     Order Specific Question:   Restrict to:     Answer:   Strict [1]     Behavior Modification Plan  Keep the environment simple to avoid over stimulatiom/agitation, Allow for rest time, Keep instructions simple/concrete, Use nonverbal cues (model/demonstrate or gesture), Give clear feedback, Set clear goals, Be calm, Redirect to task/topic, Provide reasonable choices, Decrease the chance of failure by offering activities that are within the patient's abilities, Analyze tasks (break down into smaller steps) and Reinforce participation in desired tasks  Assistive Technology  Other: continuing training for yes/no responses via hand squeeze  Family Training/Education:  Ongoing with patient and his mother.  DC Recommendations:   Patient will require additional ST services upon discharge from this facility.  Strengths:  Supportive family  Barriers:  Agitation, Unable to follow instructions, Confused, Decreased endurance, Poor activity tolerance, Poor carryover of learning and Poor insight/denial of deficits  # of short term goals set=  2  # of short term goals met= 0  Speech Therapy Problems     Problem: Comprehension STGs     Dates: Start: 02/06/20       Description:     Goal: STG-Within one week, patient will     Dates: Start: 02/06/20       Description: 1) Individualized goal:  Answer personal y/n questions with use of hand squeeze with 75% accuracy, given mod verbal cues.   2) Interventions:  SLP Speech Language Treatment, SLP Self Care / ADL Training , SLP Cognitive Skill Development and SLP Group Treatment       Note:     Goal Note filed on 02/25/20 0893 by Tammi Fletcher MS,CCC-SLP    Most recently 0% with max A.  Limited participation in this task in recent sessions.                        Problem: Expression STGs     Dates: Start:  02/11/20       Description:     Goal: STG-Within one week, patient will     Dates: Start: 02/11/20       Description: 1) Individualized goal:  Imitate vowel sounds on 50% of attempts.   2) Interventions:  SLP Speech Language Treatment, SLP Swallowing Dysfunction Treatment, SLP Oral Pharyngeal Evaluation, SLP Prosthesis Checkout, SLP Self Care / ADL Training , SLP Cognitive Skill Development and SLP Group Treatment       Note:     Goal Note filed on 02/25/20 1339 by Tammi Fletcher MS,CCC-SLP    To be addressed.                        Problem: Speech/Swallowing LTGs     Dates: Start: 02/06/20       Description:     Goal: LTG-By discharge, patient will safely swallow     Dates: Start: 02/06/20       Description: 1) Individualized goal:  Dysphagia I/NTL diet without clinical s/sx of aspiration  2) Interventions:  SLP Speech Language Treatment, SLP Swallowing Dysfunction Treatment, SLP Oral Pharyngeal Evaluation, SLP Video Swallow Evaluation, SLP Self Care / ADL Training , SLP Cognitive Skill Development and SLP Group Treatment             Goal: LTG-By discharge, patient will     Dates: Start: 02/06/20       Description:                 Problem: Swallowing STGs     Dates: Start: 02/25/20       Description:     Goal: STG-Within one week, patient will safely swallow     Dates: Start: 02/25/20       Description: 1) Individualized goal:  Therapeutic trials of nectar thick via tsp with no s/sx of aspiration.  2) Interventions:  SLP Swallowing Dysfunction Treatment and SLP Group Treatment                          Section completed by:  Tmami Fletcher MS,CCC-SLP       Nutrition  Dietary Problems     Problem: Other Problem (see comments)     Description: Difficulty swallowing r/t secondary dysphagia s/p Intracerebral hemorrhage, intraventricular as evidenced by NPO, G-tube for alternate route of nutrition/hydraiton/ medications.      Goal: Other Goal (Resolved)     Description: 1. Tolerance to EN regimen 2. Maintain adequate  enteral nutrient/fluid intake (PO vs TF) to promote nutrition optimization/healing 3.  Transition to PO pending clinical outcomes                      Patient continues to tolerate TF of Phigital + supplemental beneprotein with no issues per home regimen.  Getting 150mL free water flushes q 4 hours providing 1725 kcals 104g/protein, 1835mL free wtaer per day.     Plans to discharge home 2/26 with mother.  Still NPO.    Weight noted to be 73.1kg indicating weight loss, however, was taken via wheel chair.  Monitor trends.  Patient does not appear to have lost weight.  Overall weight has been stable.  Labs from today are excellent.  Serum BG noted to be 142.  Pertinent Medications: noted- has completed abx   GI: BM today  : Yellow UOP  Skin intact. + PEG  Vitals: HR elevated at times, /96   I/Os: n/a- documentation inaccurate\    Plan: Continue current nutritional POC.  RD following weekly per protocol.       Section completed by:  Magdalena Jiang R.D.    REHAB-Pharmacy IDT Team Note by Ro Villasenor RPH at 2/25/2020 10:55 AM  Version 1 of 1    Author:  Ro Villasenor RPH Service:  -- Author Type:  Pharmacist    Filed:  2/25/2020 10:57 AM Date of Service:  2/25/2020 10:55 AM Status:  Signed    :  Ro Villasenor RPH (Pharmacist)         Pharmacy   Pharmacy  Antibiotics/Antifungals/Antivirals:  Medication:      Active Orders (From admission, onward)    None        Route:         n/a  Stop Date:  n/a  Reason:   Antihypertensives/Cardiac:  Medication:    Orders (72h ago, onward)     Start     Ordered    02/22/20 1500  propranolol (INDERAL) tablet 20 mg  3 TIMES DAILY      02/22/20 1224    02/21/20 1500  propranolol (INDERAL) tablet 10 mg  3 TIMES DAILY,   Status:  Discontinued      02/21/20 1258    02/21/20 1258  hydrALAZINE (APRESOLINE) tablet 25 mg  EVERY 8 HOURS PRN      02/21/20 1258              Patient Vitals for the past 24 hrs:   BP Pulse   02/25/20 0700 -- 83   02/25/20 0655 128/84 (!)  124   02/24/20 2053 124/78 94   02/24/20 1925 127/70 100   02/24/20 1339 -- 98   02/24/20 1325 137/96 95     Anticoagulation:  Medication:  n/a  INR:      Other key medications: amantadine, baclofen, levetiracetam, montelukast, omeprazole, quetiapine  A review of the medication list reveals no issues at this time. Patient is currently on an antihypertensive. Recommend home blood pressure monitoring/recording if antihypertensive regimen continues.  Section completed by:  Ro Villasenor RPH[TK.1]        Attribution Key     TK.1 - Ro Villasenor RPH on 2/25/2020 10:55 AM                  DC Planning  DC destination/dispostion:  Patient lives with his mother in a single level apartment.     Referrals: Medicaid PCS services.     DC Needs: Patient has been decannulated.  He has a peg tube.  He has only a hospital bed and transfer chair at home.  We will order a tub transfer bench and comode.  He will need home health therapy and PCS services.       Barriers to discharge:   Limited family members in the area.     Strengths: mother is proficient in his care.     Section completed by:  Anahi Anderson R.N.      Physician Summary  Mandy Luke DO participated and led team conference discussion.

## 2020-02-26 PROCEDURE — 770010 HCHG ROOM/CARE - REHAB SEMI PRIVAT*

## 2020-02-26 PROCEDURE — 97129 THER IVNTJ 1ST 15 MIN: CPT

## 2020-02-26 PROCEDURE — 700102 HCHG RX REV CODE 250 W/ 637 OVERRIDE(OP): Performed by: PHYSICAL MEDICINE & REHABILITATION

## 2020-02-26 PROCEDURE — A9270 NON-COVERED ITEM OR SERVICE: HCPCS | Performed by: PHYSICAL MEDICINE & REHABILITATION

## 2020-02-26 PROCEDURE — 97530 THERAPEUTIC ACTIVITIES: CPT

## 2020-02-26 PROCEDURE — 97130 THER IVNTJ EA ADDL 15 MIN: CPT

## 2020-02-26 PROCEDURE — 97112 NEUROMUSCULAR REEDUCATION: CPT

## 2020-02-26 PROCEDURE — 700102 HCHG RX REV CODE 250 W/ 637 OVERRIDE(OP): Performed by: HOSPITALIST

## 2020-02-26 PROCEDURE — A9270 NON-COVERED ITEM OR SERVICE: HCPCS | Performed by: HOSPITALIST

## 2020-02-26 PROCEDURE — 700101 HCHG RX REV CODE 250: Performed by: PHYSICAL MEDICINE & REHABILITATION

## 2020-02-26 PROCEDURE — 99232 SBSQ HOSP IP/OBS MODERATE 35: CPT | Performed by: HOSPITALIST

## 2020-02-26 PROCEDURE — 97110 THERAPEUTIC EXERCISES: CPT

## 2020-02-26 PROCEDURE — 99232 SBSQ HOSP IP/OBS MODERATE 35: CPT | Performed by: PHYSICAL MEDICINE & REHABILITATION

## 2020-02-26 RX ADMIN — SIMETHICONE CHEW TAB 80 MG 80 MG: 80 TABLET ORAL at 09:01

## 2020-02-26 RX ADMIN — AMANTADINE HYDROCHLORIDE 100 MG: 50 SOLUTION ORAL at 09:02

## 2020-02-26 RX ADMIN — OMEPRAZOLE 40 MG: KIT at 09:02

## 2020-02-26 RX ADMIN — LEVETIRACETAM 500 MG: 500 SOLUTION ORAL at 20:33

## 2020-02-26 RX ADMIN — BISACODYL 10 MG: 10 SUPPOSITORY RECTAL at 19:39

## 2020-02-26 RX ADMIN — BACLOFEN 5 MG: 10 TABLET ORAL at 20:33

## 2020-02-26 RX ADMIN — PROPRANOLOL HYDROCHLORIDE 20 MG: 10 TABLET ORAL at 15:48

## 2020-02-26 RX ADMIN — MONTELUKAST 10 MG: 10 TABLET, FILM COATED ORAL at 20:33

## 2020-02-26 RX ADMIN — BACLOFEN 5 MG: 10 TABLET ORAL at 15:45

## 2020-02-26 RX ADMIN — CHOLECALCIFEROL TAB 25 MCG (1000 UNIT) 1000 UNITS: 25 TAB at 09:02

## 2020-02-26 RX ADMIN — BACLOFEN 5 MG: 10 TABLET ORAL at 09:01

## 2020-02-26 RX ADMIN — SIMETHICONE CHEW TAB 80 MG 80 MG: 80 TABLET ORAL at 15:48

## 2020-02-26 RX ADMIN — IPRATROPIUM BROMIDE AND ALBUTEROL SULFATE 3 ML: .5; 3 SOLUTION RESPIRATORY (INHALATION) at 02:32

## 2020-02-26 RX ADMIN — AMANTADINE HYDROCHLORIDE 100 MG: 50 SOLUTION ORAL at 13:43

## 2020-02-26 RX ADMIN — LEVETIRACETAM 500 MG: 500 SOLUTION ORAL at 09:02

## 2020-02-26 RX ADMIN — IPRATROPIUM BROMIDE AND ALBUTEROL SULFATE 3 ML: .5; 3 SOLUTION RESPIRATORY (INHALATION) at 21:15

## 2020-02-26 RX ADMIN — POLYETHYLENE GLYCOL 3350 1 PACKET: 17 POWDER, FOR SOLUTION ORAL at 15:48

## 2020-02-26 RX ADMIN — SIMETHICONE CHEW TAB 80 MG 80 MG: 80 TABLET ORAL at 20:33

## 2020-02-26 RX ADMIN — PROPRANOLOL HYDROCHLORIDE 20 MG: 10 TABLET ORAL at 09:02

## 2020-02-26 RX ADMIN — PROPRANOLOL HYDROCHLORIDE 20 MG: 10 TABLET ORAL at 20:33

## 2020-02-26 NOTE — PROGRESS NOTES
[facility] Medicine Daily Progress Note    Date of Service  2/26/2020    Chief Complaint:  Fever  Tachycardia  Leukocytosis      Interval History:  No significant events or changes since last visit    Review of Systems  Review of Systems   Unable to perform ROS: Medical condition        Physical Exam  Temp:  [36.4 °C (97.6 °F)-36.6 °C (97.9 °F)] 36.6 °C (97.8 °F)  Pulse:  [] 99  Resp:  [18-20] 18  BP: (108-136)/(60-91) 136/78  SpO2:  [96 %-98 %] 98 %    Physical Exam  Vitals signs and nursing note reviewed.   Constitutional:       General: He is not in acute distress.  HENT:      Mouth/Throat:      Mouth: Mucous membranes are moist.      Pharynx: Oropharynx is clear.   Eyes:      General: No scleral icterus.  Neck:      Musculoskeletal: No neck rigidity.   Cardiovascular:      Rate and Rhythm: Regular rhythm. Tachycardia present.      Heart sounds: No murmur.   Pulmonary:      Effort: Pulmonary effort is normal.      Breath sounds: No wheezing or rales.      Comments: Has some coarse breath sounds  Abdominal:      General: Bowel sounds are normal.      Palpations: Abdomen is soft.   Musculoskeletal:      Right lower leg: No edema.      Left lower leg: No edema.   Skin:     General: Skin is warm and dry.      Findings: No rash.   Neurological:      Mental Status: He is alert and oriented to person, place, and time.   Psychiatric:         Mood and Affect: Mood normal.         Behavior: Behavior normal.         Fluids    Intake/Output Summary (Last 24 hours) at 2/26/2020 1033  Last data filed at 2/25/2020 1700  Gross per 24 hour   Intake 640 ml   Output --   Net 640 ml       Laboratory  Recent Labs     02/24/20  0605   WBC 8.9   RBC 6.30*   HEMOGLOBIN 17.4   HEMATOCRIT 52.4*   MCV 83.2   MCH 27.6   MCHC 33.2*   RDW 46.9   PLATELETCT 347   MPV 11.6     Recent Labs     02/24/20  0605   SODIUM 136   POTASSIUM 4.2   CHLORIDE 104   CO2 21   GLUCOSE 142*   BUN 12   CREATININE 0.68   CALCIUM 9.7                    Imaging    Assessment/Plan  Tachycardia- (present on admission)  Assessment & Plan  HR generally ok but occ rises up a little  Likely to agitation  Off Lopressor  On Propranolol: 20 mg tid  Cont to monitor    Leukocytosis- (present on admission)  Assessment & Plan  Currently resolved  S/P fever  S/P leukocytosis  UA: neg  BC x 2: neg  CXR (2/19): unremarkable  S/P empiric abx of Zosyn & Zyvox    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)  Assessment & Plan  Nonverbal  Agitation much improved    Dysphagia following nontraumatic intracerebral hemorrhage- (present on admission)  Assessment & Plan  Has PEG tube and tolerating TF    Tracheostomy dependent (HCC)- (present on admission)  Assessment & Plan  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

## 2020-02-26 NOTE — FLOWSHEET NOTE
02/26/20 0725   Events/Summary/Plan   Events/Summary/Plan trach stoma care done, yellow discharge on gauze    Vital Signs   Respiration 20   Oxygen   O2 Delivery Device None - Room Air

## 2020-02-26 NOTE — PROGRESS NOTES
Change of shift report obtained, vss, declines pain, pt was resting comfortably then woke during the night with a nonproductive/ cough w/ mild shortness of breath which disrupted sleep, prn neb treatment administered. Pts mother bedside, no other issues or concerns noted.

## 2020-02-26 NOTE — THERAPY
Speech Language Pathology  Daily Treatment     Patient Name: Rey Medina  Age:  24 y.o., Sex:  male  Medical Record #: [id number]  Today's Date: 2/25/2020     Subjective    Assumed care of patient from mother, therapy conducted in back gym.      Objective       02/25/20 1434   Receptive Language / Auditory Comprehension   Answers Yes / No Personal Questions Profound (1)   Follows One Unit Commands Profound (1)   Expressive Language   Gestures Profound (1)   Naming Profound (1)   Repeats Speech Accurately Profound (1)   Automatic Language Appropriate Profound (1)   Social / Pragmatic Communication   Eye Contact Severe (2)   Dysphagia    Other Treatments pt refusing all PO trials   Interdisciplinary Plan of Care Collaboration   IDT Collaboration with  Family / Caregiver;Physical Therapist   Patient Position at End of Therapy Seated   Collaboration Comments assumed care from mother, transfer of care to PT   SLP Total Time Spent   SLP Individual Total Time Spent (Mins) 30   Charge Group   SLP Cognitive Skill Development First 15 Minutes 1   SLP Cognitive Skill Development Additional 15 Minutes 1     Assessment    Pt refusing all oral trials, oral stim. Refusals demonstrated by patient bringing hand/arm to mouth and rubbing excessively, pulling hand away when attempting tactile stim and covering eyes when placing red/green cards for Y/N questions.     Plan    Continue to target increased stim, participation in structured therapy tasks

## 2020-02-26 NOTE — THERAPY
Physical Therapy   Daily Treatment     Patient Name: Rey Medina  Age:  24 y.o., Sex:  male  Medical Record #: [id number]  Today's Date: 2/26/2020     Precautions  Precautions: Fall Risk, PEG Tube  Comments: Recent decannulation of tracheostomy, non-verbal    Subjective    Pt seated in therapy gym with mother, mother agreeable to PT      Objective       02/26/20 1401   Precautions   Precautions Fall Risk;PEG Tube   Comments Recent decannulation of tracheostomy, non-verbal   Sitting Lower Body Exercises   Other Exercises Motomed for BLEs: gear 1, 12 min, 0.37 miles active, 0.01 miles passive    Interdisciplinary Plan of Care Collaboration   IDT Collaboration with  Family / Caregiver   Patient Position at End of Therapy Seated;Family / Friend in Room;Self Releasing Lap Belt Applied   Collaboration Comments pt's mother present for session   PT Total Time Spent   PT Individual Total Time Spent (Mins) 30   PT Charge Group   PT Therapeutic Exercise 1   PT Therapeutic Activities 1     Attempted sit >  // bars 2x, pt not participating and removing UEs from bars.     Assessment    Pt unable/ unwilling to stand this session. Able to participate actively in motomed, compared to only passive participation in the past.     Plan    Standing tolerance in // bars, continue attempts for seated balance, LE PROM/ stretching, there-ex as pt tolerates.

## 2020-02-26 NOTE — DISCHARGE PLANNING
Met with patient's mother and provided update from team conference discussion and current d/c target.  Patient has made good gains the last few days.  I will plan on a new w/c for him.  We have ordered tub bench and matty.  Will follow.

## 2020-02-26 NOTE — THERAPY
Speech Language Pathology  Daily Treatment     Patient Name: Rey Medina  Age:  24 y.o., Sex:  male  Medical Record #: [id number]  Today's Date: 2/26/2020     Subjective    Patient was sitting up in chair.      Objective       02/26/20 1032   Receptive Language / Auditory Comprehension   Answers Yes / No Personal Questions Severe (2)   Social / Pragmatic Communication   Eye Contact Severe (2)   Dysphagia    Other Treatments oral stim attempted.    SLP Total Time Spent   SLP Individual Total Time Spent (Mins) 30   Charge Group   SLP Cognitive Skill Development First 15 Minutes 1   SLP Cognitive Skill Development Additional 15 Minutes 1         Assessment    Decreased tolerance for sensory stim compared to earlier session. Patient turning face away, removing hands, and clenching lips at all attempts. Turns head from olfactory stimulation.     Plan    Continue POC

## 2020-02-26 NOTE — THERAPY
Physical Therapy   Daily Treatment     Patient Name: Rey Medina  Age:  24 y.o., Sex:  male  Medical Record #: [id number]  Today's Date: 2/26/2020     Precautions  Precautions: Fall Risk, PEG Tube  Comments: Recent decannulation of tracheostomy, non-verbal    Subjective    Pt seated in therapy gym, mother reports ready for PT.      Objective       02/26/20 1001   Precautions   Precautions Fall Risk;PEG Tube   Comments Recent decannulation of tracheostomy, non-verbal   Bed Mobility    Sit to Stand Total Assist X 2  (max A x1 and min A x1 in // bars)   Interdisciplinary Plan of Care Collaboration   IDT Collaboration with  Family / Caregiver   Patient Position at End of Therapy Seated;Self Releasing Lap Belt Applied;Family / Friend in Room   Collaboration Comments pt with mother in therapy gym after PT session    PT Total Time Spent   PT Individual Total Time Spent (Mins) 30   PT Charge Group   PT Neuromuscular Re-Education / Balance 1   PT Therapeutic Activities 1     Transfer WC <> EOM, 2 person assist (max A x1 and min A x1) SPT.     Pt attempted seated balance this session EOM, unable to hold self upright without total A. Attempted different methods to promote balance including having pt  therapist's arms and pull self forward, pt unsuccessful with attempts and continued to need total A.     Assisted mother with pants and brief change after urinary incontinence, pt's mother able to assist standing with max A while PT managed pulling pants up and don, total A to thread LEs in/out of pants and brief while sitting in WC.     Pt able to stand 5 min this session with 2 person assist and max manual cuing at lumbar extensors.       Assessment    Increased standing tolerance this session, however continues to require total A for upright posture when standing. Unable to maintain unsupported sitting.     Plan    Standing tolerance in // bars, continue attempts for seated balance, LE PROM/ stretching, there-ex as pt  tolerates.

## 2020-02-26 NOTE — THERAPY
Physical Therapy   Daily Treatment     Patient Name: Rey Medina  Age:  24 y.o., Sex:  male  Medical Record #: [id number]  Today's Date: 2/25/2020     Precautions  Precautions: Fall Risk, PEG Tube  Comments: Recent decannulation of tracheostomy, non-verbal    Subjective    Pt seated in therapy gym with mother, ready for PT.      Objective       02/25/20 1531   Precautions   Precautions Fall Risk;PEG Tube   Comments Recent decannulation of tracheostomy, non-verbal   Sitting Lower Body Exercises   Other Exercises Motomed for BLES: 8 min, 0.49 miles passive   Neuro-Muscular Treatments   Neuro-Muscular Treatments Weight Shift Right;Weight Shift Left;Tactile Cuing;Verbal Cuing;Postural Changes;Postural Facilitation   Comments Attempted seated balance activities including reaching and pulling to bring self from reclined to full sit position; pt unable to hold self up without max- total A, ~12 min spent with pt seated.    Interdisciplinary Plan of Care Collaboration   IDT Collaboration with  Family / Caregiver   Patient Position at End of Therapy Seated;Family / Friend in Room   Collaboration Comments Pt's mother present in room at end of session    PT Total Time Spent   PT Individual Total Time Spent (Mins) 30   PT Charge Group   PT Therapeutic Exercise 1   PT Neuromuscular Re-Education / Balance 1       Assessment    Pt unable to hold self in unsupported seated position without max- total A. Tolerated therapy session with little volitional movement.     Plan    Continue PROM/ stretching, standing in // bars as tolerated, seated balance and transfer training as able.

## 2020-02-26 NOTE — THERAPY
Occupational Therapy  Daily Treatment     Patient Name: Rey Medina  Age:  24 y.o., Sex:  male  Medical Record #: [id number]  Today's Date: 2/26/2020     Precautions  Precautions: (P) Fall Risk, PEG Tube  Comments: (P) Recent decannulation of tracheostomy, non-verbal    Safety   Comments: Total assist at this time due to agitation, level of alertness    Subjective    Patient presents non-verbal. Mother present (in and out of room) during session.      Objective     02/26/20 1100   Precautions   Precautions Fall Risk;PEG Tube   Comments Recent decannulation of tracheostomy, non-verbal   Passive ROM Upper Body   Passive ROM Upper Body WDL   Comments Patient tolerated PROM exercises (shoulder flexion/extension, abd/adduction, internal/external rotation, horizontal abd/adduction, elbow flexion/extension, forearm supination/pronation, wrist flexion/extension, finger flexion/extension) with BUEs. PROM WFL. No active motor control noted during PROM exercises. Increased tone noted in R finger flexors (MAS 1 to 1 +)   Interdisciplinary Plan of Care Collaboration   Patient Position at End of Therapy Seated;Self Releasing Lap Belt Applied;Family / Friend in Room   OT Total Time Spent   OT Individual Total Time Spent (Mins) 30   OT Charge Group   OT Therapy Activity 2     Lighting dim and patient's favorite music playing at low volume to facilitate low stim environment in patient's room.     Assessment    Patient tolerated OT session to the best of his ability with no agitation or restlessness noted. Patient's eyes open entire session however observed significant right gaze preference. Attempted to manually facilitate rotating patient's head to L side however unable to do so past midline (stiffness vs patient resistance). No grimacing or discomfort observed during this session.     Plan    Low-stim environment,  sensory stimulation, ADLs/functional participation, sitting balance

## 2020-02-26 NOTE — DISCHARGE PLANNING
Met with patient and his mother.  Reviewed dme needs.  Therapist recommend a traditional w/c for patient.  Mother is agreeable for us to order this.  She has received a call from [facility] regarding tub bench and comode.  They will deliver at home when discharged.  We have also referred [facility] per our discussion.  Will follow.

## 2020-02-26 NOTE — THERAPY
Speech Language Pathology  Daily Treatment     Patient Name: Rey Medina  Age:  24 y.o., Sex:  male  Medical Record #: [id number]  Today's Date: 2/26/2020     Subjective    Patient was in room with mother.     Objective       02/26/20 0802   Receptive Language / Auditory Comprehension   Answers Yes / No Personal Questions Profound (1)   Expressive Language   Gestures Severe (2)   Dysphagia    Other Treatments oral stim attempted   ABS (Agitated Behavior Scale)   Short Attention Span, Easy Distractibility, Inability to Concentrate 4   Impulsive, Impatient, Low Tolerance for Pain or Frustration 2   Uncooperative, Resistant to Care, Demanding 2   Violent and/or Threatening Violence Toward People or Property 1   Explosive and/or Unpredictable Anger 1   Rocking, Rubbing, Moaning, Other Self-Stimulating Behavior 2   Pulling at Tubes, Restraints, etc. 1   Wandering from Treatment Area 1   Restlessness, Pacing, Excessive Movement 2   Repetitive Behaviors, Motor and/or Verbal 2   Rapid, Loud or Excessive Talking 1   Sudden Changes of Mood 1   Easily Initiated - Excessive Crying and/or Laughter 1   Self-Abusiveness, Physical and/or Verbal 1   Agitated Behavior Scale Total Score 22   Level of Severity Mild Agitation   SLP Total Time Spent   SLP Individual Total Time Spent (Mins) 30   Charge Group   SLP Cognitive Skill Development First 15 Minutes 1   SLP Cognitive Skill Development Additional 15 Minutes 1         Assessment    Patient scored 14 on CRS improving from last score of 8. Eyes open throughout session. Able to intermittently follow some directives ( wipe lips, swallow). Some tolerance for oral stim. Opened lips, but continues to clench teeth. Consistently swallowed on command after wet spoon was placed to lips.     Plan    continue with sensory stim, following directives, oral stim.

## 2020-02-26 NOTE — PROGRESS NOTES
Rehab Progress Note     Encounter Date: 2/26/2020    CC: Patient nonverbal at this time no chief complaint, mother reports patient doing better and better each day.    Interval Events (Subjective)  Mother is at side.  States patient is getting better and better each day.  Patient is more alert today.  Patient is holding phone listening to music.  He did follow commands to take deep breaths.  Mother does not report that she it appears that anything is causing him pain.    Full review of systems could not be performed due to patient's nonverbal status at this time    Objective:  VITAL SIGNS: /88   Pulse 92   Temp 36.4 °C (97.6 °F) (Tympanic)   Resp 20   Wt 73.1 kg (161 lb 3.2 oz)   SpO2 97%   BMI 25.25 kg/m²     GEN: No apparent distress, sitting in wheelchair next to mother, listening to music  HEENT: Head normocephalic. Sclera nonicteric bilaterally, no ocular discharge appreciated bilaterally.  CV: Extremities warm and well-perfused, no peripheral edema appreciated bilaterally.  PULMONARY: Breathing nonlabored on room air, no respiratory accessory muscle use.  Not requiring supplemental oxygen.  Lungs clear to auscultation bilaterally upper lobes anteriorly and posteriorly.  ABD: Soft, nontender.  SKIN: No appreciable skin breakdown on exposed areas of skin.  NEURO: Awake alert.  Follows simple directions, such as taking a deep breath.  Spasticity appreciated bilateral upper extremities, not quantified on MAS during today's exam.  PSYCH: Mood and affect within normal limits.    Recent Results (from the past 72 hour(s))   CBC WITHOUT DIFFERENTIAL    Collection Time: 02/24/20  6:05 AM   Result Value Ref Range    WBC 8.9 4.8 - 10.8 K/uL    RBC 6.30 (H) 4.70 - 6.10 M/uL    Hemoglobin 17.4 14.0 - 18.0 g/dL    Hematocrit 52.4 (H) 42.0 - 52.0 %    MCV 83.2 81.4 - 97.8 fL    MCH 27.6 27.0 - 33.0 pg    MCHC 33.2 (L) 33.7 - 35.3 g/dL    RDW 46.9 35.9 - 50.0 fL    Platelet Count 347 164 - 446 K/uL    MPV 11.6 9.0  - 12.9 fL   Basic Metabolic Panel    Collection Time: 02/24/20  6:05 AM   Result Value Ref Range    Sodium 136 135 - 145 mmol/L    Potassium 4.2 3.6 - 5.5 mmol/L    Chloride 104 96 - 112 mmol/L    Co2 21 20 - 33 mmol/L    Glucose 142 (H) 65 - 99 mg/dL    Bun 12 8 - 22 mg/dL    Creatinine 0.68 0.50 - 1.40 mg/dL    Calcium 9.7 8.5 - 10.5 mg/dL    Anion Gap 11.0 0.0 - 11.9   ESTIMATED GFR    Collection Time: 02/24/20  6:05 AM   Result Value Ref Range    GFR If African American >60 >60 mL/min/1.73 m 2    GFR If Non African American >60 >60 mL/min/1.73 m 2       Current Facility-Administered Medications   Medication Frequency   • simethicone (MYLICON) chewable tab 80 mg TID   • amantadine (SYMMETREL) 50 MG/5ML syrup 100 mg BID   • traZODone (DESYREL) tablet 50 mg QHS PRN   • propranolol (INDERAL) tablet 20 mg TID   • acetaminophen (TYLENOL) tablet 650 mg Q4HRS PRN   • artificial tears ophthalmic solution 1 Drop PRN   • benzocaine-menthol (CEPACOL) lozenge 1 Lozenge Q2HRS PRN   • hydrALAZINE (APRESOLINE) tablet 25 mg Q8HRS PRN   • mag hydrox-al hydrox-simeth (MAALOX PLUS ES or MYLANTA DS) suspension 20 mL Q2HRS PRN   • ondansetron (ZOFRAN ODT) dispertab 4 mg 4X/DAY PRN    Or   • ondansetron (ZOFRAN) syringe/vial injection 4 mg 4X/DAY PRN   • polyethylene glycol/lytes (MIRALAX) PACKET 1 Packet Q24HRS PRN    And   • magnesium hydroxide (MILK OF MAGNESIA) suspension 30 mL QDAY PRN    And   • bisacodyl (DULCOLAX) suppository 10 mg QDAY PRN   • sodium chloride (OCEAN) 0.65 % nasal spray 2 Spray PRN   • tramadol (ULTRAM) 50 MG tablet 50 mg Q4HRS PRN   • Respiratory Therapy Consult Continuous RT   • baclofen (LIORESAL) tablet 5 mg TID   • eucerin cream TID PRN   • ipratropium-albuterol (DUONEB) nebulizer solution Q4H PRN (RT)   • levETIRAcetam (KEPPRA) 100 MG/ML solution 500 mg Q12HRS   • montelukast (SINGULAIR) tablet 10 mg Nightly   • omeprazole (FIRST-OMEPRAZOLE) 2 mg/mL oral susp 40 mg DAILY   • polyethylene glycol/lytes  (MIRALAX) PACKET 1 Packet DAILY   • QUEtiapine (SEROQUEL) tablet 25 mg TID PRN   • vitamin D (cholecalciferol) tablet 1,000 Units DAILY       Orders Placed This Encounter   Procedures   • Diet NPO     Standing Status:   Standing     Number of Occurrences:   1     Order Specific Question:   Restrict to:     Answer:   Strict [1]       Assessment:  Active Hospital Problems    Diagnosis   • Tachycardia   • Leukocytosis   • Tracheostomy dependent (HCC)   • Intracerebral hemorrhage, intraventricular (HCC)   • Dysphagia following nontraumatic intracerebral hemorrhage   • 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 Patient very low functioning but per mother becoming more purposeful  -PT and OT for mobility and ADLs  -SLP for cognition   -Patient agitated on Amantadine and Modafinil. Both trials halted due to agitation. Restart Amantadine 50 mg as he was too agitated on 100 mg but was speaking and helping with ADLs --> 2/24 mom okay with increased to amantadine 100 mg twice daily.  -Continue Keppra. Unclear if had seizure or was continued on prophylaxis  -NSG appointment on 2/7/20 to evaluation  shunt. Will follow-up in 2 months (~4/2020)   -CT head as waxing and waning status. At times he voices including his names, at other times no response and compulsive grabbing of bed rail and urinal. CT head - negative.      Sleep: 2/24 trazodone to as needed     Agitation - Appears like TBI with agitation worsened from neurostimulant. Change Metoprolol to Propranolol. Start Seroquel PRN, too sedated on scheduled  -Stable without significant agitation lately, continue to monitor.     Muscle spasms - mother claims Bromocriptine helps, typically used for dopamine side effects/neurostimulant. Will start low dose Baclofen and monitor.  - Continue baclofen 5 mg 3 times daily     Dysphagia - Patient with G  Tube in place. SLP for swallow. MBSS on 2/10/20, start on dysphagia 1 with NTL.  Had tolerated diet minimally prior to aspiration event.  -Continue tube feeds, administered by mom.     Aspiration pneumonia - Currently on Unasyn.  Elevated WBC, broadened to Zyvox and Zosyn. Hospitalist consulted.  Currently stable, resolved.  -Continue to monitor for aspiration events     CV - Patient is on Ivabradine 5 mg daily. Unclear reason why. Discontinue Ivabradine, on Metoprolol for tachycardia. Switch metoprolol to Propranolol  -Continue propanolol 20 mg 3 times daily.     Respiratory - Patient with size 7 trach on admission. Patient on Duonebs and Singulair. Exchange aborted on 2/6/20 due to emesis on suction. Tolerated overnight. Decannulated AM 2/7/20. Tolerated and stoma closing.   -Continue to monitor respiratory status  -Continue Singulair 10 mg nightly     GI Ppx - Patient on Prilosec and Prevacid. Unclear why two PPIs. Discontinue Prevacid.  -Continue Prilosec 40 mg daily     DVT Ppx - Patient on Eliquis 2.5 mg BID. Unknown reason why, mother denies clot or DVT. Discontinue Eliquis as 10 months post-ICH.   -Patient currently not on DVT chemoprophylaxis.  -Continue JEFF hose    Total time:  21 minutes.  I spent greater than 50% of the time for patient care and coordination on this date, including unit/floor time, and face-to-face time with the patient as per assessment and plan above.  Discussed plan of care with mom and potential medication changes that could be made.  Discussed new discharge date which mother is happy about.    Mandy Luke D.O.

## 2020-02-27 PROCEDURE — 97130 THER IVNTJ EA ADDL 15 MIN: CPT

## 2020-02-27 PROCEDURE — 770010 HCHG ROOM/CARE - REHAB SEMI PRIVAT*

## 2020-02-27 PROCEDURE — 97535 SELF CARE MNGMENT TRAINING: CPT

## 2020-02-27 PROCEDURE — 700102 HCHG RX REV CODE 250 W/ 637 OVERRIDE(OP): Performed by: PHYSICAL MEDICINE & REHABILITATION

## 2020-02-27 PROCEDURE — 99232 SBSQ HOSP IP/OBS MODERATE 35: CPT | Performed by: HOSPITALIST

## 2020-02-27 PROCEDURE — 700102 HCHG RX REV CODE 250 W/ 637 OVERRIDE(OP): Performed by: HOSPITALIST

## 2020-02-27 PROCEDURE — 99231 SBSQ HOSP IP/OBS SF/LOW 25: CPT | Performed by: PHYSICAL MEDICINE & REHABILITATION

## 2020-02-27 PROCEDURE — 97110 THERAPEUTIC EXERCISES: CPT

## 2020-02-27 PROCEDURE — A9270 NON-COVERED ITEM OR SERVICE: HCPCS | Performed by: HOSPITALIST

## 2020-02-27 PROCEDURE — A9270 NON-COVERED ITEM OR SERVICE: HCPCS | Performed by: PHYSICAL MEDICINE & REHABILITATION

## 2020-02-27 PROCEDURE — 97129 THER IVNTJ 1ST 15 MIN: CPT

## 2020-02-27 PROCEDURE — 97530 THERAPEUTIC ACTIVITIES: CPT

## 2020-02-27 PROCEDURE — 97112 NEUROMUSCULAR REEDUCATION: CPT

## 2020-02-27 PROCEDURE — 700101 HCHG RX REV CODE 250: Performed by: PHYSICAL MEDICINE & REHABILITATION

## 2020-02-27 RX ADMIN — AMANTADINE HYDROCHLORIDE 100 MG: 50 SOLUTION ORAL at 08:58

## 2020-02-27 RX ADMIN — SIMETHICONE CHEW TAB 80 MG 80 MG: 80 TABLET ORAL at 20:54

## 2020-02-27 RX ADMIN — BACLOFEN 5 MG: 10 TABLET ORAL at 20:54

## 2020-02-27 RX ADMIN — SIMETHICONE CHEW TAB 80 MG 80 MG: 80 TABLET ORAL at 08:57

## 2020-02-27 RX ADMIN — LEVETIRACETAM 500 MG: 500 SOLUTION ORAL at 20:53

## 2020-02-27 RX ADMIN — PROPRANOLOL HYDROCHLORIDE 20 MG: 10 TABLET ORAL at 13:57

## 2020-02-27 RX ADMIN — BACLOFEN 5 MG: 10 TABLET ORAL at 13:57

## 2020-02-27 RX ADMIN — PROPRANOLOL HYDROCHLORIDE 20 MG: 10 TABLET ORAL at 08:57

## 2020-02-27 RX ADMIN — OMEPRAZOLE 40 MG: KIT at 08:59

## 2020-02-27 RX ADMIN — AMANTADINE HYDROCHLORIDE 100 MG: 50 SOLUTION ORAL at 13:57

## 2020-02-27 RX ADMIN — BACLOFEN 5 MG: 10 TABLET ORAL at 08:57

## 2020-02-27 RX ADMIN — MONTELUKAST 10 MG: 10 TABLET, FILM COATED ORAL at 20:54

## 2020-02-27 RX ADMIN — SIMETHICONE CHEW TAB 80 MG 80 MG: 80 TABLET ORAL at 13:57

## 2020-02-27 RX ADMIN — LEVETIRACETAM 500 MG: 500 SOLUTION ORAL at 08:58

## 2020-02-27 RX ADMIN — POLYETHYLENE GLYCOL 3350 1 PACKET: 17 POWDER, FOR SOLUTION ORAL at 18:03

## 2020-02-27 RX ADMIN — CHOLECALCIFEROL TAB 25 MCG (1000 UNIT) 1000 UNITS: 25 TAB at 08:57

## 2020-02-27 RX ADMIN — IPRATROPIUM BROMIDE AND ALBUTEROL SULFATE 3 ML: .5; 3 SOLUTION RESPIRATORY (INHALATION) at 22:46

## 2020-02-27 RX ADMIN — PROPRANOLOL HYDROCHLORIDE 20 MG: 10 TABLET ORAL at 20:53

## 2020-02-27 NOTE — CARE PLAN
Problem: Infection  Goal: Will remain free from infection  Outcome: PROGRESSING AS EXPECTED  Note: Patient does not present signs and symptoms of infection such as fever, inflammation, purulent drainage, change in LOC, turbid urine or loose stools.       Problem: Bowel/Gastric:  Goal: Normal bowel function is maintained or improved  Outcome: PROGRESSING AS EXPECTED  Note: Patient having regular bowel movements; last BM 2/26.  Denies s/s constipation; bowel meds available if needed.  Will continue to monitor.

## 2020-02-27 NOTE — THERAPY
Occupational Therapy  Daily Treatment     Patient Name: Rey Medina  Age:  24 y.o., Sex:  male  Medical Record #: [id number]  Today's Date: 2/27/2020     Precautions  Precautions: Fall Risk, PEG Tube  Comments: Recent decannulation of tracheostomy, non-verbal    Safety   Comments: Total assist at this time due to agitation, level of alertness    Subjective    Patient presents non-verbal.      Objective     02/27/20 1031   Precautions   Precautions Fall Risk;PEG Tube   Comments Recent decannulation of tracheostomy, non-verbal   Passive ROM Upper Body   Passive ROM Upper Body WDL   Comments Patient tolerated PROM exercises (shoulder flexion/extension, abd/adduction, internal/external rotation, horizontal abd/adduction, elbow flexion/extension, forearm supination/pronation, wrist flexion/extension, finger flexion/extension) with BUEs. PROM WFL. No active motor control noted during PROM exercises.   Sitting Upper Body Exercises   Sitting Upper Body Exercises Yes   Upper Extremity Bike Minutes / Rest Breaks (See Comments)  (Passive x .36 miles/ 7:04)   Interdisciplinary Plan of Care Collaboration   IDT Collaboration with  Family / Caregiver   Patient Position at End of Therapy Seated;Self Releasing Lap Belt Applied   Collaboration Comments Mother present during session    OT Total Time Spent   OT Individual Total Time Spent (Mins) 30   OT Charge Group   OT Therapy Activity 1   OT Therapeutic Exercise  1     Assessment    Patient tolerated OT session well with focus on PROM and functional use of UEs. Patient demo'd ability to maintain bilateral grasp on bre-med bike without assist however no active active movement noted during biking activity.     Plan    Low-stim environment,  sensory stimulation, ADLs/functional participation, sitting balance

## 2020-02-27 NOTE — THERAPY
Speech Language Pathology  Daily Treatment     Patient Name: Rey Medina  Age:  24 y.o., Sex:  male  Medical Record #: [id number]  Today's Date: 2/27/2020     Subjective    Patient was up in chair at time of ST.      Objective       02/27/20 0802   Cognition   Rancho Scale Level 4   Dysphagia    Other Treatments oral stim attempted   SLP Total Time Spent   SLP Individual Total Time Spent (Mins) 30   Charge Group   SLP Cognitive Skill Development First 15 Minutes 1   SLP Cognitive Skill Development Additional 15 Minutes 1         Assessment    Patient demonstrated evasive movements with sensory stim presented to arms and face. Turns away, removes arms/hands, covers face. Able to swallow on mariah , but did not follow any other directives.     Plan    Continue POC

## 2020-02-27 NOTE — THERAPY
"Occupational Therapy  Daily Treatment     Patient Name: Rey Medina  Age:  24 y.o., Sex:  male  Medical Record #: [id number]  Today's Date: 2/26/2020     Precautions  Precautions: Fall Risk, PEG Tube  Comments: Recent decannulation of tracheostomy, non-verbal    Safety   Comments: Total assist at this time due to agitation, level of alertness    Subjective    Patient continues to present non-verbal however significant improvement noted during this session with communication (via hand squeezes)     Objective     02/26/20 1301   Precautions   Precautions Fall Risk;PEG Tube   Comments Recent decannulation of tracheostomy, non-verbal   Interdisciplinary Plan of Care Collaboration   Patient Position at End of Therapy Seated;Self Releasing Lap Belt Applied   OT Total Time Spent   OT Individual Total Time Spent (Mins) 30   OT Charge Group   OT Therapy Activity 2     Patient accurately responded to the following commands via hand squeeze:  - \"Squeeze my hand if your name is Rey\"  - \"Squeeze my hand if your name is Chaim\" (patient did not squeeze hand)  - \"Squeeze my hand if your mom's name is Melinda\"  Additionally patient was able to squeeze undersigned therapist's hand correct amount of times (1-5).     Total assist provided for neck rotation (due to R gaze preference) to facilitate attention to L side. Slight improvement noted (~10-20 degrees past midline)     Assessment    Patient tolerated OT session well with focus on non-verbal communication and attention to L side. Good accuracy noted for communication via hand squeeze during this session.     Plan    Low-stim environment,  sensory stimulation, ADLs/functional participation, sitting balance    "

## 2020-02-27 NOTE — PROGRESS NOTES
Hospital Medicine Daily Progress Note    Date of Service  2/27/2020    Chief Complaint:  Fever  Tachycardia  Leukocytosis      Interval History:  No significant events or changes since last visit    Review of Systems  Review of Systems   Unable to perform ROS: Medical condition        Physical Exam  Temp:  [36.4 °C (97.5 °F)-37.2 °C (98.9 °F)] 36.4 °C (97.5 °F)  Pulse:  [] 118  Resp:  [18-20] 18  BP: (127-136)/(80-89) 132/89  SpO2:  [97 %] 97 %    Physical Exam  Vitals signs and nursing note reviewed.   Constitutional:       Appearance: He is not diaphoretic.   HENT:      Mouth/Throat:      Pharynx: No oropharyngeal exudate or posterior oropharyngeal erythema.   Eyes:      Extraocular Movements: Extraocular movements intact.   Neck:      Vascular: No carotid bruit.   Cardiovascular:      Rate and Rhythm: Regular rhythm. Tachycardia present.      Heart sounds: No murmur.   Pulmonary:      Effort: Pulmonary effort is normal.      Breath sounds: Normal breath sounds.      Comments: Has some coarse breath sounds  Abdominal:      General: Bowel sounds are normal.      Palpations: Abdomen is soft.   Musculoskeletal:      Right lower leg: No edema.      Left lower leg: No edema.   Skin:     General: Skin is warm and dry.      Findings: No rash.   Psychiatric:         Mood and Affect: Mood normal.         Behavior: Behavior normal.         Fluids    Intake/Output Summary (Last 24 hours) at 2/27/2020 1028  Last data filed at 2/27/2020 0600  Gross per 24 hour   Intake 750 ml   Output --   Net 750 ml       Laboratory                        Imaging    Assessment/Plan  Tachycardia- (present on admission)  Assessment & Plan  HR generally ok but occ rises up a little  Likely to agitation  Off Lopressor  On Propranolol: 20 mg tid  Cont to monitor    Leukocytosis- (present on admission)  Assessment & Plan  Currently resolved  S/P fever  S/P leukocytosis  UA: neg  BC x 2: neg  CXR (2/19): unremarkable  S/P empiric abx of Zosyn  & Zyvox    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)  Assessment & Plan  Nonverbal  Agitation much improved    Dysphagia following nontraumatic intracerebral hemorrhage- (present on admission)  Assessment & Plan  Has PEG tube and tolerating TF    Tracheostomy dependent (HCC)- (present on admission)  Assessment & Plan  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

## 2020-02-27 NOTE — CARE PLAN
Problem: Infection  Goal: Will remain free from infection  Outcome: PROGRESSING AS EXPECTED     Problem: Skin Integrity  Goal: Risk for impaired skin integrity will decrease  Outcome: PROGRESSING AS EXPECTED     Problem: Bowel/Gastric:  Goal: Normal bowel function is maintained or improved  Outcome: PROGRESSING SLOWER THAN EXPECTED  Note: Last BM 2/24, Miralax given, discussed with pt's mother to give suppository this evening if no results.

## 2020-02-27 NOTE — FLOWSHEET NOTE
02/27/20 1106   Events/Summary/Plan   Events/Summary/Plan Trach stoma care done by mother after shower today.

## 2020-02-27 NOTE — THERAPY
Speech Language Pathology  Daily Treatment     Patient Name: Rey Medina  Age:  24 y.o., Sex:  male  Medical Record #: [id number]  Today's Date: 2/27/2020     Subjective    Patient was in room with mother at time of ST.      Objective       02/27/20 1332   Cognition   Rancho Scale Level 4   Social / Pragmatic Communication   Eye Contact Severe (2)   Outcome Measures   Outcome Measures Utilized ABS   ABS (Agitated Behavior Scale)   Short Attention Span, Easy Distractibility, Inability to Concentrate 4   Impulsive, Impatient, Low Tolerance for Pain or Frustration 3   Uncooperative, Resistant to Care, Demanding 3   Violent and/or Threatening Violence Toward People or Property 1   Explosive and/or Unpredictable Anger 1   Rocking, Rubbing, Moaning, Other Self-Stimulating Behavior 3   Pulling at Tubes, Restraints, etc. 1   Wandering from Treatment Area 1   Restlessness, Pacing, Excessive Movement 2   Repetitive Behaviors, Motor and/or Verbal 2   Rapid, Loud or Excessive Talking 1   Sudden Changes of Mood 1   Easily Initiated - Excessive Crying and/or Laughter 1   Self-Abusiveness, Physical and/or Verbal 1   Agitated Behavior Scale Total Score 25   Level of Severity Mild Agitation   SLP Total Time Spent   SLP Individual Total Time Spent (Mins) 30   Charge Group   SLP Cognitive Skill Development First 15 Minutes 1   SLP Cognitive Skill Development Additional 15 Minutes 1         Assessment    Continued to attempt to avoid all stim presented (olfactory, tactile, auditory) turns head away, pulls arms away, increased fidgeting.     Plan    Continue sensory stim, ABS, and CRS.

## 2020-02-27 NOTE — THERAPY
Occupational Therapy  Daily Treatment     Patient Name: Rey Medina  Age:  24 y.o., Sex:  male  Medical Record #: [id number]  Today's Date: 2020     Precautions  Precautions: (P) Fall Risk, PEG Tube  Comments: (P) Recent decannulation of tracheostomy, non-verbal    Safety   Comments: Total assist at this time due to agitation, level of alertness    Subjective    Patient presents non-verbal however accurately responded via hand squeeze.      Objective     20 1301   Precautions   Precautions Fall Risk;PEG Tube   Comments Recent decannulation of tracheostomy, non-verbal   Interdisciplinary Plan of Care Collaboration   IDT Collaboration with  Family / Caregiver   Patient Position at End of Therapy Seated;Self Releasing Lap Belt Applied;Family / Friend in Room   Collaboration Comments Patient's mother present during session   OT Total Time Spent   OT Individual Total Time Spent (Mins) 30   OT Charge Group   OT Self Care / ADL 2       FIM Grooming Score:  1 - Total Assistance  Grooming Description:  (Total assist for washing hands, max assist for washing face  (patient briefly wiped face with wet wash cloth after initial hand-over-hand assist; requires assist for thoroughness))    FIM Upper Body Dressin - Total Assistance  Upper Body Dressing Description:  (Total assist for donning t-shirt while seated in w/c)    FIM Lower Body Dressing Score:  1 - Total Assistance  Lower Body Dressing Description:  Increased time, Supervision for safety, Set-up of equipment, Requires 2 people to assist(Patient required total assist (2 person assist) to don/doff disposable briefs and pajama bottoms while seated in w/c and standing. )    Patient's body language initially tense AEB difficulty moving patient's UEs due to resistance/withdrawn behavior. Shortly after, undersigned therapist assisted patient to the restroom in his w/c (with goal of working on ADLs) and patient presented more calm/relaxed. When undersigned  "therapist and patient's mother assisted patient with standing to complete pants over hips pursuit, patient briefly noted attempting to pull pants up (when RUE placed on elastic band of pants).     Patient accurately responded to the following commands via hand squeeze:  - \"Squeeze my hand if your name is Rey\"  - \"Squeeze my hand if your name is Juan\" (patient did not squeeze hand)  - \"Squeeze my hand if your mom's name is Melinda\"  Additionally patient was able to squeeze undersigned therapist's hand correct amount of times (1-5).     Assessment    Patient tolerated OT session well with focus on progression with ADLs. Patient demonstrates improvement with activity tolerance and automatic task participation. Good accuracy noted with hand squeeze communication.     Plan    Low-stim environment,  sensory stimulation, ADLs/functional participation, sitting balance    "

## 2020-02-27 NOTE — THERAPY
Physical Therapy   Daily Treatment     Patient Name: Rey Medina  Age:  24 y.o., Sex:  male  Medical Record #: [id number]  Today's Date: 2/27/2020     Precautions  Precautions: Fall Risk, PEG Tube  Comments: Recent decannulation of tracheostomy, non-verbal    Subjective    Pt seated in therapy gym with mother, mother reports he is getting tired, but ready for PT.      Objective       02/27/20 1431   Precautions   Precautions Fall Risk;PEG Tube   Comments Recent decannulation of tracheostomy, non-verbal   Bed Mobility    Sit to Stand Total Assist X 2   Interdisciplinary Plan of Care Collaboration   IDT Collaboration with  Family / Caregiver;Certified Nursing Assistant   Patient Position at End of Therapy Seated;Family / Friend in Room   Collaboration Comments spoke with CNA regarding pt grabbing at groin, possibly demonstrating need to urinate   PT Total Time Spent   PT Individual Total Time Spent (Mins) 30   PT Charge Group   PT Neuromuscular Re-Education / Balance 1   PT Therapeutic Activities 1     Pt attempted standing 3x in // bars, unsuccessful with first attempt, only able to attain half stand with 2nd attempt, able to fully stand on 3rd attempt with max A x2, max manual cuing at lumbar extensors and physical assist for upright posture with UE support and assist to position UEs on bars.     Assessment    Pt able to participate in short session, limited by fatigue and difficulty with upright posture. Continues to show slow progress with alertness levels and participation.    Plan    Standing tolerance in // bars, continue attempts for seated balance, LE PROM/ stretching, there-ex as pt tolerates.

## 2020-02-27 NOTE — PROGRESS NOTES
Rehab Progress Note     Encounter Date: 2/27/2020    CC: Patient nonverbal no chief complaint could be acquired.    Interval Events (Subjective)  Last BM 2/24, patient given MiraLAX, MOM to have given suppository if no results.  It was given last night.  Blood pressure stable, heart rate elevated to 118 but otherwise within the 80s to low 100s, patient does occasionally elevate into the 110s.  Mother at bedside, patient nonverbal, not reliably squeezing hand for yes and no at this time.  Mother states she is he is doing well.  Continues to see a positive trajectory upward.  Not wanting to change medications at this time given his stability.  No questions or concerns.  She has not noticed that Rey has had any chills, difficulty breathing, nausea, vomiting.    14 point ROS reviewed and negative except as stated above.     Objective:  VITAL SIGNS: /89   Pulse (!) 118   Temp 36.4 °C (97.5 °F) (Temporal)   Resp 18   Wt 73.1 kg (161 lb 3.2 oz)   SpO2 97%   BMI 25.25 kg/m²     GEN: No apparent distress, sitting in wheelchair in room  HEENT: Head normocephalic, atraumatic.  Sclera nonicteric bilaterally, no ocular discharge appreciated bilaterally.  CV: Extremities warm and well-perfused, no peripheral edema appreciated bilaterally.  PULMONARY: Breathing nonlabored on room air, no respiratory accessory muscle use.  Not requiring supplemental oxygen.  Trach site covered with gauze.  ABD: Soft, nontender.  SKIN: No appreciable skin breakdown on exposed areas of skin.  NEURO: Nonverbal.  Awake.  Not responding to hand squeeze As Form of Communication.    PSYCH: Mood and affect within normal limits.    No results found for this or any previous visit (from the past 72 hour(s)).    Current Facility-Administered Medications   Medication Frequency   • simethicone (MYLICON) chewable tab 80 mg TID   • amantadine (SYMMETREL) 50 MG/5ML syrup 100 mg BID   • traZODone (DESYREL) tablet 50 mg QHS PRN   • propranolol (INDERAL)  tablet 20 mg TID   • acetaminophen (TYLENOL) tablet 650 mg Q4HRS PRN   • artificial tears ophthalmic solution 1 Drop PRN   • benzocaine-menthol (CEPACOL) lozenge 1 Lozenge Q2HRS PRN   • hydrALAZINE (APRESOLINE) tablet 25 mg Q8HRS PRN   • mag hydrox-al hydrox-simeth (MAALOX PLUS ES or MYLANTA DS) suspension 20 mL Q2HRS PRN   • ondansetron (ZOFRAN ODT) dispertab 4 mg 4X/DAY PRN    Or   • ondansetron (ZOFRAN) syringe/vial injection 4 mg 4X/DAY PRN   • polyethylene glycol/lytes (MIRALAX) PACKET 1 Packet Q24HRS PRN    And   • magnesium hydroxide (MILK OF MAGNESIA) suspension 30 mL QDAY PRN    And   • bisacodyl (DULCOLAX) suppository 10 mg QDAY PRN   • sodium chloride (OCEAN) 0.65 % nasal spray 2 Spray PRN   • tramadol (ULTRAM) 50 MG tablet 50 mg Q4HRS PRN   • Respiratory Therapy Consult Continuous RT   • baclofen (LIORESAL) tablet 5 mg TID   • eucerin cream TID PRN   • ipratropium-albuterol (DUONEB) nebulizer solution Q4H PRN (RT)   • levETIRAcetam (KEPPRA) 100 MG/ML solution 500 mg Q12HRS   • montelukast (SINGULAIR) tablet 10 mg Nightly   • omeprazole (FIRST-OMEPRAZOLE) 2 mg/mL oral susp 40 mg DAILY   • polyethylene glycol/lytes (MIRALAX) PACKET 1 Packet DAILY   • QUEtiapine (SEROQUEL) tablet 25 mg TID PRN   • vitamin D (cholecalciferol) tablet 1,000 Units DAILY       Orders Placed This Encounter   Procedures   • Diet NPO     Standing Status:   Standing     Number of Occurrences:   1     Order Specific Question:   Restrict to:     Answer:   Strict [1]       Assessment:  Active Hospital Problems    Diagnosis   • Tachycardia   • Leukocytosis   • Tracheostomy dependent (HCC)   • Intracerebral hemorrhage, intraventricular (HCC)   • Dysphagia following nontraumatic intracerebral hemorrhage   • 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 Patient very low functioning but per mother becoming more  purposeful  -PT and OT for mobility and ADLs  -SLP for cognition   -Patient agitated on Amantadine and Modafinil. Both trials halted due to agitation. Restart Amantadine 50 mg as he was too agitated on 100 mg but was speaking and helping with ADLs --> 2/24 mom okay with increased to amantadine 100 mg twice daily.  -Continue Keppra. Unclear if had seizure or was continued on prophylaxis  -NSG appointment on 2/7/20 to evaluation  shunt. Will follow-up in 2 months (~4/2020)   -CT head as waxing and waning status. At times he voices including his names, at other times no response and compulsive grabbing of bed rail and urinal. CT head - negative.      Sleep: 2/24 trazodone to as needed     Agitation - Appears like TBI with agitation worsened from neurostimulant. Change Metoprolol to Propranolol. Start Seroquel PRN, too sedated on scheduled  -Stable without significant agitation lately, continue to monitor.     Muscle spasms - mother claims Bromocriptine helps, typically used for dopamine side effects/neurostimulant. Will start low dose Baclofen and monitor.  - Continue baclofen 5 mg 3 times daily     Dysphagia - Patient with G Tube in place. SLP for swallow. MBSS on 2/10/20, start on dysphagia 1 with NTL.  Had tolerated diet minimally prior to aspiration event.  -Continue tube feeds, administered by mom.     Aspiration pneumonia - Currently on Unasyn.  Elevated WBC, broadened to Zyvox and Zosyn. Hospitalist consulted.  Currently stable, resolved.  -Continue to monitor for aspiration events     CV - Patient is on Ivabradine 5 mg daily. Unclear reason why. Discontinue Ivabradine, on Metoprolol for tachycardia. Switch metoprolol to Propranolol  -Continue propanolol 20 mg 3 times daily.     Respiratory - Patient with size 7 trach on admission. Patient on Duonebs and Singulair. Exchange aborted on 2/6/20 due to emesis on suction. Tolerated overnight. Decannulated AM 2/7/20. Tolerated and stoma closing.   -Continue to  monitor respiratory status  -Continue Singulair 10 mg nightly     GI Ppx - Patient on Prilosec and Prevacid. Unclear why two PPIs. Discontinue Prevacid.  -Continue Prilosec 40 mg daily     DVT Ppx - Patient on Eliquis 2.5 mg BID. Unknown reason why, mother denies clot or DVT. Discontinue Eliquis as 10 months post-ICH.   -Patient currently not on DVT chemoprophylaxis.  -Continue JEFF hose    Total time:  19 minutes.  I spent greater than 50% of the time for patient care and coordination on this date, including unit/floor time, and face-to-face time with the patient as per assessment and plan above.  Discussed current plan of care with mother and agreeable to holding on medication changes as patient is continuing to improve.    Mandy Luke D.O.

## 2020-02-27 NOTE — THERAPY
"Physical Therapy   Daily Treatment     Patient Name: Rey Medina  Age:  24 y.o., Sex:  male  Medical Record #: [id number]  Today's Date: 2/27/2020     Precautions  Precautions: Fall Risk, PEG Tube  Comments: Recent decannulation of tracheostomy, non-verbal    Subjective    Pt seated in therapy gym with mother, RN assisting with medication administration.      Objective       02/27/20 0901   Precautions   Precautions Fall Risk;PEG Tube   Comments Recent decannulation of tracheostomy, non-verbal   Neuro-Muscular Treatments   Neuro-Muscular Treatments Tactile Cuing;Verbal Cuing;Postural Changes;Postural Facilitation   Comments Seated balance EOM: Pt sat with total A and posterior leaning, with assistance to bring weight anterior pt able to maintain position with close SBA. Attempted use of PVC pipe to pull self from posterior to anterior position and attempted medium \"beach ball\" lfit. Pt unable to  ball or maintain appropriate  in L hand on PVC pipe to pull self.   Interdisciplinary Plan of Care Collaboration   IDT Collaboration with  Family / Caregiver;Nursing   Patient Position at End of Therapy Seated  (with mother in therapy gym)   Collaboration Comments RN assisted mother with administration of medications through PEG, mother present for session.    PT Total Time Spent   PT Individual Total Time Spent (Mins) 30   PT Charge Group   PT Neuromuscular Re-Education / Balance 1   PT Therapeutic Activities 1       FIM Bed/Chair/Wheelchair Transfers Score: 2 - Max Assistance  Bed/Chair/Wheelchair Transfers Description:  Increased time, Set-up of equipment, Verbal cueing(WC <> seated EOM, max A SPT without AD)      Assessment    Pt with slight improvement in seated balance, only able to maintain seated EOM with anterior lean, otherwise total A to maintain seated balance with severe posterior lean.     Plan    Standing tolerance in // bars, continue attempts for seated balance, LE PROM/ stretching, there-ex " as pt tolerates.

## 2020-02-28 PROCEDURE — 97110 THERAPEUTIC EXERCISES: CPT

## 2020-02-28 PROCEDURE — A9270 NON-COVERED ITEM OR SERVICE: HCPCS | Performed by: HOSPITALIST

## 2020-02-28 PROCEDURE — 700102 HCHG RX REV CODE 250 W/ 637 OVERRIDE(OP): Performed by: HOSPITALIST

## 2020-02-28 PROCEDURE — 97530 THERAPEUTIC ACTIVITIES: CPT

## 2020-02-28 PROCEDURE — 99231 SBSQ HOSP IP/OBS SF/LOW 25: CPT | Performed by: HOSPITALIST

## 2020-02-28 PROCEDURE — 700102 HCHG RX REV CODE 250 W/ 637 OVERRIDE(OP): Performed by: PHYSICAL MEDICINE & REHABILITATION

## 2020-02-28 PROCEDURE — 92507 TX SP LANG VOICE COMM INDIV: CPT

## 2020-02-28 PROCEDURE — 770010 HCHG ROOM/CARE - REHAB SEMI PRIVAT*

## 2020-02-28 PROCEDURE — 97112 NEUROMUSCULAR REEDUCATION: CPT

## 2020-02-28 PROCEDURE — 700101 HCHG RX REV CODE 250: Performed by: PHYSICAL MEDICINE & REHABILITATION

## 2020-02-28 PROCEDURE — 99232 SBSQ HOSP IP/OBS MODERATE 35: CPT | Performed by: PHYSICAL MEDICINE & REHABILITATION

## 2020-02-28 PROCEDURE — 97535 SELF CARE MNGMENT TRAINING: CPT

## 2020-02-28 PROCEDURE — A9270 NON-COVERED ITEM OR SERVICE: HCPCS | Performed by: PHYSICAL MEDICINE & REHABILITATION

## 2020-02-28 RX ADMIN — PROPRANOLOL HYDROCHLORIDE 20 MG: 10 TABLET ORAL at 20:37

## 2020-02-28 RX ADMIN — SIMETHICONE CHEW TAB 80 MG 80 MG: 80 TABLET ORAL at 20:37

## 2020-02-28 RX ADMIN — POLYETHYLENE GLYCOL 3350 1 PACKET: 17 POWDER, FOR SOLUTION ORAL at 17:00

## 2020-02-28 RX ADMIN — CHOLECALCIFEROL TAB 25 MCG (1000 UNIT) 1000 UNITS: 25 TAB at 09:22

## 2020-02-28 RX ADMIN — AMANTADINE HYDROCHLORIDE 100 MG: 50 SOLUTION ORAL at 13:41

## 2020-02-28 RX ADMIN — LEVETIRACETAM 500 MG: 500 SOLUTION ORAL at 20:37

## 2020-02-28 RX ADMIN — PROPRANOLOL HYDROCHLORIDE 20 MG: 10 TABLET ORAL at 16:59

## 2020-02-28 RX ADMIN — AMANTADINE HYDROCHLORIDE 100 MG: 50 SOLUTION ORAL at 09:21

## 2020-02-28 RX ADMIN — BACLOFEN 5 MG: 10 TABLET ORAL at 20:38

## 2020-02-28 RX ADMIN — PROPRANOLOL HYDROCHLORIDE 20 MG: 10 TABLET ORAL at 09:21

## 2020-02-28 RX ADMIN — MONTELUKAST 10 MG: 10 TABLET, FILM COATED ORAL at 20:38

## 2020-02-28 RX ADMIN — SIMETHICONE CHEW TAB 80 MG 80 MG: 80 TABLET ORAL at 16:59

## 2020-02-28 RX ADMIN — IPRATROPIUM BROMIDE AND ALBUTEROL SULFATE 3 ML: .5; 3 SOLUTION RESPIRATORY (INHALATION) at 22:29

## 2020-02-28 RX ADMIN — LEVETIRACETAM 500 MG: 500 SOLUTION ORAL at 09:21

## 2020-02-28 RX ADMIN — OMEPRAZOLE 40 MG: KIT at 09:21

## 2020-02-28 RX ADMIN — SIMETHICONE CHEW TAB 80 MG 80 MG: 80 TABLET ORAL at 09:21

## 2020-02-28 RX ADMIN — BACLOFEN 5 MG: 10 TABLET ORAL at 16:59

## 2020-02-28 RX ADMIN — BACLOFEN 5 MG: 10 TABLET ORAL at 09:22

## 2020-02-28 NOTE — PROGRESS NOTES
[facility] Medicine Daily Progress Note    Date of Service  2/28/2020    Chief Complaint:  Fever  Tachycardia  Leukocytosis      Interval History:  No significant events or changes since last visit    Review of Systems  Review of Systems   Unable to perform ROS: Medical condition        Physical Exam  Temp:  [36.4 °C (97.5 °F)-36.8 °C (98.2 °F)] 36.4 °C (97.5 °F)  Pulse:  [] 92  Resp:  [18] 18  BP: (121-136)/(76-86) 130/76  SpO2:  [96 %-98 %] 96 %    Physical Exam  Vitals signs and nursing note reviewed.   Constitutional:       Appearance: Normal appearance.   HENT:      Head: Atraumatic.   Eyes:      Conjunctiva/sclera: Conjunctivae normal.      Pupils: Pupils are equal, round, and reactive to light.   Neck:      Musculoskeletal: Normal range of motion and neck supple.   Cardiovascular:      Rate and Rhythm: Regular rhythm. Tachycardia present.   Pulmonary:      Effort: Pulmonary effort is normal.      Breath sounds: Normal breath sounds.   Abdominal:      General: Bowel sounds are normal.      Palpations: Abdomen is soft.   Musculoskeletal:      Right lower leg: No edema.      Left lower leg: No edema.   Skin:     General: Skin is warm and dry.   Psychiatric:         Mood and Affect: Mood normal.         Behavior: Behavior normal.         Fluids    Intake/Output Summary (Last 24 hours) at 2/28/2020 0933  Last data filed at 2/27/2020 1800  Gross per 24 hour   Intake 200 ml   Output --   Net 200 ml       Laboratory                        Imaging    Assessment/Plan  Tachycardia- (present on admission)  Assessment & Plan  HR generally ok but occ rises up a little  Likely to agitation  Off Lopressor  On Propranolol: 20 mg tid  Cont to monitor    Leukocytosis- (present on admission)  Assessment & Plan  Currently resolved  S/P fever  S/P leukocytosis  UA: neg  BC x 2: neg  CXR (2/19): unremarkable  S/P empiric abx of Zosyn & Zyvox    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  admission)  Assessment & Plan  Nonverbal  Agitation much improved    Dysphagia following nontraumatic intracerebral hemorrhage- (present on admission)  Assessment & Plan  Has PEG tube and tolerating TF    Tracheostomy dependent (HCC)- (present on admission)  Assessment & Plan  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

## 2020-02-28 NOTE — THERAPY
Occupational Therapy  Daily Treatment     Patient Name: Rey Medina  Age:  24 y.o., Sex:  male  Medical Record #: [id number]  Today's Date: 2/28/2020     Precautions  Precautions: Fall Risk, PEG Tube  Comments: Recent decannulation of tracheostomy, non-verbal    Safety   Comments: Total assist at this time due to agitation, level of alertness    Subjective    See in data section for appropriate hand squeeze communication.    Pt briefly tearful and brought hands to head during conversation about being in [place]. Unsure if this is related.     Objective       02/28/20 1031   Cognition    Ability To Follow Commands 1 Step   [place]o Scale Level 4   Comments Pt able to follow commands to grab specific sports ball (ex- soccer, football, basketball). Pt also able to follow directions to use specific hands (R vs L). Pt followed directions to put hands on Motomed handles. Pt followed directions to put hands on table and hands on lap. Pt squeezed hand appropriately for his first name and his last name. Pt did not seem to know he was in the [place]. Pt seemed to know through hand squeezes that he has family in [place]. Pt brought hands to forehead and was tearful after explaining he was in [place]. Unsure if emotional reaction was related to realizing where he was located geographically.   Vision Screen   Vision   (Pt attempted eye contact to L, not able to sustain)   Sitting Upper Body Exercises   Upper Extremity Bike Minutes / Rest Breaks (See Comments)  (5 minutes on motomed; .22 miles)   Comments Pt able to place and maintain hands on pedals independently. Pt initially started moving pedals but stopped when motor started.    Interdisciplinary Plan of Care Collaboration   IDT Collaboration with  Physical Therapist;Family / Caregiver   Patient Position at End of Therapy Seated;Family / Friend in Room   Collaboration Comments transition of care from PT; updated PT, MD and Mother on progress in session   OT Total Time Spent   OT  Individual Total Time Spent (Mins) 30   OT Charge Group   OT Therapy Activity 1   OT Therapeutic Exercise  1     Retrieved accurate sports ball x20 each hand and placed in a different bin.    Assessment    Pt with improvements to ability to follow specific commands including using specific hands L v R and retrieving specific sports ball. Pt tearful briefly see above. Pt attempted to make eye contact and was able to move eyes to L on command, but could not sustain.    Plan    Low-stim environment,  sensory stimulation, ADLs/functional participation, sitting balance

## 2020-02-28 NOTE — THERAPY
"Speech Language Pathology  Daily Treatment     Patient Name: Rey Medina  Age:  24 y.o., Sex:  male  Medical Record #: [id number]  Today's Date: 2/28/2020     Subjective    Mother completing shower and dressing at time of ST. Assumed care and therapy conducted in back gym. Pt appeared more alert, eyes open throughout session.     Objective     02/28/20 0804   Receptive Language / Auditory Comprehension   Answers Yes / No Personal Questions Moderate (3)   Follows One Unit Commands Moderate (3)   Interdisciplinary Plan of Care Collaboration   IDT Collaboration with  Family / Caregiver   Patient Position at End of Therapy Seated;Family / Friend in Room   Collaboration Comments assumed care from mother.   SLP Total Time Spent   SLP Individual Total Time Spent (Mins) 30   Charge Group   SLP Treatment - Individual Speech Language Treatment - Individual         Assessment    Pt more alert and cooperative this date. Pt squeezing therapist's hand for \"yes\" to personal Y/N questions in 5/5 trials.   CRS initiated, pt able to shift eye gaze between two objects in 4/4 trials, reach for 2 objects in 4/4 trials and demonstrate use in 1/2 trials this date.     Plan    Continue CRS    "

## 2020-02-28 NOTE — THERAPY
"Occupational Therapy  Daily Treatment     Patient Name: Rey Medina  Age:  24 y.o., Sex:  male  Medical Record #: [id number]  Today's Date: 2/28/2020     Precautions  Precautions: Fall Risk, PEG Tube  Comments: Recent decannulation of tracheostomy, non-verbal    Safety   Comments: Total assist at this time due to agitation, level of alertness    Subjective    Pt squeezed appropriately for location [place].     \"He is exhausted. He's not wanting to get out of bed.\"     Objective       02/28/20 1401   Cognition    Ability To Follow Commands 1 Step   Comments Pt able to squeeze yes for the accurate first and last name, as well as, [place]. Pt does not know this state. Per mom, pt has never been to [place] before this.   Neuro-Muscular Treatments   Comments Seated UE Neuro re-ed. Utilized vibration for LUE wrist extension, elbow flexion and elbow extension. Pt had better response to wrist and elbow extension than elbow flexion.    Balance   Sitting Balance (Static) Poor +   Sitting Balance (Dynamic) Poor   Bed Mobility    Supine to Sit Total Assist   Sit to Supine Total Assist   Scooting Total Assist   Rolling Total Assist to Lt.   Interdisciplinary Plan of Care Collaboration   IDT Collaboration with  Family / Caregiver  (mother present for session)   Patient Position at End of Therapy Seated;Self Releasing Lap Belt Applied;Family / Friend in Room   Collaboration Comments mother present for session   OT Total Time Spent   OT Individual Total Time Spent (Mins) 30   OT Charge Group   OT Self Care / ADL 1   OT Neuromuscular Re-education / Balance 1     Bed to w/c : Total A x1    Assessment    Pt with flat affect. Pt seemed somewhat irritated with using vibrating massage. Pt continued to follow commands for R vs L hand. Followed up with PT after session. Pt was communicating verbally after session.    Plan    Low-stim environment,  sensory stimulation, ADLs/functional participation, sitting balance    "

## 2020-02-28 NOTE — THERAPY
"Speech Language Pathology  Daily Treatment     Patient Name: Rey Medina  Age:  24 y.o., Sex:  male  Medical Record #: [id number]  Today's Date: 2/28/2020     Subjective    Pt alert, tracking with eyes for 30 minute session. Mother present for session.      Objective     02/28/20 1434   Receptive Language / Auditory Comprehension   Answers Yes / No Personal Questions Moderate (3)   Follows One Unit Commands Minimal (4)   Expressive Language   Naming Minimal (4)   Repeats Speech Accurately Minimal (4)   Interdisciplinary Plan of Care Collaboration   IDT Collaboration with  Family / Caregiver   Patient Position at End of Therapy Seated   Collaboration Comments mother present for session   SLP Total Time Spent   SLP Individual Total Time Spent (Mins) 30   Charge Group   SLP Treatment - Individual Speech Language Treatment - Individual         Assessment    Pt received score of 19 on CRS (pt has not received this score since 2/12/20). Pt able to follow commands to reach for items in FO2, appropriately demonstrate use of common objects and name same objects in 7/10 trials. Pt able to state name with initial model, mother's name, as well as answer Y/N questions in 4/5 attempts (1 attempt pt did not verbalize a response). When mother asked if pt would like to return to room at end of session he stated \"no.\" Attempted oral stim using empty spoon, pt able to lift spoon to mouth, rub lips and teeth, however pt refused to open mouth to place spoon on tongue. When offered ice chips/juice pt verbalized \"no\" while pushing SLP's hand away.     Plan    Continue to administer CRS, target receptive/expressive language    "

## 2020-02-28 NOTE — THERAPY
Physical Therapy   Daily Treatment     Patient Name: Rey Medina  Age:  24 y.o., Sex:  male  Medical Record #: [id number]  Today's Date: 2/28/2020     Precautions  Precautions: Fall Risk, PEG Tube  Comments: Recent decannulation of tracheostomy, non-verbal    Subjective    Pt seated in therapy gym with mother, mother reports ready for PT.     Objective       02/28/20 0831   Precautions   Precautions Fall Risk;PEG Tube   Comments Recent decannulation of tracheostomy, non-verbal   Neuro-Muscular Treatments   Neuro-Muscular Treatments Weight Shift Right;Weight Shift Left;Postural Changes;Tactile Cuing;Verbal Cuing   Comments Pt stood in // bars 3x with max A x1 and min A x1, assist for placement of UEs on // bars and upright posture: 2x ~3 min, and 1x ~10 sec; bilateral weight shifting completed during second stand 2x10 with max A at pelvis.   Interdisciplinary Plan of Care Collaboration   IDT Collaboration with  Family / Caregiver   Patient Position at End of Therapy Seated;Call Light within Reach;Tray Table within Reach   Collaboration Comments pt's mother present for PT session    PT Total Time Spent   PT Individual Total Time Spent (Mins) 30   PT Charge Group   PT Neuromuscular Re-Education / Balance 1   PT Therapeutic Activities 1     Pt and mother educated on purpose of bilateral weight shifts for pre-gait.     Assessment    Pt successful for 3/5 attempts standing in // bars, continues to require max- total A to maintain standing.     Plan    Standing tolerance in // bars, seated balance/ core strengthening, LE PROM/ stretching, there-ex as pt tolerates, transfer training.

## 2020-02-28 NOTE — PROGRESS NOTES
Rehab Progress Note     Encounter Date: 2/28/2020    CC: Patient nonverbal no chief complaint could be obtained, mother reports no changes    Interval Events (Subjective)  Vital signs: Intermittent tachycardia.  Stable.  Blood pressure stable.  Afebrile.  Not requiring supplemental oxygen.  No acute events overnight per nursing notes.    Mother at bedside reports that patient has been slightly sweaty the past couple of nights.  Discussed with her that because the labs were not able to be drawn this morning we will try to get them today with nursing staff.  Otherwise clinically he is stable and vital signs are stable so we will continue to monitor him.  Mom has no other questions at this time.    14 point ROS reviewed and negative except as stated above.     Objective:  VITAL SIGNS: /76   Pulse 92   Temp 36.4 °C (97.5 °F) (Temporal)   Resp 18   Wt 73.1 kg (161 lb 3.2 oz)   SpO2 96%   BMI 25.25 kg/m²     GEN: No apparent distress, sitting up in chair working with therapy  HEENT: Head normocephalic, atraumatic.  Sclera nonicteric bilaterally, no ocular discharge appreciated bilaterally.  CV: Extremities warm and well-perfused, no peripheral edema appreciated bilaterally.  PULMONARY: Breathing nonlabored on room air, no respiratory accessory muscle use.  Not requiring supplemental oxygen.  Tracheostomy site covered with gauze.  ABD: Soft, nontender.  SKIN: No appreciable skin breakdown on exposed areas of skin.  NEURO: Nonverbal.  Awake and alert.  Clonus bilateral ankles, not interfering with therapy.  No significant spasticity of upper extremities.  PSYCH: Mood and affect appear to be within normal limits.    No results found for this or any previous visit (from the past 72 hour(s)).    Current Facility-Administered Medications   Medication Frequency   • simethicone (MYLICON) chewable tab 80 mg TID   • amantadine (SYMMETREL) 50 MG/5ML syrup 100 mg BID   • traZODone (DESYREL) tablet 50 mg QHS PRN   •  propranolol (INDERAL) tablet 20 mg TID   • acetaminophen (TYLENOL) tablet 650 mg Q4HRS PRN   • artificial tears ophthalmic solution 1 Drop PRN   • benzocaine-menthol (CEPACOL) lozenge 1 Lozenge Q2HRS PRN   • hydrALAZINE (APRESOLINE) tablet 25 mg Q8HRS PRN   • mag hydrox-al hydrox-simeth (MAALOX PLUS ES or MYLANTA DS) suspension 20 mL Q2HRS PRN   • ondansetron (ZOFRAN ODT) dispertab 4 mg 4X/DAY PRN    Or   • ondansetron (ZOFRAN) syringe/vial injection 4 mg 4X/DAY PRN   • polyethylene glycol/lytes (MIRALAX) PACKET 1 Packet Q24HRS PRN    And   • magnesium hydroxide (MILK OF MAGNESIA) suspension 30 mL QDAY PRN    And   • bisacodyl (DULCOLAX) suppository 10 mg QDAY PRN   • sodium chloride (OCEAN) 0.65 % nasal spray 2 Spray PRN   • tramadol (ULTRAM) 50 MG tablet 50 mg Q4HRS PRN   • Respiratory Therapy Consult Continuous RT   • baclofen (LIORESAL) tablet 5 mg TID   • eucerin cream TID PRN   • ipratropium-albuterol (DUONEB) nebulizer solution Q4H PRN (RT)   • levETIRAcetam (KEPPRA) 100 MG/ML solution 500 mg Q12HRS   • montelukast (SINGULAIR) tablet 10 mg Nightly   • omeprazole (FIRST-OMEPRAZOLE) 2 mg/mL oral susp 40 mg DAILY   • polyethylene glycol/lytes (MIRALAX) PACKET 1 Packet DAILY   • QUEtiapine (SEROQUEL) tablet 25 mg TID PRN   • vitamin D (cholecalciferol) tablet 1,000 Units DAILY       Orders Placed This Encounter   Procedures   • Diet NPO     Standing Status:   Standing     Number of Occurrences:   1     Order Specific Question:   Restrict to:     Answer:   Strict [1]       Assessment:  Active Hospital Problems    Diagnosis   • Tachycardia   • Leukocytosis   • Tracheostomy dependent (HCC)   • Intracerebral hemorrhage, intraventricular (HCC)   • Dysphagia following nontraumatic intracerebral hemorrhage   • 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 Patient very low functioning but per  mother becoming more purposeful  -PT and OT for mobility and ADLs  -SLP for cognition   -Patient agitated on Amantadine and Modafinil. Both trials halted due to agitation. Restart Amantadine 50 mg as he was too agitated on 100 mg but was speaking and helping with ADLs --> 2/24 mom okay with increased to amantadine 100 mg twice daily.  -Continue Keppra. Unclear if had seizure or was continued on prophylaxis  -NSG appointment on 2/7/20 to evaluation  shunt. Will follow-up in 2 months (~4/2020)   -CT head as waxing and waning status. At times he voices including his names, at other times no response and compulsive grabbing of bed rail and urinal. CT head - negative.      Sleep: 2/24 trazodone to as needed     Agitation - Appears like TBI with agitation worsened from neurostimulant. Change Metoprolol to Propranolol. Start Seroquel PRN, too sedated on scheduled  -Stable without significant agitation lately, continue to monitor.     Muscle spasms - mother claims Bromocriptine helps, typically used for dopamine side effects/neurostimulant. Will start low dose Baclofen and monitor.  - Continue baclofen 5 mg 3 times daily     Dysphagia - Patient with G Tube in place. SLP for swallow. MBSS on 2/10/20, start on dysphagia 1 with NTL.  Had tolerated diet minimally prior to aspiration event.  -Continue tube feeds, administered by mom.     Aspiration pneumonia - Currently on Unasyn.  Elevated WBC, broadened to Zyvox and Zosyn. Hospitalist consulted.  Currently stable, resolved.  -Continue to monitor for aspiration events     CV - Patient is on Ivabradine 5 mg daily. Unclear reason why. Discontinue Ivabradine, on Metoprolol for tachycardia. Switch metoprolol to Propranolol  -Continue propanolol 20 mg 3 times daily.     Respiratory - Patient with size 7 trach on admission. Patient on Duonebs and Singulair. Exchange aborted on 2/6/20 due to emesis on suction. Tolerated overnight. Decannulated AM 2/7/20. Tolerated and stoma  closing.   -Continue to monitor respiratory status  -Continue Singulair 10 mg nightly     GI Ppx - Patient on Prilosec and Prevacid. Unclear why two PPIs. Discontinue Prevacid.  -Continue Prilosec 40 mg daily     DVT Ppx - Patient on Eliquis 2.5 mg BID. Unknown reason why, mother denies clot or DVT. Discontinue Eliquis as 10 months post-ICH.   -Patient currently not on DVT chemoprophylaxis: Patient is chronically injured, not indicated.  -Continue JEFF hose    Total time:  29 minutes.  I spent greater than 50% of the time for patient care and coordination on this date, including unit/floor time, and face-to-face time with the patient as per assessment and plan above.  Discussed DVT prophylaxis with nurses and patient's mother.  Discussed need to get labs given they were unable to be drawn this morning.  Reviewed chart.    Mandy Luke D.O.

## 2020-02-28 NOTE — THERAPY
"Physical Therapy   Daily Treatment     Patient Name: Rey Medina  Age:  24 y.o., Sex:  male  Medical Record #: [id number]  Today's Date: 2/28/2020     Precautions  Precautions: Fall Risk, PEG Tube  Comments: Recent decannulation of tracheostomy, non-verbal    Subjective    Pt seated in therapy gym with mother, mother reports ready for PT.      Objective       02/28/20 1001   Precautions   Precautions Fall Risk;PEG Tube   Comments Recent decannulation of tracheostomy, non-verbal   Neuro-Muscular Treatments   Neuro-Muscular Treatments Verbal Cuing;Tactile Cuing;Sequencing;Postural Changes   Comments Seated balance EOM: pt completed the following activities: static sitting 2x 3 min with min A holding pt's hands on lap; modified \"sit-ups\" 2x10 min A with max verbal cuing, holding pt's hands for assist to pull self anterior, 2nd person posterior; medium size \"beach ball\" lift with max A for maintaining  on ball and ROM through bilateral shoulders 1x10   Interdisciplinary Plan of Care Collaboration   IDT Collaboration with  Occupational Therapist   Patient Position at End of Therapy Seated  (in therapy gym for OT session)   Collaboration Comments pt care passed to OT    PT Total Time Spent   PT Individual Total Time Spent (Mins) 30   PT Charge Group   PT Neuromuscular Re-Education / Balance 2       FIM Bed/Chair/Wheelchair Transfers Score: 2 - Max Assistance  Bed/Chair/Wheelchair Transfers Description:  Increased time, Set-up of equipment, Requires lift(WC <> EOM (sit <> sit), max A SPT without AD)      Assessment    Pt with improved core stability/ seated balance this session compared to yesterday. Continues with increasing alertness and improved ability to follow commands.     Plan    Standing tolerance in // bars, seated balance/ core strengthening, LE PROM/ stretching, there-ex as pt tolerates, transfer training.         "

## 2020-02-28 NOTE — DISCHARGE PLANNING
Met with patient and mom at bedside. Discussed home health services and disciplines ordered, mom is agreeable to having home health services, pt is non verbal. Discussed admission process and the way scheduling is done and what to expect. Card left with patients mom if she has further questions about home health services.   Thank you,  Madalyn Perry RN Liaison

## 2020-02-29 ENCOUNTER — APPOINTMENT (OUTPATIENT)
Dept: RADIOLOGY | Facility: REHABILITATION | Age: 25
DRG: 056 | End: 2020-02-29
Attending: HOSPITALIST
Payer: MEDICAID

## 2020-02-29 PROBLEM — R05.8 SPUTUM PRODUCTION: Status: ACTIVE | Noted: 2020-02-29

## 2020-02-29 PROCEDURE — 92507 TX SP LANG VOICE COMM INDIV: CPT

## 2020-02-29 PROCEDURE — 700102 HCHG RX REV CODE 250 W/ 637 OVERRIDE(OP): Performed by: PHYSICAL MEDICINE & REHABILITATION

## 2020-02-29 PROCEDURE — 99232 SBSQ HOSP IP/OBS MODERATE 35: CPT | Performed by: HOSPITALIST

## 2020-02-29 PROCEDURE — 97530 THERAPEUTIC ACTIVITIES: CPT

## 2020-02-29 PROCEDURE — 770010 HCHG ROOM/CARE - REHAB SEMI PRIVAT*

## 2020-02-29 PROCEDURE — 700102 HCHG RX REV CODE 250 W/ 637 OVERRIDE(OP): Performed by: HOSPITALIST

## 2020-02-29 PROCEDURE — A9270 NON-COVERED ITEM OR SERVICE: HCPCS | Performed by: HOSPITALIST

## 2020-02-29 PROCEDURE — 97112 NEUROMUSCULAR REEDUCATION: CPT

## 2020-02-29 PROCEDURE — 97129 THER IVNTJ 1ST 15 MIN: CPT

## 2020-02-29 PROCEDURE — A9270 NON-COVERED ITEM OR SERVICE: HCPCS | Performed by: PHYSICAL MEDICINE & REHABILITATION

## 2020-02-29 PROCEDURE — 97116 GAIT TRAINING THERAPY: CPT

## 2020-02-29 PROCEDURE — 71045 X-RAY EXAM CHEST 1 VIEW: CPT

## 2020-02-29 PROCEDURE — 97130 THER IVNTJ EA ADDL 15 MIN: CPT

## 2020-02-29 RX ADMIN — LEVETIRACETAM 500 MG: 500 SOLUTION ORAL at 20:52

## 2020-02-29 RX ADMIN — SIMETHICONE CHEW TAB 80 MG 80 MG: 80 TABLET ORAL at 08:27

## 2020-02-29 RX ADMIN — BISACODYL 10 MG: 10 SUPPOSITORY RECTAL at 20:50

## 2020-02-29 RX ADMIN — PROPRANOLOL HYDROCHLORIDE 20 MG: 10 TABLET ORAL at 20:50

## 2020-02-29 RX ADMIN — BACLOFEN 5 MG: 10 TABLET ORAL at 14:30

## 2020-02-29 RX ADMIN — BACLOFEN 5 MG: 10 TABLET ORAL at 08:27

## 2020-02-29 RX ADMIN — SIMETHICONE CHEW TAB 80 MG 80 MG: 80 TABLET ORAL at 14:30

## 2020-02-29 RX ADMIN — LEVETIRACETAM 500 MG: 500 SOLUTION ORAL at 08:27

## 2020-02-29 RX ADMIN — MONTELUKAST 10 MG: 10 TABLET, FILM COATED ORAL at 20:49

## 2020-02-29 RX ADMIN — CHOLECALCIFEROL TAB 25 MCG (1000 UNIT) 1000 UNITS: 25 TAB at 08:27

## 2020-02-29 RX ADMIN — POLYETHYLENE GLYCOL 3350 1 PACKET: 17 POWDER, FOR SOLUTION ORAL at 18:05

## 2020-02-29 RX ADMIN — BACLOFEN 5 MG: 10 TABLET ORAL at 20:50

## 2020-02-29 RX ADMIN — PROPRANOLOL HYDROCHLORIDE 20 MG: 10 TABLET ORAL at 08:27

## 2020-02-29 RX ADMIN — SIMETHICONE CHEW TAB 80 MG 80 MG: 80 TABLET ORAL at 20:50

## 2020-02-29 RX ADMIN — AMANTADINE HYDROCHLORIDE 100 MG: 50 SOLUTION ORAL at 14:30

## 2020-02-29 RX ADMIN — AMANTADINE HYDROCHLORIDE 100 MG: 50 SOLUTION ORAL at 08:27

## 2020-02-29 RX ADMIN — PROPRANOLOL HYDROCHLORIDE 20 MG: 10 TABLET ORAL at 14:30

## 2020-02-29 RX ADMIN — OMEPRAZOLE 40 MG: KIT at 08:27

## 2020-02-29 NOTE — THERAPY
"Occupational Therapy  Daily Treatment     Patient Name: Rey Medina  Age:  24 y.o., Sex:  male  Medical Record #: [id number]  Today's Date: 2/29/2020     Precautions  Precautions: Fall Risk, PEG Tube  Comments: Recent decannulation of tracheostomy, non-verbal    Safety   Comments: Total assist at this time due to agitation, level of alertness    Subjective    \"Thank you\"    Pt waved and said goodbye at end of session.     See below for more information on verbalizations.     Objective       02/29/20 1031   Cognition    Level of Consciousness Alert   Ability To Follow Commands Other (See Comments)  (1-2 steps)   Comments verbalizations have improved. See note below.   Fine Motor / Dexterity    Comments  worked on FMC and GMC of UE. Worked on in hand manipulation and use of 1 and 2 hands to  objects. See below for more details.   Interdisciplinary Plan of Care Collaboration   IDT Collaboration with  Family / Caregiver   Patient Position at End of Therapy Seated;Self Releasing Lap Belt Applied;Family / Friend in Room  (with mother headed to his room.)   Collaboration Comments re: CLOF and vision   OT Total Time Spent   OT Individual Total Time Spent (Mins) 30   OT Charge Group   OT Therapy Activity 2       Pt talking today. Pt able to count from 1-20. Pt able to identify written numbers on blocks and place them in to bucket. Pt able to diffierentiate between R and L hand.     Pt following 1-2 step directions and able to anticipate what the direction is after repeating similar task. For example, OTR asked pt what the number block said.     Pt stated number correctly picked up the block and put it in the basket without prompt. Pt able to identify Memory Cards. Pt initially stated \"Card\" when asked what the memory card was. Pt able to identify pictures on cards and may potentialyl be reading the words on the cards as well (still assessing reading). Pt identified memory cards plate, headphones, television, " "cell phone, clock, and pencil correctly. Pt able to  card from table using 1 to 2 hands and place it in the basket.     Pt able to follow directions to  correct colored clothespin. Pt manipulate clothespin in his hand to open it, but does not have the coordination to put it on the clothespin tree. Pt not able to identify the word clothes pin and is not able to repeat the word clothespin when said to him.     Pt able to identify body parts on a puzzle. Pt able to  wooden puzzle pieces and place them in a bucket. Pt followed cues \"more forward.\" and \"towards you\" when grabbing puzzle pieces.       Assessment    Pt with great improvement to ability to verbalize. Pt able to count from 1-20. After 20, pt tried to start from 1 again. Pt able to read written numbers 1-10. See above for more information on talking and identifying objects.Pt with improvements to in hand manipulation. Pt still with incoordination of BUE, however, will continue to practice.     Plan    Low-stim environment,  sensory stimulation, ADLs/functional participation, sitting balance, functional communication, fine and gross coordination of BUE, 1 and 2 step directions  "

## 2020-02-29 NOTE — CARE PLAN
Problem: Bowel/Gastric:  Goal: Normal bowel function is maintained or improved  Outcome: PROGRESSING AS EXPECTED  Mom states that he hasn't had a good BM today but that is normal after 2 days. She will request something if he doesn't have a BM tomorrow  Goal: Will not experience complications related to bowel motility  Outcome: PROGRESSING AS EXPECTED

## 2020-02-29 NOTE — ASSESSMENT & PLAN NOTE
Was noticed to have some yellow discharge on his trach dressing  Has been afebrile  No cough  CXR (2/29): hypoventilatory with some new linear right basilar opacity more likely from atelectasis than aspiration or pneumonia  Would like to check on wbc's -- but pt has refused lab workup  Monitor for now

## 2020-02-29 NOTE — THERAPY
"Speech Language Pathology  Daily Treatment     Patient Name: Rey Medina  Age:  24 y.o., Sex:  male  Medical Record #: [id number]  Today's Date: 2/29/2020     Subjective    RT changing trach site bandage. Pt continues to demonstrate air escape through stoma site, using tactile pressure over site allows for increased intelligibility due to air flow directed through oral/nasal cavities. Given choice pt able to select to do therapy in room vs gym.      Objective     02/29/20 1434   Receptive Language / Auditory Comprehension   Answers Yes / No Personal Questions Minimal (4)   Follows One Unit Commands Supervision (5)   Expressive Language   Naming Minimal (4)   Repeats Speech Accurately Supervision (5)   Automatic Language Appropriate Moderate (3)   Interdisciplinary Plan of Care Collaboration   IDT Collaboration with  Family / Caregiver;Respiratory Therapist   Patient Position at End of Therapy Seated   Collaboration Comments mother, aunt and respiratory therapist in room at time of session   SLP Total Time Spent   SLP Individual Total Time Spent (Mins) 45   Charge Group   SLP Treatment - Individual Speech Language Treatment - Individual         Assessment    Pt with marked improvement in spontaneous speech, increased receptive and expressive language in structured therapy tasks. Pt completed picture naming using [place] Naming Test (high frequency words) 18/32 IND, 5/32 semantic cue, 1/32 phonemic cue, 7/32 model. Pt verbalized Y/N responses to questions 18/20 IND. Point to objects in room provided single word cue 15/15, 1-step commands: 21/25 IND, 23/25 with repetition. When asked if pt would like therapy tomorrow he responded \"no.\" When asked what he would like to do tomorrow pt responded \"anime\"     Plan    Continue to target receptive/expressive language    "

## 2020-02-29 NOTE — THERAPY
Physical Therapy   Daily Treatment     Patient Name: Rey Medina  Age:  24 y.o., Sex:  male  Medical Record #: [id number]  Today's Date: 2/29/2020     Precautions  Precautions: (P) PEG Tube, Fall Risk  Comments: (P) Recent decannulation of tracheostomy, non-verbal    Subjective    Patient seated in w/c with mother present; able to give thumbs up to agree to therapy.     Objective       02/29/20 0831   Precautions   Precautions PEG Tube;Fall Risk   Comments Recent decannulation of tracheostomy, non-verbal   Bed Mobility    Sit to Stand Minimal Assist  (verbal cuing for set up in II bars, assist for steadying)   Interdisciplinary Plan of Care Collaboration   Patient Position at End of Therapy Seated;Self Releasing Lap Belt Applied;Family / Friend in Room  (with mother heading toward room)   PT Total Time Spent   PT Individual Total Time Spent (Mins) 30   PT Charge Group   PT Gait Training 1   PT Therapeutic Activities 1       FIM Walking Score:  1 - Total Assistance  Walking Description:  Two hand rails, Requires 2 people to assist, Verbal cueing, Safety concerns, Extra time(3 ftx2, 5 ftx2, 8 ftx1 in parallel bars with Max A for stabilizing, facilitating weight shift, and managing step length/position, Min A to manage trunk and to bring w/c. Max multimodal cues for sequencing and posture.) Patient requiring seated rest breaks between ambulation trials, impulsive at times attempting to stand before therapist ready. Second person assist provided by mother as no tech assist available.    Static standing with BUE support on parallel bars x2 minutes, Min A to prevent posterior LOB however able to progress to CGA and short bouts of SBA with verbal cues for anterior weight shift. Tactile cues provided to encourage R weight shift and midline orientation with patient able to maintain with intermittent cues.      Assessment    Patient tolerated session well today with consistent ability to follow 1-step commands for  standing and ambulating in parallel bars. Improved gait quality and distance over trials with patient calm throughout session with mild impulsivity.    Plan    Standing tolerance in // bars, seated balance/ core strengthening, LE PROM/ stretching, there-ex as pt tolerates, transfer training.

## 2020-02-29 NOTE — PROGRESS NOTES
Hospital Medicine Daily Progress Note    Date of Service  2/29/2020    Chief Complaint:  Fever  Tachycardia  Leukocytosis      Interval History:  No significant events or changes since last visit    Review of Systems  Review of Systems   Unable to perform ROS: Medical condition        Physical Exam  Temp:  [36.6 °C (97.8 °F)-36.6 °C (97.9 °F)] 36.6 °C (97.9 °F)  Pulse:  [89-98] 89  Resp:  [18-20] 18  BP: (140-142)/(84-94) 142/94  SpO2:  [98 %] 98 %    Physical Exam  Vitals signs and nursing note reviewed.   Constitutional:       General: He is not in acute distress.  HENT:      Mouth/Throat:      Mouth: Mucous membranes are moist.      Pharynx: Oropharynx is clear.   Eyes:      General: No scleral icterus.  Neck:      Musculoskeletal: No neck rigidity.   Cardiovascular:      Rate and Rhythm: Regular rhythm. Tachycardia present.   Pulmonary:      Effort: Pulmonary effort is normal.      Breath sounds: No wheezing or rales.      Comments: Has diminished breath sound (has poor inspiratory effort)  Abdominal:      General: Bowel sounds are normal.      Palpations: Abdomen is soft.   Musculoskeletal:      Right lower leg: No edema.      Left lower leg: No edema.   Skin:     General: Skin is warm and dry.   Psychiatric:         Mood and Affect: Mood normal.         Behavior: Behavior normal.         Fluids    Intake/Output Summary (Last 24 hours) at 2/29/2020 0961  Last data filed at 2/28/2020 2050  Gross per 24 hour   Intake 650 ml   Output --   Net 650 ml       Laboratory                        Imaging    Assessment/Plan  Sputum production  Assessment & Plan  Was noticed to have some yellow discharge on his trach dressing  Has been afebrile  No real cough  Will check CXR  Will get CBC    Tachycardia- (present on admission)  Assessment & Plan  HR generally ok but occ rises up a little -- likely 2nd to agitation  BP ok  Off Lopressor  On Propranolol: 20 mg tid  Cont to monitor    Leukocytosis- (present on  admission)  Assessment & Plan  Currently resolved  S/P fever  S/P leukocytosis  UA: neg  BC x 2: neg  CXR (2/19): unremarkable  S/P empiric abx of Zosyn & Zyvox    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)  Assessment & Plan  Nonverbal  Agitation much improved    Dysphagia following nontraumatic intracerebral hemorrhage- (present on admission)  Assessment & Plan  Has PEG tube and tolerating TF    Tracheostomy dependent (HCC)- (present on admission)  Assessment & Plan  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

## 2020-02-29 NOTE — THERAPY
"Speech Language Pathology  Daily Treatment     Patient Name: Rey Medina  Age:  24 y.o., Sex:  male  Medical Record #: [id number]  Today's Date: 2/29/2020     Subjective    Pt alert, eyes open and tracking for entirety of session. Therapy conduced in back gym. Pt spontaneously responding \"good\" when asked how he was doing today.       Objective     02/29/20 1134   Expressive Language   Naming Minimal (4)   Repeats Speech Accurately Supervision (5)   Automatic Language Appropriate Moderate (3)   Interdisciplinary Plan of Care Collaboration   IDT Collaboration with  Family / Caregiver   Patient Position at End of Therapy Seated;Self Releasing Lap Belt Applied   Collaboration Comments mother present for session   SLP Total Time Spent   SLP Individual Total Time Spent (Mins) 30   Charge Group   SLP Cognitive Skill Development First 15 Minutes 1   SLP Cognitive Skill Development Additional 15 Minutes 1         Assessment    CRS of 23 this date. Pt able to demonstrate appropriate object use, name objects, track and follow 1-step commands. Automatic language including counting 1-10, [place], ANUPAM given initial target to start pt able to complete with 100% accuracy. Attempted letter and 3-5 letter word recognition - letters: 100%, words: 60%. Pt verbally expressing Y/N responses with 100% accuracy.    Plan    Continue to target receptive/expressive language    "

## 2020-03-01 PROCEDURE — A9270 NON-COVERED ITEM OR SERVICE: HCPCS | Performed by: PHYSICAL MEDICINE & REHABILITATION

## 2020-03-01 PROCEDURE — 94760 N-INVAS EAR/PLS OXIMETRY 1: CPT

## 2020-03-01 PROCEDURE — 700102 HCHG RX REV CODE 250 W/ 637 OVERRIDE(OP): Performed by: HOSPITALIST

## 2020-03-01 PROCEDURE — A9270 NON-COVERED ITEM OR SERVICE: HCPCS | Performed by: HOSPITALIST

## 2020-03-01 PROCEDURE — 700102 HCHG RX REV CODE 250 W/ 637 OVERRIDE(OP): Performed by: PHYSICAL MEDICINE & REHABILITATION

## 2020-03-01 PROCEDURE — 770010 HCHG ROOM/CARE - REHAB SEMI PRIVAT*

## 2020-03-01 PROCEDURE — 99232 SBSQ HOSP IP/OBS MODERATE 35: CPT | Performed by: HOSPITALIST

## 2020-03-01 RX ADMIN — PROPRANOLOL HYDROCHLORIDE 20 MG: 10 TABLET ORAL at 14:25

## 2020-03-01 RX ADMIN — BACLOFEN 5 MG: 10 TABLET ORAL at 14:25

## 2020-03-01 RX ADMIN — LEVETIRACETAM 500 MG: 500 SOLUTION ORAL at 19:49

## 2020-03-01 RX ADMIN — SIMETHICONE CHEW TAB 80 MG 80 MG: 80 TABLET ORAL at 19:49

## 2020-03-01 RX ADMIN — PROPRANOLOL HYDROCHLORIDE 20 MG: 10 TABLET ORAL at 09:44

## 2020-03-01 RX ADMIN — AMANTADINE HYDROCHLORIDE 100 MG: 50 SOLUTION ORAL at 09:44

## 2020-03-01 RX ADMIN — AMANTADINE HYDROCHLORIDE 100 MG: 50 SOLUTION ORAL at 14:25

## 2020-03-01 RX ADMIN — LEVETIRACETAM 500 MG: 500 SOLUTION ORAL at 09:44

## 2020-03-01 RX ADMIN — PROPRANOLOL HYDROCHLORIDE 20 MG: 10 TABLET ORAL at 19:49

## 2020-03-01 RX ADMIN — POLYETHYLENE GLYCOL 3350 1 PACKET: 17 POWDER, FOR SOLUTION ORAL at 18:06

## 2020-03-01 RX ADMIN — SIMETHICONE CHEW TAB 80 MG 80 MG: 80 TABLET ORAL at 09:44

## 2020-03-01 RX ADMIN — BACLOFEN 5 MG: 10 TABLET ORAL at 09:44

## 2020-03-01 RX ADMIN — CHOLECALCIFEROL TAB 25 MCG (1000 UNIT) 1000 UNITS: 25 TAB at 09:44

## 2020-03-01 RX ADMIN — SIMETHICONE CHEW TAB 80 MG 80 MG: 80 TABLET ORAL at 14:25

## 2020-03-01 RX ADMIN — MONTELUKAST 10 MG: 10 TABLET, FILM COATED ORAL at 19:49

## 2020-03-01 RX ADMIN — OMEPRAZOLE 40 MG: KIT at 09:44

## 2020-03-01 RX ADMIN — BACLOFEN 5 MG: 10 TABLET ORAL at 19:49

## 2020-03-01 ASSESSMENT — PATIENT HEALTH QUESTIONNAIRE - PHQ9
2. FEELING DOWN, DEPRESSED, IRRITABLE, OR HOPELESS: NOT AT ALL
SUM OF ALL RESPONSES TO PHQ9 QUESTIONS 1 AND 2: 0
1. LITTLE INTEREST OR PLEASURE IN DOING THINGS: NOT AT ALL

## 2020-03-01 NOTE — THERAPY
Physical Therapy   Daily Treatment     Patient Name: Rey Medina  Age:  24 y.o., Sex:  male  Medical Record #: [id number]  Today's Date: 2/29/2020     Precautions  Precautions: (P) PEG Tube, Fall Risk  Comments: (P) Recent decannulation of tracheostomy    Subjective    Patient able to verbalize agreement to participating in therapy.     Objective       02/29/20 1331   Precautions   Precautions PEG Tube;Fall Risk   Comments Recent decannulation of tracheostomy   Cognition    Ability To Follow Commands Other (See Comments)  (1-2 step)   Bed Mobility    Supine to Sit Moderate Assist   Sit to Supine Maximal Assist   Sit to Stand Minimal Assist   Interdisciplinary Plan of Care Collaboration   IDT Collaboration with  Family / Caregiver   Patient Position at End of Therapy Self Releasing Lap Belt Applied;Seated;Family / Friend in Room   Collaboration Comments mother and aunt present for session   PT Total Time Spent   PT Individual Total Time Spent (Mins) 30   PT Charge Group   PT Neuromuscular Re-Education / Balance 1   PT Therapeutic Activities 1       FIM Bed/Chair/Wheelchair Transfers Score: 2 - Max Assistance  Bed/Chair/Wheelchair Transfers Description:  Increased time, Verbal cueing, Requires lift(Max A sit>supine on therapy mat, Mod A sup>sit on therapy mat, Max A squat pivot transfer w/c<>therapy mat.)    FIM Walking Score:  1 - Total Assistance  Walking Description:  Two hand rails, Requires 2 people to assist, Verbal cueing, Safety concerns, Extra time(3 ftx2, 5 ftx2, 8 ftx1 in parallel bars with Max A for stabilizing, facilitating weight shift, and managing step length/position, Min A to manage trunk. Max multimodal cues for sequencing and posture.)    Squat pivot transfer from w/c<>therapy mat with Max A, patient able to lean forward, place hands, and lift off/pivot feet with cues.    Seated dynamic and static balance activities with SBA, occasional CGA during LOBs: static sitting EOB no UE support  throughout session, lateral elbow prop x3 each direction with patient able to attain prop, maintain prop for ~10-20 seconds, and return to midline with only verbal cues (initial overcorrection to midline however over trials resolved).    Max A sit>supine with patient able to R elbow prop however required assist for trunk control and bringing BLE onto therapy mat.    Blocked rolling supine>sidelying x3 each direction with SBA and faded verbal cues.    Lateral scooting in supine performing segmental trunk and hip movements with multimodal cues and stabilizing legs in hooklying position in order to bridge. Lateral scooting along EOB with Min A and multimodal cues for technique and sequencing.    Mod A sup>sit on therapy mat with patient requiring assist to bring legs over EOB and initial lift into elbow prop.      Assessment    Patient tolerated session well today with focus on seated balance/trunk control and developmental movements. Patient consistently able to follow commands and respond to questions. Increased verbalization this afternoon compared to this morning, with patient able to answer yes/no questions and respond to questions including his favorite color and his birthday with verbal encouragement. Patient benefiting from quiet rest breaks throughout session.    Plan    Standing tolerance in // bars, seated balance/ core strengthening, LE PROM/ stretching, there-ex as pt tolerates, transfer training.

## 2020-03-01 NOTE — CARE PLAN
Problem: Knowledge Deficit  Goal: Knowledge of disease process/condition, treatment plan, diagnostic tests, and medications will improve  Outcome: PROGRESSING SLOWER THAN EXPECTED  Continues to refuse all lab draws even with max encouragement. He starts to tighten his arms to his chest and shaking his head no. His mother is not able to convince him either.

## 2020-03-01 NOTE — FLOWSHEET NOTE
03/01/20 0800   Events/Summary/Plan   Events/Summary/Plan stoma care done, pt sitting up in chair just finished shower    Skin Inspection Respiratory Device Intact   Vital Signs   Pulse (!) 114   Respiration 18   Pulse Oximetry 96 %   $ Pulse Oximetry (Spot Check) Yes   Respiratory Assessment   Level of Consciousness Alert   Chest Exam   Work Of Breathing / Effort Mild   Breath Sounds   RUL Breath Sounds Clear   RML Breath Sounds Clear   RLL Breath Sounds Diminished   FORTINO Breath Sounds Clear   LLL Breath Sounds Diminished   Secretions   Cough Strong   Sputum Amount Unable to Evaluate;Small   Sputum Color Tan;Yellow   Sputum Consistency Unable to Evaluate;Thick   Mobility   Activity Performed Up to chair   Oxygen   O2 (LPM) 0   FiO2% 21 %   O2 Delivery Device None - Room Air

## 2020-03-01 NOTE — CARE PLAN
Problem: Infection  Goal: Will remain free from infection  Outcome: PROGRESSING AS EXPECTED     Problem: Venous Thromboembolism (VTW)/Deep Vein Thrombosis (DVT) Prevention:  Goal: Patient will participate in Venous Thrombosis (VTE)/Deep Vein Thrombosis (DVT)Prevention Measures  Outcome: PROGRESSING AS EXPECTED     Problem: Bowel/Gastric:  Goal: Normal bowel function is maintained or improved  Outcome: PROGRESSING SLOWER THAN EXPECTED

## 2020-03-01 NOTE — PROGRESS NOTES
Hospital Medicine Daily Progress Note    Date of Service  3/1/2020    Chief Complaint:  Fever  Tachycardia  Leukocytosis      Interval History:  No significant events or changes since last visit    Review of Systems  Review of Systems   Unable to perform ROS: Medical condition        Physical Exam  Temp:  [36.3 °C (97.4 °F)-37 °C (98.6 °F)] 36.6 °C (97.8 °F)  Pulse:  [] 110  Resp:  [18-19] 18  BP: (120-140)/(74-84) 140/74  SpO2:  [96 %] 96 %    Physical Exam  Vitals signs and nursing note reviewed.   Constitutional:       Appearance: He is not diaphoretic.   HENT:      Mouth/Throat:      Pharynx: No oropharyngeal exudate or posterior oropharyngeal erythema.   Eyes:      Extraocular Movements: Extraocular movements intact.   Neck:      Vascular: No carotid bruit.   Cardiovascular:      Rate and Rhythm: Regular rhythm. Tachycardia present.   Pulmonary:      Effort: Pulmonary effort is normal.      Breath sounds: No wheezing or rales.      Comments: Has diminished breath sound (has poor inspiratory effort)  Abdominal:      General: There is no distension.      Palpations: Abdomen is soft.      Tenderness: There is no abdominal tenderness.   Musculoskeletal:      Right lower leg: No edema.      Left lower leg: No edema.   Skin:     General: Skin is warm and dry.   Psychiatric:         Mood and Affect: Mood normal.         Behavior: Behavior normal.         Fluids    Intake/Output Summary (Last 24 hours) at 3/1/2020 1023  Last data filed at 3/1/2020 0600  Gross per 24 hour   Intake 240 ml   Output --   Net 240 ml       Laboratory                        Imaging    Assessment/Plan  Sputum production  Assessment & Plan  Was noticed to have some yellow discharge on his trach dressing  Has been afebrile  No cough  CXR (2/29): hypoventilatory with some new linear right basilar opacity more likely from atelectasis than aspiration or pneumonia  Would like to check on wbc's -- but pt has refused lab workup  Monitor for  now    Tachycardia- (present on admission)  Assessment & Plan  HR generally ok but occ rises up a little -- likely 2nd to agitation  BP ok  Off Lopressor  On Propranolol: 20 mg tid  Cont to monitor    Leukocytosis- (present on admission)  Assessment & Plan  Currently resolved  W/U unremarkable  S/P empiric abx of Zosyn & Zyvox    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)  Assessment & Plan  Nonverbal  Agitation much improved    Dysphagia following nontraumatic intracerebral hemorrhage- (present on admission)  Assessment & Plan  Has PEG tube and tolerating TF    Tracheostomy dependent (HCC)- (present on admission)  Assessment & Plan  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

## 2020-03-01 NOTE — THERAPY
"Missed Therapy     Patient Name: Rey Medina  Age:  24 y.o., Sex:  male  Medical Record #: [id number]  Today's Date: 2/29/2020    Discussed missed therapy with OT due to sig fatigue (participated in all prior therapies this date), refusal (repeatedly stated \"no\" and shaking head), and agitation. Discussed anticipated make up requirement later in the week with pt and mother. Informed tech of missed tx.        02/29/20 1531   Precautions   Precautions PEG Tube;Fall Risk   Comments Recent decannulation of tracheostomy; limited communication abilities   Interdisciplinary Plan of Care Collaboration   IDT Collaboration with  Family / Caregiver;Therapy Tech   Patient Position at End of Therapy In Bed;Family / Friend in Room   Collaboration Comments mother reports that pt is very tired and becoming aggitated, informed tech of pt refusal   Therapy Missed   Missed Therapy (Minutes) 30   Reason For Missed Therapy Medical - Patient Agitated;Non-Medical - Patient Refused  (pt ademently refused )     "

## 2020-03-01 NOTE — PROGRESS NOTES
Received patient on bed. HOB at 30 degrees. Patient asleep but easily awakeable.  Patient PEG tube has no residual upon aspiration and patent and infusing well. Patient has signs of SOB. Patient is calm and comfortable. Patient mother at bedside. Mother said no concerns for now. Safety and fall precaution reinforced.patient did not have BM for 2 days per mother. Dulcolax suppository given.

## 2020-03-01 NOTE — PROGRESS NOTES
Per patient mother. Patient refused to have lab drawn again. Per mother will inform RN if patient is ready for blood draw. Charge nurse informed.

## 2020-03-02 PROCEDURE — 97110 THERAPEUTIC EXERCISES: CPT

## 2020-03-02 PROCEDURE — A9270 NON-COVERED ITEM OR SERVICE: HCPCS | Performed by: PHYSICAL MEDICINE & REHABILITATION

## 2020-03-02 PROCEDURE — 92507 TX SP LANG VOICE COMM INDIV: CPT

## 2020-03-02 PROCEDURE — 700102 HCHG RX REV CODE 250 W/ 637 OVERRIDE(OP): Performed by: PHYSICAL MEDICINE & REHABILITATION

## 2020-03-02 PROCEDURE — 97530 THERAPEUTIC ACTIVITIES: CPT

## 2020-03-02 PROCEDURE — 99232 SBSQ HOSP IP/OBS MODERATE 35: CPT | Performed by: PHYSICAL MEDICINE & REHABILITATION

## 2020-03-02 PROCEDURE — 700101 HCHG RX REV CODE 250: Performed by: PHYSICAL MEDICINE & REHABILITATION

## 2020-03-02 PROCEDURE — A9270 NON-COVERED ITEM OR SERVICE: HCPCS | Performed by: HOSPITALIST

## 2020-03-02 PROCEDURE — 700102 HCHG RX REV CODE 250 W/ 637 OVERRIDE(OP): Performed by: HOSPITALIST

## 2020-03-02 PROCEDURE — 94640 AIRWAY INHALATION TREATMENT: CPT

## 2020-03-02 PROCEDURE — 99232 SBSQ HOSP IP/OBS MODERATE 35: CPT | Performed by: HOSPITALIST

## 2020-03-02 PROCEDURE — 770010 HCHG ROOM/CARE - REHAB SEMI PRIVAT*

## 2020-03-02 PROCEDURE — 97535 SELF CARE MNGMENT TRAINING: CPT

## 2020-03-02 PROCEDURE — 94760 N-INVAS EAR/PLS OXIMETRY 1: CPT

## 2020-03-02 RX ADMIN — PROPRANOLOL HYDROCHLORIDE 20 MG: 10 TABLET ORAL at 09:15

## 2020-03-02 RX ADMIN — BACLOFEN 5 MG: 10 TABLET ORAL at 09:10

## 2020-03-02 RX ADMIN — OMEPRAZOLE 40 MG: KIT at 09:10

## 2020-03-02 RX ADMIN — BACLOFEN 5 MG: 10 TABLET ORAL at 13:44

## 2020-03-02 RX ADMIN — SIMETHICONE CHEW TAB 80 MG 80 MG: 80 TABLET ORAL at 13:44

## 2020-03-02 RX ADMIN — POLYETHYLENE GLYCOL 3350 1 PACKET: 17 POWDER, FOR SOLUTION ORAL at 18:09

## 2020-03-02 RX ADMIN — SIMETHICONE CHEW TAB 80 MG 80 MG: 80 TABLET ORAL at 09:10

## 2020-03-02 RX ADMIN — BACLOFEN 5 MG: 10 TABLET ORAL at 20:30

## 2020-03-02 RX ADMIN — IPRATROPIUM BROMIDE AND ALBUTEROL SULFATE 3 ML: .5; 3 SOLUTION RESPIRATORY (INHALATION) at 07:31

## 2020-03-02 RX ADMIN — AMANTADINE HYDROCHLORIDE 100 MG: 50 SOLUTION ORAL at 13:45

## 2020-03-02 RX ADMIN — CHOLECALCIFEROL TAB 25 MCG (1000 UNIT) 1000 UNITS: 25 TAB at 09:10

## 2020-03-02 RX ADMIN — LEVETIRACETAM 500 MG: 500 SOLUTION ORAL at 20:29

## 2020-03-02 RX ADMIN — AMANTADINE HYDROCHLORIDE 100 MG: 50 SOLUTION ORAL at 09:10

## 2020-03-02 RX ADMIN — PROPRANOLOL HYDROCHLORIDE 20 MG: 10 TABLET ORAL at 20:29

## 2020-03-02 RX ADMIN — PROPRANOLOL HYDROCHLORIDE 20 MG: 10 TABLET ORAL at 13:44

## 2020-03-02 RX ADMIN — LEVETIRACETAM 500 MG: 500 SOLUTION ORAL at 09:11

## 2020-03-02 RX ADMIN — MONTELUKAST 10 MG: 10 TABLET, FILM COATED ORAL at 20:30

## 2020-03-02 NOTE — REHAB-PHARMACY IDT TEAM NOTE
Pharmacy   Pharmacy  Antibiotics/Antifungals/Antivirals:  Medication:      Active Orders (From admission, onward)    None        Route:        NA  Stop Date:  NA  Reason:      NA  Antihypertensives/Cardiac:  Medication:    Orders (72h ago, onward)     Start     Ordered    02/22/20 1500  propranolol (INDERAL) tablet 20 mg  3 TIMES DAILY      02/22/20 1224    02/21/20 1258  hydrALAZINE (APRESOLINE) tablet 25 mg  EVERY 8 HOURS PRN      02/21/20 1258              Patient Vitals for the past 24 hrs:   BP Pulse   03/02/20 0758 -- (!) 121   03/02/20 0653 140/88 (!) 102   03/01/20 1838 102/59 80   03/01/20 1430 130/85 98     Anticoagulation:  Medication: None                                     Other key medications: A review of the medication list reveals no issues at this time. Patient is currently on antihypertensive(s). Recommend home blood pressure monitoring/recording if antihypertensive(s) regimen(s) continue.    Section completed by: Nemesio Chris [facility]

## 2020-03-02 NOTE — FLOWSHEET NOTE
03/02/20 0758   Events/Summary/Plan   Events/Summary/Plan trach stoma care done, yellow discharge   Vital Signs   Pulse (!) 121  (post duo neb tx)   Respiration 20   Pulse Oximetry 97 %   $ Pulse Oximetry (Spot Check) Yes   Breath Sounds   RLL Breath Sounds Diminished   LLL Breath Sounds Diminished   Secretions   Cough Dry   Oxygen   O2 Delivery Device None - Room Air

## 2020-03-02 NOTE — REHAB-CM IDT TEAM NOTE
Case Management    DC Planning  DC destination/dispostion:  Patient lives with his mother in a single level apartment.     Referrals: Medicaid PCS services.     DC Needs: Patient has been decannulated.  He has a peg tube.  He has only a hospital bed and transfer chair at home.  We have ordered a tub transfer bench, w/c and comode.  He will need home health therapy and PCS services.  [facility] referred.  Barriers to discharge:   Limited family members in the area.     Strengths: mother is proficient in his care.     Section completed by:  Anahi Anderson R.N.

## 2020-03-02 NOTE — PROGRESS NOTES
Rehab Progress Note     Encounter Date: 3/2/2020    CC: [facility], AMS    Interval Events (Subjective)  Patient sitting up in bed following some commands. Per mother she reports he has been doing great the last week. She reports he can read signs such as the fire alarm in the hallway but not smaller texts. She reports he had glasses in the past but does not know if it would be better for optometrist vs ophthalmologist. Discussed with her that I would follow-up with SLP/OT to see if they did any visual testing. Otherwise mothers only concern is his stoma not closing completely before going home.      ROS: Cannot participate in conversation. No grimacing.     IDT Team Meeting 2/18/2020  DC/Disposition:  2/26/20    Objective:  VITAL SIGNS: /80   Pulse 96   Temp 36.4 °C (97.6 °F) (Oral)   Resp 18   Wt 73.1 kg (161 lb 3.2 oz)   SpO2 98%   BMI 25.25 kg/m²  HR: 100, /86, T: 98.3: RR: 22  Gen: NAD  Psych: Mood and affect flat  CV: RRR, no edema  Resp: CTAB, no upper airway sounds  Abd: NTND  Neuro: following simple commands to voice, tracking at times with eyes.      No results found for this or any previous visit (from the past 72 hour(s)).    Current Facility-Administered Medications   Medication Frequency   • simethicone (MYLICON) chewable tab 80 mg TID   • amantadine (SYMMETREL) 50 MG/5ML syrup 100 mg BID   • traZODone (DESYREL) tablet 50 mg QHS PRN   • propranolol (INDERAL) tablet 20 mg TID   • acetaminophen (TYLENOL) tablet 650 mg Q4HRS PRN   • artificial tears ophthalmic solution 1 Drop PRN   • benzocaine-menthol (CEPACOL) lozenge 1 Lozenge Q2HRS PRN   • hydrALAZINE (APRESOLINE) tablet 25 mg Q8HRS PRN   • mag hydrox-al hydrox-simeth (MAALOX PLUS ES or MYLANTA DS) suspension 20 mL Q2HRS PRN   • ondansetron (ZOFRAN ODT) dispertab 4 mg 4X/DAY PRN    Or   • ondansetron (ZOFRAN) syringe/vial injection 4 mg 4X/DAY PRN   • polyethylene glycol/lytes (MIRALAX) PACKET 1 Packet Q24HRS PRN    And   • magnesium  hydroxide (MILK OF MAGNESIA) suspension 30 mL QDAY PRN    And   • bisacodyl (DULCOLAX) suppository 10 mg QDAY PRN   • sodium chloride (OCEAN) 0.65 % nasal spray 2 Spray PRN   • tramadol (ULTRAM) 50 MG tablet 50 mg Q4HRS PRN   • Respiratory Therapy Consult Continuous RT   • baclofen (LIORESAL) tablet 5 mg TID   • eucerin cream TID PRN   • ipratropium-albuterol (DUONEB) nebulizer solution Q4H PRN (RT)   • levETIRAcetam (KEPPRA) 100 MG/ML solution 500 mg Q12HRS   • montelukast (SINGULAIR) tablet 10 mg Nightly   • omeprazole (FIRST-OMEPRAZOLE) 2 mg/mL oral susp 40 mg DAILY   • polyethylene glycol/lytes (MIRALAX) PACKET 1 Packet DAILY   • QUEtiapine (SEROQUEL) tablet 25 mg TID PRN   • vitamin D (cholecalciferol) tablet 1,000 Units DAILY       Orders Placed This Encounter   Procedures   • Diet NPO     Standing Status:   Standing     Number of Occurrences:   1     Order Specific Question:   Restrict to:     Answer:   Strict [1]       Assessment:  Active Hospital Problems    Diagnosis   • *Intracerebral hemorrhage, intraventricular (HCC)   • 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 Patient very low functioning but per mother becoming more purposeful  -PT and OT for mobility and ADLs  -SLP for cognition   -Patient agitated on Amantadine and Modafinil. Both trials halted due to agitation. Restart Amantadine 50 mg as he was too agitated on 100 mg. Slow increase up to 100 mg BID with improved cognition and function  -Continue Keppra. Unclear if had seizure or was continued on prophylaxis. Follow-up with Neurology  -NSG appointment on 2/7/20 to evaluation  shunt. Will follow-up in 2 months   -CT head as waxing and waning status. At times he voices including his  names, at other times no response and compulsive grabbing of bed rail and urinal. CT head - negative.      Agitation - Appears like TBI with agitation worsened from neurostimulant. Change Metoprolol to Propranolol. Start Seroquel PRN, too sedated on scheduled  -Propranolol increase to 20 mg TID    Muscle spasms - mother claims Bromocriptine helps, typically used for dopamine side effects/neurostimulant. Will start low dose Baclofen and monitor.     Dysphagia - Patient with G Tube in place. SLP for swallow. MBSS on 2/10/20, start on dysphagia 1 with NTL  -Tolerated first meal but emesis on 2nd meal at higher percentage. May be due to distention, no lung changes. Continue to monitor.   -Decreased oral intake on less stimulant, mother is monitoring his intake.      Aspiration pneumonia - Currently on Unasyn.  Elevated WBC, broadened to Zyvox and Zosyn  -Hospitalist consulted    CV - Patient is on Ivabradine 5 mg daily. Unclear reason why. Discontinue Ivabradine, on Metoprolol for tachycardia. Switch metoprolol to Propranolol     Respiratory - Patient with size 7 trach on admission. Patient on Duonebs and Singulair  -Exchange aborted on 2/6/20 due to emesis on suction. Tolerated overnight. Decannulated AM 2/7/20. Tolerated. Stoma remains opened, discussed with RT and increase changes and tighter bandaging      GI Ppx - Patient on Prilosec and Prevacid. Unclear why two PPIs. Discontinue Prevacid. Discontinue Simethicone     DVT Ppx - Patient on Eliquis 2.5 mg BID. Unknown reason why, mother denies clot or DVT. Discontinue Eliquis as 10 months post-ICH    Total time:  25 minutes.  I spent greater than 50% of the time for patient care, counseling, and coordination on this date, including unit/floor time, and face-to-face time with the patient as per interval events and assessment and plan above. Topics discussed included discharge planning, simethicone discontinued, and ongoing discussion about visual changes.     Greer  Alexander David M.D.

## 2020-03-02 NOTE — THERAPY
Speech Language Pathology  Daily Treatment     Patient Name: Rey Medina  Age:  24 y.o., Sex:  male  Medical Record #: [id number]  Today's Date: 3/2/2020     Subjective    Pt's mother reports pt is cooperative, however, does not want to get out of bed for therapy. Pt able to wave, nod head to SLP and give thumbs up when asked if he wants to do therapy at bedside. Pt awake, alert and cooperative throughout session.      Objective     03/02/20 1134   Receptive Language / Auditory Comprehension   Follows One Unit Commands Supervision (5)   Follows Two Unit Commands Severe (2)   Expressive Language   Gestures Moderate (3)   Naming Minimal (4)   Repeats Speech Accurately Minimal (4)   Automatic Language Appropriate Moderate (3)   Outcome Measures   Outcome Measures Utilized ABS   ABS (Agitated Behavior Scale)   Short Attention Span, Easy Distractibility, Inability to Concentrate 1   Impulsive, Impatient, Low Tolerance for Pain or Frustration 1   Uncooperative, Resistant to Care, Demanding 1   Violent and/or Threatening Violence Toward People or Property 1   Explosive and/or Unpredictable Anger 1   Rocking, Rubbing, Moaning, Other Self-Stimulating Behavior 1   Pulling at Tubes, Restraints, etc. 1   Wandering from Treatment Area 1   Restlessness, Pacing, Excessive Movement 1   Repetitive Behaviors, Motor and/or Verbal 1   Rapid, Loud or Excessive Talking 1   Sudden Changes of Mood 1   Easily Initiated - Excessive Crying and/or Laughter 1   Self-Abusiveness, Physical and/or Verbal 1   Agitated Behavior Scale Total Score 14   Level of Severity No Agitation   Interdisciplinary Plan of Care Collaboration   IDT Collaboration with  Family / Caregiver   Patient Position at End of Therapy In Bed;Family / Friend in Room   Collaboration Comments therapy conducted at bedside in room   SLP Total Time Spent   SLP Individual Total Time Spent (Mins) 30   Charge Group   SLP Treatment - Individual Speech Language Treatment -  "Individual         Assessment    Following 1-step commands: 9/10, 2-step commands: 3/10, pt able to complete initial instruction in 5/10 trials, however, unable to complete 2nd component. Picture naming: 15/25 IND, pt able to answer open ended questions about picture cards in 10 trials (e.g. picture of cookies, \"what kind of cookies do you like?\" Pt responded \"oreos\")    Plan    Continue to target receptive/expressive language    "

## 2020-03-02 NOTE — THERAPY
"Occupational Therapy  Daily Treatment     Patient Name: Rey Medina  Age:  24 y.o., Sex:  male  Medical Record #: [id number]  Today's Date: 3/2/2020     Precautions  Precautions: (P) PEG Tube, Swallow Precautions ( See Comments), Fall Risk  Comments: (P) Dysarthria, shunt, NPO     Safety   ADL Safety : (P) Requires Physical Assist for Safety  Bathroom Safety: Requires Physical Assist for Safety  Comments: (P) See FIMs for ADL performance details (grooming)    Subjective    Patient insisted on not getting OOB during this session (AEB nodding head \"no\") despite maximal encouragement and rest break.     Patient waved hello at start of session.      Objective     03/02/20 1401   Precautions   Precautions PEG Tube;Swallow Precautions ( See Comments);Fall Risk   Comments Dysarthria, shunt, NPO    Safety    ADL Safety  Requires Physical Assist for Safety   Comments See FIMs for ADL performance details (grooming)   Cognition    Level of Consciousness Alert   Ability To Follow Commands 1 Step   Rancho Scale Level 4   Comments Significant improvement noted with expressive communication/verbalizations however speech is dysarthric   Interdisciplinary Plan of Care Collaboration   IDT Collaboration with  Family / Caregiver;Other (See Comments)  (PT student )   Patient Position at End of Therapy In Bed;Family / Friend in Room;Call Light within Reach   Collaboration Comments Bedside therapy as patient declined to get OOB, PT student assisted; mother present during session   OT Total Time Spent   OT Individual Total Time Spent (Mins) 30   OT Charge Group   OT Self Care / ADL 1   OT Therapy Activity 1       FIM Grooming Score:  3 - Moderate Assistance  Grooming Description:  Increased time, Supervision for safety, Verbal cueing, Set-up of equipment(Patient demo'd ability to brush teeth with mod assist and mod-max VCs.)    Introduced iPad during this session as patient previously worked with computers and per mother, patient " "enjoys computer-related activities. Patient demonstrated ability to follow 1-step command of pressing home button on iPad (with min assist due to decreased coordination). Patient also demonstrated ability to scroll through open Internet browser (up and down) with min assist for stabilizing iPad. When asked if patient can identify \"clock\" vicente or \"calendar\" vicente, but unable to do so. When undersigned therapist clarified if patient can see what is on the screen he nodded his head \"no.\" Patient nodded \"yes\" when asked if vision is blurry. Suspect double vision may be a barrier also. Patient's mother to bring in eyeglasses for patient to use during future therapy sessions.     Assessment    Patient tolerated OT session well with focus on progression with ADLs and integrating BUEs into functional/meaningful tasks. Patient declined on getting OOB today however agreeable to participate in bedside therapy. Demonstrates significant improvement with communication, initiation of functional tasks, and activity tolerance. Anticipate participation will continue to improve once patient has his eyeglasses.     Plan    Low-stim environment,  sensory stimulation, ADLs/functional participation, sitting balance, functional communication, fine and gross coordination of BUE, 1 and 2 step directions     "

## 2020-03-02 NOTE — THERAPY
Physical Therapy   Daily Treatment     Patient Name: Rey Medina  Age:  24 y.o., Sex:  male  Medical Record #: [id number]  Today's Date: 3/2/2020     Precautions  Precautions: PEG Tube, Swallow Precautions ( See Comments), Fall Risk  Comments: NPO, limited communication abilities, pt on turning schedule, behavior, tachycardia, shunt    Subjective    Patient agreeable to ther ex in bed and rolling only; refused other interventions; pt's mother present; pt hooked up to PEG tube feeding before and after session.     Objective       03/02/20 1031   Precautions   Precautions PEG Tube;Swallow Precautions ( See Comments);Fall Risk   Comments NPO, limited communication abilities, pt on turning schedule, behavior, tachycardia, shunt   Vitals   O2 (LPM) 0   O2 Delivery Device None - Room Air   Non Verbal Descriptors   Non Verbal Scale  Calm   Supine Lower Body Exercise   Supine Lower Body Exercises Yes   Heel Slide 3 sets of 10  (while donning B heel off-loading boots for skin protection)   Ankle Pumps 1 set of 10  (Total A provided due to lack of B voluntary facilitation)   Bed Mobility    Supine to Sit Refused   Sit to Supine Refused   Sit to Stand Refused   Scooting Refused   Rolling Moderate Assist to Rt.;Moderate Assist to Lt.  (bed rails, mod cueing, hand-over-hand A; 1 rep to each side)   Interdisciplinary Plan of Care Collaboration   IDT Collaboration with  Occupational Therapist;Family / Caregiver;Nursing   Patient Position at End of Therapy In Bed;Call Light within Reach;Family / Friend in Room  (HOB at about 30 degrees)   Collaboration Comments CLOF and progress; pt's mother present throughout session, attempted incorporation of family with transfers/bed mobility/ and ther ex; pt resuming PEG tube feeding   PT Total Time Spent   PT Individual Total Time Spent (Mins) 30   PT Charge Group   PT Therapeutic Exercise 2       FIM Bed/Chair/Wheelchair Transfers Score: 0 - Not tested, patient  refused  Bed/Chair/Wheelchair Transfers Description:       FIM Walking Score:  0 - Not tested, patient refused  Walking Description:       FIM Wheelchair Score:  0 - Not tested, patient refused  Wheelchair Description:       FIM Stairs Score:  0 - Not tested,unsafe activity  Stairs Description:         Assessment    Patient self-limited much of session; hip flexion has excessive hip ABD/ER; 0/5 today with B ankle PF/DF along with presence of B ankle INV; pt unable to obtain PROM to 0 deg ankle DF on R but likely would be able to achieve position if in standing (pt refused standing today on multiple attempts too).    Plan    Standing tolerance & ambulation in // bars, seated unsupported balance/ core strengthening, BLE PROM/ stretching/ ther-ex, transfer training.  D/c currently scheduled for 3/5/2020.

## 2020-03-02 NOTE — THERAPY
"Speech Language Pathology  Daily Treatment     Patient Name: Rey Medina  Age:  24 y.o., Sex:  male  Medical Record #: [id number]  Today's Date: 3/2/2020     Subjective    Pt was in bed at the beginning of this session.  When asked if he was willing to get into the chair pt was able to shake his head \"yes\", but then when his mother attempted to transfer pt he pulled away from her several times.  When asked if he wanted to get up pt pointing to the bed indicting he wanted to stay in bed.       Objective       03/02/20 0801   Interdisciplinary Plan of Care Collaboration   IDT Collaboration with  Family / Caregiver   Patient Position at End of Therapy In Bed;Family / Friend in Room   Collaboration Comments Pt's mother present for this ST session    SLP Total Time Spent   SLP Individual Total Time Spent (Mins) 30   Charge Group   SLP Treatment - Individual Speech Language Treatment - Individual       Assessment    Therapy session completed at bedside as pt was not willing to transfer out of bed.  Pt was able to identify pictures in a FO3 with 80% accuracy.  Pt was also able to name pictures with 90% accuracy (assistance required occasionally from pt's mother for intelligibility).  Pt asked if he wanted to try to eat or drink, pt shook his head no and pointed to his stomach.  Pt unable to answer why he does not want to attempt PO intake.      Plan    Continue targeting expressive/receptive language and PO intake     "

## 2020-03-02 NOTE — REHAB-DIETARY IDT TEAM NOTE
Dietary   Nutrition  Dietary Problems     Problem: Other Problem (see comments)     Description: Difficulty swallowing r/t secondary dysphagia s/p Intracerebral hemorrhage, intraventricular as evidenced by NPO, G-tube for alternate route of nutrition/hydraiton/ medications.      Goal: Other Goal (Resolved)     Description: 1. Tolerance to EN regimen 2. Maintain adequate enteral nutrient/fluid intake (PO vs TF) to promote nutrition optimization/healing 3.  Transition to PO pending clinical outcomes                      Rey has plans to discharge home with home health with his mother.  His mother does all major care for patient.    He continues to tolerate TF with no issues. He is still experiencing constipation to which resolves with Miralax.  Continue to recommend prune juice daily with feedings.  Otherwise, doing well.  He has good and bad days, but, overall appears to be progressing.  Mother denies nausea,vomiting, diarrhea.  She is independent with TF care.    Patient remains NPO, refusing PO trials.  TF of [employer] + supplemental beneprotein +  getting 150mL free water flushes q 4 hours providing 1725 kcals 104g/protein, 1835mL free wtaer per day.     No recent labs to review (pt has notably been refusing).  Chest XRay negative.  Pertinent Medications: noted    Weight: no new weight taken x 1 week  Skin: CDI/ + PEG    Vitals:  (tachy), /88, RA   GI: BM 3/1  : WNL  I/Os: no output documented     Plan: Continue current nutritional POC. RD continuing to follow weekly per protocol.  Weekly weights per policy/ order.  Utilize same scale for accuracy.     Section completed by:  Magdalena Jiang R.D.

## 2020-03-02 NOTE — CARE PLAN
Problem: Safety  Goal: Will remain free from falls  Intervention: Implement fall precautions  Note: Use of call light encouraged to make needs known.Fall precautions and frequent rounding in place for safety.Mother at bedside.Call light within reach.Will continue to monitor and assess needs and safety.     Problem: Bowel/Gastric:  Goal: Normal bowel function is maintained or improved  Intervention: Educate patient and significant other/support system about signs and symptoms of constipation and interventions to implement  Note: Pt is incontinent of bowel per report.LBM 03/01.Will continue to monitor.     Problem: Other Problem (see comments)  Intervention: Monitor PO Intake, weights and laboratory values  Note: Pt tolerated bolus feeding at hs.Will continue to monitor.

## 2020-03-02 NOTE — PROGRESS NOTES
Hospital Medicine Daily Progress Note    Date of Service  3/2/2020    Chief Complaint:  Fever  Tachycardia  Leukocytosis      Interval History:  No significant events or changes since last visit    Review of Systems  Review of Systems   Unable to perform ROS: Medical condition        Physical Exam  Temp:  [36.3 °C (97.4 °F)-36.8 °C (98.3 °F)] 36.3 °C (97.4 °F)  Pulse:  [] 121  Resp:  [16-20] 20  BP: (102-140)/(59-88) 140/88  SpO2:  [95 %-100 %] 97 %    Physical Exam  Vitals signs and nursing note reviewed.   Constitutional:       Appearance: Normal appearance.   HENT:      Head: Atraumatic.   Eyes:      Conjunctiva/sclera: Conjunctivae normal.      Pupils: Pupils are equal, round, and reactive to light.   Neck:      Musculoskeletal: Normal range of motion and neck supple.   Cardiovascular:      Rate and Rhythm: Regular rhythm. Tachycardia present.      Heart sounds: No murmur.   Pulmonary:      Effort: Pulmonary effort is normal.      Breath sounds: No stridor. No wheezing or rales.      Comments: Has diminished breath sound (has poor inspiratory effort)  Abdominal:      General: There is no distension.      Palpations: Abdomen is soft.      Tenderness: There is no abdominal tenderness.   Musculoskeletal:      Right lower leg: No edema.      Left lower leg: No edema.   Skin:     General: Skin is warm and dry.      Findings: No rash.   Psychiatric:         Mood and Affect: Mood normal.         Behavior: Behavior normal.         Fluids    Intake/Output Summary (Last 24 hours) at 3/2/2020 1010  Last data filed at 3/1/2020 2000  Gross per 24 hour   Intake 950 ml   Output --   Net 950 ml       Laboratory                        Imaging    Assessment/Plan  Sputum production  Assessment & Plan  Was noticed to have some yellow discharge on his trach dressing  Has been afebrile  No cough  CXR (2/29): hypoventilatory with some new linear right basilar opacity more likely from atelectasis than aspiration or  pneumonia  Would like to check on wbc's -- but pt has refused lab workup  Monitor for now    Tachycardia- (present on admission)  Assessment & Plan  HR occ rises up -- 2nd to agitation  BP ok  On Propranolol: 20 mg tid  Cont to monitor    Leukocytosis- (present on admission)  Assessment & Plan  Currently resolved  W/U unremarkable  S/P empiric abx of Zosyn & Zyvox    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)  Assessment & Plan  Nonverbal  Agitation improved    Dysphagia following nontraumatic intracerebral hemorrhage- (present on admission)  Assessment & Plan  Has PEG tube and tolerating TF    Tracheostomy dependent (HCC)- (present on admission)  Assessment & Plan  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

## 2020-03-02 NOTE — THERAPY
"Occupational Therapy  Daily Treatment     Patient Name: Rey Medina  Age:  24 y.o., Sex:  male  Medical Record #: [id number]  Today's Date: 3/2/2020     Precautions  Precautions: PEG Tube, Swallow Precautions ( See Comments), Fall Risk  Comments: NPO, limited communication abilities, pt on turning schedule, behavior, tachycardia, shunt    Safety   ADL Safety : (P) Requires Physical Assist for Safety  Bathroom Safety: (P) Requires Physical Assist for Safety  Comments: (P) See FIMs for ADL performance details    Subjective    Patient correctly verbalized his name, last name, birthdate, colors (yellow, green, blue, red and black), yes and no, numbers (when asked to identify how many fingers therapist was holding up), and pill bottle.    Patient waved good bye at end of session and gave \"fist pump\" and thumbs up to therapists.     Nodded \"yes\" and \"no\" appropriately.      Patient declined to get OOB and transfer into w/c despite max encouragement and assist.     Objective     03/02/20 1001   Precautions   Precautions PEG Tube;Swallow Precautions ( See Comments);Fall Risk   Comments NPO, limited communication abilities, shunt   Safety    ADL Safety  Requires Physical Assist for Safety   Bathroom Safety Requires Physical Assist for Safety   Comments See FIMs for ADL performance details   Interdisciplinary Plan of Care Collaboration   IDT Collaboration with  Physical Therapist;Family / Caregiver   Patient Position at End of Therapy In Bed   Collaboration Comments Mother present during session, PT student present during session, notified PT re: CLOF   OT Total Time Spent   OT Individual Total Time Spent (Mins) 30   OT Charge Group   OT Self Care / ADL 1   OT Therapy Activity 1     FIM Upper Body Dressing:  3 - Moderate Assistance  Upper Body Dressing Description:  Increased time, Supervision for safety, Set-up of equipment(Patient doffed t-shirt with min assist while in bed with HOB elevated and donned t-shirt with " max assist (for threading BUEs through sleeves and pulling shirt down), required tactile and VCs to complete task. )    FIM Lower Body Dressing Score:  4 - Minimal Assistance  Lower Body Dressing Description:  Increased time, Supervision for safety, Set-up of equipment(Patient donned and doffed socks with min assist while in bed with HOB elevated. )    Patient demonstrated ability to grasp, pinch open 5 clothespins of varied resistance with min assist (due to decreased coordination).     Patient opened/closed 3 different child-lock pill containers using BUEs with min assist (due to decreased coordination).     Patient completed dowel exercises (with 2# weighted dowel) with min assist. Completed shoulder flexion and bicep curls (1 set of 10 each) while in bed with HOB elevated.     Assessment    Patient tolerated OT session well with focus on progression with ADLs, communication, and functional use of BUEs. Significant functional improvement noted with communication, ADL independence and functional use of BUEs.     Plan    Low-stim environment,  sensory stimulation, ADLs/functional participation, sitting balance, functional communication, fine and gross coordination of BUE, 1 and 2 step directions

## 2020-03-02 NOTE — FLOWSHEET NOTE
03/02/20 0802   Inhalation Therapy Treatment   $ SVN Performed Yes   Given By: John   Date SVN Next Change Due 03/09/20

## 2020-03-02 NOTE — CARE PLAN
Problem: Communication  Goal: The ability to communicate needs accurately and effectively will improve  Outcome: PROGRESSING SLOWER THAN EXPECTED  Childlike behaviors when it comes to getting out of bed and getting lab drawn. He fights attempts by getting agitated and swinging.

## 2020-03-03 PROCEDURE — 700102 HCHG RX REV CODE 250 W/ 637 OVERRIDE(OP): Performed by: HOSPITALIST

## 2020-03-03 PROCEDURE — 99232 SBSQ HOSP IP/OBS MODERATE 35: CPT | Performed by: HOSPITALIST

## 2020-03-03 PROCEDURE — 97530 THERAPEUTIC ACTIVITIES: CPT

## 2020-03-03 PROCEDURE — 92526 ORAL FUNCTION THERAPY: CPT

## 2020-03-03 PROCEDURE — A9270 NON-COVERED ITEM OR SERVICE: HCPCS | Performed by: PHYSICAL MEDICINE & REHABILITATION

## 2020-03-03 PROCEDURE — 700102 HCHG RX REV CODE 250 W/ 637 OVERRIDE(OP): Performed by: PHYSICAL MEDICINE & REHABILITATION

## 2020-03-03 PROCEDURE — 97110 THERAPEUTIC EXERCISES: CPT

## 2020-03-03 PROCEDURE — 99233 SBSQ HOSP IP/OBS HIGH 50: CPT | Performed by: PHYSICAL MEDICINE & REHABILITATION

## 2020-03-03 PROCEDURE — A9270 NON-COVERED ITEM OR SERVICE: HCPCS | Performed by: HOSPITALIST

## 2020-03-03 PROCEDURE — 770010 HCHG ROOM/CARE - REHAB SEMI PRIVAT*

## 2020-03-03 PROCEDURE — 92507 TX SP LANG VOICE COMM INDIV: CPT

## 2020-03-03 PROCEDURE — 97112 NEUROMUSCULAR REEDUCATION: CPT

## 2020-03-03 RX ADMIN — PROPRANOLOL HYDROCHLORIDE 20 MG: 10 TABLET ORAL at 09:40

## 2020-03-03 RX ADMIN — CHOLECALCIFEROL TAB 25 MCG (1000 UNIT) 1000 UNITS: 25 TAB at 09:41

## 2020-03-03 RX ADMIN — PROPRANOLOL HYDROCHLORIDE 20 MG: 10 TABLET ORAL at 14:22

## 2020-03-03 RX ADMIN — POLYETHYLENE GLYCOL 3350 1 PACKET: 17 POWDER, FOR SOLUTION ORAL at 17:36

## 2020-03-03 RX ADMIN — BACLOFEN 5 MG: 10 TABLET ORAL at 09:41

## 2020-03-03 RX ADMIN — OMEPRAZOLE 40 MG: KIT at 09:41

## 2020-03-03 RX ADMIN — PROPRANOLOL HYDROCHLORIDE 20 MG: 10 TABLET ORAL at 20:33

## 2020-03-03 RX ADMIN — AMANTADINE HYDROCHLORIDE 100 MG: 50 SOLUTION ORAL at 09:40

## 2020-03-03 RX ADMIN — MONTELUKAST 10 MG: 10 TABLET, FILM COATED ORAL at 20:33

## 2020-03-03 RX ADMIN — LEVETIRACETAM 500 MG: 500 SOLUTION ORAL at 20:33

## 2020-03-03 RX ADMIN — LEVETIRACETAM 500 MG: 500 SOLUTION ORAL at 09:40

## 2020-03-03 RX ADMIN — BISACODYL 10 MG: 10 SUPPOSITORY RECTAL at 17:43

## 2020-03-03 RX ADMIN — AMANTADINE HYDROCHLORIDE 100 MG: 50 SOLUTION ORAL at 14:22

## 2020-03-03 NOTE — THERAPY
Speech Language Pathology  Daily Treatment     Patient Name: Rey Medina  Age:  24 y.o., Sex:  male  Medical Record #: [id number]  Today's Date: 3/3/2020     Subjective    Patient was willing to participate.      Objective       03/03/20 0932   Dysphagia    Other Treatments trials of minced and moist and MTL   SLP Total Time Spent   SLP Individual Total Time Spent (Mins) 30   Charge Group   SLP Swallowing Dysfunction Treatment Swallowing Dysfunction Treatment         Assessment    Patient was assessed with minced and moist textures as well as MTL via straw. No overt s/sx of aspiration were noted.     Plan    MBSS as appropriate. Attempt meal trial as tolerated.

## 2020-03-03 NOTE — CARE PLAN
Problem: Mobility Transfers  Goal: STG-Within one week, patient will roll  Description: 1) Individualized goal:  To left or right, max A with use of bed features as needed.  2) Interventions:  PT E Stim Attended, PT Orthotics Training, PT Gait Training, PT Self Care/Home Eval, PT Therapeutic Exercises, PT Ultrasound, PT TENS Application, PT Neuro Re-Ed/Balance, PT Therapeutic Activity, PT Manual Therapy and PT Evaluation      Outcome: MET  Goal: STG-Within one week, patient will sit to stand  Description: 1) Individualized goal:  Min A x1 with use of AD/bed rail as needed  2) Interventions: PT E Stim Attended, PT Orthotics Training, PT Gait Training, PT Self Care/Home Eval, PT Therapeutic Exercises, PT Ultrasound, PT TENS Application, PT Neuro Re-Ed/Balance, PT Therapeutic Activity, PT Manual Therapy and PT Evaluation      Outcome: MET     Problem: Mobility Transfers  Goal: STG-Within one week, patient will transfer bed to chair  Description: 1) Individualized goal:  Mod A x1 with LRAD  2) Interventions:  PT E Stim Attended, PT Orthotics Training, PT Gait Training, PT Self Care/Home Eval, PT Therapeutic Exercises, PT Ultrasound, PT TENS Application, PT Neuro Re-Ed/Balance, PT Therapeutic Activity, PT Manual Therapy and PT Evaluation      Outcome: NOT MET  Note: Patient uses additional A (Max A) with bed/mat transfers at this time.

## 2020-03-03 NOTE — REHAB-SLP IDT TEAM NOTE
Speech Therapy   Cognitive Linquistic Functions  Comprehension Initial:  1 - Total Assistance  Comprehension Current:  4 - Minimal Assistance   Comprehension Description:     Expression Initial:  1 - Total Assistance  Expression Current:  3 - Moderate Assistance   Expression Description:     Social Interaction Initial:  2 - Max Assistance  Social Interaction Current:  3 - Moderate Assistance   Social Interaction Description:     Problem Solving Initial:  1 - Total Assistance  Problem Solving Current:  2 - Max Assistance   Problem Solving Description:     Memory Initial:  1 - Total Assistance  Memory Current:  2 - Max Assistance   Memory Description:     Executive Functioning / Organization Initial:     Executive Functioning / Organization Current:      Executive Functioning Desciption:  Max A  Swallowing  Swallowing Status:  Trials of MTL, Minced and moist, and thin liquids without overt s/sx of aspiration. Recommend repeat MBSS  Orders Placed This Encounter   Procedures   • Diet NPO     Standing Status:   Standing     Number of Occurrences:   1     Order Specific Question:   Restrict to:     Answer:   Strict [1]     Behavior Modification Plan  Keep the environment simple to avoid over stimulatiom/agitation, Allow for rest time, Keep instructions simple/concrete, Give clear feedback, Set clear goals, Redirect to task/topic, Analyze tasks (break down into smaller steps) and Reinforce participation in desired tasks  Assistive Technology  Low/Impaired vision equipment and Low tech: Calendar, planner, schedule, alarms/timers, pill organizer, post-it notes, lists  Family Training/Education:  Ongoing with mother  DC Recommendations:    speech therapy  Strengths:  Able to follow instructions, Making steady progress towards goals and Supportive family  Barriers:  Aspiration risk, Impulsive and Poor activity tolerance  # of short term goals set=3  # of short term goals met=2  Speech Therapy Problems     Problem:  Speech/Swallowing LTGs     Dates: Start: 02/06/20       Description:     Goal: LTG-By discharge, patient will safely swallow     Dates: Start: 02/06/20       Description: 1) Individualized goal:  Dysphagia I/NTL diet without clinical s/sx of aspiration  2) Interventions:  SLP Speech Language Treatment, SLP Swallowing Dysfunction Treatment, SLP Oral Pharyngeal Evaluation, SLP Video Swallow Evaluation, SLP Self Care / ADL Training , SLP Cognitive Skill Development and SLP Group Treatment             Goal: LTG-By discharge, patient will     Dates: Start: 02/06/20       Description:                 Problem: Swallowing STGs     Dates: Start: 02/25/20       Description:     Goal: STG-Within one week, patient will safely swallow     Dates: Start: 02/25/20       Description: 1) Individualized goal:  Therapeutic trials of nectar thick via tsp with no s/sx of aspiration.  2) Interventions:  SLP Swallowing Dysfunction Treatment and SLP Group Treatment                          Section completed by:  Katia Doss MS,CCC-SLP

## 2020-03-03 NOTE — REHAB-OT IDT TEAM NOTE
Occupational Therapy   Activities of Daily Living  Eating Initial:  1 - Total Assistance  Eating Current:  1 - Total Assistance   Eating Description:  Modified diet, Supervision for safety, Verbal cueing, Tube feed bolus, Set-up of equipment or meal/tube feeding, Needs help scooping food, Needs help bringing food to mouth, Increased time(Total assist for tube feedings, re-started PO trials)  Grooming Initial:  1 - Total Assistance  Grooming Current:  3 - Moderate Assistance   Grooming Description:  Increased time, Supervision for safety, Verbal cueing, Set-up of equipment(Patient demo'd ability to brush teeth with mod assist and mod-max VCs.)  Bathing Initial:  1 - Total Assistance  Bathing Current:  1 - Total Assistance   Bathing Description:  (Total assist for sponge bath in bed)  Upper Body Dressing Initial:  1 - Total Assistance  Upper Body Dressing Current:  3 - Moderate Assistance   Upper Body Dressing Description:  Increased time, Supervision for safety, Set-up of equipment(Patient doffed t-shirt with min assist while in bed with HOB elevated and donned t-shirt with max assist (for threading BUEs through sleeves and pulling shirt down), required tactile and VCs to complete task. )  Lower Body Dressing Initial:  1 - Total Assistance  Lower Body Dressing Current:  4 - Minimal Assistance   Lower Body Dressing Description:  4 - Minimal Assistance  Toileting Initial:     Toileting Current:      Toileting Description:     Toilet Transfer Initial:  0 - Not tested,medical condition  Toilet Transfer Current:  0 - Not tested,medical condition   Toilet Transfer Description:  0 - Not tested,medical condition  Tub / Shower Transfer Initial:     Tub / Shower Transfer Current:      Tub / Shower Transfer Description:     IADL:  NA at this time, OT focus has been on ADLs and functional use of UEs   Family Training/Education:  Ongoing with mother    DME/DC Recommendations:  W/c, shower bench, grab bars, HH OT    Strengths:   Adequate strength, Independent PLOF, Supportive family, Willingly participates in therapeutic activities and Other: able to follow one step instructions  Barriers:   Decreased endurance, Generalized weakness, Limited mobility, Poor activity tolerance, Poor balance, Poor carryover of learning, Uncooperative, Fall risk, limited communication abilities/dysarthria  # of short term goals set= 1  # of short term goals met= 1    Occupational Therapy Goals     Problem: Functional Cognition     Dates: Start: 02/06/20       Description:           Problem: OT Long Term Goals     Dates: Start: 02/06/20       Description:     Goal: LTG-By discharge, patient will complete basic self care tasks     Dates: Start: 02/06/20       Description: 1) Individualized Goal:  Mod assist with AE as needed  2) Interventions:  OT Group Therapy, OT Self Care/ADL, OT Cognitive Skill Dev, OT Manual Ther Technique, OT Neuro Re-Ed/Balance, OT Sensory Int Techniques, OT Therapeutic Activity, OT Evaluation and OT Therapeutic Exercise           Goal: LTG-By discharge, patient will perform bathroom transfers     Dates: Start: 02/06/20       Description: 1) Individualized Goal:  Mod assist with AE as needed    2) Interventions:  OT Group Therapy, OT Self Care/ADL, OT Cognitive Skill Dev, OT Manual Ther Technique, OT Neuro Re-Ed/Balance, OT Sensory Int Techniques, OT Therapeutic Activity, OT Evaluation and OT Therapeutic Exercise                       Section completed by:  Minnie Luke MS,OTR/L

## 2020-03-03 NOTE — THERAPY
Speech Language Pathology  Daily Treatment     Patient Name: Rey Medina  Age:  24 y.o., Sex:  male  Medical Record #: [id number]  Today's Date: 3/3/2020     Subjective    Patient was in room with mother at time of ST. Thumbs up for participation in ST.      Objective       03/03/20 0802   Reading Comprehension    Reading Words Supervision (5)   Reading Phrases Supervision (5)   Dysphagia    Other Treatments PO trials of ice cream   SLP Total Time Spent   SLP Individual Total Time Spent (Mins) 30   Charge Group   SLP Treatment - Individual Speech Language Treatment - Individual         Assessment    Patient was assessed with PO trial of ice cream. Cough x3 noted throughout trials. Patient was able to follow directives to complete secondary swallows.   Able to match phrases to pictures with 100% accuracy. 77% for phrase completion.     Plan    Continue PO trials as tolerated,

## 2020-03-03 NOTE — REHAB-COLLABORATIVE ONGOING IDT TEAM NOTE
Weekly Interdisciplinary Team Conference Note    Weekly Interdisciplinary Team Conference # 4  Date:  3/3/2020    Clinicians present and reporting at team conference include the following:   MD: CHARLENE David MD    RN:  Robert Reynolds RN    PT:   Mustapha Page PT, DPT  OT:  Minnie Luke MS, OTR/L   ST:  Katia Doss MS, CCC-SLP  CM:  Anahi Anderson RN [facility]  REC:  None  RT:  Ana Becker RRT  RPh:  Ro Villasenor [facility]  Other:   None  All reporting clinicians have a working knowledge of this patient's plan of care.    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 **        Nursing  Diet/Nutrition:  Tube Feed and NPO  Medication Administration:  Via Gastric Tube  Fluid Intake/Output in past 24 hours: In: 525 [Other:100; Enteral:100]  Out: -   Admit Weight:  Weight: 73.1 kg (161 lb 3.2 oz)  Weight Last 7 Days     Pain Issues:    Location:  --  --         Severity:  Denies   Scheduled pain medications:  None     PRN pain medications used in last 24 hours:  None   Non Pharmacologic Interventions:  emotional support, repositioned and rest  Effectiveness of pain management plan:  good=patient states acceptable comfort after interventions    Bowel:    Bowel Assist Initial Score:  1 - Total Assistance  Bowel Assist Current Score:  1 - Total Assistance  Bowl Accidents in last 7 days:  3  Last bowel movement: 03/01/20  Stool Description: Large, Brown,  Loose     Usual bowel pattern:  every other day  Scheduled bowel medications:  polyethylene glycol/lytes (MIRALAX)   PRN bowel medications used in last 24 hours:  None  Nursing Interventions:  Increased time, Scheduled medication, Emptying of device, Brief  Effectiveness of bowel program:   fair=sometimes needs prn bowel meds for constipation  Bladder:    Bladder Assist Initial Score:  1 - Total Assistance  Bladder Assist Current Score:  1 - Total Assistance  Bladder Accidents in last 7 days:     PVR range for past 24-48 hours: --  Intermittent Catheterization:    Medications affecting bladder:  None    Interventions:  Increased time, Emptying of device, Brief  Effectiveness of bladder training:  Poor=Continues to have bladder accidents    Wound:      Patient Lines/Drains/Airways Status    Active Current Wounds     Name: Placement date: Placement time: Site: Days:    Wound 02/14/20 neck stoma site  02/14/20   1059   --  17                   Interventions:  Monitor and assess  Effectiveness of intervention:  wound is unchanged     Sleep/wake cycle:   Average hours slept:  Sleeps 4-6 hours without waking  Sleep medication usage:  None    Patient/Family Training/Education:  Aspiration Precautions, Bladder Management/Training, Bowel Management/Training, Diet/Nutrition, Fall Prevention, General Self Care, Medication Management, Pain Management, Respiratory Hygiene, Safety and Wound Care  Discharge Supply Recommendations:  Wound Care Supplies  Strengths: Supportive family   Barriers:   Aspiration risk, Bladder incontinence, Bowel incontinence, Fatigue, Generalized weakness, Limited mobility and Tube feeding       Nursing Problems     Problem: Bowel/Gastric:     Description:     Goal: Normal bowel function is maintained or improved     Description:           Goal: Will not experience complications related to bowel motility     Description:                 Problem: Communication     Description:     Goal: The ability to  communicate needs accurately and effectively will improve     Description:                 Problem: Discharge Barriers/Planning     Description:     Goal: Patient's continuum of care needs will be met     Description:                 Problem: Infection     Description:     Goal: Will remain free from infection     Description:                 Problem: Knowledge Deficit     Description:     Goal: Knowledge of disease process/condition, treatment plan, diagnostic tests, and medications will improve     Description:           Goal: Knowledge of the prescribed therapeutic regimen will improve     Description:                 Problem: Respiratory:     Description:     Goal: Respiratory status will improve     Description:                 Problem: Safety     Description:     Goal: Will remain free from injury     Description:           Goal: Will remain free from falls     Description:                 Problem: Skin Integrity     Description:     Goal: Risk for impaired skin integrity will decrease     Description:                 Problem: Urinary Elimination:     Description:     Goal: Ability to reestablish a normal urinary elimination pattern will improve     Description:                 Problem: Venous Thromboembolism (VTW)/Deep Vein Thrombosis (DVT) Prevention:     Description:     Goal: Patient will participate in Venous Thrombosis (VTE)/Deep Vein Thrombosis (DVT)Prevention Measures     Description:                        Long Term Goals:   At discharge patient will be able to function safely at home and in the community with support.    Section completed by:  Maricruz Kerr R.N.        Respiratory Therapy    The only barrier with this patient is his trach stoma that has not closed.  His mother occasionally request Duoneb txs for him.  There is no definitive benefit.  He has been on RA since admission and his SATS are in the mid to high 90s.  Section completed by:  Ana Becker, RRT    Mobility  Bed  mobility:   Mod-Max A  Bed /Chair/Wheelchair Transfer Initial:  1 - Total Assistance  Bed /Chair/Wheelchair Transfer Current:  0 - Not tested, patient refused   Bed/Chair/Wheelchair Transfer Description:  Increased time, Verbal cueing, Requires lift(Max A sit>supine on therapy mat, Mod A sup>sit on therapy mat, Max A squat pivot transfer w/c<>therapy mat.)  Walk Initial:  1 - Total Assistance  Walk Current:  0 - Not tested, patient refused   Walk Description:  Two hand rails, Requires 2 people to assist, Verbal cueing, Safety concerns, Extra time(3 ftx2, 5 ftx2, 8 ftx1 in parallel bars with Max A for stabilizing, facilitating weight shift, and managing step length/position, Min A to manage trunk. Max multimodal cues for sequencing and posture.)  Wheelchair Initial:  0 - Not tested, patient refused  Wheelchair Current:  0 - Not tested, patient refused   Wheelchair Description:     Stairs Initial:  0 - Not tested,unsafe activity  Stairs Current: 0 - Not tested,unsafe activity   Stairs Description:    Patient/Family Training/Education:  Ongoing patient and family (mother) education with much better carryover & demonstration for pt's mother than patient himself  DME/DC Recommendations:  Continued stay at inpatient facility to facilitate safe return home   Strengths:  Making steady progress towards goals, Manages pain appropriately and Supportive family  Barriers:   Agitation, Decreased endurance, Generalized weakness, Limited mobility, Poor activity tolerance, Poor balance, Poor carryover of learning, Uncooperative, Lack of motivation and Other: PEG tube & NPO, Fall risk, limited communication abilities, pt on turning schedule, behavior, tachycardia, shunt in place  # of short term goals set= 3  # of short term goals met= 2  Physical Therapy Problems     Problem: Balance     Dates: Start: 03/03/20       Description:     Goal: STG-Within one week, patient will maintain static standing     Dates: Start: 03/03/20        Description: 1) Individualized goal: 2 min, Mod A, gait belt, // bars, cueing, setup  2) Interventions: PT E Stim Attended, PT Orthotics Training, PT Gait Training, PT Self Care/Home Eval, PT Therapeutic Exercises, PT Ultrasound, PT TENS Application, PT Neuro Re-Ed/Balance, PT Therapeutic Activity, PT Manual Therapy and PT Evaluation                    Problem: Mobility     Dates: Start: 03/03/20       Description:     Goal: STG-Within one week, patient will ambulate household distance     Dates: Start: 03/03/20       Description: 1) Individualized goal: 10 ft, Max A, // bars, gait belt, max cueing, increased time, setup  2) Interventions: PT E Stim Attended, PT Orthotics Training, PT Gait Training, PT Self Care/Home Eval, PT Therapeutic Exercises, PT Ultrasound, PT TENS Application, PT Neuro Re-Ed/Balance, PT Therapeutic Activity, PT Manual Therapy and PT Evaluation                  Problem: Mobility Transfers     Dates: Start: 02/06/20       Description:     Goal: STG-Within one week, patient will transfer bed to chair     Dates: Start: 02/06/20       Description: 1) Individualized goal:  Mod A x1 with LRAD  2) Interventions:  PT E Stim Attended, PT Orthotics Training, PT Gait Training, PT Self Care/Home Eval, PT Therapeutic Exercises, PT Ultrasound, PT TENS Application, PT Neuro Re-Ed/Balance, PT Therapeutic Activity, PT Manual Therapy and PT Evaluation        Note:     Goal Note filed on 03/03/20 0810 by Mustapha Page, PT    Patient uses additional A (Max A) with bed/mat transfers at this time.                  Goal: STG-Within one week, patient will perform bed mobility     Dates: Start: 03/03/20       Description: 1) Individualized goal: Mod A for supine<>sit, bed rail prn, cueing, setup, non-elevated HOB, increased time  2) Interventions: PT E Stim Attended, PT Orthotics Training, PT Gait Training, PT Self Care/Home Eval, PT Therapeutic Exercises, PT Ultrasound, PT TENS Application, PT Neuro  Re-Ed/Balance, PT Therapeutic Activity, PT Manual Therapy and PT Evaluation                 Problem: PT-Long Term Goals     Dates: Start: 02/06/20       Description:     Goal: LTG-By discharge, patient will maintain balance     Dates: Start: 02/06/20       Description: 1) Individualized goal:  In sitting with use of UE for assist at a SPV level, 5 min   2) Interventions:  PT E Stim Attended, PT Orthotics Training, PT Gait Training, PT Self Care/Home Eval, PT Therapeutic Exercises, PT Ultrasound, PT TENS Application, PT Neuro Re-Ed/Balance, PT Therapeutic Activity, PT Manual Therapy and PT Evaluation              Goal: LTG-By discharge, patient will tolerate standing     Dates: Start: 02/06/20       Description: 1) Individualized goal:  2 min, SBA with use of LRAD  2) Interventions:  PT E Stim Attended, PT Orthotics Training, PT Gait Training, PT Self Care/Home Eval, PT Therapeutic Exercises, PT Ultrasound, PT TENS Application, PT Neuro Re-Ed/Balance, PT Therapeutic Activity, PT Manual Therapy and PT Evaluation            Goal: LTG-By discharge, patient will ambulate     Dates: Start: 02/06/20       Description: 1) Individualized goal:  10 ft, mod A with LRAD  2) Interventions:  PT E Stim Attended, PT Orthotics Training, PT Gait Training, PT Self Care/Home Eval, PT Therapeutic Exercises, PT Ultrasound, PT TENS Application, PT Neuro Re-Ed/Balance, PT Therapeutic Activity, PT Manual Therapy and PT Evaluation             Goal: LTG-By discharge, patient will transfer one surface to another     Dates: Start: 02/06/20       Description: 1) Individualized goal:  Min A with LRAD  2) Interventions:  PT E Stim Attended, PT Orthotics Training, PT Gait Training, PT Self Care/Home Eval, PT Therapeutic Exercises, PT Ultrasound, PT TENS Application, PT Neuro Re-Ed/Balance, PT Therapeutic Activity, PT Manual Therapy and PT Evaluation                         Section completed by:  Mustapha Page, PT    Activities of Daily  Living  Eating Initial:  1 - Total Assistance  Eating Current:  1 - Total Assistance   Eating Description:  Modified diet, Supervision for safety, Verbal cueing, Tube feed bolus, Set-up of equipment or meal/tube feeding, Needs help scooping food, Needs help bringing food to mouth, Increased time(Total assist for tube feedings, re-started PO trials)  Grooming Initial:  1 - Total Assistance  Grooming Current:  3 - Moderate Assistance   Grooming Description:  Increased time, Supervision for safety, Verbal cueing, Set-up of equipment(Patient demo'd ability to brush teeth with mod assist and mod-max VCs.)  Bathing Initial:  1 - Total Assistance  Bathing Current:  1 - Total Assistance   Bathing Description:  (Total assist for sponge bath in bed)  Upper Body Dressing Initial:  1 - Total Assistance  Upper Body Dressing Current:  3 - Moderate Assistance   Upper Body Dressing Description:  Increased time, Supervision for safety, Set-up of equipment(Patient doffed t-shirt with min assist while in bed with HOB elevated and donned t-shirt with max assist (for threading BUEs through sleeves and pulling shirt down), required tactile and VCs to complete task. )  Lower Body Dressing Initial:  1 - Total Assistance  Lower Body Dressing Current:  4 - Minimal Assistance   Lower Body Dressing Description:  4 - Minimal Assistance  Toileting Initial:     Toileting Current:      Toileting Description:     Toilet Transfer Initial:  0 - Not tested,medical condition  Toilet Transfer Current:  0 - Not tested,medical condition   Toilet Transfer Description:  0 - Not tested,medical condition  Tub / Shower Transfer Initial:     Tub / Shower Transfer Current:      Tub / Shower Transfer Description:     IADL:  NA at this time, OT focus has been on ADLs and functional use of UEs   Family Training/Education:  Ongoing with mother    DME/DC Recommendations:  W/c, shower bench, grab bars, HH OT    Strengths:  Adequate strength, Independent PLOF,  Supportive family, Willingly participates in therapeutic activities and Other: able to follow one step instructions  Barriers:   Decreased endurance, Generalized weakness, Limited mobility, Poor activity tolerance, Poor balance, Poor carryover of learning, Uncooperative, Fall risk, limited communication abilities/dysarthria  # of short term goals set= 1  # of short term goals met= 1    Occupational Therapy Goals     Problem: Functional Cognition     Dates: Start: 02/06/20       Description:           Problem: OT Long Term Goals     Dates: Start: 02/06/20       Description:     Goal: LTG-By discharge, patient will complete basic self care tasks     Dates: Start: 02/06/20       Description: 1) Individualized Goal:  Mod assist with AE as needed  2) Interventions:  OT Group Therapy, OT Self Care/ADL, OT Cognitive Skill Dev, OT Manual Ther Technique, OT Neuro Re-Ed/Balance, OT Sensory Int Techniques, OT Therapeutic Activity, OT Evaluation and OT Therapeutic Exercise           Goal: LTG-By discharge, patient will perform bathroom transfers     Dates: Start: 02/06/20       Description: 1) Individualized Goal:  Mod assist with AE as needed    2) Interventions:  OT Group Therapy, OT Self Care/ADL, OT Cognitive Skill Dev, OT Manual Ther Technique, OT Neuro Re-Ed/Balance, OT Sensory Int Techniques, OT Therapeutic Activity, OT Evaluation and OT Therapeutic Exercise                       Section completed by:  Minnie Luke MS,OTR/L    Cognitive Linquistic Functions  Comprehension Initial:  1 - Total Assistance  Comprehension Current:  4 - Minimal Assistance   Comprehension Description:     Expression Initial:  1 - Total Assistance  Expression Current:  3 - Moderate Assistance   Expression Description:     Social Interaction Initial:  2 - Max Assistance  Social Interaction Current:  3 - Moderate Assistance   Social Interaction Description:     Problem Solving Initial:  1 - Total Assistance  Problem Solving Current:  2 - Max  Assistance   Problem Solving Description:     Memory Initial:  1 - Total Assistance  Memory Current:  2 - Max Assistance   Memory Description:     Executive Functioning / Organization Initial:     Executive Functioning / Organization Current:      Executive Functioning Desciption:  Max A  Swallowing  Swallowing Status:  Trials of MTL, Minced and moist, and thin liquids without overt s/sx of aspiration. Recommend repeat MBSS  Orders Placed This Encounter   Procedures   • Diet NPO     Standing Status:   Standing     Number of Occurrences:   1     Order Specific Question:   Restrict to:     Answer:   Strict [1]     Behavior Modification Plan  Keep the environment simple to avoid over stimulatiom/agitation, Allow for rest time, Keep instructions simple/concrete, Give clear feedback, Set clear goals, Redirect to task/topic, Analyze tasks (break down into smaller steps) and Reinforce participation in desired tasks  Assistive Technology  Low/Impaired vision equipment and Low tech: Calendar, planner, schedule, alarms/timers, pill organizer, post-it notes, lists  Family Training/Education:  Ongoing with mother  DC Recommendations:    speech therapy  Strengths:  Able to follow instructions, Making steady progress towards goals and Supportive family  Barriers:  Aspiration risk, Impulsive and Poor activity tolerance  # of short term goals set=3  # of short term goals met=2  Speech Therapy Problems     Problem: Speech/Swallowing LTGs     Dates: Start: 02/06/20       Description:     Goal: LTG-By discharge, patient will safely swallow     Dates: Start: 02/06/20       Description: 1) Individualized goal:  Dysphagia I/NTL diet without clinical s/sx of aspiration  2) Interventions:  SLP Speech Language Treatment, SLP Swallowing Dysfunction Treatment, SLP Oral Pharyngeal Evaluation, SLP Video Swallow Evaluation, SLP Self Care / ADL Training , SLP Cognitive Skill Development and SLP Group Treatment             Goal: LTG-By  discharge, patient will     Dates: Start: 02/06/20       Description:                 Problem: Swallowing STGs     Dates: Start: 02/25/20       Description:     Goal: STG-Within one week, patient will safely swallow     Dates: Start: 02/25/20       Description: 1) Individualized goal:  Therapeutic trials of nectar thick via tsp with no s/sx of aspiration.  2) Interventions:  SLP Swallowing Dysfunction Treatment and SLP Group Treatment                          Section completed by:  Katia Doss MS,CCC-SLP       Nutrition  Dietary Problems     Problem: Other Problem (see comments)     Description: Difficulty swallowing r/t secondary dysphagia s/p Intracerebral hemorrhage, intraventricular as evidenced by NPO, G-tube for alternate route of nutrition/hydraiton/ medications.      Goal: Other Goal (Resolved)     Description: 1. Tolerance to EN regimen 2. Maintain adequate enteral nutrient/fluid intake (PO vs TF) to promote nutrition optimization/healing 3.  Transition to PO pending clinical outcomes                      Rey has plans to discharge home with home health with his mother.  His mother does all major care for patient.    He continues to tolerate TF with no issues. He is still experiencing constipation to which resolves with Miralax.  Continue to recommend prune juice daily with feedings.  Otherwise, doing well.  He has good and bad days, but, overall appears to be progressing.  Mother denies nausea,vomiting, diarrhea.  She is independent with TF care.    Patient remains NPO, refusing PO trials.  TF of [employer] + supplemental beneprotein +  g etting 150mL free water flushes q 4 hours providing 1725 kcals 104g/protein, 1835mL free wtaer per day.     No recent labs to review (pt has notably been refusing).  Chest XRay negative.  Pertinent Medications: noted    Weight: no new weight taken x 1 week  Skin: CDI/ + PEG    Vitals:  (tachy), /88, RA   GI: BM 3/1  : WNL  I/Os: no output  documented     Plan: Continue current nutritional POC. RD continuing to follow weekly per protocol.  Weekly weights per policy/ order.  Utilize same scale for accuracy.     Section completed by:  Magdalena Jiang R.D.    REHAB-Pharmacy IDT Team Note by Nemesio Chris RP at 3/2/2020  1:19 PM  Version 1 of 1    Author:  Nemesio Chris RPH Service:  -- Author Type:  Pharmacist    Filed:  3/2/2020  1:20 PM Date of Service:  3/2/2020  1:19 PM Status:  Signed    :  Nemesio Chris RPH (Pharmacist)         Pharmacy   Pharmacy  Antibiotics/Antifungals/Antivirals:  Medication:      Active Orders (From admission, onward)    None        Route:        NA  Stop Date:  NA  Reason:      NA  Antihypertensives/Cardiac:  Medication:    Orders (72h ago, onward)     Start     Ordered    02/22/20 1500  propranolol (INDERAL) tablet 20 mg  3 TIMES DAILY      02/22/20 1224    02/21/20 1258  hydrALAZINE (APRESOLINE) tablet 25 mg  EVERY 8 HOURS PRN      02/21/20 1258              Patient Vitals for the past 24 hrs:   BP Pulse   03/02/20 0758 -- (!) 121   03/02/20 0653 140/88 (!) 102   03/01/20 1838 102/59 80   03/01/20 1430 130/85 98     Anticoagulation:  Medication: None                                     Other key medications: A review of the medication list reveals no issues at this time. Patient is currently on antihypertensive(s). Recommend home blood pressure monitoring/recording if antihypertensive(s) regimen(s) continue.    Section completed by: Nemesio Chris [facility][AW.1]         Attribution [employer]     AW.1 - Nemesio Chris RP on 3/2/2020  1:19 PM                  DC Planning  DC destination/dispostion:  Patient lives with his mother in a single level apartment.     Referrals: Medicaid PCS services.     DC Needs: Patient has been decannulated.  He has a peg tube.  He has only a hospital bed and transfer chair at home.  We  have ordered a tub transfer bench, w/c and comode.  He will need home health therapy and PCS  services.   [facility] referred.  Barriers to discharge:   Limited family members in the area.     Strengths: mother is proficient in his care.     Section completed by:  Anahi Anderson R.N.         Physician Summary  CHARLENE David MD  participated and led team conference discussion.

## 2020-03-03 NOTE — THERAPY
"Speech Language Pathology  Daily Treatment     Patient Name: Rey Medina  Age:  24 y.o., Sex:  male  Medical Record #: [id number]  Today's Date: 3/2/2020     Subjective    Therapy conducted at bedside this session. Mother present.      Objective     03/02/20 1434   Receptive Language / Auditory Comprehension   Answers Yes / No Simple / Contextual Questions Supervision (5)   Follows One Unit Commands Supervision (5)   Expressive Language   Naming Minimal (4)   Reading Comprehension    Reading Comprehension (WDL) X   Matches Words to Pictures / Objects Severe (2)   Reading Words Severe (2)   Interdisciplinary Plan of Care Collaboration   IDT Collaboration with  Family / Caregiver   Patient Position at End of Therapy In Bed;Family / Friend in Room   Collaboration Comments mother present for session in room   SLP Total Time Spent   SLP Individual Total Time Spent (Mins) 30   Charge Group   SLP Treatment - Individual Speech Language Treatment - Individual         Assessment    Pt refusing trials of mildly thick peach janet juice. In a.m. session pt stated \"janet\" juice was his favorite. When discussing POC with patient and need to resume eating/drinking pt nodding head when asked if he was agreeable to trials with primary SLP. Pt agreeable to \"chocolate ice cream\" tomorrow. Simple Y/N questions 18/20 IND, Pointing to objects in room: 8/10. Attempted to further assess reading using RCBA, pt able to point to correct word with picture cue in 2/7 trials. Pt with difficulty holding eye gaze to scan 3 words, may benefit from larger print.     Plan    Initiate oral trials, continue to target receptive/expressive language    "

## 2020-03-03 NOTE — REHAB-NURSING IDT TEAM NOTE
Nursing   Nursing  Diet/Nutrition:  Tube Feed and NPO  Medication Administration:  Via Gastric Tube  Fluid Intake/Output in past 24 hours: In: 525 [Other:100; Enteral:100]  Out: -   Admit Weight:  Weight: 73.1 kg (161 lb 3.2 oz)  Weight Last 7 Days     Pain Issues:    Location:  --  --         Severity:  Denies   Scheduled pain medications:  None     PRN pain medications used in last 24 hours:  None   Non Pharmacologic Interventions:  emotional support, repositioned and rest  Effectiveness of pain management plan:  good=patient states acceptable comfort after interventions    Bowel:    Bowel Assist Initial Score:  1 - Total Assistance  Bowel Assist Current Score:  1 - Total Assistance  Bowl Accidents in last 7 days:  3  Last bowel movement: 03/01/20  Stool Description: Large, Brown, Loose     Usual bowel pattern:  every other day  Scheduled bowel medications:  polyethylene glycol/lytes (MIRALAX)   PRN bowel medications used in last 24 hours:  None  Nursing Interventions:  Increased time, Scheduled medication, Emptying of device, Brief  Effectiveness of bowel program:   fair=sometimes needs prn bowel meds for constipation  Bladder:    Bladder Assist Initial Score:  1 - Total Assistance  Bladder Assist Current Score:  1 - Total Assistance  Bladder Accidents in last 7 days:     PVR range for past 24-48 hours: --  Intermittent Catheterization:    Medications affecting bladder:  None    Interventions:  Increased time, Emptying of device, Brief  Effectiveness of bladder training:  Poor=Continues to have bladder accidents    Wound:      Patient Lines/Drains/Airways Status    Active Current Wounds     Name: Placement date: Placement time: Site: Days:    Wound 02/14/20 neck stoma site  02/14/20   1059   --  17                   Interventions:  Monitor and assess  Effectiveness of intervention:  wound is unchanged     Sleep/wake cycle:   Average hours slept:  Sleeps 4-6 hours without waking  Sleep medication usage:   None    Patient/Family Training/Education:  Aspiration Precautions, Bladder Management/Training, Bowel Management/Training, Diet/Nutrition, Fall Prevention, General Self Care, Medication Management, Pain Management, Respiratory Hygiene, Safety and Wound Care  Discharge Supply Recommendations:  Wound Care Supplies  Strengths: Supportive family   Barriers:   Aspiration risk, Bladder incontinence, Bowel incontinence, Fatigue, Generalized weakness, Limited mobility and Tube feeding       Nursing Problems     Problem: Bowel/Gastric:     Description:     Goal: Normal bowel function is maintained or improved     Description:           Goal: Will not experience complications related to bowel motility     Description:                 Problem: Communication     Description:     Goal: The ability to communicate needs accurately and effectively will improve     Description:                 Problem: Discharge Barriers/Planning     Description:     Goal: Patient's continuum of care needs will be met     Description:                 Problem: Infection     Description:     Goal: Will remain free from infection     Description:                 Problem: Knowledge Deficit     Description:     Goal: Knowledge of disease process/condition, treatment plan, diagnostic tests, and medications will improve     Description:           Goal: Knowledge of the prescribed therapeutic regimen will improve     Description:                 Problem: Respiratory:     Description:     Goal: Respiratory status will improve     Description:                 Problem: Safety     Description:     Goal: Will remain free from injury     Description:           Goal: Will remain free from falls     Description:                 Problem: Skin Integrity     Description:     Goal: Risk for impaired skin integrity will decrease     Description:                 Problem: Urinary Elimination:     Description:     Goal: Ability to reestablish a normal urinary elimination  pattern will improve     Description:                 Problem: Venous Thromboembolism (VTW)/Deep Vein Thrombosis (DVT) Prevention:     Description:     Goal: Patient will participate in Venous Thrombosis (VTE)/Deep Vein Thrombosis (DVT)Prevention Measures     Description:                        Long Term Goals:   At discharge patient will be able to function safely at home and in the community with support.    Section completed by:  Maricruz Kerr R.N.

## 2020-03-03 NOTE — REHAB-RESPIRATORY IDT TEAM NOTE
Respiratory Therapy   Respiratory Therapy    The only barrier with this patient is his trach stoma that has not closed.  His mother occasionally request Duoneb txs for him.  There is no definitive benefit.  He has been on RA since admission and his SATS are in the mid to high 90s.  Section completed by:  Ana Becker, RRT

## 2020-03-03 NOTE — REHAB-PT IDT TEAM NOTE
Physical Therapy   Mobility  Bed mobility:   Mod-Max A  Bed /Chair/Wheelchair Transfer Initial:  1 - Total Assistance  Bed /Chair/Wheelchair Transfer Current:  0 - Not tested, patient refused   Bed/Chair/Wheelchair Transfer Description:  Increased time, Verbal cueing, Requires lift(Max A sit>supine on therapy mat, Mod A sup>sit on therapy mat, Max A squat pivot transfer w/c<>therapy mat.)  Walk Initial:  1 - Total Assistance  Walk Current:  0 - Not tested, patient refused   Walk Description:  Two hand rails, Requires 2 people to assist, Verbal cueing, Safety concerns, Extra time(3 ftx2, 5 ftx2, 8 ftx1 in parallel bars with Max A for stabilizing, facilitating weight shift, and managing step length/position, Min A to manage trunk. Max multimodal cues for sequencing and posture.)  Wheelchair Initial:  0 - Not tested, patient refused  Wheelchair Current:  0 - Not tested, patient refused   Wheelchair Description:     Stairs Initial:  0 - Not tested,unsafe activity  Stairs Current: 0 - Not tested,unsafe activity   Stairs Description:    Patient/Family Training/Education:  Ongoing patient and family (mother) education with much better carryover & demonstration for pt's mother than patient himself  DME/DC Recommendations:  Continued stay at inpatient facility to facilitate safe return home   Strengths:  Making steady progress towards goals, Manages pain appropriately and Supportive family  Barriers:   Agitation, Decreased endurance, Generalized weakness, Limited mobility, Poor activity tolerance, Poor balance, Poor carryover of learning, Uncooperative, Lack of motivation and Other: PEG tube & NPO, Fall risk, limited communication abilities, pt on turning schedule, behavior, tachycardia, shunt in place  # of short term goals set= 3  # of short term goals met= 2  Physical Therapy Problems     Problem: Balance     Dates: Start: 03/03/20       Description:     Goal: STG-Within one week, patient will maintain static standing      Dates: Start: 03/03/20       Description: 1) Individualized goal: 2 min, Mod A, gait belt, // bars, cueing, setup  2) Interventions: PT E Stim Attended, PT Orthotics Training, PT Gait Training, PT Self Care/Home Eval, PT Therapeutic Exercises, PT Ultrasound, PT TENS Application, PT Neuro Re-Ed/Balance, PT Therapeutic Activity, PT Manual Therapy and PT Evaluation                    Problem: Mobility     Dates: Start: 03/03/20       Description:     Goal: STG-Within one week, patient will ambulate household distance     Dates: Start: 03/03/20       Description: 1) Individualized goal: 10 ft, Max A, // bars, gait belt, max cueing, increased time, setup  2) Interventions: PT E Stim Attended, PT Orthotics Training, PT Gait Training, PT Self Care/Home Eval, PT Therapeutic Exercises, PT Ultrasound, PT TENS Application, PT Neuro Re-Ed/Balance, PT Therapeutic Activity, PT Manual Therapy and PT Evaluation                  Problem: Mobility Transfers     Dates: Start: 02/06/20       Description:     Goal: STG-Within one week, patient will transfer bed to chair     Dates: Start: 02/06/20       Description: 1) Individualized goal:  Mod A x1 with LRAD  2) Interventions:  PT E Stim Attended, PT Orthotics Training, PT Gait Training, PT Self Care/Home Eval, PT Therapeutic Exercises, PT Ultrasound, PT TENS Application, PT Neuro Re-Ed/Balance, PT Therapeutic Activity, PT Manual Therapy and PT Evaluation        Note:     Goal Note filed on 03/03/20 0810 by Mustapha Page, PT    Patient uses additional A (Max A) with bed/mat transfers at this time.                  Goal: STG-Within one week, patient will perform bed mobility     Dates: Start: 03/03/20       Description: 1) Individualized goal: Mod A for supine<>sit, bed rail prn, cueing, setup, non-elevated HOB, increased time  2) Interventions: PT E Stim Attended, PT Orthotics Training, PT Gait Training, PT Self Care/Home Eval, PT Therapeutic Exercises, PT Ultrasound, PT TENS  Application, PT Neuro Re-Ed/Balance, PT Therapeutic Activity, PT Manual Therapy and PT Evaluation                 Problem: PT-Long Term Goals     Dates: Start: 02/06/20       Description:     Goal: LTG-By discharge, patient will maintain balance     Dates: Start: 02/06/20       Description: 1) Individualized goal:  In sitting with use of UE for assist at a SPV level, 5 min   2) Interventions:  PT E Stim Attended, PT Orthotics Training, PT Gait Training, PT Self Care/Home Eval, PT Therapeutic Exercises, PT Ultrasound, PT TENS Application, PT Neuro Re-Ed/Balance, PT Therapeutic Activity, PT Manual Therapy and PT Evaluation              Goal: LTG-By discharge, patient will tolerate standing     Dates: Start: 02/06/20       Description: 1) Individualized goal:  2 min, SBA with use of LRAD  2) Interventions:  PT E Stim Attended, PT Orthotics Training, PT Gait Training, PT Self Care/Home Eval, PT Therapeutic Exercises, PT Ultrasound, PT TENS Application, PT Neuro Re-Ed/Balance, PT Therapeutic Activity, PT Manual Therapy and PT Evaluation            Goal: LTG-By discharge, patient will ambulate     Dates: Start: 02/06/20       Description: 1) Individualized goal:  10 ft, mod A with LRAD  2) Interventions:  PT E Stim Attended, PT Orthotics Training, PT Gait Training, PT Self Care/Home Eval, PT Therapeutic Exercises, PT Ultrasound, PT TENS Application, PT Neuro Re-Ed/Balance, PT Therapeutic Activity, PT Manual Therapy and PT Evaluation             Goal: LTG-By discharge, patient will transfer one surface to another     Dates: Start: 02/06/20       Description: 1) Individualized goal:  Min A with LRAD  2) Interventions:  PT E Stim Attended, PT Orthotics Training, PT Gait Training, PT Self Care/Home Eval, PT Therapeutic Exercises, PT Ultrasound, PT TENS Application, PT Neuro Re-Ed/Balance, PT Therapeutic Activity, PT Manual Therapy and PT Evaluation                         Section completed by:  Mustapha Page, PT

## 2020-03-03 NOTE — THERAPY
"Occupational Therapy  Daily Treatment     Patient Name: Rey Medina  Age:  24 y.o., Sex:  male  Medical Record #: [id number]  Today's Date: 3/3/2020     Precautions  Precautions: (P) PEG Tube, Swallow Precautions ( See Comments), Fall Risk  Comments: (P) Dysarthria, shunt    Safety   ADL Safety : Requires Physical Assist for Safety  Bathroom Safety: Requires Physical Assist for Safety  Comments: See FIMs for ADL performance details (grooming)    Subjective    Patient verbalized \"calendar,\" \"clock,\"  \"thank you,\" waved \"hello\" and \"goodbye\"    Declined to get OOB despite multiple attempts/max encouragement. Patient nodded \"yes\" referring to agreeing to get OOB/participating in OT session later this afternoon.      Objective     03/03/20 0831   Precautions   Precautions PEG Tube;Swallow Precautions ( See Comments);Fall Risk   Comments Dysarthria, shunt   Interdisciplinary Plan of Care Collaboration   IDT Collaboration with  Family / Caregiver   Patient Position at End of Therapy In Bed;Family / Friend in Room   Collaboration Comments Patient's mother present during OT session.    OT Total Time Spent   OT Individual Total Time Spent (Mins) 30   OT Charge Group   OT Therapy Activity 2     Patient demo'd ability to don L sock with min assist while in bed.     Patient presented in bed (awake, watching TV) when undersigned therapist arrived with patient's mother bedside. Patient wearing eyeglasses today and confirmed vision is improved (with corrective lenses). Patient able to correctly identify  (verbally) letters and numbers on iPad, calendar, and clock (when undersigned therapist pointed to items and asked \"what is this?\"     Assessment    Patient tolerated OT session well with focus on progression with communication and integrating BUEs into functional tasks. Continues to demonstrate consistent improvement with functional use of BUEs and communication (both expressive and receptive). Declined to get OOB however " will reattempt this PM.     Plan    Low-stim environment,  sensory stimulation, ADLs/functional participation, sitting balance, functional communication, fine and gross coordination of BUE, 1 and 2 step directions

## 2020-03-03 NOTE — THERAPY
Occupational Therapy  Daily Treatment     Patient Name: Rey Medina  Age:  24 y.o., Sex:  male  Medical Record #: [id number]  Today's Date: 3/3/2020     Precautions  Precautions: (P) PEG Tube, Swallow Precautions ( See Comments), Fall Risk  Comments: (P) Dysarthria, shunt    Safety   ADL Safety : Requires Physical Assist for Safety  Bathroom Safety: Requires Physical Assist for Safety  Comments: See FIMs for ADL performance details (grooming)    Subjective    Patient agreeable to participate in OT.      Objective     03/03/20 1431   Precautions   Precautions PEG Tube;Swallow Precautions ( See Comments);Fall Risk   Comments Dysarthria, shunt   D[employer]   Patient Position Sitting   Application Attention regulation;Bilateral integration;Self-awareness;Visual-motor integration;Visual-perceptual processing;Visual-spatial integration   Mode A   Time per trial (sec) 120   Number of Rings 4   Quadrants LLQ;RLQ   Number of Hits 13   Average Reaction Time 9.23   Clinical Observations Coordination impaired;Other  (Max cues for attention, rei to L side)   Interdisciplinary Plan of Care Collaboration   IDT Collaboration with  Family / Caregiver   Patient Position at End of Therapy Seated;Self Releasing Lap Belt Applied;Family / Friend in Room   Collaboration Comments Sister/neice present during session   OT Total Time Spent   OT Individual Total Time Spent (Mins) 30   OT Charge Group   OT Neuromuscular Re-education / Balance 1   OT Therapy Activity 1     Patient correctly identified (verbally) 6/6 block colors (green, yellow, orange, red, violet, blue).      strength: 55# (L), 35# (R)    Correctly matched 2/3 dominoes with mod cues.     Patient stacked /unstacked 7 cones with mod-max cues and mod assist (due to decreased coordination).     Assessment    Patient tolerated OT session well with focus on communication, gross and fine motor control with BUEs,and visual attention. Patient continues to demonstrate  improvement with activity tolerance and attention to tasks. Required increased cues to attend to L side.     Plan    Low-stim environment,  sensory stimulation, ADLs/functional participation, sitting balance, functional communication, fine and gross coordination of BUE, 1 and 2 step directions

## 2020-03-03 NOTE — CARE PLAN
Problem: Dressing  Goal: STG-Within one week, patient will dress UB  Description: 1) Individualized Goal:  Max assist   2) Interventions:  OT Group Therapy, OT Self Care/ADL, OT Cognitive Skill Dev, OT Manual Ther Technique, OT Neuro Re-Ed/Balance, OT Sensory Int Techniques, OT Therapeutic Activity, OT Evaluation and OT Therapeutic Exercise   Outcome: MET

## 2020-03-03 NOTE — CARE PLAN
Problem: Safety  Goal: Will remain free from falls  Intervention: Implement fall precautions  Note: Fall precautions and frequent rounding in place for safety.Assistance provided as needed.Call light within reach.Will continue to monitor and assess needs and safety.     Problem: Skin Integrity  Goal: Risk for impaired skin integrity will decrease  Intervention: Implement precautions to protect skin integrity in collaboration with the interdisciplinary team  Note: Trache site care done by pt's mother as she wants to do things with the pt first and insisted on doing the trache site care.Charge nurse aware.

## 2020-03-03 NOTE — PROGRESS NOTES
Rehab Progress Note     Encounter Date: 3/3/2020    CC: I[facility], AMS    Interval Events (Subjective)  Patient sitting up in room. Patient giving thumbs up to questions. Per mother he is doing very well and was able to eat this morning. She has questions about staying longer to work on eating. Discussed would bring it up to therapy staff today but would ultimately be determined by insurance.  Discussed can also work on this as an outpatient. Patient denies pain. Gives thumbs up for sleep.     IDT Team Meeting 3/3/2020    Greer URIBE M.D., was present and led the interdisciplinary team conference on 3/3/2020.  I led the IDT conference and agree with the IDT conference documentation and plan of care as noted below.     RN:  Diet Working on diet   % Meal     Pain    Sleep    Bowel Incontinent   Bladder Incontinent   In's & Out's    More cooperative  Mom takes good care of him    PT:  Bed Mobility    Transfers 2 persons limited by motivation   Mobility MaxA; 3-8 feet on Saturday   Stairs    Refuses at time  Walked 3 times on Saturday  Very variable; limited postural control    OT:  Eating    Grooming    Bathing    UB Dressing modA   LB Dressing Mikhail   Toileting Mikhail   Shower & Transfer    Identifying objects and colors  Working on iPad  Identifying letters and numbers    SLP:  Improved identification  Reading short phrases today  Working on food and ice cream  Repeat MBSS tomorrow    Resp:  Stoma still closing    CM:  Continues to work on disposition and DME needs.      DC/Disposition:  3/12/20    Objective:  VITAL SIGNS: /83   Pulse 94   Temp 36.5 °C (97.7 °F) (Oral)   Resp 18   Wt 73.1 kg (161 lb 3.2 oz)   SpO2 94%   BMI 25.25 kg/m²  HR: 100, /86, T: 98.3: RR: 22  Gen: NAD  Psych: Mood and affect flat  CV: RRR, no edema  Resp: CTAB, no upper airway sounds  Abd: NTND  Neuro: eyes tracking but also constant blinking, gives thumbs up appropriately      No results found for this or any  previous visit (from the past 72 hour(s)).    Current Facility-Administered Medications   Medication Frequency   • amantadine (SYMMETREL) 50 MG/5ML syrup 100 mg BID   • traZODone (DESYREL) tablet 50 mg QHS PRN   • propranolol (INDERAL) tablet 20 mg TID   • acetaminophen (TYLENOL) tablet 650 mg Q4HRS PRN   • artificial tears ophthalmic solution 1 Drop PRN   • benzocaine-menthol (CEPACOL) lozenge 1 Lozenge Q2HRS PRN   • hydrALAZINE (APRESOLINE) tablet 25 mg Q8HRS PRN   • mag hydrox-al hydrox-simeth (MAALOX PLUS ES or MYLANTA DS) suspension 20 mL Q2HRS PRN   • ondansetron (ZOFRAN ODT) dispertab 4 mg 4X/DAY PRN    Or   • ondansetron (ZOFRAN) syringe/vial injection 4 mg 4X/DAY PRN   • polyethylene glycol/lytes (MIRALAX) PACKET 1 Packet Q24HRS PRN    And   • magnesium hydroxide (MILK OF MAGNESIA) suspension 30 mL QDAY PRN    And   • bisacodyl (DULCOLAX) suppository 10 mg QDAY PRN   • sodium chloride (OCEAN) 0.65 % nasal spray 2 Spray PRN   • tramadol (ULTRAM) 50 MG tablet 50 mg Q4HRS PRN   • Respiratory Therapy Consult Continuous RT   • baclofen (LIORESAL) tablet 5 mg TID   • eucerin cream TID PRN   • ipratropium-albuterol (DUONEB) nebulizer solution Q4H PRN (RT)   • levETIRAcetam (KEPPRA) 100 MG/ML solution 500 mg Q12HRS   • montelukast (SINGULAIR) tablet 10 mg Nightly   • omeprazole (FIRST-OMEPRAZOLE) 2 mg/mL oral susp 40 mg DAILY   • polyethylene glycol/lytes (MIRALAX) PACKET 1 Packet DAILY   • QUEtiapine (SEROQUEL) tablet 25 mg TID PRN   • vitamin D (cholecalciferol) tablet 1,000 Units DAILY       Orders Placed This Encounter   Procedures   • Diet NPO     Standing Status:   Standing     Number of Occurrences:   1     Order Specific Question:   Restrict to:     Answer:   Strict [1]       Assessment:  Active Hospital Problems    Diagnosis   • *Intracerebral hemorrhage, intraventricular (HCC)   • 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 Patient very low functioning but per mother becoming more purposeful  -PT and OT for mobility and ADLs  -SLP for cognition   -Patient agitated on Amantadine and Modafinil. Both trials halted due to agitation. Restart Amantadine 50 mg as he was too agitated on 100 mg. Slow increase up to 100 mg BID with improved cognition and function  -Continue Keppra. Unclear if had seizure or was continued on prophylaxis. Follow-up with Neurology  -NSG appointment on 2/7/20 to evaluation  shunt. Will follow-up in 2 months   -CT head as waxing and waning status. At times he voices including his names, at other times no response and compulsive grabbing of bed rail and urinal. CT head - negative.      Agitation - Appears like TBI with agitation worsened from neurostimulant. Change Metoprolol to Propranolol. Start Seroquel PRN, too sedated on scheduled  -Propranolol increase to 20 mg TID, improved agitation control     Muscle spasms - mother claims Bromocriptine helps, typically used for dopamine side effects/neurostimulant. Will start low dose Baclofen and monitor.   -Limited spasticity, mostly resisting examination. Stop Baclofen and monitor.     Dysphagia - Patient with G Tube in place. SLP for swallow. MBSS on 2/10/20, start on dysphagia 1 with NTL  -Tolerated first meal but emesis on 2nd meal at higher percentage. May be due to distention, no lung changes. Continue to monitor.   -Decreased oral intake on less stimulant, mother is monitoring his intake.      Aspiration pneumonia - Currently on Unasyn.  Elevated WBC, broadened to Zyvox and Zosyn  -Hospitalist consulted    CV - Patient is on Ivabradine 5 mg daily. Unclear reason why. Discontinue Ivabradine, on Metoprolol for tachycardia. Switch metoprolol to Propranolol     Respiratory - Patient with  size 7 trach on admission. Patient on Duonebs and Singulair  -Exchange aborted on 2/6/20 due to emesis on suction. Tolerated overnight. Decannulated AM 2/7/20. Tolerated. Stoma remains opened, discussed with RT and increase changes and tighter bandaging      GI Ppx - Patient on Prilosec and Prevacid. Unclear why two PPIs. Discontinue Prevacid. Discontinue Simethicone     DVT Ppx - Patient on Eliquis 2.5 mg BID. Unknown reason why, mother denies clot or DVT. Discontinue Eliquis as 10 months post-ICH    Total time:  36 minutes.  I spent greater than 50% of the time for patient care, counseling, and coordination on this date, including unit/floor time, and face-to-face time with the patient as per interval events and assessment and plan above. Topics discussed included discharge planning, possible extension, no notable spasticity, and discontinue Baclofen. Patient was discussed separately in IDT today; please see details above.    Greer David M.D.

## 2020-03-03 NOTE — FLOWSHEET NOTE
03/03/20 0751   Events/Summary/Plan   Events/Summary/Plan Mom already changed trach stoma dressing.  Per our discussion yesterday, she held stoma shut and covered tightly with gauze and tape

## 2020-03-04 ENCOUNTER — APPOINTMENT (OUTPATIENT)
Dept: RADIOLOGY | Facility: REHABILITATION | Age: 25
DRG: 056 | End: 2020-03-04
Attending: PHYSICAL MEDICINE & REHABILITATION
Payer: MEDICAID

## 2020-03-04 ENCOUNTER — APPOINTMENT (OUTPATIENT)
Dept: PAIN MANAGEMENT | Facility: REHABILITATION | Age: 25
DRG: 056 | End: 2020-03-04
Attending: HOSPITALIST
Payer: MEDICAID

## 2020-03-04 PROCEDURE — 700102 HCHG RX REV CODE 250 W/ 637 OVERRIDE(OP): Performed by: PHYSICAL MEDICINE & REHABILITATION

## 2020-03-04 PROCEDURE — 99232 SBSQ HOSP IP/OBS MODERATE 35: CPT | Performed by: HOSPITALIST

## 2020-03-04 PROCEDURE — 74230 X-RAY XM SWLNG FUNCJ C+: CPT

## 2020-03-04 PROCEDURE — 97535 SELF CARE MNGMENT TRAINING: CPT

## 2020-03-04 PROCEDURE — 92526 ORAL FUNCTION THERAPY: CPT

## 2020-03-04 PROCEDURE — 97112 NEUROMUSCULAR REEDUCATION: CPT

## 2020-03-04 PROCEDURE — 97530 THERAPEUTIC ACTIVITIES: CPT

## 2020-03-04 PROCEDURE — A9270 NON-COVERED ITEM OR SERVICE: HCPCS | Performed by: PHYSICAL MEDICINE & REHABILITATION

## 2020-03-04 PROCEDURE — 770010 HCHG ROOM/CARE - REHAB SEMI PRIVAT*

## 2020-03-04 PROCEDURE — 97110 THERAPEUTIC EXERCISES: CPT

## 2020-03-04 PROCEDURE — 99232 SBSQ HOSP IP/OBS MODERATE 35: CPT | Performed by: PHYSICAL MEDICINE & REHABILITATION

## 2020-03-04 PROCEDURE — A9270 NON-COVERED ITEM OR SERVICE: HCPCS | Performed by: HOSPITALIST

## 2020-03-04 PROCEDURE — 92611 MOTION FLUOROSCOPY/SWALLOW: CPT

## 2020-03-04 PROCEDURE — 700102 HCHG RX REV CODE 250 W/ 637 OVERRIDE(OP): Performed by: HOSPITALIST

## 2020-03-04 RX ADMIN — CHOLECALCIFEROL TAB 25 MCG (1000 UNIT) 1000 UNITS: 25 TAB at 08:57

## 2020-03-04 RX ADMIN — LEVETIRACETAM 500 MG: 500 SOLUTION ORAL at 08:57

## 2020-03-04 RX ADMIN — OMEPRAZOLE 40 MG: KIT at 08:57

## 2020-03-04 RX ADMIN — POLYETHYLENE GLYCOL 3350 1 PACKET: 17 POWDER, FOR SOLUTION ORAL at 21:00

## 2020-03-04 RX ADMIN — PROPRANOLOL HYDROCHLORIDE 20 MG: 10 TABLET ORAL at 21:00

## 2020-03-04 RX ADMIN — AMANTADINE HYDROCHLORIDE 100 MG: 50 SOLUTION ORAL at 08:56

## 2020-03-04 RX ADMIN — PROPRANOLOL HYDROCHLORIDE 20 MG: 10 TABLET ORAL at 14:24

## 2020-03-04 RX ADMIN — LEVETIRACETAM 500 MG: 500 SOLUTION ORAL at 21:00

## 2020-03-04 RX ADMIN — MONTELUKAST 10 MG: 10 TABLET, FILM COATED ORAL at 21:00

## 2020-03-04 RX ADMIN — PROPRANOLOL HYDROCHLORIDE 20 MG: 10 TABLET ORAL at 08:57

## 2020-03-04 RX ADMIN — AMANTADINE HYDROCHLORIDE 100 MG: 50 SOLUTION ORAL at 14:24

## 2020-03-04 NOTE — PROGRESS NOTES
Hospital Medicine Daily Progress Note      Chief Complaint:  Fever  Tachycardia  Leukocytosis    Interval History:  Pt following simple commands.    Review of Systems  Review of Systems   Unable to perform ROS: Medical condition        Physical Exam  Temp:  [36.7 °C (98 °F)-37 °C (98.6 °F)] 36.8 °C (98.2 °F)  Pulse:  [] 104  Resp:  [18-20] 18  BP: (110-138)/(74-95) 135/95  SpO2:  [97 %-98 %] 97 %    Physical Exam  Vitals signs reviewed.   Constitutional:       General: He is not in acute distress.     Appearance: He is not ill-appearing.   HENT:      Head: Normocephalic and atraumatic.      Right Ear: External ear normal.      Left Ear: External ear normal.      Nose: Nose normal.   Eyes:      General:         Right eye: No discharge.         Left eye: No discharge.      Extraocular Movements: Extraocular movements intact.      Conjunctiva/sclera: Conjunctivae normal.   Neck:      Musculoskeletal: Normal range of motion and neck supple.      Comments: Trach stoma dressed  Cardiovascular:      Rate and Rhythm: Regular rhythm. Tachycardia present.   Pulmonary:      Effort: Pulmonary effort is normal. No respiratory distress.      Breath sounds: Normal breath sounds. No wheezing.   Abdominal:      General: Bowel sounds are normal. There is no distension.      Palpations: Abdomen is soft.      Tenderness: There is no abdominal tenderness.      Comments: PEG site dressed   Musculoskeletal:      Right lower leg: No edema.      Left lower leg: No edema.   Skin:     General: Skin is warm and dry.   Neurological:      Comments: Awake         Fluids    Intake/Output Summary (Last 24 hours) at 3/4/2020 1500  Last data filed at 3/3/2020 2039  Gross per 24 hour   Intake 3065 ml   Output --   Net 3065 ml       Laboratory                      Assessment/Plan  Tachycardia- (present on admission)  Assessment & Plan  Suspect 2/2 agitation and debility  Continue Propranol for HR and BP control    Leukocytosis- (present on  admission)  Assessment & Plan  UA 0-2 WBC  BCx x 2 NGTD  CXR +basilar opacities, possible PNA  Elevated WBC improved on empiric Zosyn and Zyvox  F/U labs ordered, not done    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)  Assessment & Plan  Nonverbal  Agitation improved    Dysphagia following nontraumatic intracerebral hemorrhage- (present on admission)  Assessment & Plan  Has PEG tube and tolerating TF  SLP doing PO trials    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

## 2020-03-04 NOTE — FLOWSHEET NOTE
03/04/20 0731   Events/Summary/Plan   Events/Summary/Plan Trach stoma care done with steri-strips and tape only, no gauze

## 2020-03-04 NOTE — THERAPY
Physical Therapy   Daily Treatment     Patient Name: Rey Medina  Age:  24 y.o., Sex:  male  Medical Record #: [id number]  Today's Date: 3/3/2020     Precautions  Precautions: PEG Tube, Swallow Precautions ( See Comments), Fall Risk  Comments: Dysarthria, shunt    Subjective    Patient agreeable to structured PT.     Objective       03/03/20 1101   Precautions   Precautions PEG Tube;Swallow Precautions ( See Comments);Fall Risk   Comments NPO, limited communication abilities, pt on turning schedule, behavior, tachycardia, shunt   Vitals   O2 (LPM) 0   O2 Delivery Device None - Room Air   Bed Mobility    Supine to Sit Total Assist X 2   Sit to Supine Total Assist   Sit to Stand Moderate Assist   Scooting Maximal Assist   Rolling Moderate Assist to Lt.;Moderate Assist to Rt.   Interdisciplinary Plan of Care Collaboration   IDT Collaboration with  Family / Caregiver   Collaboration Comments Pt's mother assisted with supine to sit today to help with pt behavior and motivation.   PT Total Time Spent   PT Individual Total Time Spent (Mins) 30   PT Charge Group   PT Therapeutic Activities 2       FIM Bed/Chair/Wheelchair Transfers Score: 1 - Total Assistance  Bed/Chair/Wheelchair Transfers Description:  (Mod A sit<>stand with increased time and gait belt; bed mobility at Max-Total A along with elevated HOB due to pt behavior; increased time, setup, cueing; 2nd person present for supine to sit for pt motivation and direction to task.  x25 min.)    FIM Wheelchair Score:  1 - Total Assistance  Wheelchair Description:       FIM Stairs Score:  0 - Not tested,unsafe activity  Stairs Description:         Assessment    Patient required increased time, questions with 2 choices, and re-direction of behavior to task; motivated for most of session until behavior increased with more difficult tasks.    Plan    Standing tolerance & ambulation in // bars, seated unsupported balance/ core strengthening, BLE PROM/ stretching/  ther-ex, transfer training.  D/c currently scheduled for 3/5/2020.

## 2020-03-04 NOTE — THERAPY
Physical Therapy   Daily Treatment     Patient Name: Rey Medina  Age:  24 y.o., Sex:  male  Medical Record #: [id number]  Today's Date: 3/3/2020     Precautions  Precautions: PEG Tube, Swallow Precautions ( See Comments), Fall Risk  Comments: NPO, limited communication abilities, pt on turning schedule, behavior, tachycardia, shunt    Subjective    Patient agreeable to PT.     Objective       03/03/20 1501   Precautions   Precautions PEG Tube;Swallow Precautions ( See Comments);Fall Risk   Comments NPO, limited communication abilities, pt on turning schedule, behavior, tachycardia, shunt   Vitals   O2 (LPM) 0   O2 Delivery Device None - Room Air   Standing Lower Body Exercises   Other Exercises Standing for 2x 2 minutes with Min-Max A, 2nd person for motivation/behavior, // bars, gait belt, increased initiation time, and work on sequencing for sit to stands.  1 rep with same setup of 1 minute for 5 UE grasp ball and release at target with 1-2 step cueing provided.   Bed Mobility    Sit to Stand Total Assist X 2  (// bars, Mod Ax1 toTotal Ax2; setup, gt belt, extra time, 3x)   IDT Conference   IDT Conference I have reviewed and discussed the POC and barriers to discharge for this patient.  I am knowledgeable of the patient's needs and have attended the IDT Conference.   Interdisciplinary Plan of Care Collaboration   IDT Collaboration with  Family / Caregiver  (IDT team)   Patient Position at End of Therapy Seated;Self Releasing Lap Belt Applied;Family / Friend in Room   Collaboration Comments Pt's mother and niece? present during session; education to pt and pt's mother on behavior redirection and pt's goals.  IDT conference: recommend extending therapy at this time for this IRF setting, discussed pt's improving CLOF and strengths/barriers, pushing towards increased diet with SLP and nursing too.   PT Total Time Spent   PT Individual Total Time Spent (Mins) 45   PT Charge Group   PT Therapeutic Exercise 2   PT  Therapeutic Activities 1       Focus on standing and sit<>stands along with increasing behavior and participation in structured setting; also worked on performing activity in busier gym environment.    FIM Walking Score:  0 - Not tested, patient refused  Walking Description:       FIM Wheelchair Score:  1 - Total Assistance  Wheelchair Description:         Assessment    Patient has improved ability to stay with task with structured task and family support; increasing fatigue at end of session; improving standing tolerance.    Plan    Standing tolerance & ambulation in // bars, seated unsupported balance/ core strengthening, BLE PROM/ stretching/ ther-ex, transfer training.  D/c currently scheduled for 3/12/2020.

## 2020-03-04 NOTE — THERAPY
Speech Language Pathology  Daily Treatment     Patient Name: Rey Medina  Age:  24 y.o., Sex:  male  Medical Record #: [id number]  Today's Date: 3/4/2020     Subjective    Patient arrived on time to ST with family.      Objective       03/04/20 0802   Dysphagia    Other Treatments trials of purees, MTL, and thins   Nutritional Food Intake Rating Scale Tube dependent with minimal attempts of oral intake   SLP Total Time Spent   SLP Individual Total Time Spent (Mins) 30   Charge Group   SLP Swallowing Dysfunction Treatment Swallowing Dysfunction Treatment         Assessment    Trials of purees and MTL revealed no overt s/sx of aspiration. Thin liquids trials were assessed with immediate coughing on 2/5 trials. Patient was able to self feed with set up and mod assist.     Plan    MBSS to be completed.

## 2020-03-04 NOTE — RESPIRATORY CARE
Pt's trach stoma was covered today with steri-strips then tape.  Plan is to see if these will help hold trach shut.  Please do not change unless they are falling off.  If that happens, replace all.

## 2020-03-04 NOTE — CARE PLAN
Problem: Safety  Goal: Will remain free from falls  Intervention: Implement fall precautions  Note: Fall precautions and frequent rounding in place for safety.Mother at bedside.Call light within reach.Will continue to monitor and assess needs and safety.

## 2020-03-04 NOTE — PROGRESS NOTES
Hospital Medicine Daily Progress Note      Chief Complaint:  Fever  Tachycardia  Leukocytosis    Interval History:  No overnight issues reported.    Review of Systems  Review of Systems   Unable to perform ROS: Medical condition        Physical Exam  Temp:  [36.7 °C (98 °F)-37 °C (98.6 °F)] 36.8 °C (98.2 °F)  Pulse:  [] 104  Resp:  [18-20] 18  BP: (110-138)/(74-95) 135/95  SpO2:  [97 %-98 %] 97 %    Physical Exam  Vitals signs reviewed.   Constitutional:       General: He is not in acute distress.     Appearance: He is not ill-appearing.   HENT:      Head: Normocephalic and atraumatic.      Right Ear: External ear normal.      Left Ear: External ear normal.      Nose: Nose normal.   Eyes:      General:         Right eye: No discharge.         Left eye: No discharge.      Extraocular Movements: Extraocular movements intact.      Conjunctiva/sclera: Conjunctivae normal.   Neck:      Musculoskeletal: Normal range of motion and neck supple.      Comments: Trach stoma dressed  Cardiovascular:      Rate and Rhythm: Regular rhythm. Tachycardia present.   Pulmonary:      Effort: Pulmonary effort is normal. No respiratory distress.      Breath sounds: Normal breath sounds. No wheezing.   Abdominal:      General: Bowel sounds are normal. There is no distension.      Palpations: Abdomen is soft.      Tenderness: There is no abdominal tenderness.      Comments: PEG site dressed   Musculoskeletal:      Right lower leg: No edema.      Left lower leg: No edema.   Skin:     General: Skin is warm and dry.   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)  Assessment & Plan  Nonverbal  Agitation improved    Dysphagia following nontraumatic intracerebral hemorrhage- (present on admission)  Assessment & Plan  Has PEG tube and tolerating TF  SLP doing PO trials    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

## 2020-03-04 NOTE — THERAPY
"Occupational Therapy  Daily Treatment     Patient Name: Rey Medina  Age:  24 y.o., Sex:  male  Medical Record #: [id number]  Today's Date: 3/4/2020     Precautions  Precautions: (P) PEG Tube, Swallow Precautions ( See Comments), Fall Risk  Comments: (P) dysarthria, shunt     Safety   ADL Safety : Requires Physical Assist for Safety  Bathroom Safety: Requires Physical Assist for Safety  Comments: (P) See FIMs for ADL performance details     Subjective    Patient agreeable to participate in OT. Gave \"thumbs up\" following shower and waved \"goodbye\"at end of session.      Objective     03/04/20 0931   Precautions   Precautions PEG Tube;Swallow Precautions ( See Comments);Fall Risk   Comments dysarthria, shunt    Safety    Comments See FIMs for ADL performance details    Interdisciplinary Plan of Care Collaboration   IDT Collaboration with  Family / Caregiver;Respiratory Therapist   Patient Position at End of Therapy Seated;Self Releasing Lap Belt Applied   Collaboration Comments Mother and sister present during OT session/assisted with shower/dressing. RT changed dressing following shower    OT Total Time Spent   OT Individual Total Time Spent (Mins) 60   OT Charge Group   OT Self Care / ADL 4     FIM Grooming Score:  3 - Moderate Assistance  Grooming Description:  Increased time, Set-up of equipment, Supervision for safety(Min to mod assist for brushing teeth while seated  (for thoroughness and safety). )    FIM Bathing Score:  1 - Total Assistance  Bathing Description:  Adaptive equipment, Hand held shower, Increased time, Supervision for safety, Verbal cueing, Set-up of equipment, Initial preparation for task(Max assist for thoroughness with all body parts, safety and balance while seated on roll-in shower chair. )     FIM Upper Body Dressing:  3 - Moderate Assistance  Upper Body Dressing Description:  (Mod assist for clothing orientation, pulling shirt down and shirt over head while seated.  Requires max cues " (verbal/tactile due to decreased coordination))    FIM Lower Body Dressing Score:  1 - Total Assistance  Lower Body Dressing Description:  Increased time, Supervision for safety, Requires 2 people to assist, Set-up of equipment, Verbal cueing(Requires 2 person assist for pants over hips pursuit in standing (for standing balance and safety). Demo'd ability to thread BLEs through elastic waist pants with min assist while seated in roll-in shower chair. )    FIM Tub/Shower Transfers Score:  1 - Total Assistance  Tub/Shower Transfers Description:  (Total assist shower txfr (roll-in shower chair) with 2 person assist for safety. )    Assessment    Patient tolerated OT session well with focus on progression with ADLs. Patient demonstrates functional progress with ADL routine, initiation of tasks, and activity tolerance. Primary barriers to functional performance at this time include decreased endurance, decreased coordination and poor balance/trunk control.     Plan    Low-stim environment as needed, ADLs/automatic tasks, sitting balance, functional communication, fine and gross coordination of BUE, 1 and 2 step directions

## 2020-03-04 NOTE — CARE PLAN
Problem: Communication  Goal: The ability to communicate needs accurately and effectively will improve  Outcome: PROGRESSING AS EXPECTED   Encouraged pt to communicate with staff for needs and reinforced call light use, pt shows some understanding , but does not use call light, family educated to call and us call light fir any needs, mother and sister in room throughout shift, both show good understanding, will continue to reinforce call light use.  Problem: Safety  Goal: Will remain free from injury  Outcome: PROGRESSING AS EXPECTED  Goal: Will remain free from falls  Outcome: PROGRESSING AS EXPECTED   Pt education given on fall prevention and pt safety, family with pt, mother shows good understanding and uses call light and fall prevention techniques very well, will continue to provide support to pt and family.

## 2020-03-04 NOTE — PROGRESS NOTES
Rehab Progress Note     Encounter Date: 3/4/2020    CC: ICH, AMS    Interval Events (Subjective)  Patient sitting up in room. He gives thumbs up for sleeping well and thumbs down for pain. Per mother he is doing very well today and was able to help more with ADLs and eating this morning. Discussed possible extension and mother is appreciative of request. She reports she will be out of town for a few days to go get a car. Discussed will have CM follow-up with her. Otherwise another family member will be helping out.     IDT Team Meeting 3/3/2020  DC/Disposition:  3/12/20    Objective:  VITAL SIGNS: /93   Pulse (!) 110   Temp 37 °C (98.6 °F) (Oral)   Resp 18   Wt 73.1 kg (161 lb 3.2 oz)   SpO2 98%   BMI 25.25 kg/m²  HR: 100, /86, T: 98.3: RR: 22  Gen: NAD  Psych: Mood and affect flat  CV: RRR, no edema  Resp: CTAB, no upper airway sounds  Abd: NTND  Neuro: eyes tracking but also constant blinking, gives thumbs up appropriately   Unchanged from 3/3/20     No results found for this or any previous visit (from the past 72 hour(s)).    Current Facility-Administered Medications   Medication Frequency   • amantadine (SYMMETREL) 50 MG/5ML syrup 100 mg BID   • traZODone (DESYREL) tablet 50 mg QHS PRN   • propranolol (INDERAL) tablet 20 mg TID   • acetaminophen (TYLENOL) tablet 650 mg Q4HRS PRN   • artificial tears ophthalmic solution 1 Drop PRN   • benzocaine-menthol (CEPACOL) lozenge 1 Lozenge Q2HRS PRN   • hydrALAZINE (APRESOLINE) tablet 25 mg Q8HRS PRN   • mag hydrox-al hydrox-simeth (MAALOX PLUS ES or MYLANTA DS) suspension 20 mL Q2HRS PRN   • ondansetron (ZOFRAN ODT) dispertab 4 mg 4X/DAY PRN    Or   • ondansetron (ZOFRAN) syringe/vial injection 4 mg 4X/DAY PRN   • polyethylene glycol/lytes (MIRALAX) PACKET 1 Packet Q24HRS PRN    And   • magnesium hydroxide (MILK OF MAGNESIA) suspension 30 mL QDAY PRN    And   • bisacodyl (DULCOLAX) suppository 10 mg QDAY PRN   • sodium chloride (OCEAN) 0.65 % nasal  spray 2 Spray PRN   • tramadol (ULTRAM) 50 MG tablet 50 mg Q4HRS PRN   • Respiratory Therapy Consult Continuous RT   • eucerin cream TID PRN   • ipratropium-albuterol (DUONEB) nebulizer solution Q4H PRN (RT)   • levETIRAcetam (KEPPRA) 100 MG/ML solution 500 mg Q12HRS   • montelukast (SINGULAIR) tablet 10 mg Nightly   • omeprazole (FIRST-OMEPRAZOLE) 2 mg/mL oral susp 40 mg DAILY   • polyethylene glycol/lytes (MIRALAX) PACKET 1 Packet DAILY   • QUEtiapine (SEROQUEL) tablet 25 mg TID PRN   • vitamin D (cholecalciferol) tablet 1,000 Units DAILY       Orders Placed This Encounter   Procedures   • Diet NPO     Standing Status:   Standing     Number of Occurrences:   1     Order Specific Question:   Restrict to:     Answer:   Strict [1]       Assessment:  Active Hospital Problems    Diagnosis   • *Intracerebral hemorrhage, intraventricular (HCC)   • 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 Patient very low functioning but per mother becoming more purposeful  -PT and OT for mobility and ADLs  -SLP for cognition   -Patient agitated on Amantadine and Modafinil. Both trials halted due to agitation. Restart Amantadine 50 mg as he was too agitated on 100 mg. Slow increase up to 100 mg BID with improved cognition and function  -Continue Keppra. Unclear if had seizure or was continued on prophylaxis. Follow-up with Neurology  -NSG appointment on 2/7/20 to evaluation  shunt. Will follow-up in 2 months   -CT head as waxing and waning status. At times he voices including his names, at other times no response and compulsive grabbing of bed rail and urinal. CT head - negative.      Agitation - Appears like TBI with agitation worsened from neurostimulant. Change Metoprolol  to Propranolol. Start Seroquel PRN, too sedated on scheduled  -Propranolol increase to 20 mg TID, improved agitation control     Muscle spasms - mother claims Bromocriptine helps, typically used for dopamine side effects/neurostimulant. Will start low dose Baclofen and monitor.   -Limited spasticity, mostly resisting examination. Stop Baclofen and monitor.     Dysphagia - Patient with G Tube in place. SLP for swallow. MBSS on 2/10/20, start on dysphagia 1 with NTL  -Tolerated first meal but emesis on 2nd meal at higher percentage. May be due to distention, no lung changes. Continue to monitor.   -Discussed with SLP and recommend MBSS on 3/4/20. Discussed with mother about risks and benefits with recent possible aspiration pneumonia.     Aspiration pneumonia - Currently on Unasyn.  Elevated WBC, broadened to Zyvox and Zosyn  -Hospitalist consulted    CV - Patient is on Ivabradine 5 mg daily. Unclear reason why. Discontinue Ivabradine, on Metoprolol for tachycardia. Switch metoprolol to Propranolol     Respiratory - Patient with size 7 trach on admission. Patient on Duonebs and Singulair  -Exchange aborted on 2/6/20 due to emesis on suction. Tolerated overnight. Decannulated AM 2/7/20. Tolerated. Stoma remains opened, discussed with RT and increase changes and tighter bandaging      GI Ppx - Patient on Prilosec and Prevacid. Unclear why two PPIs. Discontinue Prevacid. Discontinue Simethicone     DVT Ppx - Patient on Eliquis 2.5 mg BID. Unknown reason why, mother denies clot or DVT. Discontinue Eliquis as 10 months post-ICH    Dispo - Possible extension to 3/12/20, patient improving with ADLs and eating.     Total time:  25 minutes.  I spent greater than 50% of the time for patient care, counseling, and coordination on this date, including unit/floor time, and face-to-face time with the patient as per interval events and assessment and plan above. Topics discussed included discharge planning, extension, and repeat MBS  this afternoon.    Greer David M.D.

## 2020-03-05 PROCEDURE — 92526 ORAL FUNCTION THERAPY: CPT

## 2020-03-05 PROCEDURE — 97535 SELF CARE MNGMENT TRAINING: CPT

## 2020-03-05 PROCEDURE — A9270 NON-COVERED ITEM OR SERVICE: HCPCS | Performed by: PHYSICAL MEDICINE & REHABILITATION

## 2020-03-05 PROCEDURE — 99232 SBSQ HOSP IP/OBS MODERATE 35: CPT | Performed by: HOSPITALIST

## 2020-03-05 PROCEDURE — 700102 HCHG RX REV CODE 250 W/ 637 OVERRIDE(OP): Performed by: HOSPITALIST

## 2020-03-05 PROCEDURE — 97110 THERAPEUTIC EXERCISES: CPT

## 2020-03-05 PROCEDURE — 99231 SBSQ HOSP IP/OBS SF/LOW 25: CPT | Performed by: PHYSICAL MEDICINE & REHABILITATION

## 2020-03-05 PROCEDURE — 97530 THERAPEUTIC ACTIVITIES: CPT

## 2020-03-05 PROCEDURE — A9270 NON-COVERED ITEM OR SERVICE: HCPCS | Performed by: HOSPITALIST

## 2020-03-05 PROCEDURE — 700102 HCHG RX REV CODE 250 W/ 637 OVERRIDE(OP): Performed by: PHYSICAL MEDICINE & REHABILITATION

## 2020-03-05 PROCEDURE — 770010 HCHG ROOM/CARE - REHAB SEMI PRIVAT*

## 2020-03-05 RX ADMIN — LEVETIRACETAM 500 MG: 500 SOLUTION ORAL at 20:59

## 2020-03-05 RX ADMIN — PROPRANOLOL HYDROCHLORIDE 20 MG: 10 TABLET ORAL at 21:01

## 2020-03-05 RX ADMIN — OMEPRAZOLE 40 MG: KIT at 09:03

## 2020-03-05 RX ADMIN — AMANTADINE HYDROCHLORIDE 100 MG: 50 SOLUTION ORAL at 14:55

## 2020-03-05 RX ADMIN — PROPRANOLOL HYDROCHLORIDE 20 MG: 10 TABLET ORAL at 09:03

## 2020-03-05 RX ADMIN — AMANTADINE HYDROCHLORIDE 100 MG: 50 SOLUTION ORAL at 09:03

## 2020-03-05 RX ADMIN — CHOLECALCIFEROL TAB 25 MCG (1000 UNIT) 1000 UNITS: 25 TAB at 09:03

## 2020-03-05 RX ADMIN — LEVETIRACETAM 500 MG: 500 SOLUTION ORAL at 09:03

## 2020-03-05 RX ADMIN — MONTELUKAST 10 MG: 10 TABLET, FILM COATED ORAL at 21:00

## 2020-03-05 RX ADMIN — PROPRANOLOL HYDROCHLORIDE 20 MG: 10 TABLET ORAL at 14:55

## 2020-03-05 NOTE — THERAPY
Speech Language Pathology  Daily Treatment     Patient Name: Rey Medina  Age:  24 y.o., Sex:  male  Medical Record #: [id number]  Today's Date: 3/5/2020     Subjective    Patient arrived on time to ST with family.      Objective       03/05/20 1132   Dysphagia    Diet / Liquid Recommendation Minced & Moist (5) - (Dysphagia II);Thin (0)   Nutritional Liquid Intake Rating Scale Non thickened beverages   Nutritional Food Intake Rating Scale Total oral diet with multiple consistencies but requiring special preparation or compensations   SLP Total Time Spent   SLP Individual Total Time Spent (Mins) 30   Charge Group   SLP Swallowing Dysfunction Treatment Swallowing Dysfunction Treatment         Assessment    Patient was assessed with current diet textures. One cough noted after sip of thin liquid from straw. Patient was able to follow directive for strong cough and subsequent swallows.     Plan    Continue to assess diet tolerance, PO trials as appropriate, Reinforce swallow strategies.

## 2020-03-05 NOTE — PROGRESS NOTES
0600 Pt adamantly refused lab this morning, agreed to do it at 1pm.Will endorse to day nurse for f/u.

## 2020-03-05 NOTE — CARE PLAN
Problem: Infection  Goal: Will remain free from infection  Note: Patient remains free from s/s infection; afebrile.  Will continue to monitor.      Problem: Skin Integrity  Goal: Risk for impaired skin integrity will decrease  Note: Patient's skin remains intact and free from new or accidental injury this shift.  Will continue to monitor.

## 2020-03-05 NOTE — REHAB-DIETARY IDT TEAM NOTE
Dietary   Nutrition  Dietary Problems     Problem: Other Problem (see comments)     Description: Difficulty swallowing r/t secondary dysphagia s/p Intracerebral hemorrhage, intraventricular as evidenced by NPO, G-tube for alternate route of nutrition/hydraiton/ medications.      Goal: Other Goal (Resolved)     Description: 1. Tolerance to EN regimen 2. Maintain adequate enteral nutrient/fluid intake (PO vs TF) to promote nutrition optimization/healing 3.  Transition to PO pending clinical outcomes                    Nutrition Services: Update   Pt passed MBSS 3/4 and started a Level 5, minced and moist diet with thin liquids.  Pt is in T-dine with 1:1 assistance for self feeding.  Current tube feed order includes order to hold tube feed bolus if PO >50% at a meal.  Pt ate 65% of lunch today.  Family did hold bolus feed per RN.  PO intake at breakfast was 40%.  Boost Plus resumed with meals.    Recommendations/Plan:  1. Diet upgrades per SLP  2. Hold tube feed if PO intake >50% of meal.  3. Offer Boost Plus with meals.   4. Encourage intake of meals and supplements.  5. Document intake of all meals and supplements as % taken in ADL's to provide interdisciplinary communication across all shifts.   6. Monitor weight.  7. Nutrition rep will continue to see patient for ongoing meal and snack preferences.    RD following    Section completed by:  Amy Chapman R.D.

## 2020-03-05 NOTE — THERAPY
"Physical Therapy   Daily Treatment     Patient Name: Rey Medina  Age:  24 y.o., Sex:  male  Medical Record #: [id number]  Today's Date: 3/4/2020     Precautions  Precautions: PEG Tube, Swallow Precautions ( See Comments), Fall Risk  Comments: dysarthria, shunt     Subjective    Pt seated in room with mother and sister present. Verbalizes \"1 minute\"      Objective       03/04/20 1301   Precautions   Precautions PEG Tube;Swallow Precautions ( See Comments);Fall Risk   Comments dysarthria, shunt    Bed Mobility    Sit to Stand Refused   Neuro-Muscular Treatments   Neuro-Muscular Treatments Weight Shift Right;Weight Shift Left;Postural Changes   Comments Attempt at seated balance in WC: pt resistant and required mod- max A to sit forward without back support. Able to complete 10x \"high fives\" with RUE and mod- max A to lean forward while completing.    Interdisciplinary Plan of Care Collaboration   IDT Collaboration with  Family / Caregiver   Patient Position at End of Therapy Seated;Call Light within Reach;Self Releasing Lap Belt Applied;Family / Friend in Room   Collaboration Comments pt with sister after PT session    PT Total Time Spent   PT Individual Total Time Spent (Mins) 30   PT Charge Group   PT Therapeutic Exercise 1   PT Neuromuscular Re-Education / Balance 1     Attempted standing in // bars. Pt unwilling to participate and repeated \"1 minute\" and \"5 minutes\" whenever prompted to participate.     Total A WC propulsion outside for fresh air and attempt to motivate pt participation.     Assessment    Pt with poor participation this session. Unwilling to stand and minimally participatory in seated balance in WC, unwilling to transfer to mat for seated balance activities.     Plan    Standing tolerance & ambulation in // bars, seated unsupported balance/ core strengthening, BLE PROM/ stretching/ ther-ex, transfer training.  D/c currently scheduled for 3/12/2020.      "

## 2020-03-05 NOTE — THERAPY
"Physical Therapy   Daily Treatment     Patient Name: Rey Medina  Age:  24 y.o., Sex:  male  Medical Record #: [id number]  Today's Date: 3/4/2020     Precautions  Precautions: Fall Risk, PEG Tube, Swallow Precautions ( See Comments)  Comments: dysarthria, shunt     Subjective    Pt seated in therapy gym with mother and sister. Mother requesting a wheelchair that reclines \"so he can rest and not have to get back in bed\"     Objective       03/04/20 1531   Precautions   Precautions Fall Risk;PEG Tube;Swallow Precautions ( See Comments)   Comments dysarthria, shunt    Bed Mobility    Sit to Stand Maximal Assist  (in // bars)   Interdisciplinary Plan of Care Collaboration   IDT Collaboration with  Family / Caregiver   Patient Position at End of Therapy Seated;Family / Friend in Room   Collaboration Comments pt's mother and sister present for PT session    PT Total Time Spent   PT Individual Total Time Spent (Mins) 30   PT Charge Group   PT Neuromuscular Re-Education / Balance 1   PT Therapeutic Activities 1     Pt stood in // bars 2x: 1x with mod A for ~3 min, 1x with max A for ~45 sec. Attempted 3rd  // bars, however pt unwilling to participate.     Pt switched to tilt and recline WC for increased comfort. Mother and sister educated in use of tilt/ recline function.     Assessment    Pt with increased participation this session compared to earlier PT session today, however continues to delay participation with \"1 minute\" requests.     Plan    Standing tolerance & ambulation in // bars, seated unsupported balance/ core strengthening, BLE PROM/ stretching/ ther-ex, transfer training.  D/c currently scheduled for 3/12/2020.    "

## 2020-03-05 NOTE — THERAPY
"Speech Language Pathology  Video Swallow     Patient Name:  Rey Medina  AGE:  24 y.o., SEX:  male  Medical Record #:  [id number]  Today's Date: 3/4/2020     Objective    Pt agreeable to follow up MBSS to assess progressed and ability to initiate oral diet full time. Pt has been tolerating trials with SLP.     Assessment     03/04/20 1334   History / Background Information   Prior Level of Function for Eating / Swallowing regular/thin prior to hospitalization.    Diagnosis 24 y.o. 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     Onset Date Of Dysphagia 4/21/19   Dysphagia Symptoms Warranting Video Swallow Follow up MBSS, pt was tolerating oral diet of Level 4/Level 2, change in function/emesis pt downgraded to NPO   General Anatomy / Physiology unremarkable   \"Dry\" / Saliva Swallow Observations not test   Dentition Intact   Procedure   Patient Seated in  w/c    Seated at (Degrees) 90   Views Completed Lateral;Anterior / Posterior   Fluoroscopy Time 2 minutes 39 seconds   Consistencies / Presentation Method   Consistencies / Presentation Method Tested   Mildly Think (2) - (Nectar Thick) Teaspoon;Straw   Thin (0) Teaspoon;Straw   Pureed (4) Teaspoon   Minced & Moist (5) - (Dysphagia II) Teaspoon   Soft & Bite-Sized (6) - (Dysphagia III) Teaspoon  (pears)   Regular - Easy to Chew (7)   (bite size cookie)   Oral Phase   Oral Phase X   Mildly Thick (2) - (Nectar Thick) Premature Spillage Into Valleculae;Premature Spillage Into Lateral Channels;Premature Spillage Into Pyriform Sinus;Oral Residue After the Swallow   Thin (0) Premature Spillage Into Valleculae;Premature Spillage Into Lateral Channels;Premature Spillage Into Pyriform Sinus   Pureed (4) Premature Spillage Into Valleculae;Oral Residue After the Swallow   Soft & Bite-Sized (6) - (Dysphagia III) Premature Spillage Into Valleculae;Premature Spillage " Into Lateral Channels;Oral Residue After the Swallow   Regular-Easy to Chew (7) Premature Spillage Into Valleculae   Pharyngeal Phase   Pharyngeal Phase X   Mildly Thick (2) - (Nectar Thick) Penetration During Swallow   Thin (0) Penetration During Swallow;Aspiration During Swallow;Absent Cough Response to Penetration / Aspiration  (liquid wash following pears silent aspiration of thins)   Additional Comments 1 instance of 5 trials of thin liquids pt demonstrated silent aspiration   Esophageal Phase   Esophageal Phase X   Mildly Thick (2) - (Nectar Thick) Esophageal Reflux   Thin (0) Esophageal Reflux   Puree Esophageal Reflux   Soft & Bite-Sized (6) - (Dysphagia III) Esophageal Reflux   Regular-Easy to Chew (7) Esophageal Reflux   Additional Comments  slight reflux noted with all textures, upon A-P view slight narrowing of esophagus just below UES   Compensatory Strategies Attempted   Compensatory Strategies Attempted Yes   Multiple Swallows effective at clearing very mild residue   Liquid Wash After Swallow effective at clearing very mild residue, however, pt would be better off completing serial swallows as pt did demonstrate 1 instance of silent aspiration with liquid wash on thins   Impression   Dysphagia Present Yes   Oral - Pharyngeal Mild Impairment   Additional Comments MBSS completed this date. Trials of thins via tsp, straw, mildly thick via tsp, straw, puree via 5mL tsp, soft fruit and soft cookie. Pt presents with mild oral pharyngeal dysphagia. Liquid trials revealed penetration of all boluses with pt exhibiting anterior spillage with trials via tsp. pt demonstrates better oral control and containment with straw sips. Trace oral residue noted with all boluses which pt was able to clear with cued serial swallow. Premature spillage to pyriforms with liquid boluses, to valleculae with puree/solids. 1 instance of silent aspiration demonstated with thin liquid wash following soft fruit. No other instances of  aspiration via straw on any other trials. Pt with poor mastication of soft fruit and cookie, would benefit from minced/moist textures at this time. RECOMMEND: Initiate oral diet of Level 5 - minced and moist, Level 0 thin liquids. Meds continue to be administered via PEG or crushed in puree. Continue to monitor temp/lung sounds for any changes to respiratory status and pt to receive 1:1 assistance with self feeding. Pt ok for straws with thin liquids with SPV.    Prognosis   Prognosis for Improvement Good   Barriers to Improvement decreased alertness, inconsistent cooperation although improving   Positive Indicators for Improvement Pt is able to follow directions, strong family support   Recommendations   Diet / Liquid Recommendation Thin (0);Minced & Moist (5) - (Dysphagia II)   Medication Administration  Crush all Medications in Puree;Via Gastric Tube   Strategies / Precautions Supervision Required;Small Bites;Small Sips;Multiple Swallows;Stay Upright at Least 30 Minutes After Meals;Sitting Upright at 90 Degrees while Eating;Oral Care After Meals;Monitor Temperature and Lung Sounds   Interventions Dysphagia Therapy by SLP;Compensatory Safe Swallow Strategy Training;1 : 1 Supervision / Assistance with SLP;Therapeutic Dining Program for Meals;Patient / Caregiver Education / Training   SLP Contact Information (Name / Extension) Marjorie Pearl MS, CCC_SLP, extension [phone]   Video Tape Number [id number]   Interdisciplinary Plan of Care Collaboration   IDT Collaboration with  Family / Caregiver;Nursing   Patient Position at End of Therapy Seated;Family / Friend in Room   Collaboration Comments CLOF and POC   SLP Total Time Spent   SLP Individual Total Time Spent (Mins) 30   Charge Group   SLP Video Swallow / FEES Videofluoroscopic Evaluation       Plan    Initiate diet of Level 5/Level 0 with straws ok. Meds crushed via tube or puree. Pt to remain in T-dine with 1:1 assist for self feeding.

## 2020-03-05 NOTE — CARE PLAN
Problem: Infection  Goal: Will remain free from infection  Intervention: Assess signs and symptoms of infection  Note: Pt is calm, comfortable and no sign of acute distress noted.Will continue to monitor.

## 2020-03-05 NOTE — THERAPY
Physical Therapy   Daily Treatment     Patient Name: Rey Medina  Age:  24 y.o., Sex:  male  Medical Record #: [id number]  Today's Date: 3/5/2020     Precautions  Precautions: (P) Fall Risk, PEG Tube, Swallow Precautions ( See Comments)  Comments: (P) dysarthria, shunt    Subjective    Pt seated in room with his sister at start of therapy. Agreed to PT with thumbs up.     Objective       03/05/20 0931   Precautions   Precautions Fall Risk;PEG Tube;Swallow Precautions ( See Comments)   Comments dysarthria, shunt   Cognition    Ability To Follow Commands 1 Step   Bed Mobility    Sit to Stand Moderate Assist  (min A at start of session, mod A as pt fatigued)   Neuro-Muscular Treatments   Neuro-Muscular Treatments Weight Shift Right;Weight Shift Left;Verbal Cuing;Tapping;Tactile Cuing;Sequencing;Postural Facilitation;Postural Changes;Facilitation   Comments Standing in // bars with BUE support and min-mod A from PT, 2 min 30 sec, 2 min, able to perform weight shifts 5x bilaterally with facilitation (mod A and VC for weight shifting to R).  Seated balance on mat table with BUE support, pt able to hold himself up without assist for 1 min 30 sec before fatiguing and requiring rest break. Seated abdominal crunches from reclined position to sitting with use of UE and VC to grab PT's hands and pull to sitting upright, 1 set 10 reps   Interdisciplinary Plan of Care Collaboration   IDT Collaboration with  Family / Caregiver   Patient Position at End of Therapy Seated;Self Releasing Lap Belt Applied;Family / Friend in Room;Other (Comments)   Collaboration Comments Pt's sister present for therapy and pushing pt back to room after therapy       FIM Bed/Chair/Wheelchair Transfers Score: 3 - Moderate Assistance  Bed/Chair/Wheelchair Transfers Description:  Increased time, Supervision for safety, Verbal cueing, Set-up of equipment, Initial preparation for task (wc <> mat table stand step transfer (sit <> sit), min A WC > EOM,  mod A EOM > WC with VC for sequencing and stepping; pt transfers better to L than to R)      Assessment    Pt tolerated session well today and was participatory during all activities. His communication has improved significantly and pt was able to respond appropriately with head nods and thumbs up/down when asked questions. He demonstrates poor weight bearing through his R side in standing but responds well to verbal and tactile cueing for posture and weight shifting.     Plan    Standing tolerance & ambulation in // bars, seated unsupported balance/ core strengthening, BLE PROM/ stretching/ ther-ex, transfer training.  D/c currently scheduled for 3/12/2020

## 2020-03-05 NOTE — THERAPY
"Occupational Therapy  Daily Treatment     Patient Name: Rey Medina  Age:  24 y.o., Sex:  male  Medical Record #: [id number]  Today's Date: 3/5/2020     Precautions  Precautions: (P) Fall Risk, PEG Tube, Swallow Precautions ( See Comments)  Comments: (P) dysarthria, shunt, modified diet     Safety   ADL Safety : (P) Requires Physical Assist for Safety  Bathroom Safety: Requires Physical Assist for Safety  Comments: (P) See FIMs for ADL performance details    Subjective    Patient agreeable to participate in OT. Waved \"hello\" and \"goodbye\"     Objective     03/05/20 1231   Precautions   Precautions Fall Risk;PEG Tube;Swallow Precautions ( See Comments)   Comments dysarthria, shunt, modified diet    Safety    ADL Safety  Requires Physical Assist for Safety   Comments See FIMs for ADL performance details   Cognition    Level of Consciousness Alert   Interdisciplinary Plan of Care Collaboration   IDT Collaboration with  Family / Caregiver   Patient Position at End of Therapy Seated;Self Releasing Lap Belt Applied   Collaboration Comments Sister bedside at end of session   OT Total Time Spent   OT Individual Total Time Spent (Mins) 30   OT Charge Group   OT Self Care / ADL 1   OT Therapy Activity 1     FIM Eating Score:  4 - Minimal Assistance  Eating Description:  Modified diet, Increased time, Supervision for safety, Verbal cueing, Set-up of equipment or meal/tube feeding, Needs help scooping food, Needs help bringing food to mouth    FIM Grooming Score:  4 - Minimal Assistance  Grooming Description:  Increased time, Supervision for safety, Set-up of equipment, Initial preparation for task(Patient demo'd ability to wash face with min assist for thoroughness while seated at sink. )    Patient grasped and placed koosh balls and bean bags into container using BUEs with min to mod assist due to decreased coordination.     Grasped, pinched and placed clothespins onto rim of container with mod to max assist due to " decreased coordination.     Assessment    Patient tolerated OT session well with focus on progression with ADLs and facilitation of BUE function/strength. Significant improvement noted with ADL function/participation. Patient now demonstrates ability to perform self-feeding and grooming tasks at min assist level (with built-up utensils and min hand-over-hand assist due to decreased coordination).     Plan    ADL retraining while incorporating low-stim environment as needed, BUE strength and coordination

## 2020-03-05 NOTE — THERAPY
Speech Language Pathology  Daily Treatment     Patient Name: Rey Medina  Age:  24 y.o., Sex:  male  Medical Record #: [id number]  Today's Date: 3/5/2020     Subjective    Patient arrived on time to  with assistance.      Objective       03/05/20 0802   Dysphagia    Diet / Liquid Recommendation Minced & Moist (5) - (Dysphagia II);Thin (0)   Nutritional Liquid Intake Rating Scale Non thickened beverages   Nutritional Food Intake Rating Scale Total oral diet with multiple consistencies but requiring special preparation or compensations   SLP Total Time Spent   SLP Individual Total Time Spent (Mins) 30   Charge Group   SLP Swallowing Dysfunction Treatment Swallowing Dysfunction Treatment         Assessment    Patient was assessed with current level 5, thin liquid diet. No overt s/sx of aspiration were noted. Extra time needed as well as assist for self feeding.     Plan    Continue to assess diet tolerance with trials as appropriate.

## 2020-03-06 ENCOUNTER — APPOINTMENT (OUTPATIENT)
Dept: RADIOLOGY | Facility: REHABILITATION | Age: 25
DRG: 056 | End: 2020-03-06
Attending: HOSPITALIST
Payer: MEDICAID

## 2020-03-06 LAB
ALBUMIN SERPL BCP-MCNC: 4.4 G/DL (ref 3.2–4.9)
ALBUMIN/GLOB SERPL: 1.2 G/DL
ALP SERPL-CCNC: 95 U/L (ref 30–99)
ALT SERPL-CCNC: 29 U/L (ref 2–50)
ANION GAP SERPL CALC-SCNC: 22 MMOL/L (ref 0–11.9)
AST SERPL-CCNC: 16 U/L (ref 12–45)
BASOPHILS # BLD AUTO: 1.4 % (ref 0–1.8)
BASOPHILS # BLD: 0.17 K/UL (ref 0–0.12)
BILIRUB SERPL-MCNC: 0.7 MG/DL (ref 0.1–1.5)
BUN SERPL-MCNC: 9 MG/DL (ref 8–22)
CALCIUM SERPL-MCNC: 9.9 MG/DL (ref 8.5–10.5)
CHLORIDE SERPL-SCNC: 104 MMOL/L (ref 96–112)
CO2 SERPL-SCNC: 17 MMOL/L (ref 20–33)
CREAT SERPL-MCNC: 0.92 MG/DL (ref 0.5–1.4)
EOSINOPHIL # BLD AUTO: 0.19 K/UL (ref 0–0.51)
EOSINOPHIL NFR BLD: 1.6 % (ref 0–6.9)
ERYTHROCYTE [DISTWIDTH] IN BLOOD BY AUTOMATED COUNT: 50.8 FL (ref 35.9–50)
GLOBULIN SER CALC-MCNC: 3.7 G/DL (ref 1.9–3.5)
GLUCOSE SERPL-MCNC: 115 MG/DL (ref 65–99)
HCT VFR BLD AUTO: 54.1 % (ref 42–52)
HGB BLD-MCNC: 16.8 G/DL (ref 14–18)
IMM GRANULOCYTES # BLD AUTO: 0.06 K/UL (ref 0–0.11)
IMM GRANULOCYTES NFR BLD AUTO: 0.5 % (ref 0–0.9)
LYMPHOCYTES # BLD AUTO: 3.35 K/UL (ref 1–4.8)
LYMPHOCYTES NFR BLD: 27.8 % (ref 22–41)
MAGNESIUM SERPL-MCNC: 2.2 MG/DL (ref 1.5–2.5)
MCH RBC QN AUTO: 27.1 PG (ref 27–33)
MCHC RBC AUTO-ENTMCNC: 31.1 G/DL (ref 33.7–35.3)
MCV RBC AUTO: 87.4 FL (ref 81.4–97.8)
MONOCYTES # BLD AUTO: 0.56 K/UL (ref 0–0.85)
MONOCYTES NFR BLD AUTO: 4.6 % (ref 0–13.4)
NEUTROPHILS # BLD AUTO: 7.72 K/UL (ref 1.82–7.42)
NEUTROPHILS NFR BLD: 64.1 % (ref 44–72)
NRBC # BLD AUTO: 0 K/UL
NRBC BLD-RTO: 0 /100 WBC
PHOSPHATE SERPL-MCNC: 4.7 MG/DL (ref 2.5–4.5)
PLATELET # BLD AUTO: 322 K/UL (ref 164–446)
PMV BLD AUTO: 11.6 FL (ref 9–12.9)
POTASSIUM SERPL-SCNC: 4.4 MMOL/L (ref 3.6–5.5)
PROT SERPL-MCNC: 8.1 G/DL (ref 6–8.2)
RBC # BLD AUTO: 6.19 M/UL (ref 4.7–6.1)
SODIUM SERPL-SCNC: 143 MMOL/L (ref 135–145)
VIT B12 SERPL-MCNC: 611 PG/ML (ref 211–911)
WBC # BLD AUTO: 12.1 K/UL (ref 4.8–10.8)

## 2020-03-06 PROCEDURE — 97110 THERAPEUTIC EXERCISES: CPT

## 2020-03-06 PROCEDURE — 99231 SBSQ HOSP IP/OBS SF/LOW 25: CPT | Performed by: PHYSICAL MEDICINE & REHABILITATION

## 2020-03-06 PROCEDURE — 97530 THERAPEUTIC ACTIVITIES: CPT

## 2020-03-06 PROCEDURE — 82607 VITAMIN B-12: CPT

## 2020-03-06 PROCEDURE — 97112 NEUROMUSCULAR REEDUCATION: CPT

## 2020-03-06 PROCEDURE — 36415 COLL VENOUS BLD VENIPUNCTURE: CPT

## 2020-03-06 PROCEDURE — 83735 ASSAY OF MAGNESIUM: CPT

## 2020-03-06 PROCEDURE — 71045 X-RAY EXAM CHEST 1 VIEW: CPT

## 2020-03-06 PROCEDURE — 700102 HCHG RX REV CODE 250 W/ 637 OVERRIDE(OP): Performed by: HOSPITALIST

## 2020-03-06 PROCEDURE — 80053 COMPREHEN METABOLIC PANEL: CPT

## 2020-03-06 PROCEDURE — 97130 THER IVNTJ EA ADDL 15 MIN: CPT

## 2020-03-06 PROCEDURE — 92507 TX SP LANG VOICE COMM INDIV: CPT

## 2020-03-06 PROCEDURE — 700102 HCHG RX REV CODE 250 W/ 637 OVERRIDE(OP): Performed by: PHYSICAL MEDICINE & REHABILITATION

## 2020-03-06 PROCEDURE — A9270 NON-COVERED ITEM OR SERVICE: HCPCS | Performed by: HOSPITALIST

## 2020-03-06 PROCEDURE — 84100 ASSAY OF PHOSPHORUS: CPT

## 2020-03-06 PROCEDURE — A9270 NON-COVERED ITEM OR SERVICE: HCPCS | Performed by: PHYSICAL MEDICINE & REHABILITATION

## 2020-03-06 PROCEDURE — 97129 THER IVNTJ 1ST 15 MIN: CPT

## 2020-03-06 PROCEDURE — 85025 COMPLETE CBC W/AUTO DIFF WBC: CPT

## 2020-03-06 PROCEDURE — 92526 ORAL FUNCTION THERAPY: CPT

## 2020-03-06 PROCEDURE — 770010 HCHG ROOM/CARE - REHAB SEMI PRIVAT*

## 2020-03-06 PROCEDURE — 99232 SBSQ HOSP IP/OBS MODERATE 35: CPT | Performed by: HOSPITALIST

## 2020-03-06 PROCEDURE — 81001 URINALYSIS AUTO W/SCOPE: CPT

## 2020-03-06 RX ADMIN — OMEPRAZOLE 40 MG: KIT at 09:35

## 2020-03-06 RX ADMIN — LEVETIRACETAM 500 MG: 500 SOLUTION ORAL at 21:39

## 2020-03-06 RX ADMIN — AMANTADINE HYDROCHLORIDE 100 MG: 50 SOLUTION ORAL at 09:35

## 2020-03-06 RX ADMIN — CHOLECALCIFEROL TAB 25 MCG (1000 UNIT) 1000 UNITS: 25 TAB at 09:35

## 2020-03-06 RX ADMIN — PROPRANOLOL HYDROCHLORIDE 20 MG: 10 TABLET ORAL at 09:35

## 2020-03-06 RX ADMIN — AMANTADINE HYDROCHLORIDE 100 MG: 50 SOLUTION ORAL at 13:48

## 2020-03-06 RX ADMIN — MONTELUKAST 10 MG: 10 TABLET, FILM COATED ORAL at 21:36

## 2020-03-06 RX ADMIN — PROPRANOLOL HYDROCHLORIDE 20 MG: 10 TABLET ORAL at 21:36

## 2020-03-06 RX ADMIN — PROPRANOLOL HYDROCHLORIDE 20 MG: 10 TABLET ORAL at 13:48

## 2020-03-06 RX ADMIN — LEVETIRACETAM 500 MG: 500 SOLUTION ORAL at 09:35

## 2020-03-06 NOTE — FLOWSHEET NOTE
03/05/20 1315   Events/Summary/Plan   Events/Summary/Plan Trach stoma care, site clean, no swelling or redness noted. Air passing through stoma noted with some mucus under dressing.

## 2020-03-06 NOTE — THERAPY
Occupational Therapy  Daily Treatment     Patient Name: Rey Medina  Age:  24 y.o., Sex:  male  Medical Record #: [id number]  Today's Date: 3/5/2020     Precautions  Precautions: Fall Risk, PEG Tube, Swallow Precautions ( See Comments)  Comments: dysarthria, shunt, modified diet     Safety   ADL Safety : Requires Physical Assist for Safety  Bathroom Safety: Requires Physical Assist for Safety  Comments: See FIMs for ADL performance details    Subjective    Patient agreeable to participate in OT.      Objective     03/05/20 0831   Precautions   Precautions Fall Risk;PEG Tube;Swallow Precautions ( See Comments)   Comments dysarthria, shunt, modified diet    Interdisciplinary Plan of Care Collaboration   IDT Collaboration with  Family / Caregiver   Patient Position at End of Therapy Seated;Self Releasing Lap Belt Applied   Collaboration Comments mother/sister present   OT Total Time Spent   OT Individual Total Time Spent (Mins) 30   OT Charge Group   OT Therapy Activity 2     Patient completed bre-med with BUEs x 4:42 (1:05 active, 3:37 passive).    Patient correctly identified (verbally) 3 animals illustrated on a puzzle box. Additionally correctly identified parts of elephant (ie nose, head, back).     Assessment    Patient tolerated OT session well with focus on communication, cognition, and integration of BUEs. Patient continues to demonstrate consistent functional progress toward d/c goals and improvement with activity tolerance.    Plan    ADL retraining while incorporating low-stim environment as needed, BUE strength and coordination

## 2020-03-06 NOTE — THERAPY
Physical Therapy   Daily Treatment     Patient Name: Rey Medina  Age:  24 y.o., Sex:  male  Medical Record #: [id number]  Today's Date: 3/6/2020     Precautions  Precautions: (P) Fall Risk, PEG Tube, Swallow Precautions ( See Comments)  Comments: (P) dysarthria, shunt, modified diet    Subjective    Pt seated in therapy gym with OT, pt's mother agreeable to getting pt standing in // bars. Pt waved hello in response to greeting.     Objective       03/06/20 0901   Precautions   Precautions Fall Risk;PEG Tube;Swallow Precautions ( See Comments)   Comments dysarthria, shunt, modified diet   Cognition    Level of Consciousness Alert   Ability To Follow Commands 1 Step   Sitting Lower Body Exercises   Marching 1 set of 10   Other Exercises seated in wc in // bars with verbal and visual cues for sequencing   Standing Lower Body Exercises   Marching 1 set of 10  (in // bars with BUE support, mod-max assist from PT, max VC)   Bed Mobility    Sit to Stand Maximal Assist  (in // bars; mod A at start of session, max A as pt fatigued)   Neuro-Muscular Treatments   Neuro-Muscular Treatments Compensatory Strategies;Facilitation;Joint Approximation;Postural Changes;Postural Facilitation;Sequencing;Tapping;Verbal Cuing;Weight Shift Right;Weight Shift Left;Tactile Cuing   Comments Standing in // bars: 2 min, 1.5 min (see marching in standing ther ex); mod-max verbal cueing for upright posture, hand placement, sequencing, and encouragement. Tactile cueing and facilitation provided to sternum and low back for upright posture; Pt able to perform weight shifts bilaterally x10 with max VC and facilitation at hips   Interdisciplinary Plan of Care Collaboration   IDT Collaboration with  Family / Caregiver;Occupational Therapist   Patient Position at End of Therapy Seated;Self Releasing Lap Belt Applied;Family / Friend in Room  (mother pushing pt back to room)   Collaboration Comments Pt care received from OT; mother present for  entire PT session     Min- mod A for standing in // bars, max A for sitting back in WC.     Pt's mother donned his shoes and socks at start of therapy session.   Pt able to buckle and unbuckle his wc seatbelt at start and end of session when asked. 1 VC required for pushing button to unlock.    Assessment    Pt tolerated session very well today and was participatory in standing activities in // bars. Pt motivated to perform marching in // bars with BLE, however requires max cueing for safety and weight shifting. He will benefit from continued pre-gait training and standing endurance.    Plan    Pre-gait training in // bars, standing tolerance & ambulation in // bars, seated unsupported balance/ core strengthening, BLE PROM/ stretching/ ther-ex, transfer training.  D/c currently scheduled for 3/12/2020

## 2020-03-06 NOTE — PROGRESS NOTES
Hospital Medicine Daily Progress Note      Chief Complaint:  Fever  Tachycardia  Leukocytosis    Interval History:  Pt seen and examined in [facility], refusing surveillance labs.    Review of Systems  Review of Systems   Unable to perform ROS: Medical condition        Physical Exam  Temp:  [36.7 °C (98 °F)-36.8 °C (98.2 °F)] 36.8 °C (98.2 °F)  Pulse:  [] 105  Resp:  [18-19] 18  BP: (118-137)/(81-94) 118/81    Physical Exam  Vitals signs reviewed.   Constitutional:       General: He is not in acute distress.     Appearance: He is not ill-appearing.   HENT:      Head: Normocephalic and atraumatic.      Right Ear: External ear normal.      Left Ear: External ear normal.      Nose: Nose normal.   Eyes:      General:         Right eye: No discharge.         Left eye: No discharge.      Extraocular Movements: Extraocular movements intact.      Conjunctiva/sclera: Conjunctivae normal.   Neck:      Musculoskeletal: Normal range of motion and neck supple.      Comments: Trach stoma dressed  Cardiovascular:      Rate and Rhythm: Regular rhythm. Tachycardia present.   Pulmonary:      Effort: Pulmonary effort is normal. No respiratory distress.      Breath sounds: Normal breath sounds. No wheezing.   Abdominal:      General: Bowel sounds are normal. There is no distension.      Palpations: Abdomen is soft.      Tenderness: There is no abdominal tenderness.      Comments: PEG site dressed   Musculoskeletal:      Right lower leg: No edema.      Left lower leg: No edema.   Skin:     General: Skin is warm and dry.   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)  Assessment & Plan  Agitation improved  Becoming more verbal    Dysphagia following nontraumatic intracerebral hemorrhage- (present on admission)  Assessment & Plan  Has PEG tube and tolerating TF  SLP doing PO trials  Had MBS today    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

## 2020-03-06 NOTE — PROGRESS NOTES
Rehab Progress Note     Encounter Date: 3/5/2020    CC: I[facility], AMS    Interval Events (Subjective)  Patient sitting up in room. When asked how he is doing he grimaces. Per sister he is very fatigued today at the end of the day. She reports he was more active earlier. Patient will follow commands to breath in for lung exam but otherwise closes his eyes.  Denies pain.     IDT Team Meeting 3/3/2020  DC/Disposition:  3/12/20    Objective:  VITAL SIGNS: /81   Pulse (!) 105   Temp 36.8 °C (98.2 °F) (Oral)   Resp 18   Wt 73.1 kg (161 lb 3.2 oz)   SpO2 97%   BMI 25.25 kg/m²  HR: 100, /86, T: 98.3: RR: 22  Gen: NAD  Psych: Mood and affect appropriate  CV: RRR, no edema  Resp: CTAB, no upper airway sounds  Abd: NTND  Neuro: eyes closed, only grimaces, no thumbs up     No results found for this or any previous visit (from the past 72 hour(s)).    Current Facility-Administered Medications   Medication Frequency   • amantadine (SYMMETREL) 50 MG/5ML syrup 100 mg BID   • traZODone (DESYREL) tablet 50 mg QHS PRN   • propranolol (INDERAL) tablet 20 mg TID   • acetaminophen (TYLENOL) tablet 650 mg Q4HRS PRN   • artificial tears ophthalmic solution 1 Drop PRN   • benzocaine-menthol (CEPACOL) lozenge 1 Lozenge Q2HRS PRN   • hydrALAZINE (APRESOLINE) tablet 25 mg Q8HRS PRN   • mag hydrox-al hydrox-simeth (MAALOX PLUS ES or MYLANTA DS) suspension 20 mL Q2HRS PRN   • ondansetron (ZOFRAN ODT) dispertab 4 mg 4X/DAY PRN    Or   • ondansetron (ZOFRAN) syringe/vial injection 4 mg 4X/DAY PRN   • polyethylene glycol/lytes (MIRALAX) PACKET 1 Packet Q24HRS PRN    And   • magnesium hydroxide (MILK OF MAGNESIA) suspension 30 mL QDAY PRN    And   • bisacodyl (DULCOLAX) suppository 10 mg QDAY PRN   • sodium chloride (OCEAN) 0.65 % nasal spray 2 Spray PRN   • tramadol (ULTRAM) 50 MG tablet 50 mg Q4HRS PRN   • Respiratory Therapy Consult Continuous RT   • eucerin cream TID PRN   • ipratropium-albuterol (DUONEB) nebulizer solution Q4H PRN  (RT)   • levETIRAcetam (KEPPRA) 100 MG/ML solution 500 mg Q12HRS   • montelukast (SINGULAIR) tablet 10 mg Nightly   • omeprazole (FIRST-OMEPRAZOLE) 2 mg/mL oral susp 40 mg DAILY   • polyethylene glycol/lytes (MIRALAX) PACKET 1 Packet DAILY   • QUEtiapine (SEROQUEL) tablet 25 mg TID PRN   • vitamin D (cholecalciferol) tablet 1,000 Units DAILY       Orders Placed This Encounter   Procedures   • Diet Order Regular (meds via  tube)     Standing Status:   Standing     Number of Occurrences:   1     Order Specific Question:   Diet:     Answer:   Regular [1]     Comments:   meds via  tube     Order Specific Question:   Texture Modifier     Answer:   Level 5 - Minced & Moist (Dysphagia 2)     Order Specific Question:   Liquid level     Answer:   Level 0 - Thin       Assessment:  Active Hospital Problems    Diagnosis   • *Intracerebral hemorrhage, intraventricular (HCC)   • 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 Patient very low functioning but per mother becoming more purposeful  -PT and OT for mobility and ADLs  -SLP for cognition   -Patient agitated on Amantadine and Modafinil. Both trials halted due to agitation. Restart Amantadine 50 mg as he was too agitated on 100 mg. Slow increase up to 100 mg BID with improved cognition and function  -Continue Keppra. Unclear if had seizure or was continued on prophylaxis. Follow-up with Neurology  -NSG appointment on 2/7/20 to evaluation  shunt. Will follow-up in 2 months   -CT head as waxing and waning status. At times he voices including his names, at other times no response and compulsive grabbing of bed rail and urinal. CT head - negative.      Agitation - Appears like TBI with agitation worsened from  neurostimulant. Change Metoprolol to Propranolol. Start Seroquel PRN, too sedated on scheduled  -Propranolol increase to 20 mg TID, improved agitation control     Muscle spasms - mother claims Bromocriptine helps, typically used for dopamine side effects/neurostimulant. Will start low dose Baclofen and monitor.   -Limited spasticity, mostly resisting examination. Stop Baclofen and monitor.     Dysphagia - Patient with G Tube in place. SLP for swallow. MBSS on 2/10/20, start on dysphagia 1 with NTL  -Tolerated first meal but emesis on 2nd meal at higher percentage. May be due to distention, no lung changes. Continue to monitor.   -Discussed with SLP and recommend MBSS on 3/4/20. Upgraded to Dysphagia 2, ongoing trials    Aspiration pneumonia - Currently on Unasyn.  Elevated WBC, broadened to Zyvox and Zosyn  -Hospitalist consulted    CV - Patient is on Ivabradine 5 mg daily. Unclear reason why. Discontinue Ivabradine, on Metoprolol for tachycardia. Switch metoprolol to Propranolol     Respiratory - Patient with size 7 trach on admission. Patient on Duonebs and Singulair  -Exchange aborted on 2/6/20 due to emesis on suction. Tolerated overnight. Decannulated AM 2/7/20. Tolerated. Stoma remains opened, discussed with RT and increase changes and tighter bandaging      GI Ppx - Patient on Prilosec and Prevacid. Unclear why two PPIs. Discontinue Prevacid. Discontinue Simethicone     DVT Ppx - Patient on Eliquis 2.5 mg BID. Unknown reason why, mother denies clot or DVT. Discontinue Eliquis as 10 months post-ICH    Dispo - Possible extension to 3/12/20, patient improving with ADLs and eating.     Total time:  17 minutes.  I spent greater than 50% of the time for patient care, counseling, and coordination on this date, including unit/floor time, and face-to-face time with the patient as per interval events and assessment and plan above. Topics discussed included restarted on diet, new fatigue this afternoon.     Greer  Alexander David M.D.

## 2020-03-06 NOTE — CARE PLAN
Problem: Safety  Goal: Will remain free from injury  Outcome: PROGRESSING AS EXPECTED  Bed and chair alarms in place.  In room close to the nurses station.       Problem: Bowel/Gastric:  Goal: Normal bowel function is maintained or improved  Outcome: PROGRESSING AS EXPECTED  Bowel sounds are normoactive in all quadrants.       Problem: Urinary Elimination:  Goal: Ability to reestablish a normal urinary elimination pattern will improve  Outcome: PROGRESSING AS EXPECTED  Patient wears a brief and uses a urinal, some bladder incontinence noted.

## 2020-03-06 NOTE — THERAPY
Physical Therapy   Daily Treatment     Patient Name: Rey Medina  Age:  24 y.o., Sex:  male  Medical Record #: [id number]  Today's Date: 3/5/2020     Precautions  Precautions: Fall Risk, PEG Tube, Swallow Precautions ( See Comments)  Comments: dysarthria, shunt, modified diet     Subjective    Pt was seated in w/c upon arrival and agreeable to treatment.  Pt's sister present during session.       Objective       03/05/20 1501   Precautions   Precautions Fall Risk;PEG Tube;Swallow Precautions ( See Comments)   Cognition    Ability To Follow Commands 1 Step   Sitting Lower Body Exercises   Ankle Pumps   (x5)   Hip Abduction   (x5, manual resistance)   Hip Adduction   (x5, manual resistance)   Long Arc Quad   (x5)   Marching   (x5)   Hamstring Curl   (x5)   Interdisciplinary Plan of Care Collaboration   Patient Position at End of Therapy Seated;Call Light within Reach;Tray Table within Reach;Phone within Reach;Family / Friend in Room   PT Total Time Spent   PT Individual Total Time Spent (Mins) 30   PT Charge Group   PT Therapeutic Exercise 1   PT Therapeutic Activities 1       FIM Wheelchair Score:  1 - Total Assistance  Wheelchair Description:  Adaptive equipment, Extra time, Safety concerns, Verbal cueing, Supervision for safety(x1 foot with BUE dependent with hand over hand TC)    AROM/PROM assessment of BUE, BLE, and  strength x 5 reps each direction.  Trunk flexion x 5 reps.  Exercise focus on LE strengthening and consistency following one step directions.      Assessment    Pt demonstrated improved consistency with following one step directions (75% of the time with exercise, 50% of the time with yes/no questions) this session.  PT continues to be limited secondary to significant cognitive deficits.      Plan    Standing tolerance & ambulation in // bars, seated unsupported balance/ core strengthening, BLE PROM/ stretching/ ther-ex, transfer training.  D/c currently scheduled for 3/12/2020

## 2020-03-06 NOTE — THERAPY
Occupational Therapy  Daily Treatment     Patient Name: Rey Medina  Age:  24 y.o., Sex:  male  Medical Record #: [id number]  Today's Date: 3/6/2020     Precautions  Precautions: Fall Risk, PEG Tube, Swallow Precautions ( See Comments)  Comments: dysarthria, shunt, modified diet    Safety   ADL Safety : Requires Physical Assist for Safety  Bathroom Safety: Requires Physical Assist for Safety  Comments: See FIMs for ADL performance details    Subjective       Objective       03/06/20 1301   Precautions   Precautions Fall Risk;PEG Tube;Swallow Precautions ( See Comments)   Comments dysarthria, shunt, modified diet   Vitals   O2 Delivery Device None - Room Air   Pain   Intervention Declines   Non Verbal Descriptors   Non Verbal Scale  Calm   Cognition    Level of Consciousness Alert   Fine Motor / Dexterity    Comments  PVC pipe activity seated w/ hospital table pulled up to wc.  Noted poor coordination bilaterally - Min assist to dismantle 25 pice PVC pipe puzzle.  Mod assist to construct given 15 piece pvc pipe puzzle.  Pt correctly identified and retrieved appropriate pieces - signifiant ataxia required therapist hand over hand assist to stabilize piece to insert w/ contralateral hand.     Interdisciplinary Plan of Care Collaboration   IDT Collaboration with  Family / Caregiver   Patient Position at End of Therapy Seated;Self Releasing Lap Belt Applied;Call Light within Reach;Tray Table within Reach;Phone within Reach;Family / Friend in Room   OT Total Time Spent   OT Individual Total Time Spent (Mins) 30   OT Charge Group   OT Therapy Activity 2       Assessment    Pt was alert and cooperative w/ tx.  Noted ataxia, required hand over hand to stabilize. Significant over/undershooting for PVC pipe puzzle task. See notes above.    Plan    ADL retraining while incorporating low-stim environment as needed, BUE strength and coordination

## 2020-03-06 NOTE — THERAPY
Occupational Therapy  Daily Treatment     Patient Name: Rey Medina  Age:  24 y.o., Sex:  male  Medical Record #: [id number]  Today's Date: 3/6/2020     Precautions  Precautions: Fall Risk, PEG Tube, Swallow Precautions ( See Comments)  Comments: dysarthria, shunt, modified diet     Safety   ADL Safety : Requires Physical Assist for Safety  Bathroom Safety: Requires Physical Assist for Safety  Comments: See FIMs for ADL performance details    Subjective       Objective       03/06/20 0831   Precautions   Precautions Fall Risk;PEG Tube;Swallow Precautions ( See Comments)   Comments dysarthria, shunt, modified diet    Vitals   O2 Delivery Device None - Room Air   Non Verbal Descriptors   Non Verbal Scale  Calm   Cognition    Level of Consciousness Alert   Sitting Upper Body Exercises   Upper Extremity Bike Minutes / Rest Breaks (See Comments)  (FluidoBike, Level 0, 15 mins, Therapist provided tactile/VQ')   Comments throughout, Mod to Mod Indep dependeing on paula of pedaling.  Pt maintained grasp througout session, rest break x 5, .485km   Interdisciplinary Plan of Care Collaboration   IDT Collaboration with  Family / Caregiver;Physical Therapist   Patient Position at End of Therapy Seated;Self Releasing Lap Belt Applied   Collaboration Comments Transition of care to PT   OT Total Time Spent   OT Individual Total Time Spent (Mins) 30   OT Charge Group   OT Therapeutic Exercise  2       Assessment    Pt was alert and cooperative w/ tx.  Frqnt tactile/VQ's throughout stationary UE Bike - see notes above.  Maintained grasp throughout and greater assist w/ upswing potion of cycling task.     Plan    ADL retraining while incorporating low-stim environment as needed, BUE strength and coordination

## 2020-03-06 NOTE — CARE PLAN
Problem: Safety  Goal: Will remain free from injury  Outcome: PROGRESSING AS EXPECTED     Problem: Infection  Goal: Will remain free from infection  Outcome: PROGRESSING AS EXPECTED  Note: Awaiting to collect urine for UA, CXR done for increased WBC.     Problem: Bowel/Gastric:  Goal: Normal bowel function is maintained or improved  Outcome: PROGRESSING AS EXPECTED

## 2020-03-07 PROBLEM — E87.5 HYPERKALEMIA: Status: ACTIVE | Noted: 2020-03-07

## 2020-03-07 LAB
AMORPH CRY #/AREA URNS HPF: PRESENT /HPF
ANION GAP SERPL CALC-SCNC: 15 MMOL/L (ref 0–11.9)
APPEARANCE UR: ABNORMAL
BACTERIA #/AREA URNS HPF: NEGATIVE /HPF
BILIRUB UR QL STRIP.AUTO: NEGATIVE
BUN SERPL-MCNC: 9 MG/DL (ref 8–22)
CALCIUM SERPL-MCNC: 10.4 MG/DL (ref 8.5–10.5)
CAOX CRY #/AREA URNS HPF: ABNORMAL /HPF
CHLORIDE SERPL-SCNC: 107 MMOL/L (ref 96–112)
CO2 SERPL-SCNC: 20 MMOL/L (ref 20–33)
COLOR UR: ABNORMAL
CREAT SERPL-MCNC: 0.84 MG/DL (ref 0.5–1.4)
EPI CELLS #/AREA URNS HPF: NEGATIVE /HPF
ERYTHROCYTE [DISTWIDTH] IN BLOOD BY AUTOMATED COUNT: 49.5 FL (ref 35.9–50)
GLUCOSE SERPL-MCNC: 107 MG/DL (ref 65–99)
GLUCOSE UR STRIP.AUTO-MCNC: NEGATIVE MG/DL
HCT VFR BLD AUTO: 54 % (ref 42–52)
HGB BLD-MCNC: 17.7 G/DL (ref 14–18)
HYALINE CASTS #/AREA URNS LPF: ABNORMAL /LPF
KETONES UR STRIP.AUTO-MCNC: NEGATIVE MG/DL
LEUKOCYTE ESTERASE UR QL STRIP.AUTO: NEGATIVE
MCH RBC QN AUTO: 28.3 PG (ref 27–33)
MCHC RBC AUTO-ENTMCNC: 32.8 G/DL (ref 33.7–35.3)
MCV RBC AUTO: 86.4 FL (ref 81.4–97.8)
MICRO URNS: ABNORMAL
MORPHOLOGY BLD-IMP: NORMAL
MUCOUS THREADS #/AREA URNS HPF: ABNORMAL /HPF
NITRITE UR QL STRIP.AUTO: NEGATIVE
PH UR STRIP.AUTO: 6 [PH] (ref 5–8)
PLATELET # BLD AUTO: 241 K/UL (ref 164–446)
PLATELET BLD QL SMEAR: NORMAL
PMV BLD AUTO: 12.1 FL (ref 9–12.9)
POTASSIUM SERPL-SCNC: 5.9 MMOL/L (ref 3.6–5.5)
PROT UR QL STRIP: NEGATIVE MG/DL
RBC # BLD AUTO: 6.25 M/UL (ref 4.7–6.1)
RBC # URNS HPF: ABNORMAL /HPF
RBC BLD AUTO: NORMAL
RBC UR QL AUTO: NEGATIVE
SODIUM SERPL-SCNC: 142 MMOL/L (ref 135–145)
SP GR UR STRIP.AUTO: 1.03
UROBILINOGEN UR STRIP.AUTO-MCNC: 1 MG/DL
WBC # BLD AUTO: 11.2 K/UL (ref 4.8–10.8)
WBC #/AREA URNS HPF: ABNORMAL /HPF

## 2020-03-07 PROCEDURE — 85027 COMPLETE CBC AUTOMATED: CPT

## 2020-03-07 PROCEDURE — A9270 NON-COVERED ITEM OR SERVICE: HCPCS | Performed by: HOSPITALIST

## 2020-03-07 PROCEDURE — 700102 HCHG RX REV CODE 250 W/ 637 OVERRIDE(OP): Performed by: PHYSICAL MEDICINE & REHABILITATION

## 2020-03-07 PROCEDURE — 80048 BASIC METABOLIC PNL TOTAL CA: CPT

## 2020-03-07 PROCEDURE — 770010 HCHG ROOM/CARE - REHAB SEMI PRIVAT*

## 2020-03-07 PROCEDURE — A9270 NON-COVERED ITEM OR SERVICE: HCPCS | Performed by: PHYSICAL MEDICINE & REHABILITATION

## 2020-03-07 PROCEDURE — 99232 SBSQ HOSP IP/OBS MODERATE 35: CPT | Performed by: HOSPITALIST

## 2020-03-07 PROCEDURE — 94760 N-INVAS EAR/PLS OXIMETRY 1: CPT

## 2020-03-07 PROCEDURE — 36415 COLL VENOUS BLD VENIPUNCTURE: CPT

## 2020-03-07 PROCEDURE — 700102 HCHG RX REV CODE 250 W/ 637 OVERRIDE(OP): Performed by: HOSPITALIST

## 2020-03-07 RX ORDER — SODIUM POLYSTYRENE SULFONATE 15 G/60ML
30 SUSPENSION ORAL; RECTAL ONCE
Status: COMPLETED | OUTPATIENT
Start: 2020-03-07 | End: 2020-03-07

## 2020-03-07 RX ADMIN — MONTELUKAST 10 MG: 10 TABLET, FILM COATED ORAL at 21:33

## 2020-03-07 RX ADMIN — CHOLECALCIFEROL TAB 25 MCG (1000 UNIT) 1000 UNITS: 25 TAB at 09:31

## 2020-03-07 RX ADMIN — AMANTADINE HYDROCHLORIDE 100 MG: 50 SOLUTION ORAL at 09:32

## 2020-03-07 RX ADMIN — PROPRANOLOL HYDROCHLORIDE 20 MG: 10 TABLET ORAL at 14:04

## 2020-03-07 RX ADMIN — SODIUM POLYSTYRENE SULFONATE 30 G: 15 SUSPENSION ORAL; RECTAL at 14:04

## 2020-03-07 RX ADMIN — LEVETIRACETAM 500 MG: 500 SOLUTION ORAL at 21:39

## 2020-03-07 RX ADMIN — OMEPRAZOLE 40 MG: KIT at 09:32

## 2020-03-07 RX ADMIN — LEVETIRACETAM 500 MG: 500 SOLUTION ORAL at 09:31

## 2020-03-07 RX ADMIN — AMANTADINE HYDROCHLORIDE 100 MG: 50 SOLUTION ORAL at 14:04

## 2020-03-07 RX ADMIN — PROPRANOLOL HYDROCHLORIDE 20 MG: 10 TABLET ORAL at 09:31

## 2020-03-07 RX ADMIN — PROPRANOLOL HYDROCHLORIDE 20 MG: 10 TABLET ORAL at 21:33

## 2020-03-07 NOTE — DISCHARGE PLANNING
Patient's mother has been out of town.  I have updated his sister and patient on team conference discussion.  He continues to make good progress.  They are still hopeful that he can have his feeding tube out if he is eating.  Will follow.

## 2020-03-07 NOTE — CARE PLAN
Problem: Communication  Goal: The ability to communicate needs accurately and effectively will improve  Outcome: PROGRESSING AS EXPECTED   Patient remains non-verbal at this time.  Unable to assess orientation.     Problem: Safety  Goal: Will remain free from injury  Outcome: PROGRESSING AS EXPECTED    Bed and chair alarms in place.   Patient's sister staying in room with patient.

## 2020-03-07 NOTE — PROGRESS NOTES
Hospital Medicine Daily Progress Note      Chief Complaint:  Fever  Tachycardia  Leukocytosis    Interval History:  Afebrile but has mildly elevated white blood cell count.    Review of Systems  Review of Systems   Unable to perform ROS: Medical condition        Physical Exam  Temp:  [36 °C (96.8 °F)-36.3 °C (97.3 °F)] 36 °C (96.8 °F)  Pulse:  [] 76  Resp:  [18] 18  BP: (118-132)/(73-92) 118/80  SpO2:  [96 %-99 %] 96 %    Physical Exam  Vitals signs reviewed.   Constitutional:       Appearance: He is not toxic-appearing or diaphoretic.   HENT:      Head: Normocephalic and atraumatic.      Right Ear: External ear normal.      Left Ear: External ear normal.      Nose: Nose normal.   Eyes:      General:         Right eye: No discharge.         Left eye: No discharge.      Extraocular Movements: Extraocular movements intact.      Conjunctiva/sclera: Conjunctivae normal.   Neck:      Musculoskeletal: Normal range of motion and neck supple.      Comments: Trach stoma dressed  Cardiovascular:      Rate and Rhythm: Regular rhythm. Tachycardia present.   Pulmonary:      Effort: Pulmonary effort is normal. No respiratory distress.      Breath sounds: Normal breath sounds. No wheezing.   Abdominal:      General: Bowel sounds are normal. There is no distension.      Palpations: Abdomen is soft.      Tenderness: There is no abdominal tenderness.      Comments: PEG site dressed   Musculoskeletal:      Right lower leg: No edema.      Left lower leg: No edema.   Skin:     General: Skin is warm and dry.   Neurological:      Comments: Awake and alert         Fluids    Intake/Output Summary (Last 24 hours) at 3/7/2020 1236  Last data filed at 3/7/2020 0900  Gross per 24 hour   Intake 530 ml   Output 100 ml   Net 430 ml       Laboratory  Recent Labs     03/06/20  0613 03/07/20  0740   WBC 12.1* 11.2*   RBC 6.19* 6.25*   HEMOGLOBIN 16.8 17.7   HEMATOCRIT 54.1* 54.0*   MCV 87.4 86.4   MCH 27.1 28.3   MCHC 31.1* 32.8*   RDW 50.8*  49.5   PLATELETCT 322 241   MPV 11.6 12.1     Recent Labs     03/06/20  0613 03/07/20  0740   SODIUM 143 142   POTASSIUM 4.4 5.9*   CHLORIDE 104 107   CO2 17* 20   GLUCOSE 115* 107*   BUN 9 9   CREATININE 0.92 0.84   CALCIUM 9.9 10.4                 Assessment/Plan  Tachycardia- (present on admission)  Assessment & Plan  Suspect 2/2 agitation and debility  Continue Propranol for HR and BP control    Leukocytosis- (present on admission)  Assessment & Plan  UA 0-2 WBC  BCx x 2 NGTD  CXR +basilar opacities, possible PNA  Elevated WBC normalized on empiric Zosyn and Zyvox  Now w/ recurrent elevated WBC  Will check UA and CXR    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)  Assessment & Plan  Agitation improved  Becoming more verbal    Dysphagia following nontraumatic intracerebral hemorrhage- (present on admission)  Assessment & Plan  S/P PEG  Now taking PO    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

## 2020-03-07 NOTE — PROGRESS NOTES
Hospital Medicine Daily Progress Note      Chief Complaint:  Fever  Tachycardia  Leukocytosis    Interval History:  Remains afebrile.  Labs and imaging reviewed.    Review of Systems  Review of Systems   Unable to perform ROS: Medical condition        Physical Exam  Temp:  [36 °C (96.8 °F)-36.3 °C (97.3 °F)] 36 °C (96.8 °F)  Pulse:  [] 76  Resp:  [18] 18  BP: (118-132)/(73-92) 118/80  SpO2:  [96 %-99 %] 96 %    Physical Exam  Vitals signs reviewed.   Constitutional:       Appearance: He is not toxic-appearing or diaphoretic.   HENT:      Head: Normocephalic and atraumatic.      Right Ear: External ear normal.      Left Ear: External ear normal.      Nose: Nose normal.   Eyes:      General:         Right eye: No discharge.         Left eye: No discharge.      Extraocular Movements: Extraocular movements intact.      Conjunctiva/sclera: Conjunctivae normal.   Neck:      Musculoskeletal: Normal range of motion and neck supple.      Comments: Trach stoma dressed  Cardiovascular:      Rate and Rhythm: Regular rhythm. Tachycardia present.   Pulmonary:      Effort: Pulmonary effort is normal. No respiratory distress.      Breath sounds: Normal breath sounds. No wheezing.   Abdominal:      General: Bowel sounds are normal. There is no distension.      Palpations: Abdomen is soft.      Tenderness: There is no abdominal tenderness.      Comments: PEG site dressed   Musculoskeletal:      Right lower leg: No edema.      Left lower leg: No edema.   Skin:     General: Skin is warm and dry.   Neurological:      Comments: Awake and alert         Fluids    Intake/Output Summary (Last 24 hours) at 3/7/2020 1242  Last data filed at 3/7/2020 0900  Gross per 24 hour   Intake 530 ml   Output 100 ml   Net 430 ml       Laboratory  Recent Labs     03/06/20  0613 03/07/20  0740   WBC 12.1* 11.2*   RBC 6.19* 6.25*   HEMOGLOBIN 16.8 17.7   HEMATOCRIT 54.1* 54.0*   MCV 87.4 86.4   MCH 27.1 28.3   MCHC 31.1* 32.8*   RDW 50.8* 49.5    PLATELETCT 322 241   MPV 11.6 12.1     Recent Labs     03/06/20  0613 03/07/20  0740   SODIUM 143 142   POTASSIUM 4.4 5.9*   CHLORIDE 104 107   CO2 17* 20   GLUCOSE 115* 107*   BUN 9 9   CREATININE 0.92 0.84   CALCIUM 9.9 10.4                 Assessment/Plan  Hyperkalemia  Assessment & Plan  Suspect hemolyzed sample  Pt too uncooperative to repeat serum K+  Will give low dose empiric Kayexalate  Check F/U labs in am    Tachycardia- (present on admission)  Assessment & Plan  Suspect 2/2 agitation and debility  Continue Propranol for HR and BP control    Leukocytosis- (present on admission)  Assessment & Plan  UA 0-2 WBC  BCx x 2 NGTD  CXR +basilar opacities, possible PNA  Elevated WBC normalized on empiric Zosyn and Zyvox  Now w/ recurrent elevated WBC  UA and CXR both unremarkable    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)  Assessment & Plan  Agitation improved  Becoming more verbal    Dysphagia following nontraumatic intracerebral hemorrhage- (present on admission)  Assessment & Plan  S/P PEG  Now taking PO    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    Reviewed w/ pt, sister, and Staff

## 2020-03-07 NOTE — CARE PLAN
Problem: Safety  Goal: Will remain free from injury  Outcome: PROGRESSING AS EXPECTED  Goal: Will remain free from falls  Outcome: PROGRESSING AS EXPECTED     Problem: Infection  Goal: Will remain free from infection  Outcome: PROGRESSING AS EXPECTED     Problem: Respiratory:  Goal: Respiratory status will improve  Outcome: PROGRESSING AS EXPECTED

## 2020-03-07 NOTE — THERAPY
Physical Therapy   Daily Treatment     Patient Name: Rey Medina  Age:  24 y.o., Sex:  male  Medical Record #: [id number]  Today's Date: 3/6/2020     Precautions  Precautions: (P) Fall Risk, PEG Tube, Other (See Comments)  Comments: (P) dysarthria, shunt, modified diet    Subjective    Pt seated in room, very lethargic, but able to open his eyes. When asked if he was ready to start therapy, pt shook his head no. Pt able to give a thumbs up when asked how he was this afternoon.     Objective       03/06/20 1501   Precautions   Precautions Fall Risk;PEG Tube;Other (See Comments)   Comments dysarthria, shunt, modified diet   Sitting Lower Body Exercises   Other Exercises Motomed bike for LE muscle activation and strengthening: 15 min, duration passive 15 min, duration active 0.0 min, total distance 0.95 miles, distance passive 0.95, distance active 0.0, 0 kcal  (seated in wc with max encouragement to actively participate)   Interdisciplinary Plan of Care Collaboration   IDT Collaboration with  Family / Caregiver   Patient Position at End of Therapy Seated;Self Releasing Lap Belt Applied;Family / Friend in Room   Collaboration Comments pt's sister present at start and end of therapy     Assessment    Pt tolerated PT session, however, he was very lethargic and eyes remained closed for the majority of the session. Pt unwilling to transfer from wc>mat table. Pt agreeable to motomed but did not actively participate in cycling. Pt continues to be limited by fatigue in afternoon sessions.    Plan    Trial standing frame, Pre-gait training in // bars, standing tolerance & ambulation in // bars, seated unsupported balance/ core strengthening, BLE PROM/ stretching/ ther-ex, transfer training.  D/c currently scheduled for 3/12/2020

## 2020-03-07 NOTE — PROGRESS NOTES
Rehab Progress Note     Encounter Date: 3/6/2020    CC: ICH, AMS    Interval Events (Subjective)  Patient sitting up in room. Gives thumbs up for how he is doing. Per SLP patient continues to increase his oral intake with foods he likes. Denies pain. Per family mother is leaving today.     IDT Team Meeting 3/3/2020  DC/Disposition:  3/12/20    Objective:  VITAL SIGNS: /73   Pulse (!) 114   Temp 36.3 °C (97.3 °F) (Temporal)   Resp 18   Wt 73.1 kg (161 lb 3.2 oz)   SpO2 99%   BMI 25.25 kg/m²  HR: 100, /86, T: 98.3: RR: 22  Gen: NAD  Psych: Mood and affect appropriate  CV: RRR, no edema  Resp: CTAB, no upper airway sounds  Abd: NTND  Neuro: following simple commands, thumbs up and opens eyes spontaneously.     Recent Results (from the past 72 hour(s))   CBC WITH DIFFERENTIAL    Collection Time: 03/06/20  6:13 AM   Result Value Ref Range    WBC 12.1 (H) 4.8 - 10.8 K/uL    RBC 6.19 (H) 4.70 - 6.10 M/uL    Hemoglobin 16.8 14.0 - 18.0 g/dL    Hematocrit 54.1 (H) 42.0 - 52.0 %    MCV 87.4 81.4 - 97.8 fL    MCH 27.1 27.0 - 33.0 pg    MCHC 31.1 (L) 33.7 - 35.3 g/dL    RDW 50.8 (H) 35.9 - 50.0 fL    Platelet Count 322 164 - 446 K/uL    MPV 11.6 9.0 - 12.9 fL    Neutrophils-Polys 64.10 44.00 - 72.00 %    Lymphocytes 27.80 22.00 - 41.00 %    Monocytes 4.60 0.00 - 13.40 %    Eosinophils 1.60 0.00 - 6.90 %    Basophils 1.40 0.00 - 1.80 %    Immature Granulocytes 0.50 0.00 - 0.90 %    Nucleated RBC 0.00 /100 WBC    Neutrophils (Absolute) 7.72 (H) 1.82 - 7.42 K/uL    Lymphs (Absolute) 3.35 1.00 - 4.80 K/uL    Monos (Absolute) 0.56 0.00 - 0.85 K/uL    Eos (Absolute) 0.19 0.00 - 0.51 K/uL    Baso (Absolute) 0.17 (H) 0.00 - 0.12 K/uL    Immature Granulocytes (abs) 0.06 0.00 - 0.11 K/uL    NRBC (Absolute) 0.00 K/uL   Comp Metabolic Panel    Collection Time: 03/06/20  6:13 AM   Result Value Ref Range    Sodium 143 135 - 145 mmol/L    Potassium 4.4 3.6 - 5.5 mmol/L    Chloride 104 96 - 112 mmol/L    Co2 17 (L) 20 - 33  mmol/L    Anion Gap 22.0 (H) 0.0 - 11.9    Glucose 115 (H) 65 - 99 mg/dL    Bun 9 8 - 22 mg/dL    Creatinine 0.92 0.50 - 1.40 mg/dL    Calcium 9.9 8.5 - 10.5 mg/dL    AST(SGOT) 16 12 - 45 U/L    ALT(SGPT) 29 2 - 50 U/L    Alkaline Phosphatase 95 30 - 99 U/L    Total Bilirubin 0.7 0.1 - 1.5 mg/dL    Albumin 4.4 3.2 - 4.9 g/dL    Total Protein 8.1 6.0 - 8.2 g/dL    Globulin 3.7 (H) 1.9 - 3.5 g/dL    A-G Ratio 1.2 g/dL   MAGNESIUM    Collection Time: 03/06/20  6:13 AM   Result Value Ref Range    Magnesium 2.2 1.5 - 2.5 mg/dL   PHOSPHORUS    Collection Time: 03/06/20  6:13 AM   Result Value Ref Range    Phosphorus 4.7 (H) 2.5 - 4.5 mg/dL   VITAMIN B12    Collection Time: 03/06/20  6:13 AM   Result Value Ref Range    Vitamin B12 -True Cobalamin 611 211 - 911 pg/mL   ESTIMATED GFR    Collection Time: 03/06/20  6:13 AM   Result Value Ref Range    GFR If African American >60 >60 mL/min/1.73 m 2    GFR If Non African American >60 >60 mL/min/1.73 m 2       Current Facility-Administered Medications   Medication Frequency   • amantadine (SYMMETREL) 50 MG/5ML syrup 100 mg BID   • traZODone (DESYREL) tablet 50 mg QHS PRN   • propranolol (INDERAL) tablet 20 mg TID   • acetaminophen (TYLENOL) tablet 650 mg Q4HRS PRN   • artificial tears ophthalmic solution 1 Drop PRN   • benzocaine-menthol (CEPACOL) lozenge 1 Lozenge Q2HRS PRN   • hydrALAZINE (APRESOLINE) tablet 25 mg Q8HRS PRN   • mag hydrox-al hydrox-simeth (MAALOX PLUS ES or MYLANTA DS) suspension 20 mL Q2HRS PRN   • ondansetron (ZOFRAN ODT) dispertab 4 mg 4X/DAY PRN    Or   • ondansetron (ZOFRAN) syringe/vial injection 4 mg 4X/DAY PRN   • polyethylene glycol/lytes (MIRALAX) PACKET 1 Packet Q24HRS PRN    And   • magnesium hydroxide (MILK OF MAGNESIA) suspension 30 mL QDAY PRN    And   • bisacodyl (DULCOLAX) suppository 10 mg QDAY PRN   • sodium chloride (OCEAN) 0.65 % nasal spray 2 Spray PRN   • tramadol (ULTRAM) 50 MG tablet 50 mg Q4HRS PRN   • Respiratory Therapy Consult  Continuous RT   • eucerin cream TID PRN   • ipratropium-albuterol (DUONEB) nebulizer solution Q4H PRN (RT)   • levETIRAcetam (KEPPRA) 100 MG/ML solution 500 mg Q12HRS   • montelukast (SINGULAIR) tablet 10 mg Nightly   • omeprazole (FIRST-OMEPRAZOLE) 2 mg/mL oral susp 40 mg DAILY   • polyethylene glycol/lytes (MIRALAX) PACKET 1 Packet DAILY   • QUEtiapine (SEROQUEL) tablet 25 mg TID PRN   • vitamin D (cholecalciferol) tablet 1,000 Units DAILY       Orders Placed This Encounter   Procedures   • Diet Order Regular (meds via  tube)     Standing Status:   Standing     Number of Occurrences:   1     Order Specific Question:   Diet:     Answer:   Regular [1]     Comments:   meds via  tube     Order Specific Question:   Texture Modifier     Answer:   Level 5 - Minced & Moist (Dysphagia 2)     Order Specific Question:   Liquid level     Answer:   Level 0 - Thin       Assessment:  Active Hospital Problems    Diagnosis   • *Intracerebral hemorrhage, intraventricular (HCC)   • 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 Patient very low functioning but per mother becoming more purposeful  -PT and OT for mobility and ADLs  -SLP for cognition   -Patient agitated on Amantadine and Modafinil. Both trials halted due to agitation. Restart Amantadine 50 mg as he was too agitated on 200 mg. Slow increase up to 100 mg BID with improved cognition and function  -Continue Keppra. Unclear if had seizure or was continued on prophylaxis. Follow-up with Neurology  -NSG appointment on 2/7/20 to evaluation  shunt. Will follow-up in 2 months   -CT head as waxing and waning status. At times he voices including his names, at other times no response and compulsive grabbing of bed rail and  urinal. CT head - negative.      Agitation - Appears like TBI with agitation worsened from neurostimulant. Change Metoprolol to Propranolol. Start Seroquel PRN, too sedated on scheduled  -Propranolol increase to 20 mg TID, improved agitation control     Muscle spasms - mother claims Bromocriptine helps, typically used for dopamine side effects/neurostimulant. Will start low dose Baclofen and monitor.   -Limited spasticity, mostly resisting examination. Stop Baclofen and monitor.     Dysphagia - Patient with G Tube in place. SLP for swallow. MBSS on 2/10/20, start on dysphagia 1 with NTL  -Tolerated first meal but emesis on 2nd meal at higher percentage. May be due to distention, no lung changes. Continue to monitor.   -Discussed with SLP and recommend MBSS on 3/4/20. Upgraded to Dysphagia 2, improving trial     Aspiration pneumonia - Currently on Unasyn.  Elevated WBC, broadened to Zyvox and Zosyn  -Hospitalist consulted    CV - Patient is on Ivabradine 5 mg daily. Unclear reason why. Discontinue Ivabradine, on Metoprolol for tachycardia. Switch metoprolol to Propranolol     Respiratory - Patient with size 7 trach on admission. Patient on Duonebs and Singulair  -Exchange aborted on 2/6/20 due to emesis on suction. Tolerated overnight. Decannulated AM 2/7/20. Tolerated. Stoma remains opened, discussed with RT and increase changes and tighter bandaging      GI Ppx - Patient on Prilosec and Prevacid. Unclear why two PPIs. Discontinue Prevacid. Discontinue Simethicone     DVT Ppx - Patient on Eliquis 2.5 mg BID. Unknown reason why, mother denies clot or DVT. Discontinue Eliquis as 10 months post-ICH    Dispo - Possible extension to 3/12/20, patient improving with ADLs and eating.     Total time:  18 minutes.  I spent greater than 50% of the time for patient care, counseling, and coordination on this date, including unit/floor time, and face-to-face time with the patient as per interval events and assessment and plan  above. Topics discussed included improved diet, and improved command following.     Greer David M.D.

## 2020-03-07 NOTE — ASSESSMENT & PLAN NOTE
Suspect hemolyzed sample  Pt too uncooperative to repeat serum K+  Empirically gave Kayexalate w/ resultant normalized levels

## 2020-03-07 NOTE — THERAPY
"Speech Language Pathology  Daily Treatment     Patient Name: Rey Medina  Age:  24 y.o., Sex:  male  Medical Record #: [id number]  Today's Date: 3/6/2020     Subjective    Pt in room with eyes closed. Pt's sister reports \"he doesn't feel good.\"      Objective     03/06/20 1434   Receptive Language / Auditory Comprehension   Answers Yes / No Personal Questions Supervision (5)   Answers Yes / No Simple / Contextual Questions Supervision (5)   Social / Pragmatic Communication   Eye Contact Severe (2)   Interdisciplinary Plan of Care Collaboration   IDT Collaboration with  Family / Caregiver   Patient Position at End of Therapy Seated;Self Releasing Lap Belt Applied   Collaboration Comments pt's sister present for session   SLP Total Time Spent   SLP Individual Total Time Spent (Mins) 30   Charge Group   SLP Cognitive Skill Development First 15 Minutes 1   SLP Cognitive Skill Development Additional 15 Minutes 1         Assessment    Limited participation in structured therapy tasks this date. Pt with eyes closed throughout session. Pt agreeable to answering Y/N questions as well as verbalizing highly relevant information (I.e. names of siblings, locations of residence, birth order). Pt shaking head \"no\" when SLP attempted to probe further as to why pt did not feel good. Pt gesturing \"no\" with head for nausea, bowel issues, fatigue    Plan    Continue to target safe swallow strategies, attention, receptive and expressive language    "

## 2020-03-08 PROBLEM — E87.0 HYPERNATREMIA: Status: ACTIVE | Noted: 2020-03-08

## 2020-03-08 LAB
ANION GAP SERPL CALC-SCNC: 21 MMOL/L (ref 0–11.9)
BUN SERPL-MCNC: 7 MG/DL (ref 8–22)
CALCIUM SERPL-MCNC: 10.5 MG/DL (ref 8.5–10.5)
CHLORIDE SERPL-SCNC: 106 MMOL/L (ref 96–112)
CO2 SERPL-SCNC: 19 MMOL/L (ref 20–33)
CREAT SERPL-MCNC: 0.84 MG/DL (ref 0.5–1.4)
ERYTHROCYTE [DISTWIDTH] IN BLOOD BY AUTOMATED COUNT: 51 FL (ref 35.9–50)
GLUCOSE SERPL-MCNC: 113 MG/DL (ref 65–99)
HCT VFR BLD AUTO: 54.9 % (ref 42–52)
HGB BLD-MCNC: 17 G/DL (ref 14–18)
MCH RBC QN AUTO: 27.3 PG (ref 27–33)
MCHC RBC AUTO-ENTMCNC: 31 G/DL (ref 33.7–35.3)
MCV RBC AUTO: 88.3 FL (ref 81.4–97.8)
PLATELET # BLD AUTO: 282 K/UL (ref 164–446)
PMV BLD AUTO: 11.5 FL (ref 9–12.9)
POTASSIUM SERPL-SCNC: 4.6 MMOL/L (ref 3.6–5.5)
RBC # BLD AUTO: 6.22 M/UL (ref 4.7–6.1)
SODIUM SERPL-SCNC: 146 MMOL/L (ref 135–145)
WBC # BLD AUTO: 13.3 K/UL (ref 4.8–10.8)

## 2020-03-08 PROCEDURE — 700102 HCHG RX REV CODE 250 W/ 637 OVERRIDE(OP)

## 2020-03-08 PROCEDURE — 700102 HCHG RX REV CODE 250 W/ 637 OVERRIDE(OP): Performed by: PHYSICAL MEDICINE & REHABILITATION

## 2020-03-08 PROCEDURE — 700102 HCHG RX REV CODE 250 W/ 637 OVERRIDE(OP): Performed by: HOSPITALIST

## 2020-03-08 PROCEDURE — A9270 NON-COVERED ITEM OR SERVICE: HCPCS | Performed by: PHYSICAL MEDICINE & REHABILITATION

## 2020-03-08 PROCEDURE — 36415 COLL VENOUS BLD VENIPUNCTURE: CPT

## 2020-03-08 PROCEDURE — 85027 COMPLETE CBC AUTOMATED: CPT

## 2020-03-08 PROCEDURE — A9270 NON-COVERED ITEM OR SERVICE: HCPCS

## 2020-03-08 PROCEDURE — 92526 ORAL FUNCTION THERAPY: CPT

## 2020-03-08 PROCEDURE — 770010 HCHG ROOM/CARE - REHAB SEMI PRIVAT*

## 2020-03-08 PROCEDURE — 80048 BASIC METABOLIC PNL TOTAL CA: CPT

## 2020-03-08 PROCEDURE — 99232 SBSQ HOSP IP/OBS MODERATE 35: CPT | Performed by: HOSPITALIST

## 2020-03-08 PROCEDURE — A9270 NON-COVERED ITEM OR SERVICE: HCPCS | Performed by: HOSPITALIST

## 2020-03-08 RX ORDER — SIMETHICONE 80 MG
160 TABLET,CHEWABLE ORAL
Status: DISCONTINUED | OUTPATIENT
Start: 2020-03-08 | End: 2020-03-08

## 2020-03-08 RX ORDER — SIMETHICONE 80 MG
TABLET,CHEWABLE ORAL
Status: COMPLETED
Start: 2020-03-08 | End: 2020-03-08

## 2020-03-08 RX ORDER — SIMETHICONE 80 MG
80 TABLET,CHEWABLE ORAL 4 TIMES DAILY PRN
Status: DISCONTINUED | OUTPATIENT
Start: 2020-03-08 | End: 2020-03-12 | Stop reason: HOSPADM

## 2020-03-08 RX ORDER — SIMETHICONE 80 MG
TABLET,CHEWABLE ORAL
Status: ACTIVE
Start: 2020-03-08 | End: 2020-03-09

## 2020-03-08 RX ADMIN — SIMETHICONE CHEW TAB 80 MG 160 MG: 80 TABLET ORAL at 22:02

## 2020-03-08 RX ADMIN — LEVETIRACETAM 500 MG: 500 SOLUTION ORAL at 09:14

## 2020-03-08 RX ADMIN — PROPRANOLOL HYDROCHLORIDE 20 MG: 10 TABLET ORAL at 22:01

## 2020-03-08 RX ADMIN — PROPRANOLOL HYDROCHLORIDE 20 MG: 10 TABLET ORAL at 14:12

## 2020-03-08 RX ADMIN — AMANTADINE HYDROCHLORIDE 100 MG: 50 SOLUTION ORAL at 09:14

## 2020-03-08 RX ADMIN — SIMETHICONE CHEW TAB 80 MG 160 MG: 80 TABLET ORAL at 17:19

## 2020-03-08 RX ADMIN — LEVETIRACETAM 500 MG: 500 SOLUTION ORAL at 22:01

## 2020-03-08 RX ADMIN — MONTELUKAST 10 MG: 10 TABLET, FILM COATED ORAL at 22:02

## 2020-03-08 RX ADMIN — CHOLECALCIFEROL TAB 25 MCG (1000 UNIT) 1000 UNITS: 25 TAB at 09:14

## 2020-03-08 RX ADMIN — PROPRANOLOL HYDROCHLORIDE 20 MG: 10 TABLET ORAL at 09:14

## 2020-03-08 RX ADMIN — QUETIAPINE 25 MG: 25 TABLET ORAL at 19:54

## 2020-03-08 RX ADMIN — AMANTADINE HYDROCHLORIDE 100 MG: 50 SOLUTION ORAL at 14:12

## 2020-03-08 RX ADMIN — OMEPRAZOLE 40 MG: KIT at 09:14

## 2020-03-08 RX ADMIN — POLYETHYLENE GLYCOL 3350 1 PACKET: 17 POWDER, FOR SOLUTION ORAL at 18:12

## 2020-03-08 RX ADMIN — BISACODYL 10 MG: 10 SUPPOSITORY RECTAL at 18:25

## 2020-03-08 NOTE — THERAPY
"Speech Language Pathology  Daily Treatment     Patient Name: Rey Medina  Age:  24 y.o., Sex:  male  Medical Record #: [id number]  Today's Date: 3/8/2020     Subjective    Pt assisted to t-dine with assistance from Sister.     Objective       03/08/20 0803   SLP Total Time Spent   SLP Individual Total Time Spent (Mins) 30   Charge Group   SLP Swallowing Dysfunction Treatment Swallowing Dysfunction Treatment       Assessment    Pt initially refusing all PO intake when presented with variety of options, pt consistently nodding head \"no.\" Pts sister assisted in self feeding at this time in which pt was willing to consume limited amounts of thin liquids and MM textures. Pt initially consumed 2 drinks of thins via straw with impulsive intake resulting in immediate cough. With prompting for small sips, no further s/sx of asp/pen noted.     Plan    Cont to address dysphagia management     "

## 2020-03-08 NOTE — FLOWSHEET NOTE
03/07/20 0815   Events/Summary/Plan   Events/Summary/Plan Trach stoma care, site clean, no swelling or redness noted.  Air passing through stoma and some mucus under dressing.   Vital Signs   Pulse (!) 110   Respiration 18   Pulse Oximetry 95 %   $ Pulse Oximetry (Spot Check) Yes   Secretions   How Sputum Obtained Swab;Tracheal   Sputum Amount Small   Sputum Color Yellow;Clear   Sputum Consistency Thick   Oxygen   O2 Delivery Device None - Room Air

## 2020-03-08 NOTE — THERAPY
"Speech Language Pathology  Daily Treatment     Patient Name: Rey Medina  Age:  24 y.o., Sex:  male  Medical Record #: [id number]  Today's Date: 3/8/2020     Subjective    Pt seated in w/c requesting to stay in room for lunch.      Objective       03/08/20 1133   SLP Total Time Spent   SLP Individual Total Time Spent (Mins) 30   Charge Group   SLP Swallowing Dysfunction Treatment Swallowing Dysfunction Treatment       Assessment    Sister present and supportive. Education provided re: implementation of swallow strategies, going slow, allowing time for mult swallows and alternating of liquids and solids. Pt assessed with MM textures and thin liquids, pt presented with cough X3 via thin liquids with use of straw - pts sister reporting \"he does better with the thickened liquids.\" Ongoing education provided re: goal to achieve regular textured intake. Kit brayden present to trial however pt declining intake.     Plan    Cont with current plan of care    "

## 2020-03-08 NOTE — FLOWSHEET NOTE
03/08/20 0915   Events/Summary/Plan   Events/Summary/Plan Family member already did trach care.  Covered with gauze and tape.  Mom is worried trach is infected so family member pulled it back for me to see.  No change in site from previous week, clean, dry and a small amount of yellow discharge on previous day's gauze.   Vital Signs   Respiration 20   $ Pulse Oximetry (Spot Check)   (pt refused)

## 2020-03-08 NOTE — CARE PLAN
Problem: Communication  Goal: The ability to communicate needs accurately and effectively will improve  Outcome: PROGRESSING AS EXPECTED  Patient spoke this evening.  He could say his name and the names of his three sisters.  He read he time out loud from looking at his watch.  He also refused to allow his blood     Problem: Skin Integrity  Goal: Risk for impaired skin integrity will decrease  Outcome: PROGRESSING AS EXPECTED  Trach stoma with dressing clean dry and intact.   Other skin intact, no open areas noted.

## 2020-03-09 PROCEDURE — 99232 SBSQ HOSP IP/OBS MODERATE 35: CPT | Performed by: HOSPITALIST

## 2020-03-09 PROCEDURE — 700102 HCHG RX REV CODE 250 W/ 637 OVERRIDE(OP): Performed by: HOSPITALIST

## 2020-03-09 PROCEDURE — A9270 NON-COVERED ITEM OR SERVICE: HCPCS | Performed by: PHYSICAL MEDICINE & REHABILITATION

## 2020-03-09 PROCEDURE — 97110 THERAPEUTIC EXERCISES: CPT

## 2020-03-09 PROCEDURE — 700102 HCHG RX REV CODE 250 W/ 637 OVERRIDE(OP): Performed by: PHYSICAL MEDICINE & REHABILITATION

## 2020-03-09 PROCEDURE — A9270 NON-COVERED ITEM OR SERVICE: HCPCS | Performed by: HOSPITALIST

## 2020-03-09 PROCEDURE — 97535 SELF CARE MNGMENT TRAINING: CPT

## 2020-03-09 PROCEDURE — 97530 THERAPEUTIC ACTIVITIES: CPT

## 2020-03-09 PROCEDURE — 99232 SBSQ HOSP IP/OBS MODERATE 35: CPT | Performed by: PHYSICAL MEDICINE & REHABILITATION

## 2020-03-09 PROCEDURE — 92507 TX SP LANG VOICE COMM INDIV: CPT

## 2020-03-09 PROCEDURE — 770010 HCHG ROOM/CARE - REHAB SEMI PRIVAT*

## 2020-03-09 RX ORDER — TRAZODONE HYDROCHLORIDE 50 MG/1
TABLET ORAL
Status: ACTIVE
Start: 2020-03-09 | End: 2020-03-10

## 2020-03-09 RX ORDER — TRAZODONE HYDROCHLORIDE 50 MG/1
100 TABLET ORAL
Status: DISCONTINUED | OUTPATIENT
Start: 2020-03-09 | End: 2020-03-12 | Stop reason: HOSPADM

## 2020-03-09 RX ADMIN — OMEPRAZOLE 40 MG: KIT at 08:02

## 2020-03-09 RX ADMIN — LEVETIRACETAM 500 MG: 500 SOLUTION ORAL at 21:14

## 2020-03-09 RX ADMIN — AMANTADINE HYDROCHLORIDE 100 MG: 50 SOLUTION ORAL at 08:02

## 2020-03-09 RX ADMIN — AMANTADINE HYDROCHLORIDE 100 MG: 50 SOLUTION ORAL at 14:16

## 2020-03-09 RX ADMIN — MONTELUKAST 10 MG: 10 TABLET, FILM COATED ORAL at 21:14

## 2020-03-09 RX ADMIN — PROPRANOLOL HYDROCHLORIDE 20 MG: 10 TABLET ORAL at 08:03

## 2020-03-09 RX ADMIN — CHOLECALCIFEROL TAB 25 MCG (1000 UNIT) 1000 UNITS: 25 TAB at 08:03

## 2020-03-09 RX ADMIN — LEVETIRACETAM 500 MG: 500 SOLUTION ORAL at 08:03

## 2020-03-09 RX ADMIN — PROPRANOLOL HYDROCHLORIDE 20 MG: 10 TABLET ORAL at 21:14

## 2020-03-09 RX ADMIN — PROPRANOLOL HYDROCHLORIDE 20 MG: 10 TABLET ORAL at 14:16

## 2020-03-09 RX ADMIN — POLYETHYLENE GLYCOL 3350 1 PACKET: 17 POWDER, FOR SOLUTION ORAL at 17:12

## 2020-03-09 NOTE — THERAPY
Missed Therapy     Patient Name: Rey Medina  Age:  24 y.o., Sex:  male  Medical Record #: [id number]  Today's Date: 3/9/2020    Discussed missed therapy with therapy  and Dr David.     Pt unable to participate secondary to agitation and lack of sleep.

## 2020-03-09 NOTE — PROGRESS NOTES
0715: Bedside report received, assumed care for this patient.  Patient is A&O x 0, ROYAL.  Speaking but difficult to understand, agitated.  Pulling away as I try to scan bracelet. Sister present and seems to understand what he says.  VSS.  Communication board updated, call light and belongings are within reach.  Bed is in low position. Patient appears in no distress if left alone, and has no appearances of SOB and no appearances of pain.  Will be medicated per MAR.  Plan of care discussed and agreed upon with patient's family.

## 2020-03-09 NOTE — THERAPY
"Occupational Therapy  Daily Treatment     Patient Name: Rey Medina  Age:  24 y.o., Sex:  male  Medical Record #: [id number]  Today's Date: 3/9/2020     Precautions  Precautions: Fall Risk, PEG Tube  Comments: dysarthria, shunt, modified diet    Safety   ADL Safety : Requires Physical Assist for Safety  Bathroom Safety: Requires Physical Assist for Safety  Comments: See FIMs for ADL performance details    Subjective    \"I just don't want to\" patient stated re: getting OOB/actively participating in therapy.     When asked what patient would like to do, responded \"play video games.\"      Objective     03/09/20 0831   Precautions   Precautions Fall Risk;PEG Tube   Comments dysarthria, shunt, modified diet   Safety    ADL Safety  Requires Physical Assist for Safety   Cognition    Level of Consciousness Alert   Interdisciplinary Plan of Care Collaboration   Patient Position at End of Therapy In Bed;Family / Friend in Room;Call Light within Reach   OT Total Time Spent   OT Individual Total Time Spent (Mins) 30   OT Charge Group   OT Self Care / ADL 1   OT Therapy Activity 1     FIM Grooming Score:  1 - Total Assistance  Grooming Description:  (Total assist for washing face this AM due to patient refusing to actively participate in task.)    Patient in bed with sister bedside when undersigned therapist arrived. Patient declined to get OOB and to participate in grooming tasks while in bed (total assist for washing face and unable to assist patient with brushing teeth due to patient maintaining mouth shut).     Assessment    Decreased participation today with patient repeating \"I don't want to.. I just don't want to\" when maximal encouragement provided. Increased cooperation observed when undersigned therapist incorporated iPad into session and turned on Anime. Patient correctly identified several features of show (ie color of character's shirt, hair). Significant improvement noted with speech clarity/communication; " patient able to verbally communicate in short sentences.      Plan    ADL retraining while incorporating low-stim environment as needed, BUE strength and coordination

## 2020-03-09 NOTE — REHAB-DIETARY IDT TEAM NOTE
Dietary   Nutrition  Dietary Problems     Problem: Other Problem (see comments)     Description: Difficulty swallowing r/t secondary dysphagia s/p Intracerebral hemorrhage, intraventricular as evidenced by NPO, G-tube for alternate route of nutrition/hydraiton/ medications.      Goal: Other Goal (Resolved)     Description: 1. Tolerance to EN regimen 2. Maintain adequate enteral nutrient/fluid intake (PO vs TF) to promote nutrition optimization/healing 3.  Transition to PO pending clinical outcomes                      Patient's diet advanced to Level 5 (Minced and moist) with thin liquids (level 0).  Patient eating quite well, but notably refusing therapies some days.  PO > 60% on average of meals since diet upgraded.  No bolus has been given since 3/4 per review of chart.    Pertinent Labs: WBC 13.3, CO2 19, Na+ 146, Anion gap 21, Glucose 113, BUN 7    Pertinent medications: noted    Weight: there has been no new weight taken since 2/23  Skin: CDI/ +G-tube    Vitals: tachycardia noted, /84, RA   GI: BM 3/8 s/p laxatives  : WNL  I/Os: +770 mL x 24 hours - no output recorded      Plan: Increase free water to 250mL q 4 hours s/t hypernatremia and not getting free water from TF formula as this is being held due to good PO.  Weekly weights. Diet upgrades per SLP.  Give bolus if PO <50% with SLP. Continue free water infusion due to hypernatremia. RD following weekly.         Section completed by:  Magdalena Jiang R.D.

## 2020-03-09 NOTE — THERAPY
"Speech Language Pathology  Daily Treatment     Patient Name: Rey Medina  Age:  24 y.o., Sex:  male  Medical Record #: [id number]  Today's Date: 3/9/2020     Subjective    Pt continues to remain in bed, sister reports pt has not gotten up in w/c today. Sister also reports pt awoke at 2 a.m. and has not slept since. Discussed with nurse PRN trazodone for this evening.      Objective     03/09/20 1504   Social / Pragmatic Communication   Social / Pragmatic Communication X   Eye Contact Moderate (3)   Body Language Severe (2)   Attention to Social Cues Severe (2)   Therapy Missed   Missed Therapy (Minutes) 15   Reason For Missed Therapy Medical - Patient on Hold from Therapy   SLP Total Time Spent   SLP Individual Total Time Spent (Mins) 15   Charge Group   SLP Treatment - Individual Speech Language Treatment - Individual           Assessment    Pt with increased verbal output today despite refusal to participate in structured therapy tasks. Several tasks offered to patient with consistent refusal given, \"no I don't want to. Five more minutes. I'll do it later.\" Complexity of statements increased from week prior with pt responding primarily in Y/N, single words.     Plan    Continue to target receptive/expressive language    "

## 2020-03-09 NOTE — ASSESSMENT & PLAN NOTE
Na: 142 (3/7) --> 146 (3/8)  Transitioning to orals -- probably not taking in enough fluids  On free water via PEG

## 2020-03-09 NOTE — THERAPY
"Speech Language Pathology  Daily Treatment     Patient Name: Rey Medina  Age:  24 y.o., Sex:  male  Medical Record #: [id number]  Today's Date: 3/9/2020     Subjective    Pt initially refusing therapy, \"5 more minutes.\" Agreeable to at bedside with sister present.      Objective     03/09/20 1104   Expressive Language   Naming Supervision (5)   Reading Comprehension    Matches Words to Pictures / Objects Minimal (4)   Interdisciplinary Plan of Care Collaboration   IDT Collaboration with  Family / Caregiver   Patient Position at End of Therapy Seated;In Bed;Family / Friend in Room   Collaboration Comments sister present for session   SLP Total Time Spent   SLP Individual Total Time Spent (Mins) 30   Charge Group   SLP Treatment - Individual Speech Language Treatment - Individual           Assessment    Task attention and simple categorization tasks targeted this date. Given picture card and verbal prompt pt able to identify correct category given FO2 in 24/25 trials. Add +1 to a given category 21/25 IND. Pt able to verbalize category given FO3 prior to selecting additional target in FO2.     Plan    Continue to target attention, processing    "

## 2020-03-09 NOTE — THERAPY
"Missed Therapy     Patient Name: Rey Medina  Age:  24 y.o., Sex:  male  Medical Record #: [id number]  Today's Date: 3/9/2020    Discussed missed therapy with family, nursing, schedulers. Pt in bed, dressed, refusing to transfer to / for dysphagia intervention. Pt refusing therapy at bedside. \"No, I don't want to, I don't want to do anything.\" Pt agreeable to participate with this SLP at 11:00. Pt with increased intelligiblity, less air escape through stoma noted this date.      03/09/20 0804   Interdisciplinary Plan of Care Collaboration   IDT Collaboration with  Family / Caregiver;Other (See Comments)  (schedulers)   Therapy Missed   Missed Therapy (Minutes) 30   Reason For Missed Therapy Non-Medical - Patient Refused     "

## 2020-03-09 NOTE — RESPIRATORY CARE
Conscious Sedation Respiratory Update    FiO2%: 21 % (03/01/20 0800)  O2 (LPM): 0 (03/09/20 1340)       Events/Summary/Plan: Stoma care done.  Pt mom did earlier this morning but I did it again now.  Mom wanted to help and does very well.  Stoma site looks healthy. (03/09/20 1340)

## 2020-03-09 NOTE — THERAPY
Missed Therapy     Patient Name: Rey Medina  Age:  24 y.o., Sex:  male  Medical Record #: [id number]  Today's Date: 3/9/2020    Discussed missed therapy with  and MD.      03/09/20 1331   Precautions   Precautions Fall Risk;PEG Tube   Comments dysarthric (however improving), shunt, modified diet   Interdisciplinary Plan of Care Collaboration   IDT Collaboration with  Family / Caregiver   Patient Position at End of Therapy In Bed   Collaboration Comments Medical hold, sister present    Therapy Missed   Missed Therapy (Minutes) 30   Reason For Missed Therapy Medical - Patient on Hold from Therapy  (UA to participate, poor sleep)

## 2020-03-09 NOTE — CARE PLAN
Problem: Safety  Goal: Will remain free from injury  Outcome: PROGRESSING AS EXPECTED  Patient with bed alarm on.  Family at beside.

## 2020-03-09 NOTE — CARE PLAN
Problem: Bowel/Gastric:  Goal: Normal bowel function is maintained or improved  Note: Pt last BM on 3/6, pt given scheduled miralax and prn suppository per pt's family request.

## 2020-03-09 NOTE — PROGRESS NOTES
"Hospital Medicine Daily Progress Note      Chief Complaint:  Fever  Tachycardia  Leukocytosis    Interval History:  Called to bedside due to pt agitation.  Pt unable to hold still, won't allow anyone to examine him, and c/o wanting to \"spit\" but doesn't actually spit anything out.    Review of Systems  Review of Systems   Unable to perform ROS: Medical condition        Physical Exam  Temp:  [36.5 °C (97.7 °F)] 36.5 °C (97.7 °F)  Pulse:  [] 87  Resp:  [16-20] 18  BP: (108-135)/(73-89) 108/73  SpO2:  [93 %-97 %] 97 %    Physical Exam  Vitals signs reviewed.   Constitutional:       Appearance: He is not toxic-appearing or diaphoretic.   HENT:      Head: Normocephalic and atraumatic.      Right Ear: External ear normal.      Left Ear: External ear normal.      Nose: Nose normal.   Eyes:      General:         Right eye: No discharge.         Left eye: No discharge.      Extraocular Movements: Extraocular movements intact.      Conjunctiva/sclera: Conjunctivae normal.   Neck:      Musculoskeletal: Normal range of motion and neck supple.      Comments: Trach stoma dressed  Cardiovascular:      Rate and Rhythm: Regular rhythm. Tachycardia present.   Pulmonary:      Effort: Pulmonary effort is normal. No respiratory distress.      Breath sounds: Normal breath sounds. No wheezing.   Abdominal:      General: Bowel sounds are normal. There is no distension.      Palpations: Abdomen is soft.      Tenderness: There is no abdominal tenderness.      Comments: PEG site dressed   Musculoskeletal:      Right lower leg: No edema.      Left lower leg: No edema.   Skin:     General: Skin is warm and dry.   Neurological:      Comments: Agitated and keeps eyes closed         Fluids    Intake/Output Summary (Last 24 hours) at 3/8/2020 2318  Last data filed at 3/8/2020 2200  Gross per 24 hour   Intake 770 ml   Output --   Net 770 ml       Laboratory  Recent Labs     03/06/20  0613 03/07/20  0740 03/08/20  0658   WBC 12.1* 11.2* 13.3* "   RBC 6.19* 6.25* 6.22*   HEMOGLOBIN 16.8 17.7 17.0   HEMATOCRIT 54.1* 54.0* 54.9*   MCV 87.4 86.4 88.3   MCH 27.1 28.3 27.3   MCHC 31.1* 32.8* 31.0*   RDW 50.8* 49.5 51.0*   PLATELETCT 322 241 282   MPV 11.6 12.1 11.5     Recent Labs     03/06/20  0613 03/07/20  0740 03/08/20  0658   SODIUM 143 142 146*   POTASSIUM 4.4 5.9* 4.6   CHLORIDE 104 107 106   CO2 17* 20 19*   GLUCOSE 115* 107* 113*   BUN 9 9 7*   CREATININE 0.92 0.84 0.84   CALCIUM 9.9 10.4 10.5                 Assessment/Plan  Hypernatremia  Assessment & Plan  Pt now taking PO but suspect not drinking enough fluids  Will give free water via PEG    Hyperkalemia  Assessment & Plan  Suspect hemolyzed sample  Pt too uncooperative to repeat serum K+  Empirically gave Kayexalate w/ resultant normalized levels    Tachycardia- (present on admission)  Assessment & Plan  Suspect 2/2 agitation and debility  Continue Propranol for HR and BP control    Leukocytosis- (present on admission)  Assessment & Plan  UA 0-2 WBC  BCx x 2 NGTD  CXR +basilar opacities, possible PNA  Elevated WBC normalized on empiric Zosyn and Zyvox  Now w/ recurrent elevated WBC  UA and CXR both unremarkable  Suspect reactive demargination from overstimulation w/ Amantadine given worsening agitation    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)  Assessment & Plan  Becoming more verbal    Dysphagia following nontraumatic intracerebral hemorrhage- (present on admission)  Assessment & Plan  S/P PEG  Now taking PO    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

## 2020-03-09 NOTE — PROGRESS NOTES
Patient very agitated.  He has soiled himself and the bed, he is fighting staff and his sister who is trying to clean him up.  Due to soiling he needs a total bed change and to be cleaned but is fighting staff.  Charge nurse notified.  Will give seroquel 25 mg when able to safely get to peg tube.  Patient is curled up and kicking at staff and family.     1956  Seroquel given.    2200  Patient calmer at this time.

## 2020-03-09 NOTE — PROGRESS NOTES
Received shift report and assumed care of patient. Patient awake, calm and stable, currently positioned in bed for comfort and safety, personal items within reach at bedside, Family at bedside. No complaint of pain.

## 2020-03-10 PROCEDURE — 700102 HCHG RX REV CODE 250 W/ 637 OVERRIDE(OP): Performed by: PHYSICAL MEDICINE & REHABILITATION

## 2020-03-10 PROCEDURE — 770010 HCHG ROOM/CARE - REHAB SEMI PRIVAT*

## 2020-03-10 PROCEDURE — 97535 SELF CARE MNGMENT TRAINING: CPT

## 2020-03-10 PROCEDURE — 700102 HCHG RX REV CODE 250 W/ 637 OVERRIDE(OP): Performed by: HOSPITALIST

## 2020-03-10 PROCEDURE — 99232 SBSQ HOSP IP/OBS MODERATE 35: CPT | Performed by: HOSPITALIST

## 2020-03-10 PROCEDURE — A9270 NON-COVERED ITEM OR SERVICE: HCPCS | Performed by: HOSPITALIST

## 2020-03-10 PROCEDURE — A9270 NON-COVERED ITEM OR SERVICE: HCPCS | Performed by: PHYSICAL MEDICINE & REHABILITATION

## 2020-03-10 PROCEDURE — 97530 THERAPEUTIC ACTIVITIES: CPT

## 2020-03-10 PROCEDURE — 99233 SBSQ HOSP IP/OBS HIGH 50: CPT | Performed by: PHYSICAL MEDICINE & REHABILITATION

## 2020-03-10 RX ADMIN — POLYETHYLENE GLYCOL 3350 1 PACKET: 17 POWDER, FOR SOLUTION ORAL at 18:02

## 2020-03-10 RX ADMIN — PROPRANOLOL HYDROCHLORIDE 20 MG: 10 TABLET ORAL at 21:17

## 2020-03-10 RX ADMIN — AMANTADINE HYDROCHLORIDE 100 MG: 50 SOLUTION ORAL at 08:48

## 2020-03-10 RX ADMIN — OMEPRAZOLE 40 MG: KIT at 08:48

## 2020-03-10 RX ADMIN — AMANTADINE HYDROCHLORIDE 100 MG: 50 SOLUTION ORAL at 13:36

## 2020-03-10 RX ADMIN — LEVETIRACETAM 500 MG: 500 SOLUTION ORAL at 08:48

## 2020-03-10 RX ADMIN — CHOLECALCIFEROL TAB 25 MCG (1000 UNIT) 1000 UNITS: 25 TAB at 08:48

## 2020-03-10 RX ADMIN — PROPRANOLOL HYDROCHLORIDE 20 MG: 10 TABLET ORAL at 08:48

## 2020-03-10 RX ADMIN — LEVETIRACETAM 500 MG: 500 SOLUTION ORAL at 21:17

## 2020-03-10 RX ADMIN — MONTELUKAST 10 MG: 10 TABLET, FILM COATED ORAL at 21:17

## 2020-03-10 NOTE — PROGRESS NOTES
Rehab Progress Note     Encounter Date: 3/9/2020    CC: ICH, AMS    Interval Events (Subjective)  Patient sitting up in room. He is much more talkative this morning. He is making his needs known including reporting he is tired and that he is not sleeping enough at night. Denies pain. Goes back to needing more sleep at night. Discussed giving amantadine earlier in the afternoon and scheduling sleeping medication at night. Patient's sister is in agremeent.     IDT Team Meeting 3/3/2020  DC/Disposition:  3/12/20    Objective:  VITAL SIGNS: /84   Pulse (!) 103   Temp 36.4 °C (97.5 °F) (Temporal)   Resp 18   Wt 73.1 kg (161 lb 3.2 oz)   SpO2 92%   BMI 25.25 kg/m²  HR: 100, /86, T: 98.3: RR: 22  Gen: NAD  Psych: Mood and affect appropriate  CV: RRR, no edema  Resp: CTAB, no upper airway sounds  Abd: NTND  Neuro: Opening eyes occasionally but responding to questions with clear, loud voice in short sentences     Recent Results (from the past 72 hour(s))   URINALYSIS    Collection Time: 03/06/20  8:53 PM   Result Value Ref Range    Color DK Yellow     Character Cloudy (A)     Specific Gravity 1.031 <1.035    Ph 6.0 5.0 - 8.0    Glucose Negative Negative mg/dL    Ketones Negative Negative mg/dL    Protein Negative Negative mg/dL    Bilirubin Negative Negative    Urobilinogen, Urine 1.0 Negative    Nitrite Negative Negative    Leukocyte Esterase Negative Negative    Occult Blood Negative Negative    Micro Urine Req Microscopic    URINE MICROSCOPIC (W/UA)    Collection Time: 03/06/20  8:53 PM   Result Value Ref Range    WBC 0-2 (A) /hpf    RBC 5-10 (A) /hpf    Bacteria Negative None /hpf    Epithelial Cells Negative /hpf    Mucous Threads Few /hpf    Amorphous Crystal Present /hpf    Ca Oxalate Crystal Moderate /hpf    Hyaline Cast 0-2 /lpf   CBC WITHOUT DIFFERENTIAL    Collection Time: 03/07/20  7:40 AM   Result Value Ref Range    WBC 11.2 (H) 4.8 - 10.8 K/uL    RBC 6.25 (H) 4.70 - 6.10 M/uL    Hemoglobin  17.7 14.0 - 18.0 g/dL    Hematocrit 54.0 (H) 42.0 - 52.0 %    MCV 86.4 81.4 - 97.8 fL    MCH 28.3 27.0 - 33.0 pg    MCHC 32.8 (L) 33.7 - 35.3 g/dL    RDW 49.5 35.9 - 50.0 fL    Platelet Count 241 164 - 446 K/uL    MPV 12.1 9.0 - 12.9 fL   Basic Metabolic Panel    Collection Time: 03/07/20  7:40 AM   Result Value Ref Range    Sodium 142 135 - 145 mmol/L    Potassium 5.9 (H) 3.6 - 5.5 mmol/L    Chloride 107 96 - 112 mmol/L    Co2 20 20 - 33 mmol/L    Glucose 107 (H) 65 - 99 mg/dL    Bun 9 8 - 22 mg/dL    Creatinine 0.84 0.50 - 1.40 mg/dL    Calcium 10.4 8.5 - 10.5 mg/dL    Anion Gap 15.0 (H) 0.0 - 11.9   ESTIMATED GFR    Collection Time: 03/07/20  7:40 AM   Result Value Ref Range    GFR If African American >60 >60 mL/min/1.73 m 2    GFR If Non African American >60 >60 mL/min/1.73 m 2   PERIPHERAL SMEAR REVIEW    Collection Time: 03/07/20  7:40 AM   Result Value Ref Range    Peripheral Smear Review see below    MORPHOLOGY    Collection Time: 03/07/20  7:40 AM   Result Value Ref Range    RBC Morphology #CRI    PLATELET ESTIMATE    Collection Time: 03/07/20  7:40 AM   Result Value Ref Range    Plt Estimation Normal    CBC WITHOUT DIFFERENTIAL    Collection Time: 03/08/20  6:58 AM   Result Value Ref Range    WBC 13.3 (H) 4.8 - 10.8 K/uL    RBC 6.22 (H) 4.70 - 6.10 M/uL    Hemoglobin 17.0 14.0 - 18.0 g/dL    Hematocrit 54.9 (H) 42.0 - 52.0 %    MCV 88.3 81.4 - 97.8 fL    MCH 27.3 27.0 - 33.0 pg    MCHC 31.0 (L) 33.7 - 35.3 g/dL    RDW 51.0 (H) 35.9 - 50.0 fL    Platelet Count 282 164 - 446 K/uL    MPV 11.5 9.0 - 12.9 fL   Basic Metabolic Panel    Collection Time: 03/08/20  6:58 AM   Result Value Ref Range    Sodium 146 (H) 135 - 145 mmol/L    Potassium 4.6 3.6 - 5.5 mmol/L    Chloride 106 96 - 112 mmol/L    Co2 19 (L) 20 - 33 mmol/L    Glucose 113 (H) 65 - 99 mg/dL    Bun 7 (L) 8 - 22 mg/dL    Creatinine 0.84 0.50 - 1.40 mg/dL    Calcium 10.5 8.5 - 10.5 mg/dL    Anion Gap 21.0 (H) 0.0 - 11.9   ESTIMATED GFR    Collection  Time: 03/08/20  6:58 AM   Result Value Ref Range    GFR If African American >60 >60 mL/min/1.73 m 2    GFR If Non African American >60 >60 mL/min/1.73 m 2       Current Facility-Administered Medications   Medication Frequency   • simethicone (MYLICON) chewable tab 80 mg 4X/DAY PRN   • amantadine (SYMMETREL) 50 MG/5ML syrup 100 mg BID   • traZODone (DESYREL) tablet 50 mg QHS PRN   • propranolol (INDERAL) tablet 20 mg TID   • acetaminophen (TYLENOL) tablet 650 mg Q4HRS PRN   • artificial tears ophthalmic solution 1 Drop PRN   • benzocaine-menthol (CEPACOL) lozenge 1 Lozenge Q2HRS PRN   • hydrALAZINE (APRESOLINE) tablet 25 mg Q8HRS PRN   • mag hydrox-al hydrox-simeth (MAALOX PLUS ES or MYLANTA DS) suspension 20 mL Q2HRS PRN   • ondansetron (ZOFRAN ODT) dispertab 4 mg 4X/DAY PRN    Or   • ondansetron (ZOFRAN) syringe/vial injection 4 mg 4X/DAY PRN   • polyethylene glycol/lytes (MIRALAX) PACKET 1 Packet Q24HRS PRN    And   • magnesium hydroxide (MILK OF MAGNESIA) suspension 30 mL QDAY PRN    And   • bisacodyl (DULCOLAX) suppository 10 mg QDAY PRN   • sodium chloride (OCEAN) 0.65 % nasal spray 2 Spray PRN   • tramadol (ULTRAM) 50 MG tablet 50 mg Q4HRS PRN   • Respiratory Therapy Consult Continuous RT   • eucerin cream TID PRN   • ipratropium-albuterol (DUONEB) nebulizer solution Q4H PRN (RT)   • levETIRAcetam (KEPPRA) 100 MG/ML solution 500 mg Q12HRS   • montelukast (SINGULAIR) tablet 10 mg Nightly   • omeprazole (FIRST-OMEPRAZOLE) 2 mg/mL oral susp 40 mg DAILY   • polyethylene glycol/lytes (MIRALAX) PACKET 1 Packet DAILY   • QUEtiapine (SEROQUEL) tablet 25 mg TID PRN   • vitamin D (cholecalciferol) tablet 1,000 Units DAILY       Orders Placed This Encounter   Procedures   • Diet Order Regular (meds via  tube)     Standing Status:   Standing     Number of Occurrences:   1     Order Specific Question:   Diet:     Answer:   Regular [1]     Comments:   meds via  tube     Order Specific Question:   Texture Modifier      Answer:   Level 5 - Minced & Moist (Dysphagia 2)     Order Specific Question:   Liquid level     Answer:   Level 0 - Thin       Assessment:  Active Hospital Problems    Diagnosis   • *Intracerebral hemorrhage, intraventricular (HCC)   • 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 Patient very low functioning but per mother becoming more purposeful  -PT and OT for mobility and ADLs  -SLP for cognition   -Patient agitated on Amantadine and Modafinil. Both trials halted due to agitation. Restart Amantadine 50 mg as he was too agitated on 200 mg. Slow increase up to 100 mg BID with improved cognition and function  -Continue Keppra. Unclear if had seizure or was continued on prophylaxis. Follow-up with Neurology  -NSG appointment on 2/7/20 to evaluation  shunt. Will follow-up in 2 months   -CT head as waxing and waning status. At times he voices including his names, at other times no response and compulsive grabbing of bed rail and urinal. CT head - negative.      Agitation - Appears like TBI with agitation worsened from neurostimulant. Change Metoprolol to Propranolol. Start Seroquel PRN, too sedated on scheduled  -Propranolol increase to 20 mg TID, improved agitation control     Muscle spasms - mother claims Bromocriptine helps, typically used for dopamine side effects/neurostimulant. Will start low dose Baclofen and monitor.   -Limited spasticity, mostly resisting examination. Stop Baclofen and monitor.     Dysphagia - Patient with G Tube in place. SLP for swallow. MBSS on 2/10/20, start on dysphagia 1 with NTL  -Tolerated first meal but emesis on 2nd meal at higher percentage. May be due to distention, no lung changes. Continue to monitor.   -Discussed with  SLP and recommend MBSS on 3/4/20. Upgraded to Dysphagia 2, improving trial     Aspiration pneumonia - Currently on Unasyn.  Elevated WBC, broadened to Zyvox and Zosyn  -Hospitalist consulted    CV - Patient is on Ivabradine 5 mg daily. Unclear reason why. Discontinue Ivabradine, on Metoprolol for tachycardia. Switch metoprolol to Propranolol     Respiratory - Patient with size 7 trach on admission. Patient on Duonebs and Singulair  -Exchange aborted on 2/6/20 due to emesis on suction. Tolerated overnight. Decannulated AM 2/7/20. Tolerated. Stoma remains opened, discussed with RT and increase changes and tighter bandaging      Insomnia - Patient able to verbalize insomnia. Schedule Trazodone and move amantadine to noon.     GI Ppx - Patient on Prilosec and Prevacid. Unclear why two PPIs. Discontinue Prevacid. Discontinue Simethicone     DVT Ppx - Patient on Eliquis 2.5 mg BID. Unknown reason why, mother denies clot or DVT. Discontinue Eliquis as 10 months post-ICH    Dispo - Possible extension to 3/12/20, patient improving with ADLs and eating.     Total time:  26 minutes.  I spent greater than 50% of the time for patient care, counseling, and coordination on this date, including unit/floor time, and face-to-face time with the patient as per interval events and assessment and plan above. Topics discussed included increasing vocalization, schedule Trazodone for insomnia, and make Amantadine earlier in the afternoon.     Greer David M.D.       Warm

## 2020-03-10 NOTE — THERAPY
Missed Therapy     Patient Name: Rey Medina  Age:  24 y.o., Sex:  male  Medical Record #: [id number]  Today's Date: 3/10/2020    Discussed missed therapy with Dr David.    Sister present throughout tx session.  Therapist attempted to transfer pt from bed to manual wc - Pt refused.  Tx emphasis changed to Upper Body clothing management in bed - pt refused.  Noted increased agitation w/ continued attempts to negotiate participation.

## 2020-03-10 NOTE — CARE PLAN
Problem: Mobility Transfers  Goal: STG-Within one week, patient will transfer bed to chair  Description: 1) Individualized goal:  Mod A x1 with LRAD  2) Interventions:  PT E Stim Attended, PT Orthotics Training, PT Gait Training, PT Self Care/Home Eval, PT Therapeutic Exercises, PT Ultrasound, PT TENS Application, PT Neuro Re-Ed/Balance, PT Therapeutic Activity, PT Manual Therapy and PT Evaluation      Outcome: NOT MET  Note: Pt able to perform transfer at mod A level 1x, however has not been consistent  Goal: STG-Within one week, patient will perform bed mobility  Description: 1) Individualized goal: Mod A for supine<>sit, bed rail prn, cueing, setup, non-elevated HOB, increased time  2) Interventions: PT E Stim Attended, PT Orthotics Training, PT Gait Training, PT Self Care/Home Eval, PT Therapeutic Exercises, PT Ultrasound, PT TENS Application, PT Neuro Re-Ed/Balance, PT Therapeutic Activity, PT Manual Therapy and PT Evaluation   Outcome: NOT MET  Note: Min-mod A with use of bed features, including raising HOB

## 2020-03-10 NOTE — REHAB-PT IDT TEAM NOTE
Physical Therapy   Mobility  Bed mobility:   Total Assist  Bed /Chair/Wheelchair Transfer Initial:  1 - Total Assistance  Bed /Chair/Wheelchair Transfer Current:  1 - Total Assistance   Bed/Chair/Wheelchair Transfer Description:  Increased time, Supervision for safety, Verbal cueing, Set-up of equipment, Initial preparation for task (wc <> mat table stand step transfer (sit <> sit), min A wc > EOM, mod A EOM > wc with VC for sequencing and stepping; pt transfers better to L than to R)  Walk Initial:  1 - Total Assistance  Walk Current:  0 - Not tested, patient refused   Walk Description:  Two hand rails, Requires 2 people to assist, Verbal cueing, Safety concerns, Extra time (3 ftx2, 5 ftx2, 8 ftx1 in parallel bars with Max A for stabilizing, facilitating weight shift, and managing step length/position, Min A to manage trunk. Max multimodal cues for sequencing and posture.)  Wheelchair Initial:  0 - Not tested, patient refused  Wheelchair Current:  1 - Total Assistance   Wheelchair Description:  Adaptive equipment, Extra time, Safety concerns, Verbal cueing, Supervision for safety(x1 foot with BUE dependent with hand over hand TC)  Stairs Initial:  0 - Not tested,unsafe activity  Stairs Current: 0 - Not tested,unsafe activity   Stairs Description:    Patient/Family Training/Education:  Ongoing with pt's mother and sister  DME/DC Recommendations:  Manual wheelchair  Strengths:  Supportive family and Other: Improving verbal communication  Barriers:   Limited mobility, Poor activity tolerance, Poor balance, Poor sleep pattern, Uncooperative and Other: agitation   # of short term goals set= 2  # of short term goals met=0    Physical Therapy Problems     Problem: Balance     Dates: Start: 03/03/20       Description:     Goal: STG-Within one week, patient will maintain static standing     Dates: Start: 03/03/20       Description: 1) Individualized goal: 2 min, Mod A, gait belt, // bars, cueing, setup  2) Interventions: PT  E Stim Attended, PT Orthotics Training, PT Gait Training, PT Self Care/Home Eval, PT Therapeutic Exercises, PT Ultrasound, PT TENS Application, PT Neuro Re-Ed/Balance, PT Therapeutic Activity, PT Manual Therapy and PT Evaluation                    Problem: Mobility     Dates: Start: 03/03/20       Description:     Goal: STG-Within one week, patient will ambulate household distance     Dates: Start: 03/03/20       Description: 1) Individualized goal: 10 ft, Max A, // bars, gait belt, max cueing, increased time, setup  2) Interventions: PT E Stim Attended, PT Orthotics Training, PT Gait Training, PT Self Care/Home Eval, PT Therapeutic Exercises, PT Ultrasound, PT TENS Application, PT Neuro Re-Ed/Balance, PT Therapeutic Activity, PT Manual Therapy and PT Evaluation                  Problem: Mobility Transfers     Dates: Start: 02/06/20       Description:     Goal: STG-Within one week, patient will transfer bed to chair     Dates: Start: 02/06/20       Description: 1) Individualized goal:  Mod A x1 with LRAD  2) Interventions:  PT E Stim Attended, PT Orthotics Training, PT Gait Training, PT Self Care/Home Eval, PT Therapeutic Exercises, PT Ultrasound, PT TENS Application, PT Neuro Re-Ed/Balance, PT Therapeutic Activity, PT Manual Therapy and PT Evaluation        Note:     Goal Note filed on 03/10/20 1134 by Jennifer Dunne, Student    Pt able to perform transfer at mod A level 1x, however has not been consistent                  Goal: STG-Within one week, patient will perform bed mobility     Dates: Start: 03/03/20       Description: 1) Individualized goal: Mod A for supine<>sit, bed rail prn, cueing, setup, non-elevated HOB, increased time  2) Interventions: PT E Stim Attended, PT Orthotics Training, PT Gait Training, PT Self Care/Home Eval, PT Therapeutic Exercises, PT Ultrasound, PT TENS Application, PT Neuro Re-Ed/Balance, PT Therapeutic Activity, PT Manual Therapy and PT Evaluation     Note:     Goal Note filed on  03/10/20 1134 by Jennifer Dunne, Student    Min-mod A with use of bed features, including raising HOB                        Problem: PT-Long Term Goals     Dates: Start: 02/06/20       Description:     Goal: LTG-By discharge, patient will maintain balance     Dates: Start: 02/06/20       Description: 1) Individualized goal:  In sitting with use of UE for assist at a SPV level, 5 min   2) Interventions:  PT E Stim Attended, PT Orthotics Training, PT Gait Training, PT Self Care/Home Eval, PT Therapeutic Exercises, PT Ultrasound, PT TENS Application, PT Neuro Re-Ed/Balance, PT Therapeutic Activity, PT Manual Therapy and PT Evaluation              Goal: LTG-By discharge, patient will tolerate standing     Dates: Start: 02/06/20       Description: 1) Individualized goal:  2 min, SBA with use of LRAD  2) Interventions:  PT E Stim Attended, PT Orthotics Training, PT Gait Training, PT Self Care/Home Eval, PT Therapeutic Exercises, PT Ultrasound, PT TENS Application, PT Neuro Re-Ed/Balance, PT Therapeutic Activity, PT Manual Therapy and PT Evaluation            Goal: LTG-By discharge, patient will ambulate     Dates: Start: 02/06/20       Description: 1) Individualized goal:  10 ft, mod A with LRAD  2) Interventions:  PT E Stim Attended, PT Orthotics Training, PT Gait Training, PT Self Care/Home Eval, PT Therapeutic Exercises, PT Ultrasound, PT TENS Application, PT Neuro Re-Ed/Balance, PT Therapeutic Activity, PT Manual Therapy and PT Evaluation             Goal: LTG-By discharge, patient will transfer one surface to another     Dates: Start: 02/06/20       Description: 1) Individualized goal:  Min A with LRAD  2) Interventions:  PT E Stim Attended, PT Orthotics Training, PT Gait Training, PT Self Care/Home Eval, PT Therapeutic Exercises, PT Ultrasound, PT TENS Application, PT Neuro Re-Ed/Balance, PT Therapeutic Activity, PT Manual Therapy and PT Evaluation                         Section completed by: Charmaine Chawla PT, DPT;   Jennifer Dunne, Student

## 2020-03-10 NOTE — REHAB-RESPIRATORY IDT TEAM NOTE
Respiratory Therapy   Respiratory Therapy  Pt continues to be stable on RA.  There is no change in trach stoma closing.  Steri-strips did not hold for long because of secretions.  We are back to using gauze and tape.  There is no redness at site but discharge is yellow and there is an airleak.  Section completed by:  Ana Becker, RRT

## 2020-03-10 NOTE — FLOWSHEET NOTE
03/10/20 0735   Events/Summary/Plan   Events/Summary/Plan Pt somewhat agitated, no oximetry check done and family member will do trach care

## 2020-03-10 NOTE — REHAB-PHARMACY IDT TEAM NOTE
Pharmacy   Pharmacy  Antibiotics/Antifungals/Antivirals:  Medication:      Active Orders (From admission, onward)    None        Route:        NA  Stop Date:  NA  Reason:      NA  Antihypertensives/Cardiac:  Medication:    Orders (72h ago, onward)     Start     Ordered    02/22/20 1500  propranolol (INDERAL) tablet 20 mg  3 TIMES DAILY      02/22/20 1224    02/21/20 1258  hydrALAZINE (APRESOLINE) tablet 25 mg  EVERY 8 HOURS PRN      02/21/20 1258              Patient Vitals for the past 24 hrs:   BP Pulse   03/09/20 1406 136/84 (!) 103   03/09/20 1340 -- (!) 101   03/09/20 0800 130/87 88   03/08/20 2113 108/73 87     Anticoagulation:  Medication: None                                     Other key medications: A review of the medication list reveals no issues at this time. Patient is currently on antihypertensive(s). Recommend home blood pressure monitoring/recording if antihypertensive(s) regimen(s) continue.    Section completed by: Nemesio Chris [facility]

## 2020-03-10 NOTE — THERAPY
"Missed Therapy     Patient Name: Rey Medina  Age:  24 y.o., Sex:  male  Medical Record #: [id number]  Today's Date: 3/10/2020    Discussed missed therapy with pt's sister, nursing. Multiple attempts made to encourage patient participation in therapy, variety of tasks/activities offered including dysphagia intervention, speech/language as well as reinforcer using activity of high interest (anime). Pt refusing all attempts, repeating \"no, later.\" pushing sister away and attempting to turn away from sister and SLP. Pt initially agreeable to therapy at later time, when given a specific time (PT at 0930 with Charmaine) pt refusing to verbalize agreement to participate.      03/10/20 0804   Interdisciplinary Plan of Care Collaboration   IDT Collaboration with  Family / Caregiver;Nursing   Patient Position at End of Therapy Seated;In Bed;Family / Friend in Room   Collaboration Comments discussed CLOF with sister and nursing   Therapy Missed   Missed Therapy (Minutes) 60   Reason For Missed Therapy Non-Medical - Patient Refused     "

## 2020-03-10 NOTE — PROGRESS NOTES
[facility] Medicine Daily Progress Note    Date of Service  3/10/2020    Chief Complaint:  Fever  Tachycardia  Leukocytosis      Interval History:  No significant events or changes since last visit    Review of Systems  Review of Systems   Unable to perform ROS: Medical condition        Physical Exam  Temp:  [35.9 °C (96.6 °F)-36.4 °C (97.5 °F)] 35.9 °C (96.6 °F)  Pulse:  [] 98  Resp:  [18] 18  BP: (136-140)/(84-98) 140/98  SpO2:  [92 %-95 %] 95 %    Physical Exam  Vitals signs and nursing note reviewed.   Constitutional:       Appearance: Normal appearance.   HENT:      Head: Atraumatic.   Eyes:      Conjunctiva/sclera: Conjunctivae normal.      Pupils: Pupils are equal, round, and reactive to light.   Neck:      Musculoskeletal: Normal range of motion and neck supple.   Cardiovascular:      Rate and Rhythm: Regular rhythm. Tachycardia present.      Heart sounds: No murmur.   Pulmonary:      Effort: Pulmonary effort is normal.      Breath sounds: No stridor. No wheezing or rales.      Comments: Has diminished breath sound (has poor inspiratory effort)  Abdominal:      General: There is no distension.      Palpations: Abdomen is soft.      Tenderness: There is no abdominal tenderness.   Musculoskeletal:      Right lower leg: No edema.      Left lower leg: No edema.   Skin:     General: Skin is warm and dry.      Findings: No rash.   Psychiatric:         Mood and Affect: Mood normal.         Behavior: Behavior normal.         Fluids    Intake/Output Summary (Last 24 hours) at 3/10/2020 1330  Last data filed at 3/9/2020 1730  Gross per 24 hour   Intake 500 ml   Output --   Net 500 ml       Laboratory  Recent Labs     03/08/20  0658   WBC 13.3*   RBC 6.22*   HEMOGLOBIN 17.0   HEMATOCRIT 54.9*   MCV 88.3   MCH 27.3   MCHC 31.0*   RDW 51.0*   PLATELETCT 282   MPV 11.5     Recent Labs     03/08/20  0658   SODIUM 146*   POTASSIUM 4.6   CHLORIDE 106   CO2 19*   GLUCOSE 113*   BUN 7*   CREATININE 0.84   CALCIUM 10.5                    Imaging    Assessment/Plan  Hypernatremia  Assessment & Plan  Na: 142 (3/7) --> 146 (3/8)  Transitioning to orals -- probably not taking in enough fluids  On free water via PEG    Tachycardia- (present on admission)  Assessment & Plan  Suspect 2nd to agitation  On Propranolol: 20 mg tid  Monitor    Leukocytosis- (present on admission)  Assessment & Plan  WBC's: 13.3 (3/8)  Has been afebrile  UA (-) WBC  CXR (3/6): unremarkable  Leukocytosis seems to come and go  ? 2nd to reactive demargination from agitation  ? 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

## 2020-03-10 NOTE — REHAB-OT IDT TEAM NOTE
Occupational Therapy   Activities of Daily Living  Eating Initial:  1 - Total Assistance  Eating Current:  3 - Moderate Assistance   Eating Description:  Modified diet, Needs help scooping food, Set-up of equipment or meal/tube feeding, Verbal cueing, Increased time, Supervision for safety, Needs help bringing food to mouth  Grooming Initial:  1 - Total Assistance  Grooming Current:  1 - Total Assistance   Grooming Description:  (Patient declined to actively participate in grooming task; required total assist to wash face)  Bathing Initial:  1 - Total Assistance  Bathing Current:  1 - Total Assistance   Bathing Description:  Adaptive equipment, Hand held shower, Increased time, Supervision for safety, Verbal cueing, Set-up of equipment, Initial preparation for task(Max assist for thoroughness with all body parts, safety and balance while seated on roll-in shower chair. )  Upper Body Dressing Initial:  1 - Total Assistance  Upper Body Dressing Current:  0 - Not tested, patient refused   Upper Body Dressing Description:  (Mod assist for clothing orientation, pulling shirt down and shirt over head while seated.  Requires max cues (verbal/tactile due to decreased coordination))  Lower Body Dressing Initial:  1 - Total Assistance  Lower Body Dressing Current:  2 - Max Assistance   Lower Body Dressing Description:  2 - Max Assistance  Toileting Initial:     Toileting Current:      Toileting Description:     Toilet Transfer Initial:  0 - Not tested,medical condition  Toilet Transfer Current:  0 - Not tested,medical condition   Toilet Transfer Description:  0 - Not tested,medical condition  Tub / Shower Transfer Initial:  1 - Total Assistance  Tub / Shower Transfer Current:  1 - Total Assistance   Tub / Shower Transfer Description:  (Patient requires total assist to performed shower txfrs at this time with use of roll-in shower chair)  IADL:  Not applicable at this time    Family Training/Education:  Ongoing with mother    DME/DC Recommendations:  HH OT    Strengths:  Supportive family and Other: Improved verbal communication  Barriers:  Other: Variable participation, intermittent agitation, decreased functional mobility, decreased balance, ataxia/decreased coordination      # of short term goals set= 0 (Patient working on long term goals)     Occupational Therapy Goals     Problem: Functional Cognition     Dates: Start: 02/06/20       Description:           Problem: OT Long Term Goals     Dates: Start: 02/06/20       Description:     Goal: LTG-By discharge, patient will complete basic self care tasks     Dates: Start: 02/06/20       Description: 1) Individualized Goal:  Mod assist with AE as needed  2) Interventions:  OT Group Therapy, OT Self Care/ADL, OT Cognitive Skill Dev, OT Manual Ther Technique, OT Neuro Re-Ed/Balance, OT Sensory Int Techniques, OT Therapeutic Activity, OT Evaluation and OT Therapeutic Exercise           Goal: LTG-By discharge, patient will perform bathroom transfers     Dates: Start: 02/06/20       Description: 1) Individualized Goal:  Mod assist with AE as needed    2) Interventions:  OT Group Therapy, OT Self Care/ADL, OT Cognitive Skill Dev, OT Manual Ther Technique, OT Neuro Re-Ed/Balance, OT Sensory Int Techniques, OT Therapeutic Activity, OT Evaluation and OT Therapeutic Exercise                       Section completed by:  Minnie Luke MS,OTR/L

## 2020-03-10 NOTE — CARE PLAN
Problem: Safety  Goal: Will remain free from injury  Outcome: PROGRESSING AS EXPECTED     Problem: Safety  Goal: Will remain free from injury  Outcome: PROGRESSING AS EXPECTED     Problem: Communication  Goal: The ability to communicate needs accurately and effectively will improve  Outcome: PROGRESSING SLOWER THAN EXPECTED

## 2020-03-10 NOTE — REHAB-COLLABORATIVE ONGOING IDT TEAM NOTE
Weekly Interdisciplinary Team Conference Note    Weekly Interdisciplinary Team Conference # 5  Date:  3/10/2020    Clinicians present and reporting at team conference include the following:   MD: CHARLENE David MD    RN:  Charlee Crump RN    PT:   Charmaine Chawal PT, DPT  OT:  Dax Rogers MS, OTR/L    ST:  Marjorie Pearl MS, CCC-SLP  CM:  Anahi Anderson RN [facility]  REC:  None  RT:  Ana Becker RRT  RPh:  Ro Villasenor [facility]  Other:   None  All reporting clinicians have a working knowledge of this patient's plan of care.    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 **        Nursing  Diet/Nutrition:  Tube Feed  Medication Administration:  Via Gastric Tube  % consumed at meals in last 24 hours:  Consumed 0-0% of meals per documentation.  No data found.  Snack schedule:  None  Supplement:  Other: none  Appetite:  Tube feeding - no reports of PO trials to nursing  Fluid Intake/Output in past 24 hours: In: 1250 [Other:1000]  Out: -   Admit Weight:  Weight: 73.1 kg (161 lb 3.2 oz)  Weight Last 7 Days       Pain Issues:    Location:  --  --         Severity:  Denies   Scheduled pain medications:  None     PRN pain medications used in last 24 hours:  None   Non Pharmacologic Interventions:  relaxation, repositioned and shower  Effectiveness of pain management plan:  No pain    Bowel:    Bowel Assist Initial  Score:  1 - Total Assistance  Bowel Assist Current Score:  1 - Total Assistance  Bowl Accidents in last 7 days:  2  Last bowel movement: 03/09/20  Stool Description: Large, Soft     Usual bowel pattern:  every other day  Scheduled bowel medications:  senna-docusate (PERICOLACE or SENOKOT S)  + Miralax  PRN bowel medications used in last 24 hours:  None  Nursing Interventions:  Increased time, Scheduled medication, Emptying of device, Brief  Effectiveness of bowel program:   fair=sometimes needs prn bowel meds for constipation      Bladder:    Bladder Assist Initial Score:  1 - Total Assistance  Bladder Assist Current Score:  1 - Total Assistance  Bladder Accidents in last 7 days:  1  PVR range for past 24-48 hours: --    Medications affecting bladder:  None    Time void schedule/voiding pattern:  Prn per patient - brief  Interventions:  Increased time, Emptying of device, Brief    Wound:         Patient Lines/Drains/Airways Status    Active Current Wounds     Name: Placement date: Placement time: Site: Days:    Wound 02/14/20 neck stoma site  02/14/20   1059   --  24                   Interventions:  Dressing in place  Effectiveness of intervention:  wound is improving       Sleep/wake cycle:   Average hours slept:  Sleeps 4-6 hours without waking     Mom will NOT allow seroquel or trazodone!    Sleep medication usage:  None    Patient/Family Training/Education:  Medication Management  Discharge Supply Recommendations:  Other: none noted  Strengths: Supportive family   Barriers:   Uncooperative       Long Term Goals:   At discharge patient will be able to function safely at home and in the community with support.    Section completed by:  Ritu Rutledge R.N.        Respiratory Therapy  Pt continues to be stable on RA.  There is no change in trach stoma closing.  Steri-strips did not hold for long because of secretions.  We are back to using gauze and tape.  There is no redness at site but discharge is yellow and  there is an airleak.  Section completed by:  Ana Becker, RRT    Mobility  Bed mobility:   Total Assist  Bed /Chair/Wheelchair Transfer Initial:  1 - Total Assistance  Bed /Chair/Wheelchair Transfer Current:  1 - Total Assistance   Bed/Chair/Wheelchair Transfer Description:  Increased time, Supervision for safety, Verbal cueing, Set-up of equipment, Initial preparation for task (wc <> mat table stand step transfer (sit <> sit), min A wc > EOM, mod A EOM > wc with VC for sequencing and stepping; pt transfers better to L than to R)  Walk Initial:  1 - Total Assistance  Walk Current:  0 - Not tested, patient refused   Walk Description:  Two hand rails, Requires 2 people to assist, Verbal cueing, Safety concerns, Extra time (3 ftx2, 5 ftx2, 8 ftx1 in parallel bars with Max A for stabilizing, facilitating weight shift, and managing step length/position, Min A to manage trunk. Max multimodal cues for sequencing and posture.)  Wheelchair Initial:  0 - Not tested, patient refused  Wheelchair Current:  1 - Total Assistance   Wheelchair Description:  Adaptive equipment, Extra time, Safety concerns, Verbal cueing, Supervision for safety(x1 foot with BUE dependent with hand over hand TC)  Stairs Initial:  0 - Not tested,unsafe activity  Stairs Current: 0 - Not tested,unsafe activity   Stairs Description:    Patient/Family Training/Education:  Ongoing with pt's mother and sister  DME/DC Recommendations:  Manual wheelchair  Strengths:  Supportive family and Other: Improving verbal communication  Barriers:   Limited mobility, Poor activity tolerance, Poor balance, Poor sleep pattern, Uncooperative and Other: agitation   # of short term goals set= 2  # of short term goals met=0    Physical Therapy Problems     Problem: Balance     Dates: Start: 03/03/20       Description:     Goal: STG-Within one week, patient will maintain static standing     Dates: Start: 03/03/20       Description: 1) Individualized goal: 2 min, Mod A,  gait belt, // bars, cueing, setup  2) Interventions: PT E Stim Attended, PT Orthotics Training, PT Gait Training, PT Self Care/Home Eval, PT Therapeutic Exercises, PT Ultrasound, PT TENS Application, PT Neuro Re-Ed/Balance, PT Therapeutic Activity, PT Manual Therapy and PT Evaluation                    Problem: Mobility     Dates: Start: 03/03/20       Description:     Goal: STG-Within one week, patient will ambulate household distance     Dates: Start: 03/03/20       Description: 1) Individualized goal: 10 ft, Max A, // bars, gait belt, max cueing, increased time, setup  2) Interventions: PT E Stim Attended, PT Orthotics Training, PT Gait Training, PT Self Care/Home Eval, PT Therapeutic Exercises, PT Ultrasound, PT TENS Application, PT Neuro Re-Ed/Balance, PT Therapeutic Activity, PT Manual Therapy and PT Evaluation                  Problem: Mobility Transfers     Dates: Start: 02/06/20       Description:     Goal: STG-Within one week, patient will transfer bed to chair     Dates: Start: 02/06/20       Description: 1) Individualized goal:  Mod A x1 with LRAD  2) Interventions:  PT E Stim Attended, PT Orthotics Training, PT Gait Training, PT Self Care/Home Eval, PT Therapeutic Exercises, PT Ultrasound, PT TENS Application, PT Neuro Re-Ed/Balance, PT Therapeutic Activity, PT Manual Therapy and PT Evaluation        Note:     Goal Note filed on 03/10/20 1134 by Jennifer Dunne, Student    Pt able to perform transfer at mod A level 1x, however has not been consistent                  Goal: STG-Within one week, patient will perform bed mobility     Dates: Start: 03/03/20       Description: 1) Individualized goal: Mod A for supine<>sit, bed rail prn, cueing, setup, non-elevated HOB, increased time  2) Interventions: PT E Stim Attended, PT Orthotics Training, PT Gait Training, PT Self Care/Home Eval, PT Therapeutic Exercises, PT Ultrasound, PT TENS Application, PT Neuro Re-Ed/Balance, PT Therapeutic Activity, PT Manual  Therapy and PT Evaluation     Note:     Goal Note filed on 03/10/20 1134 by Jennifer Dunne, Student    Min-mod A with use of bed features, including raising HOB                        Problem: PT-Long Term Goals     Dates: Start: 02/06/20       Description:     Goal: LTG-By discharge, patient will maintain balance     Dates: Start: 02/06/20       Description: 1) Individualized goal:  In sitting with use of UE for assist at a SPV level, 5 min   2) Interventions:  PT E Stim Attended, PT Orthotics Training, PT Gait Training, PT Self Care/Home Eval, PT Therapeutic Exercises, PT Ultrasound, PT TENS Application, PT Neuro Re-Ed/Balance, PT Therapeutic Activity, PT Manual Therapy and PT Evaluation              Goal: LTG-By discharge, patient will tolerate standing     Dates: Start: 02/06/20       Description: 1) Individualized goal:  2 min, SBA with use of LRAD  2) Interventions:  PT E Stim Attended, PT Orthotics Training, PT Gait Training, PT Self Care/Home Eval, PT Therapeutic Exercises, PT Ultrasound, PT TENS Application, PT Neuro Re-Ed/Balance, PT Therapeutic Activity, PT Manual Therapy and PT Evaluation            Goal: LTG-By discharge, patient will ambulate     Dates: Start: 02/06/20       Description: 1) Individualized goal:  10 ft, mod A with LRAD  2) Interventions:  PT E Stim Attended, PT Orthotics Training, PT Gait Training, PT Self Care/Home Eval, PT Therapeutic Exercises, PT Ultrasound, PT TENS Application, PT Neuro Re-Ed/Balance, PT Therapeutic Activity, PT Manual Therapy and PT Evaluation             Goal: LTG-By discharge, patient will transfer one surface to another     Dates: Start: 02/06/20       Description: 1) Individualized goal:  Min A with LRAD  2) Interventions:  PT E Stim Attended, PT Orthotics Training, PT Gait Training, PT Self Care/Home Eval, PT Therapeutic Exercises, PT Ultrasound, PT TENS Application, PT Neuro Re-Ed/Balance, PT Therapeutic Activity, PT Manual Therapy and PT Evaluation                          Section completed by: Charmaine Chawla PT, DPT;  Jennifer Dunne, Student    Activities of Daily Living  Eating Initial:  1 - Total Assistance  Eating Current:  3 - Moderate Assistance   Eating Description:  Modified diet, Needs help scooping food, Set-up of equipment or meal/tube feeding, Verbal cueing, Increased time, Supervision for safety, Needs help bringing food to mouth  Grooming Initial:  1 - Total Assistance  Grooming Current:  1 - Total Assistance   Grooming Description:  (Patient declined to actively participate in grooming task; required total assist to wash face)  Bathing Initial:  1 - Total Assistance  Bathing Current:  1 - Total Assistance   Bathing Description:  Adaptive equipment, Hand held shower, Increased time, Supervision for safety, Verbal cueing, Set-up of equipment, Initial preparation for task(Max assist for thoroughness with all body parts, safety and balance while seated on roll-in shower chair. )  Upper Body Dressing Initial:  1 - Total Assistance  Upper Body Dressing Current:  0 - Not tested, patient refused   Upper Body Dressing Description:  (Mod assist for clothing orientation, pulling shirt down and shirt over head while seated.  Requires max cues (verbal/tactile due to decreased coordination))  Lower Body Dressing Initial:  1 - Total Assistance  Lower Body Dressing Current:  2 - Max Assistance   Lower Body Dressing Description:  2 - Max Assistance  Toileting Initial:     Toileting Current:      Toileting Description:     Toilet Transfer Initial:  0 - Not tested,medical condition  Toilet Transfer Current:  0 - Not tested,medical condition   Toilet Transfer Description:  0 - Not tested,medical condition  Tub / Shower Transfer Initial:  1 - Total Assistance  Tub / Shower Transfer Current:  1 - Total Assistance   Tub / Shower Transfer Description:  (Patient requires total assist to performed shower txfrs at this time with use of roll-in shower chair)  IADL:  Not applicable at  this time    Family Training/Education:  Ongoing with mother   DME/DC Recommendations:  HH OT    Strengths:  Supportive family and Other: Improved verbal communication  Barriers:  Other: Variable participation, intermittent agitation, decreased functional mobility, decreased balance, ataxia/decreased coordination      # of short term goals set= 0 (Patient working on long term goals)     Occupational Therapy Goals     Problem: Functional Cognition     Dates: Start: 02/06/20       Description:           Problem: OT Long Term Goals     Dates: Start: 02/06/20       Description:     Goal: LTG-By discharge, patient will complete basic self care tasks     Dates: Start: 02/06/20       Description: 1) Individualized Goal:  Mod assist with AE as needed  2) Interventions:  OT Group Therapy, OT Self Care/ADL, OT Cognitive Skill Dev, OT Manual Ther Technique, OT Neuro Re-Ed/Balance, OT Sensory Int Techniques, OT Therapeutic Activity, OT Evaluation and OT Therapeutic Exercise           Goal: LTG-By discharge, patient will perform bathroom transfers     Dates: Start: 02/06/20       Description: 1) Individualized Goal:  Mod assist with AE as needed    2) Interventions:  OT Group Therapy, OT Self Care/ADL, OT Cognitive Skill Dev, OT Manual Ther Technique, OT Neuro Re-Ed/Balance, OT Sensory Int Techniques, OT Therapeutic Activity, OT Evaluation and OT Therapeutic Exercise                       Section completed by:  Minnie Luke MS,OTR/L    Cognitive Linquistic Functions  Comprehension Initial:  1 - Total Assistance  Comprehension Current:  4 - Minimal Assistance   Comprehension Description:  Verbal cues  Expression Initial:  1 - Total Assistance  Expression Current:  3 - Moderate Assistance   Expression Description:  Verbal cueing  Social Interaction Initial:  2 - Max Assistance  Social Interaction Current:  3 - Moderate Assistance   Social Interaction Description:  Increased time, Verbal cues, Medication  Problem Solving Initial:   1 - Total Assistance  Problem Solving Current:  2 - Max Assistance   Problem Solving Description:  Verbal cueing, Increased time, Therapy schedule, Seat belt, 1:1 supervision  Memory Initial:  1 - Total Assistance  Memory Current:  2 - Max Assistance   Memory Description:  Verbal cueing, Therapy schedule, Seat belt  Executive Functioning / Organization Initial:     Executive Functioning / Organization Current: 1 - Total Assistance     Executive Functioning Desciption: emphasis of therapy on dysphagia intervention and speech language  Swallowing  Swallowing Status: Pt tolerating minced/moist and thin liquids via straw, however, pt with refusal to participate with SLP during meal times, sister/mother assisting with 1:1 feeding. Family reports pt's intake is above 50%. Limited trials of soft/bite sized - pt with munch bite chew pattern, limited mastication for more advanced textures  Orders Placed This Encounter   Procedures   • Diet Order Regular (meds via  tube)     Standing Status:   Standing     Number of Occurrences:   1     Order Specific Question:   Diet:     Answer:   Regular [1]     Comments:   meds via  tube     Order Specific Question:   Texture Modifier     Answer:   Level 5 - Minced & Moist (Dysphagia 2)     Order Specific Question:   Liquid level     Answer:   Level 0 - Thin     Behavior Modification Plan  Keep the environment simple to avoid over stimulatiom/agitation, Allow for rest time, Keep instructions simple/concrete, Give clear feedback, Set clear goals, Be calm, Provide reasonable choices, Decrease the chance of failure by offering activities that are within the patient's abilities, Analyze tasks (break down into smaller steps) and Reinforce participation in desired tasks  Assistive Technology  Low tech: Calendar, planner, schedule, alarms/timers, pill organizer, post-it notes, lists  Family Training/Education:  Ongoing with family   DC Recommendations: Pt will require ongoing ST services upon  d/c, pt will have 24-7 support of family   Strengths:  Independent PLOF and Supportive family  Barriers:  Aspiration risk, Unable to follow instructions, Decreased endurance, Poor activity tolerance, Poor carryover of learning, Poor sleep pattern, Uncooperative and Lack of motivation  # of short term goals set=1  # of short term goals met=1 (4 new goals added)   Speech Therapy Problems     Problem: Comprehension STGs     Dates: Start: 03/10/20       Description:     Goal: STG-Within one week, patient will     Dates: Start: 03/10/20       Description: 1) Individualized goal:  Complete simple categorization tasks with 80% accuracy provided MIN A.   2) Interventions:  SLP Cognitive Skill Development                   Problem: Expression STGs     Dates: Start: 03/10/20       Description:     Goal: STG-Within one week, patient will     Dates: Start: 03/10/20       Description: 1) Individualized goal:  Respond to open ended questions in 7/10 opportunities.   2) Interventions:  SLP Cognitive Skill Development                   Problem: Social Interaction STGs     Dates: Start: 03/10/20       Description:     Goal: STG-Within one week, patient will     Dates: Start: 03/10/20       Description: 1) Individualized goal:  Participate in structured therapy sessions out of bed in wheelchair.   2) Interventions:  SLP Cognitive Skill Development and SLP Group Treatment                   Problem: Speech/Swallowing LTGs     Dates: Start: 02/06/20       Description:     Goal: LTG-By discharge, patient will safely swallow     Dates: Start: 02/06/20       Description: 1) Individualized goal:  Dysphagia I/NTL diet without clinical s/sx of aspiration  2) Interventions:  SLP Speech Language Treatment, SLP Swallowing Dysfunction Treatment, SLP Oral Pharyngeal Evaluation, SLP Video Swallow Evaluation, SLP Self Care / ADL Training , SLP Cognitive Skill Development and SLP Group Treatment             Goal: LTG-By discharge, patient will      Dates: Start: 02/06/20       Description:                        Section completed by:  Marjorie Pearl, MS,[facility]-SLP       Nutrition  Dietary Problems     Problem: Other Problem (see comments)     Description: Difficulty swallowing r/t secondary dysphagia s/p Intracerebral hemorrhage, intraventricular as evidenced by NPO, G-tube for alternate route of nutrition/hydraiton/ medications.      Goal: Other Goal (Resolved)     Description: 1. Tolerance to EN regimen 2. Maintain adequate enteral nutrient/fluid intake (PO vs TF) to promote nutrition optimization/healing 3.  Transition to PO pending clinical outcomes                      Patient's diet advanced to Level 5 (Minced and moist) with thin liquids (level 0).  Patient eating quite well, but notably refusing therapies some days.  PO > 60% on average of meals since diet upgraded.  No bolus has been given since 3/4 per review of chart.    Pertinent Labs: WBC 13.3, CO2 19, Na+ 146, Anion gap 21, Glucose 113, BUN 7    Pertinent medications: noted    Weight: there has been no new weight taken since 2/23  Skin: CDI/ +G-tube    Vitals: tachycardia noted, /84, RA   GI: BM 3/8 s/p laxatives  : WNL  I/Os: +770 mL x 24 hours - no output recorded      Plan: Increase free water to 250mL q 4 hours s/t hypernatremia and not getting free water from TF formula as this is being held due to good PO.  Weekly weights. Diet upgrades per SLP.  Give bolus if PO <50% with SLP. Continue free water infusion due to hypernatremia. RD following weekly.         Section completed by:  Magdalena Jiang R.D.    REHAB-Pharmacy IDT Team Note by Nemesio Chris RPH at 3/9/2020  5:20 PM  Version 1 of 1    Author:  Nemesio Chris RPH Service:  -- Author Type:  Pharmacist    Filed:  3/9/2020  5:21 PM Date of Service:  3/9/2020  5:20 PM Status:  Signed    :  Nemesio Chris RPH (Pharmacist)         Pharmacy   Pharmacy  Antibiotics/Antifungals/Antivirals:  Medication:      Active Orders  (From admission, onward)    None        Route:        NA  Stop Date:  NA  Reason:      NA  Antihypertensives/Cardiac:  Medication:    Orders (72h ago, onward)     Start     Ordered    02/22/20 1500  propranolol (INDERAL) tablet 20 mg  3 TIMES DAILY      02/22/20 1224    02/21/20 1258  hydrALAZINE (APRESOLINE) tablet 25 mg  EVERY 8 HOURS PRN      02/21/20 1258              Patient Vitals for the past 24 hrs:   BP Pulse   03/09/20 1406 136/84 (!) 103   03/09/20 1340 -- (!) 101   03/09/20 0800 130/87 88   03/08/20 2113 108/73 87     Anticoagulation:  Medication: None                                     Other key medications: A review of the medication list reveals no issues at this time. Patient is currently on antihypertensive(s). Recommend home blood pressure monitoring/recording if antihypertensive(s) regimen(s) continue.    Section completed by: Nemesio Chris [facility][[email or web address]]     Attribution Key     AW.1 - Nemesio Chris [facility] on 3/9/2020  5:20 PM                  DC Planning  DC destination/dispostion:  Patient lives with his mother in a single level apartment.     Referrals: Medicaid PCS services.     DC Needs: Patient has been decannulated.  He has a peg tube.  He has only a hospital bed and transfer chair at home.  We  have ordered a tub transfer bench, w/c and comode.  He will need home health therapy and PCS services.   [facility] referred.  Barriers to discharge:   Limited family members in the area.     Strengths: mother is proficient in his care.     Section completed by:  Anahi Anderson R.N.      Physician Summary  CHARLENE David MD  participated and led team conference discussion.

## 2020-03-10 NOTE — REHAB-SLP IDT TEAM NOTE
Speech Therapy   Cognitive Linquistic Functions  Comprehension Initial:  1 - Total Assistance  Comprehension Current:  4 - Minimal Assistance   Comprehension Description:  Verbal cues  Expression Initial:  1 - Total Assistance  Expression Current:  3 - Moderate Assistance   Expression Description:  Verbal cueing  Social Interaction Initial:  2 - Max Assistance  Social Interaction Current:  3 - Moderate Assistance   Social Interaction Description:  Increased time, Verbal cues, Medication  Problem Solving Initial:  1 - Total Assistance  Problem Solving Current:  2 - Max Assistance   Problem Solving Description:  Verbal cueing, Increased time, Therapy schedule, Seat belt, 1:1 supervision  Memory Initial:  1 - Total Assistance  Memory Current:  2 - Max Assistance   Memory Description:  Verbal cueing, Therapy schedule, Seat belt  Executive Functioning / Organization Initial:     Executive Functioning / Organization Current: 1 - Total Assistance     Executive Functioning Desciption: emphasis of therapy on dysphagia intervention and speech language  Swallowing  Swallowing Status: Pt tolerating minced/moist and thin liquids via straw, however, pt with refusal to participate with SLP during meal times, sister/mother assisting with 1:1 feeding. Family reports pt's intake is above 50%. Limited trials of soft/bite sized - pt with munch bite chew pattern, limited mastication for more advanced textures  Orders Placed This Encounter   Procedures   • Diet Order Regular (meds via  tube)     Standing Status:   Standing     Number of Occurrences:   1     Order Specific Question:   Diet:     Answer:   Regular [1]     Comments:   meds via  tube     Order Specific Question:   Texture Modifier     Answer:   Level 5 - Minced & Moist (Dysphagia 2)     Order Specific Question:   Liquid level     Answer:   Level 0 - Thin     Behavior Modification Plan  Keep the environment simple to avoid over stimulatiom/agitation, Allow for rest time, Keep  instructions simple/concrete, Give clear feedback, Set clear goals, Be calm, Provide reasonable choices, Decrease the chance of failure by offering activities that are within the patient's abilities, Analyze tasks (break down into smaller steps) and Reinforce participation in desired tasks  Assistive Technology  Low tech: Calendar, planner, schedule, alarms/timers, pill organizer, post-it notes, lists  Family Training/Education:  Ongoing with family   DC Recommendations: Pt will require ongoing ST services upon d/c, pt will have 24-7 support of family   Strengths:  Independent PLOF and Supportive family  Barriers:  Aspiration risk, Unable to follow instructions, Decreased endurance, Poor activity tolerance, Poor carryover of learning, Poor sleep pattern, Uncooperative and Lack of motivation  # of short term goals set=1  # of short term goals met=1 (4 new goals added)   Speech Therapy Problems     Problem: Comprehension STGs     Dates: Start: 03/10/20       Description:     Goal: STG-Within one week, patient will     Dates: Start: 03/10/20       Description: 1) Individualized goal:  Complete simple categorization tasks with 80% accuracy provided MIN A.   2) Interventions:  SLP Cognitive Skill Development                   Problem: Expression STGs     Dates: Start: 03/10/20       Description:     Goal: STG-Within one week, patient will     Dates: Start: 03/10/20       Description: 1) Individualized goal:  Respond to open ended questions in 7/10 opportunities.   2) Interventions:  SLP Cognitive Skill Development                   Problem: Social Interaction STGs     Dates: Start: 03/10/20       Description:     Goal: STG-Within one week, patient will     Dates: Start: 03/10/20       Description: 1) Individualized goal:  Participate in structured therapy sessions out of bed in wheelchair.   2) Interventions:  SLP Cognitive Skill Development and SLP Group Treatment                   Problem: Speech/Swallowing LTGs      Dates: Start: 02/06/20       Description:     Goal: LTG-By discharge, patient will safely swallow     Dates: Start: 02/06/20       Description: 1) Individualized goal:  Dysphagia I/NTL diet without clinical s/sx of aspiration  2) Interventions:  SLP Speech Language Treatment, SLP Swallowing Dysfunction Treatment, SLP Oral Pharyngeal Evaluation, SLP Video Swallow Evaluation, SLP Self Care / ADL Training , SLP Cognitive Skill Development and SLP Group Treatment             Goal: LTG-By discharge, patient will     Dates: Start: 02/06/20       Description:                        Section completed by:  Marjorie Pearl MS,CCC-SLP

## 2020-03-10 NOTE — THERAPY
"Occupational Therapy  Daily Treatment     Patient Name: Rey Medina  Age:  24 y.o., Sex:  male  Medical Record #: [id number]  Today's Date: 3/10/2020     Precautions  Precautions: (P) Fall Risk, PEG Tube, Other (See Comments)  Comments: (P) dysarthric (however improving), shunt, modified diet    Safety   ADL Safety : Requires Physical Assist for Safety  Bathroom Safety: Requires Physical Assist for Safety  Comments: See FIMs for ADL performance details    Subjective    \"No\"  Pt resistant to participating w/ therapy - Tactile/VQ's to encourage participation     Objective       03/10/20 1101   Precautions   Precautions Fall Risk;PEG Tube;Other (See Comments)   Comments dysarthric (however improving), shunt, modified diet   Vitals   O2 Delivery Device None - Room Air   Pain   Intervention Declines   Pain 0 - 10 Group   Therapist Pain Assessment 0   Interdisciplinary Plan of Care Collaboration   IDT Collaboration with  Family / Caregiver;Physician   Patient Position at End of Therapy In Bed;Chair Alarm On;Call Light within Reach   Collaboration Comments Sister present for first half of session.   OT Total Time Spent   OT Individual Total Time Spent (Mins) 30   OT Charge Group   OT Self Care / ADL 2       FIM Lower Body Dressing Score:  2 - Max Assistance  Lower Body Dressing Description:  Increased time, Supervision for safety, Verbal cueing, Set-up of equipment, Initial preparation for task(Mod I to don/doff socks in bed.  Tactile/VQ's to manage pants - additional time, Max assist in bed supine/sidelying.)      Assessment    Pt required frqnt tactile/VQ's to encourage moments of participation w/ therapy - pt in bed for LB clothing management.  Additional time required.  Refer to notes above for details.    Plan    ADL retraining while incorporating low-stim environment as needed, BUE strength and coordination    "

## 2020-03-10 NOTE — PROGRESS NOTES
Rehab Progress Note     Encounter Date: 3/10/2020    CC: ICH, AMS    Interval Events (Subjective)  Patient sitting up in bed. He reports he is not willing to do therapy. Discussed that he should be able to do therapy at the bed level if he is tired. Patient reports he is tired and he is not sleeping well. Patient eventually redirected but notified later that he is refusing. Discussed that we will have IDT later today and still plan on discharge on Thursday. Family has been trained.     ROS: Unable to perform due to repeating No    IDT Team Meeting 3/10/2020    IGreer M.D., was present and led the interdisciplinary team conference on 3/10/2020.  I led the IDT conference and agree with the IDT conference documentation and plan of care as noted below.     RN:  Diet PO food and water flushes   % Meal     Pain    Sleep    Bowel Incontinent   Bladder Incontinent   In's & Out's      PT:  Bed Mobility    Transfers    Mobility Marching on Monday in parallel bars   Stairs      OT:  Eating modA   Grooming    Bathing    UB Dressing totA   LB Dressing totA   Toileting    Shower & Transfer    Improving self feeding  Refusing treatment again    SLP:  Refused treatment  Improving language complexity so that he can refuse    Resp:  Stoma    CM:  Continues to work on disposition and DME needs.      DC/Disposition:  3/12/20    Objective:  VITAL SIGNS: /98   Pulse 98   Temp 35.9 °C (96.6 °F) (Oral)   Resp 18   Wt 73.1 kg (161 lb 3.2 oz)   SpO2 95%   BMI 25.25 kg/m²  HR: 100, /86, T: 98.3: RR: 22  Gen: NAD  Psych: Mood and affect appropriate  CV: RRR, no edema  Resp: CTAB, no upper airway sounds  Abd: NTND  Neuro: Opening eyes occasionally but responding to questions with clear, loud voice in short sentences  Unchanged from 3/9/20     Recent Results (from the past 72 hour(s))   CBC WITHOUT DIFFERENTIAL    Collection Time: 03/08/20  6:58 AM   Result Value Ref Range    WBC 13.3 (H) 4.8 - 10.8 K/uL     RBC 6.22 (H) 4.70 - 6.10 M/uL    Hemoglobin 17.0 14.0 - 18.0 g/dL    Hematocrit 54.9 (H) 42.0 - 52.0 %    MCV 88.3 81.4 - 97.8 fL    MCH 27.3 27.0 - 33.0 pg    MCHC 31.0 (L) 33.7 - 35.3 g/dL    RDW 51.0 (H) 35.9 - 50.0 fL    Platelet Count 282 164 - 446 K/uL    MPV 11.5 9.0 - 12.9 fL   Basic Metabolic Panel    Collection Time: 03/08/20  6:58 AM   Result Value Ref Range    Sodium 146 (H) 135 - 145 mmol/L    Potassium 4.6 3.6 - 5.5 mmol/L    Chloride 106 96 - 112 mmol/L    Co2 19 (L) 20 - 33 mmol/L    Glucose 113 (H) 65 - 99 mg/dL    Bun 7 (L) 8 - 22 mg/dL    Creatinine 0.84 0.50 - 1.40 mg/dL    Calcium 10.5 8.5 - 10.5 mg/dL    Anion Gap 21.0 (H) 0.0 - 11.9   ESTIMATED GFR    Collection Time: 03/08/20  6:58 AM   Result Value Ref Range    GFR If African American >60 >60 mL/min/1.73 m 2    GFR If Non African American >60 >60 mL/min/1.73 m 2       Current Facility-Administered Medications   Medication Frequency   • traZODone (DESYREL) tablet 100 mg QHS   • amantadine (SYMMETREL) 50 MG/5ML syrup 100 mg BID   • simethicone (MYLICON) chewable tab 80 mg 4X/DAY PRN   • traZODone (DESYREL) tablet 50 mg QHS PRN   • propranolol (INDERAL) tablet 20 mg TID   • acetaminophen (TYLENOL) tablet 650 mg Q4HRS PRN   • artificial tears ophthalmic solution 1 Drop PRN   • benzocaine-menthol (CEPACOL) lozenge 1 Lozenge Q2HRS PRN   • hydrALAZINE (APRESOLINE) tablet 25 mg Q8HRS PRN   • mag hydrox-al hydrox-simeth (MAALOX PLUS ES or MYLANTA DS) suspension 20 mL Q2HRS PRN   • ondansetron (ZOFRAN ODT) dispertab 4 mg 4X/DAY PRN    Or   • ondansetron (ZOFRAN) syringe/vial injection 4 mg 4X/DAY PRN   • polyethylene glycol/lytes (MIRALAX) PACKET 1 Packet Q24HRS PRN    And   • magnesium hydroxide (MILK OF MAGNESIA) suspension 30 mL QDAY PRN    And   • bisacodyl (DULCOLAX) suppository 10 mg QDAY PRN   • sodium chloride (OCEAN) 0.65 % nasal spray 2 Spray PRN   • tramadol (ULTRAM) 50 MG tablet 50 mg Q4HRS PRN   • Respiratory Therapy Consult Continuous RT    • eucerin cream TID PRN   • ipratropium-albuterol (DUONEB) nebulizer solution Q4H PRN (RT)   • levETIRAcetam (KEPPRA) 100 MG/ML solution 500 mg Q12HRS   • montelukast (SINGULAIR) tablet 10 mg Nightly   • omeprazole (FIRST-OMEPRAZOLE) 2 mg/mL oral susp 40 mg DAILY   • polyethylene glycol/lytes (MIRALAX) PACKET 1 Packet DAILY   • QUEtiapine (SEROQUEL) tablet 25 mg TID PRN   • vitamin D (cholecalciferol) tablet 1,000 Units DAILY       Orders Placed This Encounter   Procedures   • Diet Order Regular (meds via  tube)     Standing Status:   Standing     Number of Occurrences:   1     Order Specific Question:   Diet:     Answer:   Regular [1]     Comments:   meds via  tube     Order Specific Question:   Texture Modifier     Answer:   Level 5 - Minced & Moist (Dysphagia 2)     Order Specific Question:   Liquid level     Answer:   Level 0 - Thin       Assessment:  Active Hospital Problems    Diagnosis   • *Intracerebral hemorrhage, intraventricular (HCC)   • 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 Patient very low functioning but per mother becoming more purposeful  -PT and OT for mobility and ADLs  -SLP for cognition   -Patient agitated on Amantadine and Modafinil. Both trials halted due to agitation. Restart Amantadine 50 mg as he was too agitated on 200 mg. Slow increase up to 100 mg BID with improved cognition and function  -Continue Keppra. Unclear if had seizure or was continued on prophylaxis. Follow-up with Neurology  -NSG appointment on 2/7/20 to evaluation  shunt. Will follow-up in 2 months   -CT head as waxing and waning status. At times he voices including his names, at other times no response and compulsive grabbing of bed rail and urinal. CT head  - negative.      Visual Field deficits - Difficult to assess due to cognitive status but having difficulty with peripheral on right. Referral to Neuro-ophtho at discharge    Agitation - Appears like TBI with agitation worsened from neurostimulant. Change Metoprolol to Propranolol. Start Seroquel PRN, too sedated on scheduled  -Propranolol increase to 20 mg TID, improved agitation control     Muscle spasms - mother claims Bromocriptine helps, typically used for dopamine side effects/neurostimulant. Will start low dose Baclofen and monitor.   -Limited spasticity, mostly resisting examination. Stop Baclofen and monitor.     Dysphagia - Patient with G Tube in place. SLP for swallow. MBSS on 2/10/20, start on dysphagia 1 with NTL  -Tolerated first meal but emesis on 2nd meal at higher percentage. May be due to distention, no lung changes. Continue to monitor.   -Discussed with SLP and recommend MBSS on 3/4/20. Upgraded to Dysphagia 2, improving trial     Aspiration pneumonia - Currently on Unasyn.  Elevated WBC, broadened to Zyvox and Zosyn  -Hospitalist consulted    CV - Patient is on Ivabradine 5 mg daily. Unclear reason why. Discontinue Ivabradine, on Metoprolol for tachycardia. Switch metoprolol to Propranolol     Respiratory - Patient with size 7 trach on admission. Patient on Duonebs and Singulair  -Exchange aborted on 2/6/20 due to emesis on suction. Tolerated overnight. Decannulated AM 2/7/20. Stoma remains opened, discussed with RT and increase changes and tighter bandaging. Possible referral to ENT     Insomnia - Patient able to verbalize insomnia. Schedule Trazodone and move amantadine to noon.     GI Ppx - Patient on Prilosec and Prevacid. Unclear why two PPIs. Discontinue Prevacid. Discontinue Simethicone     DVT Ppx - Patient on Eliquis 2.5 mg BID. Unknown reason why, mother denies clot or DVT. Discontinue Eliquis as 10 months post-ICH    Dispo - Possible extension to 3/12/20, patient improving with ADLs  and eating.     Total time:  36 minutes.  I spent greater than 50% of the time for patient care, counseling, and coordination on this date, including unit/floor time, and face-to-face time with the patient as per interval events and assessment and plan above. Topics discussed included discharge planning, referral to neuro-ophtho, and referral to ENT. Patient was discussed separately in IDT today; please see details above.    Greer David M.D.

## 2020-03-10 NOTE — DISCHARGE PLANNING
Met with patient and his sister.  Discussed team conference results and d/c target for Thursday.  We will proceed with the order for w/c, tub bench and [facility] and [facility].  Will follow.

## 2020-03-10 NOTE — REHAB-NURSING IDT TEAM NOTE
Nursing   Nursing  Diet/Nutrition:  Tube Feed  Medication Administration:  Via Gastric Tube  % consumed at meals in last 24 hours:  Consumed 0-0% of meals per documentation.  No data found.  Snack schedule:  None  Supplement:  Other: none  Appetite:  Tube feeding - no reports of PO trials to nursing  Fluid Intake/Output in past 24 hours: In: 1250 [Other:1000]  Out: -   Admit Weight:  Weight: 73.1 kg (161 lb 3.2 oz)  Weight Last 7 Days       Pain Issues:    Location:  --  --         Severity:  Denies   Scheduled pain medications:  None     PRN pain medications used in last 24 hours:  None   Non Pharmacologic Interventions:  relaxation, repositioned and shower  Effectiveness of pain management plan:  No pain    Bowel:    Bowel Assist Initial Score:  1 - Total Assistance  Bowel Assist Current Score:  1 - Total Assistance  Bowl Accidents in last 7 days:  2  Last bowel movement: 03/09/20  Stool Description: Large, Soft     Usual bowel pattern:  every other day  Scheduled bowel medications:  senna-docusate (PERICOLACE or SENOKOT S)  + Miralax  PRN bowel medications used in last 24 hours:  None  Nursing Interventions:  Increased time, Scheduled medication, Emptying of device, Brief  Effectiveness of bowel program:   fair=sometimes needs prn bowel meds for constipation      Bladder:    Bladder Assist Initial Score:  1 - Total Assistance  Bladder Assist Current Score:  1 - Total Assistance  Bladder Accidents in last 7 days:  1  PVR range for past 24-48 hours: --    Medications affecting bladder:  None    Time void schedule/voiding pattern:  Prn per patient - brief  Interventions:  Increased time, Emptying of device, Brief    Wound:         Patient Lines/Drains/Airways Status    Active Current Wounds     Name: Placement date: Placement time: Site: Days:    Wound 02/14/20 neck stoma site  02/14/20   1059   --  24                   Interventions:  Dressing in place  Effectiveness of intervention:  wound is improving        Sleep/wake cycle:   Average hours slept:  Sleeps 4-6 hours without waking     Mom will NOT allow seroquel or trazodone!    Sleep medication usage:  None    Patient/Family Training/Education:  Medication Management  Discharge Supply Recommendations:  Other: none noted  Strengths: Supportive family   Barriers:   Uncooperative       Long Term Goals:   At discharge patient will be able to function safely at home and in the community with support.    Section completed by:  Ritu Rutledge R.N.

## 2020-03-10 NOTE — THERAPY
"Missed Therapy     Patient Name: Rey Medina  Age:  24 y.o., Sex:  male  Medical Record #: [id number]  Today's Date: 3/10/2020    Discussed missed therapy with  and Dr. David.    Pt laying in bed at start of therapy refusing PT. Able to answer questions about sleeping and eating, however refusing to transfer to , supine there ex, stretching/PROM, and playing Wii. Pt pushing therapists hands away from bed. When asked what he wants to do, pt replied, \"I'm okay.\"  "

## 2020-03-10 NOTE — THERAPY
Missed Therapy     Patient Name: Rey Medina  Age:  24 y.o., Sex:  male  Medical Record #: [id number]  Today's Date: 3/10/2020    Discussed missed therapy with scheduling. MD aware of pt refusals and agitation.    Attempted to do bed mobility, transfer training, PROM/stretching, etc. Pt refusing to participate in therapy and pulls away from light touch and stimuli. When asked questions, pt did not verbally express himself this session, only responding with head nods. Pt encouraged to participate so he can assist his mother and sister at home, however, pt continued to refuse.

## 2020-03-10 NOTE — CARE PLAN
Problem: Communication  Goal: The ability to communicate needs accurately and effectively will improve  Note: Pt. is unable to communicate needs to staff. Irritable for the most part of this shift.      Problem: Discharge Barriers/Planning  Goal: Patient's continuum of care needs will be met  Note: Pt. is Irritable, and he refused his vital signs taken as well as morning assessment. Pt's sister at the bedside

## 2020-03-10 NOTE — PROGRESS NOTES
Hospital Medicine Daily Progress Note      Chief Complaint:  Fever  Tachycardia  Leukocytosis    Interval History:  Sister reports pt calmed down after given Seroquel overnight.    Review of Systems  Review of Systems   Unable to perform ROS: Medical condition        Physical Exam  Temp:  [36.4 °C (97.5 °F)-37.2 °C (98.9 °F)] 36.4 °C (97.5 °F)  Pulse:  [] 103  Resp:  [18] 18  BP: (108-136)/(73-87) 136/84  SpO2:  [92 %-97 %] 92 %    Physical Exam  Vitals signs reviewed.   Constitutional:       Appearance: He is not toxic-appearing or diaphoretic.   HENT:      Head: Normocephalic and atraumatic.      Right Ear: External ear normal.      Left Ear: External ear normal.      Nose: Nose normal.   Eyes:      General:         Right eye: No discharge.         Left eye: No discharge.      Extraocular Movements: Extraocular movements intact.      Conjunctiva/sclera: Conjunctivae normal.   Neck:      Musculoskeletal: Normal range of motion and neck supple.      Comments: Trach stoma dressed  Cardiovascular:      Rate and Rhythm: Regular rhythm. Tachycardia present.   Pulmonary:      Effort: Pulmonary effort is normal. No respiratory distress.      Breath sounds: Normal breath sounds. No wheezing.   Abdominal:      General: Bowel sounds are normal. There is no distension.      Palpations: Abdomen is soft.      Tenderness: There is no abdominal tenderness.      Comments: PEG site dressed   Musculoskeletal:      Right lower leg: No edema.      Left lower leg: No edema.   Skin:     General: Skin is warm and dry.   Neurological:      Comments: Awake and cooperative today         Fluids    Intake/Output Summary (Last 24 hours) at 3/9/2020 1721  Last data filed at 3/9/2020 1400  Gross per 24 hour   Intake 1000 ml   Output --   Net 1000 ml       Laboratory  Recent Labs     03/07/20  0740 03/08/20  0658   WBC 11.2* 13.3*   RBC 6.25* 6.22*   HEMOGLOBIN 17.7 17.0   HEMATOCRIT 54.0* 54.9*   MCV 86.4 88.3   MCH 28.3 27.3   MCHC 32.8*  31.0*   RDW 49.5 51.0*   PLATELETCT 241 282   MPV 12.1 11.5     Recent Labs     03/07/20  0740 03/08/20  0658   SODIUM 142 146*   POTASSIUM 5.9* 4.6   CHLORIDE 107 106   CO2 20 19*   GLUCOSE 107* 113*   BUN 9 7*   CREATININE 0.84 0.84   CALCIUM 10.4 10.5                 Assessment/Plan  Hypernatremia  Assessment & Plan  Pt now taking PO but suspect not drinking enough fluids  Continue free water via PEG  Check F/U labs in am    Tachycardia- (present on admission)  Assessment & Plan  Suspect 2/2 agitation and debility  Consider increase Propranol to optimize HR and BP control    Leukocytosis- (present on admission)  Assessment & Plan  UA 0-2 WBC  BCx x 2 NGTD  CXR +basilar opacities, possible PNA  Elevated WBC normalized on empiric Zosyn and Zyvox  Now w/ recurrent elevated WBC  UA and CXR both unremarkable  Suspect reactive demargination from agitation (required prn Seroquel on 3/8/20)    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)  Assessment & Plan  Becoming more verbal    Dysphagia following nontraumatic intracerebral hemorrhage- (present on admission)  Assessment & Plan  S/P PEG  Now taking PO    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    Reviewed w/ pt's sister at bedside

## 2020-03-11 VITALS
TEMPERATURE: 97.6 F | BODY MASS INDEX: 25.25 KG/M2 | WEIGHT: 161.2 LBS | SYSTOLIC BLOOD PRESSURE: 135 MMHG | RESPIRATION RATE: 18 BRPM | OXYGEN SATURATION: 95 % | HEART RATE: 98 BPM | DIASTOLIC BLOOD PRESSURE: 83 MMHG

## 2020-03-11 PROCEDURE — 700102 HCHG RX REV CODE 250 W/ 637 OVERRIDE(OP): Performed by: HOSPITALIST

## 2020-03-11 PROCEDURE — 99232 SBSQ HOSP IP/OBS MODERATE 35: CPT | Performed by: HOSPITALIST

## 2020-03-11 PROCEDURE — 92507 TX SP LANG VOICE COMM INDIV: CPT

## 2020-03-11 PROCEDURE — 99232 SBSQ HOSP IP/OBS MODERATE 35: CPT | Performed by: PHYSICAL MEDICINE & REHABILITATION

## 2020-03-11 PROCEDURE — 770010 HCHG ROOM/CARE - REHAB SEMI PRIVAT*

## 2020-03-11 PROCEDURE — A9270 NON-COVERED ITEM OR SERVICE: HCPCS | Performed by: HOSPITALIST

## 2020-03-11 PROCEDURE — 700102 HCHG RX REV CODE 250 W/ 637 OVERRIDE(OP): Performed by: PHYSICAL MEDICINE & REHABILITATION

## 2020-03-11 PROCEDURE — A9270 NON-COVERED ITEM OR SERVICE: HCPCS | Performed by: PHYSICAL MEDICINE & REHABILITATION

## 2020-03-11 RX ADMIN — PROPRANOLOL HYDROCHLORIDE 20 MG: 10 TABLET ORAL at 07:42

## 2020-03-11 RX ADMIN — LEVETIRACETAM 500 MG: 500 SOLUTION ORAL at 07:42

## 2020-03-11 RX ADMIN — POLYETHYLENE GLYCOL 3350 1 PACKET: 17 POWDER, FOR SOLUTION ORAL at 17:01

## 2020-03-11 RX ADMIN — OMEPRAZOLE 40 MG: KIT at 07:50

## 2020-03-11 RX ADMIN — CHOLECALCIFEROL TAB 25 MCG (1000 UNIT) 1000 UNITS: 25 TAB at 07:42

## 2020-03-11 RX ADMIN — LEVETIRACETAM 500 MG: 500 SOLUTION ORAL at 20:49

## 2020-03-11 RX ADMIN — PROPRANOLOL HYDROCHLORIDE 20 MG: 10 TABLET ORAL at 20:49

## 2020-03-11 RX ADMIN — AMANTADINE HYDROCHLORIDE 100 MG: 50 SOLUTION ORAL at 07:42

## 2020-03-11 RX ADMIN — PROPRANOLOL HYDROCHLORIDE 20 MG: 10 TABLET ORAL at 17:01

## 2020-03-11 RX ADMIN — AMANTADINE HYDROCHLORIDE 100 MG: 50 SOLUTION ORAL at 12:16

## 2020-03-11 RX ADMIN — MONTELUKAST 10 MG: 10 TABLET, FILM COATED ORAL at 20:49

## 2020-03-11 NOTE — FLOWSHEET NOTE
03/11/20 0800   Events/Summary/Plan   Events/Summary/Plan Pt sister asking about ENT referral,.  Trach stoma still open, no redness but some food came out when pt coughed after eating.   Vital Signs   $ Pulse Oximetry (Spot Check)   (agitated, refused)

## 2020-03-11 NOTE — THERAPY
"Missed Therapy     Patient Name: Rey Medina  Age:  24 y.o., Sex:  male  Medical Record #: [id number]  Today's Date: 3/11/2020    Discussed missed therapy with KRISHNAN. Pt's sister attempting to get pt dressed prior to ST. Pt refusing, kicking legs, pushing hands away. Pt repeatedly stating \"no, later.\" with increased volume. Pt verbalized agreement to work at 1130 with this SLP.     03/11/20 0804   Interdisciplinary Plan of Care Collaboration   IDT Collaboration with  Family / Caregiver;Certified O.T. Assistant  (KRISHNAN)   Patient Position at End of Therapy In Bed;Family / Friend in Room   Collaboration Comments sister present, CLOF with KRISHNAN   Therapy Missed   Missed Therapy (Minutes) 30   Reason For Missed Therapy Non-Medical - Patient Refused     "

## 2020-03-11 NOTE — CARE PLAN
Problem: Mobility Transfers  Goal: STG-Within one week, patient will transfer bed to chair  Description: 1) Individualized goal:  Mod A x1 with LRAD  2) Interventions:  PT E Stim Attended, PT Orthotics Training, PT Gait Training, PT Self Care/Home Eval, PT Therapeutic Exercises, PT Ultrasound, PT TENS Application, PT Neuro Re-Ed/Balance, PT Therapeutic Activity, PT Manual Therapy and PT Evaluation      Outcome: DISCHARGED-GOAL NOT MET  Note: Inconsistent participation, unable to assess  Goal: STG-Within one week, patient will perform bed mobility  Description: 1) Individualized goal: Mod A for supine<>sit, bed rail prn, cueing, setup, non-elevated HOB, increased time  2) Interventions: PT E Stim Attended, PT Orthotics Training, PT Gait Training, PT Self Care/Home Eval, PT Therapeutic Exercises, PT Ultrasound, PT TENS Application, PT Neuro Re-Ed/Balance, PT Therapeutic Activity, PT Manual Therapy and PT Evaluation   Outcome: DISCHARGED-GOAL NOT MET  Note: Inconsistent participation, unable to assess     Problem: PT-Long Term Goals  Goal: LTG-By discharge, patient will maintain balance  Description: 1) Individualized goal:  In sitting with use of UE for assist at a SPV level, 5 min   2) Interventions:  PT E Stim Attended, PT Orthotics Training, PT Gait Training, PT Self Care/Home Eval, PT Therapeutic Exercises, PT Ultrasound, PT TENS Application, PT Neuro Re-Ed/Balance, PT Therapeutic Activity, PT Manual Therapy and PT Evaluation      Outcome: DISCHARGED-GOAL NOT MET  Note: Inconsistent participation, unable to assess  Goal: LTG-By discharge, patient will tolerate standing  Description: 1) Individualized goal:  2 min, SBA with use of LRAD  2) Interventions:  PT E Stim Attended, PT Orthotics Training, PT Gait Training, PT Self Care/Home Eval, PT Therapeutic Exercises, PT Ultrasound, PT TENS Application, PT Neuro Re-Ed/Balance, PT Therapeutic Activity, PT Manual Therapy and PT Evaluation    Outcome: DISCHARGED-GOAL  NOT MET  Note: Inconsistent participation, unable to assess  Goal: LTG-By discharge, patient will ambulate  Description: 1) Individualized goal:  10 ft, mod A with LRAD  2) Interventions:  PT E Stim Attended, PT Orthotics Training, PT Gait Training, PT Self Care/Home Eval, PT Therapeutic Exercises, PT Ultrasound, PT TENS Application, PT Neuro Re-Ed/Balance, PT Therapeutic Activity, PT Manual Therapy and PT Evaluation     Outcome: DISCHARGED-GOAL NOT MET  Note: Inconsistent participation, unable to assess  Goal: LTG-By discharge, patient will transfer one surface to another  Description: 1) Individualized goal:  Min A with LRAD  2) Interventions:  PT E Stim Attended, PT Orthotics Training, PT Gait Training, PT Self Care/Home Eval, PT Therapeutic Exercises, PT Ultrasound, PT TENS Application, PT Neuro Re-Ed/Balance, PT Therapeutic Activity, PT Manual Therapy and PT Evaluation   Outcome: DISCHARGED-GOAL NOT MET  Note: Inconsistent participation, unable to assess

## 2020-03-11 NOTE — THERAPY
"Missed Therapy     Patient Name: Rey Medina  Age:  24 y.o., Sex:  male  Medical Record #: [id number]  Today's Date: 3/11/2020    Discussed missed therapy with  IDT        03/11/20 0831   Interdisciplinary Plan of Care Collaboration   IDT Collaboration with  Family / Caregiver   Collaboration Comments sister present  reports patient declining tx since day prior    Therapy Missed   Missed Therapy (Minutes) 30   Reason For Missed Therapy Non-Medical - Patient Refused      several Attempts made  to engage patient in any purposeful activity.    patient presents with eyes closed, yelling out  \"No!\"  Withdrawing from touch.      "

## 2020-03-11 NOTE — THERAPY
"Speech Language Pathology  Daily Treatment     Patient Name: Rey Medina  Age:  24 y.o., Sex:  male  Medical Record #: [id number]  Today's Date: 3/11/2020     Subjective    Attempted to see patient for dysphagia intervention. Pt refusing to eat breakfast per sister. Pt continues to be in bed, not dressed, refusing all attempts per therapist and sister.      Objective     03/11/20 1134   Receptive Language / Auditory Comprehension   Answers Yes / No Personal Questions Supervision (5)   Follows One Unit Commands Severe (2)   Follows Two Unit Commands Severe (2)   Expressive Language   Automatic Language Appropriate Minimal (4)   Interdisciplinary Plan of Care Collaboration   IDT Collaboration with  Family / Caregiver   Patient Position at End of Therapy In Bed;Family / Friend in Room   Collaboration Comments sister present for session   Therapy Missed   Missed Therapy (Minutes) 15   Reason For Missed Therapy Non-Medical - Patient Refused   SLP Total Time Spent   SLP Individual Total Time Spent (Mins) 15   Charge Group   SLP Treatment - Individual Speech Language Treatment - Individual       Assessment    When SLP entered room, pt immediately repeating \"no, NO, NO\" - attempted for 15 minutes to engage pt in variety of speech/language and dysphagia tasks. Pt refusing trials of preferred candy (Kit Kelsey), answering Y/N questions with SPV. It should be noted that pt's complexity of sentences when responding to open ended questions has increased despite all productions being related to refusal of therapy tasks. Pt able to state \"I will do it later.\" \"I said not now.\" \"no more, later, 5 minutes.\"     Plan    Continue to encourage participation in structured therapy tasks.     "

## 2020-03-11 NOTE — CARE PLAN
Problem: Bowel/Gastric:  Goal: Normal bowel function is maintained or improved  Outcome: PROGRESSING AS EXPECTED  Note: Last BM 3/9/2020. On scheduled bowel medications.     Problem: Urinary Elimination:  Goal: Ability to reestablish a normal urinary elimination pattern will improve  Note: Pt can be incontinent of bladder.      Problem: Pain Management  Goal: Pain level will decrease to patient's comfort goal  Outcome: PROGRESSING AS EXPECTED

## 2020-03-11 NOTE — THERAPY
"Missed Therapy     Patient Name: Rey Medina  Age:  24 y.o., Sex:  male  Medical Record #: [id number]  Today's Date: 3/11/2020    Discussed missed therapy with .    Attempted supine treatment in bed. Pt agitated and refusing to participate. Pt asked if he wanted to get dressed, participate in supine there ex, PROM/stretching, etc. Pt repeated \"No\" and turned away from PT into fetal position.  "

## 2020-03-11 NOTE — THERAPY
"Missed Therapy     Patient Name: Rey Mdeina  Age:  24 y.o., Sex:  male  Medical Record #: [id number]  Today's Date: 3/11/2020    Discussed missed therapy with .    Pt refused to participate in transfer training, supine there ex, stretching/PROM, and/or dressing at this time. When asked what pt would like to do, he replied, \"Nothing!\" Pt pulling away from all light touch stimuli.  "

## 2020-03-11 NOTE — THERAPY
"Missed Therapy     Patient Name: Rey Medina  Age:  24 y.o., Sex:  male  Medical Record #: [id number]  Today's Date: 3/11/2020    Discussed missed therapy with MD and . Attempted to engage patient in various activities with maximal encouragement however patient continued to state \"NO!\" When asked why he does not want to participate, patient responded \"I just don't want to.\" Will re-attempt to work with patient this afternoon.      03/11/20 1031   Precautions   Precautions Fall Risk;PEG Tube   Comments dysarthric (however improving), shunt, modified diet   Interdisciplinary Plan of Care Collaboration   IDT Collaboration with  Family / Caregiver   Patient Position at End of Therapy In Bed;Family / Friend in Room;Call Light within Reach   Collaboration Comments Sister present during session    Therapy Missed   Missed Therapy (Minutes) 30   Reason For Missed Therapy Non-Medical - Patient Refused     "

## 2020-03-11 NOTE — CARE PLAN
Problem: Mobility Transfers  Goal: STG-Within one week, patient will transfer bed to chair  Description: 1) Individualized goal:  Mod A x1 with LRAD  2) Interventions:  PT E Stim Attended, PT Orthotics Training, PT Gait Training, PT Self Care/Home Eval, PT Therapeutic Exercises, PT Ultrasound, PT TENS Application, PT Neuro Re-Ed/Balance, PT Therapeutic Activity, PT Manual Therapy and PT Evaluation      Outcome: DISCHARGED-GOAL NOT MET  Note: Inconsistent participation, unable to assess  Goal: STG-Within one week, patient will perform bed mobility  Description: 1) Individualized goal: Mod A for supine<>sit, bed rail prn, cueing, setup, non-elevated HOB, increased time  2) Interventions: PT E Stim Attended, PT Orthotics Training, PT Gait Training, PT Self Care/Home Eval, PT Therapeutic Exercises, PT Ultrasound, PT TENS Application, PT Neuro Re-Ed/Balance, PT Therapeutic Activity, PT Manual Therapy and PT Evaluation   Outcome: DISCHARGED-GOAL NOT MET  Note: Inconsistent participation, unable to assess     Problem: PT-Long Term Goals  Goal: LTG-By discharge, patient will maintain balance  Description: 1) Individualized goal:  In sitting with use of UE for assist at a SPV level, 5 min   2) Interventions:  PT E Stim Attended, PT Orthotics Training, PT Gait Training, PT Self Care/Home Eval, PT Therapeutic Exercises, PT Ultrasound, PT TENS Application, PT Neuro Re-Ed/Balance, PT Therapeutic Activity, PT Manual Therapy and PT Evaluation      Outcome: DISCHARGED-GOAL NOT MET  Note: Inconsistent participation, unable to assess  Goal: LTG-By discharge, patient will tolerate standing  Description: 1) Individualized goal:  2 min, SBA with use of LRAD  2) Interventions:  PT E Stim Attended, PT Orthotics Training, PT Gait Training, PT Self Care/Home Eval, PT Therapeutic Exercises, PT Ultrasound, PT TENS Application, PT Neuro Re-Ed/Balance, PT Therapeutic Activity, PT Manual Therapy and PT Evaluation    Outcome: DISCHARGED-GOAL  NOT MET  Note: Inconsistent participation, unable to assess  Goal: LTG-By discharge, patient will ambulate  Description: 1) Individualized goal:  10 ft, mod A with LRAD  2) Interventions:  PT E Stim Attended, PT Orthotics Training, PT Gait Training, PT Self Care/Home Eval, PT Therapeutic Exercises, PT Ultrasound, PT TENS Application, PT Neuro Re-Ed/Balance, PT Therapeutic Activity, PT Manual Therapy and PT Evaluation     Outcome: DISCHARGED-GOAL NOT MET  Note: Inconsistent participation, unable to assess  Goal: LTG-By discharge, patient will transfer one surface to another  Description: 1) Individualized goal:  Min A with LRAD  2) Interventions:  PT E Stim Attended, PT Orthotics Training, PT Gait Training, PT Self Care/Home Eval, PT Therapeutic Exercises, PT Ultrasound, PT TENS Application, PT Neuro Re-Ed/Balance, PT Therapeutic Activity, PT Manual Therapy and PT Evaluation   Outcome: DISCHARGED-GOAL NOT MET  Note: Inconsistent participation, unable to assess     Problem: Balance  Goal: STG-Within one week, patient will maintain static standing  Description: 1) Individualized goal: 2 min, Mod A, gait belt, // bars, cueing, setup  2) Interventions: PT E Stim Attended, PT Orthotics Training, PT Gait Training, PT Self Care/Home Eval, PT Therapeutic Exercises, PT Ultrasound, PT TENS Application, PT Neuro Re-Ed/Balance, PT Therapeutic Activity, PT Manual Therapy and PT Evaluation      Outcome: DISCHARGED-GOAL NOT MET  Note: Pt unable to consistently maintain balance in // bars without max assist     Problem: Mobility  Goal: STG-Within one week, patient will ambulate household distance  Description: 1) Individualized goal: 10 ft, Max A, // bars, gait belt, max cueing, increased time, setup  2) Interventions: PT E Stim Attended, PT Orthotics Training, PT Gait Training, PT Self Care/Home Eval, PT Therapeutic Exercises, PT Ultrasound, PT TENS Application, PT Neuro Re-Ed/Balance, PT Therapeutic Activity, PT Manual Therapy and  PT Evaluation    Outcome: DISCHARGED-GOAL NOT MET  Note: Inconsistent participation, unable to assess

## 2020-03-11 NOTE — THERAPY
Missed Therapy     Patient Name: Rey Medina  Age:  24 y.o., Sex:  male  Medical Record #: [id number]  Today's Date: 3/11/2020    Discussed missed therapy with /supervisor.        03/11/20 1431   Precautions   Precautions Fall Risk;PEG Tube   Comments Dysarthric (however improving), shunt, modified diet   Therapy Missed   Missed Therapy (Minutes) 30   Reason For Missed Therapy Non-Medical - Patient Refused

## 2020-03-11 NOTE — PROGRESS NOTES
Hospital Medicine Daily Progress Note    Date of Service  3/11/2020    Chief Complaint:  Fever  Tachycardia  Leukocytosis      Interval History:  No significant events or changes since last visit    Review of Systems  Review of Systems   Unable to perform ROS: Medical condition        Physical Exam  Temp:  [36.8 °C (98.3 °F)] 36.8 °C (98.3 °F)  Pulse:  [101] 101  Resp:  [18] 18  BP: (130)/(84) 130/84    Physical Exam  Vitals signs and nursing note reviewed.   Constitutional:       General: He is not in acute distress.  HENT:      Mouth/Throat:      Mouth: Mucous membranes are moist.      Pharynx: Oropharynx is clear.   Eyes:      General: No scleral icterus.  Neck:      Musculoskeletal: No neck rigidity.   Cardiovascular:      Rate and Rhythm: Regular rhythm. Tachycardia present.      Heart sounds: No murmur.   Pulmonary:      Effort: Pulmonary effort is normal.      Breath sounds: Normal breath sounds. No stridor.      Comments: Has diminished breath sound (has poor inspiratory effort)  Abdominal:      General: There is no distension.      Palpations: Abdomen is soft.      Tenderness: There is no abdominal tenderness.   Musculoskeletal:      Right lower leg: No edema.      Left lower leg: No edema.   Skin:     General: Skin is warm and dry.      Findings: No rash.   Psychiatric:         Mood and Affect: Mood normal.         Behavior: Behavior normal.         Fluids    Intake/Output Summary (Last 24 hours) at 3/11/2020 1032  Last data filed at 3/11/2020 0730  Gross per 24 hour   Intake 890 ml   Output --   Net 890 ml       Laboratory                        Imaging    Assessment/Plan  Hypernatremia  Assessment & Plan  Na: 142 (3/7) --> 146 (3/8)  Transitioning to orals -- probably not taking in enough fluids  On free water via PEG    Tachycardia- (present on admission)  Assessment & Plan  Suspect 2nd to agitation  On Propranolol: 20 mg tid  Monitor    Leukocytosis- (present on admission)  Assessment & Plan  WBC's:  13.3 (3/8)  Has been afebrile  UA (-) WBC  CXR (3/6): unremarkable  Leukocytosis seems to come and go  ? 2nd to reactive demargination from agitation  ? 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

## 2020-03-12 PROBLEM — R05.8 SPUTUM PRODUCTION: Status: RESOLVED | Noted: 2020-02-29 | Resolved: 2020-03-12

## 2020-03-12 PROBLEM — E87.0 HYPERNATREMIA: Status: RESOLVED | Noted: 2020-03-08 | Resolved: 2020-03-12

## 2020-03-12 PROBLEM — E87.5 HYPERKALEMIA: Status: RESOLVED | Noted: 2020-03-07 | Resolved: 2020-03-12

## 2020-03-12 PROBLEM — Z93.0 TRACHEOSTOMY DEPENDENT (HCC): Status: RESOLVED | Noted: 2020-01-24 | Resolved: 2020-03-12

## 2020-03-12 PROCEDURE — 99239 HOSP IP/OBS DSCHRG MGMT >30: CPT | Performed by: PHYSICAL MEDICINE & REHABILITATION

## 2020-03-12 PROCEDURE — 99232 SBSQ HOSP IP/OBS MODERATE 35: CPT | Performed by: HOSPITALIST

## 2020-03-12 PROCEDURE — A9270 NON-COVERED ITEM OR SERVICE: HCPCS | Performed by: PHYSICAL MEDICINE & REHABILITATION

## 2020-03-12 PROCEDURE — 700102 HCHG RX REV CODE 250 W/ 637 OVERRIDE(OP): Performed by: PHYSICAL MEDICINE & REHABILITATION

## 2020-03-12 RX ORDER — PROPRANOLOL HYDROCHLORIDE 20 MG/1
20 TABLET ORAL 3 TIMES DAILY
Qty: 90 TAB | Refills: 2 | Status: SHIPPED | OUTPATIENT
Start: 2020-03-12 | End: 2020-05-01 | Stop reason: SDUPTHER

## 2020-03-12 RX ORDER — MONTELUKAST SODIUM 10 MG/1
10 TABLET ORAL DAILY
Qty: 90 TAB | Refills: 2 | Status: SHIPPED | OUTPATIENT
Start: 2020-03-12 | End: 2020-05-28 | Stop reason: SDUPTHER

## 2020-03-12 RX ORDER — LEVETIRACETAM 500 MG/1
500 TABLET ORAL 2 TIMES DAILY
Qty: 60 TAB | Refills: 2 | Status: SHIPPED | OUTPATIENT
Start: 2020-03-12 | End: 2020-05-28 | Stop reason: SDUPTHER

## 2020-03-12 RX ORDER — POLYETHYLENE GLYCOL 3350 17 G/17G
17 POWDER, FOR SOLUTION ORAL DAILY
Qty: 90 EACH | Refills: 2 | Status: SHIPPED | OUTPATIENT
Start: 2020-03-12 | End: 2020-05-28 | Stop reason: SDUPTHER

## 2020-03-12 RX ADMIN — AMANTADINE HYDROCHLORIDE 100 MG: 50 SOLUTION ORAL at 12:37

## 2020-03-12 RX ADMIN — LEVETIRACETAM 500 MG: 500 SOLUTION ORAL at 07:49

## 2020-03-12 RX ADMIN — OMEPRAZOLE 40 MG: KIT at 07:49

## 2020-03-12 RX ADMIN — CHOLECALCIFEROL TAB 25 MCG (1000 UNIT) 1000 UNITS: 25 TAB at 07:49

## 2020-03-12 RX ADMIN — AMANTADINE HYDROCHLORIDE 100 MG: 50 SOLUTION ORAL at 07:49

## 2020-03-12 NOTE — PROGRESS NOTES
Patient discharged to home per order.  Discharge instructions reviewed with patient and mother; they verbalize understanding and signed copies placed in chart.  Patient has all belongings; signed copy of form in chart.  Patient left facility at 1230 via w/c accompanied by rehab staff and family.  Have enjoyed working with this pleasant patient.

## 2020-03-12 NOTE — FLOWSHEET NOTE
03/12/20 1045   Events/Summary/Plan   Events/Summary/Plan Trach stoma care done with assistance of pt's mother.  Dressing removed  and slight green/yellow  thick mucus noted on dressing. Stoma not red or swollen. Mucus and air continue to come from trachea.  Pt's mother and sister have been educated on proper  trach stoma care.

## 2020-03-12 NOTE — PROGRESS NOTES
[facility] Medicine Daily Progress Note    Date of Service  3/12/2020    Chief Complaint:  Fever  Tachycardia  Leukocytosis      Interval History:  No significant events or changes since last visit    Review of Systems  Review of Systems   Unable to perform ROS: Medical condition        Physical Exam  Temp:  [36.4 °C (97.6 °F)] 36.4 °C (97.6 °F)  Pulse:  [78-98] 98  Resp:  [18] 18  BP: (135-144)/(83-87) 135/83  SpO2:  [95 %] 95 %    Physical Exam  Vitals signs and nursing note reviewed.   Constitutional:       Appearance: He is not diaphoretic.   HENT:      Mouth/Throat:      Pharynx: No oropharyngeal exudate or posterior oropharyngeal erythema.   Eyes:      Extraocular Movements: Extraocular movements intact.   Neck:      Vascular: No carotid bruit.   Cardiovascular:      Rate and Rhythm: Regular rhythm. Tachycardia present.      Heart sounds: No murmur.   Pulmonary:      Effort: Pulmonary effort is normal.      Breath sounds: Normal breath sounds. No stridor.      Comments: Has diminished breath sound (has poor inspiratory effort)  Abdominal:      General: Bowel sounds are normal.      Palpations: Abdomen is soft.   Musculoskeletal:      Right lower leg: No edema.      Left lower leg: No edema.   Skin:     General: Skin is warm and dry.      Findings: No rash.   Psychiatric:         Mood and Affect: Mood normal.         Behavior: Behavior normal.         Fluids  No intake or output data in the 24 hours ending 03/12/20 0758    Laboratory                        Imaging    Assessment/Plan  Hypernatremia  Assessment & Plan  Na: 142 (3/7) --> 146 (3/8)  Transitioning to orals -- probably not taking in enough fluids  On free water via PEG    Tachycardia- (present on admission)  Assessment & Plan  Suspect 2nd to agitation  On Propranolol: 20 mg tid  Monitor    Leukocytosis- (present on admission)  Assessment & Plan  WBC's: 13.3 (3/8)  Has been afebrile  UA (-) WBC  CXR (3/6): unremarkable  Leukocytosis seems to come and  go  ? 2nd to reactive demargination from agitation  ? 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

## 2020-03-12 NOTE — DISCHARGE PLANNING
Case management Summary:   Met with patient prior to discharge. Reviewed all follow up appointments.   Referral made to [facility] and they are have accepted referral and are ready to follow.  Preferred home care has delivered w/c, tub bench and comode to patient.    During hospitalization, I have provided support and education and have been available for questions and information during hours of operation, communicated with therapy team and MD along with providing links/resources  to outside services.    Patient verbalizes agreement with all plans and has an understanding of the next steps within the post acute services.     CASE MANAGEMENT PLAN OF CARE   Individualized Goals:   1. Patient's mother hopes he can be decanulated before discharge  2. Patient's mother wants to obtain something with handles for the toilet.  3. Patient's mother would like to obtain PCS services from Medicaid.      Outcome:   1. Met: patient has been decannulated and tolerated well.  2. Met: Comode to use over the toilet has been delivered.  3. Partially Met; This referral has been made.

## 2020-03-12 NOTE — DISCHARGE INSTRUCTIONS
Dysphagia Diet Level 2, Mechanically Altered  The dysphagia level 2 diet includes foods that are blended, chopped, ground, or mashed so they are easier to chew and swallow. The foods are soft, moist, and can be chopped into ¼-inch chunks (such as pancakes, pasta, and bananas).  In order to be on this diet, you must be able to chew. This diet helps you transition between the pureed textures of the dysphagia level 1 diet to more solid textures. This diet is helpful for people with mild to moderate swallowing difficulties. It reduces the risk of food getting caught in the windpipe, trachea, or lungs.   You may need help or supervision during meals while following this diet so that you eat safely. You will be on this diet until your health care provider advances the texture of your diet.   WHAT DO I NEED TO KNOW ABOUT THIS DIET?  Foods  · You may eat foods that are soft and moist.  ¨ You may need to use a , whisk, or masher to soften some of your foods.  ¨ You can moisten foods with gravies, sauces, vegetable or fruit juice, milk, half and half, or water when blending, mashing, or grinding your foods to the right consistency.  · If you were on the dysphagia level 1 diet, you may still eat any of the foods included in that diet.  · Avoid foods that are dry, hard, sticky, chewy, coarse, and crunchy. Also avoid large cuts of food.  · Take small bites. Each bite should contain ¼ inch or less of food.  Liquids  · Avoid liquids with seeds and chunks.  · Thicken liquids, if instructed by your health care provider. Your health care provider will tell you the consistency to which you should thicken your liquids for safe swallowing. To thicken a liquid, use a commercial thickener or a thickening food (such as rice cereal or potato flakes). Ask your health care provider for specific recommendations on thickeners.  See your dietitian or health care provider regularly for help with your dietary changes.  WHAT FOODS CAN I  EAT?  Grains  Store-bought soft breads that do not have nuts or seeds. Pancakes, sweet rolls, Guyanese pastries, and Frisian toast that have been moistened with syrup or sauce to form a slurry when blended. Well-cooked pasta, noodles, and bread dressing. Well-cooked noodles and pasta in sauce. Moist macaroni and cheese. Soft dumplings or spaetzle with gravy or butter. Cooked cereals (including oatmeal). Low-texture dry cereals, such as rice puff, corn, or wheat-flake cereals, with milk (if thin liquids are not allowed, make sure all of the milk is absorbed by the cereal before eating it).  Vegetables  Very soft, well-cooked vegetables in pieces less than ½ inch in size. Cooked potatoes that are moist, not crispy, and with sauce.  Fruits  Canned or cooked fruits that are soft or moist and do not have skin or seeds. Fresh, soft bananas. Fruit juices with a small amount of pulp (if thin liquids are allowed). Gelatin or plain gelatin with canned fruit, except pineapple.  Meat and Other Protein Sources  Tender, moist meats, poultry, or fish cooked with gravy or sauce and cubed to ¼-inch bites or smaller. Ground meat. Moist meatball or meatloaf. Fish without bones. Moist casseroles without rice. Tuna, egg, or meat salad without chunks or hard-to-chew vegetables, such as celery and onions. Smooth quiche without large chunks. Scrambled, poached, or soft-cooked eggs with butter, margarine, sauce, or gravy. Tofu. Well-cooked, moistened and mashed beans, peas, baked beans, and other legumes. Casseroles without rice (such as tuna noodle casserole or soft moist meat lasagna).  Dairy  Cream cheese. Yogurt. Cottage cheese. Ask your health care provider if milk is allowed.  Sweets/Desserts  Pudding. Custard. Soft fruit pies with crust on the bottom only. Crisps and cobblers without seeds or nuts and with soft crusts. Soft, moist cakes. Icing. Pre-gelled cookies. Soft, moist cookies dunked in milk, coffee, or another liquid. Jelly.  Soft, smooth chocolate bars that are easily chewed. Jams and preserves without seeds. Ask your health care provider whether you can have frozen desserts.  Fats and Oils  Butter. Margarine. Cream for cereal, depending on liquid consistency allowed. Gravy. Cream sauces. Mayonnaise. Salad dressings. Cream cheese. Cheese spreads, plain or with soft fruits or vegetables added. Sour cream. Sour cream dips with soft fruits or vegetables added. Whipped toppings.  Other  Sauces and salsas that have soft chunks that are about ½ inch or smaller.  The items listed above may not be a complete list of recommended foods or beverages. Contact your dietitian for more options.  WHAT FOODS ARE NOT RECOMMENDED?  Grains  All breads not listed in the recommended list. Breads that are hard or have nuts or seeds. Coarse cereals. Cereals that have nuts, seeds, dried fruits, or coconut. Rice. Corn.  Vegetables  Whole, raw, frozen, or dried vegetables. Tough, fibrous, chewy, or stringy cooked vegetables, such as celery, peas, broccoli, cabbage, Dike sprouts, and asparagus. Potato skins. Potato and other vegetable chips. Fried or Wolof-fried potatoes. Cooked corn and peas.  Fruits  Whole raw, frozen, or dried fruits, including coconut. Pineapple. Fruits with seeds.  Meat and Other Protein Sources  Dry, tough meats, such as hernandez, sausage, and hot dogs. Cheese slices and cubes. Peanut butter. Hard boiled or fried eggs. Nuts. Seeds. Pizza. Sandwiches. Dry casseroles or casseroles with rice or large chunks.  Dairy  Yogurt with nuts, seeds, or large chunks.  Sweets/Desserts  Coarse, hard, chewy, or sticky desserts. Any dessert with nuts, seeds, coconut, pineapple, or dried fruit. Ask your health care provider whether you can have frozen desserts.  Fats and Oils  Avoid fats with chunky, large textures, such as those with nuts or fruits.  Other  Soups and casseroles with large chunks.  The items listed above may not be a complete list of foods  and beverages to avoid. Contact your dietitian for more information.     This information is not intended to replace advice given to you by your health care provider. Make sure you discuss any questions you have with your health care provider.     Document Released: 12/18/2006 Document Revised: 01/08/2016 Document Reviewed: 12/01/2014  [employer] Patient Education ©2016 [employer]      Fall Prevention in the Home  Introduction  Falls can cause injuries. They can happen to people of all ages. There are many things you can do to make your home safe and to help prevent falls.  What can I do on the outside of my home?  · Regularly fix the edges of walkways and driveways and fix any cracks.  · Remove anything that might make you trip as you walk through a door, such as a raised step or threshold.  · Trim any bushes or trees on the path to your home.  · Use bright outdoor lighting.  · Clear any walking paths of anything that might make someone trip, such as rocks or tools.  · Regularly check to see if handrails are loose or broken. Make sure that both sides of any steps have handrails.  · Any raised decks and porches should have guardrails on the edges.  · Have any leaves, snow, or ice cleared regularly.  · Use sand or salt on walking paths during winter.  · Clean up any spills in your garage right away. This includes oil or grease spills.  What can I do in the bathroom?  · Use night lights.  · Install grab bars by the toilet and in the tub and shower. Do not use towel bars as grab bars.  · Use non-skid mats or decals in the tub or shower.  · If you need to sit down in the shower, use a plastic, non-slip stool.  · Keep the floor dry. Clean up any water that spills on the floor as soon as it happens.  · Remove soap buildup in the tub or shower regularly.  · Attach bath mats securely with double-sided non-slip rug tape.  · Do not have throw rugs and other things on the floor that can make you trip.  What can I  do in the bedroom?  · Use night lights.  · Make sure that you have a light by your bed that is easy to reach.  · Do not use any sheets or blankets that are too big for your bed. They should not hang down onto the floor.  · Have a firm chair that has side arms. You can use this for support while you get dressed.  · Do not have throw rugs and other things on the floor that can make you trip.  What can I do in the kitchen?  · Clean up any spills right away.  · Avoid walking on wet floors.  · Keep items that you use a lot in easy-to-reach places.  · If you need to reach something above you, use a strong step stool that has a grab bar.  · Keep electrical cords out of the way.  · Do not use floor polish or wax that makes floors slippery. If you must use wax, use non-skid floor wax.  · Do not have throw rugs and other things on the floor that can make you trip.  What can I do with my stairs?  · Do not leave any items on the stairs.  · Make sure that there are handrails on both sides of the stairs and use them. Fix handrails that are broken or loose. Make sure that handrails are as long as the stairways.  · Check any carpeting to make sure that it is firmly attached to the stairs. Fix any carpet that is loose or worn.  · Avoid having throw rugs at the top or bottom of the stairs. If you do have throw rugs, attach them to the floor with carpet tape.  · Make sure that you have a light switch at the top of the stairs and the bottom of the stairs. If you do not have them, ask someone to add them for you.  What else can I do to help prevent falls?  · Wear shoes that:  ¨ Do not have high heels.  ¨ Have rubber bottoms.  ¨ Are comfortable and fit you well.  ¨ Are closed at the toe. Do not wear sandals.  · If you use a stepladder:  ¨ Make sure that it is fully opened. Do not climb a closed stepladder.  ¨ Make sure that both sides of the stepladder are locked into place.  ¨ Ask someone to hold it for you, if possible.  · Clearly mike  and make sure that you can see:  ¨ Any grab bars or handrails.  ¨ First and last steps.  ¨ Where the edge of each step is.  · Use tools that help you move around (mobility aids) if they are needed. These include:  ¨ Canes.  ¨ Walkers.  ¨ Scooters.  ¨ Crutches.  · Turn on the lights when you go into a dark area. Replace any light bulbs as soon as they burn out.  · Set up your furniture so you have a clear path. Avoid moving your furniture around.  · If any of your floors are uneven, fix them.  · If there are any pets around you, be aware of where they are.  · Review your medicines with your doctor. Some medicines can make you feel dizzy. This can increase your chance of falling.  Ask your doctor what other things that you can do to help prevent falls.  This information is not intended to replace advice given to you by your health care provider. Make sure you discuss any questions you have with your health care provider.  Document Released: 10/14/2010 Document Revised: 05/25/2017 Document Reviewed: 01/22/2016  © 2017 [facility] NURSING DISCHARGE INSTRUCTIONS    Blood Pressure: 135/83  Weight: 73.1 kg (161 lb 3.2 oz)  Nursing recommendations for Rey Medina at time of discharge are as follows:  Family Member verbalized understanding of all discharge instructions and prescriptions.     Review all your home medications and newly ordered medications with your doctor and/or pharmacist. Follow medication instructions as directed by your doctor and/or pharmacist.    Pain Management:   Discharge Pain Medication Instructions:  Comfort Goal: Comfort with Movement, Comfort at Rest, Sleep Comfortably  Notify your primary care provider if pain is unrelieved with these measures, if the pain is new, or increased in intensity.    Discharge Skin Characteristics: Warm, Dry  Discharge Skin Exam: Clear  Wound 02/14/20 neck stoma site (Active)   Wound Image   2/23/2020  9:00 AM   Periwound  Assessment ROYAL 3/11/2020  7:30 AM   Wound Cleansing Approved Wound Cleanser 3/6/2020  8:00 PM   Dressing Options Dry Gauze;Hypafix Tape 3/11/2020  8:45 PM   Dressing Changed Observed 3/11/2020  7:30 AM   Dressing Status Clean;Dry;Intact 3/11/2020  8:45 PM   Dressing Change/Treatment Frequency Daily, and As Needed 3/11/2020  8:45 PM     Skin / Wound Care Instructions: Please contact your primary care physician for any change in skin integrity.     If You Have Surgical Incisions / Wounds:  Monitor surgical site(s) for signs of increased swelling, redness or symptoms of drainage from the site or fever as this could indicate signs and symptoms of infection. If these symptoms are noted, notifiy your primary care provider.      Discharge Safety Instructions: Should Have ADULT SUPERVISION     Discharge Safety Concerns: Unsteady Gait, Balance Problems (Dizziness, Light Headedness), Impaired Judgement, Weakness  The interdisciplinary team has made recommendation that you should have adult supervision in the house due to balance problem, impaired judgment, weakness and unsteady gait  Anti-embolic stockings are not required to increase circulation to the lower extremities.    Discharge Diet: regular     Discharge Liquids: thin  Discharge Bowel Function: Incontinent  Please contact your primary care physician for any changes in bowel habits.  Discharge Bowel Program:    Discharge Bladder Function: Incontinent  Discharge Urinary Devices: Brief      Nursing Discharge Plan:        Case Management Discharge Instructions:   Discharge Location:    Agency Name/Address/Phone:    Home Health:    Outpatient Services:    DME Provider/Phone:    Medical Equipment Ordered:    Prescription Faxed to:        Discharge Medication Instructions:  Below are the medications your physician expects you to take upon discharge:Speech Therapy Discharge Instructions for Rey Medina    3/11/2020    Diet: Minced and Moist (5) - (Dysphagia II), Thin  (0)  Swallow Strategies: small bites/sips, alternate between solids and liquids.   Other Diet Instructions: Medications continue to be given via feeding tube  Supervision: 24-hour supervision is needed at this time.   Cognition / Communication: Rey, you have made so much progress in your short time at rehab! Please continue to work hard at home so you can get better! ~ Marjorie Pearl, MS, CCC-SLP    Physical Therapy Discharge Instructions for Rey JAMES Adam    3/11/2020    Level of Assist Required for Ambulation: Requires Wheelchair for Mobility at This Time  Level of Assist Required to Propel Wheelchair: Consistent Physical Assist  Level of Assist Required for Transfers: Physical Assist    Thank you all for working so hard! It has been so great working with you! Good luck with everything and please feel free to keep in touch if you have any questions.  -Charmaine Chawla PT, DPT and Jennifer Dunne, SPT

## 2020-03-12 NOTE — PROGRESS NOTES
"Rehab Progress Note     Encounter Date: 3/11/2020    CC: ICH, AMS    Interval Events (Subjective)  Patient sitting up in bed. He is agitated this morning for unknown reason he keeps repeating \"Come back later\" and \"Later.\" Sister reports he has had a tough morning. Discussed with therapy that he is refusing therapy. Sister has questions about discharge medications including stimulant. Discussed follow-up with Dr. Luke in PM&R clinic. Discussed stimulant as well as nightly sleep aid. Discussed agitation with propranolol. Discussed discharge planning for tomorrow. Discussed would not remove G tube as he is not reliable with his agitation.     ROS: Unable to perform due to repeating No    IDT Team Meeting 3/10/2020  DC/Disposition:  3/12/20    Objective:  VITAL SIGNS: /83   Pulse 98   Temp 36.4 °C (97.6 °F) (Oral)   Resp 18   Wt 73.1 kg (161 lb 3.2 oz)   SpO2 95%   BMI 25.25 kg/m²  HR: 100, /86, T: 98.3: RR: 22  Gen: NAD  Psych: Mood and affect appropriate  CV: RRR, no edema  Resp: CTAB, no upper airway sounds  Abd: NTND  Neuro: repeating same phrases over and refuses to open eyes, agitated and grabbing at side rails      No results found for this or any previous visit (from the past 72 hour(s)).    Current Facility-Administered Medications   Medication Frequency   • traZODone (DESYREL) tablet 100 mg QHS   • amantadine (SYMMETREL) 50 MG/5ML syrup 100 mg BID   • simethicone (MYLICON) chewable tab 80 mg 4X/DAY PRN   • traZODone (DESYREL) tablet 50 mg QHS PRN   • propranolol (INDERAL) tablet 20 mg TID   • acetaminophen (TYLENOL) tablet 650 mg Q4HRS PRN   • artificial tears ophthalmic solution 1 Drop PRN   • benzocaine-menthol (CEPACOL) lozenge 1 Lozenge Q2HRS PRN   • hydrALAZINE (APRESOLINE) tablet 25 mg Q8HRS PRN   • mag hydrox-al hydrox-simeth (MAALOX PLUS ES or MYLANTA DS) suspension 20 mL Q2HRS PRN   • ondansetron (ZOFRAN ODT) dispertab 4 mg 4X/DAY PRN    Or   • ondansetron (ZOFRAN) syringe/vial " injection 4 mg 4X/DAY PRN   • polyethylene glycol/lytes (MIRALAX) PACKET 1 Packet Q24HRS PRN    And   • magnesium hydroxide (MILK OF MAGNESIA) suspension 30 mL QDAY PRN    And   • bisacodyl (DULCOLAX) suppository 10 mg QDAY PRN   • sodium chloride (OCEAN) 0.65 % nasal spray 2 Spray PRN   • tramadol (ULTRAM) 50 MG tablet 50 mg Q4HRS PRN   • Respiratory Therapy Consult Continuous RT   • eucerin cream TID PRN   • ipratropium-albuterol (DUONEB) nebulizer solution Q4H PRN (RT)   • levETIRAcetam (KEPPRA) 100 MG/ML solution 500 mg Q12HRS   • montelukast (SINGULAIR) tablet 10 mg Nightly   • omeprazole (FIRST-OMEPRAZOLE) 2 mg/mL oral susp 40 mg DAILY   • polyethylene glycol/lytes (MIRALAX) PACKET 1 Packet DAILY   • QUEtiapine (SEROQUEL) tablet 25 mg TID PRN   • vitamin D (cholecalciferol) tablet 1,000 Units DAILY       Orders Placed This Encounter   Procedures   • Diet Order Regular (meds via  tube)     Standing Status:   Standing     Number of Occurrences:   1     Order Specific Question:   Diet:     Answer:   Regular [1]     Comments:   meds via  tube     Order Specific Question:   Texture Modifier     Answer:   Level 5 - Minced & Moist (Dysphagia 2)     Order Specific Question:   Liquid level     Answer:   Level 0 - Thin       Assessment:  Active Hospital Problems    Diagnosis   • *Intracerebral hemorrhage, intraventricular (HCC)   • 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 Patient very low functioning but per mother becoming more purposeful  -PT and OT for mobility and ADLs  -SLP for cognition   -Patient agitated on Amantadine and Modafinil. Both trials halted due to agitation. Restart Amantadine 50 mg as he was too agitated on 200 mg. Slow increase up to 100  mg BID with improved cognition and function  -Continue Keppra. Unclear if had seizure or was continued on prophylaxis. Follow-up with Neurology  -NSG appointment on 2/7/20 to evaluation  shunt. Will follow-up in 2 months   -CT head as waxing and waning status. At times he voices including his names, at other times no response and compulsive grabbing of bed rail and urinal. CT head - negative.      Visual Field deficits - Difficult to assess due to cognitive status but having difficulty with peripheral on right. Referral to Neuro-ophtho at discharge    Agitation - Appears like TBI with agitation worsened from neurostimulant. Change Metoprolol to Propranolol. Start Seroquel PRN, too sedated on scheduled  -Propranolol increase to 20 mg TID, improved agitation control     Muscle spasms - mother claims Bromocriptine helps, typically used for dopamine side effects/neurostimulant. Will start low dose Baclofen and monitor.   -Limited spasticity, mostly resisting examination. Stop Baclofen and monitor.     Dysphagia - Patient with G Tube in place. SLP for swallow. MBSS on 2/10/20, start on dysphagia 1 with NTL  -Tolerated first meal but emesis on 2nd meal at higher percentage. May be due to distention, no lung changes. Continue to monitor.   -Discussed with SLP and recommend MBSS on 3/4/20. Upgraded to Dysphagia 2. Patient is at times too agitated to remove PEG. Will continue while ongoing home health and outpatient therapies.     Aspiration pneumonia - Currently on Unasyn.  Elevated WBC, broadened to Zyvox and Zosyn. Resolved after antibiotics.     CV - Patient is on Ivabradine 5 mg daily. Unclear reason why. Discontinue Ivabradine, on Metoprolol for tachycardia. Switch metoprolol to Propranolol     Respiratory - Patient with size 7 trach on admission. Patient on Duonebs and Singulair  -Exchange aborted on 2/6/20 due to emesis on suction. Tolerated overnight. Decannulated AM 2/7/20. Stoma remains opened, discussed with RT  and increase changes and tighter bandaging. Referral to ENT     Insomnia - Patient able to verbalize insomnia. Schedule Trazodone and move amantadine to noon.     GI Ppx - Patient on Prilosec and Prevacid. Unclear why two PPIs. Discontinue Prevacid. Discontinue Simethicone     DVT Ppx - Patient on Eliquis 2.5 mg BID. Unknown reason why, mother denies clot or DVT. Discontinue Eliquis as 10 months post-ICH    Dispo - Possible extension to 3/12/20, patient improving with ADLs and eating.     Total time:  28 minutes.  I spent greater than 50% of the time for patient care, counseling, and coordination on this date, including unit/floor time, and face-to-face time with the patient as per interval events and assessment and plan above. Topics discussed included discharge planning, discharge medications, referral to ENT and referral to PM&R outpatient.     Greer David M.D.

## 2020-03-12 NOTE — DISCHARGE SUMMARY
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  Patient was living on [facility] at the time. Per patient's mom she does not know all that happened after his AVM ruptured including why he is on Eliquis. She reports he had a tracheostomy and G tube placed two weeks later. He was initially getting care in the [facility] and then moved to the  in 11/19. She reports she thinks he takes bromocriptine for muscle spasms. She reports occasional small amounts of dried blood are coughed up from the tracheostomy. She has not had the trach exchanged since she has been in the . She has a PMV valve but he often coughs it off. One of his mothers goals is to get the trach removed. He has been on tube feeds which are given via bolus. Patient is non-verbal but recently he has been grabbing himself when he needs to void and she can place a urinal.  She reports otherwise they use a diaper. Patient can follow some simple commands on the right intermittently. He has been getting PT and OT at home. He was evaluated in our PM&R office on 1/24/20 and thought to benefit from inpatient  rehabilitation to work on trach, swallow and trial of neurostimulants. Mother does not know if he was ever tried on neurostimulants (bromocriptine is often used in [place] as a neurostimulant).     Patient was admitted to [facility] on 2/5/2020.     Hospital Course by Problem List:  AVM rupture - s/p AVM repair in the [facility] s/p  Shunt. Patient very low functioning but per mother becoming more purposeful. Patient initially presented with pointing to urinal or to his buttocks for bladder/bowel, respectively.  Patient underwent acute inpatient rehabilitation from 2/21/20 to 3/12/20 with good improvement in ADLs and cognition. Patient now able to make his needs more known including voicing short sentences and phrases. Patient agitated on Amantadine and Modafinil. Both trials halted due to agitation.   -Restarted Amantadine 50 mg as he was too agitated on 200 mg. Slow increase up to 100 mg BID with improved cognition and function. Patient actively voicing.   -Continue Keppra. Unclear if had seizure or was continued on prophylaxis. Follow-up with Neurology  -NSG appointment on 2/7/20 to evaluation  shunt. Will follow-up in 2 months   -CT head as waxing and waning status. At times he voices including his names, at other times no response and compulsive grabbing of bed rail and urinal. CT head 2/21/20 - negative.      Visual Field deficits - Difficult to assess due to cognitive status but having difficulty with peripheral on right. Referral to Neuro-ophtho at discharge     Agitation - Appears like TBI with agitation worsened from neurostimulant. Change Metoprolol to Propranolol. Start Seroquel PRN, too sedated on scheduled  -Propranolol increase to 20 mg TID, improved agitation control      Muscle spasms - mother claims Bromocriptine helps, typically used for dopamine side effects/neurostimulant. Will start low dose Baclofen and monitor. Limited spasticity, mostly resisting examination. Stop  Baclofen and monitor. Resolved     Dysphagia - Patient with G Tube in place. SLP for swallow. MBSS on 2/10/20, start on dysphagia 1 with NTL. Tolerated first meal but emesis on 2nd meal at higher percentage. May be due to distention, no lung changes. Developed aspiration pneumonia.    -Discussed with SLP and recommend MBSS on 3/4/20. Upgraded to Dysphagia 2. Patient is at times too agitated to remove PEG. Will continue while ongoing home health and outpatient therapies.      Aspiration pneumonia - Started on Unasyn.  Elevated WBC, broadened to Zyvox and Zosyn. Resolved after antibiotics.      CV - Patient is on Ivabradine 5 mg daily. Unclear reason why. Discontinue Ivabradine, on Metoprolol for tachycardia. Switch metoprolol to Propranolol     Respiratory - Patient with size 7 trach on admission. Patient on Duonebs and Singulair. Exchange aborted on 20 due to emesis on suction. Tolerated overnight -. Decannulated AM 20. Stoma remains opened, discussed with RT and increase changes and tighter bandaging. Referral to ENT as most likely scar tissue limiting healing of stoma.      Insomnia - Patient able to verbalize insomnia. Schedule Trazodone and move amantadine to noon.      GI Ppx - Patient on Prilosec and Prevacid. Unclear why two PPIs. Discontinue Prevacid. Discontinue Simethicone     DVT Ppx - Patient on Eliquis 2.5 mg BID. Unknown reason why, mother denies clot or DVT. Discontinue Eliquis as 10 months post-ICH     Dispo - Extension to 3/12/20, patient with good improvement in phonation. Continues to make progress but intermittently frustrated.     Functional Status at Discharge  Eating:  3 - Moderate Assistance  Eating Description:  Modified diet, Needs help scooping food, Set-up of equipment or meal/tube feeding, Verbal cueing, Increased time, Supervision for safety, Needs help bringing food to mouth  Groomin - Total Assistance  Grooming Description:  (Patient declined to actively participate  in grooming task; required total assist to wash face)  Bathin - Total Assistance  Bathing Description:  Adaptive equipment, Hand held shower, Increased time, Supervision for safety, Verbal cueing, Set-up of equipment, Initial preparation for task(Max assist for thoroughness with all body parts, safety and balance while seated on roll-in shower chair. )  Upper Body Dressin - Not tested, patient refused  Upper Body Dressing Description:  (Mod assist for clothing orientation, pulling shirt down and shirt over head while seated.  Requires max cues (verbal/tactile due to decreased coordination))  Lower Body Dressin - Max Assistance  Lower Body Dressing Description:  2 - Max Assistance     Walk:  0 - Not tested, patient refused  Distance Walked:  Walks less than 50 feet  Walk Description:  Two hand rails, Requires 2 people to assist, Verbal cueing, Safety concerns, Extra time(3 ftx2, 5 ftx2, 8 ftx1 in parallel bars with Max A for stabilizing, facilitating weight shift, and managing step length/position, Min A to manage trunk. Max multimodal cues for sequencing and posture.)  Wheelchair:  1 - Total Assistance  Distance Propelled:  Propels less than 50 feet   Wheelchair Description:  Adaptive equipment, Extra time, Safety concerns, Verbal cueing, Supervision for safety(x1 foot with BUE dependent with hand over hand TC)  Stairs 0 - Not tested,unsafe activity  Stairs Description   Discharge Location: Home  Patient Discharging with Assist of: Family  Level of Supervision Required Upon Discharge: Twenty Four Hour Supervision  Recommended Equipment for Discharge: Manual Wheelchair  Recommeded Services Upon Discharge: Home Health Physical Therapy  Long Term Goals Met: 0  Long Term Goals Not Met: 4  Criteria for Termination of Services: Maximum Function Achieved for Inpatient Rehabilitation  Comprehension Mode:  Both  Comprehension:  4 - Minimal Assistance  Comprehension Description:  Verbal cues  Expression Mode:   Vocal  Expression:  3 - Moderate Assistance  Expression Description:  Verbal cueing  Social Interaction:  3 - Moderate Assistance  Social Interaction Description:  Increased time, Verbal cues, Medication  Problem Solvin - Max Assistance  Problem Solving Description:  Verbal cueing, Increased time, Therapy schedule, Seat belt, 1:1 supervision  Memory:  2 - Max Assistance  Memory Description:  Verbal cueing, Therapy schedule, Seat belt  Progress since Admit: Pt has made consistent progress since admission. While at [facility] pt has been decannulated, initated oral diet with advancement to current diet level of minced/moist and thin liquids via straw. Trials of soft/bite sized textures attempted, however, pt continues to demonstrate minimal mastication, munch bite chew pattern. Ongoing trials of soft/bite sized foods recommended with home health SLP. Pt is able to verbalize wants/needs in short, simple phrases, and Y/N responses consistently. Frequent refusals of structured therapy tasks and lack of cooperative and motivation have been barriers to progress most recently. Recommend pt d/c home with support of family 24-7, ongoing SLP services via home health.   Discharge Location : Home  Patient Discharging with Assist of: Family   Level of Supervision Required: 24 Hour Supervision  Recommended Services Upon Discharge: Home Health Speech Therapy  Long Term Goals Met: 1/2  Long Term Goals Not Met: 1/2  Reason(s) for Goals Not Met: Pt requires max A for participation in structured therapy tasks  Criteria for Termination of Services: Maximum Function Achieved for Inpatient Rehabilitation    Greer URIBE M.D., personally performed a complete drug regimen review and no potential clinically significant medication issues were identified.   Discharge Medication:     Medication List      START taking these medications      Instructions   amantadine 50 MG/5ML Syrp  Commonly known as:  SYMMETREL   Doctor's comments:   0800 and 1200  Take 10 mL by mouth 2 Times a Day.  Dose:  100 mg     propranolol 20 MG Tabs  Commonly known as:  INDERAL   Take 1 Tab by mouth 3 times a day.  Dose:  20 mg        CHANGE how you take these medications      Instructions   levETIRAcetam 500 MG Tabs  What changed:    · See the new instructions.  · Another medication with the same name was removed. Continue taking this medication, and follow the directions you see here.  Commonly known as:  KEPPRA   Take 1 Tab by mouth 2 times a day.  Dose:  500 mg     montelukast 10 MG Tabs  What changed:    · See the new instructions.  · Another medication with the same name was removed. Continue taking this medication, and follow the directions you see here.  Commonly known as:  SINGULAIR   Take 1 Tab by mouth every day.  Dose:  10 mg        CONTINUE taking these medications      Instructions   * Assurance Fitted Brief Large Misc   ASSURANCE FITTED BRIEF LARGE     * Advocate Underpads Misc   ADVOCATE UNDERPADS     polyethylene glycol/lytes Pack  Commonly known as:  MIRALAX   Take 1 Packet by mouth every day.  Dose:  17 g         * This list has 2 medication(s) that are the same as other medications prescribed for you. Read the directions carefully, and ask your doctor or other care provider to review them with you.            STOP taking these medications    bromocriptine 2.5 MG Tabs  Commonly known as:  PARLODEL     Caltrate Minis Plus Minerals 300-800 MG-UNIT Tabs     Corlanor 5 MG Tabs tablet  Generic drug:  ivabradine     Eliquis 2.5mg Tabs  Generic drug:  apixaban     lansoprazole 30 MG Cpdr  Commonly known as:  PREVACID     vitamin D 1000 Unit (25 mcg) Tabs  Commonly known as:  cholecalciferol            Discharge Diet:  Dysphagia 2 with tube feeds    Discharge Activity:  As tolerated     Disposition:  Patient to discharge home with family support and community resources.     Equipment:  WC, bedside commode     Follow-up & Discharge Instructions:  Follow up with  your primary care provider (PCP) within 7-10 days of discharge to review your medications and take over your care.     If you develop chest pain, fever, chills, change in neurologic function (weakness, sensation changes, vision changes), or other concerning sxs, seek immediate medical attention or call 911.      Condition on Discharge:  Good    More than 32 minutes was spent on discharging this patient, including face-to-face time, prescription management, and the dictation of this note.    Greer David M.D.    Date of Service: 3/12/2020

## 2020-03-12 NOTE — DISCHARGE PLANNING
03/12/20  0947   Discharge Instructions - Completed by Case Mgmt   Discharge Location Home with Home Health   Agency Name / Address / Phone [facility] at [phone] (they will call you to schedule visits)   Home Health Registered Nurse; Occupational Therapist; Physical Therapist; Speech Therapist   Medical equipment Provider / Phone [facility] at [phone]   Medical Equipment Ordered Wheelchair; comode and tub bench   Comments Please follow up with Maeve Bustamante regarding Personal Care Attendent program [phone].            Follow-up With  Details  Why  Contact Info   Nirmala Man M.D. (Primary Care)  On 3/17/2020  Tuesday at 1:40 pm  [address]  [place] [address]  [place] [address] [phone]     Mandy Luke D.O. (Physiatry)  On 3/26/2020  Thursday at 9:00 am  [address] [facility]  [place] [id number] [phone]     Leodan Iglesias M.D. (Gastroenterology)  On 5/13/2020 Weds at 4:30 pm (please check in at 4:00 pm)(you will be seen by Shae SRIVASTAVA)  [address] [facility]  [place] [address]  [phone]

## 2020-03-14 ENCOUNTER — HOME CARE VISIT (OUTPATIENT)
Dept: HOME HEALTH SERVICES | Facility: HOME HEALTHCARE | Age: 25
End: 2020-03-14
Payer: MEDICAID

## 2020-03-14 VITALS
OXYGEN SATURATION: 97 % | RESPIRATION RATE: 16 BRPM | HEART RATE: 83 BPM | BODY MASS INDEX: 21.38 KG/M2 | SYSTOLIC BLOOD PRESSURE: 126 MMHG | WEIGHT: 161.3 LBS | HEIGHT: 73 IN | TEMPERATURE: 98.5 F | DIASTOLIC BLOOD PRESSURE: 85 MMHG

## 2020-03-14 PROCEDURE — G0493 RN CARE EA 15 MIN HH/HOSPICE: HCPCS

## 2020-03-14 PROCEDURE — 665001 SOC-HOME HEALTH

## 2020-03-14 PROCEDURE — 6650537 HCR  CLEANSER ANTISEPTIC HAND FOAM 1.6OZ

## 2020-03-14 ASSESSMENT — FIBROSIS 4 INDEX: FIB4 SCORE: 0.26

## 2020-03-15 ASSESSMENT — ENCOUNTER SYMPTOMS
AGITATION: 1
NAUSEA: DENIES
DIFFICULTY THINKING: 1
VOMITING: DENIES
SHORTNESS OF BREATH: T

## 2020-03-15 ASSESSMENT — PATIENT HEALTH QUESTIONNAIRE - PHQ9
CLINICAL INTERPRETATION OF PHQ2 SCORE: 0
2. FEELING DOWN, DEPRESSED, IRRITABLE, OR HOPELESS: 00
1. LITTLE INTEREST OR PLEASURE IN DOING THINGS: 00

## 2020-03-15 ASSESSMENT — ACTIVITIES OF DAILY LIVING (ADL): OASIS_M1830: 06

## 2020-03-16 ENCOUNTER — ANTICOAGULATION MONITORING (OUTPATIENT)
Dept: MEDICAL GROUP | Facility: PHYSICIAN GROUP | Age: 25
End: 2020-03-16

## 2020-03-16 ENCOUNTER — HOME CARE VISIT (OUTPATIENT)
Dept: HOME HEALTH SERVICES | Facility: HOME HEALTHCARE | Age: 25
End: 2020-03-16
Payer: MEDICAID

## 2020-03-16 NOTE — PROGRESS NOTES
Received referral from [facility]. Medications reviewed. No clinically significant interactions noted.

## 2020-03-17 ENCOUNTER — HOME CARE VISIT (OUTPATIENT)
Dept: HOME HEALTH SERVICES | Facility: HOME HEALTHCARE | Age: 25
End: 2020-03-17
Payer: MEDICAID

## 2020-03-17 PROCEDURE — G0495 RN CARE TRAIN/EDU IN HH: HCPCS

## 2020-03-17 ASSESSMENT — ENCOUNTER SYMPTOMS: AGITATION: 1

## 2020-03-18 ENCOUNTER — HOME CARE VISIT (OUTPATIENT)
Dept: HOME HEALTH SERVICES | Facility: HOME HEALTHCARE | Age: 25
End: 2020-03-18
Payer: MEDICAID

## 2020-03-18 VITALS
HEART RATE: 94 BPM | TEMPERATURE: 98.1 F | DIASTOLIC BLOOD PRESSURE: 90 MMHG | SYSTOLIC BLOOD PRESSURE: 114 MMHG | RESPIRATION RATE: 16 BRPM

## 2020-03-18 PROCEDURE — G0152 HHCP-SERV OF OT,EA 15 MIN: HCPCS

## 2020-03-18 ASSESSMENT — ENCOUNTER SYMPTOMS
MUSCLE WEAKNESS: 1
LIMITED RANGE OF MOTION: 1
POOR JUDGMENT: 1

## 2020-03-18 ASSESSMENT — ACTIVITIES OF DAILY LIVING (ADL)
AMBULATION ASSISTANCE: TWO PERSON
CURRENT_FUNCTION: TWO PERSON

## 2020-03-19 ENCOUNTER — HOME CARE VISIT (OUTPATIENT)
Dept: HOME HEALTH SERVICES | Facility: HOME HEALTHCARE | Age: 25
End: 2020-03-19
Payer: MEDICAID

## 2020-03-19 PROCEDURE — G0153 HHCP-SVS OF S/L PATH,EA 15MN: HCPCS

## 2020-03-19 SDOH — ECONOMIC STABILITY: FOOD INSECURITY: FOOD SAFETY CONCERNS: 1

## 2020-03-19 ASSESSMENT — ENCOUNTER SYMPTOMS
DIFFICULTY THINKING: 1
DIFFICULTY THINKING: 1
CHANGE IN APPETITE: ABSENT
POOR JUDGMENT: 1
TROUBLE SWALLOWING: 1
AGITATION: 1
AGITATION: 1

## 2020-03-20 ENCOUNTER — HOME CARE VISIT (OUTPATIENT)
Dept: HOME HEALTH SERVICES | Facility: HOME HEALTHCARE | Age: 25
End: 2020-03-20
Payer: MEDICAID

## 2020-03-20 VITALS — OXYGEN SATURATION: 96 % | TEMPERATURE: 97.6 F | RESPIRATION RATE: 16 BRPM | HEART RATE: 95 BPM

## 2020-03-20 PROCEDURE — G0495 RN CARE TRAIN/EDU IN HH: HCPCS

## 2020-03-20 PROCEDURE — G0151 HHCP-SERV OF PT,EA 15 MIN: HCPCS

## 2020-03-20 ASSESSMENT — ENCOUNTER SYMPTOMS
MUSCLE WEAKNESS: 1
AGITATION: 1
POOR JUDGMENT: 1
LIMITED RANGE OF MOTION: 1

## 2020-03-20 ASSESSMENT — ACTIVITIES OF DAILY LIVING (ADL)
CURRENT_FUNCTION: TWO PERSON
AMBULATION ASSISTANCE: NON-AMBULATORY

## 2020-03-22 VITALS — TEMPERATURE: 97.6 F | OXYGEN SATURATION: 96 % | HEART RATE: 95 BPM | RESPIRATION RATE: 16 BRPM

## 2020-03-22 ASSESSMENT — ACTIVITIES OF DAILY LIVING (ADL)
GROOMING_COMMENTS: PLEASE REFER TO OT ASSESSMENT
BATHING_COMMENTS: PLEASE REFER TO OT ASSESSMENT
FEEDING_COMMENTS: PLEASE REFER TO OT ASSESSMENT
ADLS_COMMENTS: PLEASE REFER TO OT ASSESSMENT

## 2020-03-22 ASSESSMENT — ENCOUNTER SYMPTOMS
POOR JUDGMENT: 1
DIFFICULTY THINKING: 1
AGITATION: 1

## 2020-03-24 ENCOUNTER — HOME CARE VISIT (OUTPATIENT)
Dept: HOME HEALTH SERVICES | Facility: HOME HEALTHCARE | Age: 25
End: 2020-03-24
Payer: MEDICAID

## 2020-03-24 VITALS
RESPIRATION RATE: 16 BRPM | SYSTOLIC BLOOD PRESSURE: 128 MMHG | TEMPERATURE: 97.5 F | HEART RATE: 96 BPM | OXYGEN SATURATION: 97 % | DIASTOLIC BLOOD PRESSURE: 90 MMHG

## 2020-03-24 PROCEDURE — G0495 RN CARE TRAIN/EDU IN HH: HCPCS

## 2020-03-24 ASSESSMENT — ENCOUNTER SYMPTOMS
POOR JUDGMENT: 1
LIMITED RANGE OF MOTION: 1
AGITATION: 1
MUSCLE WEAKNESS: 1

## 2020-03-24 ASSESSMENT — ACTIVITIES OF DAILY LIVING (ADL)
AMBULATION ASSISTANCE: ONE PERSON
CURRENT_FUNCTION: ONE PERSON

## 2020-03-26 DIAGNOSIS — R63.0 POOR APPETITE: ICD-10-CM

## 2020-03-26 RX ORDER — MIRTAZAPINE 15 MG/1
15 TABLET, FILM COATED ORAL
Qty: 30 TAB | Refills: 2 | Status: SHIPPED | OUTPATIENT
Start: 2020-03-26 | End: 2020-05-28 | Stop reason: SDUPTHER

## 2020-03-26 NOTE — PROGRESS NOTES
Rey is a very pleasant 24-year-old male with past medical history significant for ICH secondary to AVM rupture on 4/21/2019 s/p AVM repair, hydrocephalus s/p  shunt who was recently admitted to [facility] from 2/5/2020 to 3/12/2020.    Patient was supposed to see me for a post rehab evaluation, however, he did not want to get out of the car to come inside.  Had extensive discussion with patient's mother, Melinda, today.    Rehab: Melinda states that since discharge the patient has been rather resistant to care.  He has not been very cooperative.  She has had trouble convincing him to eat regular foods and continues to give him tube feeds.  Additionally he will sometimes get up in the middle of the night and want to walk around which Melinda will do with him as it is good for exercise, but she has tried to convince him to do this type of exercise during the day.  She states that she thinks all of this started when they forced him to get blood 1 day and now he is very scared and resistant to healthcare professionals in general.  He is supposed to have started outpatient therapies but has not done so due to this underlying distrust.    Neuro: Patient's surgical intervention during initial time of injury occurred in the [facility].  Patient was placed on amantadine and trial of modafinil for neuro stimulation, however, at higher doses became agitated.  He has since been stable on amantadine 100 mg twice daily.  Mother is giving at 7 AM and 11 AM.  Prior to patient's admission to ARU, referral was sent to neurology seizure clinic given that patient had been on Keppra and as far as Melinda knows, he has not had any seizures.  This appointment is ready to schedule and we will discuss this at next visit.  Patient additionally suffered from agitation which was worsened by neuro stimulants.  He was placed on propranolol 20 mg 3 times daily which he continues to be on with improved agitation.  Patient had been on baclofen for  "spasticity, however, it was deciphered that patient was mostly resistant to examination as opposed to exhibiting true spasticity.  We will have to continue to monitor this.  Not on baclofen at this time.    HEENT: Difficult to assess visual deficits due to cognitive status but reportedly having difficulty with peripheral vision on the right.  Referral to neuro-ophthalmology was to be made on ARU discharge. Will follow-up, do not see referral in [facility].  Regardless, patient would not be able to schedule until he is more cooperative with healthcare professionals.    GI: Patient has G-tube in place.  Currently getting tube feeds due to resisting oral feeding.  During ARU as diet was being advanced patient developed aspiration pneumonia which required his transfer off.  Given that patient is resisting oral feedings and still remains distrustful of healthcare professionals, continue with G-tube at this time.  Ideally will restart with speech therapy when able.  Feasibly could have G-tube removed in the future.    Pulm: Patient with size 7 trach on admission to ARU.  Decannulated 2/7/2020.  On Singulair at this time.  Poor stoma healing.  Patient was referred to ENT this referral ready to schedule.  Will discuss this at next visit.    Bowel/Bladder: Still incontinent into diaper.  Discussed timed voiding for bladder management.  Perform bowel program every couple days with daily bowel meds.    CV: Had been on Eliquis for unclear reason which was discontinued while in ARU.  Per mom, no history of clots or other indication for anticoagulation.    Vitamin D deficiency: 5000 units daily    Mom rescheduled patient's appointment for 1 to 2 months, hopefully patient will be more cooperative and we can discuss further the aforementioned issues.  Mom reports that patient is \"there \"in terms of cognition.  I suspect that patient's cognition has surpassed his functional capabilities.  To some degree, patient is likely seeking control " where he can.     Dr. Mandy Luke DO, MS  Department of Physical Medicine & Rehabilitation  Neuro Rehabilitation Clinic  [facility]    Prolonged non-face-to-face time was spent on 3/26/2020  from 08:40 to 09:38 by this reviewer, for a total of 58 minutes.  An extensive review was performed of patient's past records regarding past surgical history relating to current diagnosis admission and discharge note review, op note review, current therapy note review, progress notes. Extensive discussions with patient's mom.  This data reviewed and collected is outlined above.

## 2020-03-27 ENCOUNTER — HOME CARE VISIT (OUTPATIENT)
Dept: HOME HEALTH SERVICES | Facility: HOME HEALTHCARE | Age: 25
End: 2020-03-27
Payer: MEDICAID

## 2020-03-27 VITALS
TEMPERATURE: 97.2 F | SYSTOLIC BLOOD PRESSURE: 112 MMHG | RESPIRATION RATE: 16 BRPM | HEART RATE: 94 BPM | OXYGEN SATURATION: 97 % | DIASTOLIC BLOOD PRESSURE: 90 MMHG

## 2020-03-27 PROCEDURE — G0493 RN CARE EA 15 MIN HH/HOSPICE: HCPCS

## 2020-03-27 PROCEDURE — A4452 WATERPROOF TAPE: HCPCS

## 2020-03-27 ASSESSMENT — ACTIVITIES OF DAILY LIVING (ADL)
AMBULATION ASSISTANCE: ONE PERSON
CURRENT_FUNCTION: TWO PERSON

## 2020-03-27 ASSESSMENT — ENCOUNTER SYMPTOMS
MUSCLE WEAKNESS: 1
LIMITED RANGE OF MOTION: 1

## 2020-03-29 ASSESSMENT — ENCOUNTER SYMPTOMS: POOR JUDGMENT: 1

## 2020-03-31 ENCOUNTER — HOME CARE VISIT (OUTPATIENT)
Dept: HOME HEALTH SERVICES | Facility: HOME HEALTHCARE | Age: 25
End: 2020-03-31
Payer: MEDICAID

## 2020-03-31 VITALS
HEART RATE: 88 BPM | TEMPERATURE: 97.7 F | DIASTOLIC BLOOD PRESSURE: 82 MMHG | SYSTOLIC BLOOD PRESSURE: 107 MMHG | RESPIRATION RATE: 16 BRPM | OXYGEN SATURATION: 95 %

## 2020-03-31 PROCEDURE — G0495 RN CARE TRAIN/EDU IN HH: HCPCS

## 2020-03-31 ASSESSMENT — ACTIVITIES OF DAILY LIVING (ADL)
AMBULATION ASSISTANCE: ONE PERSON
CURRENT_FUNCTION: ONE PERSON

## 2020-03-31 ASSESSMENT — ENCOUNTER SYMPTOMS
MUSCLE WEAKNESS: 1
POOR JUDGMENT: 1
LIMITED RANGE OF MOTION: 1

## 2020-04-01 ENCOUNTER — HOME CARE VISIT (OUTPATIENT)
Dept: HOME HEALTH SERVICES | Facility: HOME HEALTHCARE | Age: 25
End: 2020-04-01
Payer: MEDICAID

## 2020-04-02 ENCOUNTER — HOME HEALTH ADMISSION (OUTPATIENT)
Dept: HOME HEALTH SERVICES | Facility: HOME HEALTHCARE | Age: 25
End: 2020-04-02
Payer: MEDICAID

## 2020-04-02 ENCOUNTER — HOME CARE VISIT (OUTPATIENT)
Dept: HOME HEALTH SERVICES | Facility: HOME HEALTHCARE | Age: 25
End: 2020-04-02
Payer: MEDICAID

## 2020-04-02 NOTE — PROGRESS NOTES
Placed call to pt mother, who is the primary CG. Updated her that HH needs to do another admission under different [facility] plan, and asked if Saturday this week 4/4 would be acceptable. She stated yes and agreed to have Frances come Saturday for SOC visit. Made sure she understood therapies cannot come out today or tomorrow as it is not covered under old insurance, she verbalized understanding and stated next week would be ok for therapies. S cheduling aware.

## 2020-04-03 ENCOUNTER — HOME CARE VISIT (OUTPATIENT)
Dept: HOME HEALTH SERVICES | Facility: HOME HEALTHCARE | Age: 25
End: 2020-04-03
Payer: MEDICAID

## 2020-04-03 ASSESSMENT — ACTIVITIES OF DAILY LIVING (ADL)
OASIS_M1830: 06
HOME_HEALTH_OASIS: 01

## 2020-04-03 ASSESSMENT — ENCOUNTER SYMPTOMS: POOR JUDGMENT: 1

## 2020-04-03 NOTE — PROGRESS NOTES
Received a phone call from pt's Mom who is his caregiver. She was made aware of need for pt to be discharged from old episode and admitted under new insurance, which was scheduled for tomorrow 4/4/2020. She now calls to ask that HH SOC be postponed until after the Coronavirus pandemic is managed, as she fears exposing him for unnecessary reasons. She states she does want him to have therapies, but not necessary now as she has too many c oncerns about him ruben the virus. Explained to her this SN understands and although HC workers are taking precautions to protect the pt and themselves, this is a real concern. Discussed that the risk outweighs the benefit at present time. Told her we would cancel SOC visit tomorrow and close the episode, and when she feels the time is right with low risk to her son, she just needs to request new referral from the PCP and HH would  be happy to come back in and treat the patient. She verbalized appreciation and understanding.

## 2020-04-21 ENCOUNTER — PATIENT MESSAGE (OUTPATIENT)
Dept: NEUROLOGY | Facility: MEDICAL CENTER | Age: 25
End: 2020-04-21

## 2020-04-22 ENCOUNTER — TELEMEDICINE (OUTPATIENT)
Dept: NEUROLOGY | Facility: MEDICAL CENTER | Age: 25
End: 2020-04-22
Payer: MEDICAID

## 2020-04-22 VITALS
SYSTOLIC BLOOD PRESSURE: 126 MMHG | OXYGEN SATURATION: 98 % | WEIGHT: 160.94 LBS | BODY MASS INDEX: 25.26 KG/M2 | HEIGHT: 67 IN | HEART RATE: 75 BPM | DIASTOLIC BLOOD PRESSURE: 85 MMHG

## 2020-04-22 DIAGNOSIS — F09 COGNITIVE AND NEUROBEHAVIORAL DYSFUNCTION FOLLOWING BRAIN INJURY (HCC): ICD-10-CM

## 2020-04-22 DIAGNOSIS — S06.9XAS COGNITIVE AND NEUROBEHAVIORAL DYSFUNCTION FOLLOWING BRAIN INJURY (HCC): ICD-10-CM

## 2020-04-22 DIAGNOSIS — G31.89 COGNITIVE AND NEUROBEHAVIORAL DYSFUNCTION FOLLOWING BRAIN INJURY (HCC): ICD-10-CM

## 2020-04-22 PROCEDURE — 99205 OFFICE O/P NEW HI 60 MIN: CPT | Mod: CR

## 2020-04-22 RX ORDER — HYDROXYCHLOROQUINE SULFATE 200 MG/1
TABLET, FILM COATED ORAL
COMMUNITY
Start: 2020-03-17 | End: 2020-05-28

## 2020-04-22 RX ORDER — PREGABALIN 75 MG/1
CAPSULE ORAL
COMMUNITY
Start: 2020-04-15 | End: 2020-05-26

## 2020-04-22 ASSESSMENT — FIBROSIS 4 INDEX: FIB4 SCORE: 0.26

## 2020-04-22 NOTE — PROGRESS NOTES
Telemedicine Visit: New Patient     This encounter was conducted via Zoom .   Verbal consent was obtained. Patient's identity was verified.    Subjective:     CC: follow up after hospital and reha discharge   Rey Medina is a 24 y.o. 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  Patient was living on [facility] [place] [facility]"" at the time. Per patient's mom she does not know all that happened after his AVM ruptured including why he is on Eliquis. She reports he had a tracheostomy and G tube placed two weeks later. He was initially getting care in the [facility] and then moved to the  in 11/19  He was initially nonverbal and was in rehabilitation center and recently got discharged home, He is minimally verbal at this time.Per mom his speech is improving slowly and he is able to say simple words and do some simple math problems. Was initially on both Modafinil and amantadine and given agitation now on amantadine only due to decreased awareness and decreased cognitive function.  Mom thinks amantadine is helping him. Can walk to the bathroom now.Mom helps with all activities if daily living including bathing and dressing.  On LEV for seizure prophylaxis.No seizures reported apart from occasional staring spells.  He has poor appetite and mom is doing everything in her power to get him to eat.  Somewhat resistant to care.  Reported difficulty with vision in peripheral visual field on the right however difficult to assess.  Mom is here to discuss if the patient needs LEV as he had no seizures.  ROS  See HPI  Constitutional: Negative for fever, chills and malaise/fatigue.   HENT: Negative for congestion.    Eyes: Negative for pain.   Respiratory: Negative for cough and shortness of breath.    Cardiovascular: Negative for leg swelling.   Gastrointestinal: Negative  for nausea, vomiting, abdominal pain and diarrhea.   Genitourinary: Negative for dysuria and hematuria.   Skin: Negative for rash.   Neurological: as above minimally verbal   Endo/Heme/Allergies: Does not bruise/bleed easily.   Psychiatric/Behavioral: unable to assess     Allergies   Allergen Reactions   • Vancomycin Swelling   • Other Misc Rash     Allergic to name band.       Current medicines (including changes today)  Current Outpatient Medications   Medication Sig Dispense Refill   • mirtazapine (REMERON) 15 MG Tab Take 1 Tab by mouth every bedtime. 30 Tab 2   • Cholecalciferol (VITAMIN D3) 5000 UNIT/ML Liquid 125 mcg by Gastric Tube route every day.     • amantadine (SYMMETREL) 50 MG/5ML Syrup Take 10 mL by mouth 2 Times a Day. 1 Bottle 2   • levETIRAcetam (KEPPRA) 500 MG Tab Take 1 Tab by mouth 2 times a day. 60 Tab 2   • montelukast (SINGULAIR) 10 MG Tab Take 1 Tab by mouth every day. 90 Tab 2   • polyethylene glycol/lytes (MIRALAX) Pack Take 1 Packet by mouth every day. 90 Each 2   • propranolol (INDERAL) 20 MG Tab Take 1 Tab by mouth 3 times a day. 90 Tab 2   • Incontinence Supply Disposable (ASSURANCE FITTED BRIEF LARGE) [facility] ASSURANCE FITTED BRIEF LARGE     • Incontinence Supply Disposable (ADVOCATE UNDERPADS) [facility] ADVOCATE UNDERPADS       No current facility-administered medications for this visit.        He  has a past medical history of Stroke ([facility]).  He  has no past surgical history on file.      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  Skin: No rashes in visible areas.  Eye: Round. Conjunctiva clear, lids normal. No icterus.   ENMT: Lips pink without lesions, good dentition, moist mucous membranes. Phonation normal.  Neck: No masses, no thyromegaly. Moves freely without pain.  CV: Pulse as reported by patient  Respiratory: Unlabored respiratory effort, no cough or audible wheeze  Psych:minimally verbal   Neurological   Can say yes or no   Can do basic math such as 2+ 3   Can not maintain meaningful conversation or answer questions  Not participating in exam   Face appears symmetric   Can not test vision or coordination   Can stand up   Moving all extremities spontaneously     Imaging and labs reviewed    Lab Results   Component Value Date/Time    SODIUM 146 (H) 03/08/2020 06:58 AM    POTASSIUM 4.6 03/08/2020 06:58 AM    CHLORIDE 106 03/08/2020 06:58 AM    CO2 19 (L) 03/08/2020 06:58 AM    GLUCOSE 113 (H) 03/08/2020 06:58 AM    BUN 7 (L) 03/08/2020 06:58 AM    CREATININE 0.84 03/08/2020 06:58 AM      Lab Results   Component Value Date/Time    WBC 13.3 (H) 03/08/2020 06:58 AM    RBC 6.22 (H) 03/08/2020 06:58 AM    HEMOGLOBIN 17.0 03/08/2020 06:58 AM    HEMATOCRIT 54.9 (H) 03/08/2020 06:58 AM    MCV 88.3 03/08/2020 06:58 AM    MCH 27.3 03/08/2020 06:58 AM    MCHC 31.0 (L) " 03/08/2020 06:58 AM    MPV 11.5 03/08/2020 06:58 AM    NEUTSPOLYS 64.10 03/06/2020 06:13 AM    LYMPHOCYTES 27.80 03/06/2020 06:13 AM    MONOCYTES 4.60 03/06/2020 06:13 AM    EOSINOPHILS 1.60 03/06/2020 06:13 AM    BASOPHILS 1.40 03/06/2020 06:13 AM      CT head 3/2/2020   IMPRESSION:     No acute intracranial abnormality is identified.     Left posterior approach ventriculostomy catheter terminates near midline  FINDINGS:  Left posterior parietal approach ventriculostomy catheter terminates at midline near the foramina of [place]     No ventricular dilatation is seen. Ventricles are normal in configuration.     No acute intracranial hemorrhage is seen.     There is no midline shift.     Gray white junction differentiation is distinct.     Visualized paranasal sinuses and mastoid air cells are clear.     No acute calvarium abnormality is noted.     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  On amantadine 100 mg bid to improve cognitive function.  No seizures - remains on  mg bid  Plan  EEG routine   MRI brain   MRA head  We will review images and eeg and if no risk for seizures we may taper LEV until off.    The following treatment plan was discussed:     There are no diagnoses linked to this encounter.      Follow-up: No follow-ups on file.

## 2020-05-01 RX ORDER — PROPRANOLOL HYDROCHLORIDE 20 MG/1
20 TABLET ORAL 3 TIMES DAILY
Qty: 90 TAB | Refills: 2 | Status: SHIPPED | OUTPATIENT
Start: 2020-05-01 | End: 2020-07-31

## 2020-05-01 NOTE — TELEPHONE ENCOUNTER
We received this refill request from [facility]. (In media tab) I put two refills because they requested 90 day for insurance.

## 2020-05-04 ENCOUNTER — PATIENT MESSAGE (OUTPATIENT)
Dept: NEUROLOGY | Facility: MEDICAL CENTER | Age: 25
End: 2020-05-04

## 2020-05-07 ENCOUNTER — PATIENT MESSAGE (OUTPATIENT)
Dept: NEUROLOGY | Facility: MEDICAL CENTER | Age: 25
End: 2020-05-07

## 2020-05-21 ENCOUNTER — NON-PROVIDER VISIT (OUTPATIENT)
Dept: NEUROLOGY | Facility: MEDICAL CENTER | Age: 25
End: 2020-05-21
Payer: MEDICAID

## 2020-05-21 ENCOUNTER — PATIENT MESSAGE (OUTPATIENT)
Dept: NEUROLOGY | Facility: MEDICAL CENTER | Age: 25
End: 2020-05-21

## 2020-05-21 ENCOUNTER — APPOINTMENT (OUTPATIENT)
Dept: NEUROLOGY | Facility: MEDICAL CENTER | Age: 25
End: 2020-05-21
Payer: MEDICAID

## 2020-05-21 ENCOUNTER — HOSPITAL ENCOUNTER (OUTPATIENT)
Dept: RADIOLOGY | Facility: MEDICAL CENTER | Age: 25
End: 2020-05-21
Attending: NEUROLOGICAL SURGERY
Payer: MEDICAID

## 2020-05-21 DIAGNOSIS — S06.9XAS COGNITIVE AND NEUROBEHAVIORAL DYSFUNCTION FOLLOWING BRAIN INJURY (HCC): ICD-10-CM

## 2020-05-21 DIAGNOSIS — I60.8: ICD-10-CM

## 2020-05-21 DIAGNOSIS — G31.89 COGNITIVE AND NEUROBEHAVIORAL DYSFUNCTION FOLLOWING BRAIN INJURY (HCC): ICD-10-CM

## 2020-05-21 DIAGNOSIS — F09 COGNITIVE AND NEUROBEHAVIORAL DYSFUNCTION FOLLOWING BRAIN INJURY (HCC): ICD-10-CM

## 2020-05-21 PROCEDURE — 70551 MRI BRAIN STEM W/O DYE: CPT

## 2020-05-21 PROCEDURE — 95816 EEG AWAKE AND DROWSY: CPT

## 2020-05-21 PROCEDURE — 70544 MR ANGIOGRAPHY HEAD W/O DYE: CPT

## 2020-05-21 NOTE — PROCEDURES
ROUTINE ELECTROENCEPHALOGRAM REPORT      Referring provider: Dr. Myers     DOS:  24 minutes     INDICATION:  25 yo male with     CURRENT ANTIEPILEPTIC REGIMEN: LEV    TECHNIQUE: 30 channel routine electroencephalogram (EEG) was performed in accordance with the international 10-20 system. The study was reviewed in bipolar and referential montages. The recording examined the patient during wakeful and drowsy/sleep state(s).     DESCRIPTION OF THE RECORD:  During the wakefulness, the background showed a symmetrical 9-10 Hz alpha activity posteriorly with amplitude of 70 mV.  There was reactivity to eye closure/opening.  A normal anterior-posterior gradient was noted with faster beta frequencies seen anteriorly.  During drowsiness, theta/delta frequencies were seen.    Sleep was not captured.    There were 1-2 occurrences of  intermittent 2-3 seconds of 4-5 Hz delta activity over right and left hemisphere. There were no seizures or sharp waves during this recording.    ACTIVATION PROCEDURES:   Not performed due to history of intracerebral hemorrhage.      ICTAL AND/OR INTERICTAL FINDINGS:   No focal or generalized epileptiform activity noted. Intermittent generalized slowing of short duration over both hemispheres was recorded.  No clinical events or seizures were reported or recorded during the study.     EKG: sampling of the EKG recording demonstrated sinus rhythm.       INTERPRETATION:  This is a abnormal routine EEG recording in the awake and  Drowsy states.  Intermittent slowing suggests mild encephalopathy and is in correlation with patient's history of intracerebral hemorrhage. There were no seizures recorded.Clinical correlation is recommended.    Note: A normal EEG does not rule out epilepsy.  If the clinical suspicion remains high for seizures, a prolonged recording to capture clinical or subclinical events may be helpful.

## 2020-05-26 ENCOUNTER — TELEMEDICINE (OUTPATIENT)
Dept: PHYSICAL MEDICINE AND REHAB | Facility: REHABILITATION | Age: 25
End: 2020-05-26
Payer: MEDICAID

## 2020-05-26 VITALS — WEIGHT: 160 LBS | BODY MASS INDEX: 25.11 KG/M2 | HEIGHT: 67 IN

## 2020-05-26 DIAGNOSIS — H53.8 BLURRY VISION: ICD-10-CM

## 2020-05-26 DIAGNOSIS — N31.9 NEUROGENIC BLADDER: ICD-10-CM

## 2020-05-26 DIAGNOSIS — Z78.9 IMPAIRED INSTRUMENTAL ACTIVITIES OF DAILY LIVING (IADL): ICD-10-CM

## 2020-05-26 DIAGNOSIS — E55.9 VITAMIN D DEFICIENCY: ICD-10-CM

## 2020-05-26 DIAGNOSIS — I61.5 NONTRAUMATIC INTRAVENTRICULAR INTRACEREBRAL HEMORRHAGE, UNSPECIFIED LATERALITY (HCC): Primary | ICD-10-CM

## 2020-05-26 DIAGNOSIS — R13.10 DYSPHAGIA, UNSPECIFIED TYPE: ICD-10-CM

## 2020-05-26 DIAGNOSIS — K59.2 NEUROGENIC BOWEL: ICD-10-CM

## 2020-05-26 DIAGNOSIS — G47.9 SLEEPING DIFFICULTY: ICD-10-CM

## 2020-05-26 DIAGNOSIS — R25.2 SPASTICITY: ICD-10-CM

## 2020-05-26 DIAGNOSIS — I69.191 DYSPHAGIA FOLLOWING NONTRAUMATIC INTRACEREBRAL HEMORRHAGE: ICD-10-CM

## 2020-05-26 PROCEDURE — 99214 OFFICE O/P EST MOD 30 MIN: CPT | Mod: CR | Performed by: PHYSICAL MEDICINE & REHABILITATION

## 2020-05-26 ASSESSMENT — ENCOUNTER SYMPTOMS
FEVER: 0
LOSS OF CONSCIOUSNESS: 0
PALPITATIONS: 0
SORE THROAT: 0
SEIZURES: 0
CHILLS: 0
TINGLING: 0
HEADACHES: 0
SHORTNESS OF BREATH: 0
DIARRHEA: 0
MEMORY LOSS: 0
COUGH: 0
CONSTIPATION: 1
BRUISES/BLEEDS EASILY: 0
BLURRED VISION: 1
FALLS: 0

## 2020-05-26 ASSESSMENT — PATIENT HEALTH QUESTIONNAIRE - PHQ9
SUM OF ALL RESPONSES TO PHQ QUESTIONS 1-9: 2
CLINICAL INTERPRETATION OF PHQ2 SCORE: 1
5. POOR APPETITE OR OVEREATING: 0 - NOT AT ALL

## 2020-05-26 ASSESSMENT — FIBROSIS 4 INDEX: FIB4 SCORE: 0.26

## 2020-05-26 NOTE — PROGRESS NOTES
[facility]  [facility]  [address] [place] [address]  Ph: [phone]    FOLLOW UP PATIENT EVALUATION    This encounter was conducted via Zoom .   Verbal consent was obtained. Patient's identity was verified.    Patient Name: Rey Medina   Patient : 1995  Patient Age: 24 y.o.   PCP: Nirmala Man M.D.    Examining Physician: Dr. 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   Patient was supposed to see me for a post rehab evaluation, however, he did not want to get out of the car to come inside.  Had extensive discussion with patient's mother, Melinda, today.     Rehab: Melinda states that since discharge the patient has been rather resistant to care.  He has not been very cooperative.  She has had trouble convincing him to eat regular foods and continues to give him tube feeds.  Additionally he will sometimes get up in the middle of the night and want to walk around which Melinda will do with him as it is good for exercise, but she has tried to convince him to do this type of exercise during the day.  She states that she thinks all of this started when they forced him to get blood 1 day and now he is very scared and resistant to healthcare professionals in general.  He is supposed to have started outpatient therapies but has not done so due to this underlying distrust.     Neuro:  Patient's surgical intervention during initial time of injury occurred in the [facility].  Patient was placed on amantadine and trial of modafinil for neuro stimulation, however, at higher doses became agitated.  He has since been stable on amantadine 100 mg twice daily.  Mother is giving at 7 AM and 11 AM.  Prior to patient's admission to ARU, referral was sent to neurology seizure clinic given that patient had been on Keppra and as far as Melinda knows, he has not had any seizures.  This appointment is ready to schedule and we will discuss this at next visit.  Patient additionally suffered from agitation which was worsened by neuro stimulants.  He was placed on propranolol 20 mg 3 times daily which he continues to be on with improved agitation.  Patient had been on baclofen for spasticity, however, it was deciphered that patient was mostly resistant to examination as opposed to exhibiting true spasticity.  We will have to continue to monitor this.  Not on baclofen at this time.     HEENT: Difficult to assess visual deficits due to cognitive status but reportedly having difficulty with peripheral vision on the right.  Referral to neuro-ophthalmology was to be made on ARU discharge. Will follow-up, do not see referral in [facility].  Regardless, patient would not be able to schedule until he is more cooperative with healthcare professionals.     GI: Patient has G-tube in place.  Currently getting tube feeds due to resisting oral feeding.  During ARU as diet was being advanced patient developed aspiration pneumonia which required his transfer off.  Given that patient is resisting oral feedings and still remains distrustful of healthcare professionals, continue with G-tube at this time.  Ideally will restart with speech therapy when able.  Feasibly could have G-tube removed in the future.     Pulm: Patient with size 7 trach on admission to ARU.  Decannulated 2/7/2020. On Singulair at this time. Poor stoma healing.  Patient  was referred to ENT this referral ready to schedule.  Will discuss this at next visit.     Bowel/Bladder: Still incontinent into diaper.  Discussed timed voiding for bladder management.  Perform bowel program every couple days with daily bowel meds.     CV: Had been on Eliquis for unclear reason which was discontinued while in ARU.  Per mom, no history of clots or other indication for anticoagulation.     Vitamin D deficiency: 5000 units daily    Accompanied by Today: Mom  Interval History:    - Increased Mirtazapine last visit to 15 mg QHS.    - Eating PO TID now; eating a lot of rice and other [employer] dishes. Started with softer foods and now progressed to a little more towards regular diet.   - Understanding is improving.   - No longer getting up at all hours of the night, that has resolved.   - Requested HH again and still waiting for them to process.  - Mom is trying to do as much as she can at home with regards to therapy while awaiting HH approval.    - Bladder: can urinate okay, sometimes has to concentrate hard. No infections.   - Bowel: Since starting PO diet needs to have suppository every 3 days to have bowel movement. Previously did not need suppository because was on tube feeds.   - Trach stoma - doing well. Looks completely closed on Zoom, but not entirely clear. Has appointment ENT in July 1.   - Still taking Amantadine, taking 100 mg BID. Mom would like to know if we can taper this at some point.   - Has appnt with Neurology tomorrow, is still on Keppra 500mg BID. Had EEG and MRI - neuro following.     Review of Systems:  Review of Systems   Constitutional: Negative for chills and fever.   HENT: Negative for congestion and sore throat.    Eyes: Positive for blurred vision.   Respiratory: Negative for cough and shortness of breath.    Cardiovascular: Negative for palpitations and leg swelling.   Gastrointestinal: Positive for constipation. Negative for diarrhea.   Genitourinary: Negative for  dysuria, frequency and urgency.   Musculoskeletal: Negative for falls and joint pain.   Skin:        Tracheostomy site healing well.    Neurological: Negative for tingling, seizures, loss of consciousness and headaches.   Endo/Heme/Allergies: Does not bruise/bleed easily.   Psychiatric/Behavioral: Negative for memory loss.        Per mom, always says 8/10 when asked how his mood is.       All other pertinent positive review of systems are noted above in HPI.     Past Medical History:  Past Medical History:   Diagnosis Date   • Stroke (HCC)       History reviewed. No pertinent surgical history.     Current Outpatient Medications:   •  propranolol (INDERAL) 20 MG Tab, Take 1 Tab by mouth 3 times a day for 90 days., Disp: 90 Tab, Rfl: 2  •  mirtazapine (REMERON) 15 MG Tab, Take 1 Tab by mouth every bedtime., Disp: 30 Tab, Rfl: 2  •  Cholecalciferol (VITAMIN D3) 5000 UNIT/ML Liquid, 125 mcg by Gastric Tube route every day., Disp: , Rfl:   •  amantadine (SYMMETREL) 50 MG/5ML Syrup, Take 10 mL by mouth 2 Times a Day., Disp: 1 Bottle, Rfl: 2  •  levETIRAcetam (KEPPRA) 500 MG Tab, Take 1 Tab by mouth 2 times a day., Disp: 60 Tab, Rfl: 2  •  montelukast (SINGULAIR) 10 MG Tab, Take 1 Tab by mouth every day., Disp: 90 Tab, Rfl: 2  •  polyethylene glycol/lytes (MIRALAX) Pack, Take 1 Packet by mouth every day., Disp: 90 Each, Rfl: 2  •  hydroxychloroquine (PLAQUENIL) 200 MG Tab, , Disp: , Rfl:   •  Incontinence Supply Disposable (ASSURANCE FITTED BRIEF LARGE) Misc, ASSURANCE FITTED BRIEF LARGE, Disp: , Rfl:   •  Incontinence Supply Disposable (ADVOCATE UNDERPADS) Misc, ADVOCATE UNDERPADS, Disp: , Rfl:   Allergies   Allergen Reactions   • Vancomycin Swelling   • Other Misc Rash     Allergic to name band.        Past Social History:  Social History     Socioeconomic History   • Marital status: Single     Spouse name: Not on file   • Number of children: Not on file   • Years of education: Not on file   • Highest education level:  Not on file   Occupational History   • Not on file   Social Needs   • Financial resource strain: Not on file   • Food insecurity     Worry: Not on file     Inability: Not on file   • Transportation needs     Medical: Not on file     Non-medical: Not on file   Tobacco Use   • Smoking status: Never Smoker   • Smokeless tobacco: Never Used   Substance and Sexual Activity   • Alcohol use: Never     Frequency: Never   • Drug use: Never   • Sexual activity: Not on file   Lifestyle   • Physical activity     Days per week: Not on file     Minutes per session: Not on file   • Stress: Not on file   Relationships   • Social connections     Talks on phone: Not on file     Gets together: Not on file     Attends [employer] service: Not on file     Active member of club or organization: Not on file     Attends meetings of clubs or organizations: Not on file     Relationship status: Not on file   • Intimate partner violence     Fear of current or ex partner: Not on file     Emotionally abused: Not on file     Physically abused: Not on file     Forced sexual activity: Not on file   Other Topics Concern   •  Service No   • Blood Transfusions No   • Caffeine Concern No   • Occupational Exposure No   • Hobby Hazards No   • Sleep Concern No   • Stress Concern No   • Weight Concern No   • Special Diet Yes   • Back Care No   • Exercise Yes   • Bike Helmet No   • Seat Belt Yes   • Self-Exams No   Social History Narrative   • Not on file        Family History:  Family History   Problem Relation Age of Onset   • Hypertension Mother    • Hypertension Brother        Depression and Opioid Screening  PHQ-9:  Depression Screen (PHQ-2/PHQ-9) 3/9/2020 3/14/2020 5/26/2020   PHQ-2 Total Score 0 - -   PHQ-2 Total Score - 0 1   PHQ-9 Total Score - - 2     Interpretation of PHQ-9 Total Score   Score Severity   1-4 No Depression   5-9 Mild Depression   10-14 Moderate Depression   15-19 Moderately Severe Depression   20-27 Severe Depression      Opioid Risk Score: No value filed.  Interpretation of Opioid Risk Score   Score 0-3 = Low risk of abuse. Do UDS at least once per year.  Score 4-7 = Moderate risk of abuse. Do UDS 1-4 times per year.  Score 8+ = High risk of abuse. Refer to specialist.      OBJECTIVE:   Vital Signs:  There were no vitals filed for this visit.     Pertinent Labs:  Lab Results   Component Value Date/Time    SODIUM 146 (H) 03/08/2020 06:58 AM    POTASSIUM 4.6 03/08/2020 06:58 AM    CHLORIDE 106 03/08/2020 06:58 AM    CO2 19 (L) 03/08/2020 06:58 AM    GLUCOSE 113 (H) 03/08/2020 06:58 AM    BUN 7 (L) 03/08/2020 06:58 AM    CREATININE 0.84 03/08/2020 06:58 AM       Lab Results   Component Value Date/Time    HBA1C 5.7 (H) 02/06/2020 06:07 AM       Lab Results   Component Value Date/Time    WBC 13.3 (H) 03/08/2020 06:58 AM    RBC 6.22 (H) 03/08/2020 06:58 AM    HEMOGLOBIN 17.0 03/08/2020 06:58 AM    HEMATOCRIT 54.9 (H) 03/08/2020 06:58 AM    MCV 88.3 03/08/2020 06:58 AM    MCH 27.3 03/08/2020 06:58 AM    MCHC 31.0 (L) 03/08/2020 06:58 AM    MPV 11.5 03/08/2020 06:58 AM    NEUTSPOLYS 64.10 03/06/2020 06:13 AM    LYMPHOCYTES 27.80 03/06/2020 06:13 AM    MONOCYTES 4.60 03/06/2020 06:13 AM    EOSINOPHILS 1.60 03/06/2020 06:13 AM    BASOPHILS 1.40 03/06/2020 06:13 AM       Lab Results   Component Value Date/Time    ASTSGOT 16 03/06/2020 06:13 AM    ALTSGPT 29 03/06/2020 06:13 AM        Physical Exam:   GEN: No apparent distress  HEENT: Head normocephalic, atraumatic.  Sclera nonicteric bilaterally, no ocular discharge appreciated bilaterally.  Tracheostomy site appears well-healed.  PULMONARY: Breathing nonlabored on room air, no respiratory accessory muscle use.  Not requiring supplemental oxygen.  SKIN: No appreciable skin breakdown on exposed areas of skin.  Tracheostomy site present.  PSYCH: Mood and affect within normal limits.  NEURO: Awake alert.  Answers questions appropriately.  Speech limited.  Poor spontaneous speech but does  respond to questions.    Imaging:   MRI brain 5/21/2020 per neurology  IMPRESSION:  1.  Cerebellar atrophy with encephalomalacia, gliosis and hemosiderin staining within the vermis and posterior brainstem consistent with prior hemorrhage.  2.  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  3.  Stable left posterior parietal approach ventriculoperitoneal shunt catheter. Stable mild ventriculomegaly. No transependymal flow CSF.  4.  Mild cerebral atrophy.  5.  No acute intracranial abnormality.    MRA head 5/21/2020 per neurology  No significant stenosis or aneurysm is seen. No high flow vascular malformation is identified.    ASSESSMENT/PLAN: Rey Medina  is a 24 y.o. male with rehabilitation history significant for ICH secondary to AVM rupture on 4/21/2019 s/p AVM repair, hydrocephalus s/p  shunt who was recently admitted to [facility] from 2/5/2020 to 3/12/2020, here 5/26/2020 for follow up. The following plan was discussed with the patient who is in agreement.     Visit Diagnoses     ICD-10-CM   1. Nontraumatic intraventricular intracerebral hemorrhage, unspecified laterality (HCC)  I61.5   2. Blurry vision  H53.8   3. Impaired instrumental activities of daily living (IADL)  Z78.9   4. Dysphagia, unspecified type  R13.10   5. Neurogenic bowel  K59.2   6. Neurogenic bladder  N31.9   7. Vitamin D deficiency  E55.9   8. Sleeping difficulty  G47.9   9. Spasticity  R25.2   10. Dysphagia following nontraumatic intracerebral hemorrhage  I69.191        Rehab/Neuro:   1. ICH secondary to AVM rupture on 4/21/2019 s/p AVM repair, hydrocephalus s/p  shunt who was recently admitted to [facility] from 2/5/2020 to 3/12/2020: Reviewed all pertinent previous medical records leading up to today's clinic visit.   2.  No history of seizure, on Keppra.  Had EEG 5/21/2020  3.  Poor attention secondary to ICH requiring need for neuro-stimulant  4.  Blurry vision after  "ICH, did have reading glasses prior to stroke.  Blurry vision persistent.  Also having problems with peripheral vision on the right.  -REFERRAL for home health: OT, PT, speech  -REFERRAL to neuro-ophthalmology given blurry vision after ICH in setting of premorbidly requiring glasses  -Continue home exercise program as mom is able.  Counseled on importance of continued home exercise program.  -No equipment needs at this time  -Has neurology follow-up for Keppra management  -Continue amantadine 100 mg at 08:00 and 12:00; counseled on potential for tapering amantadine in the future, but recommend continuing at this time given will be restarting home health therapies and want him to be participatory.  Discussed this with mom in detail.    Assessment of Current Functional Status: Patient previously resistant to home therapies.  Cognitively he is significantly improved.  His memory and attention are improving.    Spasticity: Remains off of baclofen for spasticity.  Will reassess patient's tone when physically present next visit.  Had been taking baclofen which was stopped while inpatient.    Neuropathic Pain: Discussed patient's pain.  Patient denies pain.    Neurogenic Bladder: Reportedly continent and volitional per mom.    Neurogenic Bowel: Trending towards constipation now on p.o. diet.  Uses suppository every 3 days.  -Counseled on dietary measures to improve regularity of BMs.  -Continue suppository as needed.    Mood/Sleep:   1. Secondary to adjustment disorder resulting from #1 in \"neuro/rehab section\": Improved.  -Continue mirtazapine 15 mg at night  -Continue propranolol 20 mg 3 times daily for agitation    GI/Diet:   1. Dysphagia secondary to #1 in \"rehab/neuro\" section: Improved.  Now taking in p.o. diet 3 times daily.  Mom denies signs or symptoms of aspiration at this time.  -Encouraged to continue to work with speech therapy through home health.  -Counseled on monitoring for signs and symptoms of " aspiration.    HEENT:  1.  Acute respiratory failure S/p tracheostomy: Tracheostomy site healing well.  Per report, not leaking air.  -Continue follow-up with ENT    Endo:  1.  Vitamin D deficiency:  -Continue vitamin D 5000 units  -We will recheck at next visit    Follow up: 3 months    Total time spent face to face with patient was 26 minutes. Greater then 50% of my visit was spent on counseling and coordination of care regarding the primary medical diagnosis, secondary medical complications, patient on their condition, best management practices, and risks and benefits of treatment as aforementioned in the assessment and plan. Extensive discussion involved the patient and mom, Melinda.    Please note that this dictation was created using voice recognition software. I have made every reasonable attempt to correct obvious errors but there may be errors of grammar and content that I may have overlooked prior to finalization of this note.    Dr. Mandy Luke DO, MS  Department of Physical Medicine & Rehabilitation  Neuro Rehabilitation Clinic  [facility]  5/26/2020 1:37 PM

## 2020-05-27 ENCOUNTER — TELEMEDICINE (OUTPATIENT)
Dept: NEUROLOGY | Facility: MEDICAL CENTER | Age: 25
End: 2020-05-27
Payer: MEDICAID

## 2020-05-27 ENCOUNTER — HOME HEALTH ADMISSION (OUTPATIENT)
Dept: HOME HEALTH SERVICES | Facility: HOME HEALTHCARE | Age: 25
End: 2020-05-27
Payer: MEDICAID

## 2020-05-27 VITALS
BODY MASS INDEX: 25.11 KG/M2 | HEART RATE: 72 BPM | SYSTOLIC BLOOD PRESSURE: 104 MMHG | OXYGEN SATURATION: 98 % | HEIGHT: 67 IN | DIASTOLIC BLOOD PRESSURE: 73 MMHG | WEIGHT: 160 LBS

## 2020-05-27 DIAGNOSIS — Z87.74 HISTORY OF ARTERIOVENOUS MALFORMATION (AVM): ICD-10-CM

## 2020-05-27 DIAGNOSIS — R41.3 MEMORY LOSS: ICD-10-CM

## 2020-05-27 DIAGNOSIS — G31.89 COGNITIVE AND NEUROBEHAVIORAL DYSFUNCTION FOLLOWING BRAIN INJURY (HCC): ICD-10-CM

## 2020-05-27 DIAGNOSIS — I61.0 NONTRAUMATIC SUBCORTICAL HEMORRHAGE OF CEREBRAL HEMISPHERE, UNSPECIFIED LATERALITY (HCC): ICD-10-CM

## 2020-05-27 DIAGNOSIS — F09 COGNITIVE AND NEUROBEHAVIORAL DYSFUNCTION FOLLOWING BRAIN INJURY (HCC): ICD-10-CM

## 2020-05-27 DIAGNOSIS — S06.9XAS COGNITIVE AND NEUROBEHAVIORAL DYSFUNCTION FOLLOWING BRAIN INJURY (HCC): ICD-10-CM

## 2020-05-27 PROCEDURE — 99214 OFFICE O/P EST MOD 30 MIN: CPT | Mod: 95,CR

## 2020-05-27 ASSESSMENT — FIBROSIS 4 INDEX: FIB4 SCORE: 0.26

## 2020-05-28 ENCOUNTER — HOME CARE VISIT (OUTPATIENT)
Dept: HOME HEALTH SERVICES | Facility: HOME HEALTHCARE | Age: 25
End: 2020-05-28
Payer: MEDICAID

## 2020-05-28 VITALS
SYSTOLIC BLOOD PRESSURE: 108 MMHG | RESPIRATION RATE: 18 BRPM | TEMPERATURE: 98.5 F | OXYGEN SATURATION: 95 % | DIASTOLIC BLOOD PRESSURE: 72 MMHG | HEART RATE: 82 BPM

## 2020-05-28 PROCEDURE — G0151 HHCP-SERV OF PT,EA 15 MIN: HCPCS

## 2020-05-28 PROCEDURE — 665001 SOC-HOME HEALTH

## 2020-05-28 RX ORDER — MIRTAZAPINE 15 MG/1
15 TABLET, FILM COATED ORAL
Qty: 30 TAB | Refills: 2 | Status: SHIPPED | OUTPATIENT
Start: 2020-05-28 | End: 2020-08-26 | Stop reason: SDUPTHER

## 2020-05-28 RX ORDER — MONTELUKAST SODIUM 10 MG/1
10 TABLET ORAL DAILY
Qty: 90 TAB | Refills: 2 | Status: SHIPPED | OUTPATIENT
Start: 2020-05-28 | End: 2020-08-17 | Stop reason: SDUPTHER

## 2020-05-28 RX ORDER — LEVETIRACETAM 500 MG/1
500 TABLET ORAL 2 TIMES DAILY
Qty: 60 TAB | Refills: 2 | Status: SHIPPED | OUTPATIENT
Start: 2020-05-28 | End: 2020-06-10 | Stop reason: SDUPTHER

## 2020-05-28 RX ORDER — POLYETHYLENE GLYCOL 3350 17 G/17G
17 POWDER, FOR SOLUTION ORAL DAILY
Qty: 90 EACH | Refills: 2 | Status: SHIPPED | OUTPATIENT
Start: 2020-05-28 | End: 2020-08-26 | Stop reason: SDUPTHER

## 2020-05-28 SDOH — ECONOMIC STABILITY: FOOD INSECURITY: MEALS PER DAY: 3

## 2020-05-28 ASSESSMENT — ACTIVITIES OF DAILY LIVING (ADL)
GROOMING_COMMENTS: NOT ASSESSED
PHYSICAL TRANSFERS ASSESSED: 1
CURRENT_FUNCTION: MODERATE ASSIST
FEEDING_COMMENTS: NOT ASSESSED
CURRENT_FUNCTION: MINIMUM ASSIST
OASIS_M1830: 03

## 2020-05-28 ASSESSMENT — ENCOUNTER SYMPTOMS
LAST BOWEL MOVEMENT: 65525
CHANGE IN APPETITE: UNCHANGED
STOOL FREQUENCY: LESS THAN DAILY
NAUSEA: NONE REPORTED
DIFFICULTY THINKING: 1
VOMITING: NONE REPORTED
CONSTIPATION: 1
POOR JUDGMENT: 1
APPETITE LEVEL: GOOD

## 2020-05-29 ENCOUNTER — ANTICOAGULATION MONITORING (OUTPATIENT)
Dept: MEDICAL GROUP | Facility: PHYSICIAN GROUP | Age: 25
End: 2020-05-29

## 2020-05-29 NOTE — PROGRESS NOTES
Received referral from [facility]. Medications reviewed. No clinically significant interactions noted.

## 2020-06-01 ENCOUNTER — TELEPHONE (OUTPATIENT)
Dept: PHYSICAL MEDICINE AND REHAB | Facility: REHABILITATION | Age: 25
End: 2020-06-01

## 2020-06-01 ENCOUNTER — HOME CARE VISIT (OUTPATIENT)
Dept: HOME HEALTH SERVICES | Facility: HOME HEALTHCARE | Age: 25
End: 2020-06-01
Payer: MEDICAID

## 2020-06-01 VITALS
OXYGEN SATURATION: 96 % | HEART RATE: 87 BPM | RESPIRATION RATE: 18 BRPM | DIASTOLIC BLOOD PRESSURE: 62 MMHG | TEMPERATURE: 97.8 F | SYSTOLIC BLOOD PRESSURE: 102 MMHG

## 2020-06-01 PROCEDURE — G0153 HHCP-SVS OF S/L PATH,EA 15MN: HCPCS

## 2020-06-01 PROCEDURE — G0180 MD CERTIFICATION HHA PATIENT: HCPCS | Performed by: PHYSICAL MEDICINE & REHABILITATION

## 2020-06-01 ASSESSMENT — ENCOUNTER SYMPTOMS: DIFFICULTY THINKING: 1

## 2020-06-01 NOTE — TELEPHONE ENCOUNTER
I spoke with Violeta and let her know Dr. Luke said  two bottles with two refills for Amantadine syrup. She agreed.

## 2020-06-01 NOTE — PROGRESS NOTES
Telemedicine Visit: Established Patient     This encounter was conducted via Zoom .   Verbal consent was obtained. Patient's identity was verified.    Subjective:   CC: follow up for cognitive dysfunction and history of ruptured AVM with [facility]  Rey JAMES Adam is a 24 y.o. male presenting for evaluation and management of: above noted problems.  He is here with his mom. He had improved since our last zoom meeting he is more awake, tracheostomy had closed and he is now breathing normally,he is eating his meals and he is more engaged in conversations.  Mom does math with him and reading.  He is improving.  He has home speech and physical  therapy.    ROS Denies any recent fevers or chills. No nausea or vomiting. No chest pains or shortness of breath.     Allergies   Allergen Reactions   • Vancomycin Swelling   • Other Misc Rash     Allergic to name band.       Current medicines (including changes today)  Current Outpatient Medications   Medication Sig Dispense Refill   • propranolol (INDERAL) 20 MG Tab Take 1 Tab by mouth 3 times a day for 90 days. (Patient taking differently: 1 Tab by Per G Tube route 3 times a day.) 90 Tab 2   • Cholecalciferol (VITAMIN D3) 5000 UNIT/ML Liquid 125 mcg by Gastric Tube route every day.     • amantadine (SYMMETREL) 50 MG/5ML Syrup 10 mL by Per G Tube route 2 Times a Day. 1 Bottle 2   • levETIRAcetam (KEPPRA) 500 MG Tab 1 Tab by Enteral Tube route 2 times a day. 60 Tab 2   • montelukast (SINGULAIR) 10 MG Tab 1 Tab by Per G Tube route every day. 90 Tab 2   • polyethylene glycol/lytes (MIRALAX) Pack Take 1 Packet by mouth every day. through G tube 90 Each 2   • mirtazapine (REMERON) 15 MG Tab 1 Tab by Per G Tube route every bedtime. 30 Tab 2   • Incontinence Supply Disposable (ASSURANCE FITTED BRIEF LARGE) Misc ASSURANCE FITTED BRIEF LARGE     • Incontinence Supply Disposable (ADVOCATE UNDERPADS) [facility] [place]       No current facility-administered medications for this visit.   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  He  has no past surgical history on file.       Objective:   /73 Comment: Done @ home this am.Telemed  Pulse 72   Ht 1.702 m (5' 7\")   Wt 72.6 kg (160 lb) Comment: stated  SpO2 98%   BMI 25.06 kg/m²     Physical Exam:  Constitutional: Alert, no distress, well-groomed.  Skin: No rashes in visible areas.  Eye: Round. Conjunctiva clear, lids normal. No icterus.   ENMT: Lips pink without lesions, good dentition, moist mucous membranes. Phonation normal.  Neck: No masses, no thyromegaly. Moves freely without pain.  CV: Pulse as reported by patient  Respiratory: Unlabored respiratory effort, no cough or audible wheeze  Neurological exam   In a chair   Able to stand up and take few steps unassisted  Speech decreased output however improved from the last visit   Cn 2-12 grossly intact   Dysarthria present   F-t-n able to do both sided   hts is more difficult for him   He is able to state his name knows this is hs mom and he is at home. Ca answer few simple questions.    .imaging and labs   All reviewed    MRI brain personally reviewed and showed images to his mom   Stable changes   No new hemorrhage   See rad read below  IMPRESSION:     1.  Cerebellar atrophy with encephalomalacia, gliosis and hemosiderin staining within the vermis and posterior brainstem consistent with prior hemorrhage.  2.  Mild superficial " siderosis and hemosiderin staining in the occipital horns of the lateral ventricles.  3.  Stable left posterior parietal approach ventriculoperitoneal shunt catheter. Stable mild ventriculomegaly. No transependymal flow CSF.  4.  Mild cerebral atrophy.  5.  No acute intracranial abnormality.    MRA head  TECHNIQUE/EXAM DESCRIPTION:  MRA of the head ([place]) without contrast.     The study was performed on a [employer] Signa 1.5 Bailey MRI scanner.     MRA of the [place] was performed with 3D time-of-flight technique with axial acquisition. Additional MRA of the internal carotid and vertebrobasilar arteries at the level of the skull base and craniocervical junction was also performed with 3D   time-of-flight technique with axial acquisition. Images are reviewed at the PACS workstation as axial source images as well as maximum intensity ray projection (MIP) multiplanar 3D reconstructions.     COMPARISON:  None     FINDINGS:  The distal vertebral arteries show normal caliber and flow signal intensity. The vertebrobasilar confluence is intact. The basilar artery is patent. The posterior cerebral arteries are unremarkable. No aneurysm or occlusive lesion is seen in the   posterior circulation.     The anterior circulation shows the carotid siphons to be intact. The middle cerebral and anterior cerebral arteries are intact. No aneurysm or intracranial occlusive lesion is evident.     IMPRESSION:     No significant stenosis or aneurysm is seen. No high flow vascular malformation is identified.             Last Resulted: 05/21/20  9:26 AM  Order Details View Encounter Lab and Collection Details Routing Result History             MR-MRA HEAD-W/O [[id number]]     Electronically signed by: Jennifer Chaudhari on 03/16/20 1554  Status: Completed    Ordering user: Jennifer Chaudhari 03/16/20 1554  Authorized by: Daniel Walton M.D.     Add Signature Requirement    Frequency:  05/21/20 0631 - 1  occurrence   Indications comment: Other nontraumatic subarachnoid hemorrhage (HCC)    Diagnoses   Other nontraumatic subarachnoid hemorrhage (HCC) [I60.8]    Questionnaire     Question  Answer    Is the patient diabetic?  Unknown    Does the patient have a pacemaker / wiring?  Unknown    Is the patient claustrophobic?  Unknown    Does the patient require anesthesia or sedation?  No Sedation    Order comments: NEED MAKE/MODEL FOR PTS  SHUNT AND VALVE TO DETERMINE MRI SAFETY. IF MR CONDITIONAL PT WILL LIKELY NEED TO HAVE SHUNT CHECKED BY MD SAME DAY AFTER SCAN TO ENSURE SETTINGS HAVE NOT CHANGED. RTN w/o x2, Self sedate 1 hr check in early + Mom  Orders in media - CV    View SmartLink Info     MR-MRA HEAD-W/O (Order #[id number]) on 5/21/20    Reading Provider  Reading Date    Orlin Tirado M.D.  May 21, 2020    Signing Provider  Signing Date  Signing Time    Orlin Tirado M.D.  May 21, 2020   9:26 AM    Last Resulted Time        Assessment and Plan:   The following treatment plan was discussed:     There are no diagnoses linked to this encounter.  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  On amantadine 100 mg bid to improve cognitive function.  No seizures - remains on  mg bid  Plan  EEG routine reviewed and no seizures   MRI brain reviewed and NAF stable chronic changes with  shunt   MRA head :  No significant stenosis or aneurysm is seen. No high flow vascular malformation is identified.   Keep on LEV for another 3 months - may decide on lowering the dose at that time.  Neurosurgery follow up for  catheter, AVM follow up.    Follow-up: No follow-ups on file.

## 2020-06-01 NOTE — TELEPHONE ENCOUNTER
I spoke with Lucas at [facility] pharmacy and he wants to know about the quantity of the dose. For example one bottle contains 473 ml. And he says that should be approximately 24 days at 20 ml per day.

## 2020-06-01 NOTE — TELEPHONE ENCOUNTER
----- Message from Mandy Luke D.O. sent at 6/1/2020 12:04 PM PDT -----  I have no clue about the ounce conversion, when you call let me know if theres anything I can do to assist, but I really have no idea.     Thanks!  ----- Message -----  From: Jj Davis, Med Ass't  Sent: 6/1/2020  11:48 AM PDT  To: FRAN[address]     [facility] pharmacy wants to verify qty by ounces of Amantadine for Rey. The request is in his media tab.     I will either fax back or call.     Angelia

## 2020-06-02 ENCOUNTER — HOME CARE VISIT (OUTPATIENT)
Dept: HOME HEALTH SERVICES | Facility: HOME HEALTHCARE | Age: 25
End: 2020-06-02
Payer: MEDICAID

## 2020-06-02 VITALS
RESPIRATION RATE: 16 BRPM | OXYGEN SATURATION: 97 % | TEMPERATURE: 97.7 F | DIASTOLIC BLOOD PRESSURE: 72 MMHG | HEART RATE: 82 BPM | SYSTOLIC BLOOD PRESSURE: 110 MMHG

## 2020-06-02 PROCEDURE — G0151 HHCP-SERV OF PT,EA 15 MIN: HCPCS

## 2020-06-02 PROCEDURE — G0153 HHCP-SVS OF S/L PATH,EA 15MN: HCPCS

## 2020-06-02 SDOH — ECONOMIC STABILITY: FOOD INSECURITY: MEALS PER DAY: 3

## 2020-06-02 ASSESSMENT — ENCOUNTER SYMPTOMS
CHANGE IN APPETITE: INCREASED
POOR JUDGMENT: 1
DIFFICULTY THINKING: 1
APPETITE LEVEL: GOOD

## 2020-06-03 ENCOUNTER — HOME CARE VISIT (OUTPATIENT)
Dept: HOME HEALTH SERVICES | Facility: HOME HEALTHCARE | Age: 25
End: 2020-06-03
Payer: MEDICAID

## 2020-06-03 VITALS
OXYGEN SATURATION: 95 % | RESPIRATION RATE: 16 BRPM | SYSTOLIC BLOOD PRESSURE: 110 MMHG | TEMPERATURE: 98.3 F | HEART RATE: 77 BPM | DIASTOLIC BLOOD PRESSURE: 88 MMHG

## 2020-06-03 PROCEDURE — G0152 HHCP-SERV OF OT,EA 15 MIN: HCPCS

## 2020-06-03 ASSESSMENT — ACTIVITIES OF DAILY LIVING (ADL)
BATHING_CURRENT_FUNCTION: MAXIMUM ASSIST
LAUNDRY: DEPENDENT
TOILETING: MAXIMUM ASSIST
SHOPPING ASSESSED: 1
TRANSPORTATION: DEPENDENT
BATHING ASSESSED: 1
USING THE TELPHONE: DEPENDENT
ORAL_CARE_CURRENT_FUNCTION: NEEDS ASSISTANCE
TOILETING: 1
HOUSEKEEPING ASSESSED: 1
ORAL_CARE_ASSESSED: 1
FEEDING ASSESSED: 1
LAUNDRY ASSESSED: 1
SHOPPING: DEPENDENT
DRESSING_UB_CURRENT_FUNCTION: MINIMUM ASSIST
FEEDING: MODERATE ASSIST
GROOMING_CURRENT_FUNCTION: MODERATE ASSIST
GROOMING ASSESSED: 1
TRANSPORTATION ASSESSED: 1
DRESSING_LB_CURRENT_FUNCTION: MAXIMUM ASSIST
LIGHT HOUSEKEEPING: DEPENDENT
TELEPHONE USE ASSESSED: 1
PREPARING MEALS: DEPENDENT
BATHING EQUIPMENT USED: TUB BENCH

## 2020-06-03 ASSESSMENT — ENCOUNTER SYMPTOMS
DIFFICULTY THINKING: 1
POOR JUDGMENT: 1
DIFFICULTY THINKING: 1

## 2020-06-04 ENCOUNTER — HOME CARE VISIT (OUTPATIENT)
Dept: HOME HEALTH SERVICES | Facility: HOME HEALTHCARE | Age: 25
End: 2020-06-04
Payer: MEDICAID

## 2020-06-04 VITALS
OXYGEN SATURATION: 98 % | HEART RATE: 76 BPM | SYSTOLIC BLOOD PRESSURE: 104 MMHG | TEMPERATURE: 98.2 F | DIASTOLIC BLOOD PRESSURE: 80 MMHG | RESPIRATION RATE: 16 BRPM

## 2020-06-04 PROCEDURE — G0152 HHCP-SERV OF OT,EA 15 MIN: HCPCS

## 2020-06-04 PROCEDURE — G0151 HHCP-SERV OF PT,EA 15 MIN: HCPCS

## 2020-06-04 ASSESSMENT — ENCOUNTER SYMPTOMS: DIFFICULTY THINKING: 1

## 2020-06-05 ASSESSMENT — ENCOUNTER SYMPTOMS
DIFFICULTY THINKING: 1
POOR JUDGMENT: 1

## 2020-06-08 ENCOUNTER — HOME CARE VISIT (OUTPATIENT)
Dept: HOME HEALTH SERVICES | Facility: HOME HEALTHCARE | Age: 25
End: 2020-06-08
Payer: MEDICAID

## 2020-06-08 VITALS
OXYGEN SATURATION: 96 % | SYSTOLIC BLOOD PRESSURE: 108 MMHG | TEMPERATURE: 98.2 F | DIASTOLIC BLOOD PRESSURE: 70 MMHG | RESPIRATION RATE: 16 BRPM | HEART RATE: 80 BPM

## 2020-06-08 PROCEDURE — G0153 HHCP-SVS OF S/L PATH,EA 15MN: HCPCS

## 2020-06-08 PROCEDURE — G0157 HHC PT ASSISTANT EA 15: HCPCS | Mod: CQ

## 2020-06-08 NOTE — PROGRESS NOTES
ohn did very well today with understanding al verbal cues and followelled commands WNL. He did very well with his WC mob and followed all VC with mod I. He did have noted fatigue at the end of therapy as he tends to put arms down and stops answerring or trying juan. Will cont with POC to increase her funcional mob and ind to prevent further decline in Ind. S/B Jonna Mills PT

## 2020-06-09 ENCOUNTER — HOME CARE VISIT (OUTPATIENT)
Dept: HOME HEALTH SERVICES | Facility: HOME HEALTHCARE | Age: 25
End: 2020-06-09
Payer: MEDICAID

## 2020-06-09 VITALS
RESPIRATION RATE: 16 BRPM | TEMPERATURE: 98.2 F | SYSTOLIC BLOOD PRESSURE: 120 MMHG | DIASTOLIC BLOOD PRESSURE: 80 MMHG | OXYGEN SATURATION: 98 % | HEART RATE: 79 BPM

## 2020-06-09 VITALS
RESPIRATION RATE: 16 BRPM | HEART RATE: 91 BPM | TEMPERATURE: 97.6 F | DIASTOLIC BLOOD PRESSURE: 74 MMHG | SYSTOLIC BLOOD PRESSURE: 102 MMHG | OXYGEN SATURATION: 98 %

## 2020-06-09 PROCEDURE — G0152 HHCP-SERV OF OT,EA 15 MIN: HCPCS

## 2020-06-09 ASSESSMENT — ENCOUNTER SYMPTOMS
DIFFICULTY THINKING: 1
DIFFICULTY THINKING: 1

## 2020-06-10 ENCOUNTER — HOME CARE VISIT (OUTPATIENT)
Dept: HOME HEALTH SERVICES | Facility: HOME HEALTHCARE | Age: 25
End: 2020-06-10
Payer: MEDICAID

## 2020-06-10 VITALS
OXYGEN SATURATION: 97 % | SYSTOLIC BLOOD PRESSURE: 104 MMHG | DIASTOLIC BLOOD PRESSURE: 70 MMHG | TEMPERATURE: 98 F | RESPIRATION RATE: 16 BRPM | HEART RATE: 80 BPM

## 2020-06-10 PROCEDURE — G0153 HHCP-SVS OF S/L PATH,EA 15MN: HCPCS

## 2020-06-10 PROCEDURE — G0157 HHC PT ASSISTANT EA 15: HCPCS | Mod: CQ

## 2020-06-10 RX ORDER — LEVETIRACETAM 500 MG/1
500 TABLET ORAL DAILY
Qty: 60 TAB | Refills: 2 | Status: SHIPPED | OUTPATIENT
Start: 2020-06-10 | End: 2020-08-26 | Stop reason: SDUPTHER

## 2020-06-10 ASSESSMENT — ENCOUNTER SYMPTOMS
POOR JUDGMENT: 1
DIFFICULTY THINKING: 1

## 2020-06-10 NOTE — PROGRESS NOTES
Rey is cont to progress with following commands and having a repetive carry over. However mom was giving commands also today during WC mob and didn't allow therapist to cue correctly. Working on having him perform increase task from WC to focus on verbal cuiing ind. Will cont with POC to increase his functional mob and ind to prevent further decline in function. S/B Miriam Dunn PT

## 2020-06-11 ENCOUNTER — TELEPHONE (OUTPATIENT)
Dept: NEUROLOGY | Facility: MEDICAL CENTER | Age: 25
End: 2020-06-11

## 2020-06-11 ENCOUNTER — HOME CARE VISIT (OUTPATIENT)
Dept: HOME HEALTH SERVICES | Facility: HOME HEALTHCARE | Age: 25
End: 2020-06-11
Payer: MEDICAID

## 2020-06-11 VITALS
RESPIRATION RATE: 16 BRPM | TEMPERATURE: 98.3 F | SYSTOLIC BLOOD PRESSURE: 118 MMHG | HEART RATE: 78 BPM | DIASTOLIC BLOOD PRESSURE: 78 MMHG | OXYGEN SATURATION: 98 %

## 2020-06-11 PROCEDURE — G0152 HHCP-SERV OF OT,EA 15 MIN: HCPCS

## 2020-06-11 ASSESSMENT — ENCOUNTER SYMPTOMS: DIFFICULTY THINKING: 1

## 2020-06-11 NOTE — TELEPHONE ENCOUNTER
Savita, as we discussed, having him stay at 250 mg, twice daily, for now is fine.  Recommending that she increase the drug dose slightly if his behaviors were to further worsen the longer he is off the drug is perfectly reasonable.  We will have Dr. ALBERTO readdress the issue when she returns on Monday.  It sounds as if you already had this discussion with the patient's wife and she is comfortable with this.

## 2020-06-11 NOTE — PATIENT COMMUNICATION
Dr Damico would you recommend anything different? Should she increase his medication back to original dose?Spoke to mom patient was on 500 bid, reduced to 250 mg in am and 500 pm for 1 week. Now has been doing 250 bid for 4 days and started noticing changes.She stated he's is manageable at this dose and is participating  in his therapies. She will increase back up if she thinks he needs it until Monday but wants to see what happens.

## 2020-06-11 NOTE — TELEPHONE ENCOUNTER
Regarding: RE: UPDATE - 250mg KEPPRA  Contact: [phone]  ----- Message from Moira Griffiths Med Ass't sent at 6/11/2020 10:15 AM PDT -----       ----- Message sent from Moira Griffiths Med Ass't to Rey Medina at 6/11/2020  9:43 AM -----   Good morning,    Dr ALBERTO is currently out of the office and will return on Monday. I will forward your message to another provider. But if he is having changes in his behavior maybe you she contact he pcp to make sure nothing else is going on.  Have a good day,    Delmi      ----- Message -----       From:Rey Medina       Sent:6/10/2020  4:10 PM PDT         To:Moira Griffiths Med Ass't    Subject:UPDATE - 250mg KEPPRA    Good afternoon again Dr. ALBERTO,     I just realized few changes I noticed since we reduced the keppra, his mood changed a little bit, not as bad as before and still manageable. He looks like a little sleepy all day and/or down and not as more lively prior to reducing the keppra, but still very cooperative during his therapy sessions, just less lively.    Today he shows less interest in watching his tv shows . I will continue to observe and will let you know if it gets too bad for me.  Please let me know if we still need to continue the 250mg twice a day.    Thank you!      ----- Message -----       From:Preston Myers M.D.       Sent:6/1/2020  8:26 AM PDT         To:Rey Medina    Subject:RE: FW: EEG Result & Next appointment    I think that we could definitely start decreasing Keppra to 500 mg in evening and 250 mg in the morning.  Try this for 1 week and let me know how it is.  If all OK after 7 days we go to 250 mg twice per day and stay there until next appointment in august.  Next follow up in august    Best     Dr ALBERTO       ----- Message -----       From:Rey Medina       Sent:5/29/2020  8:58 AM PDT         To:Moira Griffiths Med Ass't    Subject:EEG Result & Next appointment    Good morning Dr. ALBERTO,    Thank you  so much for the update. The ruptured AVM happened in April 21,2019 and the Stereotactic Radio-surgery procedure was done last July 26,2019. Does this mean by August of this year we will start reducing dosage of KEPPRA?     Please let me when will be our next appointment with you.    Thank you,    Melinda Melgarge        ----- Message -----       From:Preston Myers M.D.       Sent:5/29/2020  8:47 AM PDT         To:Rey Medina    Subject:RE: FW: EEG Test Result    Vishal Lawrence,  I am pleased to let you know that Rey's EEG looks good and there are no seizures.  I would keep keppra for 12 months after the AVM rupture and surgery due to increased risk of seizures then we can start decreasing the dose and eventually even stopping the medication.all this if he has no seizure like episodes and I know he had none thus far.    Cr ALBERTO      ----- Message -----       From:Rey Medina       Sent:5/28/2020 10:06 AM PDT         To:Moira Griffiths, Med Ass't    Subject:EEG Test Result    Good morning Ms. Pizarro,    I just want to follow up the EEG Result requested by Dr. Myers and was done on May 21, 2020. I am waiting for Dr. Myers's call and instructions once she had seen the EEG Result. I don't see the result on line yet. Please kindly follow up .    Thank you,    Melinda Melgarge        ----- Message -----       From:Moira Griffiths, Med Ass't       Sent:5/21/2020 11:17 AM PDT         To:Rey Medina    Subject:RE: Scheduled an Appointment    Hello,  I have a opening on 05/27/2020@ 10:00. We can do telemedicine if that helps.  Please let me know if this works for you.    Thanks      ----- Message -----       From:Rey Medina       Sent:5/20/2020  9:10 AM PDT         To:Moira Griffiths, Med Ass't    Subject:Scheduled an Appointment    Good morning Ms. Pizarro,    I am trying to book an appointment with Dr. Myers using the apps but it kept on taking me to  mammogram scheduling, it is not giving me an option to change it. Would you mind to please kindly help me book an appointment for my son.    The MRI and EEG procedure will be tomorrow, May 21, 2020 in the morning. I know Dr. Myers needs to see and review the result of EEG and MRI then discuss it with us.  We have an appointment with Dr. Walton on June 4 , 2020 and the rest of the month we will be free. Please let me know what date you could possibly put us in with Dr. Myers. I prefer afternoon please.    Thank you,    Melinda Mansfield      ----- Message -----       From:Moira Griffiths Med Ass't       Sent:5/7/2020  2:38 PM PDT         To:Rey Medina    Subject:RE: Scheduled Procedures    Hello,    I called script to [facility].    Have a good day,    Delmi      ----- Message -----       From:Rey Medina       Sent:5/4/2020  9:44 AM PDT         To:Moira Griffiths Med Ass't    Subject:Scheduled Procedures    David Pizarro,    I've already requested an appointment for all the procedures ( EEG/ MRI / Imaging) to get done on May 21, 2020 at [address] at 1pm . Please kindly double check my son's chart to make sure this is correct.    I'd also like to request a prescription that my son needs  to use during the procedure from  Dr. Myers . Our pharmacy is at [facility].     I will wait for the advise . Thank you!    Melinda Mansfield      ----- Message -----       From:Moira Griffiths Med Ass't       Sent:5/4/2020  8:58 AM PDT         To:Rey Medina    Subject:RE: Doctor's Order    Good morning,    Please call and schedule the MRI # [id number].     Thank you,    Delmi      ----- Message -----       From:Rey Medina       Sent:5/4/2020  8:02 AM PDT         To:Moira Griffiths Med Ass't    Subject:Doctor's Order    Good morning Ms. Pizarro,    Regarding order from Dr. Myers as per attached notes. As per discussion with Dr. Stapleton, I requested this procedure  to get done on the 30th of May , same day where they will do the MRI at [address] [facility]. I would like to know if am I the one who need to call to add up this procedure or everything will be taken care of? Please let me know . Also, I noticed on the attached note that the location of the procedure indicated there is at [address] [place] [address] which is different that the location where he need to get done the MRI.     I will be waiting for your advise. Thank you .    Melinda Mansfield   [phone]      ----- Message -----       From:Moira Griffiths, Med Ass't       Sent:4/21/2020  5:02 PM PDT         To:Rey Medina    Subject:Instructions    Here are the instructions to prepare for the telemed visit tomorrow.

## 2020-06-15 ENCOUNTER — HOME CARE VISIT (OUTPATIENT)
Dept: HOME HEALTH SERVICES | Facility: HOME HEALTHCARE | Age: 25
End: 2020-06-15
Payer: MEDICAID

## 2020-06-15 ENCOUNTER — TELEPHONE (OUTPATIENT)
Dept: NEUROLOGY | Facility: MEDICAL CENTER | Age: 25
End: 2020-06-15

## 2020-06-15 VITALS
TEMPERATURE: 97.7 F | HEART RATE: 83 BPM | SYSTOLIC BLOOD PRESSURE: 112 MMHG | RESPIRATION RATE: 16 BRPM | DIASTOLIC BLOOD PRESSURE: 74 MMHG | OXYGEN SATURATION: 98 %

## 2020-06-15 PROCEDURE — G0153 HHCP-SVS OF S/L PATH,EA 15MN: HCPCS

## 2020-06-15 ASSESSMENT — ENCOUNTER SYMPTOMS
DIFFICULTY THINKING: 1
POOR JUDGMENT: 1

## 2020-06-16 ENCOUNTER — HOME CARE VISIT (OUTPATIENT)
Dept: HOME HEALTH SERVICES | Facility: HOME HEALTHCARE | Age: 25
End: 2020-06-16
Payer: MEDICAID

## 2020-06-16 VITALS
SYSTOLIC BLOOD PRESSURE: 108 MMHG | RESPIRATION RATE: 16 BRPM | OXYGEN SATURATION: 98 % | TEMPERATURE: 98.3 F | HEART RATE: 88 BPM | DIASTOLIC BLOOD PRESSURE: 76 MMHG

## 2020-06-16 PROCEDURE — G0152 HHCP-SERV OF OT,EA 15 MIN: HCPCS

## 2020-06-16 PROCEDURE — G0151 HHCP-SERV OF PT,EA 15 MIN: HCPCS

## 2020-06-16 ASSESSMENT — ENCOUNTER SYMPTOMS: DIFFICULTY THINKING: 1

## 2020-06-17 ENCOUNTER — HOME CARE VISIT (OUTPATIENT)
Dept: HOME HEALTH SERVICES | Facility: HOME HEALTHCARE | Age: 25
End: 2020-06-17
Payer: MEDICAID

## 2020-06-17 VITALS
DIASTOLIC BLOOD PRESSURE: 80 MMHG | OXYGEN SATURATION: 98 % | SYSTOLIC BLOOD PRESSURE: 108 MMHG | RESPIRATION RATE: 16 BRPM | TEMPERATURE: 97.5 F | HEART RATE: 75 BPM

## 2020-06-17 PROCEDURE — G0153 HHCP-SVS OF S/L PATH,EA 15MN: HCPCS

## 2020-06-17 ASSESSMENT — ENCOUNTER SYMPTOMS
POOR JUDGMENT: 1
POOR JUDGMENT: 1
DIFFICULTY THINKING: 1
DIFFICULTY THINKING: 1

## 2020-06-18 ENCOUNTER — HOME CARE VISIT (OUTPATIENT)
Dept: HOME HEALTH SERVICES | Facility: HOME HEALTHCARE | Age: 25
End: 2020-06-18
Payer: MEDICAID

## 2020-06-18 VITALS
HEART RATE: 76 BPM | RESPIRATION RATE: 16 BRPM | DIASTOLIC BLOOD PRESSURE: 70 MMHG | SYSTOLIC BLOOD PRESSURE: 100 MMHG | OXYGEN SATURATION: 98 % | TEMPERATURE: 98.3 F

## 2020-06-18 PROCEDURE — G0151 HHCP-SERV OF PT,EA 15 MIN: HCPCS

## 2020-06-18 PROCEDURE — G0152 HHCP-SERV OF OT,EA 15 MIN: HCPCS

## 2020-06-18 ASSESSMENT — ENCOUNTER SYMPTOMS: DIFFICULTY THINKING: 1

## 2020-06-19 ASSESSMENT — ENCOUNTER SYMPTOMS
DIFFICULTY THINKING: 1
POOR JUDGMENT: 1

## 2020-06-22 ENCOUNTER — HOME CARE VISIT (OUTPATIENT)
Dept: HOME HEALTH SERVICES | Facility: HOME HEALTHCARE | Age: 25
End: 2020-06-22
Payer: MEDICAID

## 2020-06-22 VITALS
RESPIRATION RATE: 16 BRPM | HEART RATE: 77 BPM | TEMPERATURE: 98.3 F | OXYGEN SATURATION: 98 % | SYSTOLIC BLOOD PRESSURE: 112 MMHG | DIASTOLIC BLOOD PRESSURE: 80 MMHG

## 2020-06-22 PROCEDURE — G0153 HHCP-SVS OF S/L PATH,EA 15MN: HCPCS

## 2020-06-22 ASSESSMENT — ENCOUNTER SYMPTOMS
POOR JUDGMENT: 1
DIFFICULTY THINKING: 1

## 2020-06-23 ENCOUNTER — HOME CARE VISIT (OUTPATIENT)
Dept: HOME HEALTH SERVICES | Facility: HOME HEALTHCARE | Age: 25
End: 2020-06-23
Payer: MEDICAID

## 2020-06-23 ENCOUNTER — PATIENT MESSAGE (OUTPATIENT)
Dept: NEUROLOGY | Facility: MEDICAL CENTER | Age: 25
End: 2020-06-23

## 2020-06-23 PROCEDURE — G0152 HHCP-SERV OF OT,EA 15 MIN: HCPCS

## 2020-06-23 PROCEDURE — G0151 HHCP-SERV OF PT,EA 15 MIN: HCPCS

## 2020-06-23 ASSESSMENT — ENCOUNTER SYMPTOMS: DIFFICULTY THINKING: 1

## 2020-06-24 ENCOUNTER — HOME CARE VISIT (OUTPATIENT)
Dept: HOME HEALTH SERVICES | Facility: HOME HEALTHCARE | Age: 25
End: 2020-06-24
Payer: MEDICAID

## 2020-06-24 VITALS
RESPIRATION RATE: 16 BRPM | DIASTOLIC BLOOD PRESSURE: 72 MMHG | OXYGEN SATURATION: 98 % | TEMPERATURE: 98.5 F | HEART RATE: 84 BPM | SYSTOLIC BLOOD PRESSURE: 110 MMHG

## 2020-06-24 PROCEDURE — G0155 HHCP-SVS OF CSW,EA 15 MIN: HCPCS

## 2020-06-24 PROCEDURE — G0153 HHCP-SVS OF S/L PATH,EA 15MN: HCPCS

## 2020-06-24 ASSESSMENT — ENCOUNTER SYMPTOMS
DIFFICULTY THINKING: 1
POOR JUDGMENT: 1
DIFFICULTY THINKING: 1
POOR JUDGMENT: 1

## 2020-06-25 ENCOUNTER — HOME CARE VISIT (OUTPATIENT)
Dept: HOME HEALTH SERVICES | Facility: HOME HEALTHCARE | Age: 25
End: 2020-06-25
Payer: MEDICAID

## 2020-06-25 ENCOUNTER — TELEPHONE (OUTPATIENT)
Dept: PHYSICAL MEDICINE AND REHAB | Facility: REHABILITATION | Age: 25
End: 2020-06-25

## 2020-06-25 PROCEDURE — G0152 HHCP-SERV OF OT,EA 15 MIN: HCPCS

## 2020-06-25 PROCEDURE — G0151 HHCP-SERV OF PT,EA 15 MIN: HCPCS

## 2020-06-25 ASSESSMENT — ENCOUNTER SYMPTOMS: DIFFICULTY THINKING: 1

## 2020-06-25 NOTE — TELEPHONE ENCOUNTER
Norma from home care called to notify us that that she has been working with Rey, and for the past 3 weeks he was very cooperative but this past week he seemed to be very sleepy and not as cooperative.    She noticed his Remeron dose was changed from 7.5 mg to 15 mg.   She's not sure if this may be a reason to his recent change? Or if it may be any other medication.      Please advise,  Chely Green #[phone]  Naty Garcia # [phone]

## 2020-06-26 ENCOUNTER — TELEPHONE (OUTPATIENT)
Dept: PHYSICAL MEDICINE AND REHAB | Facility: REHABILITATION | Age: 25
End: 2020-06-26

## 2020-06-26 ASSESSMENT — ACTIVITIES OF DAILY LIVING (ADL)
FEEDING_COMMENTS: PLEASE REFER TO OT ASSESSMENT
ADLS_COMMENTS: PLEASE REFER TO OT ASSESSMENT
GROOMING_COMMENTS: PLEASE REFER TO OT ASSESSMENT
BATHING_COMMENTS: PLEASE REFER TO OT ASSESSMENT

## 2020-06-26 ASSESSMENT — ENCOUNTER SYMPTOMS
DIFFICULTY THINKING: 1
POOR JUDGMENT: 1

## 2020-06-26 NOTE — TELEPHONE ENCOUNTER
Melinda called asking for an update on the bed he was suppose to receive?  She stated the facility is asking her for notes. She can't recall the name of the facility but she gave me phone # [phone] and fax # [phone].    I told her you and Dr. Luke were out until Monday.

## 2020-06-29 NOTE — TELEPHONE ENCOUNTER
I spoke with Vandana at [facility] [phone] and she said actual face to face notes with Dr. Luke are required for insurance to consider bed order.    I let Rey Lawrence's mother know and we scheduled Rey for in person office visit with Dr. Luke 7/8/2020 at 2:05 with arrival 15 minutes before.

## 2020-06-30 ENCOUNTER — HOME CARE VISIT (OUTPATIENT)
Dept: HOME HEALTH SERVICES | Facility: HOME HEALTHCARE | Age: 25
End: 2020-06-30
Payer: MEDICAID

## 2020-06-30 VITALS
RESPIRATION RATE: 18 BRPM | HEART RATE: 88 BPM | TEMPERATURE: 98.8 F | OXYGEN SATURATION: 97 % | SYSTOLIC BLOOD PRESSURE: 102 MMHG | DIASTOLIC BLOOD PRESSURE: 80 MMHG

## 2020-06-30 PROCEDURE — 665001 SOC-HOME HEALTH

## 2020-06-30 PROCEDURE — G0152 HHCP-SERV OF OT,EA 15 MIN: HCPCS

## 2020-06-30 PROCEDURE — G0151 HHCP-SERV OF PT,EA 15 MIN: HCPCS

## 2020-06-30 ASSESSMENT — ENCOUNTER SYMPTOMS: DIFFICULTY THINKING: 1

## 2020-07-01 ASSESSMENT — ENCOUNTER SYMPTOMS
DIFFICULTY THINKING: 1
POOR JUDGMENT: 1

## 2020-07-02 ENCOUNTER — HOME CARE VISIT (OUTPATIENT)
Dept: HOME HEALTH SERVICES | Facility: HOME HEALTHCARE | Age: 25
End: 2020-07-02
Payer: MEDICAID

## 2020-07-02 PROCEDURE — G0151 HHCP-SERV OF PT,EA 15 MIN: HCPCS

## 2020-07-02 PROCEDURE — G0152 HHCP-SERV OF OT,EA 15 MIN: HCPCS

## 2020-07-02 ASSESSMENT — ENCOUNTER SYMPTOMS
DIFFICULTY THINKING: 1
POOR JUDGMENT: 1
DIFFICULTY THINKING: 1

## 2020-07-06 ENCOUNTER — HOME CARE VISIT (OUTPATIENT)
Dept: HOME HEALTH SERVICES | Facility: HOME HEALTHCARE | Age: 25
End: 2020-07-06
Payer: MEDICAID

## 2020-07-06 PROCEDURE — G0153 HHCP-SVS OF S/L PATH,EA 15MN: HCPCS

## 2020-07-07 ENCOUNTER — HOME CARE VISIT (OUTPATIENT)
Dept: HOME HEALTH SERVICES | Facility: HOME HEALTHCARE | Age: 25
End: 2020-07-07
Payer: MEDICAID

## 2020-07-07 VITALS
HEART RATE: 75 BPM | DIASTOLIC BLOOD PRESSURE: 70 MMHG | TEMPERATURE: 98.8 F | SYSTOLIC BLOOD PRESSURE: 110 MMHG | RESPIRATION RATE: 16 BRPM | OXYGEN SATURATION: 97 %

## 2020-07-07 VITALS
TEMPERATURE: 98.2 F | OXYGEN SATURATION: 98 % | HEART RATE: 90 BPM | SYSTOLIC BLOOD PRESSURE: 110 MMHG | RESPIRATION RATE: 16 BRPM | DIASTOLIC BLOOD PRESSURE: 76 MMHG

## 2020-07-07 PROCEDURE — G0151 HHCP-SERV OF PT,EA 15 MIN: HCPCS

## 2020-07-07 PROCEDURE — G0152 HHCP-SERV OF OT,EA 15 MIN: HCPCS

## 2020-07-07 ASSESSMENT — ENCOUNTER SYMPTOMS
DIFFICULTY THINKING: 1
DIFFICULTY THINKING: 1
POOR JUDGMENT: 1

## 2020-07-08 ENCOUNTER — OFFICE VISIT (OUTPATIENT)
Dept: PHYSICAL MEDICINE AND REHAB | Facility: REHABILITATION | Age: 25
End: 2020-07-08
Payer: MEDICAID

## 2020-07-08 VITALS
HEIGHT: 67 IN | OXYGEN SATURATION: 93 % | DIASTOLIC BLOOD PRESSURE: 82 MMHG | BODY MASS INDEX: 25.06 KG/M2 | TEMPERATURE: 98.1 F | HEART RATE: 83 BPM | SYSTOLIC BLOOD PRESSURE: 100 MMHG

## 2020-07-08 DIAGNOSIS — I61.5 NONTRAUMATIC INTRAVENTRICULAR INTRACEREBRAL HEMORRHAGE, UNSPECIFIED LATERALITY (HCC): Primary | ICD-10-CM

## 2020-07-08 DIAGNOSIS — Z78.9 IMPAIRED MOBILITY AND ACTIVITIES OF DAILY LIVING: ICD-10-CM

## 2020-07-08 DIAGNOSIS — Z74.09 IMPAIRED MOBILITY AND ACTIVITIES OF DAILY LIVING: ICD-10-CM

## 2020-07-08 DIAGNOSIS — R41.89 IMPAIRED COGNITION: ICD-10-CM

## 2020-07-08 DIAGNOSIS — F80.9 IMPAIRED VERBAL COMMUNICATION: ICD-10-CM

## 2020-07-08 DIAGNOSIS — R47.1 DYSARTHRIA: ICD-10-CM

## 2020-07-08 DIAGNOSIS — N31.9 NEUROGENIC BLADDER: ICD-10-CM

## 2020-07-08 DIAGNOSIS — E55.9 VITAMIN D DEFICIENCY: ICD-10-CM

## 2020-07-08 DIAGNOSIS — R25.2 SPASTICITY: ICD-10-CM

## 2020-07-08 DIAGNOSIS — K59.2 NEUROGENIC BOWEL: ICD-10-CM

## 2020-07-08 PROCEDURE — 99214 OFFICE O/P EST MOD 30 MIN: CPT | Performed by: PHYSICAL MEDICINE & REHABILITATION

## 2020-07-08 RX ORDER — FERRIC OXIDE RED, ZINC OXIDE, AND PRAMOXINE HYDROCHLORIDE 1.36; 78.65; 1 MG/ML; MG/ML; MG/ML
LOTION TOPICAL
COMMUNITY
Start: 2020-03-28 | End: 2020-07-29

## 2020-07-08 RX ORDER — WHEY PROTEIN ISOLATE 6 G-25/7 G
POWDER (GRAM) ORAL
COMMUNITY
Start: 2020-01-28 | End: 2020-08-11

## 2020-07-08 RX ORDER — DOCUSATE SODIUM 100 MG/1
100 CAPSULE, LIQUID FILLED ORAL 2 TIMES DAILY PRN
COMMUNITY
End: 2020-07-29

## 2020-07-08 RX ORDER — BISACODYL 10 MG
10 SUPPOSITORY, RECTAL RECTAL DAILY
Qty: 30 SUPPOSITORY | Refills: 2 | Status: SHIPPED | OUTPATIENT
Start: 2020-07-08 | End: 2020-07-29 | Stop reason: SDUPTHER

## 2020-07-08 RX ORDER — LORAZEPAM 1 MG/1
TABLET ORAL
COMMUNITY
Start: 2020-05-07 | End: 2020-05-21

## 2020-07-08 RX ORDER — MIRTAZAPINE 15 MG/1
TABLET, FILM COATED ORAL
COMMUNITY
Start: 2020-03-17 | End: 2020-07-28

## 2020-07-08 ASSESSMENT — ENCOUNTER SYMPTOMS
POOR JUDGMENT: 1
MEMORY LOSS: 0
ROS GI COMMENTS: G-TUBE PRESENT.
DIFFICULTY THINKING: 1
COUGH: 0
SHORTNESS OF BREATH: 0
CHILLS: 0
CONSTIPATION: 0
PALPITATIONS: 0
DIARRHEA: 0
WEAKNESS: 1
FALLS: 0
BRUISES/BLEEDS EASILY: 0
FEVER: 0
SORE THROAT: 0

## 2020-07-08 ASSESSMENT — PATIENT HEALTH QUESTIONNAIRE - PHQ9: CLINICAL INTERPRETATION OF PHQ2 SCORE: 0

## 2020-07-08 NOTE — PROGRESS NOTES
[facility]  [facility]  [address] [place] [address]  Ph: [phone]    FOLLOW UP PATIENT EVALUATION    Patient Name: Rey Medina   Patient : 1995  Patient Age: 24 y.o.   PCP: Nirmala Man M.D.    Examining Physician: Dr. 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  He is working with home health care and has made great progression.  The one concern that mom has is that the therapist had requested evaluation of his motor control given that it is still pretty poor.  She showed a video where patient was attempting to tap his left and right foot on an object as well as trying to receive and give a ball with his right and left hand.  His his motor control and coordination are still very much impaired, but significantly improving.  He has significant dysmetria of the upper extremities.  Patient is starting to talk more and is saying limited phrases.  - Spasticity: Patient continues to have no spasticity appreciable.  He is not on any antispasmodic medications.  -Neuro stimulation: Patient continues to be stable on amantadine 100 mg twice daily, mom is not wanting to change this or taper off of it as he has been very  stable.  - Bowel: Continue to use suppository, but patient is stable in terms of bowel movements.  - Bladder: Patient indicating his need to urinate more frequently.  Not on any bladder medications.  - GI: Patient has PEG tube which mom is using primarily only for hydration.  She supplements with water when the patient has had a lot to drink that day.  She is trying to encourage more fluid intake.  She is not using it off her medications or for food.  The patient is eating more and more and tolerating a diverse diet including smoothies, solids, walnuts  -HEENT: Patient saw the ENT specialist yesterday.  He still is not managing his secretions as well as he would like but does respond to cues to swallow secretions.  His tracheostomy site is completely healed.  Mom keeps it covered just because it is slightly indented and she does not want people staring.  -Equipment: Patient is significantly impaired regarding function and cannot move himself well.  He requires full electric hospital bed indefinitely in order to prevent skin breakdown due to physical impairment, assist with safe transfers in and out of bed, decrease caregiver burden of transferring.    Review of Systems:  Review of Systems   Constitutional: Negative for chills and fever.   HENT: Negative for congestion and sore throat.         Dysarthric.   Respiratory: Negative for cough and shortness of breath.    Cardiovascular: Negative for palpitations and leg swelling.   Gastrointestinal: Negative for constipation and diarrhea.        G-tube present.   Musculoskeletal: Negative for falls and joint pain.   Neurological: Positive for weakness.        Poor motor control.  No spasticity.   Endo/Heme/Allergies: Does not bruise/bleed easily.   Psychiatric/Behavioral: Negative for memory loss.      All other pertinent positive review of systems are noted above in HPI.     Past Medical History:  Past Medical History:   Diagnosis Date   • Stroke (HCC)       History  reviewed. No pertinent surgical history.     Current Outpatient Medications:   •  Beneprotein Nutritional Supplement Pack, BENEPROTEIN PACK, Disp: , Rfl:   •  mirtazapine (REMERON) 15 MG Tab, MIRTAZAPINE 15 MG TABS, Disp: , Rfl:   •  docusate sodium (COLACE) 100 MG Cap, Take 100 mg by mouth 2 times a day as needed for Constipation., Disp: , Rfl:   •  bisacodyl (DULCOLAX) 10 MG Suppos, Insert 1 Suppository in rectum every day., Disp: 30 Suppository, Rfl: 2  •  levETIRAcetam (KEPPRA) 500 MG Tab, 1 Tab by Enteral Tube route every day at 6 PM. PM only, Disp: 60 Tab, Rfl: 2  •  amantadine (SYMMETREL) 50 MG/5ML Syrup, 10 mL by Per G Tube route 2 Times a Day., Disp: 1 Bottle, Rfl: 2  •  montelukast (SINGULAIR) 10 MG Tab, 1 Tab by Per G Tube route every day., Disp: 90 Tab, Rfl: 2  •  polyethylene glycol/lytes (MIRALAX) Pack, Take 1 Packet by mouth every day. through G tube, Disp: 90 Each, Rfl: 2  •  mirtazapine (REMERON) 15 MG Tab, 1 Tab by Per G Tube route every bedtime., Disp: 30 Tab, Rfl: 2  •  propranolol (INDERAL) 20 MG Tab, Take 1 Tab by mouth 3 times a day for 90 days. (Patient taking differently: 1 Tab by Per G Tube route 3 times a day.), Disp: 90 Tab, Rfl: 2  •  Cholecalciferol (VITAMIN D3) 5000 UNIT/ML Liquid, 125 mcg by Gastric Tube route every day., Disp: , Rfl:   •  Incontinence Supply Disposable (ASSURANCE FITTED BRIEF LARGE) Misc, ASSURANCE FITTED BRIEF LARGE, Disp: , Rfl:   •  Incontinence Supply Disposable (ADVOCATE UNDERPADS) Misc, ADVOCATE UNDERPADS, Disp: , Rfl:   •  LORazepam (ATIVAN) 1 MG Tab, TAKE 1 TABLET BY MOUTH 30 MINUTES BEFORE MRI AND TAKE ANOTHER TABLET WHEN ANXIOUS AND AWAITING RADIOLOGY, Disp: , Rfl:   •  pramoxine-calamine 1-8% (CALADRYL) lotion, CALADRYL 1-8 % LOTN, Disp: , Rfl:   Allergies   Allergen Reactions   • Vancomycin Swelling   • Other Misc Rash     Allergic to name band.        Past Social History:  Social History     Socioeconomic History   • Marital status: Single     Spouse  name: Not on file   • Number of children: Not on file   • Years of education: Not on file   • Highest education level: Not on file   Occupational History   • Not on file   Social Needs   • Financial resource strain: Not on file   • Food insecurity     Worry: Not on file     Inability: Not on file   • Transportation needs     Medical: Not on file     Non-medical: Not on file   Tobacco Use   • Smoking status: Never Smoker   • Smokeless tobacco: Never Used   Substance and Sexual Activity   • Alcohol use: Never     Frequency: Never   • Drug use: Never   • Sexual activity: Not on file   Lifestyle   • Physical activity     Days per week: Not on file     Minutes per session: Not on file   • Stress: Not on file   Relationships   • Social connections     Talks on phone: Not on file     Gets together: Not on file     Attends [employer] service: Not on file     Active member of club or organization: Not on file     Attends meetings of clubs or organizations: Not on file     Relationship status: Not on file   • Intimate partner violence     Fear of current or ex partner: Not on file     Emotionally abused: Not on file     Physically abused: Not on file     Forced sexual activity: Not on file   Other Topics Concern   •  Service No   • Blood Transfusions No   • Caffeine Concern No   • Occupational Exposure No   • Hobby Hazards No   • Sleep Concern No   • Stress Concern No   • Weight Concern No   • Special Diet Yes   • Back Care No   • Exercise Yes   • Bike Helmet No   • Seat Belt Yes   • Self-Exams No   Social History Narrative   • Not on file        Family History:  Family History   Problem Relation Age of Onset   • Hypertension Mother    • Hypertension Brother        Depression and Opioid Screening  PHQ-9:  Depression Screen (PHQ-2/PHQ-9) 3/14/2020 5/26/2020 7/8/2020   PHQ-2 Total Score - - -   PHQ-2 Total Score 0 1 0   PHQ-9 Total Score - 2 -     Interpretation of PHQ-9 Total Score   Score Severity   1-4 No Depression    5-9 Mild Depression   10-14 Moderate Depression   15-19 Moderately Severe Depression   20-27 Severe Depression     Opioid Risk Score: No value filed.  Interpretation of Opioid Risk Score   Score 0-3 = Low risk of abuse. Do UDS at least once per year.  Score 4-7 = Moderate risk of abuse. Do UDS 1-4 times per year.  Score 8+ = High risk of abuse. Refer to specialist.      OBJECTIVE:   Vital Signs:  Vitals:    07/08/20 1354   BP: 100/82   Pulse: 83   Temp: 36.7 °C (98.1 °F)   SpO2: 93%        Pertinent Labs:  Lab Results   Component Value Date/Time    SODIUM 146 (H) 03/08/2020 06:58 AM    POTASSIUM 4.6 03/08/2020 06:58 AM    CHLORIDE 106 03/08/2020 06:58 AM    CO2 19 (L) 03/08/2020 06:58 AM    GLUCOSE 113 (H) 03/08/2020 06:58 AM    BUN 7 (L) 03/08/2020 06:58 AM    CREATININE 0.84 03/08/2020 06:58 AM       Lab Results   Component Value Date/Time    HBA1C 5.7 (H) 02/06/2020 06:07 AM       Lab Results   Component Value Date/Time    WBC 13.3 (H) 03/08/2020 06:58 AM    RBC 6.22 (H) 03/08/2020 06:58 AM    HEMOGLOBIN 17.0 03/08/2020 06:58 AM    HEMATOCRIT 54.9 (H) 03/08/2020 06:58 AM    MCV 88.3 03/08/2020 06:58 AM    MCH 27.3 03/08/2020 06:58 AM    MCHC 31.0 (L) 03/08/2020 06:58 AM    MPV 11.5 03/08/2020 06:58 AM    NEUTSPOLYS 64.10 03/06/2020 06:13 AM    LYMPHOCYTES 27.80 03/06/2020 06:13 AM    MONOCYTES 4.60 03/06/2020 06:13 AM    EOSINOPHILS 1.60 03/06/2020 06:13 AM    BASOPHILS 1.40 03/06/2020 06:13 AM       Lab Results   Component Value Date/Time    ASTSGOT 16 03/06/2020 06:13 AM    ALTSGPT 29 03/06/2020 06:13 AM        Physical Exam:   GEN: No apparent distress, patient is sitting comfortably in his lightweight manual wheelchair.  HEENT: Head normocephalic.  Sclera nonicteric bilaterally, no ocular discharge appreciated bilaterally.  Tracheostomy site well-healed, however does tunnel quite a bit.  CV: Extremities warm and well-perfused, no peripheral edema appreciated bilaterally.  PULMONARY: Breathing nonlabored on  "room air, no respiratory accessory muscle use.  Not requiring supplemental oxygen.  ABD: Soft, nontender.  G-tube present.  Orifice clean, dry, intact.  No erythema, underlying induration, purulence.  SKIN: No appreciable skin breakdown on exposed areas of skin.  PSYCH: Mood and affect within normal limits.  NEURO: Awake alert.  Minimally conversive.  Significantly dysarthric.  Communicates with simple cued sentences such as \"thank you \", \"bye\"  Upper and lower extremities are grossly antigravity, lower extremity and upper extremity strength in all major myotomes is likely at least 4/5 as patient is able to stand with a walker and support himself independently, albeit for short period of time.  Significant dysmetria, poor motor control and coordination.  No clonus appreciated bilateral ankles.  Follows 1 and two-step commands.    Tone on Modified Anusha Scale    R L  R L   Elbow extension (testing tone of elbow flexors) 0 0 Hip extension (testing hip flexors) 0 0   Elbow flexion (testing tone of elbow extensors) 0 0 Hip abduction (testing adductors) 0 0   Wrist extension (testing tone of wrist flexors)  0 0 Knee extension (testing knee flexors) 0 0   Finger extension (testing tone of finger flexors) 0 0 Knee flexion (testing knee extensors) 0 0   Supination (testing forearm pronators) 0 0 Dorsiflexion (testing plantarflexors) 0 0      Plantarflexion (testing dorsiflexors) 0 0       Imaging: No recent imaging pertinent to today's visit to review.    ASSESSMENT/PLAN: Rey Medina  is a 24 y.o. male with rehabilitation history significant for ICH secondary to AVM rupture on 4/21/2019 s/p AVM repair, hydrocephalus s/p  shunt who was recently admitted to [facility] from 2/5/2020 to 3/12/2020, here 7/8/2020 for hemorrhagic stroke follow-up. The following plan was discussed with the patient who is in agreement.     Visit Diagnoses     ICD-10-CM   1. Nontraumatic intraventricular intracerebral hemorrhage, " unspecified laterality (HCC)  I61.5   2. Vitamin D deficiency  E55.9   3. Neurogenic bowel  K59.2   4. Neurogenic bladder  N31.9   5. Spasticity  R25.2   6. Impaired mobility and activities of daily living  Z74.09    Z78.9   7. Impaired cognition  R41.89   8. Impaired verbal communication  F80.9   9. Dysarthria  R47.1      Rehab/Neuro:   1. 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   2.  No history of seizure, on Keppra.  Had EEG 5/21/2020  3.  Poor attention secondary to ICH requiring need for neuro-stimulant  4.  Blurry vision after ICH, did have reading glasses prior to stroke.  Blurry vision persistent.  Also having problems with peripheral vision on the right.  -Neuro-ophthalmology appointment scheduled for August  -Continue home health therapy  -Continue amantadine 100 mg at 08:00 and 12:00  -Counseled on potential for ARU admission; mom declines at this time.  -FACE-to-FACE visit for fully electric hospital bed indefinitely (99 months): Patient is significantly cognitively as well as physically impaired due to history of hemorrhagic stroke and cannot adequately position his body within the normal bed in order to alleviate skin pressure and prevent decubitus ulcers.  Additionally, patient's impaired function requires that he have a bed that allows height adjustment for safe transfers in and out of bed and for alleviation of caregiver burden for transfers.  Additionally, the patient has PEG tube which requires that he cannot lay fully flat given the fact that he has a risk of aspiration.  Pillows and wedges have already been tried.     Assessment of Current Functional Status:  Patient is improving in terms of his function but is still significantly functionally impaired with impaired motor control.  He requires  "assistance from his mom physically to transfer him in for position changes.     Spasticity:  No tone on exam today.  Continue off of pharmacologic agents for spasticity.     Neurogenic Bowel: Trending towards constipation now on p.o. diet.  Uses suppository every 3 days.  - PRESCRIPTION sent for Dulcolax suppository     Mood/Sleep:   1. Secondary to adjustment disorder resulting from #1 in \"neuro/rehab section\":  Improving.  -Continue mirtazapine 15 mg at night  -Continue propranolol 20 mg 3 times daily for agitation; stable, mom does not wish to wean off of this medication yet.     GI/Diet:   1. Dysphagia secondary to #1 in \"rehab/neuro\" section:  Improving, patient has a more diverse diet now.  Mom is giving all p.o. meals and medications and only using the PEG tube for hydration.  -Continue oral diet  -Counseled if patient can tolerate oral diet and remain adequately hydrated orally without use of PEG tube can consider PEG tube removal.     HEENT:  1.  Acute respiratory failure S/p tracheostomy: Tracheostomy site healing well.  Per report, not leaking air.  -Follows with ENT, last saw 6/2020.  Everything looks good per mom's report, does have some trouble managing secretions but adequately clear secretions when cued.    Endo:  1.  Vitamin D deficiency:  -Continue vitamin D 5000 units  -LAB ordered for vitamin D to be done if patient has other labs to be drawn.    Follow up: 3 months    Total time spent face to face with patient was 27 minutes. Greater then 50% of my visit was spent on counseling and coordination of care regarding the primary medical diagnosis, secondary medical complications, patient on their condition, best management practices, and risks and benefits of treatment as aforementioned in the assessment and plan. Extensive discussion involved the patient and mom.    Please note that this dictation was created using voice recognition software. I have made every reasonable attempt to correct obvious " errors but there may be errors of grammar and content that I may have overlooked prior to finalization of this note.    Dr. Mandy Luke DO, MS  Department of Physical Medicine & Rehabilitation  Neuro Rehabilitation Clinic  [facility]  7/8/2020 2:38 PM

## 2020-07-09 ENCOUNTER — HOME CARE VISIT (OUTPATIENT)
Dept: HOME HEALTH SERVICES | Facility: HOME HEALTHCARE | Age: 25
End: 2020-07-09
Payer: MEDICAID

## 2020-07-09 VITALS
OXYGEN SATURATION: 95 % | RESPIRATION RATE: 16 BRPM | HEART RATE: 76 BPM | TEMPERATURE: 98.4 F | DIASTOLIC BLOOD PRESSURE: 82 MMHG | SYSTOLIC BLOOD PRESSURE: 104 MMHG

## 2020-07-09 VITALS
DIASTOLIC BLOOD PRESSURE: 80 MMHG | RESPIRATION RATE: 16 BRPM | HEART RATE: 72 BPM | OXYGEN SATURATION: 98 % | TEMPERATURE: 99.2 F | SYSTOLIC BLOOD PRESSURE: 112 MMHG

## 2020-07-09 PROCEDURE — G0153 HHCP-SVS OF S/L PATH,EA 15MN: HCPCS

## 2020-07-09 PROCEDURE — G0152 HHCP-SERV OF OT,EA 15 MIN: HCPCS

## 2020-07-09 ASSESSMENT — ENCOUNTER SYMPTOMS
DIFFICULTY THINKING: 1
POOR JUDGMENT: 1
DIFFICULTY THINKING: 1

## 2020-07-13 ENCOUNTER — HOME CARE VISIT (OUTPATIENT)
Dept: HOME HEALTH SERVICES | Facility: HOME HEALTHCARE | Age: 25
End: 2020-07-13
Payer: MEDICAID

## 2020-07-13 ENCOUNTER — TELEPHONE (OUTPATIENT)
Dept: PHYSICAL MEDICINE AND REHAB | Facility: REHABILITATION | Age: 25
End: 2020-07-13

## 2020-07-13 VITALS
DIASTOLIC BLOOD PRESSURE: 78 MMHG | TEMPERATURE: 97.6 F | SYSTOLIC BLOOD PRESSURE: 118 MMHG | HEART RATE: 74 BPM | OXYGEN SATURATION: 98 % | RESPIRATION RATE: 16 BRPM

## 2020-07-13 PROCEDURE — G0153 HHCP-SVS OF S/L PATH,EA 15MN: HCPCS

## 2020-07-13 ASSESSMENT — ENCOUNTER SYMPTOMS
POOR JUDGMENT: 1
DIFFICULTY THINKING: 1

## 2020-07-13 NOTE — TELEPHONE ENCOUNTER
Pt's mom (full time caregiver) called asking we place a referral to ENT Ear nose and throat specialist. I advised pt this referral would need to be placed by his pcp. She sated she knew but we work faster than his pcp office.     I notified her that per insurance protocols they normally like the referrals to come from the pcp.   She said ok and will contact that office to place the referral.

## 2020-07-14 ENCOUNTER — HOME CARE VISIT (OUTPATIENT)
Dept: HOME HEALTH SERVICES | Facility: HOME HEALTHCARE | Age: 25
End: 2020-07-14
Payer: MEDICAID

## 2020-07-14 VITALS
SYSTOLIC BLOOD PRESSURE: 102 MMHG | HEART RATE: 86 BPM | TEMPERATURE: 98.6 F | DIASTOLIC BLOOD PRESSURE: 75 MMHG | RESPIRATION RATE: 16 BRPM | OXYGEN SATURATION: 97 %

## 2020-07-14 DIAGNOSIS — K59.2 NEUROGENIC BOWEL: ICD-10-CM

## 2020-07-14 DIAGNOSIS — I61.5 NONTRAUMATIC INTRAVENTRICULAR INTRACEREBRAL HEMORRHAGE, UNSPECIFIED LATERALITY (HCC): ICD-10-CM

## 2020-07-14 DIAGNOSIS — Z93.0 TRACHEOSTOMY DEPENDENT (HCC): ICD-10-CM

## 2020-07-14 PROCEDURE — G0152 HHCP-SERV OF OT,EA 15 MIN: HCPCS

## 2020-07-14 PROCEDURE — G0151 HHCP-SERV OF PT,EA 15 MIN: HCPCS

## 2020-07-14 PROCEDURE — G0153 HHCP-SVS OF S/L PATH,EA 15MN: HCPCS

## 2020-07-14 ASSESSMENT — ENCOUNTER SYMPTOMS
DIFFICULTY THINKING: 1
POOR JUDGMENT: 1

## 2020-07-15 ASSESSMENT — ENCOUNTER SYMPTOMS
POOR JUDGMENT: 1
DIFFICULTY THINKING: 1
POOR JUDGMENT: 1
DIFFICULTY THINKING: 1

## 2020-07-16 ENCOUNTER — HOME CARE VISIT (OUTPATIENT)
Dept: HOME HEALTH SERVICES | Facility: HOME HEALTHCARE | Age: 25
End: 2020-07-16
Payer: MEDICAID

## 2020-07-16 VITALS
TEMPERATURE: 98.1 F | DIASTOLIC BLOOD PRESSURE: 70 MMHG | HEART RATE: 80 BPM | RESPIRATION RATE: 16 BRPM | OXYGEN SATURATION: 95 % | SYSTOLIC BLOOD PRESSURE: 110 MMHG

## 2020-07-16 PROCEDURE — G0151 HHCP-SERV OF PT,EA 15 MIN: HCPCS

## 2020-07-16 PROCEDURE — G0152 HHCP-SERV OF OT,EA 15 MIN: HCPCS

## 2020-07-16 ASSESSMENT — ENCOUNTER SYMPTOMS: DIFFICULTY THINKING: 1

## 2020-07-17 ASSESSMENT — ENCOUNTER SYMPTOMS
DIFFICULTY THINKING: 1
POOR JUDGMENT: 1

## 2020-07-20 ENCOUNTER — HOME CARE VISIT (OUTPATIENT)
Dept: HOME HEALTH SERVICES | Facility: HOME HEALTHCARE | Age: 25
End: 2020-07-20
Payer: MEDICAID

## 2020-07-20 VITALS
HEART RATE: 84 BPM | RESPIRATION RATE: 16 BRPM | TEMPERATURE: 97.6 F | OXYGEN SATURATION: 98 % | SYSTOLIC BLOOD PRESSURE: 118 MMHG | DIASTOLIC BLOOD PRESSURE: 78 MMHG

## 2020-07-20 PROCEDURE — G0153 HHCP-SVS OF S/L PATH,EA 15MN: HCPCS

## 2020-07-20 ASSESSMENT — ENCOUNTER SYMPTOMS
POOR JUDGMENT: 1
DIFFICULTY THINKING: 1

## 2020-07-21 ENCOUNTER — HOME CARE VISIT (OUTPATIENT)
Dept: HOME HEALTH SERVICES | Facility: HOME HEALTHCARE | Age: 25
End: 2020-07-21
Payer: MEDICAID

## 2020-07-21 VITALS
OXYGEN SATURATION: 98 % | DIASTOLIC BLOOD PRESSURE: 68 MMHG | RESPIRATION RATE: 14 BRPM | HEART RATE: 76 BPM | TEMPERATURE: 98.3 F | SYSTOLIC BLOOD PRESSURE: 105 MMHG

## 2020-07-21 PROCEDURE — G0151 HHCP-SERV OF PT,EA 15 MIN: HCPCS

## 2020-07-21 PROCEDURE — G0152 HHCP-SERV OF OT,EA 15 MIN: HCPCS

## 2020-07-21 ASSESSMENT — ENCOUNTER SYMPTOMS
DIFFICULTY THINKING: 1
DIFFICULTY THINKING: 1
POOR JUDGMENT: 1

## 2020-07-22 ENCOUNTER — HOME CARE VISIT (OUTPATIENT)
Dept: HOME HEALTH SERVICES | Facility: HOME HEALTHCARE | Age: 25
End: 2020-07-22
Payer: MEDICAID

## 2020-07-22 VITALS
DIASTOLIC BLOOD PRESSURE: 62 MMHG | SYSTOLIC BLOOD PRESSURE: 102 MMHG | RESPIRATION RATE: 16 BRPM | HEART RATE: 81 BPM | TEMPERATURE: 97.6 F | OXYGEN SATURATION: 97 %

## 2020-07-22 PROCEDURE — G0153 HHCP-SVS OF S/L PATH,EA 15MN: HCPCS

## 2020-07-22 ASSESSMENT — ENCOUNTER SYMPTOMS
DIFFICULTY THINKING: 1
POOR JUDGMENT: 1

## 2020-07-23 ENCOUNTER — HOME CARE VISIT (OUTPATIENT)
Dept: HOME HEALTH SERVICES | Facility: HOME HEALTHCARE | Age: 25
End: 2020-07-23
Payer: MEDICAID

## 2020-07-23 PROCEDURE — G0151 HHCP-SERV OF PT,EA 15 MIN: HCPCS

## 2020-07-23 PROCEDURE — G0152 HHCP-SERV OF OT,EA 15 MIN: HCPCS

## 2020-07-24 ASSESSMENT — ENCOUNTER SYMPTOMS
DIFFICULTY THINKING: 1
POOR JUDGMENT: 1
DIFFICULTY THINKING: 1

## 2020-07-24 ASSESSMENT — ACTIVITIES OF DAILY LIVING (ADL): OASIS_M1830: 03

## 2020-07-27 ENCOUNTER — HOME CARE VISIT (OUTPATIENT)
Dept: HOME HEALTH SERVICES | Facility: HOME HEALTHCARE | Age: 25
End: 2020-07-27
Payer: MEDICAID

## 2020-07-27 VITALS
HEART RATE: 72 BPM | SYSTOLIC BLOOD PRESSURE: 118 MMHG | RESPIRATION RATE: 16 BRPM | OXYGEN SATURATION: 97 % | TEMPERATURE: 97.4 F | DIASTOLIC BLOOD PRESSURE: 65 MMHG

## 2020-07-27 PROCEDURE — G0153 HHCP-SVS OF S/L PATH,EA 15MN: HCPCS

## 2020-07-27 PROCEDURE — 665003 FOLLOW UP-HOME HEALTH

## 2020-07-28 ENCOUNTER — HOME CARE VISIT (OUTPATIENT)
Dept: HOME HEALTH SERVICES | Facility: HOME HEALTHCARE | Age: 25
End: 2020-07-28
Payer: MEDICAID

## 2020-07-28 VITALS
TEMPERATURE: 97.5 F | RESPIRATION RATE: 16 BRPM | HEART RATE: 86 BPM | DIASTOLIC BLOOD PRESSURE: 68 MMHG | SYSTOLIC BLOOD PRESSURE: 108 MMHG | OXYGEN SATURATION: 98 %

## 2020-07-28 VITALS
DIASTOLIC BLOOD PRESSURE: 85 MMHG | RESPIRATION RATE: 16 BRPM | HEART RATE: 89 BPM | SYSTOLIC BLOOD PRESSURE: 110 MMHG | OXYGEN SATURATION: 98 % | TEMPERATURE: 98 F

## 2020-07-28 PROCEDURE — G0151 HHCP-SERV OF PT,EA 15 MIN: HCPCS

## 2020-07-28 PROCEDURE — G0152 HHCP-SERV OF OT,EA 15 MIN: HCPCS

## 2020-07-28 ASSESSMENT — ENCOUNTER SYMPTOMS
DIFFICULTY THINKING: 1
DIFFICULTY THINKING: 1
POOR JUDGMENT: 1
POOR JUDGMENT: 1
DIFFICULTY THINKING: 1

## 2020-07-29 ENCOUNTER — HOME CARE VISIT (OUTPATIENT)
Dept: HOME HEALTH SERVICES | Facility: HOME HEALTHCARE | Age: 25
End: 2020-07-29
Payer: MEDICAID

## 2020-07-29 PROCEDURE — G0153 HHCP-SVS OF S/L PATH,EA 15MN: HCPCS

## 2020-07-29 PROCEDURE — G0179 MD RECERTIFICATION HHA PT: HCPCS | Performed by: PHYSICAL MEDICINE & REHABILITATION

## 2020-07-29 RX ORDER — BISACODYL 10 MG
10 SUPPOSITORY, RECTAL RECTAL PRN
Qty: 30 SUPPOSITORY | Refills: 2 | Status: SHIPPED | OUTPATIENT
Start: 2020-07-29 | End: 2021-03-24

## 2020-07-30 ENCOUNTER — HOME CARE VISIT (OUTPATIENT)
Dept: HOME HEALTH SERVICES | Facility: HOME HEALTHCARE | Age: 25
End: 2020-07-30
Payer: MEDICAID

## 2020-07-30 ENCOUNTER — TELEMEDICINE (OUTPATIENT)
Dept: NEUROLOGY | Facility: MEDICAL CENTER | Age: 25
End: 2020-07-30
Payer: MEDICAID

## 2020-07-30 VITALS
SYSTOLIC BLOOD PRESSURE: 108 MMHG | RESPIRATION RATE: 16 BRPM | OXYGEN SATURATION: 98 % | HEART RATE: 82 BPM | TEMPERATURE: 96.6 F | DIASTOLIC BLOOD PRESSURE: 80 MMHG

## 2020-07-30 VITALS — TEMPERATURE: 97.8 F | OXYGEN SATURATION: 98 % | HEART RATE: 78 BPM | RESPIRATION RATE: 16 BRPM

## 2020-07-30 DIAGNOSIS — G31.89 COGNITIVE AND NEUROBEHAVIORAL DYSFUNCTION FOLLOWING BRAIN INJURY (HCC): ICD-10-CM

## 2020-07-30 DIAGNOSIS — F09 COGNITIVE AND NEUROBEHAVIORAL DYSFUNCTION FOLLOWING BRAIN INJURY (HCC): ICD-10-CM

## 2020-07-30 DIAGNOSIS — S06.9XAS COGNITIVE AND NEUROBEHAVIORAL DYSFUNCTION FOLLOWING BRAIN INJURY (HCC): ICD-10-CM

## 2020-07-30 PROCEDURE — 99212 OFFICE O/P EST SF 10 MIN: CPT | Mod: CR

## 2020-07-30 PROCEDURE — G0152 HHCP-SERV OF OT,EA 15 MIN: HCPCS

## 2020-07-30 PROCEDURE — G0151 HHCP-SERV OF PT,EA 15 MIN: HCPCS

## 2020-07-30 ASSESSMENT — ENCOUNTER SYMPTOMS
POOR JUDGMENT: 1
DIFFICULTY THINKING: 1
POOR JUDGMENT: 1
DIFFICULTY THINKING: 1
DIFFICULTY THINKING: 1

## 2020-07-31 RX ORDER — PROPRANOLOL HYDROCHLORIDE 20 MG/1
TABLET ORAL
Qty: 270 TAB | Refills: 0 | Status: SHIPPED | OUTPATIENT
Start: 2020-07-31 | End: 2020-11-09 | Stop reason: SDUPTHER

## 2020-08-03 ENCOUNTER — HOME CARE VISIT (OUTPATIENT)
Dept: HOME HEALTH SERVICES | Facility: HOME HEALTHCARE | Age: 25
End: 2020-08-03
Payer: MEDICAID

## 2020-08-04 ENCOUNTER — HOME CARE VISIT (OUTPATIENT)
Dept: HOME HEALTH SERVICES | Facility: HOME HEALTHCARE | Age: 25
End: 2020-08-04
Payer: MEDICAID

## 2020-08-04 VITALS
OXYGEN SATURATION: 98 % | RESPIRATION RATE: 16 BRPM | SYSTOLIC BLOOD PRESSURE: 105 MMHG | DIASTOLIC BLOOD PRESSURE: 80 MMHG | HEART RATE: 74 BPM | TEMPERATURE: 97.6 F

## 2020-08-04 PROCEDURE — G0152 HHCP-SERV OF OT,EA 15 MIN: HCPCS

## 2020-08-04 ASSESSMENT — ENCOUNTER SYMPTOMS: DIFFICULTY THINKING: 1

## 2020-08-04 NOTE — PROGRESS NOTES
Telemedicine Visit: Established Patient     This evaluation was conducted via Zoom, using secure and encrypted videoconferencing technology. .  The patient's identity was confirmed and verbal consent for the telemedicine encounter was obtained.      Subjective:   CC: follow up for cognitive dysfunction  Rey Medina is a 24 y.o. 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  He is doing well, he is now able to speak in short sentences and is following commands.  He is eating now on his own and his tracheostomy was closed and he is breathing well.  He is trying to use a computer and his mom who is a main caregiver is helping him do cross word puzzles and reads for him.He himself is attempting to read slowly as well.  I started him on amantadine and this seems to be helpful with his cognitive function and helping him focus and sustain attention.  He is getting PT and OT at home and this is working well for Rey.    ROS   Denies any recent fevers or chills. No nausea or vomiting. No chest pains or shortness of breath.     Allergies   Allergen Reactions   • Vancomycin Swelling   • Other Misc Rash     Allergic to name band.       Current medicines (including changes today)  Current Outpatient Medications   Medication Sig Dispense Refill   • bisacodyl (DULCOLAX) 10 MG Suppos Insert 1 Suppository in rectum as needed (no BM for 3 days). 30 Suppository 2   • Beneprotein Nutritional Supplement Pack BENEPROTEIN PACK     • levETIRAcetam (KEPPRA) 500 MG Tab 1 Tab by Enteral Tube route every day at 6 PM. PM only 60 Tab 2   • amantadine (SYMMETREL) 50 MG/5ML Syrup 10 mL by Per G Tube route 2 Times a Day. 1 Bottle 2   • montelukast (SINGULAIR) 10 MG Tab 1 Tab by Per G Tube route every day. 90 Tab 2   • polyethylene glycol/lytes (MIRALAX) Pack Take 1 Packet by mouth every day. through G tube 90 Each 2   • mirtazapine (REMERON) 15 MG Tab 1  Tab by Per G Tube route every bedtime. 30 Tab 2   • Cholecalciferol (VITAMIN D3) 5000 UNIT/ML Liquid 125 mcg by Gastric Tube route every day.     • Incontinence Supply Disposable (ASSURANCE FITTED BRIEF LARGE) Misc ASSURANCE FITTED BRIEF LARGE     • Incontinence Supply Disposable (ADVOCATE UNDERPADS) [facility] ADVOCATE UNDERPADS     • propranolol (INDERAL) 20 MG Tab TAKE 1 TABLET BY MOUTH THREE TIMES DAILY FOR 90 DAYS 270 Tab 0     No current facility-administered medications for this visit.        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  He  has no past surgical history on file.       Objective:   There were no vitals taken for this visit.    Physical Exam:  Constitutional: Alert, no distress, well-groomed.  Skin: No rashes in visible areas.  Eye: Round. Conjunctiva clear, lids normal. No icterus.   ENMT: Lips pink without lesions, good dentition, moist mucous membranes. Phonation normal.  Neck: No masses, no thyromegaly. Moves freely without pain.  CV: Pulse as reported by patient  Respiratory: Unlabored respiratory effort, no cough or audible wheeze  Psych: Alert and oriented x self, home and mom. Knows he is in [place]  Dysarthria is improving  Able to stand up with mom's help  Able to take few steps  Face symmetric   Can touch his nose with both right and left index finger.  Able to lift both arms above his head   No tremor   No ataxia   Limited speech however improving  since our last follow up appointment.    Lab Results   Component Value Date/Time    SODIUM 146 (H) 03/08/2020 06:58 AM    POTASSIUM 4.6 03/08/2020 06:58 AM    CHLORIDE 106 03/08/2020 06:58 AM    CO2 19 (L) 03/08/2020 06:58 AM    GLUCOSE 113 (H) 03/08/2020 06:58 AM    BUN 7 (L) 03/08/2020 06:58 AM    CREATININE 0.84 03/08/2020 06:58 AM      Current Outpatient Medications on File Prior to Visit   Medication Sig Dispense Refill   • bisacodyl (DULCOLAX) 10 MG Suppos Insert 1 Suppository in rectum as needed (no BM for 3 days). 30 Suppository 2   • Beneprotein Nutritional Supplement Pack BENEPROTEIN PACK     • levETIRAcetam (KEPPRA) 500 MG Tab 1 Tab by Enteral Tube route every day at 6 PM. PM only 60 Tab 2   • amantadine (SYMMETREL) 50 MG/5ML Syrup 10 mL by Per G Tube route 2 Times a Day. 1 Bottle 2   • montelukast (SINGULAIR) 10 MG Tab 1 Tab by Per G Tube route every day. 90 Tab 2   • polyethylene glycol/lytes (MIRALAX) Pack Take 1 Packet by mouth every day. through G tube 90 Each 2   • mirtazapine (REMERON) 15 MG Tab 1 Tab by Per G Tube route every bedtime. 30 Tab 2   • Cholecalciferol (VITAMIN D3) 5000 UNIT/ML Liquid 125 mcg by Gastric Tube route every day.     • Incontinence Supply Disposable (ASSURANCE FITTED BRIEF LARGE) [facility] ASSURANCE FITTED BRIEF LARGE     • Incontinence Supply Disposable (ADVOCATE UNDERPADS) [facility] ADVOCATE UNDERPADS       No current facility-administered medications on file prior to visit.        Assessment and Plan:   The following treatment plan was discussed:     There are no diagnoses linked to this encounter.  25 yo male with IPH 2/2 AVM rupture in 2019 s/p AVM repair and  shunt placement, improved  Verbally.  On amantadine 100 mg bid to improve cognitive function and this is working for him.  No seizures - remains on  mg bid  Plan  EEG routine reviewed and no seizures   MRI brain reviewed and NAF stable chronic changes with  shunt   MRA head :  No significant stenosis or aneurysm  is seen. No high flow vascular malformation is identified.   Keep on LEV for another 3 months - may decide on lowering the dose at that time.  Neurosurgery follow up for  catheter, AVM follow up.  Continue PT and OT   Overall improved with speech,attention and physical ability.  RTC 3 months       Follow-up: No follow-ups on file.

## 2020-08-05 ENCOUNTER — HOME CARE VISIT (OUTPATIENT)
Dept: HOME HEALTH SERVICES | Facility: HOME HEALTHCARE | Age: 25
End: 2020-08-05
Payer: MEDICAID

## 2020-08-06 ENCOUNTER — HOME CARE VISIT (OUTPATIENT)
Dept: HOME HEALTH SERVICES | Facility: HOME HEALTHCARE | Age: 25
End: 2020-08-06
Payer: MEDICAID

## 2020-08-06 PROCEDURE — G0152 HHCP-SERV OF OT,EA 15 MIN: HCPCS

## 2020-08-06 PROCEDURE — G0151 HHCP-SERV OF PT,EA 15 MIN: HCPCS

## 2020-08-06 ASSESSMENT — ENCOUNTER SYMPTOMS: DIFFICULTY THINKING: 1

## 2020-08-09 VITALS
OXYGEN SATURATION: 98 % | TEMPERATURE: 97.4 F | DIASTOLIC BLOOD PRESSURE: 80 MMHG | SYSTOLIC BLOOD PRESSURE: 110 MMHG | RESPIRATION RATE: 16 BRPM | HEART RATE: 75 BPM

## 2020-08-09 ASSESSMENT — ENCOUNTER SYMPTOMS
DIFFICULTY THINKING: 1
POOR JUDGMENT: 1

## 2020-08-10 ENCOUNTER — HOME CARE VISIT (OUTPATIENT)
Dept: HOME HEALTH SERVICES | Facility: HOME HEALTHCARE | Age: 25
End: 2020-08-10
Payer: MEDICAID

## 2020-08-10 PROCEDURE — G0153 HHCP-SVS OF S/L PATH,EA 15MN: HCPCS

## 2020-08-11 ENCOUNTER — HOSPITAL ENCOUNTER (OUTPATIENT)
Facility: MEDICAL CENTER | Age: 25
End: 2020-08-12
Attending: EMERGENCY MEDICINE | Admitting: HOSPITALIST
Payer: MEDICAID

## 2020-08-11 ENCOUNTER — HOME CARE VISIT (OUTPATIENT)
Dept: HOME HEALTH SERVICES | Facility: HOME HEALTHCARE | Age: 25
End: 2020-08-11
Payer: MEDICAID

## 2020-08-11 VITALS
SYSTOLIC BLOOD PRESSURE: 102 MMHG | TEMPERATURE: 98 F | HEART RATE: 94 BPM | DIASTOLIC BLOOD PRESSURE: 80 MMHG | RESPIRATION RATE: 16 BRPM | OXYGEN SATURATION: 98 %

## 2020-08-11 DIAGNOSIS — I69.359 HISTORY OF HEMORRHAGIC STROKE WITH RESIDUAL HEMIPARESIS (HCC): ICD-10-CM

## 2020-08-11 DIAGNOSIS — I69.191 DYSPHAGIA FOLLOWING NONTRAUMATIC INTRACEREBRAL HEMORRHAGE: ICD-10-CM

## 2020-08-11 DIAGNOSIS — K94.20 GASTROSTOMY COMPLICATION (HCC): ICD-10-CM

## 2020-08-11 DIAGNOSIS — T85.598A FEEDING TUBE DYSFUNCTION, INITIAL ENCOUNTER: ICD-10-CM

## 2020-08-11 LAB
ANION GAP SERPL CALC-SCNC: 10 MMOL/L (ref 7–16)
BASOPHILS # BLD AUTO: 1 % (ref 0–1.8)
BASOPHILS # BLD: 0.14 K/UL (ref 0–0.12)
BUN SERPL-MCNC: 8 MG/DL (ref 8–22)
CALCIUM SERPL-MCNC: 9.8 MG/DL (ref 8.4–10.2)
CHLORIDE SERPL-SCNC: 103 MMOL/L (ref 96–112)
CO2 SERPL-SCNC: 27 MMOL/L (ref 20–33)
COVID ORDER STATUS COVID19: NORMAL
CREAT SERPL-MCNC: 0.63 MG/DL (ref 0.5–1.4)
EOSINOPHIL # BLD AUTO: 0.13 K/UL (ref 0–0.51)
EOSINOPHIL NFR BLD: 1 % (ref 0–6.9)
ERYTHROCYTE [DISTWIDTH] IN BLOOD BY AUTOMATED COUNT: 42.4 FL (ref 35.9–50)
GLUCOSE SERPL-MCNC: 91 MG/DL (ref 65–99)
HCT VFR BLD AUTO: 51.4 % (ref 42–52)
HGB BLD-MCNC: 17.1 G/DL (ref 14–18)
IMM GRANULOCYTES # BLD AUTO: 0.06 K/UL (ref 0–0.11)
IMM GRANULOCYTES NFR BLD AUTO: 0.4 % (ref 0–0.9)
INR PPP: 0.99 (ref 0.87–1.13)
LYMPHOCYTES # BLD AUTO: 2.39 K/UL (ref 1–4.8)
LYMPHOCYTES NFR BLD: 17.7 % (ref 22–41)
MCH RBC QN AUTO: 28.3 PG (ref 27–33)
MCHC RBC AUTO-ENTMCNC: 33.3 G/DL (ref 33.7–35.3)
MCV RBC AUTO: 85 FL (ref 81.4–97.8)
MONOCYTES # BLD AUTO: 0.74 K/UL (ref 0–0.85)
MONOCYTES NFR BLD AUTO: 5.5 % (ref 0–13.4)
NEUTROPHILS # BLD AUTO: 10.01 K/UL (ref 1.82–7.42)
NEUTROPHILS NFR BLD: 74.4 % (ref 44–72)
NRBC # BLD AUTO: 0 K/UL
NRBC BLD-RTO: 0 /100 WBC
PLATELET # BLD AUTO: 246 K/UL (ref 164–446)
PMV BLD AUTO: 11.2 FL (ref 9–12.9)
POTASSIUM SERPL-SCNC: 4.4 MMOL/L (ref 3.6–5.5)
PROTHROMBIN TIME: 13.2 SEC (ref 12–14.6)
RBC # BLD AUTO: 6.05 M/UL (ref 4.7–6.1)
SARS-COV-2 RNA RESP QL NAA+PROBE: NOTDETECTED
SODIUM SERPL-SCNC: 140 MMOL/L (ref 135–145)
SPECIMEN SOURCE: NORMAL
WBC # BLD AUTO: 13.5 K/UL (ref 4.8–10.8)

## 2020-08-11 PROCEDURE — 92610 EVALUATE SWALLOWING FUNCTION: CPT

## 2020-08-11 PROCEDURE — 99285 EMERGENCY DEPT VISIT HI MDM: CPT

## 2020-08-11 PROCEDURE — 80048 BASIC METABOLIC PNL TOTAL CA: CPT

## 2020-08-11 PROCEDURE — 99220 PR INITIAL OBSERVATION CARE,LEVL III: CPT | Performed by: HOSPITALIST

## 2020-08-11 PROCEDURE — G0378 HOSPITAL OBSERVATION PER HR: HCPCS

## 2020-08-11 PROCEDURE — 36415 COLL VENOUS BLD VENIPUNCTURE: CPT

## 2020-08-11 PROCEDURE — U0003 INFECTIOUS AGENT DETECTION BY NUCLEIC ACID (DNA OR RNA); SEVERE ACUTE RESPIRATORY SYNDROME CORONAVIRUS 2 (SARS-COV-2) (CORONAVIRUS DISEASE [COVID-19]), AMPLIFIED PROBE TECHNIQUE, MAKING USE OF HIGH THROUGHPUT TECHNOLOGIES AS DESCRIBED BY CMS-2020-01-R: HCPCS

## 2020-08-11 PROCEDURE — 700102 HCHG RX REV CODE 250 W/ 637 OVERRIDE(OP): Performed by: HOSPITALIST

## 2020-08-11 PROCEDURE — A9270 NON-COVERED ITEM OR SERVICE: HCPCS | Performed by: HOSPITALIST

## 2020-08-11 PROCEDURE — C9803 HOPD COVID-19 SPEC COLLECT: HCPCS | Performed by: EMERGENCY MEDICINE

## 2020-08-11 PROCEDURE — 43762 RPLC GTUBE NO REVJ TRC: CPT

## 2020-08-11 PROCEDURE — 700111 HCHG RX REV CODE 636 W/ 250 OVERRIDE (IP): Performed by: HOSPITALIST

## 2020-08-11 PROCEDURE — 85025 COMPLETE CBC W/AUTO DIFF WBC: CPT

## 2020-08-11 PROCEDURE — 700105 HCHG RX REV CODE 258: Performed by: HOSPITALIST

## 2020-08-11 PROCEDURE — 85610 PROTHROMBIN TIME: CPT

## 2020-08-11 RX ORDER — ACETAMINOPHEN 325 MG/1
650 TABLET ORAL EVERY 6 HOURS PRN
Status: DISCONTINUED | OUTPATIENT
Start: 2020-08-11 | End: 2020-08-12 | Stop reason: HOSPADM

## 2020-08-11 RX ORDER — LEVETIRACETAM 500 MG/1
500 TABLET ORAL DAILY
Status: DISCONTINUED | OUTPATIENT
Start: 2020-08-11 | End: 2020-08-12 | Stop reason: HOSPADM

## 2020-08-11 RX ORDER — PROCHLORPERAZINE EDISYLATE 5 MG/ML
5-10 INJECTION INTRAMUSCULAR; INTRAVENOUS EVERY 4 HOURS PRN
Status: DISCONTINUED | OUTPATIENT
Start: 2020-08-11 | End: 2020-08-12 | Stop reason: HOSPADM

## 2020-08-11 RX ORDER — MONTELUKAST SODIUM 10 MG/1
10 TABLET ORAL DAILY
Status: DISCONTINUED | OUTPATIENT
Start: 2020-08-11 | End: 2020-08-12 | Stop reason: HOSPADM

## 2020-08-11 RX ORDER — SODIUM CHLORIDE 9 MG/ML
INJECTION, SOLUTION INTRAVENOUS CONTINUOUS
Status: DISCONTINUED | OUTPATIENT
Start: 2020-08-11 | End: 2020-08-12 | Stop reason: HOSPADM

## 2020-08-11 RX ORDER — ONDANSETRON 2 MG/ML
4 INJECTION INTRAMUSCULAR; INTRAVENOUS EVERY 4 HOURS PRN
Status: DISCONTINUED | OUTPATIENT
Start: 2020-08-11 | End: 2020-08-12 | Stop reason: HOSPADM

## 2020-08-11 RX ORDER — ONDANSETRON 4 MG/1
4 TABLET, ORALLY DISINTEGRATING ORAL EVERY 4 HOURS PRN
Status: DISCONTINUED | OUTPATIENT
Start: 2020-08-11 | End: 2020-08-12 | Stop reason: HOSPADM

## 2020-08-11 RX ORDER — MIRTAZAPINE 15 MG/1
15 TABLET, ORALLY DISINTEGRATING ORAL
Status: DISCONTINUED | OUTPATIENT
Start: 2020-08-11 | End: 2020-08-12 | Stop reason: HOSPADM

## 2020-08-11 RX ORDER — PROMETHAZINE HYDROCHLORIDE 25 MG/1
12.5-25 SUPPOSITORY RECTAL EVERY 4 HOURS PRN
Status: DISCONTINUED | OUTPATIENT
Start: 2020-08-11 | End: 2020-08-12 | Stop reason: HOSPADM

## 2020-08-11 RX ORDER — PROPRANOLOL HYDROCHLORIDE 20 MG/1
10 TABLET ORAL 3 TIMES DAILY
Status: DISCONTINUED | OUTPATIENT
Start: 2020-08-11 | End: 2020-08-12 | Stop reason: HOSPADM

## 2020-08-11 RX ORDER — LABETALOL HYDROCHLORIDE 5 MG/ML
10 INJECTION, SOLUTION INTRAVENOUS EVERY 4 HOURS PRN
Status: DISCONTINUED | OUTPATIENT
Start: 2020-08-11 | End: 2020-08-12 | Stop reason: HOSPADM

## 2020-08-11 RX ORDER — VITAMIN B COMPLEX
1000 TABLET ORAL DAILY
Status: DISCONTINUED | OUTPATIENT
Start: 2020-08-11 | End: 2020-08-12 | Stop reason: HOSPADM

## 2020-08-11 RX ADMIN — MONTELUKAST 10 MG: 10 TABLET, FILM COATED ORAL at 21:05

## 2020-08-11 RX ADMIN — LEVETIRACETAM 500 MG: 500 TABLET ORAL at 21:04

## 2020-08-11 RX ADMIN — MELATONIN 1000 UNITS: at 15:54

## 2020-08-11 RX ADMIN — SODIUM CHLORIDE: 9 INJECTION, SOLUTION INTRAVENOUS at 13:19

## 2020-08-11 RX ADMIN — PROPRANOLOL HYDROCHLORIDE 10 MG: 20 TABLET ORAL at 15:54

## 2020-08-11 RX ADMIN — MIRTAZAPINE 15 MG: 15 TABLET, ORALLY DISINTEGRATING ORAL at 21:05

## 2020-08-11 RX ADMIN — PROPRANOLOL HYDROCHLORIDE 10 MG: 20 TABLET ORAL at 21:04

## 2020-08-11 RX ADMIN — SODIUM CHLORIDE: 9 INJECTION, SOLUTION INTRAVENOUS at 23:24

## 2020-08-11 ASSESSMENT — LIFESTYLE VARIABLES
TOTAL SCORE: 0
AVERAGE NUMBER OF DAYS PER WEEK YOU HAVE A DRINK CONTAINING ALCOHOL: 0
HAVE YOU EVER FELT YOU SHOULD CUT DOWN ON YOUR DRINKING: NO
TOTAL SCORE: 0
HAVE PEOPLE ANNOYED YOU BY CRITICIZING YOUR DRINKING: NO
HOW MANY TIMES IN THE PAST YEAR HAVE YOU HAD 5 OR MORE DRINKS IN A DAY: 0
ON A TYPICAL DAY WHEN YOU DRINK ALCOHOL HOW MANY DRINKS DO YOU HAVE: 0
EVER FELT BAD OR GUILTY ABOUT YOUR DRINKING: NO
EVER HAD A DRINK FIRST THING IN THE MORNING TO STEADY YOUR NERVES TO GET RID OF A HANGOVER: NO
CONSUMPTION TOTAL: NEGATIVE
ALCOHOL_USE: NO
TOTAL SCORE: 0

## 2020-08-11 ASSESSMENT — COGNITIVE AND FUNCTIONAL STATUS - GENERAL
DRESSING REGULAR UPPER BODY CLOTHING: TOTAL
MOVING FROM LYING ON BACK TO SITTING ON SIDE OF FLAT BED: A LOT
HELP NEEDED FOR BATHING: TOTAL
MOVING TO AND FROM BED TO CHAIR: UNABLE
DRESSING REGULAR UPPER BODY CLOTHING: A LOT
DAILY ACTIVITIY SCORE: 6
MOBILITY SCORE: 6
DRESSING REGULAR LOWER BODY CLOTHING: A LOT
MOVING FROM LYING ON BACK TO SITTING ON SIDE OF FLAT BED: UNABLE
DAILY ACTIVITIY SCORE: 10
CLIMB 3 TO 5 STEPS WITH RAILING: TOTAL
WALKING IN HOSPITAL ROOM: TOTAL
TURNING FROM BACK TO SIDE WHILE IN FLAT BAD: UNABLE
TOILETING: TOTAL
STANDING UP FROM CHAIR USING ARMS: A LOT
TURNING FROM BACK TO SIDE WHILE IN FLAT BAD: A LOT
SUGGESTED CMS G CODE MODIFIER MOBILITY: CN
WALKING IN HOSPITAL ROOM: TOTAL
SUGGESTED CMS G CODE MODIFIER DAILY ACTIVITY: CN
STANDING UP FROM CHAIR USING ARMS: TOTAL
HELP NEEDED FOR BATHING: TOTAL
SUGGESTED CMS G CODE MODIFIER DAILY ACTIVITY: CL
EATING MEALS: TOTAL
TOILETING: TOTAL
EATING MEALS: A LOT
MOVING TO AND FROM BED TO CHAIR: A LOT
PERSONAL GROOMING: TOTAL
PERSONAL GROOMING: A LOT
DRESSING REGULAR LOWER BODY CLOTHING: TOTAL

## 2020-08-11 ASSESSMENT — PATIENT HEALTH QUESTIONNAIRE - PHQ9
SUM OF ALL RESPONSES TO PHQ9 QUESTIONS 1 AND 2: 0
2. FEELING DOWN, DEPRESSED, IRRITABLE, OR HOPELESS: NOT AT ALL
1. LITTLE INTEREST OR PLEASURE IN DOING THINGS: NOT AT ALL

## 2020-08-11 ASSESSMENT — ENCOUNTER SYMPTOMS
POOR JUDGMENT: 1
DIFFICULTY THINKING: 1

## 2020-08-11 ASSESSMENT — FIBROSIS 4 INDEX: FIB4 SCORE: 0.26

## 2020-08-11 NOTE — ASSESSMENT & PLAN NOTE
Patient had an AVM malformation about a year ago that ruptured.  Patient had a hemorrhagic stroke  He remains at this point on amantadine as well as Keppra.  Since the patient cannot take medications by mouth at the moment we will put him on IV Keppra.

## 2020-08-11 NOTE — ASSESSMENT & PLAN NOTE
Per the mom he is not 100% tube feed dependent at all.  Apparently takes most of his nutrition by mouth now.  His liquids however are almost 100% dependent on the G-tube.  Medications can be given by mouth.  For now patient will be n.p.o. until surgery.

## 2020-08-11 NOTE — H&P
Hospital Medicine History & Physical Note    Date of Service  2020    Primary Care Physician  Nirmala Man M.D.    Consultants  Gastroenterology Dr. Gee    Code Status  Full Code    Chief Complaint  No chief complaint on file.      History of Presenting Illness  24 y.o. 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  The patient had a stroke about 1 year ago.  The patient now has a G-tube that is been dislodged.  The mom says it was functioning around midnight last night this morning however it was completely out.  She feels he pulled it out.  The patient at this point had the G-tube attempted to be repositioned in the emergency room unsuccessfully.  The patient at this point will need to go to surgery through gastroenterology to have the G-tube repositioned.    Review of Systems  Review of Systems   Unable to perform ROS: Medical condition       Past Medical History   has a past medical history of Stroke (HCC).    Surgical History  History of G-tube placement    Family History  family history includes Hypertension in his brother and mother.     Social History   reports that he has never smoked. He has never used smokeless tobacco. He reports that he does not drink alcohol or use drugs.    Allergies  Allergies   Allergen Reactions   • Vancomycin Swelling   • Other Misc Rash     Allergic to name band.       Medications  Prior to Admission Medications   Prescriptions Last Dose Informant Patient Reported? Taking?   Cholecalciferol (VITAMIN D3) 5000 UNIT/ML Liquid UNK at [facility] Patient's Home Pharmacy Yes No   Si mL by Gastric Tube route every day.   amantadine (SYMMETREL) 50 MG/5ML Syrup UNK at [facility] Patient's Home Pharmacy No No   Sig: 10 mL by Per G Tube route 2 Times a Day.   bisacodyl (DULCOLAX) 10 MG Suppos UNK at [facility] Patient's Home Pharmacy No No   Sig: Insert 1 Suppository in rectum as needed (no BM for 3 days).   levETIRAcetam (KEPPRA) 500 MG  Tab UNK at [facility] Patient's Home Pharmacy No No   Si Tab by Enteral Tube route every day at 6 PM. PM only   mirtazapine (REMERON) 15 MG Tab UNK at [facility] Patient's Home Pharmacy No No   Si Tab by Per G Tube route every bedtime.   montelukast (SINGULAIR) 10 MG Tab UNK at [facility] Patient's Home Pharmacy No No   Si Tab by Per G Tube route every day.   polyethylene glycol/lytes (MIRALAX) Pack UNK at [facility] Patient's Home Pharmacy No No   Sig: Take 1 Packet by mouth every day. through G tube   propranolol (INDERAL) 20 MG Tab UNK at [facility] Patient's Home Pharmacy No No   Sig: TAKE 1 TABLET BY MOUTH THREE TIMES DAILY FOR 90 DAYS      Facility-Administered Medications: None       Physical Exam  Temp:  [36.1 °C (96.9 °F)] 36.1 °C (96.9 °F)  Pulse:  [91-97] 92  Resp:  [18-19] 19  BP: (118-133)/(77-78) 118/78  SpO2:  [94 %-97 %] 96 %    Physical Exam  Vitals signs and nursing note reviewed. Exam conducted with a chaperone present.   Constitutional:       Appearance: Normal appearance. He is well-developed. He is not ill-appearing or diaphoretic.   HENT:      Head: Normocephalic and atraumatic.      Right Ear: External ear normal.      Left Ear: External ear normal.      Nose: Nose normal.      Mouth/Throat:      Mouth: Mucous membranes are moist.      Pharynx: Oropharynx is clear.   Eyes:      Extraocular Movements: Extraocular movements intact.      Conjunctiva/sclera: Conjunctivae normal.      Pupils: Pupils are equal, round, and reactive to light.   Neck:      Musculoskeletal: Normal range of motion and neck supple.      Thyroid: No thyromegaly.      Vascular: No JVD.   Cardiovascular:      Rate and Rhythm: Normal rate and regular rhythm.      Pulses: Normal pulses.      Heart sounds: Normal heart sounds.   Pulmonary:      Effort: Pulmonary effort is normal.      Breath sounds: Normal breath sounds.   Chest:      Chest wall: No tenderness.   Abdominal:      General: Abdomen is flat. Bowel sounds are normal. There is no  distension.      Palpations: Abdomen is soft. There is no mass.      Tenderness: There is no abdominal tenderness. There is no guarding or rebound.      Comments: Patient has a G-tube that has now been pulled.  There is hemorrhagic area in the middle of the G-tube site.  There is no redness on the skin there is no purulent discharge around the area.   Musculoskeletal: Normal range of motion.         General: Tenderness present.   Lymphadenopathy:      Cervical: No cervical adenopathy.   Skin:     General: Skin is warm and dry.      Capillary Refill: Capillary refill takes 2 to 3 seconds.      Findings: Erythema present. No rash.   Neurological:      Mental Status: He is alert. He is disoriented and confused.      GCS: GCS eye subscore is 4. GCS verbal subscore is 1. GCS motor subscore is 6.      Cranial Nerves: No cranial nerve deficit.      Deep Tendon Reflexes: Reflexes are normal and symmetric.   Psychiatric:         Attention and Perception: He is inattentive.         Mood and Affect: Mood is anxious. Affect is labile.         Speech: Speech is delayed (Only able to respond yes no).         Behavior: Behavior is agitated.         Cognition and Memory: Cognition is impaired. Memory is impaired. He exhibits impaired recent memory and impaired remote memory.         Judgment: Judgment is impulsive.         Laboratory:          No results for input(s): ALTSGPT, ASTSGOT, ALKPHOSPHAT, TBILIRUBIN, DBILIRUBIN, GAMMAGT, AMYLASE, LIPASE, ALB, PREALBUMIN, GLUCOSE in the last 72 hours.      No results for input(s): NTPROBNP in the last 72 hours.      No results for input(s): TROPONINT in the last 72 hours.    Imaging:  No orders to display         Assessment/Plan:  I anticipate this patient is appropriate for observation status at this time.    * Feeding tube dysfunction  Assessment & Plan  Patient is apparently had a feeding tube for the past year.  It was still functioning yesterday around midnight when the mom fed him  through it.  This morning it is dislodged.  In the emergency room it was not possible to be placed back and through regular manipulation.  The patient will be going to the OR through gastroenterology to have the G-tube repositioned.  For right now the patient will be n.p.o. we will start him on fluids and IV Keppra for seizure management.    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  Since then he is getting continued cognitive behavior modification.  Patient is receiving amantadine for cognitive modification after the hemorrhagic stroke.    Dysphagia following nontraumatic intracerebral hemorrhage- (present on admission)  Assessment & Plan  Per the mom he is not 100% tube feed dependent at all.  Apparently takes most of his nutrition by mouth now.  His liquids however are almost 100% dependent on the G-tube.  Medications can be given by mouth.  For now patient will be n.p.o. until surgery.    History of hemorrhagic stroke with residual hemiparesis (HCC)  Assessment & Plan  Patient had an AVM malformation about a year ago that ruptured.  Patient had a hemorrhagic stroke  He remains at this point on amantadine as well as Keppra.  Since the patient cannot take medications by mouth at the moment we will put him on IV Keppra.

## 2020-08-11 NOTE — THERAPY
"Speech Language Pathology   Clinical Swallow Evaluation     Patient Name: Rey Medina  AGE:  24 y.o., SEX:  male  Medical Record #: [id number]  Today's Date: 8/11/2020     Precautions  Precautions: (P) Swallow Precautions ( See Comments)    Assessment    23 YO male admitted 8/11/2020 2/2 dislodged G-tube. PMHx: hemorrhagic stroke 2/2 AV malformation with residual hemiparesis, asthma, trach. CMHx: feeding tube dysfunction, cognitive and neurobeahvioral dysfunction following brain injury, dysphagia following nontraumatic intracerebal hermorhage. MBSS completed 3/4/2020 at [facility] showed one instance of silent aspiration on thins from fruit cup. Minced and moist with thin liquid (straws okay) diet was recommended.     Pt seen this date for clinical swallow evaluation 2/2 dislodged G-tube. MD requesting swallow evaluation to determine safety with thin liquids prior to replacement of G-tube. Oral mech exam revealed restricted retraction of left labial structures. Dentition intact. Pt presents with small cough at baseline, pt's mother reports this is baseline and phlegm \"gets stuck every once in a while\".    PO trials of ice, MTL by cup and straw, puree, minced and moist, regular and thins via cup and straw assessed. Hyolaryngeal elevation palpated as complete, timely to 2-3 second delayed initiation of swallow appreciated and vocal quality remained clear throughout evaluation. Pt presents with intermittent poor coordination of breath and swallow pattern with liquids. Throat clear appreciated in 1/10 trials of MTL by straw, vocal quality remained clear. Pt demonstrated functional mastication of minced and moist solids with no oral residue appreciated. Mild oral residue appreciated with trials of regular, cleared with min cues for subsequent swallows. Immediate cough appreciated in 1/4 trials of thins by cupsip, vocal quality remained clear. Immediate cough appreciated in 4/4 trials of thins by straw, which is " concerning for possible penetration aspiration. No other s/sx of aspiration appreciated with any other consistencies consumed.    Recommend initiation of a SB6/MT2 diet with adherence to the following: upright at 90* for PO, straws okay, 1:1 feed, crush and float meds, Pt would benefit from a diagnostic swallow study to rule out/confirm aspiration and appropriateness for a more liberalized diet texture.     Plan    Recommend Speech Therapy 3 times per week until therapy goals are met for the following treatments:  Dysphagia Training and Patient / Family / Caregiver Education.    Subjective  Pt was awake, alert, followed basic directions, able to answer simple questions. Pt's mother at bedside providing medical history, reporting pt eating regular solids at home, but with limited intake of liquids. Pt's mother reporting pt received majority of liquids via PEG and requires 1:1 feeding assistance. Pt and pt's mother both expressing desire NOT to have G-tube replaced if possible.     Objective       08/11/20 1255   Oral Motor Eval    Is Patient Able to Complete Oral Motor Eval Yes but Impaired   Labial Function   Labial Structure At Rest Within Functional Limits   Labial Vowel Production / I /, / U / Minimal  (restricted retraction of left labial structures)   Labial Sequence / I /, / U / Minimal   Frown, Pucker Within Functional Limits   Lingual Function   Lingual Structure At Rest Within Functional Limits   Lingual Protrude Within Functional Limits   Elevate Outside Mouth Within Functional Limits   Lateralization No Impairment Right;No Impairment Left   / Pa / 5X's Moderate   / Ta / 5X's Moderate   / Ka / 5X's Moderate   Jaw   Jaw Structure At Rest Within Functional Limits   Bite ([place]) Within Functional Limits   Chew (Rotary) Within Functional Limits   Jaw Open / Resist Within Functional Limits   Jaw Close / Resist Within Functional Limits   Velar Function   Velar Structure At Rest Within Functional Limits   / A  "/ Prolonged Within Functional Limits   Gag / Palatal Reflex Not Tested   Laryngeal Function   Voice Quality Within Functional Limits   Volutional Cough Within Functional Limits   Excursion Upon Swallow Complete   Oral Food Presentation   Ice Chips Within Functional Limits   Single Swallow Mildly Thick (2) - (Nectar Thick)  Within Functional Limits   Serial Swallow Mildly Thick (2) - (Nectar Thick) Minimal  (throat clear 1/10)   Single Swallow Thin (0) Minimal   Serial Swallow Thin (0) Moderate  (immediate cough with straws, clear vocal quality)   Pureed (4) Within Functional Limits   Minced & Moist (5) - (Dysphagia II) Within Functional Limits   Regular (7) Minimal  (mild oral residue, cleared with cues for subsequent swallow)   Self Feeding Needs Assistance   Tracheostomy   Tracheostomy  No   Dysphagia Strategies / Recommendations   Strategies / Interventions Recommended (Yes / No) Yes   Compensatory Strategies Strict 1:1 Feeding;Head of Bed 90 Degrees During Eating / Drinking;Alternate Solids / Liquids;Single Sips / Bites   Diet / Liquid Recommendation Mildly Thick (2) - (Nectar Thick);Soft & Bite-Sized (6) - (Dysphagia III)   Medication Administration  Crush all Medications in Puree   Therapy Interventions Dysphagia Therapy By Speech Language Pathologist;MBSS Evaluation   Dysphagia Rating   Nutritional Liquid Intake Rating Scale Thickened beverages (mildly thick unless otherwise specified)   Nutritional Food Intake Rating Scale Total oral diet with multiple consistencies without special preparation but with specific food limitations   Patient / Family Goals   Patient / Family Goal #1 \"no more G tube\"   Short Term Goals   Short Term Goal # 1 Pt will consume a diet of SB6/MT2 with no s/sx of aspiration and min cues.         "

## 2020-08-11 NOTE — ASSESSMENT & PLAN NOTE
Patient is apparently had a feeding tube for the past year.  It was still functioning yesterday around midnight when the mom fed him through it.  This morning it is dislodged.  In the emergency room it was not possible to be placed back and through regular manipulation.  The patient will be going to the OR through gastroenterology to have the G-tube repositioned.  For right now the patient will be n.p.o. we will start him on fluids and IV Keppra for seizure management.

## 2020-08-11 NOTE — CONSULTS
DATE OF SERVICE:  08/11/2020    GASTROENTEROLOGY CONSULTATION    REFERRING PHYSICIAN:  Robert Mcintosh MD    PRIMARY CARE PHYSICIAN:  Nirmala Man MD    PRIMARY GASTROENTEROLOGIST:  Leodan Iglesias MD    The patient is a 24-year-old male with a history of hemorrhagic stroke   secondary to a ruptured AVM.  He has a history of a  shunt and a previous   tracheostomy.  He had a PEG tube placed while he was in the [facility] in   October and I believe it has been replaced several times.  Most recently it   was replaced by Dr. Iglesias 06/23 where he placed a 20-French Arsen-Key 4 cm.    The patient's mother is at the bedside providing history.  She believes some   time last night he accidentally pulled it out.  She did not attempt to replace   the PEG tube.  We estimate it has been at least 9 hours since the PEG has   been out.  Dr. Mcintosh tried replacing with a 20-French Bard Tri-Funnel and was   unsuccessful.    The mother states that he is able to eat solid food without any difficulties.    She has always given him fluids through his PEG tube to keep him well   hydrated.  She believes he may be able to drink his fluids but that sometimes   he is not as cooperative.    ALLERGIES:  VANCOMYCIN.    MEDICATIONS PRIOR TO ADMISSION:  Cholecalciferol 5000 units per mL liquid   daily; amantadine 50 mg per 5 mL syrup 10 mg per G-tube twice daily; bisacodyl   10 mg suppository per rectum as needed; levetiracetam, which is Keppra, 500   mg via G-tube every day at 6:00 p.m.; mirtazapine 15 mg per G-tube every   evening; montelukast 10 mg per G-tube every day; MiraLax 1 packet every day   via G-tube; propranolol 20 mg by mouth 3 times daily.    SURGERIES:   shunt, tracheostomy, PEG tube placement.    MEDICAL ILLNESSES:  1. Hemorrhagic stroke due to ruptured AVM.  2. Asthma.    SOCIAL HISTORY:  He is a nonsmoker, nondrinker.  His mother is at the bedside.    FAMILY HISTORY:  Notable for hypertension, brother and  mother.    REVIEW OF SYSTEMS:  Unable to obtain.    PHYSICAL EXAMINATION:  VITAL SIGNS:  Temperature 36.1, pulse 92, respirations 19, blood pressure   118/78.  GENERAL:  This is a 24-year-old  male lying in bed, who is well   developed, no acute distress.  HEENT:  Normocephalic and atraumatic.  Mucous membranes moist, Mallampati IV.    Sclerae are anicteric.  NECK:  Soft.  No adenopathy, no thyromegaly.  LUNGS:  Clear to auscultation.  CARDIOVASCULAR:  S1, S2, without murmur, gallop or rub.  ABDOMEN:  Bowel sounds are present.  Soft, nontender, no palpable masses.  His   PEG tube site is in the epigastrium.  There is scant amount of blood at the   site, but no induration, no purulent discharge.  MUSCULOSKELETAL:  He moves all of his extremities.  SKIN:  Smooth, moist, and warm.  NEUROLOGIC:  He does verbalize with sounds, but no distinct words.  He follows   commands and moves all extremities.  PSYCHIATRIC:  He is inattentive.  Affect is flat.    LABORATORY DATA:  No recent labs.    IMAGING:  None.    IMPRESSION:  1. Feeding tube malfunction.  2. Oropharyngeal dysphagia.  3. History of hemorrhagic stroke.    RECOMMENDATIONS:  1. The mother would really like me to see if speech pathology can evaluate him   for thin liquids swallowing today prior to taking him to the OR.  I have   placed that consult.  Her feeling is that he does very well with eating, but   if he could also take in fluids safely, we may not need to take him back to   the operating room today for PEG tube placement.  2. Our plan B is I will try to see if I can place a PEG into the same site,   looking both from within the stomach and possible dilation of the tract.  If I   am unsuccessful, the mother has asked me not to place a new PEG tube and she   would like to see how he does at home with liquids.  She realizes a PEG tube   always could be placed in the future if he is unsuccessful.  3. No anticoagulation.  4. We will give Ancef 2 g IV just  prior to the procedure in the OR.             ____________________________________     MD MISSAEL BERGERON / EDDIE    DD:  08/11/2020 10:45:54  DT:  08/11/2020 11:13:40    D#:  [id number]  Job#:  [id number]

## 2020-08-11 NOTE — ASSESSMENT & PLAN NOTE
Patient is unfortunately not able to communicate well due to his CVA that was caused by an AVM and it was hemorrhagic in the past about a year ago.  Since then he is getting continued cognitive behavior modification.  Patient is receiving amantadine for cognitive modification after the hemorrhagic stroke.

## 2020-08-11 NOTE — ED TRIAGE NOTES
Pt BIB mother for reinsertion of PEG tube that he pulled out while sleeping. No apparent distress. Pt has medical hx of ruptured AVM and hemorrhagic stroke in 2019, asthma. Lives at home with mother.

## 2020-08-11 NOTE — ED PROVIDER NOTES
"ED Provider Note    CHIEF COMPLAINT  Dislodged gastric tube    HPI  Rey Medina is a 24 y.o. male who presents for an accidentally dislodged gastric tube that occurred sometime overnight.  History of prior hemorrhagic stroke.  He lives with his mother.  No current abdominal pain fever or vomiting.  No recent illness.  History obtained from the mother and patient was able to answer yes no to questions.  His tube was originally placed in the [facility] and replaced by Dr. Harris fallon in [place].    REVIEW OF SYSTEMS  Pertinent positives include: Dislodged feeding tube.  Pertinent negatives include: Fever, cough, coronavirus, dyspnea, rash, swelling.  10+ systems reviewed and negative    PAST MEDICAL HISTORY  Past Medical History:   Diagnosis Date   • Stroke (HCC)        SOCIAL HISTORY  Lives with his mother.    CURRENT MEDICATIONS  Home Medications     Reviewed by Skinny Howe R.N. (Registered Nurse) on 08/11/20 at 0657  Med List Status: <None>   Medication Last Dose Status   amantadine (SYMMETREL) 50 MG/5ML Syrup  Active   Beneprotein Nutritional Supplement Pack  Active   bisacodyl (DULCOLAX) 10 MG Suppos  Active   Cholecalciferol (VITAMIN D3) 5000 UNIT/ML Liquid  Active   Incontinence Supply Disposable (ADVOCATE UNDERPADS) Misc  Active   Incontinence Supply Disposable (ASSURANCE FITTED BRIEF LARGE) Misc  Active   levETIRAcetam (KEPPRA) 500 MG Tab  Active   mirtazapine (REMERON) 15 MG Tab  Active   montelukast (SINGULAIR) 10 MG Tab  Active   polyethylene glycol/lytes (MIRALAX) Pack  Active   propranolol (INDERAL) 20 MG Tab  Active                ALLERGIES  Allergies   Allergen Reactions   • Vancomycin Swelling   • Other Misc Rash     Allergic to name band.       PHYSICAL EXAM  VITAL SIGNS: /77   Pulse 91   Temp 36.1 °C (96.9 °F) (Temporal)   Resp 19   Ht 1.702 m (5' 7\")   Wt 71.5 kg (157 lb 10.1 oz)   SpO2 97%   BMI 24.69 kg/m² . Reviewed and high normal blood pressure, " afebrile  Constitutional :  Well developed, Well nourished, well-appearing.   HNT: atraumatic, wearing a mask.  Trach site covered with gauze  Ears: external ears normal.  Eyes: pupils reactive without eye discharge nor conjunctival hyperemia.  Cardiovascular: Normal heart rate, regular S1-S2 without murmur, rub, gallop  Respiratory: Unlabored respirations with no cough, no rales, rhonchi, wheeze  Abdomen:  Soft, nontender, left upper quadrant G-tube site appears patent  Skin: Warm, dry, no erythema, no rash.   Musculoskeletal: no limb deformities.    LABORATORY: Coags requested by gastroenterology.  Results for orders placed or performed during the hospital encounter of 08/11/20   CBC WITH DIFFERENTIAL   Result Value Ref Range    WBC 13.5 (H) 4.8 - 10.8 K/uL    RBC 6.05 4.70 - 6.10 M/uL    Hemoglobin 17.1 14.0 - 18.0 g/dL    Hematocrit 51.4 42.0 - 52.0 %    MCV 85.0 81.4 - 97.8 fL    MCH 28.3 27.0 - 33.0 pg    MCHC 33.3 (L) 33.7 - 35.3 g/dL    RDW 42.4 35.9 - 50.0 fL    Platelet Count 246 164 - 446 K/uL    MPV 11.2 9.0 - 12.9 fL    Neutrophils-Polys 74.40 (H) 44.00 - 72.00 %    Lymphocytes 17.70 (L) 22.00 - 41.00 %    Monocytes 5.50 0.00 - 13.40 %    Eosinophils 1.00 0.00 - 6.90 %    Basophils 1.00 0.00 - 1.80 %    Immature Granulocytes 0.40 0.00 - 0.90 %    Nucleated RBC 0.00 /100 WBC    Neutrophils (Absolute) 10.01 (H) 1.82 - 7.42 K/uL    Lymphs (Absolute) 2.39 1.00 - 4.80 K/uL    Monos (Absolute) 0.74 0.00 - 0.85 K/uL    Eos (Absolute) 0.13 0.00 - 0.51 K/uL    Baso (Absolute) 0.14 (H) 0.00 - 0.12 K/uL    Immature Granulocytes (abs) 0.06 0.00 - 0.11 K/uL    NRBC (Absolute) 0.00 K/uL   Basic Metabolic Panel   Result Value Ref Range    Sodium 140 135 - 145 mmol/L    Potassium 4.4 3.6 - 5.5 mmol/L    Chloride 103 96 - 112 mmol/L    Co2 27 20 - 33 mmol/L    Glucose 91 65 - 99 mg/dL    Bun 8 8 - 22 mg/dL    Creatinine 0.63 0.50 - 1.40 mg/dL    Calcium 9.8 8.4 - 10.2 mg/dL    Anion Gap 10.0 7.0 - 16.0   Prothrombin  Time   Result Value Ref Range    PT 13.2 12.0 - 14.6 sec    INR 0.99 0.87 - 1.13   COVID/SARS CoV-2 PCR    Specimen: Nasopharyngeal; Respirate   Result Value Ref Range    COVID Order Status Received        INTERVENTIONS:  I attempted to replace the G-tube but the tract was closed and I could not advance the tube.    Case discussed with  who will arrange for G-tube replacement in the OR.    Case discussed with Dr. Sheridan hospitalist who will admit the patient to the hospital.    COURSE & MEDICAL DECISION MAKING  This patient presents with a dislodged gastrostomy tube that could not be replaced here in the ER.  Likely this is been out for 8 or 9 hours.  There is no evidence of peritonitis or clinical coronavirus infection.    PLAN:  Admission for new G-tube    CONDITION:  Fair    FINAL IMPRESSION:  1. Gastrostomy complication (HCC)          Electronically signed by: Robert Mcintosh M.D., 8/11/2020

## 2020-08-11 NOTE — CONSULTS
DATE OF SERVICE:  08/11/2020    GASTROENTEROLOGY CONSULTATION    REFERRING PHYSICIAN:  Robert Mcintosh MD    PRIMARY CARE PHYSICIAN:  Nirmala Man MD    PRIMARY GASTROENTEROLOGIST:  Leodan Iglesias MD    The patient is a 24-year-old male with a history of hemorrhagic stroke   secondary to a ruptured AVM.  He has a history of a  shunt and a previous   tracheostomy.  He had a PEG tube placed while he was in the [facility] in   October and I believe it has been replaced several times.  Most recently it   was replaced by Dr. Iglesias 06/23 where he placed a 20-French Arsen-Key 4 cm.    The patient's mother is at the bedside providing history.  She believes some   time last night he accidentally pulled it out.  She did not attempt to replace   the PEG tube.  We estimate it has been at least 9 hours since the PEG has   been out.  Dr. Mcintsoh tried replacing with a 20-French Bard Tri-Funnel and was   unsuccessful.    The mother states that he is able to eat solid food without any difficulties.    She has always given him fluids through his PEG tube to keep him well   hydrated.  She believes he may be able to drink his fluids, but that sometimes   he is not as cooperative.    ALLERGIES:  VANCOMYCIN.    MEDICATIONS PRIOR TO ADMISSION:  Cholecalciferol 5000 units per mL liquid   daily, amantadine 50 mg per 5 mL syrup 10 mg per G-tube twice daily, bisacodyl   10 mg suppository per rectum as needed, levetiracetam which is Keppra 500 mg   via G-tube every day at 6:00 p.m., mirtazapine 15 mg per G-tube every evening,   Montelukast 10 mg per G-tube every day, MiraLax 1 packet every day via   G-tube, propranolol 20 mg by mouth 3 times daily.    SURGERIES:   shunt, tracheostomy, PEG tube placement.    MEDICAL ILLNESSES:  1.  Hemorrhagic stroke due to ruptured AVM.  2.  Asthma.    SOCIAL HISTORY:  He is a nonsmoker, nondrinker.  His mother is at the bedside.    FAMILY HISTORY:  Notable for hypertension, brother and  mother.    REVIEW OF SYSTEMS:  Unable to obtain.    PHYSICAL EXAMINATION:  VITAL SIGNS:  Temperature 36.1, pulse 92, respirations 19, blood pressure   118/78.  GENERAL:  This is a 24-year-old  male lying in bed, who is well   developed, no acute distress.  HEENT:  Normocephalic and atraumatic.  Mucous membranes moist, Mallampati IV.    Sclerae are anicteric.  NECK:  Soft.  No adenopathy, no thyromegaly.  LUNGS:  Clear to auscultation.  CARDIOVASCULAR:  S1, S2, without murmur, gallop or rub.  ABDOMEN:  Bowel sounds are present.  Soft, nontender, no palpable masses.  His   PEG tube site is in the epigastrium.  There is scant amount of blood at the   site, but no induration, no purulent discharge.  MUSCULOSKELETAL:  He moves all of his extremities.  SKIN:  Smooth, moist, and warm.  NEUROLOGIC:  He does verbalize with sounds, but no distinct words.  He follows   commands and moves all extremities.  PSYCHIATRIC:  He is inattentive.  Affect is flat.    LABORATORY DATA:  No recent labs.    IMAGING:  None.    IMPRESSION:  1.  Feeding tube malfunction.  2.  Oropharyngeal dysphagia.  3.  History of hemorrhagic stroke.    RECOMMENDATIONS:  1.  The mother would really like me to see if speech pathology can evaluate   him for thin liquids swallowing today prior to taking him to the OR.  I have   placed that consult.  Her feeling is that he does very well with eating, but   if he could also take in fluids safely, we may not need to take him back to   the operating room today for PEG tube placement.  2.  Our plan B is I will try to see if I can place a PEG into the same site   looking both from within the stomach and possible dilation of the tract.  If I   am unsuccessful, the mother has asked me not to place a new PEG tube and she   would like to see how he does at home with liquids.  She realizes a PEG tube   always could be placed in the future if he is unsuccessful.  3.  No anticoagulation.  4.  We will give Ancef 2 g IV  just prior to the procedure in the OR.       ____________________________________     MD MISSAEL BERGERON / EDDIE    DD:  08/11/2020 10:45:54  DT:  08/11/2020 11:13:40    D#:  [id number]  Job#:  [id number]

## 2020-08-12 ENCOUNTER — HOME CARE VISIT (OUTPATIENT)
Dept: HOME HEALTH SERVICES | Facility: HOME HEALTHCARE | Age: 25
End: 2020-08-12
Payer: MEDICAID

## 2020-08-12 ENCOUNTER — APPOINTMENT (OUTPATIENT)
Dept: RADIOLOGY | Facility: MEDICAL CENTER | Age: 25
End: 2020-08-12
Attending: INTERNAL MEDICINE
Payer: MEDICAID

## 2020-08-12 VITALS
BODY MASS INDEX: 24.74 KG/M2 | DIASTOLIC BLOOD PRESSURE: 72 MMHG | RESPIRATION RATE: 18 BRPM | TEMPERATURE: 98 F | OXYGEN SATURATION: 100 % | HEIGHT: 67 IN | WEIGHT: 157.63 LBS | HEART RATE: 98 BPM | SYSTOLIC BLOOD PRESSURE: 148 MMHG

## 2020-08-12 PROCEDURE — 302043 TAPE, HYPAFIX: Performed by: INTERNAL MEDICINE

## 2020-08-12 PROCEDURE — G0378 HOSPITAL OBSERVATION PER HR: HCPCS

## 2020-08-12 PROCEDURE — A9270 NON-COVERED ITEM OR SERVICE: HCPCS | Performed by: HOSPITALIST

## 2020-08-12 PROCEDURE — 92611 MOTION FLUOROSCOPY/SWALLOW: CPT

## 2020-08-12 PROCEDURE — 700102 HCHG RX REV CODE 250 W/ 637 OVERRIDE(OP): Performed by: HOSPITALIST

## 2020-08-12 PROCEDURE — 74230 X-RAY XM SWLNG FUNCJ C+: CPT

## 2020-08-12 PROCEDURE — 99217 PR OBSERVATION CARE DISCHARGE: CPT | Performed by: INTERNAL MEDICINE

## 2020-08-12 RX ADMIN — PROPRANOLOL HYDROCHLORIDE 10 MG: 20 TABLET ORAL at 08:29

## 2020-08-12 ASSESSMENT — PAIN SCALES - WONG BAKER: WONGBAKER_NUMERICALRESPONSE: DOESN'T HURT AT ALL

## 2020-08-12 NOTE — DISCHARGE PLANNING
Received Choice form at 1315  Agency/Facility Name: [facility]    Referral sent per Choice form at 1345.

## 2020-08-12 NOTE — DISCHARGE PLANNING
Anticipated Discharge Disposition: Home w/ HH    Action: Per rounds, patient is able to go home. MD to put in readmit order for HH. ROGER faxed to [facility].     Barriers to Discharge: None    Plan: Patient okay to d/c home.

## 2020-08-12 NOTE — DISCHARGE SUMMARY
Discharge Summary    CHIEF COMPLAINT ON ADMISSION  Feeding tube dysfunction    Reason for Admission  Tube Insertion     Admission Date  8/11/2020    CODE STATUS  Full Code    HPI & HOSPITAL COURSE  This is a 24 y.o. male with a history of a ruptured AVM approximately 1 year ago complicated by hemorrhagic CVA resulting in cognitive dysfunction and dysphasia, requiring PEG tube, admitted for PEG tube dysfunction.  Since developing dysphasia 1 year ago, he has had dysphasia and inability to maintain his caloric needs with oral intake.  His mom primarily uses his G-tube for fluids.  It has been functioning well however, inadvertently was removed therefore he was brought to the hospital for replacement.  GI consultants was consulted however, decision was made by the patient and his mother to reevaluate his swallowing ability and hopefully avoid replacement of his G-tube.  He was evaluated by speech therapy at the bedside and with a barium swallow and they recommended speech therapy 3 times a week in addition to a p.o. diet of minced, moist solids, mildly thick liquids and meds crushed in purée.  STRICT adherence to safe swallow precautions and 1:1 feeding.    His PEG site was cleaned by wound care and he was given supplies for ongoing cleaning on discharge       Therefore, he is discharged in good and stable condition to home with organized home healthcare and close outpatient follow-up.    The patient recovered much more quickly than anticipated on admission.    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 *      FOLLOW UP  Future Appointments   Date Time Provider Department Center   8/13/2020 10:00 AM MARQUIS Zabala [facility] None   8/13/2020 11:30 AM Adrianne Friedmano, PT [facility] None   8/13/2020 11:30 AM Norma Mathews, MS,OTR/L [facility] None   8/17/2020 To Be Determined MARQUIS Zabala [facility] None   8/18/2020 To Be Determined Norma Mathews MS,OTR/L [facility] None   8/18/2020 11:30 AM Adrianne CALDWELL Donavon, PT [facility] None   8/19/2020 To Be Determined MARQUIS Zabala [facility] None   8/20/2020 To Be Determined Norma Mathews MS,OTR/L [facility] None   8/20/2020 11:30 AM Adrianne CALDWELL Donavon, PT [facility] None   8/31/2020  2:00 PM Everette Romero M.D. [facility] None   10/8/2020  3:15 PM Mandy Luke D.O. [facility] None     No follow-up provider specified.    MEDICATIONS ON DISCHARGE     Medication List      CONTINUE taking these medications      Instructions   amantadine 50 MG/5ML Syrp  Commonly known as: SYMMETREL   Doctor's comments: 0800 and 1200  10 mL by Per G Tube route 2 Times a Day.  Dose: 100 mg     bisacodyl 10 MG Supp  Commonly known as: DULCOLAX   Insert 1 Suppository in rectum as needed (no BM for 3 days).  Dose: 10 mg     levETIRAcetam 500 MG Tabs  Commonly known as: KEPPRA   1 Tab by Enteral Tube route every day at 6 PM. PM only  Dose: 500 mg     mirtazapine 15 MG Tabs  Commonly known as: REMERON   1 Tab by Per G Tube route every bedtime.  Dose: 15 mg     montelukast 10 MG Tabs  Commonly known as: SINGULAIR   1 Tab by Per G Tube route every day.  Dose: 10 mg     polyethylene glycol/lytes 17 g Pack  Commonly known as: MIRALAX   Take 1 Packet by mouth every day. through G tube  Dose: 17 g     propranolol 20 MG Tabs  Commonly known as: INDERAL   TAKE 1 TABLET BY MOUTH THREE TIMES DAILY FOR 90 DAYS     Vitamin D3 5000 UNIT/ML Liqd   1 mL by Gastric Tube route every day.  Dose: 1 mL            Allergies  Allergies   Allergen Reactions   • Vancomycin Swelling   • Other  Misc Rash     Allergic to name band.       DIET  Orders Placed This Encounter   Procedures   • Diet Order Regular     Standing Status:   Standing     Number of Occurrences:   1     Order Specific Question:   Diet:     Answer:   Regular [1]     Order Specific Question:   Texture Modifier     Answer:   Level 6 - Soft & Bite Sized (Dysphagia 3)     Comments:   straws okay     Order Specific Question:   Liquid level     Answer:   Level 2 - Mildly Thick     Order Specific Question:   Miscellaneous modifications:     Answer:   SLP - 1:1 Supervision by Nursing [21]     Order Specific Question:   Miscellaneous modifications:     Answer:   SLP - Deliver to Nursing Station [22]       ACTIVITY  As tolerated.  Weight bearing as tolerated    CONSULTATIONS  SLP therapy    PROCEDURES  Barium Swallow    LABORATORY  Lab Results   Component Value Date    SODIUM 140 08/11/2020    POTASSIUM 4.4 08/11/2020    CHLORIDE 103 08/11/2020    CO2 27 08/11/2020    GLUCOSE 91 08/11/2020    BUN 8 08/11/2020    CREATININE 0.63 08/11/2020        Lab Results   Component Value Date    WBC 13.5 (H) 08/11/2020    HEMOGLOBIN 17.1 08/11/2020    HEMATOCRIT 51.4 08/11/2020    PLATELETCT 246 08/11/2020        Total time of the discharge process exceeds 38 minutes.

## 2020-08-12 NOTE — PROGRESS NOTES
Pt family brought pt's medication. Got verbal consent from family to place medication in medication room.

## 2020-08-12 NOTE — DISCHARGE INSTRUCTIONS
Discharge Instructions    Discharged to home by car with relative. Discharged via wheelchair, hospital escort: Yes.  Special equipment needed: Not Applicable    Be sure to schedule a follow-up appointment with your primary care doctor or any specialists as instructed.     Discharge Plan:        I understand that a diet low in cholesterol, fat, and sodium is recommended for good health. Unless I have been given specific instructions below for another diet, I accept this instruction as my diet prescription.   Other diet: n/a    Special Instructions: None    · Is patient discharged on Warfarin / Coumadin?   No     Depression / Suicide Risk    As you are discharged from this [facility] facility, it is important to learn how to keep safe from harming yourself.    Recognize the warning signs:  · Abrupt changes in personality, positive or negative- including increase in energy   · Giving away possessions  · Change in eating patterns- significant weight changes-  positive or negative  · Change in sleeping patterns- unable to sleep or sleeping all the time   · Unwillingness or inability to communicate  · Depression  · Unusual sadness, discouragement and loneliness  · Talk of wanting to die  · Neglect of personal appearance   · Rebelliousness- reckless behavior  · Withdrawal from people/activities they love  · Confusion- inability to concentrate     If you or a loved one observes any of these behaviors or has concerns about self-harm, here's what you can do:  · Talk about it- your feelings and reasons for harming yourself  · Remove any means that you might use to hurt yourself (examples: pills, rope, extension cords, firearm)  · Get professional help from the community (Mental Health, Substance Abuse, psychological counseling)  · Do not be alone:Call your Safe Contact- someone whom you trust who will be there for you.  · Call your local CRISIS HOTLINE 642-0321 or 913-188-6743  · Call your local Children's Mobile Crisis  Response Team [facility] (748) 565-5890 or www.[employer]  · Call the toll free National Suicide Prevention Hotlines   · National Suicide Prevention Lifeline 064-923-YZVI (6581)  · National Hope Line Network 800-SUICIDE (280-1688)

## 2020-08-12 NOTE — PROGRESS NOTES
Bedside report received from night RN. Assumed care of patient. Daily plan of care discussed. Pt resting comfortably in bed, wakes easily to voice, responds appropriately to questions. No signs of distress observed at this time. Pt currently denies needs. Hourly rounding in place.

## 2020-08-12 NOTE — DISCHARGE PLANNING
Anticipated Discharge Disposition: Back to home with family support.     Action: SW completed chart review. Expected d/c date updated. Pending surgery.     Barriers to Discharge: TBD    Plan: SW to follow up in rounds.

## 2020-08-12 NOTE — SENIOR ADMIT NOTE
"Called dr. Bustamante at 2120. Regarding pts medication propanolol. Pt's med req indicates pt take 20mg at home and only 10mg are ordered here. Dr. Bustamante said to give the \"10 mg now\" and have the them speak to someone in the morning.  "

## 2020-08-12 NOTE — PROGRESS NOTES
Pt discharged to home via personal vehicle with family member. Discharge paperwork reviewed and signed. Prescribed home medications reviewed with pt per discharge summary. VSS. Pt escorted off unit via wheelchair with hospital staff.

## 2020-08-12 NOTE — FACE TO FACE
Face to Face Supporting Documentation - Home Health    The encounter with this patient was in whole or in part the primary reason for home health admission.    Date of encounter:   Patient:                    MRN:                       YOB: 2021  Rey Medina  [id number]  1995     Home health to see patient for:  Skilled Nursing care for assessment, interventions & education, Wound Care, Home health aide, Physical Therapy evaluation and treatment, Occupational therapy evaluation and treatment and Speech Language Pathology evaluation and treatment    Skilled need for:  Recent Deterioration of Health Status G Tube removed    Skilled nursing interventions to include:  Wound Care- Ostomy    Homebound status evidenced by:  Needs the assistance of another person in order to leave the home. Leaving home requires a considerable and taxing effort. There is a normal inability to leave the home.    Community Physician to provide follow up care: Nirmala Man M.D.     Optional Interventions? No      I certify the face to face encounter for this home health care referral meets the CMS requirements and the encounter/clinical assessment with the patient was, in whole, or in part, for the medical condition(s) listed above, which is the primary reason for home health care. Based on my clinical findings: the service(s) are medically necessary, support the need for home health care, and the homebound criteria are met.  I certify that this patient has had a face to face encounter by myself.  Adamaris Hanson D.O. - ROSYI: [phone]

## 2020-08-12 NOTE — THERAPY
Speech Language Pathology   Video Swallow Evaluation     Patient Name: Rey Medina  AGE:  24 y.o., SEX:  male  Medical Record #: [id number]  Today's Date: 8/12/2020     Precautions: Fall Risk, Swallow Precautions ( See Comments)    Assessment    Patient is 24 y.o. male admitted 8/11/2020 due to dislodged G-tube. PMHx: hemorrhagic stroke due to AV malformation with residual hemiparesis, asthma, trach. CMHx: feeding tube dysfunction, cognitive and neurobehavioral dysfunction following brain injury, dysphagia following nontraumatic intracerebral hemorrhage. [facility] one instance of silent aspiration on thins from fruit cup. Minced and moist with thin liquid (straws okay) diet was recommended.     Pt seen today for Modified Barium Swallow Study. Pt was seated upright in MBS chair for today's exam. Pt was administered barium trials (see amounts, viscosities below.) Pt with penetration before and during the swallow for both thin liquids and mildly thick liquids. Pt with SILENT aspiration before and during the swallow for thin liquids via cup and straw sip. Oral dysphagia characterized by bolus escape to floor of mouth for liquids, posterior bolus escape to piriforms for liquids, reduced mastication for solids (resulting in partially unchewed bolus amounts being swallowed during study, which is a choking risk)  and trace-collection oral residue after the swallow. Pharyngeal dysphagia characterized by delayed swallow initiation to the piriforms for liquids and to the valleculae for solids, reduced base of tongue retraction, reduced pharyngeal stripping wave and penetration/aspiration events (as noted below.)    Pt is presenting with moderate oropharyngeal dysphagia given the aforementioned information. Compensatory swallow strategies of three second prep and/or cough/clear were minimally effective; however, multiple swallows, effortful swallow and alternating bites/sips appeared  "successful in reducing residue. Recommend PO diet of Minced, moist solids, Mildly thick liquids and meds crushed in puree given STRICT adherence to safe swallow precautions and 1:1 feeding. Pt's mom present and verbalized understanding.    Plan    Recommend Speech Therapy 3 times per week until therapy goals are met for the following treatments:  Dysphagia Training, Cognitive-Linguistic Training and Patient / Family / Caregiver Education.    Discharge: Recommend outpatient or home health transitional care services for continued speech therapy services       Objective       08/12/20 1135   Prior Level Of Function   Communication Impaired  (hx CVA, ICH, AVM)   Swallow Impaired  (hx PEG tube, silent aspiration with thins)   Dentition Intact   History / Background Information   Prior Level of Function for Eating / Swallowing Regular solids, liquids via PEG tube   Diagnosis Feeding tube dysfunction   Onset Date Of Dysphagia 8/11/2020   Dysphagia Symptoms Warranting Video Swallow Coughing with straw sips thins   General Anatomy / Physiology WFL   \"Dry\" / Saliva Swallow Observations Complete   Procedure   Patient Seated in  MBS chair   Seated at (Degrees) 90   Views Completed Lateral   Consistencies / Presentation Method   Mildly Thick (2) - (Nectar Thick) Cup;Straw   Thin (0) Teaspoon;Cup;Straw   Pureed (4) Teaspoon   Soft & Bite-Sized (6) - (Dysphagia III) Teaspoon   Oral Phase   Mildly Thick (2) - (Nectar Thick) Premature Spillage Into Pyriform Sinus;Oral Residue After the Swallow  (Escape to FOM)   Thin (0) Oral Residue After the Swallow;Premature Spillage Into Pyriform Sinus;Other (See Comments)  (escape to FOM)   Pureed (4) Oral Residue After the Swallow   Soft & Bite-Sized (6) - (Dysphagia III) Ineffective Mastication;Oral Residue After the Swallow;Premature Spillage Into Pyriform Sinus   Regular (7) Ineffective Mastication;Oral Residue After the Swallow   Pharyngeal Phase   Mildly Thick (2) - (Nectar Thick) Delayed " Onset of Swallow;Reduced Tongue Base Retraction;Residue In Valleculae;Residue In Pyriform Sinus;Penetration During Swallow;Absent Cough Response to Penetration / Aspiration    Thin (0) Delayed Onset of Swallow;Reduced Tongue Base Retraction;Residue In Valleculae;Residue In Pyriform Sinus;Penetration Before Swallow;Penetration During Swallow;Aspiration Before Swallow;Aspiration During Swallow;Absent Cough Response to Penetration / Aspiration   Pureed (4) Delayed Onset of Swallow;Residue In Valleculae;Residue In Pyriform Sinus;Reduced Tongue Base Retraction    Soft & Bite-Sized (6) - (Dysphagia III) Delayed Onset of Swallow;Residue In Valleculae;Residue In Pyriform Sinus;Reduced Tongue Base Retraction   Regular (7) Delayed Onset of Swallow;Reduced Tongue Base Retraction;Residue In Valleculae;Residue In Pyriform Sinus   Compensatory Strategies Attempted   Multiple Swallows Successful in reducing residue   Effortful Swallows Successful in reducing residue   Cough / Clear After Swallow Partially successful in reducing aspirate   Liquid Wash After Swallow Successful in reducing residue   Three Second Prep Minimally effective in reducing penetration/delay   Penetration Aspiration Scale   Penetration Aspiration Scale 8 - Material passes glottis and is not ejected, visible subglottic stasis, absent patient response   Impression   Oral - Pharyngeal Moderate Impairment   Prognosis   Prognosis for Improvement Good   Barriers to Improvement Cognition   Positive Indicators for Improvement Family support, age   Recommendations   Diet / Liquid Recommendation Minced & Moist (5) - (Dysphagia II);Mildly Thick (2) - (Nectar Thick)   Medication Administration  Crush all Medications in Puree   Strategies / Precautions Supervision Required;Small Bites;Small Sips;Cues to Slow Rate of Eating;Multiple Swallows;Effortful Swallow;Alternate Solids / Liquids;Sitting Upright at 90 Degrees while Eating;Stay Upright at Least 30 Minutes After Meals    Interventions Dysphagia Therapy by SLP;Compensatory Safe Swallow Strategy Training;1 : 1 Supervision / Assistance with Nursing;Pharyngeal - Laryngeal Strengthening Exercises;Follow Up Video Swallow;Patient / Caregiver Education / Training   Short Term Goals   Short Term Goal # 1 Revised 8/12: Pt will consume a PO diet of MM5/MT2 with no clinical s/sx of aspiration and min cues.   Goal Outcome # 1 Progressing as expected   Anticipated Discharge Needs   Therapy Recommendations Upon DC Dysphagia Training;Cognitive-Linguistic Training;Patient / Family / Caregiver Education

## 2020-08-12 NOTE — CARE PLAN
Problem: Communication  Goal: The ability to communicate needs accurately and effectively will improve  Outcome: PROGRESSING AS EXPECTED  Intervention: [place] patient and significant other/support system to call light to alert staff of needs  Flowsheets (Taken 8/11/2020 7161)  Oriented to::   Call Light & Bedside Controls   Unit Routine     Problem: Knowledge Deficit  Goal: Knowledge of disease process/condition, treatment plan, diagnostic tests, and medications will improve  Outcome: PROGRESSING AS EXPECTED     Problem: Pain Management  Goal: Pain level will decrease to patient's comfort goal  Outcome: PROGRESSING AS EXPECTED

## 2020-08-12 NOTE — CARE PLAN
Problem: Safety  Goal: Will remain free from falls  Outcome: PROGRESSING AS EXPECTED  Note: Pt has not fallen during this hospital admission. Pt educated to press call light when needing to get out of bed. Bed alarm is on. Pt's bed is in lowest and locked position. Will continue to monitor.     Problem: Pain Management  Goal: Pain level will decrease to patient's comfort goal  Outcome: PROGRESSING AS EXPECTED  Note: Pt reports no pain at this time. Pt appears comfortable. Pt is calm. Will continue to monitor.

## 2020-08-12 NOTE — PROGRESS NOTES
Received report from Joaquim VARELA at 1905. Pt is resting in bed with family at bedside. Call light and belongings are within reach. Skin assessment completed with Joaquim VARELA. All needs met at this time. Will continue to monitor.

## 2020-08-12 NOTE — DISCHARGE PLANNING
ATTN: Case Management  RE: Referral for Home Health    As of 08/12/2020, we have accepted the Home Health referral for the patient listed above.    A [facility] clinician will be out to see the patient within 48 hours. If you have any questions or concerns regarding the patient's transition to Home Health, please do not hesitate to contact us at [phone].      We look forward to collaborating with you,  [facility] Home Health Team

## 2020-08-13 ENCOUNTER — HOME CARE VISIT (OUTPATIENT)
Dept: HOME HEALTH SERVICES | Facility: HOME HEALTHCARE | Age: 25
End: 2020-08-13
Payer: MEDICAID

## 2020-08-13 VITALS
DIASTOLIC BLOOD PRESSURE: 74 MMHG | OXYGEN SATURATION: 98 % | TEMPERATURE: 97.5 F | SYSTOLIC BLOOD PRESSURE: 104 MMHG | RESPIRATION RATE: 16 BRPM | HEART RATE: 79 BPM

## 2020-08-13 PROCEDURE — G0152 HHCP-SERV OF OT,EA 15 MIN: HCPCS

## 2020-08-13 PROCEDURE — G0151 HHCP-SERV OF PT,EA 15 MIN: HCPCS

## 2020-08-13 PROCEDURE — G0153 HHCP-SVS OF S/L PATH,EA 15MN: HCPCS

## 2020-08-13 ASSESSMENT — ENCOUNTER SYMPTOMS
POOR JUDGMENT: 1
DIFFICULTY THINKING: 1
DIFFICULTY THINKING: 1
POOR JUDGMENT: 1

## 2020-08-14 VITALS
HEART RATE: 71 BPM | DIASTOLIC BLOOD PRESSURE: 70 MMHG | RESPIRATION RATE: 16 BRPM | TEMPERATURE: 97.6 F | SYSTOLIC BLOOD PRESSURE: 110 MMHG | OXYGEN SATURATION: 97 %

## 2020-08-14 ASSESSMENT — ENCOUNTER SYMPTOMS
POOR JUDGMENT: 1
DIFFICULTY THINKING: 1

## 2020-08-17 ENCOUNTER — HOME CARE VISIT (OUTPATIENT)
Dept: HOME HEALTH SERVICES | Facility: HOME HEALTHCARE | Age: 25
End: 2020-08-17
Payer: MEDICAID

## 2020-08-17 VITALS
HEART RATE: 84 BPM | SYSTOLIC BLOOD PRESSURE: 102 MMHG | TEMPERATURE: 97.7 F | RESPIRATION RATE: 16 BRPM | DIASTOLIC BLOOD PRESSURE: 72 MMHG | OXYGEN SATURATION: 98 %

## 2020-08-17 PROCEDURE — G0153 HHCP-SVS OF S/L PATH,EA 15MN: HCPCS

## 2020-08-17 RX ORDER — MONTELUKAST SODIUM 10 MG/1
10 TABLET ORAL PRN
Qty: 90 TAB | Refills: 2 | Status: SHIPPED | OUTPATIENT
Start: 2020-08-17 | End: 2022-04-04

## 2020-08-17 ASSESSMENT — ENCOUNTER SYMPTOMS
DIFFICULTY THINKING: 1
POOR JUDGMENT: 1

## 2020-08-18 ENCOUNTER — HOME CARE VISIT (OUTPATIENT)
Dept: HOME HEALTH SERVICES | Facility: HOME HEALTHCARE | Age: 25
End: 2020-08-18
Payer: MEDICAID

## 2020-08-18 VITALS
SYSTOLIC BLOOD PRESSURE: 104 MMHG | OXYGEN SATURATION: 95 % | TEMPERATURE: 98.6 F | DIASTOLIC BLOOD PRESSURE: 70 MMHG | HEART RATE: 69 BPM | RESPIRATION RATE: 16 BRPM

## 2020-08-18 PROCEDURE — G0151 HHCP-SERV OF PT,EA 15 MIN: HCPCS

## 2020-08-18 PROCEDURE — G0152 HHCP-SERV OF OT,EA 15 MIN: HCPCS

## 2020-08-18 ASSESSMENT — ENCOUNTER SYMPTOMS
POOR JUDGMENT: 1
DIFFICULTY THINKING: 1

## 2020-08-19 ENCOUNTER — HOME CARE VISIT (OUTPATIENT)
Dept: HOME HEALTH SERVICES | Facility: HOME HEALTHCARE | Age: 25
End: 2020-08-19
Payer: MEDICAID

## 2020-08-19 PROCEDURE — G0153 HHCP-SVS OF S/L PATH,EA 15MN: HCPCS

## 2020-08-19 ASSESSMENT — ENCOUNTER SYMPTOMS
DIFFICULTY THINKING: 1
POOR JUDGMENT: 1

## 2020-08-20 ENCOUNTER — HOME CARE VISIT (OUTPATIENT)
Dept: HOME HEALTH SERVICES | Facility: HOME HEALTHCARE | Age: 25
End: 2020-08-20
Payer: MEDICAID

## 2020-08-20 VITALS
HEART RATE: 86 BPM | DIASTOLIC BLOOD PRESSURE: 78 MMHG | TEMPERATURE: 97.5 F | SYSTOLIC BLOOD PRESSURE: 112 MMHG | RESPIRATION RATE: 16 BRPM | OXYGEN SATURATION: 97 %

## 2020-08-20 PROCEDURE — G0152 HHCP-SERV OF OT,EA 15 MIN: HCPCS

## 2020-08-20 PROCEDURE — G0151 HHCP-SERV OF PT,EA 15 MIN: HCPCS

## 2020-08-20 ASSESSMENT — ENCOUNTER SYMPTOMS
POOR JUDGMENT: 1
DIFFICULTY THINKING: 1

## 2020-08-21 ASSESSMENT — ACTIVITIES OF DAILY LIVING (ADL)
ADLS_COMMENTS: PLEASE REFER TO OT ASSESSMENT
FEEDING_COMMENTS: PLEASE REFER TO OT ASSESSMENT
BATHING_COMMENTS: PLEASE REFER TO OT ASSESSMENT
GROOMING_COMMENTS: PLEASE REFER TO OT ASSESSMENT

## 2020-08-21 ASSESSMENT — ENCOUNTER SYMPTOMS
POOR JUDGMENT: 1
POOR JUDGMENT: 1
DIFFICULTY THINKING: 1
DIFFICULTY THINKING: 1

## 2020-08-24 ENCOUNTER — HOME CARE VISIT (OUTPATIENT)
Dept: HOME HEALTH SERVICES | Facility: HOME HEALTHCARE | Age: 25
End: 2020-08-24
Payer: MEDICAID

## 2020-08-24 PROCEDURE — G0153 HHCP-SVS OF S/L PATH,EA 15MN: HCPCS

## 2020-08-25 ENCOUNTER — APPOINTMENT (OUTPATIENT)
Dept: PHYSICAL MEDICINE AND REHAB | Facility: REHABILITATION | Age: 25
End: 2020-08-25
Payer: MEDICAID

## 2020-08-25 ENCOUNTER — OFFICE VISIT (OUTPATIENT)
Dept: OPHTHALMOLOGY | Facility: MEDICAL CENTER | Age: 25
End: 2020-08-25
Payer: MEDICAID

## 2020-08-25 ENCOUNTER — HOME CARE VISIT (OUTPATIENT)
Dept: HOME HEALTH SERVICES | Facility: HOME HEALTHCARE | Age: 25
End: 2020-08-25
Payer: MEDICAID

## 2020-08-25 VITALS
OXYGEN SATURATION: 96 % | HEART RATE: 72 BPM | DIASTOLIC BLOOD PRESSURE: 80 MMHG | SYSTOLIC BLOOD PRESSURE: 112 MMHG | RESPIRATION RATE: 16 BRPM | TEMPERATURE: 97.6 F

## 2020-08-25 VITALS
RESPIRATION RATE: 16 BRPM | TEMPERATURE: 97.6 F | DIASTOLIC BLOOD PRESSURE: 72 MMHG | HEART RATE: 80 BPM | OXYGEN SATURATION: 98 % | SYSTOLIC BLOOD PRESSURE: 107 MMHG

## 2020-08-25 VITALS
HEART RATE: 81 BPM | DIASTOLIC BLOOD PRESSURE: 70 MMHG | TEMPERATURE: 98.1 F | SYSTOLIC BLOOD PRESSURE: 105 MMHG | RESPIRATION RATE: 16 BRPM | OXYGEN SATURATION: 97 %

## 2020-08-25 DIAGNOSIS — I61.5 NONTRAUMATIC INTRAVENTRICULAR INTRACEREBRAL HEMORRHAGE, UNSPECIFIED LATERALITY (HCC): ICD-10-CM

## 2020-08-25 DIAGNOSIS — H52.13 MYOPIA OF BOTH EYES: ICD-10-CM

## 2020-08-25 DIAGNOSIS — H49.9 OPHTHALMOPLEGIA: ICD-10-CM

## 2020-08-25 DIAGNOSIS — I61.9 HEMORRHAGIC STROKE (HCC): ICD-10-CM

## 2020-08-25 PROCEDURE — G0152 HHCP-SERV OF OT,EA 15 MIN: HCPCS

## 2020-08-25 PROCEDURE — G0151 HHCP-SERV OF PT,EA 15 MIN: HCPCS

## 2020-08-25 PROCEDURE — 92250 FUNDUS PHOTOGRAPHY W/I&R: CPT | Performed by: OPHTHALMOLOGY

## 2020-08-25 PROCEDURE — 92015 DETERMINE REFRACTIVE STATE: CPT | Performed by: OPHTHALMOLOGY

## 2020-08-25 PROCEDURE — 92060 SENSORIMOTOR EXAMINATION: CPT | Performed by: OPHTHALMOLOGY

## 2020-08-25 PROCEDURE — 99204 OFFICE O/P NEW MOD 45 MIN: CPT | Mod: 25 | Performed by: OPHTHALMOLOGY

## 2020-08-25 ASSESSMENT — SLIT LAMP EXAM - LIDS
COMMENTS: NORMAL
COMMENTS: NORMAL

## 2020-08-25 ASSESSMENT — VISUAL ACUITY
METHOD: SNELLEN - LINEAR
OD_CC: 20/80
OS_CC: 20/60
CORRECTION_TYPE: GLASSES

## 2020-08-25 ASSESSMENT — CUP TO DISC RATIO
OD_RATIO: 0.2
OS_RATIO: 0.2

## 2020-08-25 ASSESSMENT — TONOMETRY
OS_IOP_MMHG: 20
OD_IOP_MMHG: 21

## 2020-08-25 ASSESSMENT — REFRACTION
OS_SPHERE: -6.75
OS_AXIS: 079
OS_CYLINDER: +2.00
OD_CYLINDER: +1.50
OD_AXIS: 100
OD_SPHERE: -6.00

## 2020-08-25 ASSESSMENT — CONF VISUAL FIELD
OD_NORMAL: 1
OS_NORMAL: 1

## 2020-08-25 ASSESSMENT — REFRACTION_MANIFEST
OS_CYLINDER: +2.00
OD_AXIS: 098
OD_SPHERE: -7.00
OS_AXIS: 078
OS_SPHERE: -7.75
OD_CYLINDER: +1.25

## 2020-08-25 ASSESSMENT — REFRACTION_WEARINGRX
SPECS_TYPE: SVL
OS_CYLINDER: +1.00
OS_AXIS: 080
OD_SPHERE: -5.00
OS_SPHERE: -5.50
OD_AXIS: 078
OD_CYLINDER: +0.50

## 2020-08-25 ASSESSMENT — ENCOUNTER SYMPTOMS
DIFFICULTY THINKING: 1
DIFFICULTY THINKING: 1
POOR JUDGMENT: 1
DIFFICULTY THINKING: 1

## 2020-08-25 ASSESSMENT — EXTERNAL EXAM - RIGHT EYE: OD_EXAM: NORMAL

## 2020-08-25 ASSESSMENT — EXTERNAL EXAM - LEFT EYE: OS_EXAM: NORMAL

## 2020-08-25 NOTE — ASSESSMENT & PLAN NOTE
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 Has some end gaze nystagmus worse to the right than the left as well as a bilateral elevation deficit, probably secondary to involvement of the RIMLF. There is no evidence of involvement of the third nucleus in that has good adduction and depression and the pupils are not fixed and dilated. In primary position is relatively orthotropic so no need for prisms or eye muscle surgery to further elevate the eyes. Also obtained OCT NFL thickness that was normal at 99 OD and 102 OS.

## 2020-08-25 NOTE — PROGRESS NOTES
Peds/Neuro Ophthalmology:   Everette Romero M.D.    Date & Time note created:    8/25/2020   11:42 AM     Referring MD / APRN:  Nirmala Man M.D., No att. providers found    Patient ID:  Name:             Rey Medina   YOB: 1995  Age:                 24 y.o.  male   MRN:               [id number]    Chief Complaint/Reason for Visit:     Other (Stroke)      History of Present Illness:    Rey Medina is a 24 y.o. male   Pt has had a Stroke in April of 2019.Was in a coma for 2 weeks,unsure if vision has changed but has old pair of glasses that are broke. No complaints of double vision or viison loss.       Review of Systems:  Review of Systems   All other systems reviewed and are negative.      Past Medical History:   Past Medical History:   Diagnosis Date   • Stroke (HCC)        Past Surgical History:  Past Surgical History:   Procedure Laterality Date   • ANAL FISTULOTOMY         Current Outpatient Medications:  Current Outpatient Medications   Medication Sig Dispense Refill   • montelukast (SINGULAIR) 10 MG Tab Take 1 Tab by mouth as needed (to reduce amount of pills needing to be taken daily ). 90 Tab 2   • propranolol (INDERAL) 20 MG Tab TAKE 1 TABLET BY MOUTH THREE TIMES DAILY FOR 90 DAYS 270 Tab 0   • bisacodyl (DULCOLAX) 10 MG Suppos Insert 1 Suppository in rectum as needed (no BM for 3 days). 30 Suppository 2   • levETIRAcetam (KEPPRA) 500 MG Tab 1 Tab by Enteral Tube route every day at 6 PM. PM only 60 Tab 2   • amantadine (SYMMETREL) 50 MG/5ML Syrup 10 mL by Per G Tube route 2 Times a Day. 1 Bottle 2   • polyethylene glycol/lytes (MIRALAX) Pack Take 1 Packet by mouth every day. through G tube 90 Each 2   • mirtazapine (REMERON) 15 MG Tab 1 Tab by Per G Tube route every bedtime. 30 Tab 2   • Cholecalciferol (VITAMIN D3) 5000 UNIT/ML Liquid 1 mL by Gastric Tube route every day.       No current facility-administered medications for this visit.         Allergies:  Allergies   Allergen Reactions   • Vancomycin Swelling   • Other Misc Rash     Allergic to name band.       Family History:  Family History   Problem Relation Age of Onset   • Hypertension Mother    • Glasses Mother    • Hypertension Brother    • Diabetes Maternal Grandfather    • Diabetes Paternal Grandmother        Social History:  Social History     Socioeconomic History   • Marital status: Single     Spouse name: Not on file   • Number of children: Not on file   • Years of education: Not on file   • Highest education level: Not on file   Occupational History   • Not on file   Social Needs   • Financial resource strain: Not on file   • Food insecurity     Worry: Not on file     Inability: Not on file   • Transportation needs     Medical: Not on file     Non-medical: Not on file   Tobacco Use   • Smoking status: Never Smoker   • Smokeless tobacco: Never Used   Substance and Sexual Activity   • Alcohol use: Never     Frequency: Never   • Drug use: Never   • Sexual activity: Not on file   Lifestyle   • Physical activity     Days per week: Not on file     Minutes per session: Not on file   • Stress: Not on file   Relationships   • Social connections     Talks on phone: Not on file     Gets together: Not on file     Attends Orthodoxy service: Not on file     Active member of club or organization: Not on file     Attends meetings of clubs or organizations: Not on file     Relationship status: Not on file   • Intimate partner violence     Fear of current or ex partner: Not on file     Emotionally abused: Not on file     Physically abused: Not on file     Forced sexual activity: Not on file   Other Topics Concern   •  Service No   • Blood Transfusions No   • Caffeine Concern No   • Occupational Exposure No   • Hobby Hazards No   • Sleep Concern No   • Stress Concern No   • Weight Concern No   • Special Diet Yes   • Back Care No   • Exercise Yes   • Bike Helmet No   • Seat Belt Yes   • Self-Exams  No   Social History Narrative    Lives with mom          Physical Exam:  Physical Exam    Oriented x 3  Weight/BMI: There is no height or weight on file to calculate BMI.  There were no vitals taken for this visit.    Base Eye Exam     Visual Acuity (Snellen - Linear)       Right Left    Dist cc 20/80 20/60    Correction: Glasses          Tonometry (i care, 10:16 AM)       Right Left    Pressure 21 20          Pupils       Pupils    Right PERRL    Left PERRL          Visual Fields       Right Left     Full Full          Extraocular Movement       Right Left     Abnormal, Ortho Abnormal, Ortho     -2 -2 -2   0  0   0 0 0    -2 -2 -2   0  0   0 0 0             Neuro/Psych     Mood/Affect: slurred speech          Dilation     Both eyes: Tropicamide (MYDRIACYL) 1% ophthalmic solution, Phenylephrine (NEOSYNEPHRINE) ophthalmic solution 2.5%, Cyclopentolate (CYCLOGYL) 1% ophthalmic solution @ 10:25 AM            Strabismus Exam     Observations: Ortho    Distance Near Near +3DS N Bifocals                    -2 -2 -2   -2 -2 -2                       0  0   0  0                       0 0 0   0 0 0                   Slit Lamp and Fundus Exam     External Exam       Right Left    External Normal Normal          Slit Lamp Exam       Right Left    Lids/Lashes Normal Normal    Conjunctiva/Sclera White and quiet White and quiet    Cornea Clear Clear    Anterior Chamber Deep and quiet Deep and quiet    Iris Round and reactive Round and reactive    Lens Clear Clear    Vitreous Normal Normal          Fundus Exam       Right Left    Disc Normal Normal    C/D Ratio 0.2 0.2    Macula Normal Normal    Vessels Normal Normal    Periphery Normal Normal            Refraction     Wearing Rx       Sphere Cylinder Axis    Right -5.00 +0.50 078    Left -5.50 +1.00 080    Age: 3yrs    Type: SVL          Manifest Refraction       Sphere Cylinder Axis    Right -7.00 +1.25 098    Left -7.75 +2.00 078          Cycloplegic Refraction (Auto)        Sphere Cylinder Axis    Right -6.00 +1.50 100    Left -6.75 +2.00 079          Final Rx       Sphere Cylinder Axis    Right -6.00 +1.50 100    Left -6.75 +2.00 079                Pertinent Lab/Test/Imaging Review:      Assessment and Plan:     Hemorrhagic stroke (HCC)  8/25/2020 - 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 Has some end gaze nystagmus worse to the right than the left as well as a bilateral elevation deficit, probably secondary to involvement of the RIMLF. There is no evidence of involvement of the third nucleus in that has good adduction and depression and the pupils are not fixed and dilated. In primary position is relatively orthotropic so no need for prisms or eye muscle surgery to further elevate the eyes. Also obtained OCT NFL thickness that was normal at 99 OD and 102 OS.     Myopia of both eyes  8/25/2020 - progressive myopia - will give glasses rx    Ophthalmoplegia  8/25/2020 - Bilateral elevation deficit secondary to AVM / Stroke involving the area of the RIMLF        Everette Romero M.D.

## 2020-08-26 ENCOUNTER — HOME CARE VISIT (OUTPATIENT)
Dept: HOME HEALTH SERVICES | Facility: HOME HEALTHCARE | Age: 25
End: 2020-08-26
Payer: MEDICAID

## 2020-08-26 VITALS
RESPIRATION RATE: 16 BRPM | OXYGEN SATURATION: 97 % | TEMPERATURE: 97.6 F | DIASTOLIC BLOOD PRESSURE: 80 MMHG | HEART RATE: 71 BPM | SYSTOLIC BLOOD PRESSURE: 104 MMHG

## 2020-08-26 PROCEDURE — 665003 FOLLOW UP-HOME HEALTH

## 2020-08-26 PROCEDURE — G0153 HHCP-SVS OF S/L PATH,EA 15MN: HCPCS

## 2020-08-26 RX ORDER — LEVETIRACETAM 500 MG/1
500 TABLET ORAL DAILY
Qty: 60 TAB | Refills: 2 | Status: SHIPPED | OUTPATIENT
Start: 2020-08-26 | End: 2020-12-08 | Stop reason: SDUPTHER

## 2020-08-26 RX ORDER — MIRTAZAPINE 15 MG/1
15 TABLET, FILM COATED ORAL
Qty: 30 TAB | Refills: 2 | Status: SHIPPED | OUTPATIENT
Start: 2020-08-26 | End: 2020-10-27

## 2020-08-26 RX ORDER — POLYETHYLENE GLYCOL 3350 17 G/17G
17 POWDER, FOR SOLUTION ORAL DAILY
Qty: 90 EACH | Refills: 2 | Status: SHIPPED | OUTPATIENT
Start: 2020-08-26 | End: 2020-12-21

## 2020-08-26 ASSESSMENT — ENCOUNTER SYMPTOMS
DIFFICULTY THINKING: 1
POOR JUDGMENT: 1

## 2020-08-27 ENCOUNTER — HOME CARE VISIT (OUTPATIENT)
Dept: HOME HEALTH SERVICES | Facility: HOME HEALTHCARE | Age: 25
End: 2020-08-27
Payer: MEDICAID

## 2020-08-27 PROCEDURE — G0151 HHCP-SERV OF PT,EA 15 MIN: HCPCS

## 2020-08-28 VITALS
HEART RATE: 74 BPM | OXYGEN SATURATION: 98 % | RESPIRATION RATE: 16 BRPM | DIASTOLIC BLOOD PRESSURE: 80 MMHG | TEMPERATURE: 97.8 F | SYSTOLIC BLOOD PRESSURE: 110 MMHG

## 2020-08-28 ASSESSMENT — ENCOUNTER SYMPTOMS
POOR JUDGMENT: 1
DIFFICULTY THINKING: 1

## 2020-08-31 ENCOUNTER — HOME CARE VISIT (OUTPATIENT)
Dept: HOME HEALTH SERVICES | Facility: HOME HEALTHCARE | Age: 25
End: 2020-08-31
Payer: MEDICAID

## 2020-08-31 PROCEDURE — G0153 HHCP-SVS OF S/L PATH,EA 15MN: HCPCS

## 2020-09-01 ENCOUNTER — HOME CARE VISIT (OUTPATIENT)
Dept: HOME HEALTH SERVICES | Facility: HOME HEALTHCARE | Age: 25
End: 2020-09-01
Payer: MEDICAID

## 2020-09-01 VITALS
OXYGEN SATURATION: 98 % | SYSTOLIC BLOOD PRESSURE: 100 MMHG | RESPIRATION RATE: 16 BRPM | DIASTOLIC BLOOD PRESSURE: 78 MMHG | HEART RATE: 86 BPM | TEMPERATURE: 98.7 F

## 2020-09-01 VITALS
RESPIRATION RATE: 16 BRPM | TEMPERATURE: 97.6 F | SYSTOLIC BLOOD PRESSURE: 100 MMHG | OXYGEN SATURATION: 97 % | DIASTOLIC BLOOD PRESSURE: 80 MMHG | HEART RATE: 74 BPM

## 2020-09-01 PROCEDURE — G0152 HHCP-SERV OF OT,EA 15 MIN: HCPCS

## 2020-09-01 PROCEDURE — G0151 HHCP-SERV OF PT,EA 15 MIN: HCPCS

## 2020-09-01 ASSESSMENT — ENCOUNTER SYMPTOMS
DIFFICULTY THINKING: 1
DIFFICULTY THINKING: 1
POOR JUDGMENT: 1
DIFFICULTY THINKING: 1
POOR JUDGMENT: 1

## 2020-09-02 ENCOUNTER — HOME CARE VISIT (OUTPATIENT)
Dept: HOME HEALTH SERVICES | Facility: HOME HEALTHCARE | Age: 25
End: 2020-09-02
Payer: MEDICAID

## 2020-09-02 PROCEDURE — G0153 HHCP-SVS OF S/L PATH,EA 15MN: HCPCS

## 2020-09-03 ENCOUNTER — OFFICE VISIT (OUTPATIENT)
Dept: PHYSICAL MEDICINE AND REHAB | Facility: REHABILITATION | Age: 25
End: 2020-09-03
Payer: MEDICAID

## 2020-09-03 ENCOUNTER — HOME CARE VISIT (OUTPATIENT)
Dept: HOME HEALTH SERVICES | Facility: HOME HEALTHCARE | Age: 25
End: 2020-09-03
Payer: MEDICAID

## 2020-09-03 VITALS
DIASTOLIC BLOOD PRESSURE: 80 MMHG | SYSTOLIC BLOOD PRESSURE: 102 MMHG | HEART RATE: 79 BPM | RESPIRATION RATE: 16 BRPM | TEMPERATURE: 97.7 F | OXYGEN SATURATION: 97 %

## 2020-09-03 VITALS
SYSTOLIC BLOOD PRESSURE: 105 MMHG | OXYGEN SATURATION: 95 % | HEART RATE: 77 BPM | TEMPERATURE: 99.2 F | RESPIRATION RATE: 16 BRPM | DIASTOLIC BLOOD PRESSURE: 78 MMHG

## 2020-09-03 VITALS
DIASTOLIC BLOOD PRESSURE: 80 MMHG | SYSTOLIC BLOOD PRESSURE: 98 MMHG | HEIGHT: 68 IN | TEMPERATURE: 98.8 F | HEART RATE: 69 BPM | BODY MASS INDEX: 23.97 KG/M2 | OXYGEN SATURATION: 96 %

## 2020-09-03 DIAGNOSIS — Z74.09 IMPAIRED MOBILITY AND ACTIVITIES OF DAILY LIVING: ICD-10-CM

## 2020-09-03 DIAGNOSIS — F09 COGNITIVE AND NEUROBEHAVIORAL DYSFUNCTION FOLLOWING BRAIN INJURY (HCC): ICD-10-CM

## 2020-09-03 DIAGNOSIS — S06.9XAS COGNITIVE AND NEUROBEHAVIORAL DYSFUNCTION FOLLOWING BRAIN INJURY (HCC): ICD-10-CM

## 2020-09-03 DIAGNOSIS — R41.89 IMPAIRED COGNITION: ICD-10-CM

## 2020-09-03 DIAGNOSIS — K59.2 NEUROGENIC BOWEL: ICD-10-CM

## 2020-09-03 DIAGNOSIS — F80.9 IMPAIRED VERBAL COMMUNICATION: ICD-10-CM

## 2020-09-03 DIAGNOSIS — R13.10 DYSPHAGIA, UNSPECIFIED TYPE: ICD-10-CM

## 2020-09-03 DIAGNOSIS — Z78.9 IMPAIRED MOBILITY AND ACTIVITIES OF DAILY LIVING: ICD-10-CM

## 2020-09-03 DIAGNOSIS — G31.89 COGNITIVE AND NEUROBEHAVIORAL DYSFUNCTION FOLLOWING BRAIN INJURY (HCC): ICD-10-CM

## 2020-09-03 DIAGNOSIS — R39.81 URINARY INCONTINENCE DUE TO COGNITIVE IMPAIRMENT: ICD-10-CM

## 2020-09-03 DIAGNOSIS — I61.5 NONTRAUMATIC INTRAVENTRICULAR INTRACEREBRAL HEMORRHAGE, UNSPECIFIED LATERALITY (HCC): Primary | ICD-10-CM

## 2020-09-03 DIAGNOSIS — N31.9 NEUROGENIC BLADDER: ICD-10-CM

## 2020-09-03 PROCEDURE — G0151 HHCP-SERV OF PT,EA 15 MIN: HCPCS

## 2020-09-03 PROCEDURE — G0152 HHCP-SERV OF OT,EA 15 MIN: HCPCS

## 2020-09-03 PROCEDURE — 99215 OFFICE O/P EST HI 40 MIN: CPT | Performed by: PHYSICAL MEDICINE & REHABILITATION

## 2020-09-03 RX ORDER — WHEY PROTEIN ISOLATE 6 G-25/7 G
POWDER (GRAM) ORAL
COMMUNITY
Start: 2020-08-26 | End: 2020-09-25

## 2020-09-03 ASSESSMENT — ENCOUNTER SYMPTOMS
BRUISES/BLEEDS EASILY: 0
DIFFICULTY THINKING: 1
POOR JUDGMENT: 1
POOR JUDGMENT: 1
SORE THROAT: 0
SHORTNESS OF BREATH: 0
FALLS: 0
MEMORY LOSS: 0
DIFFICULTY THINKING: 1
FEVER: 0
DIARRHEA: 0
COUGH: 0
PALPITATIONS: 0
CHILLS: 0
CONSTIPATION: 1

## 2020-09-03 ASSESSMENT — PATIENT HEALTH QUESTIONNAIRE - PHQ9: CLINICAL INTERPRETATION OF PHQ2 SCORE: 0

## 2020-09-03 NOTE — PROGRESS NOTES
[facility]  [facility]  [address] [place] [address]  Ph: [phone]    FOLLOW UP PATIENT EVALUATION    Patient Name: Rey Medina   Patient : 1995  Patient Age: 24 y.o.   PCP: Nirmala Man M.D.    Examining Physician: Dr. 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   - Function: Patient continues to recover. He has HH through end September. Currently limited by the lack of equipment to continue progress.   - Neuro stimulation: Stable on decreased Amantadine 50mg BID. Recently decreased this medication. A little more lethargic first couple days, but then stable.   - GI: PEG tube came out, hospital notes reviewed. Could not put it back in without surgical intervention. Mom and patient did not want to have surgical intervention to put back so now is completely removed. Mom still using thickener for liquids. States patient doing well. Site healing well.    - Bowel: Past month had not used suppository, bc he was not needing it and initiating bowel movements on his own. Started using suppository more frequently recently back to every 3rd day. Diet has not changed. Rey gets urge to go, but gets lazy to push it out, so that is why mom started suppository. Lets him sit on commode for a few moments to let him  try and go.   - HEENT: Will be seeing ENT soon regarding trach site. Healed well, but mom would like it checked.   - Spasticity: Patient continues to have no spasticity appreciable.  He is not on any antispasmodic medications.  - Bladder: Not on any bladder medications. Wears adult briefs. Concentrates a lot to pee. When reduced amantadine does not have to concentrate as hard to urinate. Still not voicing need to go to bathroom.   -Equipment: Has electric bed now which was requested at last visit.  - Pain: Pt declines pain anywhere.     Review of Systems:  Review of Systems   Constitutional: Negative for chills and fever.   HENT: Negative for congestion and sore throat.         Questionable healing of trach site.   Respiratory: Negative for cough and shortness of breath.    Cardiovascular: Negative for palpitations and leg swelling.   Gastrointestinal: Positive for constipation. Negative for diarrhea.        Bowel incontinence.   Genitourinary: Negative for dysuria, frequency and urgency.        Urinary incontinence.   Musculoskeletal: Negative for falls and joint pain.   Neurological:        Discoordination.  No spasticity.   Endo/Heme/Allergies: Does not bruise/bleed easily.   Psychiatric/Behavioral: Negative for memory loss.      All other pertinent positive review of systems are noted above in HPI.     Past Medical History:  Past Medical History:   Diagnosis Date   • Stroke (HCC)       Past Surgical History:   Procedure Laterality Date   • ANAL FISTULOTOMY          Current Outpatient Medications:   •  Beneprotein Powder, BENEPROTEIN INSTANT PROTEIN POWDER, Disp: , Rfl:   •  amantadine (SYMMETREL) 50 MG/5ML Syrup, Take 5 mL by mouth 2 Times a Day., Disp: 300 mL, Rfl: 2  •  polyethylene glycol/lytes (MIRALAX) 17 g Pack, Take 1 Packet by mouth every day., Disp: 90 Each, Rfl: 2  •  mirtazapine (REMERON) 15 MG Tab, Take 1 Tab by mouth every bedtime., Disp: 30 Tab, Rfl: 2  •  levETIRAcetam (KEPPRA) 500 MG Tab, Take 1  Tab by mouth every day at 6 PM. PM only, Disp: 60 Tab, Rfl: 2  •  montelukast (SINGULAIR) 10 MG Tab, Take 1 Tab by mouth as needed (to reduce amount of pills needing to be taken daily )., Disp: 90 Tab, Rfl: 2  •  propranolol (INDERAL) 20 MG Tab, TAKE 1 TABLET BY MOUTH THREE TIMES DAILY FOR 90 DAYS, Disp: 270 Tab, Rfl: 0  •  bisacodyl (DULCOLAX) 10 MG Suppos, Insert 1 Suppository in rectum as needed (no BM for 3 days)., Disp: 30 Suppository, Rfl: 2  •  Cholecalciferol (VITAMIN D3) 5000 UNIT/ML Liquid, Take 1 mL by mouth every day., Disp: , Rfl:   Allergies   Allergen Reactions   • Vancomycin Swelling   • Other Misc Rash     Allergic to name band.        Past Social History:  Social History     Socioeconomic History   • Marital status: Single     Spouse name: Not on file   • Number of children: Not on file   • Years of education: Not on file   • Highest education level: Not on file   Occupational History   • Not on file   Social Needs   • Financial resource strain: Not on file   • Food insecurity     Worry: Not on file     Inability: Not on file   • Transportation needs     Medical: Not on file     Non-medical: Not on file   Tobacco Use   • Smoking status: Never Smoker   • Smokeless tobacco: Never Used   Substance and Sexual Activity   • Alcohol use: Never     Frequency: Never   • Drug use: Never   • Sexual activity: Not on file   Lifestyle   • Physical activity     Days per week: Not on file     Minutes per session: Not on file   • Stress: Not on file   Relationships   • Social connections     Talks on phone: Not on file     Gets together: Not on file     Attends Mormon service: Not on file     Active member of club or organization: Not on file     Attends meetings of clubs or organizations: Not on file     Relationship status: Not on file   • Intimate partner violence     Fear of current or ex partner: Not on file     Emotionally abused: Not on file     Physically abused: Not on file     Forced sexual activity:  Not on file   Other Topics Concern   •  Service No   • Blood Transfusions No   • Caffeine Concern No   • Occupational Exposure No   • Hobby Hazards No   • Sleep Concern No   • Stress Concern No   • Weight Concern No   • Special Diet Yes   • Back Care No   • Exercise Yes   • Bike Helmet No   • Seat Belt Yes   • Self-Exams No   Social History Narrative    Lives with mom        Family History:  Family History   Problem Relation Age of Onset   • Hypertension Mother    • Glasses Mother    • Hypertension Brother    • Diabetes Maternal Grandfather    • Diabetes Paternal Grandmother        Depression and Opioid Screening  PHQ-9:  Depression Screen (PHQ-2/PHQ-9) 7/8/2020 8/11/2020 9/3/2020   PHQ-2 Total Score - 0 -   PHQ-2 Total Score 0 - 0   PHQ-9 Total Score - - -     Interpretation of PHQ-9 Total Score   Score Severity   1-4 No Depression   5-9 Mild Depression   10-14 Moderate Depression   15-19 Moderately Severe Depression   20-27 Severe Depression     Opioid Risk Score: No value filed.  Interpretation of Opioid Risk Score   Score 0-3 = Low risk of abuse. Do UDS at least once per year.  Score 4-7 = Moderate risk of abuse. Do UDS 1-4 times per year.  Score 8+ = High risk of abuse. Refer to specialist.      OBJECTIVE:   Vital Signs:  Vitals:    09/03/20 1414   BP: (!) 98/80   Pulse: 69   Temp: 37.1 °C (98.8 °F)   SpO2: 96%        Pertinent Labs:  Lab Results   Component Value Date/Time    SODIUM 140 08/11/2020 10:00 AM    POTASSIUM 4.4 08/11/2020 10:00 AM    CHLORIDE 103 08/11/2020 10:00 AM    CO2 27 08/11/2020 10:00 AM    GLUCOSE 91 08/11/2020 10:00 AM    BUN 8 08/11/2020 10:00 AM    CREATININE 0.63 08/11/2020 10:00 AM       Lab Results   Component Value Date/Time    HBA1C 5.7 (H) 02/06/2020 06:07 AM       Lab Results   Component Value Date/Time    WBC 13.5 (H) 08/11/2020 10:00 AM    RBC 6.05 08/11/2020 10:00 AM    HEMOGLOBIN 17.1 08/11/2020 10:00 AM    HEMATOCRIT 51.4 08/11/2020 10:00 AM    MCV 85.0 08/11/2020  10:00 AM    MCH 28.3 08/11/2020 10:00 AM    MCHC 33.3 (L) 08/11/2020 10:00 AM    MPV 11.2 08/11/2020 10:00 AM    NEUTSPOLYS 74.40 (H) 08/11/2020 10:00 AM    LYMPHOCYTES 17.70 (L) 08/11/2020 10:00 AM    MONOCYTES 5.50 08/11/2020 10:00 AM    EOSINOPHILS 1.00 08/11/2020 10:00 AM    BASOPHILS 1.00 08/11/2020 10:00 AM       Lab Results   Component Value Date/Time    ASTSGOT 16 03/06/2020 06:13 AM    ALTSGPT 29 03/06/2020 06:13 AM        Physical Exam:   GEN: No apparent distress, sitting comfortably in his manual wheelchair.  HEENT: Head normocephalic.  Sclera nonicteric bilaterally, no ocular discharge appreciated bilaterally.  Tracheostomy site is well-healed, but tunnels to some degree.  CV: Extremities warm and well-perfused, no peripheral edema appreciated bilaterally.  PULMONARY: Breathing nonlabored on room air, no respiratory accessory muscle use.  Not requiring supplemental oxygen.  ABD: Soft, nontender.  G-tube removed, slight erythema from area where it was removed.  Healing well.  SKIN: G-tube site healing well.  PSYCH: Mood and affect within normal limits.  NEURO:   Awake, alert.    Minimally conversive.    Significantly dysarthric.    Can say simple phrases such as thank you, bye, have a good day.  Significant dysmetria bilateral upper extremities and lower extremities.  Significant coordination impairment in the bilateral upper and lower extremities.  Follows commands.  No clonus appreciated at bilateral ankles.  Reflexes 2+/4 symmetric bilaterally at C5, C6, L4, S1.  Paredes's negative bilaterally.    Motor Exam Upper Extremities   ? Myotome R L   Shoulder Abduction C5 5 5   Elbow flexion C5 5 5   Wrist extension C6 5 5   Elbow extension C7 5 5   Finger flexion C8 4 4   Finger abduction T1 4 4-     Motor Exam Lower Extremities  ? Myotome R L   Hip flexion L2 5 5   Knee extension L3 5 5   Ankle dorsiflexion L4 5 5   Toe extension L5 5 5   Ankle plantarflexion S1 5 5     Tone on Modified Anusha Scale    R  L  R L   Elbow extension (testing tone of elbow flexors) 0 0 Hip extension (testing hip flexors) 0 0   Elbow flexion (testing tone of elbow extensors) 0 0 Hip abduction (testing adductors) 0 0   Wrist extension (testing tone of wrist flexors)  0 0 Knee extension (testing knee flexors) 0 0   Finger extension (testing tone of finger flexors) 0 0 Knee flexion (testing knee extensors) 0 0   Supination (testing forearm pronators) 0 0 Dorsiflexion (testing plantarflexors) 0 0      Plantarflexion (testing dorsiflexors) 0 0       Imaging:   MRI Brain 5/21/20  1.  Cerebellar atrophy with encephalomalacia, gliosis and hemosiderin staining within the vermis and posterior brainstem consistent with prior hemorrhage.  2.  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  3.  Stable left posterior parietal approach ventriculoperitoneal shunt catheter. Stable mild ventriculomegaly. No transependymal flow CSF.  4.  Mild cerebral atrophy.  5.  No acute intracranial abnormality.    ASSESSMENT/PLAN: Rey Medina  is a 24 y.o. male with rehabilitation history significant for ICH secondary to AVM rupture on 4/21/2019 s/p AVM repair, hydrocephalus s/p  shunt who was recently admitted to [facility] from 2/5/2020 to 3/12/2020, here for hemorrhagic stroke follow-up. The following plan was discussed with the patient who is in agreement.     Visit Diagnoses     ICD-10-CM   1. Nontraumatic intraventricular intracerebral hemorrhage, unspecified laterality (HCC)  I61.5   2. Neurogenic bowel  K59.2   3. Neurogenic bladder  N31.9   4. Impaired mobility and activities of daily living  Z74.09    Z78.9   5. Impaired cognition  R41.89   6. Impaired verbal communication  F80.9   7. Dysphagia, unspecified type  R13.10   8. Urinary incontinence due to cognitive impairment  R39.81   9. 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   2.  No history of seizure, on Keppra.  Had EEG 2020  3.  Poor attention secondary to ICH requiring need for neuro-stimulant  4.  Blurry vision after ICH, did have reading glasses prior to stroke.  Blurry vision persistent.  Also having problems with peripheral vision on the right.  -Continue home health therapy, will  end of September.  -Continue amantadine 50 mg at 0800 and 1200     Assessment of Current Functional Status:  Patient has significantly improved from a much lower cognitive state to a higher one in which he is participating with therapy, and making significant gains.  He is receiving all disciplines, OT PT and speech.  At this point the therapy team recommends that he would be a good candidate for acute inpatient rehabilitation given his level of participation, young age, and potential for significant change in independence of ADLs and other functional gains.    Most recent speech therapy note dated 2020: \"Treatment focused on improving respiratory coordination and articulation of voiced and voiceless bilabial sounds in the initial and final position.  The pt's mom purchased a whistle since the last HH session as recommended.  The pt was able to coordinate inhalation through the nose and blow into the whistle for 2-3 sec with cues to increase force during exhalation.  The pt was unable to increase inhalation of exhalation time using the device.  SLP encouraged the pt to use the whistle outside during daily walks.  Pt/mom in agreement.  The pt was able to demonstrate proper lip placement to produce bilabial sounds /m/, /p/, and /b/ with 50% acc.  With proper lip placement the pt was able to achieve 80% acc during CV and VC artic " "drills with /m/ and /b/.  The pt demonstrated improvements producing voiceless /p/ with cues to limit voicing and achieved 60% acc during CV/VC artic drills.\"    Per patient's physical therapist and occupational therapist dated 8/25/2020: From a therapy standpoint he is continuing to make slow but steady progress in all areas, however is limited still w ability for OT/PT to address functional mobility due to significant compensatory movement patterns that are difficult to safely modifiy without the proper equipment (ie Vector system).  He is responding very well to wt bearing input in quadruped, tall kneeling, and controlled standing w transfer pole, and is demonstrating tremendous improvement in R UE functional movement patterns for self feeding w reduced dysmetria.  He is responding consistently to specific commands for \"slow\" movement patterns, and 2 steps commands fairly consistently.  Initiation continues to be a barrier, especially w B/B training, but his mother is working on some daytime time voiding and having some success.       Furthermore, outpatient therapy centers do not have the specter systems and therefore outpatient therapy also would not be able to help his recovery as maximally as possible compared to the inpatient environment where Rey would not only be able to work with a full therapy discipline team (PT, OT, speech) but also with nursing and case management for bowel/bladder, new equipment, respectively.  Given Rey's comorbidities would also require daily monitoring by a physiatry for medical issues (see below).    Recommend acute inpatient rehabilitation for the aforementioned reasons.    Spasticity:  Patient was very cooperative with exam today and did not display any tone on MAS.     Neurogenic Bowel:  Improving in terms of instigating bowel movements.  Still tends towards constipation.  Wears adult briefs.  -Continue suppository q 3 days.     Mood: Post stroke agitation.  -Continue " "propranolol 20 mg 3 times daily for agitation     GI/Diet:   1. Dysphagia secondary to #1 in \"rehab/neuro\" section:  Improving, patient has a more diverse diet now.  Mom is giving all p.o. meals and medications.  G-tube fell out 8/11/2020 and patient was admitted to the hospital.  Per documentation it would not be able to be replaced without surgical intervention which patient and mom declined.  Patient is now s/p G-tube and reportedly has good oral intake.  -Continue oral diet  -Continue to thicken liquids to avoid aspiration pneumonia.     HEENT:  1.  Acute respiratory failure S/p tracheostomy: Tracheostomy site healing well.  Per report, not leaking air.  -Still with difficulty managing secretions  -Continue follow-up with ENT    Endo:  1.  Vitamin D deficiency: Ordered vitamin D at last clinic visit which has not been drawn.  -Continue vitamin D 5000 units  -Reminded to have vitamin D drawn.    Follow up: PRN; awaiting authorization for inpatient rehabilitation program.    Please note that this dictation was created using voice recognition software. I have made every reasonable attempt to correct obvious errors but there may be errors of grammar and content that I may have overlooked prior to finalization of this note.    Dr. Mandy Luke DO, MS  Department of Physical Medicine & Rehabilitation  Neuro Rehabilitation Clinic  [facility]  "

## 2020-09-04 ASSESSMENT — ENCOUNTER SYMPTOMS
DIFFICULTY THINKING: 1
POOR JUDGMENT: 1

## 2020-09-08 ENCOUNTER — HOME CARE VISIT (OUTPATIENT)
Dept: HOME HEALTH SERVICES | Facility: HOME HEALTHCARE | Age: 25
End: 2020-09-08
Payer: MEDICAID

## 2020-09-08 ENCOUNTER — HOSPITAL ENCOUNTER (INPATIENT)
Facility: REHABILITATION | Age: 25
End: 2020-09-08
Attending: PHYSICAL MEDICINE & REHABILITATION | Admitting: PHYSICAL MEDICINE & REHABILITATION
Payer: MEDICARE

## 2020-09-08 PROCEDURE — G0152 HHCP-SERV OF OT,EA 15 MIN: HCPCS

## 2020-09-08 PROCEDURE — G0151 HHCP-SERV OF PT,EA 15 MIN: HCPCS

## 2020-09-08 PROCEDURE — G0153 HHCP-SVS OF S/L PATH,EA 15MN: HCPCS

## 2020-09-08 ASSESSMENT — ENCOUNTER SYMPTOMS
POOR JUDGMENT: 1
DIFFICULTY THINKING: 1
POOR JUDGMENT: 1
DIFFICULTY THINKING: 1

## 2020-09-09 ENCOUNTER — HOME CARE VISIT (OUTPATIENT)
Dept: HOME HEALTH SERVICES | Facility: HOME HEALTHCARE | Age: 25
End: 2020-09-09
Payer: MEDICAID

## 2020-09-09 VITALS
DIASTOLIC BLOOD PRESSURE: 76 MMHG | SYSTOLIC BLOOD PRESSURE: 110 MMHG | OXYGEN SATURATION: 98 % | TEMPERATURE: 97.8 F | HEART RATE: 72 BPM | RESPIRATION RATE: 16 BRPM

## 2020-09-09 PROCEDURE — G0153 HHCP-SVS OF S/L PATH,EA 15MN: HCPCS

## 2020-09-09 ASSESSMENT — ENCOUNTER SYMPTOMS
DIFFICULTY THINKING: 1
POOR JUDGMENT: 1

## 2020-09-10 ENCOUNTER — HOME CARE VISIT (OUTPATIENT)
Dept: HOME HEALTH SERVICES | Facility: HOME HEALTHCARE | Age: 25
End: 2020-09-10
Payer: MEDICAID

## 2020-09-10 PROCEDURE — G0151 HHCP-SERV OF PT,EA 15 MIN: HCPCS

## 2020-09-10 PROCEDURE — G0152 HHCP-SERV OF OT,EA 15 MIN: HCPCS

## 2020-09-10 ASSESSMENT — ENCOUNTER SYMPTOMS
DIFFICULTY THINKING: 1
POOR JUDGMENT: 1

## 2020-09-11 VITALS
RESPIRATION RATE: 16 BRPM | HEART RATE: 68 BPM | HEART RATE: 72 BPM | OXYGEN SATURATION: 94 % | SYSTOLIC BLOOD PRESSURE: 108 MMHG | TEMPERATURE: 97.8 F | DIASTOLIC BLOOD PRESSURE: 80 MMHG | SYSTOLIC BLOOD PRESSURE: 105 MMHG | RESPIRATION RATE: 16 BRPM | OXYGEN SATURATION: 96 % | TEMPERATURE: 97.6 F | DIASTOLIC BLOOD PRESSURE: 80 MMHG

## 2020-09-11 ASSESSMENT — ENCOUNTER SYMPTOMS
POOR JUDGMENT: 1
DIFFICULTY THINKING: 1
POOR JUDGMENT: 1
DIFFICULTY THINKING: 1

## 2020-09-14 ENCOUNTER — HOME CARE VISIT (OUTPATIENT)
Dept: HOME HEALTH SERVICES | Facility: HOME HEALTHCARE | Age: 25
End: 2020-09-14
Payer: MEDICAID

## 2020-09-14 PROCEDURE — G0153 HHCP-SVS OF S/L PATH,EA 15MN: HCPCS

## 2020-09-15 ENCOUNTER — HOME CARE VISIT (OUTPATIENT)
Dept: HOME HEALTH SERVICES | Facility: HOME HEALTHCARE | Age: 25
End: 2020-09-15
Payer: MEDICAID

## 2020-09-15 ENCOUNTER — TELEPHONE (OUTPATIENT)
Dept: PHYSICAL MEDICINE AND REHAB | Facility: REHABILITATION | Age: 25
End: 2020-09-15

## 2020-09-15 VITALS
SYSTOLIC BLOOD PRESSURE: 110 MMHG | DIASTOLIC BLOOD PRESSURE: 72 MMHG | OXYGEN SATURATION: 98 % | RESPIRATION RATE: 16 BRPM | TEMPERATURE: 97.7 F | HEART RATE: 77 BPM

## 2020-09-15 VITALS
SYSTOLIC BLOOD PRESSURE: 115 MMHG | DIASTOLIC BLOOD PRESSURE: 70 MMHG | RESPIRATION RATE: 16 BRPM | HEART RATE: 69 BPM | TEMPERATURE: 98.3 F | OXYGEN SATURATION: 98 %

## 2020-09-15 DIAGNOSIS — I61.5 NONTRAUMATIC INTRAVENTRICULAR INTRACEREBRAL HEMORRHAGE, UNSPECIFIED LATERALITY (HCC): ICD-10-CM

## 2020-09-15 PROCEDURE — G0151 HHCP-SERV OF PT,EA 15 MIN: HCPCS

## 2020-09-15 PROCEDURE — G0152 HHCP-SERV OF OT,EA 15 MIN: HCPCS

## 2020-09-15 ASSESSMENT — ENCOUNTER SYMPTOMS
POOR JUDGMENT: 1
DIFFICULTY THINKING: 1

## 2020-09-15 NOTE — TELEPHONE ENCOUNTER
Rey from [facility] pharmacy wants to verify the prescription for amantadine. Is says to take 8:00 and 12:00 in notes and they want to verify if it is 5ml at 8 and 12 or just 5ml once a day.

## 2020-09-16 ENCOUNTER — HOME CARE VISIT (OUTPATIENT)
Dept: HOME HEALTH SERVICES | Facility: HOME HEALTHCARE | Age: 25
End: 2020-09-16
Payer: MEDICAID

## 2020-09-16 PROCEDURE — G0153 HHCP-SVS OF S/L PATH,EA 15MN: HCPCS

## 2020-09-16 ASSESSMENT — ENCOUNTER SYMPTOMS
POOR JUDGMENT: 1
DIFFICULTY THINKING: 1

## 2020-09-17 ENCOUNTER — HOME CARE VISIT (OUTPATIENT)
Dept: HOME HEALTH SERVICES | Facility: HOME HEALTHCARE | Age: 25
End: 2020-09-17
Payer: MEDICAID

## 2020-09-17 VITALS
SYSTOLIC BLOOD PRESSURE: 108 MMHG | DIASTOLIC BLOOD PRESSURE: 78 MMHG | OXYGEN SATURATION: 95 % | HEART RATE: 87 BPM | RESPIRATION RATE: 16 BRPM | TEMPERATURE: 97.8 F

## 2020-09-17 PROCEDURE — G0151 HHCP-SERV OF PT,EA 15 MIN: HCPCS

## 2020-09-17 PROCEDURE — G0152 HHCP-SERV OF OT,EA 15 MIN: HCPCS

## 2020-09-17 ASSESSMENT — ENCOUNTER SYMPTOMS
DIFFICULTY THINKING: 1
POOR JUDGMENT: 1
CHANGE IN APPETITE: UNCHANGED
APPETITE LEVEL: GOOD
DIFFICULTY THINKING: 1

## 2020-09-18 VITALS
DIASTOLIC BLOOD PRESSURE: 80 MMHG | HEART RATE: 74 BPM | TEMPERATURE: 97.6 F | SYSTOLIC BLOOD PRESSURE: 105 MMHG | OXYGEN SATURATION: 98 % | RESPIRATION RATE: 16 BRPM

## 2020-09-18 ASSESSMENT — ENCOUNTER SYMPTOMS
DIFFICULTY THINKING: 1
POOR JUDGMENT: 1

## 2020-09-21 ENCOUNTER — HOME CARE VISIT (OUTPATIENT)
Dept: HOME HEALTH SERVICES | Facility: HOME HEALTHCARE | Age: 25
End: 2020-09-21
Payer: MEDICAID

## 2020-09-21 VITALS
RESPIRATION RATE: 16 BRPM | HEART RATE: 77 BPM | TEMPERATURE: 98.2 F | DIASTOLIC BLOOD PRESSURE: 70 MMHG | OXYGEN SATURATION: 100 % | SYSTOLIC BLOOD PRESSURE: 100 MMHG

## 2020-09-21 PROCEDURE — G0152 HHCP-SERV OF OT,EA 15 MIN: HCPCS

## 2020-09-21 PROCEDURE — G0153 HHCP-SVS OF S/L PATH,EA 15MN: HCPCS

## 2020-09-21 ASSESSMENT — ENCOUNTER SYMPTOMS: DIFFICULTY THINKING: 1

## 2020-09-22 ENCOUNTER — TELEMEDICINE (OUTPATIENT)
Dept: PHYSICAL MEDICINE AND REHAB | Facility: REHABILITATION | Age: 25
End: 2020-09-22
Payer: MEDICAID

## 2020-09-22 ENCOUNTER — HOME CARE VISIT (OUTPATIENT)
Dept: HOME HEALTH SERVICES | Facility: HOME HEALTHCARE | Age: 25
End: 2020-09-22
Payer: MEDICAID

## 2020-09-22 VITALS
RESPIRATION RATE: 16 BRPM | HEART RATE: 72 BPM | OXYGEN SATURATION: 98 % | TEMPERATURE: 97.6 F | DIASTOLIC BLOOD PRESSURE: 80 MMHG | SYSTOLIC BLOOD PRESSURE: 110 MMHG

## 2020-09-22 VITALS — BODY MASS INDEX: 24.33 KG/M2 | HEIGHT: 67 IN | WEIGHT: 155 LBS

## 2020-09-22 DIAGNOSIS — F80.9 IMPAIRED VERBAL COMMUNICATION: ICD-10-CM

## 2020-09-22 DIAGNOSIS — R39.198 DIFFICULTY URINATING: ICD-10-CM

## 2020-09-22 DIAGNOSIS — I61.5 NONTRAUMATIC INTRAVENTRICULAR INTRACEREBRAL HEMORRHAGE, UNSPECIFIED LATERALITY (HCC): Primary | ICD-10-CM

## 2020-09-22 DIAGNOSIS — N31.9 NEUROGENIC BLADDER: ICD-10-CM

## 2020-09-22 DIAGNOSIS — R47.1 DYSARTHRIA: ICD-10-CM

## 2020-09-22 PROCEDURE — 99214 OFFICE O/P EST MOD 30 MIN: CPT | Mod: CR | Performed by: PHYSICAL MEDICINE & REHABILITATION

## 2020-09-22 PROCEDURE — G0151 HHCP-SERV OF PT,EA 15 MIN: HCPCS

## 2020-09-22 ASSESSMENT — ENCOUNTER SYMPTOMS
DIFFICULTY THINKING: 1
DIFFICULTY THINKING: 1
CONSTIPATION: 0
POOR JUDGMENT: 1
POOR JUDGMENT: 1
COUGH: 0
CHILLS: 0
FEVER: 0
DIARRHEA: 0
SORE THROAT: 0

## 2020-09-22 ASSESSMENT — FIBROSIS 4 INDEX: FIB4 SCORE: 0.31

## 2020-09-22 ASSESSMENT — ACTIVITIES OF DAILY LIVING (ADL): AMBULATION ASSISTANCE ON FLAT SURFACES: 1

## 2020-09-22 ASSESSMENT — PATIENT HEALTH QUESTIONNAIRE - PHQ9: CLINICAL INTERPRETATION OF PHQ2 SCORE: 0

## 2020-09-22 NOTE — PROGRESS NOTES
[facility]  [facility]  [address] [place] [id number]  Ph: [phone]    FOLLOW UP PATIENT EVALUATION    This evaluation was conducted via Zoom using secure and encrypted videoconferencing technology. The patient was in a private location in the [facility].  The patient's identity was confirmed and verbal consent was obtained for this virtual visit.    Patient Name: Rey Medina   Patient : [id number]  Patient Age: 24 y.o.   PCP: Nirmala Man M.D.    Examining Physician: Dr. 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 Noticable association with addition of amantadine back in Feb.   - Rey states he has difficulty releasing it. Has to concentrate.   - It is not painful.   - There are times it takes a few seconds to initiate stream. Once initiated then it continues.   - There are times it takes a little longer but not much longer.   - Wearing adult briefs.   - Not verbalizing when he needs to go but mom can tell.   - Mom watches face to see if he has started his stream and completely   - No urinary tract infections. Has been tested, but has always been negative. Was tested 2 or 3 times.   - Thinks it is better compared to prior and in the setting of decreased amantadine.  -States that her daughter says  that it is better as well.    Review of Systems:  Review of Systems   Reason unable to perform ROS: Patient's mom helps answer review of system questions as she is with the patient 24/7.   Constitutional: Negative for chills and fever.   HENT: Negative for congestion and sore throat.    Respiratory: Negative for cough.    Cardiovascular: Negative for leg swelling.   Gastrointestinal: Negative for constipation and diarrhea.   Genitourinary:        Difficulty initiating stream.      All other pertinent positive review of systems are noted above in HPI.     Past Medical History:  Past Medical History:   Diagnosis Date   • Stroke (HCC)       Past Surgical History:   Procedure Laterality Date   • ANAL FISTULOTOMY          Current Outpatient Medications:   •  amantadine (SYMMETREL) 50 MG/5ML Syrup, Take 5 mL by mouth every day., Disp: 300 mL, Rfl: 2  •  Beneprotein Powder, BENEPROTEIN INSTANT PROTEIN POWDER, Disp: , Rfl:   •  polyethylene glycol/lytes (MIRALAX) 17 g Pack, Take 1 Packet by mouth every day., Disp: 90 Each, Rfl: 2  •  mirtazapine (REMERON) 15 MG Tab, Take 1 Tab by mouth every bedtime., Disp: 30 Tab, Rfl: 2  •  levETIRAcetam (KEPPRA) 500 MG Tab, Take 1 Tab by mouth every day at 6 PM. PM only, Disp: 60 Tab, Rfl: 2  •  montelukast (SINGULAIR) 10 MG Tab, Take 1 Tab by mouth as needed (to reduce amount of pills needing to be taken daily )., Disp: 90 Tab, Rfl: 2  •  propranolol (INDERAL) 20 MG Tab, TAKE 1 TABLET BY MOUTH THREE TIMES DAILY FOR 90 DAYS, Disp: 270 Tab, Rfl: 0  •  bisacodyl (DULCOLAX) 10 MG Suppos, Insert 1 Suppository in rectum as needed (no BM for 3 days)., Disp: 30 Suppository, Rfl: 2  •  Cholecalciferol (VITAMIN D3) 5000 UNIT/ML Liquid, Take 1 mL by mouth every day., Disp: , Rfl:   Allergies   Allergen Reactions   • Vancomycin Swelling   • Other Misc Rash     Allergic to name band.        Past Social History:  Social History     Socioeconomic History   • Marital status: Single     Spouse name: Not  on file   • Number of children: Not on file   • Years of education: Not on file   • Highest education level: Not on file   Occupational History   • Not on file   Social Needs   • Financial resource strain: Not on file   • Food insecurity     Worry: Not on file     Inability: Not on file   • Transportation needs     Medical: Not on file     Non-medical: Not on file   Tobacco Use   • Smoking status: Never Smoker   • Smokeless tobacco: Never Used   Substance and Sexual Activity   • Alcohol use: Never     Frequency: Never   • Drug use: Never   • Sexual activity: Not on file   Lifestyle   • Physical activity     Days per week: Not on file     Minutes per session: Not on file   • Stress: Not on file   Relationships   • Social connections     Talks on phone: Not on file     Gets together: Not on file     Attends Mandaen service: Not on file     Active member of club or organization: Not on file     Attends meetings of clubs or organizations: Not on file     Relationship status: Not on file   • Intimate partner violence     Fear of current or ex partner: Not on file     Emotionally abused: Not on file     Physically abused: Not on file     Forced sexual activity: Not on file   Other Topics Concern   •  Service No   • Blood Transfusions No   • Caffeine Concern No   • Occupational Exposure No   • Hobby Hazards No   • Sleep Concern No   • Stress Concern No   • Weight Concern No   • Special Diet Yes   • Back Care No   • Exercise Yes   • Bike Helmet No   • Seat Belt Yes   • Self-Exams No   Social History Narrative    Lives with mom        Family History:  Family History   Problem Relation Age of Onset   • Hypertension Mother    • Glasses Mother    • Hypertension Brother    • Diabetes Maternal Grandfather    • Diabetes Paternal Grandmother        Depression and Opioid Screening  PHQ-9:  Depression Screen (PHQ-2/PHQ-9) 8/11/2020 9/3/2020 9/22/2020   PHQ-2 Total Score 0 - -   PHQ-2 Total Score - 0 0   PHQ-9 Total Score -  - -     Interpretation of PHQ-9 Total Score   Score Severity   1-4 No Depression   5-9 Mild Depression   10-14 Moderate Depression   15-19 Moderately Severe Depression   20-27 Severe Depression     Opioid Risk Score: No value filed.  Interpretation of Opioid Risk Score   Score 0-3 = Low risk of abuse. Do UDS at least once per year.  Score 4-7 = Moderate risk of abuse. Do UDS 1-4 times per year.  Score 8+ = High risk of abuse. Refer to specialist.      OBJECTIVE:   Vital Signs:  There were no vitals filed for this visit.     Pertinent Labs:  Lab Results   Component Value Date/Time    SODIUM 140 08/11/2020 10:00 AM    POTASSIUM 4.4 08/11/2020 10:00 AM    CHLORIDE 103 08/11/2020 10:00 AM    CO2 27 08/11/2020 10:00 AM    GLUCOSE 91 08/11/2020 10:00 AM    BUN 8 08/11/2020 10:00 AM    CREATININE 0.63 08/11/2020 10:00 AM       Lab Results   Component Value Date/Time    HBA1C 5.7 (H) 02/06/2020 06:07 AM       Lab Results   Component Value Date/Time    WBC 13.5 (H) 08/11/2020 10:00 AM    RBC 6.05 08/11/2020 10:00 AM    HEMOGLOBIN 17.1 08/11/2020 10:00 AM    HEMATOCRIT 51.4 08/11/2020 10:00 AM    MCV 85.0 08/11/2020 10:00 AM    MCH 28.3 08/11/2020 10:00 AM    MCHC 33.3 (L) 08/11/2020 10:00 AM    MPV 11.2 08/11/2020 10:00 AM    NEUTSPOLYS 74.40 (H) 08/11/2020 10:00 AM    LYMPHOCYTES 17.70 (L) 08/11/2020 10:00 AM    MONOCYTES 5.50 08/11/2020 10:00 AM    EOSINOPHILS 1.00 08/11/2020 10:00 AM    BASOPHILS 1.00 08/11/2020 10:00 AM       Lab Results   Component Value Date/Time    ASTSGOT 16 03/06/2020 06:13 AM    ALTSGPT 29 03/06/2020 06:13 AM        Physical Exam:   GEN: No apparent distress  HEENT: Head normocephalic.  Sclera nonicteric bilaterally, no ocular discharge appreciated bilaterally.  Tracheostomy site visualized and is healed.  PULMONARY: Breathing nonlabored on room air, no respiratory accessory muscle use.  Not requiring supplemental oxygen.   PSYCH: Mood and affect within normal limits.  NEURO: Awake, alert.  Answers  questions appropriately.  Speech limited.  Severely dysarthric.  Can say short phrases such as hi, thank you, goodbye.    Imaging: No new imaging pertinent to today's visit    ASSESSMENT/PLAN: Rey Medina  is a 24 y.o. 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 from 2020 to 3/12/2020, here for hemorrhagic stroke follow-up. The following plan was discussed with the patient who is in agreement.     Visit Diagnoses     ICD-10-CM   1. Nontraumatic intraventricular intracerebral hemorrhage, unspecified laterality (HCC)  I61.5   2. Neurogenic bladder  N31.9   3. Impaired verbal communication  F80.9   4. Dysarthria  R47.1   5. Difficulty urinating  R39.198        Rehab/Neuro:   1. 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 from 2020 to 3/12/2020: Reviewed all pertinent previous medical records leading up to today's clinic visit.   2.  No history of seizure, on Keppra.  Had EEG 2020  3.  Poor attention secondary to ICH requiring need for neuro-stimulant  4.  Blurry vision after ICH, did have reading glasses prior to stroke.  Blurry vision persistent.  Also having problems with peripheral vision on the right.  -Continue home health therapy, will  end of September and transition to outpatient.  -Continue amantadine 50 mg daily next     Bladder: Wears adult briefs.  Mom concerned about initial hesitancy he has prior to initiating stream.  - Discussion with mom regarding symptoms and counseled that this is not concerning given patient has not had urinary tract infections and the stream, once initiated, is consistent until full emptying.  -Counseled on potential management options.  - Moving forward with stopping amantadine, as aforementioned, next week and monitoring how  patient does cognitively after cessation of amantadine as well as urine stream after cessation.  - Counseled on potential to trial Flomax.  -Counseled on potential urology management, mom declines at this time, but we will refer to urology in future if problematic for mom and patient.    Follow up: 1 month to reevaluate    Total time spent face to face with patient was 27 minutes. Greater then 50% of my visit was spent on counseling and coordination of care regarding the primary medical diagnosis, secondary medical complications, patient on their condition, best management practices, and risks and benefits of treatment as aforementioned in the assessment and plan. Extensive discussion involved the patient and momMelinda.    Please note that this dictation was created using voice recognition software. I have made every reasonable attempt to correct obvious errors but there may be errors of grammar and content that I may have overlooked prior to finalization of this note.    Dr. Mandy Luke DO, MS  Department of Physical Medicine & Rehabilitation  Neuro Rehabilitation Clinic  [facility]

## 2020-09-23 ENCOUNTER — HOME CARE VISIT (OUTPATIENT)
Dept: HOME HEALTH SERVICES | Facility: HOME HEALTHCARE | Age: 25
End: 2020-09-23
Payer: MEDICAID

## 2020-09-23 PROCEDURE — G0153 HHCP-SVS OF S/L PATH,EA 15MN: HCPCS

## 2020-09-24 ENCOUNTER — HOME CARE VISIT (OUTPATIENT)
Dept: HOME HEALTH SERVICES | Facility: HOME HEALTHCARE | Age: 25
End: 2020-09-24
Payer: MEDICAID

## 2020-09-24 VITALS
TEMPERATURE: 98 F | OXYGEN SATURATION: 98 % | SYSTOLIC BLOOD PRESSURE: 108 MMHG | HEART RATE: 77 BPM | RESPIRATION RATE: 16 BRPM | DIASTOLIC BLOOD PRESSURE: 80 MMHG

## 2020-09-24 PROCEDURE — G0151 HHCP-SERV OF PT,EA 15 MIN: HCPCS

## 2020-09-24 ASSESSMENT — ENCOUNTER SYMPTOMS
DIFFICULTY THINKING: 1
POOR JUDGMENT: 1

## 2020-09-25 VITALS
DIASTOLIC BLOOD PRESSURE: 80 MMHG | TEMPERATURE: 97.8 F | OXYGEN SATURATION: 98 % | SYSTOLIC BLOOD PRESSURE: 110 MMHG | RESPIRATION RATE: 16 BRPM | HEART RATE: 74 BPM

## 2020-09-25 ASSESSMENT — ENCOUNTER SYMPTOMS
POOR JUDGMENT: 1
DIFFICULTY THINKING: 1

## 2020-09-25 ASSESSMENT — ACTIVITIES OF DAILY LIVING (ADL)
HOME_HEALTH_OASIS: 01
OASIS_M1830: 03

## 2020-09-25 ASSESSMENT — PATIENT HEALTH QUESTIONNAIRE - PHQ9: CLINICAL INTERPRETATION OF PHQ2 SCORE: 0

## 2020-09-30 ENCOUNTER — OCCUPATIONAL THERAPY (OUTPATIENT)
Dept: OCCUPATIONAL THERAPY | Facility: REHABILITATION | Age: 25
End: 2020-09-30
Attending: PHYSICAL MEDICINE & REHABILITATION
Payer: MEDICAID

## 2020-09-30 DIAGNOSIS — I61.5 LEFT-SIDED NONTRAUMATIC INTRAVENTRICULAR INTRACEREBRAL HEMORRHAGE (HCC): ICD-10-CM

## 2020-09-30 PROCEDURE — 97166 OT EVAL MOD COMPLEX 45 MIN: CPT

## 2020-09-30 ASSESSMENT — ENCOUNTER SYMPTOMS: PAIN SCALE: 0

## 2020-10-01 ENCOUNTER — SPEECH THERAPY (OUTPATIENT)
Dept: SPEECH THERAPY | Facility: REHABILITATION | Age: 25
End: 2020-10-01
Attending: PHYSICAL MEDICINE & REHABILITATION
Payer: MEDICAID

## 2020-10-01 DIAGNOSIS — I61.5 NONTRAUMATIC INTRAVENTRICULAR INTRACEREBRAL HEMORRHAGE, UNSPECIFIED LATERALITY (HCC): ICD-10-CM

## 2020-10-01 DIAGNOSIS — R47.1 DYSARTHRIA AND ANARTHRIA: ICD-10-CM

## 2020-10-01 DIAGNOSIS — R13.12 OROPHARYNGEAL DYSPHAGIA: ICD-10-CM

## 2020-10-01 PROCEDURE — 92610 EVALUATE SWALLOWING FUNCTION: CPT

## 2020-10-01 PROCEDURE — 92523 SPEECH SOUND LANG COMPREHEN: CPT

## 2020-10-01 ASSESSMENT — SOCIAL DETERMINANTS OF HEALTH (SDOH): SOCIAL_SUPPORT_SYSTEM: PARENT

## 2020-10-01 NOTE — OP THERAPY EVALUATION
"  Outpatient Speech Therapy  INITIAL EVALUATION    [place] [id number]  [address]  [place] [address]  Phone:  [phone]  Fax:  [phone]    Date of Evaluation: 10/01/2020    Patient: Rey Medina  YOB: 1995  MRN: [id number]     Referring Provider: Mandy Luke D.O.  [address] [place] [address]   Referring Diagnosis Nontraumatic intraventricular intracerebral hemorrhage, unspecified laterality (HCC) [I61.5]     Time Calculation                     Chief Complaint: Not Understood By Others, Difficulty Swallowing, Can't Understand Patient (poor articulation), Choking (with liquids), and Slurred Speech (decreased breath support)    Visit Diagnoses     ICD-10-CM   1. Nontraumatic intraventricular intracerebral hemorrhage, unspecified laterality (HCC)  I61.5   2. Oropharyngeal dysphagia  R13.12   3. Dysarthria and anarthria  R47.1     Subjective:   Reason for Therapy:     Reason For Evaluation:  CVA, Cognition, Dysarthria, Voice, Dysphagia and Stroke Rehabilitaion    Onset Date:  4/19/2019    Onset Description:  \"Suffered a vermian intracerebral hemorrhage with intraventricular bleeding and hydrocephalus on April 21, 2019, s/p  shunt placement. He was initially taken to [facility] for additional care, then moved back to  in 11/2019.\"    Social Support:     Accompanied By:  Parent (Melinda in session)    Patient Mental Status:  Alert and Responsive  Therapy History:     Previous Treatments and Dates:   He had HH in [place] for short time, but never acute therapy rehabilitation.  [place] [facility] as he had behavioral interference and was resistant to participating.  MD provided medication management to assist with behavioral disturbances and patient began participating in HH 6/1/20 through 9/23/20 addressing dysphagia, dysarthria, and cognitive linguistic deficits. Patient made slow, but steady gains towards goals.  Was " "resistant to AAC low tech and high tech devices to assist with communication.    Previous Diet:  Peg-Tube    Current Diet:  Mechanical Soft Foods and [place] Liquids    Hearing:  No Deficits    Vision:  Eye Glasses    Dentition:  Complete Dentition  Additional Subjective Comments:      Mother reported that patient is continuing to consume chopped regular solids with no complications. Will crush or cut medications in half depending on the size.  Can take small pills whole.   She is trying to have him complete hand to mouth movements, but assists with a majority of the PO feedings.  Continues to cough with thin liquids.  Mother is slowly tapering off thickened liquids, but aware that the coughing is a sign of aspiration and he requires the thicker liquids at this point. Reported that he is understanding well and typing out sentences when needed.  Stated she understands 50% of what he is communicating to her.  HH SLP provided words for him to practice as well as use of whistle to assist with breath support.  Running out of air and talking faster than his baseline.  Stated that when he slows down there is improvements with his intelligibility. Noted that he is not recalling new information as well unless he \"is interested\" such as prayers and new baby's name.  Reported that long term recall there are no deficits.  Still with decreased initiation of tasks and conversations.    Continues to default to \"I don't know\" often in response to questions.    Past Medical History:   Diagnosis Date   • Stroke (HCC)      Past Surgical History:   Procedure Laterality Date   • ANAL FISTULOTOMY       Objective:   Treatments/Interventions Performed:  Home program, Dysphagia treatment, Cognitive-Linguistic training, Speech/Language treatment, Patient/Caregiver education, Compensatory strategy training, Respiratory Coordination / Support training and Voice training  Treatment Intervention tool(s) used:  Osiel Dysarthria Assessment " (FDA-2), Swallowing Ability and Function Evaluation (SAFE), Wyatt swallow protocol, EAT 10(EAT-10) is a patient administered, self-assessment of possible difficulties that may be encountered as a result of difficulty in swallow.  Scores greater than 3 can be indicative of changes in swallow consistent with dysphagia; Voice Handicap Index (VHI) is a patient centered questionnaire that reflects the influence of voice on patient quality of life in 3 areas: Functional, physical, emotional.     Objective Details:  FDA-2 patient presenting with severe deficits especially related to respiration at rest and in speech; decreased labial function with labial seal and coordination with sounds to conversation; decreased palatal strength; decreased laryngeal strength and variation with pitch and volume in sounds to conversation; decreased lingual coordination in sound and conversation; and profound deficits with intelligibility at sound to conversation.    Eat 10 score: 4/40 with scores highest rated at a two related to swallowing liquids and pills take extra effort.    SAFE scores were the following: Physical Examination stanine 4 (MODERATE), Oral and Pharyngeal Phases stanine 8 (WNL).  Nectar thick sips used, would have had lower scores with thin liquids as he is presenting with aspiration/pnetration signs with intake.  Wyatt Swallow Protocol- failed, unable to consume large amounts of thin or nectar thick liquids.    VHI: Patient scores revealed:  Functional: 20  Physical: 15  Emotional: 3  Total: 37   Results fell in the scoring range of (31-60) presenting a moderate severity rating. Scores rated the highest at 4 were related to people have difficulty hearing me in a noisy room, people ask me to repeat myself when face-to-face, voice difficulties restrict personal and social life, feel left out because of my voice, voice problem causes me to lose income, and I run out of air when I talk.    Sustained /s/ and /z/ for at or below  "1 second.  Pitch slides with minimal changes in pitch.  Volume is loud a majority of time with speech tasks.  Unable to have increased and decreased volume with a model.  Incoordinated breathing at rest.  Maximum phonation time was 9 seconds on \"ah.\" Sustained falsetto for 2-3 seconds.    Modeled oral motor exercises to 80-90% accuracy: ooo-eee, smile, pucker with sound, cheek puff with lateralization, lingual protrusion with elevation, lingual press, lingual clicks, lingual cheek press, and velar sound with and without occlusion.      Speech Therapy Assessment:     Expressive Language Assessment:     Repeats speech accurately Severe.    Receptive Language Assessment:     Patient follows 1 step simple commands: L    Patient follows 2 step simple commands:[facility]    Cognitive Linguistic Assessment:     Cognitive-Linguistic comments: To be assessed on next visit      Speech Mechanism Assessment:     Patient voice description: Clear    Laterality of patient facial weakness: Left    Patient's oral movements are voluntary and coordination: Moderate    Patient exhibits articulatory precision: Severe    Patient exhibits single word intelligibility: Severe    Patient exhibits sentence level intelligibility: Profound    Patient exhibits conversational intelligibility: Profound    Patient apraxia: [facility]    Patient dysarthria: Severe    Nasality is moderate    Hypernasality is moderate    Patient uses adequate breath support: No    Patient breath rate: Fast  Speech mechanism comments: [facility]- slow, imprecise    Oral Motor Status:     Labial strength and control for patient: Poor    Lingual strength and control for patient: Poor    Patient saliva management: GoodPatient oral sensation and awareness: Good    Oral Phase Assessment:     Types of food within functional limits: Regular and Nectar Liquid    Types of food with decreased mastication: Mechanical Soft    Pharyngeal Phase Assessment:     Delayed Swallow    Food types within " "functional limits: Mechanical Soft, Regular and Nectar Liquid    Laryngeal    Reduced laryngeal elevation: Mechanical Soft, Regular and Nectar Liquid    Pharyngeal phase comments: Oral motor function moderately decreased, and mild decrease with hyolaryngeal elevation. Observed with mixed, cracker, and nectar thick by cup.  Prep times decreased for chopped soft solids.  Mother reported that she had noticed that he is quicker sometimes with softer items.  She makes sure to have no environmental distractions to ensure he \"focuses\" with mastication.  Transit and initiation times WFL for all trials. SLP monitored size and rate of intake. Patient denied any pharyngeal residue.  Used lingual sweep independently and had no oral residue with all trials.  No overt s/sx of aspiration with all intake.     Speech Therapy Plan :   Prognosis & Recommendations  Impression Summary:  Rey Medina is a 25 year old male who presented for assessment of dysarthria, dysphagia, and cognitive linguistic deficits post CVA.  He has significant deficits in weakness and coordination of breath support and oral motor function of which significantly impact his intelligibility and airway protection with PO intake.  He is on nectar thick liquids and chopped solids due to decreased swallow abilities. He has learned strategies with SLP in previous settings for slower rate of speech and improved breath support for communication of needs/wants, but continues to severly impaired.  He is refusing AAC at this time, however future attempts will be made as cognition improves. Cognitive deficits are noted specifically in recall, attention, and initiation.  However further assessment will be completed in next visit due to time limitations.  Prognosis:  Good  Prognosis Details:  Patient would likely benefit from participation in direct, outpatient speech therapy addressing deficits in speech production, swallowing, and cognitive linguistic skills following CVA. " Patient is highly motivated to improve function to return to highest level attainable in all areas.   Compensatory swallow strategies:  Upright position 90 degrees during meal and 45 minutes after meal, Reduce bolus size, Finger/tongue sweep to clear pocketing, No straws and No talking while eating  Diet Recommendation:  Mechanical Soft Foods  Liquid Recommendation:  Nectar Thick  Goals  Short Term Goals:  -Patient to complete diaphragm breathing exercises to improve coordination of breath and PO intake and increase length of utterance in 10/15 sessions, mod assist.  -Patient to complete oral, pharyngeal, and laryngeal exercises to improve function and strength of swallow and speech mechanisms to 85% accuracy in 2 of 3 sessions, min assist.  -Patient will improve memory recall, including working memory, short term and time delayed recall, implementing compensatory strategies as necessary, to newly learned information with 80% or greater accuracy, mod cues.  -Patient will complete selective, alternating and divided attention tasks given min to mod verbal prompts/ instruction/ cues to 85% accuracy.   -Patient will maintain the topic of conversation for 2 turns in 3 of 4 opportunities to improve functional communication.  -Patient will produce reduplicative syllables first in isolation (rudolph rudolph rudolph, chad chad chad)  then in succession (rudolph, chad, rj) for 30 trials with 80% accuracy, mod cues.  - Patient to initiate what task to complete in therapy within 5 minutes with field choice of 2 in 3/4 session, given min to mod cues.  Short Term Goal Duration (Weeks):  4-6 weeks  Long Term Goals:  -Patient will improve safety in swallow implementing compensatory swallow strategies during intake given minimal to no verbal prompts/ instruction/ cues demonstrating safety in swallow of regular solids, thin liquid as evidenced by no overt signs or symptoms of aspiration for primary nutrition/ hydration needs.  - Patient will  develop functional, cognitive-linguistic based skills and utilize compensatory strategies to communicate wants and needs effectively to different conversational partners, maintain safety, and participate socially in a functional living environment.  Long Term Goal Duration (Weeks):  4-6 months  Patient Stated Goal:  I want to communicate better  Potential barriers to Goal Achievement:  None  Therapy Recommendations  Recommendation:  Individual Speech Therapy and Dysphagia Treatment,  Planned Therapy Interventions:  Development of Cognitive Skills, Self-care home management, Swallow Dysfunction treatment, Speech/Language training, Respiratory coordination/support training, Dysphagia treatment, Thermal/Tactile Stimulation, Cognitive-Linguistic training, Voice training, Patient/Caregiver Education, Home Program and Compensatory Strategy Training,   Plan Details:  Assess cognition, swallow exercises  Frequency:  2x week (24 x 92526; 24 x 92507)  Duration (in weeks):  12      Referring provider co-signature:  I have reviewed this plan of care and my co-signature certifies the need for services.    Certification Period: 10/01/2020 to  12/25/20    Physician Signature: ________________________________ Date: ______________

## 2020-10-02 ENCOUNTER — PHYSICAL THERAPY (OUTPATIENT)
Dept: PHYSICAL THERAPY | Facility: REHABILITATION | Age: 25
End: 2020-10-02
Attending: PHYSICAL MEDICINE & REHABILITATION
Payer: MEDICAID

## 2020-10-02 DIAGNOSIS — I61.5 NONTRAUMATIC INTRAVENTRICULAR INTRACEREBRAL HEMORRHAGE, UNSPECIFIED LATERALITY (HCC): ICD-10-CM

## 2020-10-02 PROCEDURE — 97163 PT EVAL HIGH COMPLEX 45 MIN: CPT

## 2020-10-02 ASSESSMENT — ENCOUNTER SYMPTOMS: PAIN SCALE: 0

## 2020-10-02 NOTE — OP THERAPY EVALUATION
"  Outpatient Physical Therapy  INITIAL NEUROLOGICAL EVALUATION    [facility] [address].  [address]  [facility] [address]  Phone:  [phone]  Fax:  [phone]    Date of Evaluation: 10/02/2020    Patient: Rey Medina  YOB: 1995  MRN: [id number]     Referring Provider: Mandy Luke D.O.  [address] [place] [address]   Referring Diagnosis Nontraumatic intraventricular intracerebral hemorrhage, unspecified laterality (HCC) [I61.5]     Time Calculation                       Chief Complaint: Difficulty Walking and Loss Of Balance    Visit Diagnoses     ICD-10-CM   1. 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   Present with mom, Melinda. Mother gave majority of subjective today as pt is very aphasic.     Pt's mom reports she would like pt to return to walking and become more independent, \"sometimes he's jerking when he rushes to do something.\" Mother reporting pt responds well to simple and direct one step instructions.    Chronicity of condition:    Precautions: None  Received home health Rehab: Home health PT, OT, SLP; home health PT (3/20/20-9/24/20)  Pt current living situation: Lives with Mother and sister  Pt working prior to incident: Worked in IT  Transportation to PT: Mother drove pt to PT  Type of AD/WC: Manual WC \"Lightweight transport\" (Has attendant controls with brakes)   Where is WC from? [facility]   Loaner or belongs to patient? Loaner from care [facility]  Duration in chair/WC during day: Pt's mother reports is sitting most of the day in the WC or in the couch (relatively 7-8 hours " "in WC)  Caregiver support: With mother  (pt's mother not working due to pt's condition)  Sleeping:   Type of bed: Hospital bed with side rail   Bed positioning/bed set up: None   Bed equipment: None  B and B program: Wears adult briefs; per Dr. Luke' note, pt is able to void volitionally when \"he concentrates.\" Per mother report, has difficulty initiating stream but is able to empty completely once initaited. (Improved since stopping amantadine medication per mother)  Spasticity management: Pt's mother reporting is unsure of this  Pressure relief program: Pt indep with pressure relief and able to adjust independently in chair  Bed sores/wounds: None   Dizziness: None   Falls: None  Other therapies: Additionally scheduled for OP SLP and OT services   Next MD/neuro appointment: Upcoming appt with Dr. Luke 10/20/20  Current medications: Keppra, stopped amentadine on Wed secondary to difficulty with B and B (improving)      Prior level of function:  Independent ADLs, was working in IT, living with sister   Sleep disturbance:  Not disrupted  Pain:     Current pain ratin    Pain Comments::  No pain complaints  Social Support:     Lives in:  Apartment (No stairs to enter)    Lives with: Mother and sister.  Hand dominance:  Right  Activities of Daily Living:     Patient reported ADL status: Per OT note:  Min assist feeding for full set-up of food on spoon, Mod assist grooming (assist with oral hygiene and shaving) with max cues, Min assist UB dressing, Max assist LB dressing, Mod assist bathing seated shower with max verbal/tactile cues. Total assist toileting for bowel/bladder. Enjoys video games.  Pt's mother reports pt requires initiation for all activities; mother reporting pt will not initiate unless given verbal cues    Walking: Unable  Standing: Unable to perform independently; req modA  Community: Dependent; attendant operated Manual wheelchair-mother assisting  Transfers: Pt completing stand pivot with " "Davina    DME owned: Manual WC, shower chair, 4 wheel walker    Patient Goals:     Other patient goals:  \"To be able to walk and be independent\" (mother reported goal)      Past Medical History:   Diagnosis Date   • Stroke (HCC)      Past Surgical History:   Procedure Laterality Date   • ANAL FISTULOTOMY       Social History     Tobacco Use   • Smoking status: Never Smoker   • Smokeless tobacco: Never Used   Substance Use Topics   • Alcohol use: Never     Frequency: Never     Family and Occupational History     Socioeconomic History   • Marital status: Single     Spouse name: Not on file   • Number of children: Not on file   • Years of education: Not on file   • Highest education level: Not on file   Occupational History   • Not on file       Objective:   Active Range of Motion:   Upper extremity (left):     Shoulder flexion: Within functional limits    Shoulder abduction: Within functional limits    Shoulder external rotation: Within functional limits  Upper extremity (right):     Shoulder flexion: Within functional limits    Shoulder abduction: Within functional limits    Shoulder external rotation: Within functional limits  Lower extremity (left):     Hip flexion: Within functional limits    Knee extension: Within functional limits    Ankle dorsiflexion: Within functional limits    Ankle plantar flexion: Within functional limits  Lower extremity (right):     Hip flexion: Within functional limits    Knee extension: Within functional limits    Ankle dorsiflexion: Within functional limits    Ankle plantar flexion: Within functional limits  Active range of motion comments: Poor movement control; moderate dyskinesias \"jerky movements\" with BUE AROM; overcorrection and overshooting tendencies  Additional UE AROM deferred due to receiving OT services; see OT eval for further details    Able to plantarflex B feet with tactile cuing and demonstration (Above AROM assessed in sitting)            Passive Range of Motion: "     Passive Range of Motion Comments:  PROM BLE's WFL except bilateral gastroc tightness; able to achieve ankle neutral bilaterally    Strength:     Strength Comments:  Strength: Requires modA for standing  Core strength: Able to tolerate mod perturbations in sitting  Further strength testing to be completed in subsequent sessions    Additional findings-Observation:  Tracheotomy scar at neck anteriorly  Skin rash at LUE from wrist extended to Upper arm. Pt's mother reports has additional rash on torso and neck. (Pt reporting will follow up with MD)    Sits with significant posterior tilt, rounded shoulders and forward head    Pt demonstrating repeated jaw movements during exam including smacking of lips throughout evaluation    Vitals at rest: (On room air; assessed in sitting)   HR: 67   SpO2: 96%  *No signs of distress          Tone, Sensation and Coordination:     Modified Anusha:     Tone comments:   No tone appreciated on exam    Coordination   Upper extremity (left):     Slow alternating movements: Impaired    Finger touch to nose: Impaired (Used finger to sternum as pt is wearing face shield)  Upper extremity (right):     Slow alternating movements: Impaired    Finger touch to nose: Impaired  Lower extremity (left):     Slow alternating movements: Impaired    Rapid alternating movements: Impaired  Lower extremity (right):     Slow alternating movements: Impaired    Rapid alternating movements: Impaired      Coordination comments:   Hypermetria like movements      Cognition:     Direction following: one step    Cognition Comments:  Pt is very aphasic; able to vocalize with cuing but hard to understand    Vision/Perception:     Vision/Perception Comments:   Per pt report; has blurry vision; problems with peripheral vision. Mother reports pt normally wears corrective lenses        Postural Control (Balance)     Sitting (static): Good    Balance/Gait Comments   Currently unable to ambulate         Activities of  Daily Living:   Transfers/Mobility:     Transfer mobility comments:  Pt asked to scoot to edge of manual wheelchair; pt having difficulty with tendency towards extension with one episode where pt started to  chair. Requires Mikhail/modA at pelvis to assist with scooting.        Therapeutic Exercises (CPT 19124):     1. Scooting to edge of bed, x2 min, using lean to opp side to unweight hip; mod A at unweighted hip to bring forward; demonstrated to mother (caregiver); added to hep    2. 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 Present with mom, Melinda, who it pt's full time caregiver.  Pt may benefit from skilled physical therapy in order to address above listed impairments (Further testing to be completed in subsequent sessions) in order to improve QOL, return to ADL's, and decrease caregiver burden.     *Pt may benefit from expedited authorization for continued rehabilitation, thus avoiding gap in care, promoting continued neuroplastic benefits, increasing pt independence and decreasing caregiver burden and avoiding future medical costs.    Barriers to therapy:  Cognition, vision, comorbidities and communication  Other barriers to therapy:  Insurance: Medicaid has lag in authorization time which may result in gap in care  Prognosis: fair    Goals:   Short Term Goals:   1. Pt will  be independent with written HEP with assistance from caregiver.  2. Pt will require Mikhail-cga for all transfers consistently.  3. Pt will require SPV for scooting.    Short term goal time span:  4-6 weeks      Long Term Goals:    1. Pt will be independent with written HEP with assistance from caregiver.  2. Pt will be independent with scooting in order to increase independence.  3. Pt will demonstrate safe transfers with SPV and LRAD in order to increase independence and decrease caregiver burden.  4. Pt will demonstrate improved standing tolerance for 10 min or longer with SPV and LRAD  in order to increase independence and decrease caregiver burden.  5. Pt will ambulate 25 ft with LRAD and Mikhail in order to increase independence.    Long term goal time span:  1-2 months  Patient progress towards Long Term goals:  *Additional goals pending further testing in subsequent sessions    Plan:   Therapy options:  Physical therapy treatment to continue  Planned therapy interventions:  Neuromuscular Re-education (CPT 15367), Therapeutic Exercise (CPT 26195), Gait Training (CPT 68718) and E Stim Unattended (CPT 64155)  Frequency: 1-2x/wk.  Duration in weeks:  10  Duration in visits:  10  Discussed with:  Patient and caregiver (caregiver: mother)  Plan details:  UPOC: 12/4/20  (extended plan of care due to lag in authorization time)    *Pt may benefit from expedited authorization for continued rehabilitation, thus avoiding gap in care, promoting continued neuroplastic benefits, increasing pt independence and decreasing caregiver burden and avoiding future medical costs.      Functional Assessment Used: TBD          Referring provider co-signature:  I have reviewed this plan of care and my co-signature certifies the need for services.    Certification Period: 10/02/2020 to  12/4/20    Physician Signature: ________________________________ Date: ______________

## 2020-10-05 ASSESSMENT — BALANCE ASSESSMENTS: BALANCE - SITTING STATIC: GOOD

## 2020-10-05 ASSESSMENT — ACTIVITIES OF DAILY LIVING (ADL): POOR_BALANCE: 1

## 2020-10-07 ENCOUNTER — TELEPHONE (OUTPATIENT)
Dept: MEDICAL GROUP | Facility: PHYSICIAN GROUP | Age: 25
End: 2020-10-07

## 2020-10-20 ENCOUNTER — TELEMEDICINE (OUTPATIENT)
Dept: PHYSICAL MEDICINE AND REHAB | Facility: REHABILITATION | Age: 25
End: 2020-10-20
Payer: MEDICAID

## 2020-10-20 VITALS — WEIGHT: 155 LBS | HEIGHT: 67 IN | BODY MASS INDEX: 24.33 KG/M2

## 2020-10-20 DIAGNOSIS — K59.2 NEUROGENIC BOWEL: ICD-10-CM

## 2020-10-20 DIAGNOSIS — Z78.9 IMPAIRED MOBILITY AND ACTIVITIES OF DAILY LIVING: ICD-10-CM

## 2020-10-20 DIAGNOSIS — I61.5 NONTRAUMATIC INTRAVENTRICULAR INTRACEREBRAL HEMORRHAGE, UNSPECIFIED LATERALITY (HCC): Primary | ICD-10-CM

## 2020-10-20 DIAGNOSIS — N31.9 NEUROGENIC BLADDER: ICD-10-CM

## 2020-10-20 DIAGNOSIS — R39.198 DIFFICULTY VOIDING: ICD-10-CM

## 2020-10-20 DIAGNOSIS — Z74.09 IMPAIRED MOBILITY AND ACTIVITIES OF DAILY LIVING: ICD-10-CM

## 2020-10-20 DIAGNOSIS — R47.01 APHASIA: ICD-10-CM

## 2020-10-20 PROCEDURE — 99214 OFFICE O/P EST MOD 30 MIN: CPT | Mod: CR | Performed by: PHYSICAL MEDICINE & REHABILITATION

## 2020-10-20 RX ORDER — TAMSULOSIN HYDROCHLORIDE 0.4 MG/1
0.4 CAPSULE ORAL
Qty: 30 CAP | Refills: 2 | Status: SHIPPED | OUTPATIENT
Start: 2020-10-20 | End: 2021-01-18 | Stop reason: SDUPTHER

## 2020-10-20 ASSESSMENT — ENCOUNTER SYMPTOMS
DIARRHEA: 0
COUGH: 0
CONSTIPATION: 0
CHILLS: 0
SORE THROAT: 0
FEVER: 0

## 2020-10-20 ASSESSMENT — PATIENT HEALTH QUESTIONNAIRE - PHQ9: CLINICAL INTERPRETATION OF PHQ2 SCORE: 0

## 2020-10-20 ASSESSMENT — FIBROSIS 4 INDEX: FIB4 SCORE: 0.31

## 2020-10-20 NOTE — PROGRESS NOTES
[facility]  [facility]  [facility] [place] [address]  Ph: [phone]    FOLLOW UP PATIENT EVALUATION    This evaluation was conducted via Zoom using secure and encrypted videoconferencing technology. The patient was in a private location in the [facility].  The patient's identity was confirmed and verbal consent was obtained for this virtual visit.    Patient Name: Rey Medina   Patient : 1995  Patient Age: 24 y.o.   PCP: Nirmala Man M.D.    Examining Physician: Dr. Mandy Luke, DO    SUBJECTIVE:   Patient Identification: Rey is a very pleasant 25-year-old male with past medical history significant for ICH secondary to AVM rupture on 2019 s/p AVM repair, hydrocephalus s/p  shunt who was [facility] to 3/12/2020 and is presenting to PM&R clinic for a FOLLOW UP outpatient evaluation with the following chief complaint/s:    Chief Complaint: Nontraumatic brain injury, hemorrhagic stroke    Accompanied by Today: MomMelinda  Interval History:     Patient mom assist with communicating the majority of the history given that patient is significantly aphasic from his hemorrhagic stroke    - Saw ENT regarding trach and will wait a year to see if gets smaller.   - Will start therapy next week.   - Stopped Amantadine and is completely stable, no changes.   - Still has hesitancy and interrupted stream with urination. Has to concentrate to go.   - Tried to change primary doctor and will be seeing him next week.   - Bowel movements stable. Using suppository every 3 days. Started patient on prune juice, he doesn't like taste, but now grinding prunes into smoothie. Still getting Miralax daily.   - Still with poor initiation with motor control. Improved      Review of Systems:  Review of Systems   Reason unable to perform ROS: Patient's mom helps answer review of system questions as she is with the patient .   Constitutional:  Negative for chills and fever.   HENT: Negative for congestion and sore throat.    Respiratory: Negative for cough.    Cardiovascular: Negative for leg swelling.   Gastrointestinal: Negative for constipation and diarrhea.   Genitourinary: Negative for dysuria.        Interrupted stream.    Neurological:        Poor initiation      All other pertinent positive review of systems are noted above in HPI.     Past Medical History:  Past Medical History:   Diagnosis Date   • Stroke (HCC)       Past Surgical History:   Procedure Laterality Date   • ANAL FISTULOTOMY          Current Outpatient Medications:   •  tamsulosin (FLOMAX) 0.4 MG capsule, Take 1 Cap by mouth every bedtime., Disp: 30 Cap, Rfl: 2  •  polyethylene glycol/lytes (MIRALAX) 17 g Pack, Take 1 Packet by mouth every day., Disp: 90 Each, Rfl: 2  •  mirtazapine (REMERON) 15 MG Tab, Take 1 Tab by mouth every bedtime., Disp: 30 Tab, Rfl: 2  •  levETIRAcetam (KEPPRA) 500 MG Tab, Take 1 Tab by mouth every day at 6 PM. PM only, Disp: 60 Tab, Rfl: 2  •  montelukast (SINGULAIR) 10 MG Tab, Take 1 Tab by mouth as needed (to reduce amount of pills needing to be taken daily )., Disp: 90 Tab, Rfl: 2  •  propranolol (INDERAL) 20 MG Tab, TAKE 1 TABLET BY MOUTH THREE TIMES DAILY FOR 90 DAYS, Disp: 270 Tab, Rfl: 0  •  bisacodyl (DULCOLAX) 10 MG Suppos, Insert 1 Suppository in rectum as needed (no BM for 3 days)., Disp: 30 Suppository, Rfl: 2  •  Cholecalciferol (VITAMIN D3) 5000 UNIT/ML Liquid, Take 1 mL by mouth every day., Disp: , Rfl:   •  amantadine (SYMMETREL) 50 MG/5ML Syrup, Take 5 mL by mouth every day. (Patient not taking: Reported on 10/20/2020), Disp: 300 mL, Rfl: 2  Allergies   Allergen Reactions   • Vancomycin Swelling   • Other Misc Rash     Allergic to name band.        Past Social History:  Social History     Socioeconomic History   • Marital status: Single     Spouse name: Not on file   • Number of children: Not on file   • Years of education: Not on file   •  Highest education level: Not on file   Occupational History   • Not on file   Social Needs   • Financial resource strain: Not on file   • Food insecurity     Worry: Not on file     Inability: Not on file   • Transportation needs     Medical: Not on file     Non-medical: Not on file   Tobacco Use   • Smoking status: Never Smoker   • Smokeless tobacco: Never Used   Substance and Sexual Activity   • Alcohol use: Never     Frequency: Never   • Drug use: Never   • Sexual activity: Not on file   Lifestyle   • Physical activity     Days per week: Not on file     Minutes per session: Not on file   • Stress: Not on file   Relationships   • Social connections     Talks on phone: Not on file     Gets together: Not on file     Attends Oriental orthodox service: Not on file     Active member of club or organization: Not on file     Attends meetings of clubs or organizations: Not on file     Relationship status: Not on file   • Intimate partner violence     Fear of current or ex partner: Not on file     Emotionally abused: Not on file     Physically abused: Not on file     Forced sexual activity: Not on file   Other Topics Concern   •  Service No   • Blood Transfusions No   • Caffeine Concern No   • Occupational Exposure No   • Hobby Hazards No   • Sleep Concern No   • Stress Concern No   • Weight Concern No   • Special Diet Yes   • Back Care No   • Exercise Yes   • Bike Helmet No   • Seat Belt Yes   • Self-Exams No   Social History Narrative    Lives with mom        Family History:  Family History   Problem Relation Age of Onset   • Hypertension Mother    • Glasses Mother    • Hypertension Brother    • Diabetes Maternal Grandfather    • Diabetes Paternal Grandmother        Depression and Opioid Screening  PHQ-9:  Depression Screen (PHQ-2/PHQ-9) 9/22/2020 9/24/2020 10/20/2020   PHQ-2 Total Score - - -   PHQ-2 Total Score 0 0 0   PHQ-9 Total Score - - -     Interpretation of PHQ-9 Total Score   Score Severity   1-4 No Depression    5-9 Mild Depression   10-14 Moderate Depression   15-19 Moderately Severe Depression   20-27 Severe Depression     Opioid Risk Score: No value filed.  Interpretation of Opioid Risk Score   Score 0-3 = Low risk of abuse. Do UDS at least once per year.  Score 4-7 = Moderate risk of abuse. Do UDS 1-4 times per year.  Score 8+ = High risk of abuse. Refer to specialist.      OBJECTIVE:   Vital Signs:  There were no vitals filed for this visit.     Pertinent Labs:  Lab Results   Component Value Date/Time    SODIUM 140 08/11/2020 10:00 AM    POTASSIUM 4.4 08/11/2020 10:00 AM    CHLORIDE 103 08/11/2020 10:00 AM    CO2 27 08/11/2020 10:00 AM    GLUCOSE 91 08/11/2020 10:00 AM    BUN 8 08/11/2020 10:00 AM    CREATININE 0.63 08/11/2020 10:00 AM       Lab Results   Component Value Date/Time    HBA1C 5.7 (H) 02/06/2020 06:07 AM       Lab Results   Component Value Date/Time    WBC 13.5 (H) 08/11/2020 10:00 AM    RBC 6.05 08/11/2020 10:00 AM    HEMOGLOBIN 17.1 08/11/2020 10:00 AM    HEMATOCRIT 51.4 08/11/2020 10:00 AM    MCV 85.0 08/11/2020 10:00 AM    MCH 28.3 08/11/2020 10:00 AM    MCHC 33.3 (L) 08/11/2020 10:00 AM    MPV 11.2 08/11/2020 10:00 AM    NEUTSPOLYS 74.40 (H) 08/11/2020 10:00 AM    LYMPHOCYTES 17.70 (L) 08/11/2020 10:00 AM    MONOCYTES 5.50 08/11/2020 10:00 AM    EOSINOPHILS 1.00 08/11/2020 10:00 AM    BASOPHILS 1.00 08/11/2020 10:00 AM       Lab Results   Component Value Date/Time    ASTSGOT 16 03/06/2020 06:13 AM    ALTSGPT 29 03/06/2020 06:13 AM        Physical Exam:   GEN: No apparent distress  HEENT: Head normocephalic.  Sclera nonicteric bilaterally, no ocular discharge appreciated bilaterally.  Tracheostomy site visualized and is healed.  PULMONARY: Breathing nonlabored on room air, no respiratory accessory muscle use.  Not requiring supplemental oxygen.   PSYCH: Mood and affect within normal limits.  NEURO: Awake, alert.  Answers questions appropriately.  Speech limited.  Dysarthric.  Aphasic.  Can say short  "phrases such as \"I hope you have a good day \".    Imaging: No new imaging pertinent to today's visit    ASSESSMENT/PLAN: Rey Medina  is a 25 y.o. 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 The following plan was discussed with the patient who is in agreement.     Visit Diagnoses     ICD-10-CM   1. Nontraumatic intraventricular intracerebral hemorrhage, unspecified laterality (HCC)  I61.5   2. Difficulty voiding  R39.198   3. Neurogenic bladder  N31.9   4. Neurogenic bowel  K59.2   5. Impaired mobility and activities of daily living  Z74.09    Z78.9   6. Aphasia  R47.01        Mom assist with majority of history given patient's aphasia    Rehab/Neuro:   1. 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   2.  No history of seizure, on Keppra.  Had EEG 5/21/2020  3.  Poor attention secondary to ICH requiring need for neuro-stimulant  4.  Blurry vision after ICH, did have reading glasses prior to stroke.  Blurry vision persistent.  Also having problems with peripheral vision on the right.  -Starting outpatient therapy next week.   -Off Amantadine, no changes, continue off.   -Functional status: Patient continues to make improvements.  Endurance improving, speech improving.  Spontaneous speech improving.    Pulm:  1.  S/p tracheostomy: Recent ENT visit.  -Per mom report, annual follow-up.    Bladder: Wears adult briefs.  Hesitancy initiating stream, interrupted stream when urinating.  -Med management: Prescription for Flomax 0.4 mg to be taken nightly.  -Consider urology referral in the future    Bowel:  -Counseled okay to continue taking " MiraLAX daily, prune juice, suppository every 3 days.    Follow up: 2 months, reevaluation    Please note that this dictation was created using voice recognition software. I have made every reasonable attempt to correct obvious errors but there may be errors of grammar and content that I may have overlooked prior to finalization of this note.    Dr. Mandy Luke DO, MS  Department of Physical Medicine & Rehabilitation  Neuro Rehabilitation Clinic  [facility]

## 2020-10-26 ENCOUNTER — OCCUPATIONAL THERAPY (OUTPATIENT)
Dept: OCCUPATIONAL THERAPY | Facility: REHABILITATION | Age: 25
End: 2020-10-26
Attending: PHYSICAL MEDICINE & REHABILITATION
Payer: MEDICAID

## 2020-10-26 ENCOUNTER — PHYSICAL THERAPY (OUTPATIENT)
Dept: PHYSICAL THERAPY | Facility: REHABILITATION | Age: 25
End: 2020-10-26
Attending: PHYSICAL MEDICINE & REHABILITATION
Payer: MEDICAID

## 2020-10-26 ENCOUNTER — SPEECH THERAPY (OUTPATIENT)
Dept: SPEECH THERAPY | Facility: REHABILITATION | Age: 25
End: 2020-10-26
Attending: PHYSICAL MEDICINE & REHABILITATION
Payer: MEDICAID

## 2020-10-26 DIAGNOSIS — R13.12 OROPHARYNGEAL DYSPHAGIA: ICD-10-CM

## 2020-10-26 DIAGNOSIS — I61.5 NONTRAUMATIC INTRAVENTRICULAR INTRACEREBRAL HEMORRHAGE, UNSPECIFIED LATERALITY (HCC): ICD-10-CM

## 2020-10-26 DIAGNOSIS — R47.1 DYSARTHRIA AND ANARTHRIA: ICD-10-CM

## 2020-10-26 DIAGNOSIS — I61.5 LEFT-SIDED NONTRAUMATIC INTRAVENTRICULAR INTRACEREBRAL HEMORRHAGE (HCC): ICD-10-CM

## 2020-10-26 PROCEDURE — 97116 GAIT TRAINING THERAPY: CPT | Mod: XE

## 2020-10-26 PROCEDURE — 97112 NEUROMUSCULAR REEDUCATION: CPT | Mod: XE

## 2020-10-26 PROCEDURE — 97110 THERAPEUTIC EXERCISES: CPT | Mod: XE

## 2020-10-26 PROCEDURE — 92526 ORAL FUNCTION THERAPY: CPT

## 2020-10-26 PROCEDURE — 97530 THERAPEUTIC ACTIVITIES: CPT | Mod: XE

## 2020-10-26 PROCEDURE — 92507 TX SP LANG VOICE COMM INDIV: CPT

## 2020-10-26 ASSESSMENT — SOCIAL DETERMINANTS OF HEALTH (SDOH): SOCIAL_SUPPORT_SYSTEM: PARENT

## 2020-10-26 NOTE — OP THERAPY DAILY TREATMENT
Outpatient Speech Therapy  DAILY TREATMENT     [facility] Speech [address].  [address]  [place] [address]  Phone:  [phone]  Fax:  [phone]    Date: 10/26/2020    Patient: Rey Medina  YOB: 1995  MRN: [id number]     Time Calculation    Start time: 1133  Stop time: 1230 Time Calculation (min): 57 minutes         Chief Complaint: Cough, Not Understood By Others, and Other (poor initiation)    Visit #: 2    Subjective:   Reason for Therapy:     Reason For Evaluation:  Dysphagia, Cognition and Dysarthria  Social Support:     Accompanied By:  Parent    Patient Mental Status:  Alert and Responsive  Additional Subjective Comments:      Swallowing medications whole now and thickening liquids still.  Initiating a little more per mom with talking to baby and what he is needing. Asking what and what's next when people are stopping in the middle of stories purposefully. Completing HEP oral motor tasks at home 1 set for 10 reps.  Wathing videos and repeating sounds.          Objective:   Treatments/Interventions Performed:  Home program, Cognitive-Linguistic training, Speech/Language treatment, Dysphagia treatment, Patient/Caregiver education, Compensatory strategy training and Respiratory Coordination / Support training  Treatment Intervention tool(s) used:  Addenbrooke's Cognitive Examination version A   Objective Details:  Oral motor exercises: ooo-eee, smile and pucker, kissy sound, cheek puff with lateralization, lingual protrusion with elevation, lingual press, lingual clicks, lingual cheek press, and velar sounds completed with sight to min assist.  Completing lingual circles around lip he would end up with a zig zag motion, difficult to Lummi lips on top.  Introduced Orville and Estefanía, provided handouts.  Effortful to complete 3 reps of Estefanía.  ACE III scored 51/100 with scores skewed as mother had to assist with interpretations and due to severe dysarthria.   This was a screening on what he can possibly understand and possible limits and strengths currently.  Attention 12/18 67%; Memory 8/26 31%; Fluency 8/14 57%; Language 17/26 65%; and Visuospatial 6/16 36%.            Speech Therapy Assessment:     Expressive Language Assessment:     Repeats speech accurately Severe.    Patient's ability to verbalize wants and needs is Severe.    Written Language Assessment:     Patient has ability to copy shapes Profound.      Patient ability to write full name accurately Severe.    Ability to generate sentences Profound (Mother reported that Rey can type out words and sentences independently).    Receptive Language Assessment:     Personal information yes/no questions: [facility]    Simple yes/no questions: Supervision Required    Patient follows 1 step simple commands: [facility]    Patient follows 2 step simple commands:[facility]    Patient follows 3 step simple commands: [facility]    Patient understands simple conversation: [facility]    Reading Comprehension Assessment:     Reading comprehension comments: Read 5/5 words     Cognitive Linguistic Assessment:     Patient attention sustained: Moderate    Patient orientation to day: Profound    Patient orientation to month: Profound    Patient orientation to time: Profound    Patient orientation to self: [facility]    Patient immediate memory: Minimal    Patient recent and short term memory: Profound    Patient biographical information memory: [facility]    Patient initiation and self monitoring ability: Severe    Patient spontaneous clock drawing ability: Profound      Speech Mechanism Assessment:     Patient voice description: Clear    Patient's oral movements are voluntary and coordination: Moderate    Patient speaks fluently: Profound    Patient exhibits articulatory precision: Profound    Patient exhibits single word intelligibility: Profound    Patient exhibits sentence level intelligibility: Profound    Patient exhibits conversational intelligibility: Profound    Patient  dysarthria: Profound    Patient uses adequate breath support: No    Patient breath rate: Slow    Oral Motor Status:     Labial strength and control for patient: Fair    Lingual strength and control for patient: Fair    Patient saliva management: Fair    Oral Phase Assessment:     Types of food within functional limits: Thin Liquid    Pharyngeal Phase Assessment:     Delayed swallow seconds (Thin Liquids): 5 seconds.    Laryngeal    Reduced laryngeal elevation: Thin Liquid      Speech Therapy Plan :   Prognosis & Recommendations  Impression Summary:  Rey returned for his first therapy session addressing cognition, dysarthria, and dysphagia.  Cognitive evaluation was difficult to complete due to communication deficits.  Mother assisted with interpretation of answers.  Noted moderately severe to severe deficits with recall, attention and executive function.  Field choice with recall was not effective.  Mother reported slight improvements in cognition. SLP noted increased attention and response to questions. Noted mild improvements in session today with velar sounds, but continues to have severe deficits with lingual strength and coordination which impact ability to communicate effectively.  Continues to demonstrate improved function with PO intake and is tolerating whole medications now and more solid foods.    Prognosis Details:     Compensatory swallow strategies:  Upright position 90 degrees during meal and 45 minutes after meal, Reduce bolus size, Finger/tongue sweep to clear pocketing, No straws and No talking while eating  Diet Recommendation:  Mechanical Soft Foods  Liquid Recommendation:  Nectar Thick  Goals  Short Term Goals:  -Patient to complete diaphragm breathing exercises to improve coordination of breath and PO intake and increase length of utterance in 10/15 sessions, mod assist.  -Patient to complete oral, pharyngeal, and laryngeal exercises to improve function and strength of swallow and speech  mechanisms to 85% accuracy in 2 of 3 sessions, min assist.  -Patient will improve memory recall, including working memory, short term and time delayed recall, implementing compensatory strategies as necessary, to newly learned information with 80% or greater accuracy, mod cues.  -Patient will complete selective, alternating and divided attention tasks given min to mod verbal prompts/ instruction/ cues to 85% accuracy.   -Patient will maintain the topic of conversation for 2 turns in 3 of 4 opportunities to improve functional communication.  -Patient will produce reduplicative syllables first in isolation (rudolph rudolph rudolph, chad chad chad)  then in succession (rudolph, chad, rj) for 30 trials with 80% accuracy, mod cues.  - Patient to initiate what task to complete in therapy within 5 minutes with field choice of 2 in 3/4 session, given min to mod cues.  Potential barriers to Goal Achievement:  None  Therapy Recommendations  Recommendation:  Individual Speech Therapy and Dysphagia Treatment,  Planned Therapy Interventions:  Development of Cognitive Skills, Self-care home management, Swallow Dysfunction treatment, Speech/Language training, Respiratory coordination/support training, Dysphagia treatment, Thermal/Tactile Stimulation, Cognitive-Linguistic training, Voice training, Patient/Caregiver Education, Home Program and Compensatory Strategy Training,   Plan Details:  Complete Shaker is physically safe, Estefanía, introduce more exercises.

## 2020-10-26 NOTE — OP THERAPY DAILY TREATMENT
"  Outpatient Occupational Therapy  DAILY TREATMENT     [facility] Occupational [address]  [address]  [place] [address]  Phone:  [phone]  Fax:  [phone]    Date: 10/26/2020    Patient: Rey Medina  YOB: 1995  MRN: [id number]     Time Calculation  Start time: 0300  Stop time: 0400 Time Calculation (min): 60 minutes         Chief Complaint: Hand Weakness (bilateral) and Other (cognitive-communication deficits)    Visit #: 2    SUBJECTIVE: \"Good\"    OBJECTIVE:  Current objective measures:    strength:  Left: 55lbs, Right: 50lbs  Moderate BUE dyskinesias L > R        Therapeutic Treatments and Modalities:    1. Therapeutic Activities (CPT 84878)    Therapeutic Treatments and Modalities Summary: Selection of requested colored small plastic cubes from mix of 6 colors on table and place in bucket, 1 color each hand performed with min cues to decrease speed and for accuracy of color identification.  Multi-matrix board using numbers in ascending order alternating hands performed in 18 minutes 30 seconds, mod cues for posture and control, cues to verbalize each subsequent number in order.  Repeated selecting letters in forward sequence verbalizing each letter and alternating hands for placement into bucket with mod cues for directions.  Seated level for grasp of 2lb wrist weight with BH to reach out of SHAHEEN to dynamic target with 3 second holds multiple reps.    Time-based treatments/modalities:  Therapeutic activity minutes (CPT 94910): 60 minutes        ASSESSMENT:   Response to treatment: Pt making good progress today with his UB strength and motor control along with improved cognitive-communication and visual attention skills in response to graded activities described above.  Educated pt/mother to continue with reaching exercises to add to his HEP.    PLAN/RECOMMENDATIONS:   Plan for treatment: therapy treatment to continue next visit.  Planned interventions " for next visit: continue with current treatment and therapeutic activities (CPT 62450)

## 2020-10-26 NOTE — OP THERAPY DAILY TREATMENT
Outpatient Physical Therapy  DAILY TREATMENT     [facility] Physical [address].  [address]  [place] [address]  Phone:  [phone]  Fax:  [phone]    Date: 10/26/2020    Patient: Rey Medina  YOB: 1995  MRN: [id number]     Time Calculation    Start time: 1333  Stop time: 1434 Time Calculation (min): 61 minutes         Chief Complaint: Difficulty Walking and Loss Of Balance    Visit #: 2    SUBJECTIVE:        OBJECTIVE:  Current objective measures:       Bed mobility: Difficulty clearing LUE with rolling and transfer supine>SL>quadruped; VC's req for attention to positioning of LUE    Therapeutic Exercises (CPT 60959):     1. Scooting to edge of bed, x2 min, mod A at unweighted hip to bring forward; reviewed; continued tendency of pt responding with full body extension    2. Seated balance with perturbations, x2 min, able to accept mod perturbations; reviewed    Therapeutic Treatments and Modalities:     1. Neuromuscular Re-education (CPT 61168), see below    2. Gait Training (CPT 64140), see below    Therapeutic Treatment and Modalities Summary: Neuro re-ed:  Seated weight shifting sagittal with arms crossed, 2x10//pt overcorrecting with VC's to slow down movement; requires close SBA for safety    Seated on bosu (blue) weight shifting sag>frontal 2x10 ea: modA x1 for set up with tech assist SBA x1 and for positioning bosu under pt;pt demonstrating full extension reaction with sit>stand and utilizing LE push off at mat, pt req CGA for safety anteriorly and SBA x 1 posteriorly for safety    Quadruped >quadruped with hand lifts: Pt requiring mod x 2 for set up; difficulty clearing LUE and LLE; able to maintain static hold x 30 sec before fatigue with observed elbow flexion, and increased bilat knee flexion into sitting; VC's to address hand placement  Quadruped WS fwd and bwd: SBA x 2// good weight shifting sagittal; increased excursion to L>R, compensating  with lateral bending and limited weight shifting observed at pelvis and LE's    Tall kneeling with UE support on bedside desk: 4x30 sec  modA x 2 with positioning; fatigues rapidly, difficulty breathing with face mask    Gait training:    Pre-gait activities:  Sit<>stand x2: FWW with modA x 2//pt grabbing walker with bilat UE's and push off at plinth with LE's; pt edu re: walker being unstable and one hand on stable surface for performing all transfers. Difficulty controlling descent stand>sit; VC's with continued difficulty. Pt able to stand tall with VC's for glute engagement.     Frequent rest breaks required throughout session due to fatigue and difficulty breathing with facial mask    Time-based treatments/modalities:    Physical Therapy Timed Treatment Charges  Gait training minutes (CPT 63140): 11 minutes  Neuromusc re-ed, balance, coor, post minutes (CPT 31053): 20 minutes  Therapeutic exercise minutes (CPT 72672): 15 minutes        ASSESSMENT:   Response to treatment:   Good upright position noted in tall kneeling with UE support and static standing with FWW with VC's for glute engagement req cga x2 once positioned. ModA x2 required for set up and transfers to tall kneeling and quadruped; difficulty clearing LUE and LLE during bed mobility. Demonstrating sit<>stand with uncontrolled descent despite verbal cuing; may benefit from different cuing next session.    Overall, demonstrating impaired core and proximal stability and may benefit from further training and strengthening in subsequent sessions.   Of note, Pt fatiguing rapidly in session today requiring several rest breaks, difficulty breathing secondary to pt's face mask.     PLAN/RECOMMENDATIONS:   Plan for treatment: therapy treatment to continue next visit.  Planned interventions for next visit: continue with current treatment. Review quadruped and tall kneeling. Continue progressing core and proximal stability; emphasis on controlled descent for all  transfers.     Return for 1x97014, 1x97116, 1x97110 and 1x97112 per visit for 10 visits.

## 2020-10-27 ENCOUNTER — OFFICE VISIT (OUTPATIENT)
Dept: MEDICAL GROUP | Facility: MEDICAL CENTER | Age: 25
End: 2020-10-27
Attending: FAMILY MEDICINE
Payer: MEDICAID

## 2020-10-27 VITALS
HEIGHT: 67 IN | OXYGEN SATURATION: 97 % | SYSTOLIC BLOOD PRESSURE: 110 MMHG | RESPIRATION RATE: 14 BRPM | BODY MASS INDEX: 25.9 KG/M2 | HEART RATE: 86 BPM | DIASTOLIC BLOOD PRESSURE: 64 MMHG | TEMPERATURE: 98.6 F | WEIGHT: 165 LBS

## 2020-10-27 DIAGNOSIS — G31.89 COGNITIVE AND NEUROBEHAVIORAL DYSFUNCTION FOLLOWING BRAIN INJURY (HCC): ICD-10-CM

## 2020-10-27 DIAGNOSIS — Z98.2 VP (VENTRICULOPERITONEAL) SHUNT STATUS: ICD-10-CM

## 2020-10-27 DIAGNOSIS — R45.86 MOOD CHANGES: ICD-10-CM

## 2020-10-27 DIAGNOSIS — Z86.79 HISTORY OF INTRACRANIAL HEMORRHAGE: ICD-10-CM

## 2020-10-27 DIAGNOSIS — F09 COGNITIVE AND NEUROBEHAVIORAL DYSFUNCTION FOLLOWING BRAIN INJURY (HCC): ICD-10-CM

## 2020-10-27 DIAGNOSIS — Z23 NEED FOR VACCINATION: ICD-10-CM

## 2020-10-27 DIAGNOSIS — R21 RASH: ICD-10-CM

## 2020-10-27 DIAGNOSIS — R00.0 TACHYCARDIA: ICD-10-CM

## 2020-10-27 DIAGNOSIS — S06.9XAS COGNITIVE AND NEUROBEHAVIORAL DYSFUNCTION FOLLOWING BRAIN INJURY (HCC): ICD-10-CM

## 2020-10-27 DIAGNOSIS — R39.81 URINARY INCONTINENCE DUE TO COGNITIVE IMPAIRMENT: ICD-10-CM

## 2020-10-27 PROBLEM — K59.00 CONSTIPATION: Status: ACTIVE | Noted: 2019-11-25

## 2020-10-27 PROBLEM — T85.598A FEEDING TUBE DYSFUNCTION: Status: RESOLVED | Noted: 2020-08-11 | Resolved: 2020-10-27

## 2020-10-27 PROBLEM — Z93.1 GASTROSTOMY TUBE DEPENDENT (HCC): Status: RESOLVED | Noted: 2020-01-24 | Resolved: 2020-10-27

## 2020-10-27 PROCEDURE — 90686 IIV4 VACC NO PRSV 0.5 ML IM: CPT

## 2020-10-27 PROCEDURE — 99203 OFFICE O/P NEW LOW 30 MIN: CPT | Performed by: FAMILY MEDICINE

## 2020-10-27 PROCEDURE — 99214 OFFICE O/P EST MOD 30 MIN: CPT | Performed by: FAMILY MEDICINE

## 2020-10-27 RX ORDER — MIRTAZAPINE 15 MG/1
7.5 TABLET, FILM COATED ORAL
Qty: 30 TAB | Refills: 2 | Status: ON HOLD
Start: 2020-10-27 | End: 2020-12-13

## 2020-10-27 SDOH — SOCIAL STABILITY: SOCIAL NETWORK: ARE YOU MARRIED, WIDOWED, DIVORCED, SEPARATED, NEVER MARRIED, OR LIVING WITH A PARTNER?: NEVER MARRIED

## 2020-10-27 SDOH — ECONOMIC STABILITY: FOOD INSECURITY: WITHIN THE PAST 12 MONTHS, YOU WORRIED THAT YOUR FOOD WOULD RUN OUT BEFORE YOU GOT MONEY TO BUY MORE.: NEVER TRUE

## 2020-10-27 SDOH — ECONOMIC STABILITY: HOUSING INSECURITY
IN THE LAST 12 MONTHS, WAS THERE A TIME WHEN YOU DID NOT HAVE A STEADY PLACE TO SLEEP OR SLEPT IN A SHELTER (INCLUDING NOW)?: NO

## 2020-10-27 SDOH — ECONOMIC STABILITY: TRANSPORTATION INSECURITY
IN THE PAST 12 MONTHS, HAS THE LACK OF TRANSPORTATION KEPT YOU FROM MEDICAL APPOINTMENTS OR FROM GETTING MEDICATIONS?: NO

## 2020-10-27 SDOH — SOCIAL STABILITY: SOCIAL NETWORK: HOW OFTEN DO YOU GET TOGETHER WITH FRIENDS OR RELATIVES?: MORE THAN THREE TIMES A WEEK

## 2020-10-27 SDOH — ECONOMIC STABILITY: INCOME INSECURITY: IN THE LAST 12 MONTHS, WAS THERE A TIME WHEN YOU WERE NOT ABLE TO PAY THE MORTGAGE OR RENT ON TIME?: NO

## 2020-10-27 SDOH — HEALTH STABILITY: MENTAL HEALTH
STRESS IS WHEN SOMEONE FEELS TENSE, NERVOUS, ANXIOUS, OR CAN'T SLEEP AT NIGHT BECAUSE THEIR MIND IS TROUBLED. HOW STRESSED ARE YOU?: NOT AT ALL

## 2020-10-27 SDOH — HEALTH STABILITY: PHYSICAL HEALTH: ON AVERAGE, HOW MANY DAYS PER WEEK DO YOU ENGAGE IN MODERATE TO STRENUOUS EXERCISE (LIKE A BRISK WALK)?: 3 DAYS

## 2020-10-27 SDOH — HEALTH STABILITY: PHYSICAL HEALTH: ON AVERAGE, HOW MANY MINUTES DO YOU ENGAGE IN EXERCISE AT THIS LEVEL?: 20 MIN

## 2020-10-27 SDOH — SOCIAL STABILITY: SOCIAL NETWORK
DO YOU BELONG TO ANY CLUBS OR ORGANIZATIONS SUCH AS CHURCH GROUPS UNIONS, FRATERNAL OR ATHLETIC GROUPS, OR SCHOOL GROUPS?: NO

## 2020-10-27 SDOH — SOCIAL STABILITY: SOCIAL NETWORK: HOW OFTEN DO YOU ATTENT MEETINGS OF THE CLUB OR ORGANIZATION YOU BELONG TO?: NEVER

## 2020-10-27 SDOH — HEALTH STABILITY: MENTAL HEALTH: HOW OFTEN DO YOU HAVE 6 OR MORE DRINKS ON ONE OCCASION?: NEVER

## 2020-10-27 SDOH — HEALTH STABILITY: PHYSICAL HEALTH: ON AVERAGE, HOW MANY MINUTES DO YOU ENGAGE IN EXERCISE AT THIS LEVEL?: 20 MINUTES

## 2020-10-27 SDOH — ECONOMIC STABILITY: INCOME INSECURITY: HOW HARD IS IT FOR YOU TO PAY FOR THE VERY BASICS LIKE FOOD, HOUSING, MEDICAL CARE, AND HEATING?: NOT HARD AT ALL

## 2020-10-27 SDOH — ECONOMIC STABILITY: HOUSING INSECURITY: IN THE LAST 12 MONTHS, HOW MANY PLACES HAVE YOU LIVED?: 1

## 2020-10-27 SDOH — ECONOMIC STABILITY: FOOD INSECURITY: WITHIN THE PAST 12 MONTHS, THE FOOD YOU BOUGHT JUST DIDN'T LAST AND YOU DIDN'T HAVE MONEY TO GET MORE.: NEVER TRUE

## 2020-10-27 SDOH — SOCIAL STABILITY: SOCIAL NETWORK: IN A TYPICAL WEEK, HOW MANY TIMES DO YOU TALK ON THE PHONE WITH FAMILY, FRIENDS, OR NEIGHBORS?: NEVER

## 2020-10-27 SDOH — HEALTH STABILITY: MENTAL HEALTH: HOW MANY STANDARD DRINKS CONTAINING ALCOHOL DO YOU HAVE ON A TYPICAL DAY?: PATIENT DECLINED

## 2020-10-27 SDOH — ECONOMIC STABILITY: TRANSPORTATION INSECURITY
IN THE PAST 12 MONTHS, HAS LACK OF RELIABLE TRANSPORTATION KEPT YOU FROM MEDICAL APPOINTMENTS, MEETINGS, WORK OR FROM GETTING THINGS NEEDED FOR DAILY LIVING?: NO

## 2020-10-27 SDOH — SOCIAL STABILITY: SOCIAL NETWORK: HOW OFTEN DO YOU ATTEND CHURCH OR RELIGIOUS SERVICES?: NEVER

## 2020-10-27 SDOH — ECONOMIC STABILITY: TRANSPORTATION INSECURITY
IN THE PAST 12 MONTHS, HAS LACK OF TRANSPORTATION KEPT YOU FROM MEETINGS, WORK, OR FROM GETTING THINGS NEEDED FOR DAILY LIVING?: NO

## 2020-10-27 ASSESSMENT — SOCIAL DETERMINANTS OF HEALTH (SDOH)
HOW OFTEN DO YOU HAVE A DRINK CONTAINING ALCOHOL: NEVER
HOW MANY DRINKS CONTAINING ALCOHOL DO YOU HAVE ON A TYPICAL DAY WHEN YOU ARE DRINKING: DECLINE
WITHIN THE PAST 12 MONTHS, THE FOOD YOU BOUGHT JUST DIDN'T LAST AND YOU DIDN'T HAVE MONEY TO GET MORE: NEVER TRUE
WITHIN THE PAST 12 MONTHS, YOU WORRIED THAT YOUR FOOD WOULD RUN OUT BEFORE YOU GOT THE MONEY TO BUY MORE: NEVER TRUE
HOW OFTEN DO YOU GET TOGETHER WITH FRIENDS OR RELATIVES?: MORE THAN THREE TIMES A WEEK
DO YOU BELONG TO ANY CLUBS OR ORGANIZATIONS SUCH AS CHURCH GROUPS UNIONS, FRATERNAL OR ATHLETIC GROUPS, OR SCHOOL GROUPS?: NO
HOW OFTEN DO YOU ATTENT MEETINGS OF THE CLUB OR ORGANIZATION YOU BELONG TO?: NEVER
ARE YOU MARRIED, WIDOWED, DIVORCED, SEPARATED, NEVER MARRIED, OR LIVING WITH A PARTNER?: NEVER MARRIED
HOW HARD IS IT FOR YOU TO PAY FOR THE VERY BASICS LIKE FOOD, HOUSING, MEDICAL CARE, AND HEATING?: NOT HARD AT ALL
IN A TYPICAL WEEK, HOW MANY TIMES DO YOU TALK ON THE PHONE WITH FAMILY, FRIENDS, OR NEIGHBORS?: NEVER
HOW OFTEN DO YOU HAVE SIX OR MORE DRINKS ON ONE OCCASION: NEVER
HOW OFTEN DO YOU ATTEND CHURCH OR RELIGIOUS SERVICES?: NEVER

## 2020-10-27 ASSESSMENT — FIBROSIS 4 INDEX: FIB4 SCORE: 0.31

## 2020-10-27 NOTE — PROGRESS NOTES
Subjective:     CC:    Chief Complaint   Patient presents with   • Establish Care       HISTORY OF THE PRESENT ILLNESS: Patient is a 25 y.o. male. This pleasant patient is here today to establish care.    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 This occurred in [facility]  Managed in the [facility] until 11/2019, pt moved to [place]  2/5/2020-2/21/2020 admission to inpatient rehab for rehabilitation with improvement. 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 Mom states that he was not participating in therapy and so this was started and helped. Mom interested in deescalating as he is doing well now  - receiving PT/ST/OT as outpatient     Urinary incontinence due to cognitive impairment  Recently started on flomax    Rash  Mom is concerned about his keppra causing rash  Not itching  Has been taking for > 1 year and has had the rash since in [place]  Papules come and go, sometimes express purulence  Chest, back, neck, arms, nape of neck  Mom has tried light lotions for him, cetaphil. Mom thinks it helped a little bit.  Hasn't tried other OTC creams, ointments, medications  When decreased keppra, mom thinks it helped his rash  Never had a sz, eeg 5/2020 was normal per neuro  New neurologist, appt 12/8/2020            Tachycardia  Was started on propranolol 20mg tid during rehab inpatient stay in 02/2020 and has been on it since then. At the time likely due to sepsis (aspiration pna) or agitation due to poor sleep  These issues have since resolved  Mom doesn't quite know what he is on it for  HR was normal prior to admission to hospital  Mom is agreeable to  deescalation      Also taking lion's hossein mushroom as a supplement, mom thinks this is helping      Allergies: Vancomycin and Other misc    Current Outpatient Medications Ordered in Epic   Medication Sig Dispense Refill   • mirtazapine (REMERON) 15 MG Tab Take 0.5 Tabs by mouth every bedtime. 30 Tab 2   • tamsulosin (FLOMAX) 0.4 MG capsule Take 1 Cap by mouth every bedtime. 30 Cap 2   • polyethylene glycol/lytes (MIRALAX) 17 g Pack Take 1 Packet by mouth every day. 90 Each 2   • levETIRAcetam (KEPPRA) 500 MG Tab Take 1 Tab by mouth every day at 6 PM. PM only 60 Tab 2   • montelukast (SINGULAIR) 10 MG Tab Take 1 Tab by mouth as needed (to reduce amount of pills needing to be taken daily ). 90 Tab 2   • propranolol (INDERAL) 20 MG Tab TAKE 1 TABLET BY MOUTH THREE TIMES DAILY FOR 90 DAYS 270 Tab 0   • bisacodyl (DULCOLAX) 10 MG Suppos Insert 1 Suppository in rectum as needed (no BM for 3 days). 30 Suppository 2   • Cholecalciferol (VITAMIN D3) 5000 UNIT/ML Liquid Take 1 mL by mouth every day.       No current [facility]-ordered facility-administered medications on file.        Past Medical History:   Diagnosis Date   • ICH (intracerebral hemorrhage) (HCC)    • Stroke (HCC)        Past Surgical History:   Procedure Laterality Date   • ANAL FISTULOTOMY     • PEG PLACEMENT     • TRACHEOSTOMY     •  SHUNT INSERTION         Social History     Tobacco Use   • Smoking status: Never Smoker   • Smokeless tobacco: Never Used   Substance Use Topics   • Alcohol use: Never     Frequency: Never     Drinks per session: Patient refused     Binge frequency: Never   • Drug use: Never       Social History     Social History Narrative    Lives with mom       Family History   Problem Relation Age of Onset   • Hypertension Mother    • Glasses Mother    • Thyroid Mother    • Diabetes Maternal Grandfather    • Stroke Maternal Grandfather    • Diabetes Paternal Grandmother    • Hypertension Maternal Uncle    • Heart Disease Neg Hx    • Cancer Neg  "Hx        Health Maintenance: flu shot completed today    ROS: (per mom)  Gen: no fevers/chills  Eyes: poor vision  ENT: often clears his throat  Pulm: no cough  CV: no chest pain, no palpitations  GI: (+) constipation  : (+) hesitancy with urination  Skin: (+) rash per HPI  Neuro: able to stand against wall, respond to questions  Heme/Lymph: no easy bruising      Objective:     Exam: /64 (BP Location: Left arm, Patient Position: Sitting, BP Cuff Size: Adult)   Pulse 86   Temp 37 °C (98.6 °F) (Temporal)   Resp 14   Ht 1.702 m (5' 7\")   Wt 74.8 kg (165 lb)   SpO2 97%  Body mass index is 25.84 kg/m².    General: Normal appearing. No distress. Responds appropriately to commands, able to phonate  HEENT: Normocephalic. Eyes conjunctiva clear lids without ptosis, pupils equal and reactive to light accommodation, ears normal shape and contour, canals are clear bilaterally, L tympanic membrane benign (R with wax, TM could not be visualized), nasal mucosa benign, oropharynx is without erythema, edema or exudates.   Neck: Supple. Thyroid is not enlarged. Former trach site is well healed, no oozing  Pulmonary: Clear to ausculation.  Normal effort. No rales, ronchi, or wheezing.  Cardiovascular: Regular rate and rhythm without murmur. Radial pulses are intact and equal bilaterally.  Abdomen: Soft, nontender, nondistended. Normal bowel sounds. Prior PEG site is well healed, mom reports occasional clear discharge but gauze is intact  Neurologic: Sensation is grossly intact. Responds to questions and commands. Speech moderately fluent  Lymph: No cervical or supraclavicular lymph nodes are palpable  Skin: Warm and dry.  Papules, few nodular with possible cystic component, over chest, back, few on the neck. More dense on the back and chest, scars of old lesions also present.  Musculoskeletal: Good strength in upper and lower extremities. Presents in wheelchair. Mom reports not yet working on walking  Psych: Normal mood " and affect.       Labs: reviewed from prior admissions    Assessment & Plan:   25 y.o. male with the following -    1. History of intracranial hemorrhage  Reviewed patient's chart, discussed history with mom.   Neuro f/u 12/2020  Neurosurgery request records sent. MRI coming up    2. Mood changes  - mirtazapine (REMERON) 15 MG Tab; Take 0.5 Tabs by mouth every bedtime.  Dispense: 30 Tab; Refill: 2  Will try to decrease 7.5mg as mom is interested in de-escalating medications and pt is doing well.     3. 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 Rash  Possibly due to keppra. Pt to discuss with neuro in December regarding discontinuing or changing to another to see if this will improve rash.     5. Urinary incontinence due to cognitive impairment  Not much improvement yet, can also increase to 0.8 in future. Mom is okay with giving it longer to continue to try .    6. Tachycardia  De-escalating to 10mg tid, will titrate off at next visit if no concerns. Mom to take regular vital signs and notify me with any concerns.     6. Need for vaccination  - Influenza Vaccine Quad Injection (PF)      Return in about 3 months (around 1/27/2021).    Please note that this dictation was created using voice recognition software. I have made every reasonable attempt to correct obvious errors, but I expect that there are errors of grammar and possibly content that I did not discover before finalizing the note.

## 2020-10-27 NOTE — ASSESSMENT & PLAN NOTE
- Was on mirtazapine by prior PCP for mood, sleep. Mom states that he was not participating in therapy and so this was started and helped. Mom interested in deescalating as he is doing well now  - receiving PT/ST/OT as outpatient

## 2020-10-27 NOTE — ASSESSMENT & PLAN NOTE
Was started on propranolol 20mg tid during rehab inpatient stay in 02/2020 and has been on it since then. At the time likely due to sepsis (aspiration pna) or agitation due to poor sleep  These issues have since resolved  Mom doesn't quite know what he is on it for  HR was normal prior to admission to hospital  Mom is agreeable to deescalation

## 2020-10-27 NOTE — ASSESSMENT & PLAN NOTE
Mom is concerned about his keppra causing rash  Not itching  Has been taking for > 1 year and has had the rash since in [place]  Papules come and go, sometimes express purulence  Chest, back, neck, arms, nape of neck  Mom has tried light lotions for him, cetaphil. Mom thinks it helped a little bit.  Hasn't tried other OTC creams, ointments, medications  When decreased keppra, mom thinks it helped his rash  Never had a sz, eeg 5/2020 was normal per neuro  New neurologist, appt 12/8/2020

## 2020-10-27 NOTE — LETTER
[employer]  Sherie Power M.D.  [address]  [place] [address]  Fax: [phone]   Authorization for Release/Disclosure of   Protected Health Information   Name: DAREK PARK : 1995 SSN: [id number]   Address: [address] [place] [address] [address]  [place] [address] Phone:    [phone] (home)    I authorize the entity listed below to release/disclose the PHI below to:   [facility]/Sherie Power M.D. and Sherie Power M.D.   Provider or Entity Name:  Dr. Daniel Walton - [facility]    Address   [facility]  [address] [facility]  Phone:      Fax:     Reason for request: continuity of care   Information to be released:    [  ] LAST COLONOSCOPY,  including any PATH REPORT and follow-up  [  ] LAST FIT/COLOGUARD RESULT [  ] LAST DEXA  [  ] LAST MAMMOGRAM  [  ] LAST PAP  [  ] LAST LABS [  ] RETINA EXAM REPORT  [  ] IMMUNIZATION RECORDS  [ X ] Release all info      [  ] Check here and initial the line next to each item to release ALL health information INCLUDING  _____ Care and treatment for drug and / or alcohol abuse  _____ HIV testing, infection status, or AIDS  _____ Genetic Testing    DATES OF SERVICE OR TIME PERIOD TO BE DISCLOSED: _____________  I understand and acknowledge that:  * This Authorization may be revoked at any time by you in writing, except if your health information has already been used or disclosed.  * Your health information that will be used or disclosed as a result of you signing this authorization could be re-disclosed by the recipient. If this occurs, your re-disclosed health information may no longer be protected by State or Federal laws.  * You may refuse to sign this Authorization. Your refusal will not affect your ability to obtain treatment.  * This Authorization becomes effective upon signing and will  on (date) __________.      If no date is indicated, this Authorization will  one (1) year from the signature date.    Name: Darek Lawsonkai  Adam    Signature:   Date:     10/27/2020       PLEASE FAX REQUESTED RECORDS BACK TO: [phone]

## 2020-10-28 ENCOUNTER — PHYSICAL THERAPY (OUTPATIENT)
Dept: PHYSICAL THERAPY | Facility: REHABILITATION | Age: 25
End: 2020-10-28
Attending: PHYSICAL MEDICINE & REHABILITATION
Payer: MEDICAID

## 2020-10-28 ENCOUNTER — OCCUPATIONAL THERAPY (OUTPATIENT)
Dept: OCCUPATIONAL THERAPY | Facility: REHABILITATION | Age: 25
End: 2020-10-28
Attending: PHYSICAL MEDICINE & REHABILITATION
Payer: MEDICAID

## 2020-10-28 ENCOUNTER — SPEECH THERAPY (OUTPATIENT)
Dept: SPEECH THERAPY | Facility: REHABILITATION | Age: 25
End: 2020-10-28
Attending: PHYSICAL MEDICINE & REHABILITATION
Payer: MEDICAID

## 2020-10-28 DIAGNOSIS — I61.5 NONTRAUMATIC INTRAVENTRICULAR INTRACEREBRAL HEMORRHAGE, UNSPECIFIED LATERALITY (HCC): ICD-10-CM

## 2020-10-28 DIAGNOSIS — R13.12 OROPHARYNGEAL DYSPHAGIA: ICD-10-CM

## 2020-10-28 DIAGNOSIS — R47.1 DYSARTHRIA AND ANARTHRIA: ICD-10-CM

## 2020-10-28 DIAGNOSIS — I61.5 LEFT-SIDED NONTRAUMATIC INTRAVENTRICULAR INTRACEREBRAL HEMORRHAGE (HCC): ICD-10-CM

## 2020-10-28 PROCEDURE — 92526 ORAL FUNCTION THERAPY: CPT

## 2020-10-28 PROCEDURE — 97116 GAIT TRAINING THERAPY: CPT | Mod: XE

## 2020-10-28 PROCEDURE — 92507 TX SP LANG VOICE COMM INDIV: CPT

## 2020-10-28 PROCEDURE — 97112 NEUROMUSCULAR REEDUCATION: CPT | Mod: XE

## 2020-10-28 PROCEDURE — 97110 THERAPEUTIC EXERCISES: CPT | Mod: XE

## 2020-10-28 PROCEDURE — 97530 THERAPEUTIC ACTIVITIES: CPT | Mod: XE

## 2020-10-28 ASSESSMENT — SOCIAL DETERMINANTS OF HEALTH (SDOH): SOCIAL_SUPPORT_SYSTEM: PARENT

## 2020-10-28 NOTE — OP THERAPY DAILY TREATMENT
"  Outpatient Occupational Therapy  DAILY TREATMENT     [facility] Occupational Therapy [address]  [address]  [place] [address]  Phone:  [phone]  Fax:  [phone]    Date: 10/28/2020    Patient: Rey Medina  YOB: 1995  MRN: [id number]     Time Calculation  Start time: 0230  Stop time: 0330 Time Calculation (min): 60 minutes         Chief Complaint: Self Care Duties and Other (weakness, dyskinesias)    Visit #: 3    SUBJECTIVE: \"Ok\"    OBJECTIVE:  Current objective measures:   Sitting balance: static/dynamic: Fair plus        Therapeutic Treatments and Modalities:    1. Therapeutic Activities (CPT 72280)    Therapeutic Treatments and Modalities Summary: BH holding 2.5lb ball for reaching to dynamic target for 10 second holds x 12 reps.  BH holding 2.5lb ball for rolling down/up each leg x 10 reps each side, cues for upright posture between reps.  Seated EOM for postural control exercises with changes in arm positions x 10 reps of 10 second holds.  Seated EOM for crossing 1 foot over contralateral thigh for donning/doffing shoes with BH x 6 reps each and CGA..  Lateral WS for placement of wash cloth under buttocks seated level and removal from other side x 10 reps    Time-based treatments/modalities:  Therapeutic activity minutes (CPT 65295): 60 minutes        ASSESSMENT:   Response to treatment: Pt making good progress today in the areas of UE/core strength and postural control to perform graded ADL-related activities which require bilateral integration as described above.  To add functional exercises to HEP.    PLAN/RECOMMENDATIONS:   Plan for treatment: therapy treatment to continue next visit.  Planned interventions for next visit: continue with current treatment and therapeutic activities (CPT 18000)       "

## 2020-10-28 NOTE — OP THERAPY DAILY TREATMENT
Outpatient Speech Therapy  DAILY TREATMENT     [address]  [address]  [place] [address]  Phone:  [phone]  Fax:  [phone]    Date: 10/28/2020    Patient: Rey Medina  YOB: 1995  MRN: [id number]     Time Calculation    Start time: 1132  Stop time: 1228 Time Calculation (min): 56 minutes         Chief Complaint: Not Understood By Others and Poor Memory    Visit #: 3    Subjective:   Reason for Therapy:     Reason For Evaluation:  Dysphagia and Dysarthria  Social Support:     Accompanied By:  Parent (Mom in session)    Patient Mental Status:  Alert and Responsive  Additional Subjective Comments:      No updates      Objective:   Treatments/Interventions Performed:  Dysphagia treatment, Home program, Speech/Language treatment, Respiratory Coordination / Support training and Compensatory strategy training  Objective Details:  Oropharyngeal and laryngeal exercises completed:  Shaker completed sustained 60 x 2 and repetitive 10 and 30 repetitions.    Estefanía x 5, then fatigues.  Severe lingual pumping.    Laryngeal elevation falsetto x 10 with mod assist.  Lingual push on alveolar ridge- held 5 seconds x 10 provided min tactile cues.  Labial hold with tongue depressor x 5 weakness after 3 reps  Lingual protrusion downward x 10 moderate weakness.  Lingual resistance x 10 all directions, min cues to resist, fatigues at 7th repetition.  Lingual clicks and kissy sound x 10, independent.  Intake NTL no deficits.  Bilabial, labiodental, dental, alveolar, velar, and glottal sounds completed with CV provided max cues and model for 35% accuracy. When provided auditory feedback, significant improvements with trying to model sound correctly.         Speech Therapy Assessment:     Receptive Language Assessment:     Patient understands simple conversation: WFL    Speech Mechanism Assessment:     Patient's oral movements are voluntary and coordination:  "Moderate    Patient exhibits articulatory precision: Profound    Patient exhibits single word intelligibility: Severe    Patient exhibits sentence level intelligibility: Profound    Patient exhibits conversational intelligibility: Profound    Patient dysarthria: Severe    Patient uses adequate breath support: No    ST Speech Mechanism/Voice Assessment L12: irregular.  Speech mechanism comments: Educated on cognates and voice versus voiceless sounds and how his articulation impacts intelligibility.  \"I don't remember.\" \"I don't know.\" Can speak French, tagalong, English. \"Have a nice day.\" All phrases SLP able to comprehend independently.  When slowed rate of speech, he did increase intelligibility.      Speech Therapy Plan :   Prognosis & Recommendations  Impression Summary:  Session addressed swallowing exercises and education on how to complete correctly.   Demonstrating increased lingual coordination and increased oropharyngeal strength.  Continue to demonstrate fatigue before 1 set of ten with exercises, but he is increasing with motivation and tolerating more each time.  Prognosis Details:   Progressing towards goals.  Compensatory swallow strategies:  Upright position 90 degrees during meal and 45 minutes after meal, Reduce bolus size, Finger/tongue sweep to clear pocketing, No straws and No talking while eating  Diet Recommendation:  Mechanical Soft Foods  Liquid Recommendation:  Nectar Thick  Goals  Short Term Goals:  -Patient to complete diaphragm breathing exercises to improve coordination of breath and PO intake and increase length of utterance in 10/15 sessions, mod assist.  -Patient to complete oral, pharyngeal, and laryngeal exercises to improve function and strength of swallow and speech mechanisms to 85% accuracy in 2 of 3 sessions, min assist.  -Patient will improve memory recall, including working memory, short term and time delayed recall, implementing compensatory strategies as necessary, to " newly learned information with 80% or greater accuracy, mod cues.  -Patient will complete selective, alternating and divided attention tasks given min to mod verbal prompts/ instruction/ cues to 85% accuracy.   -Patient will maintain the topic of conversation for 2 turns in 3 of 4 opportunities to improve functional communication.  -Patient will produce reduplicative syllables first in isolation (rudolph rudolph rudolph, chad chad chad)  then in succession (rudolph, chad, rj) for 30 trials with 80% accuracy, mod cues.  - Patient to initiate what task to complete in therapy within 5 minutes with field choice of 2 in 3/4 session, given min to mod cues.  Potential barriers to Goal Achievement:  None  Therapy Recommendations  Recommendation:  Individual Speech Therapy and Dysphagia Treatment,  Planned Therapy Interventions:  Development of Cognitive Skills, Self-care home management, Swallow Dysfunction treatment, Speech/Language training, Respiratory coordination/support training, Dysphagia treatment, Thermal/Tactile Stimulation, Cognitive-Linguistic training, Voice training, Patient/Caregiver Education, Home Program and Compensatory Strategy Training,   Plan Details:  Minimal pairs, trials of thin

## 2020-10-28 NOTE — OP THERAPY DAILY TREATMENT
Outpatient Physical Therapy  DAILY TREATMENT     [facility] Physical [address].  [address]  [place] [address]  Phone:  [phone]  Fax:  [phone]    Date: 10/28/2020    Patient: Rey Medina  YOB: 1995  MRN: [id number]     Time Calculation    Start time: 1332  Stop time: 1431 Time Calculation (min): 59 minutes         Chief Complaint: Difficulty Walking and Loss Of Balance    Visit #: 3    SUBJECTIVE:  Pt's mom reporting is having pt stand at wall at home with supervision. Additionally, reporting pt is performing bridges daily. Pt reportedly in wheelchair 8 hours a day.        OBJECTIVE:  Current objective measures:       Seated with significant posterior pelvic tilt    Therapeutic Exercises (CPT 00026):     1. Scooting to edge of bed, x2 min, mod A at unweighted hip to bring forward; reviewed; continued tendency of pt responding with full body extension    2. Seated balance with perturbations feet on bosu, x2 min, able to accept mod perturbations; reviewed    3. Seated balance with perturbations feet elevated from ground, x2 min    Therapeutic Treatments and Modalities:     1. Neuromuscular Re-education (CPT 35313), see below    2. Gait Training (CPT 67836), see below    Therapeutic Treatment and Modalities Summary: Neuro re-ed:  Quadruped endurance hold: Pt requiring mod x 2 for set up; difficulty clearing LUE and LLE; able to maintain static hold x 30 sec before fatigue with muscled juddering; VC's to address hand placement    Gait training:  Pre-gait activities:  Sit<>stand x3 with maintained standing hold x30 sec: FWW with modA x 2//reviewed pt not grabbing walker and using plinth for push off. Difficulty controlling descent stand>sit; VC's with continued difficulty. Pt req repeated VC's for glute engagement. Fatigue following standing 30 sec.      Time-based treatments/modalities:    Physical Therapy Timed Treatment Charges  Gait training minutes (CPT  77674): 15 minutes  Neuromusc re-ed, balance, coor, post minutes (CPT 01189): 22 minutes  Therapeutic exercise minutes (CPT 41076): 12 minutes    ASSESSMENT:   Response to treatment:   Delayed start to session due to pt's mother needing to change pt's brief at initiation of therapy. Continued difficulty clearing UE with transfer during bed mobility; may benefit from continued UE strengthening. Continued emphasis on today's session on proximal stability; pt continuing to fatigue rapidly with muscle juddering noted BUE's in session today requiring several rest breaks, difficulty breathing secondary to pt's face mask. Pt requires repeated cues to monitor hand and finger placement.    PLAN/RECOMMENDATIONS:   Plan for treatment: therapy treatment to continue next visit.  Planned interventions for next visit: continue with current treatment. Review quadruped and tall kneeling. Continue progressing core and proximal stability.      Return for 1x97014, 1x97116, 1x97110 and 1x97112 per visit for 10 visits.

## 2020-11-02 ENCOUNTER — OCCUPATIONAL THERAPY (OUTPATIENT)
Dept: OCCUPATIONAL THERAPY | Facility: REHABILITATION | Age: 25
End: 2020-11-02
Attending: PHYSICAL MEDICINE & REHABILITATION
Payer: MEDICAID

## 2020-11-02 ENCOUNTER — PHYSICAL THERAPY (OUTPATIENT)
Dept: PHYSICAL THERAPY | Facility: REHABILITATION | Age: 25
End: 2020-11-02
Attending: PHYSICAL MEDICINE & REHABILITATION
Payer: MEDICAID

## 2020-11-02 DIAGNOSIS — I61.5 LEFT-SIDED NONTRAUMATIC INTRAVENTRICULAR INTRACEREBRAL HEMORRHAGE (HCC): ICD-10-CM

## 2020-11-02 DIAGNOSIS — I61.5 NONTRAUMATIC INTRAVENTRICULAR INTRACEREBRAL HEMORRHAGE, UNSPECIFIED LATERALITY (HCC): ICD-10-CM

## 2020-11-02 PROCEDURE — 97110 THERAPEUTIC EXERCISES: CPT

## 2020-11-02 PROCEDURE — 97116 GAIT TRAINING THERAPY: CPT | Mod: XE

## 2020-11-02 PROCEDURE — 97530 THERAPEUTIC ACTIVITIES: CPT

## 2020-11-02 PROCEDURE — 97112 NEUROMUSCULAR REEDUCATION: CPT

## 2020-11-02 NOTE — OP THERAPY DAILY TREATMENT
"  Outpatient Occupational Therapy  DAILY TREATMENT     [facility] Occupational Therapy [address].  [address]  [place] [address]  Phone:  [phone]  Fax:  [phone]    Date: 11/02/2020    Patient: Rey Medina  YOB: 1995  MRN: [id number]     Time Calculation  Start time: 0100  Stop time: 0200 Time Calculation (min): 60 minutes         Chief Complaint: Self Care Duties, Extremity Weakness (dyskinesia), and Other (cognitive-communication deficits)    Visit #: 4    SUBJECTIVE: \"He's been practicing putting on his socks and shoes\"    OBJECTIVE:  Current objective measures:   Sitting balance: static/dynamic: Fair plus        Therapeutic Treatments and Modalities:    1. Therapeutic Activities (CPT 78525)    Therapeutic Treatments and Modalities Summary: Large pegboard for placement of pegs left vs right hand crossing midline while maintaining forward trunk 20 pegs each hand.  Removal of pegs with board at midline alternating hands placing in dynamic bucket to reach out of SHAHEEN.   Seated EOM using green theraband for clothing management exercise (band over feet, raise up, weight shift side to side to pull up over buttocks) x 4 reps.  Seated push-up block exercise with elbows slightly flexed, thoracic/cervical extension x 10 reps of 10 second holds.    Time-based treatments/modalities:  Therapeutic activity minutes (CPT 73551): 60 minutes        ASSESSMENT:   Response to treatment: Pt making excellent progress today with his UB/core strength and motor control in response to graded challenges described above to improve the components required to perform his self-care duties.  HEP updated with good understanding.    PLAN/RECOMMENDATIONS:   Plan for treatment: therapy treatment to continue next visit.  Planned interventions for next visit: continue with current treatment and therapeutic activities (CPT 90771)       "

## 2020-11-02 NOTE — OP THERAPY DAILY TREATMENT
Outpatient Physical Therapy  DAILY TREATMENT     [facility] Physical [address] [place] [address]  [address]  [place] [address]  Phone:  [phone]  Fax:  [phone]    Date: 11/02/2020    Patient: Rey Medina  YOB: 1995  MRN: [id number]     Time Calculation    Start time: 1403  Stop time: 1452 Time Calculation (min): 49 minutes         Chief Complaint: Difficulty Walking and Loss Of Balance    Visit #: 4    SUBJECTIVE:  Pt's mom reporting pt is performing quadruped positioning at home for hep. Reports pt is standing few times per day.      OBJECTIVE:  Current objective measures:       Sacral sitting on plinth  Transfer WC<>mat table, mod A; does not indep move feet during transfer    Therapeutic Exercises (CPT 91386):     1. Scooting to edge of bed, x2 min, mod A at unweighted hip to bring forward; pt able to perform in wheelchair with l/s extension at seat, unable to perform indep on plinth, improved with VC and demo for elbow WB with mod A at hip; edu to mother to start performing scooting this way at home    Therapeutic Treatments and Modalities:     1. Neuromuscular Re-education (CPT 18584), see below    2. Gait Training (CPT 79575), see below    Therapeutic Treatment and Modalities Summary: Neuro re-ed:  Elbow weight bearing side to side, 5x ea: Added to hep; pt requiring SBA for safety while at edge of plinth, VC's to slow down movement    Quadruped endurance hold x3: Pt requiring mod x 2 for set up; difficulty clearing LUE and LLE during transfer; able to maintain static hold x 60 sec before fatigue with muscle juddering; VC's and manual assist to address hand placement    Quadruped WS sagittal>frontal planes: SBA x 2// good weight shifting sagittal; increased excursion to L>R, continued compensation with lateral bending and limited weight shifting observed at pelvis and LE's - frontal plane direction discontinued for now.    Tall kneeling with UE support on bedside  desk: 4x60 sec; added to hep with caregiver assistance   CGA with BUE support>modA at RUE for Single UE support; fatigues with increased l/s flexion    Tall kneeling with BUE support on ball (placed on bedside desk): Increased upright posture; able to maintain balance; provided with SBA x 2 for safety at edge of plinth    Gait training:  Pre-gait activities:  Sit<>stand x3 with maintained standing hold x30 sec: FWW with modA x 2//req repeated VC's for pt not grabbing walker and using plinth for push off. Difficulty controlling descent stand>sit despite using 1 UE for descent. Pt req repeated VC's and tactile cuing for glute engagement. Fatigue following standing 30 sec with increased forward l/s flexion. Edu mother to cue pt to stand tall at home to avoid inhibiting posterior chain musculature.        Time-based treatments/modalities:    Physical Therapy Timed Treatment Charges  Gait training minutes (CPT 74968): 11 minutes  Neuromusc re-ed, balance, coor, post minutes (CPT 99189): 20 minutes  Therapeutic exercise minutes (CPT 06717): 10 minutes    ASSESSMENT:   Response to treatment:   Continued emphasis on today's session on proximal stability; pt demonstrating improved endurance but likely due to change in mask and improved breathing ability. Posterior chain weakness and increased forward l/s flexion with fatigue despite VC's and tactile cues for erect standing; tall kneeling has increased difficulty with RUE>LUE WB req intermittent modA to avoid LOB with single UB WB.     Continues to require several rest breaks during session. Responds well to visual and tactile cuing.     PLAN/RECOMMENDATIONS:   Plan for treatment: therapy treatment to continue next visit.  Planned interventions for next visit: continue with current treatment. Continue progressing core and proximal stability.   Discuss stander equipment and standing requirements for bone health.     Return for 1x97014, 1x97116, 1x97110 and 1x97112 per visit  for 10 visits.

## 2020-11-04 ENCOUNTER — OCCUPATIONAL THERAPY (OUTPATIENT)
Dept: OCCUPATIONAL THERAPY | Facility: REHABILITATION | Age: 25
End: 2020-11-04
Attending: PHYSICAL MEDICINE & REHABILITATION
Payer: MEDICAID

## 2020-11-04 ENCOUNTER — PHYSICAL THERAPY (OUTPATIENT)
Dept: PHYSICAL THERAPY | Facility: REHABILITATION | Age: 25
End: 2020-11-04
Attending: PHYSICAL MEDICINE & REHABILITATION
Payer: MEDICAID

## 2020-11-04 DIAGNOSIS — I61.5 LEFT-SIDED NONTRAUMATIC INTRAVENTRICULAR INTRACEREBRAL HEMORRHAGE (HCC): ICD-10-CM

## 2020-11-04 DIAGNOSIS — I61.5 NONTRAUMATIC INTRAVENTRICULAR INTRACEREBRAL HEMORRHAGE, UNSPECIFIED LATERALITY (HCC): ICD-10-CM

## 2020-11-04 PROCEDURE — 97116 GAIT TRAINING THERAPY: CPT | Mod: XE

## 2020-11-04 PROCEDURE — 97112 NEUROMUSCULAR REEDUCATION: CPT

## 2020-11-04 PROCEDURE — 97530 THERAPEUTIC ACTIVITIES: CPT

## 2020-11-04 PROCEDURE — 97110 THERAPEUTIC EXERCISES: CPT

## 2020-11-04 NOTE — OP THERAPY DAILY TREATMENT
Outpatient Physical Therapy  DAILY TREATMENT     [facility] Physical [address].  [address]  [place] [address]  Phone:  [phone]  Fax:  [phone]    Date: 11/04/2020    Patient: Rey Medina  YOB: 1995  MRN: [id number]     Time Calculation    Start time: 1105  Stop time: 1156 Time Calculation (min): 51 minutes         Chief Complaint: Difficulty Walking and Loss Of Balance    Visit #: 5    SUBJECTIVE:  Pt's mom reporting pt is performing quadruped positioning at home for hep. Reports pt is standing few times per day.      OBJECTIVE:  Current objective measures:       Sacral sitting on plinth  Transfer WC<>mat table, mod A; does not indep move feet during transfer    Therapeutic Exercises (CPT 25044):     1. Scooting side to side, x10, added to hep with caregiver SBA; able to utlize UE strength to perfrom scooting however poor trunk control in sagittal plane    2. Verbal review of hep with pt and caregiver    3. Elbow weight bearing side to side, x5 ea, reviewed; pt requiring SBA for safety while at edge of plinth, VC's to slow down movement; VC's to slow down    6. UPOC: 12/4/20    Therapeutic Treatments and Modalities:     1. Neuromuscular Re-education (CPT 75878), see below    2. Gait Training (CPT 31568), see below    Therapeutic Treatment and Modalities Summary: Neuro re-ed:  Standing with side steps: -discontinued; pt taking unsafe steps with significant LOB requiring assist modA with intermittent maxA     Ball bridge 3x30 sec; 3/4 AROM observed; VC's to increase to end range but fatigues    Stander: Pt requiring modA x 2 for safe transfer in/out of stander. Pt edu to mother re: benefits for stander (I.e. maintaining bone density, psychological benefits, independence in standing). X 7 min; pt able to wheel himself forward and backward using wheels    Gait training:  Pre-gait activities:  Sit<>stand x5 with maintained standing hold x30 sec: FWW with modA  x 2//continues to req repeated VC's for pt not grabbing walker and using plinth for push off. Difficulty controlling descent stand>sit despite using 1 UE for descent. Pt req repeated VC's and tactile cuing for glute engagement. Fatigues rapidly and assumes forward trunk flexion position.    DL swissball HS roll with emphasis on slow coordinated movements 2x10, pt able to perform set up LE's onto ball with Mikhail at LE's for adjustment//initial reps with increased speed and incoordination, improved with increased reps    SL swiss ball roll 2x10 ea//initial reps with increased speed and incoordination, improved with increased reps     Continues to require several rest breaks during session.     Time-based treatments/modalities:    Physical Therapy Timed Treatment Charges  Gait training minutes (CPT 67619): 15 minutes  Neuromusc re-ed, balance, coor, post minutes (CPT 65247): 22 minutes  Therapeutic exercise minutes (CPT 34689): 14 minutes    ASSESSMENT:   Response to treatment:   Pt's mother reporting steady improvements at home. Pt continuing to demonstrate quick movements which improve with increased reps and VC's to slow down. Continues to demonstrate poor safety awareness with sit to stand transfers. Continued limited core and posterior chain strength, may continue to benefit from therex to address.    Time spent in stander equipment today; requires SPV due to poor safety awareness. Several min spent edu mother re: benefits of stander (see above); will continue to trial and see if pt is deemed appropriate for equipment within the home to maintain bone density, psychological benefits, B and B improvements and independence in standing.     PLAN/RECOMMENDATIONS:   Plan for treatment: therapy treatment to continue next visit.  Planned interventions for next visit: continue with current treatment. Continue progressing core and proximal stability.   Discuss standing requirements for bone health with pt and  caregiver.  Provide pt and mother with hep HO for scooting side to side. Continue core and posterior chain strengthening; proximal stab.     Return for 1x97014, 1x97116, 1x97110 and 1x97112 per visit for 10 visits.

## 2020-11-04 NOTE — OP THERAPY DAILY TREATMENT
"  Outpatient Occupational Therapy  DAILY TREATMENT     [facility] Occupational [address].  [address]  [place] [address]  Phone:  [phone]  Fax:  [phone]    Date: 11/04/2020    Patient: Rey Medina  YOB: 1995  MRN: [id number]     Time Calculation  Start time: 1000  Stop time: 1100 Time Calculation (min): 60 minutes         Chief Complaint: Self Care Duties, Extremity Weakness (dyskinesias), and Other (cognitive-communication deficits)    Visit #: 5    SUBJECTIVE:  \"Ok\"    OBJECTIVE:  Current objective measures:   strength:  Left: 57lbs, Right: 55lbs  Moderate BUE dyskinesias L > R          Therapeutic Treatments and Modalities:    1. Therapeutic Activities (CPT 87508)    Therapeutic Treatments and Modalities Summary: Velcro board using BH on medium sized dowel for rolling forward/backward via lateral pinch x 12 reps and 2lb wrist weights, grasp of long handled dowel for elbow flexion/extension to touch/remove from board.  Tall pegboard for placement of pegs in vertical orientation with 2lb wrist weights, removal alternating hands to place into dynamic bucket.  Unilateral sustained combined shoulder ff and elbow extension with 2lb wrist weights to maintain 90 degrees for 30 second holds x 3 reps each, repeated for shoulder abduction x 3 reps.  W/c push-ups x 10 reps of 10 second holds with tactile input for C-T extension.    Time-based treatments/modalities:  Therapeutic activity minutes (CPT 43707): 60 minutes        ASSESSMENT:   Response to treatment: Pt making progress with his BUE strength and motor control for his self-care duties in response to graded activities/exercises described above.  HEP updated with mother with good understanding.    PLAN/RECOMMENDATIONS:   Plan for treatment: therapy treatment to continue next visit.  Planned interventions for next visit: continue with current treatment and therapeutic activities (CPT 58672)       "

## 2020-11-05 ENCOUNTER — PATIENT MESSAGE (OUTPATIENT)
Dept: MEDICAL GROUP | Facility: MEDICAL CENTER | Age: 25
End: 2020-11-05

## 2020-11-09 ENCOUNTER — TELEPHONE (OUTPATIENT)
Dept: PHYSICAL MEDICINE AND REHAB | Facility: REHABILITATION | Age: 25
End: 2020-11-09

## 2020-11-09 ENCOUNTER — SPEECH THERAPY (OUTPATIENT)
Dept: SPEECH THERAPY | Facility: REHABILITATION | Age: 25
End: 2020-11-09
Attending: PHYSICAL MEDICINE & REHABILITATION
Payer: MEDICAID

## 2020-11-09 ENCOUNTER — PHYSICAL THERAPY (OUTPATIENT)
Dept: PHYSICAL THERAPY | Facility: REHABILITATION | Age: 25
End: 2020-11-09
Attending: PHYSICAL MEDICINE & REHABILITATION
Payer: MEDICAID

## 2020-11-09 ENCOUNTER — OCCUPATIONAL THERAPY (OUTPATIENT)
Dept: OCCUPATIONAL THERAPY | Facility: REHABILITATION | Age: 25
End: 2020-11-09
Attending: PHYSICAL MEDICINE & REHABILITATION
Payer: MEDICAID

## 2020-11-09 DIAGNOSIS — I61.5 NONTRAUMATIC INTRAVENTRICULAR INTRACEREBRAL HEMORRHAGE, UNSPECIFIED LATERALITY (HCC): ICD-10-CM

## 2020-11-09 DIAGNOSIS — I61.5 LEFT-SIDED NONTRAUMATIC INTRAVENTRICULAR INTRACEREBRAL HEMORRHAGE (HCC): ICD-10-CM

## 2020-11-09 DIAGNOSIS — R00.0 TACHYCARDIA: ICD-10-CM

## 2020-11-09 DIAGNOSIS — R47.1 DYSARTHRIA AND ANARTHRIA: ICD-10-CM

## 2020-11-09 DIAGNOSIS — R13.12 OROPHARYNGEAL DYSPHAGIA: ICD-10-CM

## 2020-11-09 PROCEDURE — 92526 ORAL FUNCTION THERAPY: CPT

## 2020-11-09 PROCEDURE — 92507 TX SP LANG VOICE COMM INDIV: CPT

## 2020-11-09 PROCEDURE — 97530 THERAPEUTIC ACTIVITIES: CPT | Mod: XE

## 2020-11-09 PROCEDURE — 97112 NEUROMUSCULAR REEDUCATION: CPT | Mod: XE

## 2020-11-09 PROCEDURE — 97110 THERAPEUTIC EXERCISES: CPT | Mod: XE

## 2020-11-09 PROCEDURE — 97116 GAIT TRAINING THERAPY: CPT | Mod: XE

## 2020-11-09 ASSESSMENT — SOCIAL DETERMINANTS OF HEALTH (SDOH): SOCIAL_SUPPORT_SYSTEM: PARENT

## 2020-11-09 NOTE — OP THERAPY DAILY TREATMENT
"  Outpatient Occupational Therapy  DAILY TREATMENT     [facility] Occupational [address]  [address]  [place] [address]  Phone:  [phone]  Fax:  [phone]    Date: 11/09/2020    Patient: Rey Medina  YOB: 1995  MRN: [id number]     Time Calculation    0100  0200  60 minutes         Chief Complaint: Extremity Weakness (dyskinesias), Self Care Duties, and Other (cognitive communication deficits)    Visit #: 6    SUBJECTIVE: \"We try to do exercises every day\"    OBJECTIVE:  Current objective measures:   Sitting balance: Static/dynamc: Fair plus/Fair plus  Moderate BUE dyskinesias L > R        Therapeutic Treatments and Modalities:    1. Therapeutic Activities (CPT 45842)    Therapeutic Treatments and Modalities Summary: BH holding 4lb weight for diagonal movement patterns to visual target on table (towels).  Repeated using 2.5lb ball to 3 horizontal targets with max cues to increase arc.  BH holding 2.5lb ball for reaching to alternating dynamic targets for 2-3 second sustained holds x 4 reps.  Folding towels seated table top x 3.  Table top for placement/removal of various resistance clothespins around perimeter of plastic bowl, unilateral hand for full sequence.  Seated EOM for postural exercises focused on thoracic extension and C-spine in neutral with tactile input for coordination.  Lateral scooting around perimeter of 1/2 mat with mod cues for control and CGA.    Time-based treatments/modalities:  Therapeutic activity minutes (CPT 25562): 60 minutes           ASSESSMENT:   Response to treatment: Pt making progress with his UB strength, hand-eye coordination, and postural control/alignment for his ADLs and functional mobility in response to graded activities described above.  HEP updated with good understanding.    PLAN/RECOMMENDATIONS:   Plan for treatment: therapy treatment to continue next visit.  Planned interventions for next visit: continue with " current treatment and therapeutic activities (CPT 73032)

## 2020-11-09 NOTE — TELEPHONE ENCOUNTER
I spoke with Melinda and let her know to have PCP write Rx for propanolol since she changed the Rx

## 2020-11-09 NOTE — TELEPHONE ENCOUNTER
----- Message from Mandy Luke D.O. sent at 11/9/2020  9:30 AM PST -----  Regarding: FW: Medication Renewal Request  Contact: [phone]  If PCP changed, have them refill  ----- Message -----  From: Jj Davis, Med Ass't  Sent: 11/9/2020   8:59 AM PST  To: Mandy Luke D.O.  Subject: FW: Medication Renewal Request                     ----- Message -----  From: Hanh Cano Med Ass't  Sent: 11/9/2020   7:34 AM PST  To: Jj Davis, Med Ass't  Subject: FW: Medication Renewal Request                     ----- Message -----  From: Rey Medina  Sent: 11/8/2020   9:50 AM PST  To: Physiatry Mas  Subject: Medication Renewal Request                       Refills have been requested for the following medications:        propranolol (INDERAL) 20 MG Tab [Mandy Luke D.O.]      Patient Comment: this is reduced to 10mg 3x/day by Dr. Power    Preferred pharmacy: [facility] [address] [place] [address], [place] - [address]  Delivery method: Pickup

## 2020-11-09 NOTE — OP THERAPY DAILY TREATMENT
"  Outpatient Physical Therapy  DAILY TREATMENT     [facility] Physical Therapy [address]  [address]  [place] [address]  Phone:  [phone]  Fax:  [phone]    Date: 11/09/2020    Patient: Rey Medina  YOB: 1995  MRN: [id number]     Time Calculation    Start time: 1403  Stop time: 1453 Time Calculation (min): 50 minutes         Chief Complaint: Difficulty Walking and Loss Of Balance    Visit #: 6    SUBJECTIVE:  Pt reporting he is fine.  Pt’s mother reporting she has noticed improvements at home with sit to stand and standing tolerance. Reports hep is going well and performing daily. Reports she is receiving a standing walker from [facility] to start with gait training at home.      OBJECTIVE:  Current objective measures:       Sacral sitting on surfaces  Transfers plinth>standing and turning> plinth: ModA x 2 with FWW due to unsafe with stepping/dynamic standing    Therapeutic Exercises (CPT 05892):     1. Scooting side to side at edge of plinth, x10, SBA for safety    2. Verbal review of hep with pt and caregiver    3. Elbow weight bearing side to side, x5 ea, reviewed with pt and caregiver; SBA    6. UPOC: 12/4/20    Therapeutic Treatments and Modalities:     1. Neuromuscular Re-education (CPT 17970), see below    2. Gait Training (CPT 90351), see below    Therapeutic Treatment and Modalities Summary: Neuro re-ed:    Seated on swissball x10 min: Swissball stabilized underneath plinth. Pt required modA x 2 with transfer on/off swissball; CGA x1 while seated on ball. Pt stabilizing BUE\"s on bedside desk. Alternating arm lifts with passing cone to technician 10x ea frontal plane. CGA>intermit Mikhail/modA to regain balance, LOB backwards on swissball. Sacral sitting observed.   (Rest break following exercise)    Quadruped>tall kneeling with VC's to walk hands up LE's; emphasis on erect posture 2x3// pt requires CGA with intermit modA due to LOB in forward direction; able " to maintain upright balance and posture for several seconds before fatigue; added to hep with caregiver assist    (Rest break following)    Gait training:  Pre-gait activities:  Sit<>stand: FWW and Jeremy x2 sit>stand x3// pt demonstrating improved push off with UE's (previously grabbing onto walker), improved set up and scoot to edge of chair (previously full body extension with scooting EOB). Continues to push off with LE's against chair and WB on heels when standing.       2 rest breaks req during session    Time-based treatments/modalities:    Physical Therapy Timed Treatment Charges  Gait training minutes (CPT 60505): 12 minutes  Neuromusc re-ed, balance, coor, post minutes (CPT 19015): 20 minutes  Therapeutic exercise minutes (CPT 38582): 12 minutes    ASSESSMENT:   Response to treatment:   Demonstrating improvements today with sit<>stand using walker for push off rather than grabbing on walker; improved clearance using elbows for push off during bed mobility. Continued sacral sitting on surfaces with poor trunk stability. Continues to be unsafe with dynamic standing and standing with steps. Pt's mother reporting she will be receiving a standing walker from [facility] but may be unsafe due to pt's inbalance with standing; may benefit from trial in PT session to ensure safety of pt.    PLAN/RECOMMENDATIONS:   Plan for treatment: therapy treatment to continue next visit.  Planned interventions for next visit: continue with current treatment. Continue progressing core and proximal stability. Assess response to quadruped>tall kneeling hep. Stander and play game.  Trial standing walker with pt and caregiver in session  Add nustepper warm up for coordination of 4 limbs.     Return for 1x97014, 1x97116, 1x97110 and 1x97112 per visit for 10 visits.

## 2020-11-09 NOTE — OP THERAPY DAILY TREATMENT
"  Outpatient Speech Therapy  DAILY TREATMENT     [facility] Speech [address].  [address]  [place] [address]  Phone:  [phone]  Fax:  [phone]    Date: 11/09/2020    Patient: Rey Medina  YOB: 1995  MRN: [id number]     Time Calculation    Start time: 1500  Stop time: 1605 Time Calculation (min): 65 minutes         Chief Complaint: Not Understood By Others and Dysphagia    Visit #: 4    Subjective:   Reason for Therapy:     Reason For Evaluation:  Dysarthria and Dysphagia  Social Support:     Accompanied By:  Parent (mother, present and supportive)    Patient Mental Status:  Responsive and Alert  Progress Factors:     Progression:  Getting Better  Additional Subjective Comments:      Patient mother reports that patient is demonstrating \"a lot of improvement\" reporting that patient swallowing has improved to allow patient to take pills PO without difficulty (single large pill is cut in half).       Objective:   Treatments/Interventions Performed:  Patient/Caregiver education, Compensatory strategy training, Home program, Speech/Language treatment and Dysphagia treatment  Objective Details:  Oropharyngeal and laryngeal exercises completed:  Shaker completed sustained 60 x 2 and repetitive 10 and 30 repetitions.    Estefanía x 10.  Minimal lingual pumping with improved task tolerance demonstrated.    Sustained phonation /ah/ 4 seconds: excessively loud voicing, suggesting poor laryngeal control.   Laryngeal elevation through pitch glide /ee/: 2 notes (low-high) completed x 10   Lingual push on alveolar ridge- severe discoordination of movement; unable to hold past 2 seconds given presence of tongue groping with attempts to hold x 10 provided direct verbal, visual cues  Labial hold with tongue depressor x 5 weakness after 3 reps  Lingual protrusion downward x 10 good coordination  Lingual resistance x 10 all directions, min cues to resist, fatigues at 7th " "repetition.  Lingual clicks and kissy sound x 10, independent.    Lip and tongue coordination for rapid alternating movement targeted and completed. Accuracy in articulation of single words targeted production of 'yes' and 'no' to improve effective communication. Direct cues necessary for both articulatory placement and loudness.          Speech Therapy Assessment:     Speech Mechanism Assessment:     Patient dysarthria: Severe  Speech mechanism comments: Labial coordination for speech production of bilabial /p/ and /b/ targeted given structured, repetitive practice. Lingual /t/ and /d/, velar /k/ and /g/ also targeted. /p/ production with 50% accuracy (all voiced) but coordinated, labial movement; /b/ 50% accuracy. /t/ with direct cues to \"turn voice off\" patient was accurate in production of voiceless consonant 3/10 - 30% accuracy; /d/ with improved voice control 3/10 - 30% accuracy. Velar /k/ 2/10 - 20% direct cues for tongue retraction. /g/ 2/10 - 20% accuracy.     Patient demonstrated poor voice control, with voice frequently characterized by excessive loudness. Patient was responsive to cues from clinician for cues for \"easy\" or \"gentle\" speech production, with patient then able to demonstrate reduced loudness to voice; although then voice demonstrated excessive quietness.     Oral Motor Status:     Oral motor status comments: During oral motor exercise, patient was noted to demonstrate an overall improvement in fatigue, completing each exercise to a minimum of 10 trials; although severe discoordination persists impacting accuracy in oral motor movement.       Speech Therapy Plan :   Prognosis & Recommendations  Impression Summary:  Presence of impulsivity during structured speech production tasks requiring direct cues and frequent breaks to promote accuracy with speech production tasks. Patient was noted to demonstrate improved responsiveness to verbal, visual, tactile cues as provided during today's session. "   Therapy Recommendations  Recommendation:  Individual Speech Therapy,  Planned Therapy Interventions:  Home Program, Patient/Caregiver Education, Compensatory Strategy Training and Speech/Language training,   Plan Details:  Continue with current POC.

## 2020-11-10 ENCOUNTER — TELEPHONE (OUTPATIENT)
Dept: MEDICAL GROUP | Facility: MEDICAL CENTER | Age: 25
End: 2020-11-10

## 2020-11-10 DIAGNOSIS — G31.89 COGNITIVE AND NEUROBEHAVIORAL DYSFUNCTION FOLLOWING BRAIN INJURY (HCC): ICD-10-CM

## 2020-11-10 DIAGNOSIS — S06.9XAS COGNITIVE AND NEUROBEHAVIORAL DYSFUNCTION FOLLOWING BRAIN INJURY (HCC): ICD-10-CM

## 2020-11-10 DIAGNOSIS — R39.81 URINARY INCONTINENCE DUE TO COGNITIVE IMPAIRMENT: ICD-10-CM

## 2020-11-10 DIAGNOSIS — F09 COGNITIVE AND NEUROBEHAVIORAL DYSFUNCTION FOLLOWING BRAIN INJURY (HCC): ICD-10-CM

## 2020-11-10 RX ORDER — PROPRANOLOL HYDROCHLORIDE 10 MG/1
TABLET ORAL
Qty: 90 TAB | Refills: 2 | Status: SHIPPED | OUTPATIENT
Start: 2020-11-10 | End: 2020-11-16

## 2020-11-10 NOTE — TELEPHONE ENCOUNTER
11/10/2020 Spoke with [employer] informing them that Dr Power will send in the script for his new sizw of protective underwear and under pads . Rey mother stated that she wanted size Med and 30x30 underpads.. script faxed to [phone].

## 2020-11-10 NOTE — TELEPHONE ENCOUNTER
----- Message from Sherie Power M.D. sent at 11/10/2020  7:45 AM PST -----  Regarding: FW: Propranolol refill  Contact: [phone]    ----- Message -----  From: Rey Medina  Sent: 11/10/2020   7:42 AM PST  To: Sherie Power M.D.  Subject: RE: Propranolol refill                           Good morning Dr Power,    Thank you so much! Here is the number for [employer] +3 [phone].    Melinda Mansfield      ----- Message -----  From: Sherie Power M.D.  Sent: 11/10/20, 7:38 AM  To: Rey Medina  Subject: RE: Propranolol refill    Great, I will refill to 10mg tablets now. You will be able to give him 1 tablet three times a day. We can discuss stopping it at our next visit.     We unfortunately haven't received it yet. Can you provide us with a phone number to contact and whatever details are needed to contact them?     Thank you,    Sherie Power M.D.      ----- Message -----       From:Rey Medina       Sent:11/9/2020  5:45 PM PST         To:Sherie Power M.D.    Subject:RE: Propranolol refill    Good afternoon Dr. Power,    Yes I am giving him the 10mg 3x/day. BP has been okay, SPO2 is from 95-98, heart rate is from 85 - 97bpm . It would sometimes go up to 97 then down to 90's and not steady but never been elevated to 100's.    I would also like to [facility] As per this morning when I called they haven't received it yet.    Thank you so much! I will wait for your advise regarding propranolol.    Melinda Mansfield      ----- Message -----       From:Sherie Power M.D.       Sent:11/9/2020  5:36 PM PST         To:Rey Medina    Subject:Propranolol refill    Hello,    I received the refill request for the propranolol. Are you still giving it to him at 10mg three times a day. Has his heart rate been doing okay? If so, then I will dispense 10mg three times a day instead of 20mg.     Thank you,    Sherie Power M.D.

## 2020-11-11 ENCOUNTER — TELEPHONE (OUTPATIENT)
Dept: MEDICAL GROUP | Facility: MEDICAL CENTER | Age: 25
End: 2020-11-11

## 2020-11-11 ENCOUNTER — OCCUPATIONAL THERAPY (OUTPATIENT)
Dept: OCCUPATIONAL THERAPY | Facility: REHABILITATION | Age: 25
End: 2020-11-11
Attending: PHYSICAL MEDICINE & REHABILITATION
Payer: MEDICAID

## 2020-11-11 ENCOUNTER — SPEECH THERAPY (OUTPATIENT)
Dept: SPEECH THERAPY | Facility: REHABILITATION | Age: 25
End: 2020-11-11
Attending: PHYSICAL MEDICINE & REHABILITATION
Payer: MEDICAID

## 2020-11-11 ENCOUNTER — PHYSICAL THERAPY (OUTPATIENT)
Dept: PHYSICAL THERAPY | Facility: REHABILITATION | Age: 25
End: 2020-11-11
Attending: PHYSICAL MEDICINE & REHABILITATION
Payer: MEDICAID

## 2020-11-11 DIAGNOSIS — I61.5 NONTRAUMATIC INTRAVENTRICULAR INTRACEREBRAL HEMORRHAGE, UNSPECIFIED LATERALITY (HCC): ICD-10-CM

## 2020-11-11 DIAGNOSIS — R13.12 OROPHARYNGEAL DYSPHAGIA: ICD-10-CM

## 2020-11-11 DIAGNOSIS — R47.1 DYSARTHRIA AND ANARTHRIA: ICD-10-CM

## 2020-11-11 DIAGNOSIS — I61.5 LEFT-SIDED NONTRAUMATIC INTRAVENTRICULAR INTRACEREBRAL HEMORRHAGE (HCC): ICD-10-CM

## 2020-11-11 PROCEDURE — 92507 TX SP LANG VOICE COMM INDIV: CPT

## 2020-11-11 PROCEDURE — 97116 GAIT TRAINING THERAPY: CPT | Mod: XE

## 2020-11-11 PROCEDURE — 97530 THERAPEUTIC ACTIVITIES: CPT | Mod: XE

## 2020-11-11 PROCEDURE — 97112 NEUROMUSCULAR REEDUCATION: CPT | Mod: XE

## 2020-11-11 PROCEDURE — 97110 THERAPEUTIC EXERCISES: CPT | Mod: XE

## 2020-11-11 ASSESSMENT — SOCIAL DETERMINANTS OF HEALTH (SDOH): SOCIAL_SUPPORT_SYSTEM: PARENT

## 2020-11-11 NOTE — OP THERAPY DAILY TREATMENT
Outpatient Physical Therapy  DAILY TREATMENT     [facility] Physical [address].  [address]  [place] [address]  Phone:  [phone]  Fax:  [phone]    Date: 11/11/2020    Patient: Rey Medina  YOB: 1995  MRN: [id number]     Time Calculation    Start time: 1401  Stop time: 1457 Time Calculation (min): 56 minutes         Chief Complaint: Difficulty Walking and Loss Of Balance    Visit #: 7    SUBJECTIVE:  Pt's mother reporting she had pt in a standing walker and he is able to stand erect with CGA/SBA. Reports standing balance is still off. Pt's mom reports exercises are going well at home when he is not using the mask.     Goals:   Short Term Goals:   1. Pt will be independent with written HEP with assistance from caregiver.  2. Pt will require Mikhail-cga for all transfers consistently.  3. Pt will require SPV for scooting.     Short term goal time span:  4-6 weeks      Long Term Goals:    1. Pt will be independent with written HEP with assistance from caregiver.  2. Pt will be independent with scooting in order to increase independence.  3. Pt will demonstrate safe transfers with SPV and LRAD in order to increase independence and decrease caregiver burden.  4. Pt will demonstrate improved standing tolerance for 10 min or longer with SPV and LRAD  in order to increase independence and decrease caregiver burden.  5. Pt will ambulate 25 ft with LRAD and Mikhail in order to increase independence.     Long term goal time span:  1-2 months    OBJECTIVE:  Current objective measures:       Sacral sitting on surfaces    Delayed balance reactions forward (observed from tall kneeling)    Therapeutic Exercises (CPT 86812):     2. Verbal review of hep with pt and caregiver    4. Nu stepper, 4 min 35 sec , modA for transfer to get onto nustepper; seat belt applied with towel roll at lumbar to assist with sacral sitting posture; improved coordination with VCs to slow down  "movement; SPO2: 97%, HR: 107 bpm, pt reporting \"I'm tired\"    6. UPOC: 12/4/20    Therapeutic Treatments and Modalities:     1. Neuromuscular Re-education (CPT 99265), see below    2. Gait Training (CPT 97166), Transfers x 3 WC<>nustepper; WC<>plinth, sit to stand with emphasis on     Therapeutic Treatment and Modalities Summary: Neuro re-ed:    Quadruped>tall kneeling with VC's to walk hands up LE's; emphasis on erect posture 3x2// pt requires increased CGA with intermit modA due to LOB in forward direction, pt appears fatigued today and requires increased cuing to complete activity; able to maintain upright balance and posture for several seconds before fatigue; (reviewed)    (Rest breaks following 2 reps above)    *Extended rest breaks req during session today        Time-based treatments/modalities:    Physical Therapy Timed Treatment Charges  Gait training minutes (CPT 17883): 8 minutes  Neuromusc re-ed, balance, coor, post minutes (CPT 44450): 20 minutes  Therapeutic exercise minutes (CPT 42334): 16 minutes    ASSESSMENT:   Response to treatment:     Pt more fatigued this session required rest breaks of increased frequency and longer duration with mother reporting improved therex at home when not wearing mask. Offered mother and pt virtual visits in future if needed.     Pt demonstrating improvement with transitions rolling to quadruped to tall kneeling;  however continues to require instructions and VC's to utilize elbow otherwise rolls onto face/elbow. Improved tall kneeling stability with incr erect posture and incr endurance however,  demonstrating delayed balance reactions forward during tall kneeling exercise, however continues to require SBA with intermit modA for safety due to LOB in forward direction. Pt may benefit from further assessment of balance reactions in next session.    PLAN/RECOMMENDATIONS:   Plan for treatment: therapy treatment to continue next visit.  Planned interventions for next " visit: continue with current treatment. Continue progressing core and proximal stability. Assess Balance reactions.     Return for 1x97014, 1x97116, 1x97110 and 1x97112 per visit for 10 visits.

## 2020-11-11 NOTE — OP THERAPY DAILY TREATMENT
Outpatient Speech Therapy  DAILY TREATMENT     [facility] Speech [address].  [address]  [place] [address]  Phone:  [phone]  Fax:  [phone]    Date: 11/11/2020    Patient: Rey Medina  YOB: 1995  MRN: [id number]     Time Calculation    Start time: 1000  Stop time: 1100 Time Calculation (min): 60 minutes         Chief Complaint: Dysphagia and Not Understood By Others    Visit #: 5    Subjective:   Reason for Therapy:     Reason For Evaluation:  Dysphagia and Dysarthria  Social Support:     Accompanied By:  Parent (mother, present and supportive)  Therapy History:     Current Diet:  Nectar Liquids (Slightly thick liquids)  Additional Subjective Comments:      Parent reports that patient is currently communicating initially with verbalizations; but when not understood, he will then type what he is trying to say on the iPad. Parent reports that patient does not initiate use of iPad, but requires direct cues. Parent reports that patient is now able to drink from the glass; demonstrating no episodes of coughing with cup.       Objective:   Treatments/Interventions Performed:  Patient/Caregiver education, Compensatory strategy training, Home program, Dysphagia treatment and Speech/Language treatment  Other Treatment Interventions:  Traditional dysphagia therapy targeted oral motor movement and patient participation in therapist directed trial thin liquid water presented via cup. Speech production targeted to syllable level with sounds targeted from previous session /p, b/, /t, d/ /m/ and /g/  Objective Details:  Speech production for syllable level sounds with target sound in initial word position completed with the following accuracy:  /b/ 3/6 (50%) accuracy; direct verbal, visual cues for lips together  /p/ 0/4 (0%) accuracy; direct verbal, visual cues  /m/ 2/5 (40%) accuracy; direct verbal, visual cues  /d/ 3/4 (75%) accuracy; direct verbal, visual cues for  "tongue placement prior to initiation of sound production  /t/ 0/5 (0%) accuracy; direct verbal, visual cues  /g/ 1/5 (20%) accuracy; direct verbal, visual cues    Oropharyngeal and laryngeal exercises completed:  Estefanía x 10.  Minimal lingual pumping with improved task tolerance demonstrated.    Sustained phonation /ah/ 4 seconds: excessively loud voicing, suggesting poor laryngeal control.   Laryngeal elevation through pitch glide /ee/: 2 notes (low-high) completed x 10   Lingual push on alveolar ridge-patient successful in tongue to alveolar ridge 8/10 (80%) adequacy.   Lingual clicks and kissy sound x 10, independent.           Speech Therapy Assessment:     Expressive Language Assessment:     Expressive language comments: Parent reports that patient use of verbalizations require direct cues for initiation/ response at this time. Parent reports that use of \"say X\" is often used in the home setting to promote communication. Given choice of 2, patient is able to verbalize choice to independent level; although direct cues necessary to provide a response.     Receptive Language Assessment:     Simple yes/no questions: Severe    Receptive language comments: When prompted with simple yes/ no questions (are you ok?) during today's session, patient provided independent response \"I don't care\" consistently. Patient was encouraged with use of visual cues and gesture to provide a yes/ no response. Given direct cues, modeling and multiple repetitive trials, patient was noted to begin approximation for 'yes' and 'no'.     Speech Mechanism Assessment:     Patient voice description: Clear    Patient's oral movements are voluntary and coordination: Moderate    Patient exhibits articulatory precision: Severe    Patient exhibits single word intelligibility: Severe    Patient dysarthria: Severe  Speech mechanism comments: Given direct cues for \"gentle speech\" patient is beginning to demonstrate loudness control; although attempts " "are often first produced with excessive quietness and reduced oral motor movement. Patient independent speech production remains best characterized as excessively loud, poor articulatory precision and press of speech demonstrating poor intelligibility to independent productions.     Given structured speech production tasks at the single syllable level, patient was noted to benefit from warm-up in sound production through structured practice of sound in isolation, forward chaining of sounds. Patient also benefited from cues to slow rate of speech given direct imitation prompts and multiple trials.    Oral Phase Assessment:     Oral phase comments: With self feeding, patient demonstrate severe impulsivity, marked by taking multiple, large volume sip swallows.     Pharyngeal Phase Assessment:     Coughing after the swallow present: Thin Liquid    Pharyngeal phase comments: Given noted impulsivity with intake, cough was present 2/2 trials. Patient parent was provided with instruction on strategies to address impulsivity with regards to intake. Patient may benefit from environmental modifications to improve safety in intake, such as controlled volume straws.       Speech Therapy Plan :   Prognosis & Recommendations  Impression Summary:  Patient demonstrated good response to direct verbal cues to improve loudness \"gentle speech\" with clinician allowing for periods of impulsivity prior to initiation of next task resulting in patient improved direction following and reduced impulsivity during structured therapy activities. Patient was noted to demonstrate increased response to cues, improved overall direction following to simple 2-3 step levels when direct verbal, visual prompts/ instruction/ cues were provided or directions were broken down in single steps and reintroduced through forward chaining.   Prognosis:  Good  Prognosis Details:  Patient is demonstrating increased responsiveness to cues, resulting in improved " "accuracy in articulation to single syllable level when target sound is in the word initial position. Patient is demonstrating noted benefit from repetitive practice and multi-level cueing to improve understanding during structured therapy tasks. Parent was noted to use clinician prompts for improved loudness through \"gentle speech\" resulting in patient immediate and independent attempts to lower volume to improve intelligibility.   Therapy Recommendations  Recommendation:  Individual Speech Therapy,  Planned Therapy Interventions:  Home Program, Patient/Caregiver Education, Compensatory Strategy Training, Dysphagia treatment and Speech/Language training,   Plan Details:  Continue with POC.                "

## 2020-11-11 NOTE — OP THERAPY DAILY TREATMENT
"  Outpatient Occupational Therapy  DAILY TREATMENT     [facility] Occupational [address].  [address]  [place] [address]  Phone:  [phone]  Fax:  [phone]    Date: 11/11/2020    Patient: Rey Medina  YOB: 1995  MRN: [id number]     Time Calculation  Start time: 0100  Stop time: 0200 Time Calculation (min): 60 minutes         Chief Complaint: Self Care Duties, Extremity Weakness (dyskinesias), and Other (cognitive-communication deficits, visual-perceptual deficits)    Visit #: 7    SUBJECTIVE: \"He can drink from a cup now\"    OBJECTIVE:  Current objective measures:    strength:  Left: 55lbs, Right: 42lbs  Opposition: able to oppose thumb to each digit with effort.  Moderate BUE dyskinesias L > R           Therapeutic Treatments and Modalities:    1. Therapeutic Activities (CPT 34753)    Therapeutic Treatments and Modalities Summary: Opposition bilateral hands with 3 second holds each digit x 10 reps, mod cues for attention and positioning regarding DIP flexion.  Ligament tightening exercises 10 reps of 10 second holds.  To perform 10 reps 3 x day.  Issued and instructed on light-medium resistance theraputty exercises focused on , pinch, rolling a log, in-hand manipulation, picking out coins, pulling apart and folding in intrinsic plus position, and gross extension.   Mod cues for slow and controlled movements.  To perform 1 x day for 30 minutes.    Time-based treatments/modalities:  Therapeutic activity minutes (CPT 17352): 60 minutes        ASSESSMENT:   Response to treatment:  Pt making progress with his bilateral hand motor control, hand-eye coordination, and function for his ADLs in response to graded AROM and strengthening exercises described above.  Written HEP updated with good understanding.    PLAN/RECOMMENDATIONS:   Plan for treatment: therapy treatment to continue next visit.  Planned interventions for next visit: continue with current " treatment and therapeutic activities (CPT 74391)

## 2020-11-12 NOTE — TELEPHONE ENCOUNTER
VOICEMAIL  1. Caller Name: Rodrick  @ active style               Call Back Number:    2. Message:       Had called regarding a refill on supplies.        Please advice.      Did not hear vm, just documenting what was hand written and given to me.

## 2020-11-14 ENCOUNTER — HOSPITAL ENCOUNTER (EMERGENCY)
Facility: MEDICAL CENTER | Age: 25
End: 2020-11-14
Attending: EMERGENCY MEDICINE
Payer: MEDICAID

## 2020-11-14 ENCOUNTER — APPOINTMENT (OUTPATIENT)
Dept: RADIOLOGY | Facility: MEDICAL CENTER | Age: 25
End: 2020-11-14
Attending: EMERGENCY MEDICINE
Payer: MEDICAID

## 2020-11-14 VITALS
BODY MASS INDEX: 25.84 KG/M2 | HEART RATE: 94 BPM | RESPIRATION RATE: 18 BRPM | WEIGHT: 165 LBS | DIASTOLIC BLOOD PRESSURE: 67 MMHG | OXYGEN SATURATION: 92 % | TEMPERATURE: 98.4 F | SYSTOLIC BLOOD PRESSURE: 110 MMHG

## 2020-11-14 DIAGNOSIS — R50.9 FEVER, UNSPECIFIED FEVER CAUSE: ICD-10-CM

## 2020-11-14 DIAGNOSIS — R00.0 TACHYCARDIA: ICD-10-CM

## 2020-11-14 LAB
ALBUMIN SERPL BCP-MCNC: 4.2 G/DL (ref 3.2–4.9)
ALBUMIN/GLOB SERPL: 1.2 G/DL
ALP SERPL-CCNC: 126 U/L (ref 30–99)
ALT SERPL-CCNC: 35 U/L (ref 2–50)
ANION GAP SERPL CALC-SCNC: 13 MMOL/L (ref 7–16)
APPEARANCE UR: CLEAR
AST SERPL-CCNC: 17 U/L (ref 12–45)
BASOPHILS # BLD AUTO: 1 % (ref 0–1.8)
BASOPHILS # BLD: 0.09 K/UL (ref 0–0.12)
BILIRUB SERPL-MCNC: 0.3 MG/DL (ref 0.1–1.5)
BILIRUB UR QL STRIP.AUTO: NEGATIVE
BUN SERPL-MCNC: 9 MG/DL (ref 8–22)
CALCIUM SERPL-MCNC: 8.9 MG/DL (ref 8.4–10.2)
CHLORIDE SERPL-SCNC: 100 MMOL/L (ref 96–112)
CO2 SERPL-SCNC: 20 MMOL/L (ref 20–33)
COLOR UR: YELLOW
COVID ORDER STATUS COVID19: NORMAL
CREAT SERPL-MCNC: 0.73 MG/DL (ref 0.5–1.4)
EKG IMPRESSION: NORMAL
EOSINOPHIL # BLD AUTO: 0.14 K/UL (ref 0–0.51)
EOSINOPHIL NFR BLD: 1.6 % (ref 0–6.9)
ERYTHROCYTE [DISTWIDTH] IN BLOOD BY AUTOMATED COUNT: 41.2 FL (ref 35.9–50)
FLUAV RNA SPEC QL NAA+PROBE: NEGATIVE
FLUBV RNA SPEC QL NAA+PROBE: NEGATIVE
GLOBULIN SER CALC-MCNC: 3.6 G/DL (ref 1.9–3.5)
GLUCOSE SERPL-MCNC: 103 MG/DL (ref 65–99)
GLUCOSE UR STRIP.AUTO-MCNC: NEGATIVE MG/DL
HCT VFR BLD AUTO: 45.1 % (ref 42–52)
HGB BLD-MCNC: 15.5 G/DL (ref 14–18)
IMM GRANULOCYTES # BLD AUTO: 0.04 K/UL (ref 0–0.11)
IMM GRANULOCYTES NFR BLD AUTO: 0.4 % (ref 0–0.9)
KETONES UR STRIP.AUTO-MCNC: NEGATIVE MG/DL
LACTATE BLD-SCNC: 1.5 MMOL/L (ref 0.5–2)
LEUKOCYTE ESTERASE UR QL STRIP.AUTO: NEGATIVE
LYMPHOCYTES # BLD AUTO: 1.09 K/UL (ref 1–4.8)
LYMPHOCYTES NFR BLD: 12.2 % (ref 22–41)
MCH RBC QN AUTO: 28.8 PG (ref 27–33)
MCHC RBC AUTO-ENTMCNC: 34.4 G/DL (ref 33.7–35.3)
MCV RBC AUTO: 83.7 FL (ref 81.4–97.8)
MICRO URNS: NORMAL
MONOCYTES # BLD AUTO: 0.67 K/UL (ref 0–0.85)
MONOCYTES NFR BLD AUTO: 7.5 % (ref 0–13.4)
NEUTROPHILS # BLD AUTO: 6.93 K/UL (ref 1.82–7.42)
NEUTROPHILS NFR BLD: 77.3 % (ref 44–72)
NITRITE UR QL STRIP.AUTO: NEGATIVE
NRBC # BLD AUTO: 0 K/UL
NRBC BLD-RTO: 0 /100 WBC
PH UR STRIP.AUTO: 6.5 [PH] (ref 5–8)
PLATELET # BLD AUTO: 229 K/UL (ref 164–446)
PMV BLD AUTO: 11 FL (ref 9–12.9)
POTASSIUM SERPL-SCNC: 3.8 MMOL/L (ref 3.6–5.5)
PROT SERPL-MCNC: 7.8 G/DL (ref 6–8.2)
PROT UR QL STRIP: NEGATIVE MG/DL
RBC # BLD AUTO: 5.39 M/UL (ref 4.7–6.1)
RBC UR QL AUTO: NEGATIVE
SARS-COV-2 RNA RESP QL NAA+PROBE: DETECTED
SODIUM SERPL-SCNC: 133 MMOL/L (ref 135–145)
SP GR UR STRIP.AUTO: 1.01
SPECIMEN SOURCE: ABNORMAL
TSH SERPL DL<=0.005 MIU/L-ACNC: 1.61 UIU/ML (ref 0.38–5.33)
WBC # BLD AUTO: 9 K/UL (ref 4.8–10.8)

## 2020-11-14 PROCEDURE — 85025 COMPLETE CBC W/AUTO DIFF WBC: CPT

## 2020-11-14 PROCEDURE — 84443 ASSAY THYROID STIM HORMONE: CPT

## 2020-11-14 PROCEDURE — 93005 ELECTROCARDIOGRAM TRACING: CPT | Performed by: EMERGENCY MEDICINE

## 2020-11-14 PROCEDURE — 80053 COMPREHEN METABOLIC PANEL: CPT

## 2020-11-14 PROCEDURE — 83605 ASSAY OF LACTIC ACID: CPT

## 2020-11-14 PROCEDURE — 71045 X-RAY EXAM CHEST 1 VIEW: CPT

## 2020-11-14 PROCEDURE — A9270 NON-COVERED ITEM OR SERVICE: HCPCS | Performed by: EMERGENCY MEDICINE

## 2020-11-14 PROCEDURE — 96365 THER/PROPH/DIAG IV INF INIT: CPT

## 2020-11-14 PROCEDURE — 700102 HCHG RX REV CODE 250 W/ 637 OVERRIDE(OP): Performed by: EMERGENCY MEDICINE

## 2020-11-14 PROCEDURE — 700111 HCHG RX REV CODE 636 W/ 250 OVERRIDE (IP): Performed by: EMERGENCY MEDICINE

## 2020-11-14 PROCEDURE — 99284 EMERGENCY DEPT VISIT MOD MDM: CPT

## 2020-11-14 PROCEDURE — 700105 HCHG RX REV CODE 258: Performed by: EMERGENCY MEDICINE

## 2020-11-14 PROCEDURE — 81003 URINALYSIS AUTO W/O SCOPE: CPT

## 2020-11-14 PROCEDURE — 87502 INFLUENZA DNA AMP PROBE: CPT

## 2020-11-14 PROCEDURE — 87040 BLOOD CULTURE FOR BACTERIA: CPT

## 2020-11-14 PROCEDURE — U0003 INFECTIOUS AGENT DETECTION BY NUCLEIC ACID (DNA OR RNA); SEVERE ACUTE RESPIRATORY SYNDROME CORONAVIRUS 2 (SARS-COV-2) (CORONAVIRUS DISEASE [COVID-19]), AMPLIFIED PROBE TECHNIQUE, MAKING USE OF HIGH THROUGHPUT TECHNOLOGIES AS DESCRIBED BY CMS-2020-01-R: HCPCS

## 2020-11-14 RX ORDER — SODIUM CHLORIDE, SODIUM LACTATE, POTASSIUM CHLORIDE, AND CALCIUM CHLORIDE .6; .31; .03; .02 G/100ML; G/100ML; G/100ML; G/100ML
30 INJECTION, SOLUTION INTRAVENOUS
Status: DISCONTINUED | OUTPATIENT
Start: 2020-11-14 | End: 2020-11-14 | Stop reason: HOSPADM

## 2020-11-14 RX ORDER — PROPRANOLOL HYDROCHLORIDE 20 MG/1
20 TABLET ORAL ONCE
Status: COMPLETED | OUTPATIENT
Start: 2020-11-14 | End: 2020-11-14

## 2020-11-14 RX ADMIN — CEFTRIAXONE SODIUM 2 G: 2 INJECTION, POWDER, FOR SOLUTION INTRAMUSCULAR; INTRAVENOUS at 05:36

## 2020-11-14 RX ADMIN — PROPRANOLOL HYDROCHLORIDE 20 MG: 20 TABLET ORAL at 06:45

## 2020-11-14 ASSESSMENT — PAIN SCALES - WONG BAKER: WONGBAKER_NUMERICALRESPONSE: HURTS JUST A LITTLE BIT

## 2020-11-14 ASSESSMENT — FIBROSIS 4 INDEX: FIB4 SCORE: 0.31

## 2020-11-14 NOTE — ED NOTES
Pt and family member provided w/ water.  Amy raised to assist pt in sitting up.  Pt and family member denied further needs at this time.

## 2020-11-14 NOTE — ED NOTES
Called Lab for Covid and Flu cultures, report never received the culture even though showing in process.  Covid / Flu culture recollected and sent to lab.  VS reassessment.  Pt repositioned on melanie for comfort.  Call light in .  Pt and family member updated on POC including pending tests and chart review by ERP.

## 2020-11-14 NOTE — ED NOTES
Med rec complete per family bedside.   Pt was just changed from 20 mg of Propranolol to 10 mg.   Allergies reviewed  I wore appropriate PPE.

## 2020-11-14 NOTE — ED PROVIDER NOTES
ED Provider Note    CHIEF COMPLAINT  Chief Complaint   Patient presents with   • Fever       HPI  Patient is a 25-year-old male with a complex past medical history who presents accompanied by his mother for evaluation of fever.  Mother states that over the last couple of days he has not been complaining of anything however he did have a reduction in his propranolol within the last week.  She noticed that his fever was 38.9, she did not give him any other acute medications, she checked his heart rate and it was in the 130s and 140s and she brought him in for further evaluation.  He is a native [facility] to be caring and over the last several days has not been complaining of a cough, dysuria, belly pain, nausea, vomiting, diarrheal illness.  He has no no complaints of headaches and he has been walking at his baseline and speaking in his native language at baseline (per mother).    PPE Note: I personally donned full PPE for all patient encounters during this visit, including being clean-shaven with an N95 respirator mask, gloves, and goggles.     Chart review shows that this patient had a AVM rupture status post AVM repair, hydrocephalus status post  shunt that occurred in the [facility] in November 2019.  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  Additionally the patient had some idiopathic tachycardia that was a recent discovery and he was placed on a beta-blocker.  This was recently the escalated    REVIEW OF SYSTEMS  See HPI for further details. All other systems are negative.       PAST MEDICAL HISTORY   has a past medical history of ICH (intracerebral hemorrhage) (HCC) and Stroke (HCC).    SOCIAL HISTORY  Social History     Tobacco Use   • Smoking status: Never Smoker   • Smokeless tobacco: Never Used   Substance and Sexual Activity   •  Alcohol use: Never     Frequency: Never     Drinks per session: Patient refused     Binge frequency: Never   • Drug use: Never   • Sexual activity: Not Currently       SURGICAL HISTORY   has a past surgical history that includes anal fistulotomy; peg placement; tracheostomy; and  shunt insertion.    CURRENT MEDICATIONS  Home Medications     Reviewed by Marylin Cedillo R.N. (Registered Nurse) on 11/14/20 at 0509  Med List Status: <None>   Medication Last Dose Status   bisacodyl (DULCOLAX) 10 MG Suppos  Active   Cholecalciferol (VITAMIN D3) 5000 UNIT/ML Liquid  Active   levETIRAcetam (KEPPRA) 500 MG Tab  Active   mirtazapine (REMERON) 15 MG Tab  Active   Misc. Devices Misc  Active   montelukast (SINGULAIR) 10 MG Tab  Active   polyethylene glycol/lytes (MIRALAX) 17 g Pack  Active   propranolol (INDERAL) 10 MG Tab  Active   tamsulosin (FLOMAX) 0.4 MG capsule  Active                ALLERGIES  Allergies   Allergen Reactions   • Vancomycin Swelling   • Other Misc Rash     Allergic to name band.       PHYSICAL EXAM  VITAL SIGNS: /57   Pulse (!) 121   Temp 37.1 °C (98.7 °F) (Tympanic)   Resp 20   Wt 74.8 kg (165 lb)   SpO2 92%   BMI 25.84 kg/m²   Pulse ox interpretation: I interpret this pulse ox as normal.  Genl: Chronically ill appearing M, sitting in gurney comfortably, speaking in native tongue, appears in no acute distress   Head: NC/AT   ENT: Prior trach site is clean and intact, mucous membranes dry, posterior pharynx clear, uvula midline, nares patent bilaterally   Eyes: Normal sclera, pupils equal round reactive to light  Neck: Supple, FROM, no LAD appreciated.  No midline tenderness, no pain or other symptoms with head movements  Pulmonary: Lungs are clear to auscultation bilaterally  Chest: No TTP  CV:  tachycardia, no murmur appreciated, pulses 2+ in both upper and lower extremities,  Abdomen: Multiple well-healed incisions are noted, soft, NT/ND; no rebound/guarding, no masses palpated, no HSM   :  "no CVA tenderness, mild suprapubic discomfort  Musculoskeletal: Pain free ROM of the neck. Moving upper and lower extremities and spontaneous in coordinated fashion  Neuro: A&Ox4 (person, place, time, situation), speech fluent, gait ataxic, no focal deficits appreciated, No cerebellar signs. Sensation is grossly intact in the distal upper and lower extremities.  5/5 strength in  and dorsiflexion/plantar flexion of the ankles  Psych: Patient has an appropriate affect and behavior  Skin: No rash or lesions.  No pallor or jaundice.  No cyanosis.  Warm and dry.     DIAGNOSTIC STUDIES / PROCEDURES    EKG  As interpreted by me and 0539: This is a sinus rhythm at a tachycardic rate of 120.  No MA prolongation, narrow complex, normal axis, no QTC prolongation, there is motion artifact in V5 otherwise there is no appreciable ST segment elevations or depressions noted.    LABS  Labs Reviewed   CBC WITH DIFFERENTIAL - Abnormal; Notable for the following components:       Result Value    Neutrophils-Polys 77.30 (*)     Lymphocytes 12.20 (*)     All other components within normal limits   COMP METABOLIC PANEL - Abnormal; Notable for the following components:    Sodium 133 (*)     Glucose 103 (*)     Alkaline Phosphatase 126 (*)     Globulin 3.6 (*)     All other components within normal limits   BLOOD CULTURE    Narrative:     1 of 2 for Blood Culture x 2 sites order. Per Hospital  Policy: Only change Specimen Src: to \"Line\" if specified by  physician order.   BLOOD CULTURE    Narrative:     2 of 2 blood culture x2  Sites order. Per Hospital Policy:  Only change Specimen Src: to \"Line\" if specified by physician  order.   URINALYSIS    Narrative:     Rule-out COVID-19 panel, includes droplet/contact/eye  isolation order.  Check influenza to order this test only if  specifically indicated.   COVID/SARS COV-2    Narrative:     Rule-out COVID-19 panel, includes droplet/contact/eye  isolation order.  Check influenza to order " this test only if  specifically indicated.  Is patient being admitted?->Yes  Does this patient meet criteria for Rush/Cepheid per [facility]  Inpatient Workflow? (See workflow link below)->Yes  Expected turn around time?->Rush (Cepheid 2-4 hours)  Is this the patients First SARS CoV-2 test?->Yes  Is this patient employed in healthcare?->No  Is the patient symptomatic as defined by the CDC?->No  Is the patient hospitalized?->No  Is the patient a resident in a congregate care setting?->Yes  Is the patient pregnant?->No   LACTIC ACID   ESTIMATED GFR   TSH   SARS-COV-2, PCR (IN-HOUSE)    Narrative:     Rule-out COVID-19 panel, includes droplet/contact/eye  isolation order.  Check influenza to order this test only if  specifically indicated.  Is patient being admitted?->Yes  Does this patient meet criteria for Rush/Cepheid per [facility]  Inpatient Workflow? (See workflow link below)->Yes  Expected turn around time?->Rush (Cepheid 2-4 hours)  Is this the patients First SARS CoV-2 test?->Yes  Is this patient employed in healthcare?->No  Is the patient symptomatic as defined by the CDC?->No  Is the patient hospitalized?->No  Is the patient a resident in a congregate care setting?->Yes  Is the patient pregnant?->No   INFLUENZA A/B BY PCR    Narrative:     Rule-out COVID-19 panel, includes droplet/contact/eye  isolation order.  Check influenza to order this test only if  specifically indicated.  Is patient being admitted?->Yes  Does this patient meet criteria for Rush/Cepheid per [facility]  Inpatient Workflow? (See workflow link below)->Yes  Expected turn around time?->Rush (Cepheid 2-4 hours)  Is this the patients First SARS CoV-2 test?->Yes  Is this patient employed in healthcare?->No  Is the patient symptomatic as defined by the CDC?->No  Is the patient hospitalized?->No  Is the patient a resident in a congregate care setting?->Yes  Is the patient pregnant?->No   LACTIC ACID   LACTIC ACID       RADIOLOGY  DX-CHEST-PORTABLE (1 VIEW)    Final Result      No acute cardiopulmonary abnormality.        COURSE & MEDICAL DECISION MAKING  Pertinent Labs & Imaging studies reviewed. (See chart for details)    DDX:  Tachyarrhythmia, medication de-escalation side effect, sepsis, pneumonia, pyelonephritis, influenza, viral syndrome, soft tissue infection, intra-abdominal infection, lactic acidosis, dehydration     MDM    Initial evaluation at 0507:  Patient presents emergency room for symptoms as described above.  The patient has extensive past medical history and on initial assessment there is a language barrier however the mother provides  and is a historian and primary caregiver.  She notes that he has been acting appropriately and did not have any preceding prodromal constitutional complaints.  He had some tachycardia and fevers at home and with his medical history the work-up is initiated for possible sources of infectious etiology including viral or bacterial sources.  IV fluid resuscitation is is given for tachycardia and clinical signs of dehydration.  Blood cultures were also obtained and empiric initial dose of Rocephin for more likely respiratory or urinary sources is given.    Initial lab work came back with a reassuring lactate at 1.5, WBCs of 9.  Further lab work is pending.  Patient is getting his fluid bolus, EKG shows a sinus tach rhythm without ischemia.  Chest x-ray is without any focal consolidations, pneumothorax or other cardiopulmonary process.    HYDRATION: Based on the patient's presentation of Dehydration, Sepsis and Tachycardia the patient was given IV fluids. IV Hydration was used because oral hydration was not adequate alone. Upon recheck following hydration, the patient was improved.    At the time of signout:  Pending further results of imaging, lab work, completion of fluids and clinical reassessment.  Consider recent halving of propranolol as possible source of tachycardia if pt has negative infectious workup.   Final disposition is at the discretion of the oncoming provider    FINAL IMPRESSION  Visit Diagnoses     ICD-10-CM   1. Fever, unspecified fever cause  R50.9   2. Tachycardia  R00.0     Electronically signed by: Santana Chawla M.D., 11/14/2020 5:06 AM  Results for orders placed or performed during the hospital encounter of 11/14/20   CBC WITH DIFFERENTIAL   Result Value Ref Range    WBC 9.0 4.8 - 10.8 K/uL    RBC 5.39 4.70 - 6.10 M/uL    Hemoglobin 15.5 14.0 - 18.0 g/dL    Hematocrit 45.1 42.0 - 52.0 %    MCV 83.7 81.4 - 97.8 fL    MCH 28.8 27.0 - 33.0 pg    MCHC 34.4 33.7 - 35.3 g/dL    RDW 41.2 35.9 - 50.0 fL    Platelet Count 229 164 - 446 K/uL    MPV 11.0 9.0 - 12.9 fL    Neutrophils-Polys 77.30 (H) 44.00 - 72.00 %    Lymphocytes 12.20 (L) 22.00 - 41.00 %    Monocytes 7.50 0.00 - 13.40 %    Eosinophils 1.60 0.00 - 6.90 %    Basophils 1.00 0.00 - 1.80 %    Immature Granulocytes 0.40 0.00 - 0.90 %    Nucleated RBC 0.00 /100 WBC    Neutrophils (Absolute) 6.93 1.82 - 7.42 K/uL    Lymphs (Absolute) 1.09 1.00 - 4.80 K/uL    Monos (Absolute) 0.67 0.00 - 0.85 K/uL    Eos (Absolute) 0.14 0.00 - 0.51 K/uL    Baso (Absolute) 0.09 0.00 - 0.12 K/uL    Immature Granulocytes (abs) 0.04 0.00 - 0.11 K/uL    NRBC (Absolute) 0.00 K/uL   COMP METABOLIC PANEL   Result Value Ref Range    Sodium 133 (L) 135 - 145 mmol/L    Potassium 3.8 3.6 - 5.5 mmol/L    Chloride 100 96 - 112 mmol/L    Co2 20 20 - 33 mmol/L    Anion Gap 13.0 7.0 - 16.0    Glucose 103 (H) 65 - 99 mg/dL    Bun 9 8 - 22 mg/dL    Creatinine 0.73 0.50 - 1.40 mg/dL    Calcium 8.9 8.4 - 10.2 mg/dL    AST(SGOT) 17 12 - 45 U/L    ALT(SGPT) 35 2 - 50 U/L    Alkaline Phosphatase 126 (H) 30 - 99 U/L    Total Bilirubin 0.3 0.1 - 1.5 mg/dL    Albumin 4.2 3.2 - 4.9 g/dL    Total Protein 7.8 6.0 - 8.2 g/dL    Globulin 3.6 (H) 1.9 - 3.5 g/dL    A-G Ratio 1.2 g/dL   URINALYSIS    Specimen: Urine, Clean Catch; Respirate   Result Value Ref Range    Color Yellow     Character Clear      Specific Gravity 1.015 <1.035    Ph 6.5 5.0 - 8.0    Glucose Negative Negative mg/dL    Ketones Negative Negative mg/dL    Protein Negative Negative mg/dL    Bilirubin Negative Negative    Nitrite Negative Negative    Leukocyte Esterase Negative Negative    Occult Blood Negative Negative    Micro Urine Req see below    COVID/SARS CoV-2 PCR    Specimen: Nasopharyngeal; Respirate   Result Value Ref Range    COVID Order Status Received    LACTIC ACID   Result Value Ref Range    Lactic Acid 1.5 0.5 - 2.0 mmol/L   ESTIMATED GFR   Result Value Ref Range    GFR If African American >60 >60 mL/min/1.73 m 2    GFR If Non African American >60 >60 mL/min/1.73 m 2   TSH   Result Value Ref Range    TSH 1.610 0.380 - 5.330 uIU/mL   SARS-CoV-2, PCR (In-House)   Result Value Ref Range    SARS-CoV-2 Source NP Swab    Influenza A/B By PCR (Adult - Flu Only)   Result Value Ref Range    Influenza virus A RNA Negative Negative    Influenza virus B, PCR Negative Negative   EKG   Result Value Ref Range    Report       [facility] Emergency Dept.    Test Date:  2020  Pt Name:    DAREK PARK                  Department: [facility]  MRN:        [id number]                      [facility]  Gender:     Male                         Technician:   :        1995                   Requested By:JOHN TADEO  Order #:    [id number]                    Reading MD:    Measurements  Intervals                                Axis  Rate:       120                          P:          26  WA:         152                          QRS:        -31  QRSD:       78                           T:          16  QT:         304  QTc:        430    Interpretive Statements  SINUS TACHYCARDIA  PROBABLE LEFT ATRIAL ABNORMALITY  LEFT AXIS DEVIATION  No previous ECG available for comparison           Patient's labs came back demonstrating normal thyroid function, negative influenza.  The patient's lactate is normal.  White count is  normal.  COVID-19 is pending.  The patient was given propranolol 20 mg here and his heart rate is now in the 80s.  I suspect that his tachycardia is a chronic issue and and it did appear to respond to the medication dose that he is normally on.  He will be discharged to follow-up with his regular doctor.  At this time there is no evidence of sepsis.  I did advise that they need to check the COVID-19 test tonight and tomorrow and otherwise self quarantine in the interim.    9:14 AM-the patient's COVID-19 test came back positive and we contacted the patient's mother over the phone and relayed the results.  The patient will remain in quarantine as per CDC guidelines.

## 2020-11-14 NOTE — DISCHARGE INSTRUCTIONS
You may have a viral syndrome as a source for your fever.  At this point there is no evidence of a focal infection from a bacterial standpoint.  COVID-19 is not excluded.    You have been tested for COVID-19 today. Your results are pending. Please act as if these results  are positive and self isolate until you receive the results. Make sure you  still wear a mask, social distance and practice good hand hygiene no matter  the result. In order to receive the results you will need to log into your  [employer] on the [employer] website. If you do not have an account you can create  an account. You can login or create an account for [employer] at  [email or web address]. If your symptoms worsen to a point that you become so  short of breath that you can only walk very short distances prior to  fatigue or feel you were unable to manage your symptoms at home please  return to the emergency department. For a more objective approach you can  buy a pulse oximeter online. [employer] has multiple to chose from. If your  oxygen saturation in these devices is persistently below 90% please return  to the ER.

## 2020-11-14 NOTE — ED TRIAGE NOTES
Mother brought pt into ED for tachycardia and fever that began this morning. Mother reports fever at home today was 38.1C.

## 2020-11-14 NOTE — ED NOTES
Reviewed discharge instructions w/ pt and family member, verbalized understanding to information provided including Covid precautions, follow up care and return precautions.  Pt and family member denied questions/concerns.  Family member assisted w/ cleaning and dressing pt.  Pt provided w/ new mask.  Provided w/ WC, offered assistance to car, family member declined.

## 2020-11-16 ENCOUNTER — APPOINTMENT (OUTPATIENT)
Dept: PHYSICAL THERAPY | Facility: REHABILITATION | Age: 25
End: 2020-11-16
Attending: PHYSICAL MEDICINE & REHABILITATION
Payer: MEDICAID

## 2020-11-16 ENCOUNTER — APPOINTMENT (OUTPATIENT)
Dept: SPEECH THERAPY | Facility: REHABILITATION | Age: 25
End: 2020-11-16
Attending: PHYSICAL MEDICINE & REHABILITATION
Payer: MEDICAID

## 2020-11-16 ENCOUNTER — APPOINTMENT (OUTPATIENT)
Dept: OCCUPATIONAL THERAPY | Facility: REHABILITATION | Age: 25
End: 2020-11-16
Attending: PHYSICAL MEDICINE & REHABILITATION
Payer: MEDICAID

## 2020-11-16 DIAGNOSIS — R00.0 TACHYCARDIA: ICD-10-CM

## 2020-11-16 RX ORDER — PROPRANOLOL HYDROCHLORIDE 20 MG/1
TABLET ORAL
Qty: 180 TAB | Refills: 2 | Status: SHIPPED | OUTPATIENT
Start: 2020-11-16 | End: 2022-05-17

## 2020-11-18 ENCOUNTER — APPOINTMENT (OUTPATIENT)
Dept: PHYSICAL THERAPY | Facility: REHABILITATION | Age: 25
End: 2020-11-18
Attending: PHYSICAL MEDICINE & REHABILITATION
Payer: MEDICAID

## 2020-11-18 ENCOUNTER — APPOINTMENT (OUTPATIENT)
Dept: SPEECH THERAPY | Facility: REHABILITATION | Age: 25
End: 2020-11-18
Attending: PHYSICAL MEDICINE & REHABILITATION
Payer: MEDICAID

## 2020-11-18 ENCOUNTER — APPOINTMENT (OUTPATIENT)
Dept: OCCUPATIONAL THERAPY | Facility: REHABILITATION | Age: 25
End: 2020-11-18
Attending: PHYSICAL MEDICINE & REHABILITATION
Payer: MEDICAID

## 2020-11-23 ENCOUNTER — APPOINTMENT (OUTPATIENT)
Dept: SPEECH THERAPY | Facility: REHABILITATION | Age: 25
End: 2020-11-23
Attending: PHYSICAL MEDICINE & REHABILITATION
Payer: MEDICAID

## 2020-11-23 ENCOUNTER — APPOINTMENT (OUTPATIENT)
Dept: PHYSICAL THERAPY | Facility: REHABILITATION | Age: 25
End: 2020-11-23
Attending: PHYSICAL MEDICINE & REHABILITATION
Payer: MEDICAID

## 2020-11-30 ENCOUNTER — APPOINTMENT (OUTPATIENT)
Dept: RADIOLOGY | Facility: MEDICAL CENTER | Age: 25
End: 2020-11-30
Attending: NEUROLOGICAL SURGERY
Payer: MEDICARE

## 2020-12-01 ENCOUNTER — SPEECH THERAPY (OUTPATIENT)
Dept: SPEECH THERAPY | Facility: REHABILITATION | Age: 25
End: 2020-12-01
Attending: PHYSICAL MEDICINE & REHABILITATION
Payer: MEDICAID

## 2020-12-01 ENCOUNTER — PHYSICAL THERAPY (OUTPATIENT)
Dept: PHYSICAL THERAPY | Facility: REHABILITATION | Age: 25
End: 2020-12-01
Attending: PHYSICAL MEDICINE & REHABILITATION
Payer: MEDICAID

## 2020-12-01 DIAGNOSIS — R47.1 DYSARTHRIA AND ANARTHRIA: ICD-10-CM

## 2020-12-01 DIAGNOSIS — I61.5 NONTRAUMATIC INTRAVENTRICULAR INTRACEREBRAL HEMORRHAGE, UNSPECIFIED LATERALITY (HCC): ICD-10-CM

## 2020-12-01 DIAGNOSIS — R13.12 OROPHARYNGEAL DYSPHAGIA: ICD-10-CM

## 2020-12-01 PROCEDURE — 97110 THERAPEUTIC EXERCISES: CPT | Mod: XU

## 2020-12-01 PROCEDURE — 92507 TX SP LANG VOICE COMM INDIV: CPT

## 2020-12-01 PROCEDURE — 97112 NEUROMUSCULAR REEDUCATION: CPT | Mod: XU

## 2020-12-01 ASSESSMENT — SOCIAL DETERMINANTS OF HEALTH (SDOH): SOCIAL_SUPPORT_SYSTEM: PARENT

## 2020-12-01 NOTE — OP THERAPY DAILY TREATMENT
Outpatient Speech Therapy  DAILY TREATMENT     [facility] [address]  [address]  [place] [address]  Phone:  [phone]  Fax:  [phone]    Date: 12/01/2020    Patient: Rey Medina  YOB: 1995  MRN: [id number]     Time Calculation    Start time: 1500  Stop time: 1545 Time Calculation (min): 45 minutes         Chief Complaint: Inability To Get Words Out    Visit #: 6    Subjective:   Reason for Therapy:     Reason For Evaluation:  Dysphagia and Speech/Language  Social Support:     Accompanied By:  Parent (mother, present and supportive)    Patient Mental Status:  Responsive  Additional Subjective Comments:      Parent endorses that patient speech is consistent with patient last seen visit. Patient mother reports that he has started initiating use of speech to meet simple needs such as requesting water or tissue in combination with gesture (pointing to water).       Objective:   Treatments/Interventions Performed:  Patient/Caregiver education, Compensatory strategy training, Speech/Language treatment and Home program  Treatment Intervention tool(s) used:  Speech production targeted to syllable level with sounds targeted from previous session /p, b/, /t, d/ /m/ and /g/ /k/. Speech production then targeted confrontation naming to real object, improved to picture cues, to work on initiation of verbal requests in the home setting.   Objective Details:  Speech production for syllable level sounds with target sound in initial word position completed with the following accuracy:  /b/ 5/5 (100%) accuracy  /p/ 3/5 (60%) accuracy  /d/ 5/5 (100%) accuracy  /t/ 3/5 (60%) accuracy  /g/ 4/5 (80%) accuracy  /k/ 4/5 (80%) accuracy     Patient named real items presented 10/12 = 83.3% accuracy.  Naming to picture cues with focus on clothing items: intelligible response: 2/7 = 28.5% accuracy to independent level; direct imitation cues with repetitive practice (minimum of 5  repetitions) improved accuracy to 5/7 = 71.4% accuracy.           Speech Therapy Assessment:     Expressive Language Assessment:     Ability to exhibit appropriate naming: Minimal.    Speech Mechanism Assessment:     Patient exhibits single word intelligibility: Severe      Speech Therapy Plan :   Prognosis & Recommendations  Impression Summary:  Strategies to address initiation in the home setting discussed, such as providing with choice of 2, to promote initiation of requests in the home setting. Patient mother verbalized encouragement that patient is now beginning to initiate verbalizations for simple requests in the home setting independent.   Therapy Recommendations  Recommendation:  Individual Speech Therapy,  Planned Therapy Interventions:  Home Program, Patient/Caregiver Education, Compensatory Strategy Training, Dysphagia treatment and Speech/Language training,   Plan Details:  Continue with current POC.

## 2020-12-01 NOTE — OP THERAPY DAILY TREATMENT
Outpatient Physical Therapy  DAILY TREATMENT     [facility] [address]  [address]  [place] [address]  Phone:  [phone]  Fax:  [phone]    Date: 12/01/2020    Patient: Rey Medina  YOB: 1995  MRN: [id number]     Time Calculation    Start time: 1400  Stop time: 1453 Time Calculation (min): 53 minutes         Chief Complaint: Difficulty Walking and Loss Of Balance    Visit #: 8    SUBJECTIVE:  Pt's mother reporting pt is regaining his strength after being admitted to ED for COVID.    Objective:      Therapeutic Exercises (CPT 73711):     1. Coordination of care, discussion re continuance of PT, discussion re: inpt rehab, review of s/s following recent COVID infection, discussion re: standing walker within the home, additional time spent no addit charge    2. Verbal review of hep with pt and caregiver    6. UPOC: 12/4/20      Therapeutic Exercise Summary: Vitals at end of session  HR: 94  SPO2: 95%    Therapeutic Treatments and Modalities:     1. Neuromuscular Re-education (CPT 42382), see below    2. Gait Training (CPT 02306)    Therapeutic Treatment and Modalities Summary: Gait training:  Pre-gait activities:  Transfers x 3 WC>standing with FWW; req Mikhail x 1 for initial 2 attempts, third attempt req modA due to incoordination and mis-stepping of feet; unable to regain balance indep    Neuro re-ed:    Quadruped: Previously able to weight shift sag and frontal planes; today difficulty maintaining posture for extended period, with moving LE's to bigger SHAHEEN, lost balance and rolled to side x2; increased time spent leaning on pt for support today.    Quadruped>tall kneeling with VC's to walk hands up LE's; emphasis on erect posture 3x2// pt requires freq modA due to LOB in forward and lateral direction, pt appears more fatigued today and increased duration of rest breaks      *Extended rest breaks req during session today        Time-based  treatments/modalities:    Physical Therapy Timed Treatment Charges  Gait training minutes (CPT 12030): 10 minutes  Neuromusc re-ed, balance, coor, post minutes (CPT 27510): 15 minutes  Therapeutic exercise minutes (CPT 94115): 11 minutes    ASSESSMENT:   Response to treatment:   See progress note for details      PLAN/RECOMMENDATIONS:   Plan for treatment: therapy treatment to continue next visit.  Planned interventions for next visit: continue with current treatment. Continue progressing core and proximal stability. Assess Balance reactions.

## 2020-12-01 NOTE — OP THERAPY PROGRESS SUMMARY
Outpatient Physical Therapy  PROGRESS SUMMARY NOTE      [facility]  [address]  [address]  [place] [address]  Phone:  [phone]  Fax:  [phone]    Date of Visit: 12/01/2020    Patient: Rey Medina  YOB: 1995  MRN: [id number]     Referring Provider: Mandy Luke D.O.  [address] [facility] [place] [address]   Referring Diagnosis Nontraumatic intraventricular intracerebral hemorrhage, unspecified laterality (HCC) [I61.5]     Visit Diagnoses     ICD-10-CM   1. Nontraumatic intraventricular intracerebral hemorrhage, unspecified laterality (HCC)  I61.5       Rehab Potential: fair    Progress Report Period:     Functional Assessment Used: NT          Objective Findings and Assessment:   Patient progression towards goals: Patient has been seen for 8 visits and was reaching a plateau in sitting balance, core strengthening and endurance improvements, however, was recently ill with COVID and has regressed. Pt demonstrating increased fatigue, decreased core stability, no longer performing safe transfers.     Due to gap in care secondary to quarantining for COVID, pt has not been able to complete two remaining sessions within authorized time (approval for 10 visits total). Pt may benefit from completing two remaining visits with time extension; additionally, pt may benefit from two additional visits in order to return to previous new baseline.    Objective findings and assessment details: See daily note for details    Goals:   Short Term Goals:   Short Term Goals:   1. Pt will be independent with written HEP with assistance from caregiver. (Met)  2. Pt will require Mikhail-cga for all transfers consistently. (Ongoing)  3. Pt will require SPV for scooting. (Ongoing)  4. Pt will demonstrate balance reactions in sagittal plane (New)  5. Pt will be able to demonstrate tall kneeling indep in order to demonstrate incr core strength (New)       Short term goal time  span:  4-6 weeks      Long Term Goals:    Long Term Goals:  (Ongoing goals)  1. Pt will be independent with written HEP with assistance from caregiver.  2. Pt will be independent with scooting in order to increase independence.  3. Pt will demonstrate safe transfers with SPV and LRAD in order to increase independence and decrease caregiver burden.  4. Pt will demonstrate improved standing tolerance for 10 min or longer with SPV and LRAD  in order to increase independence and decrease caregiver burden.  5. Pt will ambulate 25 ft with LRAD and Mikhail in order to increase independence. (Removed)         Long term goal time span:  2-4 months    Plan:   Planned therapy interventions:  Neuromuscular Re-education (CPT 87446) and Therapeutic Exercise (CPT 75663)  Frequency:  1x week  Duration in visits:  4  Plan details:  UPOC: 2/5/21    *Plan of care extended to allot for lag in auth time    Due to gap in care secondary to quarantining for COVID, pt has not been able to complete two remaining sessions within authorized time (approval for 10 visits total). Pt may benefit from completing two remaining visits with time extension; additionally, pt may benefit from two additional visits in order to return to previous new baseline.    Return for 2x97110 and 2x97112 per visit for 4 visits      Referring provider co-signature:  I have reviewed this plan of care and my co-signature certifies the need for services.     Certification Period: 12/01/2020 to 2/5/21    Physician Signature: ________________________________ Date: ______________

## 2020-12-03 ENCOUNTER — SPEECH THERAPY (OUTPATIENT)
Dept: SPEECH THERAPY | Facility: REHABILITATION | Age: 25
End: 2020-12-03
Attending: PHYSICAL MEDICINE & REHABILITATION
Payer: MEDICAID

## 2020-12-03 DIAGNOSIS — R13.12 OROPHARYNGEAL DYSPHAGIA: ICD-10-CM

## 2020-12-03 DIAGNOSIS — R47.1 DYSARTHRIA AND ANARTHRIA: ICD-10-CM

## 2020-12-03 PROCEDURE — 92507 TX SP LANG VOICE COMM INDIV: CPT

## 2020-12-03 ASSESSMENT — SOCIAL DETERMINANTS OF HEALTH (SDOH): SOCIAL_SUPPORT_SYSTEM: PARENT

## 2020-12-03 NOTE — OP THERAPY PROGRESS SUMMARY
Outpatient Speech Therapy  PROGRESS SUMMARY NOTE      [facility] [place] [address]  [address]  [place] [address]  Phone:  [phone]  Fax:  [phone]    Date of Visit: 12/03/2020    Patient: Rey Medina  YOB: 1995  MRN: [id number]     Referring Provider: Mandy Luke D.O.  [address] [facility] [place] [address]   Referring Diagnosis Nontraumatic intraventricular intracerebral hemorrhage, unspecified laterality (HCC) [I61.5]      Visit #: 7    Progress Report Period: 12/3/2020-    Time Calculation    Start time: 1305  Stop time: 1355 Time Calculation (min): 50 minutes         Chief Complaint: Dysphagia and Not Understood By Others    Visit Diagnoses     ICD-10-CM   1. Oropharyngeal dysphagia  R13.12   2. Dysarthria and anarthria  R47.1       Subjective:   Reason for Therapy:     Reason For Evaluation:  CVA, Cognition, Voice, Speech/Language, Dysarthria, Dysphagia and Stroke Rehabilitaion    Onset Date:  4/19/2019    Onset Description:  Rey Medina is a 25 year old male who presented for assessment of dysarthria, dysphagia, and cognitive linguistic deficits post CVA.    Social Support:     Social support system: Mother, who is primary caregiver, accompanies patient to all outpatient speech therapy visits.      Objective:   Treatments/Interventions Performed:  Cognitive-Linguistic training, Home program, Dysphagia treatment, Patient/Caregiver education, Speech/Language treatment, Compensatory strategy training and Respiratory Coordination / Support training      Speech Therapy Assessment:     Expressive Language Assessment:     Ability to exhibit appropriate naming: Moderate.    Repeats speech accurately Moderate (to single word level).    Patient's ability to verbalize wants and needs is Severe.    Expressive language comments: At the start of direct, outpatient speech therapy, patient verbalizations were limited to single words with severely reduced  intelligibility. Likewise, patient demonstrate no independent initiation of speech to communicate wants and needs in the home setting with primary caregivers including patient mother.      Through participation in direct, outpatient speech therapy, patient is now demonstrating initiation of verbalizations to communicate simple information, such as greeting/ leave taking with clinician and initiation of gesture (pointing) and verbalization to communicate simple preferences when presented in a field of 2. Parent is also reporting that patient is now initiating requests for wants or needs independently in the home setting such as requesting water when thirsty.     Receptive Language Assessment:     Patient follows 1 step simple commands: WFL    Patient follows 2 step simple commands:WFL    Patient understands simple conversation: Minimal    Receptive language comments: Patient is demonstrating accuracy in direction following to 1 and 2 step verbal directions to allow for increased accuracy in speech production specific to targeted sound practice of individual phonemes to improve accuracy in speech production for single words.     Speech Mechanism Assessment:     Patient voice description: Clear    Laterality of patient facial weakness: Left    Patient's oral movements are voluntary and coordination: Moderate    Patient exhibits articulatory precision: Moderate    Patient exhibits single word intelligibility: Moderate    Patient exhibits sentence level intelligibility: Severe (phrase level production, repetitive practice, direct cues)    Patient uses adequate breath support: No  Speech mechanism comments: Speech therapy targeted accuracy in sound production through participation in repetitive sound practice initially targeting individual phonemes to stop consonants. Patient accuracy in isolation improved dramatically over the initial treatment period from <20% accuracy to >80% accuracy given scaffolded/ faded cues. ST  has introduced individual phoneme practice for fricatives; however these have been noted to be extremely difficult for the patient to produce accurately at this time due to tendency to use voicing to substitute for deficits in articulatory precision.  Given this, direct cues and structured practice have focused on understanding placement of articulators during repetitive sound production tasks.     Oral Motor Status:     Labial strength and control for patient: Fair    Lingual strength and control for patient: Fair    Patient saliva management: Fair    Oral motor status comments: Oral motor exercises: ooo-eee, smile and pucker, kissy sound, cheek puff with lateralization, lingual protrusion with elevation, lingual press, lingual clicks, lingual cheek press, and velar sounds completed with sight to min assist.  Completing lingual circles around lip he would end up with a zig zag motion, difficult to [place] lips on top.  Introduced Juhi, provided handouts.      Pharyngeal Phase Assessment:     Coughing after the swallow present: Thin Liquid    Pharyngeal phase comments: Given noted impulsivity with intake, cough was present 2/2 trials. Patient parent was provided with instruction on strategies to address impulsivity with regards to intake. Patient may benefit from environmental modifications to improve safety in intake, such as controlled volume straws.       Speech Therapy Plan :   Prognosis & Recommendations  Impression Summary: Mr. Rey Medina, a 25 year old male, participated in 7 sessions of direct, outpatient speech therapy addressing global deficits in speech, communication, language and swallow. Patient participation in direct therapy was limited due to patient need to quarantine due to COVID-19.      Given patient progress demonstrated in swallow, speech and expressive languguage skills characterized by improved initiation of verbalizations to independently communicate simple wants, needs in the  "home setting, continued participation in direct, outpatient speech therapy remains indicated.   Prognosis:  Good  Prognosis Details:   Patient is demonstrating increased responsiveness to cues, resulting in improved accuracy in articulation to single word and simple phrase levels. Patient is demonstrating noted benefit from repetitive practice and multi-level cueing to improve accuracy in speech production during structured speech generation tasks. Parent was noted to use clinician prompts for improved loudness through \"gentle speech\" resulting in patient immediate and independent attempts to lower volume to improve intelligibility. With regards to swallow, presence of impulsivity remains limiting factor to patient progression with liquids from current diet level of slightly thick liquids to thin. Strategies to address were educated/ trained and trialed as part of direct, outpatient speech therapy sessions.   Compensatory swallow strategies:  Upright position 90 degrees during meal and 45 minutes after meal, Reduce bolus size, Multiple swallows and No talking while eating  Diet Recommendation:  Mechanical Soft Foods  Liquid Recommendation:  Nectar Thick (mildly thick to slightly thick liquids)  Goals  Short Term Goals:  -Patient to complete diaphragm breathing exercises to improve coordination of breath and PO intake and increase length of utterance in 10/15 sessions, min assist.   -Patient to complete oral, pharyngeal, and laryngeal exercises to improve function and strength of swallow and speech mechanisms to 85% accuracy in 2 of 3 sessions, min assist.   -Patient will improve memory recall, including working memory, short term and time delayed recall, implementing compensatory strategies as necessary, to newly learned information with 80% or greater accuracy, mod cues.   -Patient will complete selective, alternating and divided attention tasks given min to mod verbal prompts/ instruction/ cues to 85% accuracy. "   -Patient will maintain the topic of conversation for 2 turns in 3 of 4 opportunities to improve functional communication.  -Patient will demonstrate accuracy in confrontation naming to picture stimuli of nouns, verbs 85% accuracy; independent.    - Patient will independently initiate task choice, or item of preference, stating to phrase level given minimal verbal prompts/ instruction/ cues 80% of obligatory opportunities.   Short Term Goal Duration (Weeks):  6-8 weeks  Patient progression on Short Term Goals:  -Patient to complete diaphragm breathing exercises to improve coordination of breath and PO intake and increase length of utterance in 10/15 sessions, mod assist: GOAL MET 12/3/2020. Patient verbal expression is demonstrating improvement from single words to simple phrase level productions given direct imitation cues, 80% or greater accuracy.   -Patient to complete oral, pharyngeal, and laryngeal exercises to improve function and strength of swallow and speech mechanisms to 85% accuracy in 2 of 3 sessions, min assist: PROGRESSING/ CONTINUE.   -Patient will improve memory recall, including working memory, short term and time delayed recall, implementing compensatory strategies as necessary, to newly learned information with 80% or greater accuracy, mod cues: NOT ADDRESSED.   -Patient will complete selective, alternating and divided attention tasks given min to mod verbal prompts/ instruction/ cues to 85% accuracy: INDIRECTLY TARGETED.   -Patient will maintain the topic of conversation for 2 turns in 3 of 4 opportunities to improve functional communication: NOT ADDRESSED.   -Patient will produce reduplicative syllables first in isolation (rudolph rudolph rudolph, chad chad chad)  then in succession (rudolph, chad, rj) for 30 trials with 80% accuracy, mod cues: GOAL MET 12/3/2020.   - Patient to initiate what task to complete in therapy within 5 minutes with field choice of 2 in 3/4 session, given min to mod cues: GOAL  MET given direct picture cues in choice of 2, patient is independent in use of gesture or verbalization to state preference 80% or greater accuracy.   Long Term Goals:   -Patient will improve safety in swallow implementing compensatory swallow strategies during intake given minimal to no verbal prompts/ instruction/ cues demonstrating safety in swallow of regular solids, thin liquid as evidenced by no overt signs or symptoms of aspiration for primary nutrition/ hydration needs.  - Patient will develop functional, cognitive-linguistic based skills and utilize compensatory strategies to communicate wants and needs effectively to different conversational partners, maintain safety, and participate socially in a functional living environment.  Therapy Recommendations  Recommendation:  Individual Speech Therapy,  Planned Therapy Interventions:  Home Program, Patient/Caregiver Education, Compensatory Strategy Training, Dysphagia treatment, Respiratory coordination/support training, Cognitive-Linguistic training and Speech/Language training,   Plan Details:  10 units (00025); 10 units (49393)  Frequency:  1x week  Duration (in visits):  10  Duration (in weeks):  10      Functional Assessment Used  Data collection and skilled observation during structured outpatient speech therapy sessions    Referring provider co-signature:  I have reviewed this plan of care and my co-signature certifies the need for services.    Certification Period: 12/03/2020 to 02/11/21    Physician Signature: ________________________________ Date: ______________

## 2020-12-03 NOTE — OP THERAPY DAILY TREATMENT
Outpatient Speech Therapy  DAILY TREATMENT     [facility] Speech [address]  [address]  [place] [address]  Phone:  [phone]  Fax:  [phone]    Date: 12/03/2020    Patient: Rey Medina  YOB: 1995  MRN: [id number]     Time Calculation    Start time: 1305  Stop time: 1355 Time Calculation (min): 50 minutes         Chief Complaint: Dysphagia and Not Understood By Others    Visit #: 7    Subjective:   Reason for Therapy:     Reason For Evaluation:  Dysphagia and Speech/Language  Social Support:     Accompanied By:  Parent (mother, present and supportive)    Patient Mental Status:  Responsive and Alert  Additional Subjective Comments:      Parent is endorsing that patient is demonstrating improvement in swallow; characterized by return to feeding self liquids and solids (slightly thick liquids) with parent noting that patient is no longer demonstrating any presence of cough with intake. Due to improvement in swallow, parent requested continued development of speech, expressive language skills to assist patient in improving independent in communication.       Objective:   Treatments/Interventions Performed:  Patient/Caregiver education, Compensatory strategy training, Speech/Language treatment and Home program  Treatment Intervention tool(s) used:  Speech production targeted to syllable level with sounds targeted from previous session /p, b/, /t, d/ /m/ and /g/ /k/. Speech production then targeted confrontation naming to real object, improved to picture cues, to work on initiation of verbal requests in the home setting.   Objective Details:  Speech production for syllable level sounds with target sound in initial word position completed with the following accuracy:  /b/ 5/5 (100%) accuracy  /p/ 5/5 (100%) accuracy  /d/ 5/5 (100%) accuracy  /t/ 5/5 (100%) accuracy  /g/ 4/5 (80%) accuracy  /k/ 4/5 (80%) accuracy     Naming to picture cues with focus on clothing  items: intelligible response: 4/7 = 57.1% (improved 29% from previous session) to independent level; direct imitation cues with repetitive practice (minimum of 5 repetitions) continued to promote accuracy in verbal expression for single words. Food items initiated with patient demonstrating accuracy in naming with intelligible response: 10/15 = 66.7% accuracy. Patient then provided with choice of 2 previously named items and asked to make choice (e.g. what do you like to eat) with patient demonstrating independence in initiation of gesture (pointing to picture) then followed by verbalization of item 4/5 (80%) of obligatory opportunities. Phrase level productions were then targeted related to choice (I like hamburger) completed with direct imitation cues 4/4 trials.              Speech Therapy Assessment:     Expressive Language Assessment:     Ability to exhibit appropriate naming: Moderate.    Repeats speech accurately Moderate (to single word level).    Patient's ability to verbalize wants and needs is Severe.    Patient's ability to formulate complex/abstract language is Severe.    Expressive language comments: At the start of direct, outpatient speech therapy, patient verbalizations were limited to single words with severely reduced intelligibility. Likewise, patient demonstrate no independent initiation of speech to communicate wants and needs in the home setting with primary caregivers including patient mother.     Through participation in direct, outpatient speech therapy, patient is now demonstrating initiation of verbalizations to communicate simple information, such as greeting/ leave taking with clinician and initiation of gesture (pointing) and verbalization to communicate simple preferences when presented in a field of 2. Parent is also reporting that patient is now initiating requests for wants or needs independently in the home setting such as requesting water when thirsty.     Receptive Language  Assessment:     Patient follows 1 step simple commands: WFL    Patient follows 2 step simple commands:WFL    Patient understands simple conversation: Minimal    Receptive language comments: Patient is demonstrating accuracy in direction following to 1 and 2 step verbal directions to allow for increased accuracy in speech production specific to targeted sound practice of individual phonemes to improve accuracy in speech production for single words.     Speech Mechanism Assessment:     Patient voice description: Clear    Laterality of patient facial weakness: Left    Patient's oral movements are voluntary and coordination: Moderate    Patient exhibits articulatory precision: Moderate    Patient exhibits single word intelligibility: Moderate    Patient exhibits sentence level intelligibility: Severe (Phrase level production, repetitive practice given direct cues)    Patient dysarthria: Severe    Patient uses adequate breath support: No  Speech mechanism comments: Speech therapy targeted accuracy in sound production through participation in repetitive sound practice initially targeting individual phonemes to stop consonants. Patient accuracy in isolation improved dramatically over the initial treatment period from <20% accuracy to >80% accuracy given scaffolded/ faded cues. ST has introduced individual phoneme practice for fricatives; however these have been noted to be extremely difficult for the patient to produce accurately at this time due to tendency to use voicing to substitute for deficits in articulatory precision.  Given this, direct cues and structured practice have focused on understanding placement of articulators during repetitive sound production tasks.       Speech Therapy Plan :   Prognosis & Recommendations  Impression Summary:  Mr. Rey Medina, a 25 year old male, participated in 7 sessions of direct, outpatient speech therapy addressing global deficits in speech, communication, language and swallow.  "Patient participation in direct therapy was limited due to patient need to quarantine due to COVID-19.     Given patient progress demonstrated in swallow, speech and expressive languguage skills characterized by improved initiation of verbalizations to independently communicate simple wants, needs in the home setting, continued participation in direct, outpatient speech therapy remains indicated.   Prognosis Details:  Patient is demonstrating increased responsiveness to cues, resulting in improved accuracy in articulation to single word and simple phrase levels. Patient is demonstrating noted benefit from repetitive practice and multi-level cueing to improve accuracy in speech production during structured speech generation tasks. Parent was noted to use clinician prompts for improved loudness through \"gentle speech\" resulting in patient immediate and independent attempts to lower volume to improve intelligibility. With regards to swallow, presence of impulsivity remains limiting factor to patient progression with liquids from current diet level of slightly thick liquids to thin. Strategies to address were educated/ trained and trialed as part of direct, outpatient speech therapy sessions.   Compensatory swallow strategies:  Upright position 90 degrees during meal and 45 minutes after meal, Reduce bolus size, Multiple swallows and No talking while eating  Diet Recommendation:  Mechanical Soft Foods  Liquid Recommendation:  Nectar Thick (mildly thick to slightly thick liquids)  Goals  Short Term Goals:   -Patient to complete diaphragm breathing exercises to improve coordination of breath and PO intake and increase length of utterance in 10/15 sessions, min assist.   -Patient to complete oral, pharyngeal, and laryngeal exercises to improve function and strength of swallow and speech mechanisms to 85% accuracy in 2 of 3 sessions, min assist.   -Patient will improve memory recall, including working memory, short term and " time delayed recall, implementing compensatory strategies as necessary, to newly learned information with 80% or greater accuracy, mod cues.   -Patient will complete selective, alternating and divided attention tasks given min to mod verbal prompts/ instruction/ cues to 85% accuracy.   -Patient will maintain the topic of conversation for 2 turns in 3 of 4 opportunities to improve functional communication.  -Patient will demonstrate accuracy in confrontation naming to picture stimuli of nouns, verbs 85% accuracy; independent.    - Patient will independently initiate task choice, or item of preference, stating to phrase level given minimal verbal prompts/ instruction/ cues 80% of obligatory opportunities.   Patient progression on Short Term Goals:   -Patient to complete diaphragm breathing exercises to improve coordination of breath and PO intake and increase length of utterance in 10/15 sessions, mod assist: GOAL MET 12/3/2020. Patient verbal expression is demonstrating improvement from single words to simple phrase level productions given direct imitation cues, 80% or greater accuracy.   -Patient to complete oral, pharyngeal, and laryngeal exercises to improve function and strength of swallow and speech mechanisms to 85% accuracy in 2 of 3 sessions, min assist: PROGRESSING/ CONTINUE.   -Patient will improve memory recall, including working memory, short term and time delayed recall, implementing compensatory strategies as necessary, to newly learned information with 80% or greater accuracy, mod cues: NOT ADDRESSED.   -Patient will complete selective, alternating and divided attention tasks given min to mod verbal prompts/ instruction/ cues to 85% accuracy: INDIRECTLY TARGETED.   -Patient will maintain the topic of conversation for 2 turns in 3 of 4 opportunities to improve functional communication: NOT ADDRESSED.   -Patient will produce reduplicative syllables first in isolation (rudolph galdamez, chad bonilla  chad)  then in succession (rudolph, chad, rj) for 30 trials with 80% accuracy, mod cues: GOAL MET 12/3/2020.   - Patient to initiate what task to complete in therapy within 5 minutes with field choice of 2 in 3/4 session, given min to mod cues: GOAL MET given direct picture cues in choice of 2, patient is independent in use of gesture or verbalization to state preference 80% or greater accuracy.     Long Term Goals:  -Patient will improve safety in swallow implementing compensatory swallow strategies during intake given minimal to no verbal prompts/ instruction/ cues demonstrating safety in swallow of regular solids, thin liquid as evidenced by no overt signs or symptoms of aspiration for primary nutrition/ hydration needs.  - Patient will develop functional, cognitive-linguistic based skills and utilize compensatory strategies to communicate wants and needs effectively to different conversational partners, maintain safety, and participate socially in a functional living environment.  Therapy Recommendations  Recommendation:  Individual Speech Therapy,  Planned Therapy Interventions:  Home Program, Patient/Caregiver Education, Compensatory Strategy Training, Speech/Language training, Dysphagia treatment and Cognitive-Linguistic training,   Plan Details:  10 units (35470); 10 units (92334)  Frequency:  1x week  Duration (in weeks):  10

## 2020-12-05 ENCOUNTER — HOSPITAL ENCOUNTER (OUTPATIENT)
Dept: RADIOLOGY | Facility: MEDICAL CENTER | Age: 25
End: 2020-12-05
Attending: NEUROLOGICAL SURGERY
Payer: MEDICAID

## 2020-12-05 DIAGNOSIS — I60.8: ICD-10-CM

## 2020-12-05 PROCEDURE — 70551 MRI BRAIN STEM W/O DYE: CPT

## 2020-12-05 PROCEDURE — 70544 MR ANGIOGRAPHY HEAD W/O DYE: CPT

## 2020-12-08 ENCOUNTER — OFFICE VISIT (OUTPATIENT)
Dept: NEUROLOGY | Facility: MEDICAL CENTER | Age: 25
End: 2020-12-08
Payer: MEDICAID

## 2020-12-08 ENCOUNTER — TELEPHONE (OUTPATIENT)
Dept: NEUROLOGY | Facility: MEDICAL CENTER | Age: 25
End: 2020-12-08

## 2020-12-08 VITALS
TEMPERATURE: 98.4 F | RESPIRATION RATE: 16 BRPM | SYSTOLIC BLOOD PRESSURE: 105 MMHG | BODY MASS INDEX: 22.84 KG/M2 | OXYGEN SATURATION: 95 % | WEIGHT: 145.5 LBS | HEART RATE: 74 BPM | HEIGHT: 67 IN | DIASTOLIC BLOOD PRESSURE: 64 MMHG

## 2020-12-08 DIAGNOSIS — F09 COGNITIVE AND NEUROBEHAVIORAL DYSFUNCTION FOLLOWING BRAIN INJURY (HCC): ICD-10-CM

## 2020-12-08 DIAGNOSIS — Z86.79 H/O SPONT INTRAPARENCHYMAL INTRACRANIAL HEMORRHAGE D/T CEREBRAL AVM: ICD-10-CM

## 2020-12-08 DIAGNOSIS — I69.191 DYSPHAGIA FOLLOWING NONTRAUMATIC INTRACEREBRAL HEMORRHAGE: ICD-10-CM

## 2020-12-08 DIAGNOSIS — S06.9XAS COGNITIVE AND NEUROBEHAVIORAL DYSFUNCTION FOLLOWING BRAIN INJURY (HCC): ICD-10-CM

## 2020-12-08 DIAGNOSIS — G31.89 COGNITIVE AND NEUROBEHAVIORAL DYSFUNCTION FOLLOWING BRAIN INJURY (HCC): ICD-10-CM

## 2020-12-08 DIAGNOSIS — Z98.2 VP (VENTRICULOPERITONEAL) SHUNT STATUS: ICD-10-CM

## 2020-12-08 PROCEDURE — 99215 OFFICE O/P EST HI 40 MIN: CPT | Performed by: PSYCHIATRY & NEUROLOGY

## 2020-12-08 RX ORDER — LEVETIRACETAM 500 MG/1
500 TABLET ORAL DAILY
Qty: 90 TAB | Refills: 3 | Status: SHIPPED | OUTPATIENT
Start: 2020-12-08 | End: 2021-01-04 | Stop reason: SDUPTHER

## 2020-12-08 ASSESSMENT — FIBROSIS 4 INDEX: FIB4 SCORE: 0.33

## 2020-12-08 NOTE — PROGRESS NOTES
Chief Complaint   Patient presents with   • New Patient     intraventricular hemorrage      Patient is referred by Dr. ALBERTO for initial consult.    History of present illness:  Rey Medina 25 y.o. male presents today for neurological dysfunction.   History is obtained from patient and mom.  and Patient is accompanied by Mom Gem.  She is his primary caretaker.    Duration/timing: acute onset 4/2019   Context: AVM with intraventricular hemorrhage: Patient was in the [facility] with acute onset of intraventricular hemorrhage secondary to AVM status post  shunt.  He has since been a long-term recovery but residual deficits include the following: Short term memory difficulties; was having difficulty with initating conversation but now he is doing it more often. Non ambulatory - stands with support also with transfers which is also improvement. Some constipation. ?Bladder incontinence with use of diaper - especially after tracheostomy removal he is less able to communicate that he has to use the restroom. No clear weakness. + Incoordination in the extremities.  Swallowing improving. He can feed himself though sloppy.  Learning to brush his teeth.  No clear seizures - Keppra was started in hospital and not removed? Previously some agitation.   Location: Posterior fossa  Quality: Hemorrhage  Severity: Severe  Modifying factors: None  Associated signs/symptoms: As above  Denies: weakness, numbness/tingling, depression, anxiety, loss of consciousness, seizures, abnormal movements and falls     Patient has tried:  -Amantadine - tried by Dr. ALBERTO, stopped by Dr. Luke (psych), since focus is better  -Keppra 500mg daily - decreased in 7/2020 by Dr. ALBERTO  -Speech therapy - with improvement, on thickener  -OT/PT - current for weakness   -Psychiatrist Dr. Luke  -Neurosurgeon Dr. Walton -Next appointment in December 2020      Past medical history:   Past Medical History:   Diagnosis Date   • ICH (intracerebral hemorrhage)  "(HCC)    • Stroke (HCC)        Past surgical history:   Past Surgical History:   Procedure Laterality Date   • ANAL FISTULOTOMY     • PEG PLACEMENT     • TRACHEOSTOMY     •  SHUNT INSERTION         Family history:   Family History   Problem Relation Age of Onset   • Hypertension Mother    • Glasses Mother    • Thyroid Mother    • Diabetes Maternal Grandfather    • Stroke Maternal Grandfather    • Diabetes Paternal Grandmother    • Hypertension Maternal Uncle    • Heart Disease Neg Hx    • Cancer Neg Hx        Social history:   Tobacco Use   • Smoking status: Never Smoker   • Smokeless tobacco: Never Used   Substance and Sexual Activity   • Alcohol use: Never     Frequency: Never     Drinks per session: Patient refused     Binge frequency: Never   • Drug use: Never     Current medications:   Current Outpatient Medications   Medication   • levETIRAcetam (KEPPRA) 500 MG Tab   • propranolol (INDERAL) 20 MG Tab   • Cyanocobalamin (B-12 PO)   • Misc. Devices Misc   • mirtazapine (REMERON) 15 MG Tab   • tamsulosin (FLOMAX) 0.4 MG capsule   • polyethylene glycol/lytes (MIRALAX) 17 g Pack   • montelukast (SINGULAIR) 10 MG Tab   • bisacodyl (DULCOLAX) 10 MG Suppos   • Cholecalciferol (VITAMIN D3) 5000 UNIT/ML Liquid     No current facility-administered medications for this visit.        Medication Allergy:  Allergies   Allergen Reactions   • Vancomycin Swelling     Lips  With red face and neck   • Other Misc Rash     Allergic to name band.       Review of Systems   Limited due to patient's ability to respond    Physical examination:   Vitals:    12/08/20 1101   BP: 105/64   BP Location: Left arm   Patient Position: Sitting   BP Cuff Size: Adult   Pulse: 74   Resp: 16   Temp: 36.9 °C (98.4 °F)   TempSrc: Temporal   SpO2: 95%   Weight: 66 kg (145 lb 8 oz)   Height: 1.702 m (5' 7\")     General: Patient in well nourished in no apparent distress.  In his wheelchair.  Eyes: Ophthalmoscopic examination performed but discs cannot " be visualized well enough to characterize bilaterally.  HENT: Normocephalic, atraumatic.  Cardiovascular: No lower extremity edema.  Respiratory: Normal respiratory effort.   Skin: No appreciable signs of acute rashes or bruising.   Musculoskeletal: No signs of joint or muscle swelling.   Psychiatric: Pleasant.     NEUROLOGICAL EXAM:   Mental status: Awake, alert and fully oriented to person.  Otherwise difficulty assessing for the orientation.  He does seem to maintain attention to conversation when spoken to.  Speech and language: Speech is standing in nature.  He speaks in very short responses.  He is able to name objects.  Will to follow commands.  Cranial nerve exam:  II: Pupils are equally round and reactive to light. Visual fields are intact by confrontation -grossly.   III, IV, VI: EOMI -with evidence of nystagmus and difficulty with smooth pursuit, no diplopia, no ptosis.  V: Sensation to light touch is normal over V1-3 distributions bilaterally.  .  VII: Facial movements are symmetrical. There is no facial droop. .  VIII: Hearing intact to soft speech and finger rub bilaterally  IX: Palate elevates symmetrically, uvula is midline. Dysarthria/scanning speech.  XI: Shoulder shrug are symmetrical and strong.   XII: Tongue protrudes midline.    Motor exam:  Some increase in muscle tone in the upper and lower extremities but not significantly.  There is no strong evidence of spasticity.    Muscle strength: He is globally 5 out of 5 upper and lower extremity    Sensory exam:  Intact to Light touch in bilateral upper and lower extremity.    Reflexes:       Right  Left  Biceps   3/4  3/4  Triceps  3/4  3/4  Brachioradialis 3/4  3/4  Knee jerk  3/4  3/4  Ankle jerk  NT/4  NT/4       Coordination: Ataxia with finger-to-nose, heel-to-shin difficult to perform.  Gait: Nonambulatory    ANCILLARY DATA REVIEWED:   Lab Data Review:  Lab Results   Component Value Date/Time    WBC 9.0 11/14/2020 05:20 AM    RBC 5.39  11/14/2020 05:20 AM    HEMOGLOBIN 15.5 11/14/2020 05:20 AM    HEMATOCRIT 45.1 11/14/2020 05:20 AM    MCV 83.7 11/14/2020 05:20 AM    MCH 28.8 11/14/2020 05:20 AM    MCHC 34.4 11/14/2020 05:20 AM    MPV 11.0 11/14/2020 05:20 AM    NEUTSPOLYS 77.30 (H) 11/14/2020 05:20 AM    LYMPHOCYTES 12.20 (L) 11/14/2020 05:20 AM    MONOCYTES 7.50 11/14/2020 05:20 AM    EOSINOPHILS 1.60 11/14/2020 05:20 AM    BASOPHILS 1.00 11/14/2020 05:20 AM      Lab Results   Component Value Date/Time    SODIUM 133 (L) 11/14/2020 05:20 AM    POTASSIUM 3.8 11/14/2020 05:20 AM    CHLORIDE 100 11/14/2020 05:20 AM    CO2 20 11/14/2020 05:20 AM    GLUCOSE 103 (H) 11/14/2020 05:20 AM    BUN 9 11/14/2020 05:20 AM    CREATININE 0.73 11/14/2020 05:20 AM     Lab Results   Component Value Date/Time    ASTSGOT 17 11/14/2020 0520    ALTSGPT 35 11/14/2020 0520    ALKPHOSPHAT 126 (H) 11/14/2020 0520    ALBUMIN 4.2 11/14/2020 0520     Lab Results   Component Value Date/Time    HBA1C 5.7 (H) 02/06/2020 06:07 AM      EEG routine (Dr. ALBERTO) May 2020: This is a abnormal routine EEG recording in the awake and  Drowsy states.  Intermittent slowing suggests mild encephalopathy and is in correlation with patient's history of intracerebral hemorrhage. There were no seizures recorded.Clinical correlation is recommended.  TSH1.61    Imaging:   MRI brain without contrast December 2020:  1.  Postsurgical changes in the posterior fossa history of AVM rupture and surgical treatment. There is no large abnormal flow voids identified. However the possibility of small residual AVM cannot be assessed in this study. If needed this can be further   with the catheter angiogram.  2.  Stable ventriculoperitoneal shunt without any hydrocephalus.  3.  Mild cerebral volume loss.  4.  No acute infarct or hemorrhage.    MRA head without contrast December 2020:  There is no residual AVM in the posterior fossa. However tiny AVM/early draining veins can be missed in this study. If needed, this  can be further evaluated with catheter angiogram.    Records reviewed: Prior patient of Dr. BECKER  Evaluation for neurocognitive dysfunction following intraparenchymal hemorrhage status post AVM rupture.  At baseline he is able to speak short sentences and is following commands.  He is eating on his own and his tracheostomy was closed.  Mom is his caregiver.  Attempting to read slowly.  He was started on amantadine.        ASSESSMENT AND PLAN:    1. 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  Unfortunately patient is suffered significant neurological sequela from the intracranial hemorrhage as documented per HPI.  He is slowly improving and regaining some degree of independence though will likely not return to his prior baseline.  The history is quite uncertain but there is no history per mom who is very reliable of a known seizure secondary to this or in the past and unrelated circumstances.  He has since been placed on Keppra 500 mg 2 times daily during admission with EEG performed in May 2020 revealing intermittent mild slowing that is otherwise nonspecific or focal nature.  On review of his MRI of the brain given the location of the insult, there is no significant cortical involvement which may increase his seizure risk significantly.  He may be also experiencing a rash from the Keppra as well.  Repeat MRI without any significant structural changes.  He is otherwise doing well.  Given this history I think it is reasonable to continue the slow wean off of Keppra initiated by Dr. BROWN  - levETIRAcetam (KEPPRA) 500 MG Tab; Take 1 Tab by mouth every day at 6 PM. PM only  Dispense: 90 Tab; Refill: 3 -wean as follows: 250 mg once daily for 6 weeks and if tolerated okay to stop  -Discussed risks and benefits of weaning off  medications including but but not limited to risk of seizure.  Discussed red flag symptoms of immediate concerning for seizure.    2. 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  Agree with continuing speech, PT and OT.    3.  (ventriculoperitoneal) shunt status: Following Dr. Walton, pending December 2020 appointment    4. Dysphagia following nontraumatic intracerebral hemorrhage: Following with speech        FOLLOW-UP: Return for 3 months virtual visit.  To assess wean  EDUCATION AND COUNSELING:  -I personally discussed the following with the patient:   Medical reasoning as above, risk of possible seizure breakthrough given wean of medications, withdrawal effects, treatment goals    The patient communicates understanding of the above and agrees that due to the complexity of his/her diagnosis, results of any testing and further recommendations will typically be discussed/made during a face to face encounter in my office. The patient and/or family further understands it is their responsibility to keep proper follow up.     Disclaimer  This dictation was created using voice recognition software. I have made every reasonable attempt to avoid dictation errors, but this document may contain an error not identified before finalizing. If the error changes the accuracy of the document, I would appreciate it being brought to my attention. Thank you very much.     Jennifer Valiente MD  Neurology  [facility]

## 2020-12-08 NOTE — TELEPHONE ENCOUNTER
I called the pt mother Melinda and explained to her per Dr Valiente the above. She communicated understanding and asked to send her the instructions through [employer] for future reference. And I sent her the [employer] message.

## 2020-12-08 NOTE — TELEPHONE ENCOUNTER
----- Message from Jennifer Valiente M.D. sent at 12/8/2020 12:35 PM PST -----  Please communicate to patient's mom that I reviewed his MRI of the brain and based on the location and the EEG I am comfortable with a very slow wean.  Symptoms to monitor for that may be suggestive of seizure include altered mental status, loss of consciousness, abnormal rhythmic movements, inattentiveness, visual changes.  Please reach out if there are any particular issues.    Wean as follows: 250 mg once daily for 6 weeks and if tolerated okay to stop      These have her schedule for 3-month virtual visit with me to review how well he is doing off of the medication.  Please document appropriately.

## 2020-12-10 ENCOUNTER — APPOINTMENT (OUTPATIENT)
Dept: RADIOLOGY | Facility: MEDICAL CENTER | Age: 25
End: 2020-12-10
Attending: EMERGENCY MEDICINE
Payer: MEDICAID

## 2020-12-10 ENCOUNTER — TELEPHONE (OUTPATIENT)
Dept: NEUROLOGY | Facility: MEDICAL CENTER | Age: 25
End: 2020-12-10

## 2020-12-10 ENCOUNTER — HOSPITAL ENCOUNTER (EMERGENCY)
Facility: MEDICAL CENTER | Age: 25
End: 2020-12-10
Attending: EMERGENCY MEDICINE
Payer: MEDICAID

## 2020-12-10 VITALS
DIASTOLIC BLOOD PRESSURE: 75 MMHG | HEART RATE: 83 BPM | WEIGHT: 145.5 LBS | OXYGEN SATURATION: 95 % | SYSTOLIC BLOOD PRESSURE: 127 MMHG | RESPIRATION RATE: 24 BRPM | TEMPERATURE: 97.8 F | BODY MASS INDEX: 22.79 KG/M2

## 2020-12-10 DIAGNOSIS — R63.0 LOSS OF APPETITE: ICD-10-CM

## 2020-12-10 DIAGNOSIS — E86.0 DEHYDRATION: ICD-10-CM

## 2020-12-10 LAB
ALBUMIN SERPL BCP-MCNC: 4 G/DL (ref 3.2–4.9)
ALBUMIN/GLOB SERPL: 1 G/DL
ALP SERPL-CCNC: 147 U/L (ref 30–99)
ALT SERPL-CCNC: 52 U/L (ref 2–50)
ANION GAP SERPL CALC-SCNC: 15 MMOL/L (ref 7–16)
APPEARANCE UR: CLEAR
AST SERPL-CCNC: 27 U/L (ref 12–45)
BASOPHILS # BLD AUTO: 1.6 % (ref 0–1.8)
BASOPHILS # BLD: 0.12 K/UL (ref 0–0.12)
BILIRUB SERPL-MCNC: 0.5 MG/DL (ref 0.1–1.5)
BILIRUB UR QL STRIP.AUTO: NEGATIVE
BUN SERPL-MCNC: 9 MG/DL (ref 8–22)
CALCIUM SERPL-MCNC: 9.7 MG/DL (ref 8.4–10.2)
CHLORIDE SERPL-SCNC: 103 MMOL/L (ref 96–112)
CO2 SERPL-SCNC: 18 MMOL/L (ref 20–33)
COLOR UR: YELLOW
CREAT SERPL-MCNC: 0.66 MG/DL (ref 0.5–1.4)
EOSINOPHIL # BLD AUTO: 0.09 K/UL (ref 0–0.51)
EOSINOPHIL NFR BLD: 1.2 % (ref 0–6.9)
ERYTHROCYTE [DISTWIDTH] IN BLOOD BY AUTOMATED COUNT: 46.3 FL (ref 35.9–50)
GLOBULIN SER CALC-MCNC: 3.9 G/DL (ref 1.9–3.5)
GLUCOSE SERPL-MCNC: 86 MG/DL (ref 65–99)
GLUCOSE UR STRIP.AUTO-MCNC: NEGATIVE MG/DL
HCT VFR BLD AUTO: 50.8 % (ref 42–52)
HGB BLD-MCNC: 16.1 G/DL (ref 14–18)
IMM GRANULOCYTES # BLD AUTO: 0.05 K/UL (ref 0–0.11)
IMM GRANULOCYTES NFR BLD AUTO: 0.7 % (ref 0–0.9)
KETONES UR STRIP.AUTO-MCNC: ABNORMAL MG/DL
LEUKOCYTE ESTERASE UR QL STRIP.AUTO: NEGATIVE
LYMPHOCYTES # BLD AUTO: 2.05 K/UL (ref 1–4.8)
LYMPHOCYTES NFR BLD: 27.9 % (ref 22–41)
MCH RBC QN AUTO: 28.9 PG (ref 27–33)
MCHC RBC AUTO-ENTMCNC: 31.7 G/DL (ref 33.7–35.3)
MCV RBC AUTO: 91 FL (ref 81.4–97.8)
MICRO URNS: ABNORMAL
MONOCYTES # BLD AUTO: 0.63 K/UL (ref 0–0.85)
MONOCYTES NFR BLD AUTO: 8.6 % (ref 0–13.4)
NEUTROPHILS # BLD AUTO: 4.42 K/UL (ref 1.82–7.42)
NEUTROPHILS NFR BLD: 60 % (ref 44–72)
NITRITE UR QL STRIP.AUTO: NEGATIVE
NRBC # BLD AUTO: 0 K/UL
NRBC BLD-RTO: 0 /100 WBC
PH UR STRIP.AUTO: 6.5 [PH] (ref 5–8)
PLATELET # BLD AUTO: 379 K/UL (ref 164–446)
PMV BLD AUTO: 10.8 FL (ref 9–12.9)
POTASSIUM SERPL-SCNC: 4.6 MMOL/L (ref 3.6–5.5)
PROT SERPL-MCNC: 7.9 G/DL (ref 6–8.2)
PROT UR QL STRIP: NEGATIVE MG/DL
RBC # BLD AUTO: 5.58 M/UL (ref 4.7–6.1)
RBC UR QL AUTO: NEGATIVE
SODIUM SERPL-SCNC: 136 MMOL/L (ref 135–145)
SP GR UR STRIP.AUTO: 1.02
WBC # BLD AUTO: 7.4 K/UL (ref 4.8–10.8)

## 2020-12-10 PROCEDURE — 700102 HCHG RX REV CODE 250 W/ 637 OVERRIDE(OP): Performed by: EMERGENCY MEDICINE

## 2020-12-10 PROCEDURE — 96372 THER/PROPH/DIAG INJ SC/IM: CPT

## 2020-12-10 PROCEDURE — 81003 URINALYSIS AUTO W/O SCOPE: CPT

## 2020-12-10 PROCEDURE — 71045 X-RAY EXAM CHEST 1 VIEW: CPT

## 2020-12-10 PROCEDURE — 99284 EMERGENCY DEPT VISIT MOD MDM: CPT

## 2020-12-10 PROCEDURE — A9270 NON-COVERED ITEM OR SERVICE: HCPCS | Performed by: EMERGENCY MEDICINE

## 2020-12-10 PROCEDURE — 80053 COMPREHEN METABOLIC PANEL: CPT

## 2020-12-10 PROCEDURE — 700111 HCHG RX REV CODE 636 W/ 250 OVERRIDE (IP)

## 2020-12-10 PROCEDURE — 85025 COMPLETE CBC W/AUTO DIFF WBC: CPT

## 2020-12-10 RX ORDER — LORAZEPAM 1 MG/1
2 TABLET ORAL ONCE
Status: COMPLETED | OUTPATIENT
Start: 2020-12-10 | End: 2020-12-10

## 2020-12-10 RX ORDER — DRONABINOL 2.5 MG/1
2.5 CAPSULE ORAL DAILY
Qty: 7 CAP | Refills: 0 | Status: ON HOLD | OUTPATIENT
Start: 2020-12-10 | End: 2020-12-13

## 2020-12-10 RX ORDER — LORAZEPAM 2 MG/ML
2 INJECTION INTRAMUSCULAR ONCE
Status: DISCONTINUED | OUTPATIENT
Start: 2020-12-10 | End: 2020-12-10 | Stop reason: HOSPADM

## 2020-12-10 RX ORDER — HALOPERIDOL 5 MG/ML
5 INJECTION INTRAMUSCULAR ONCE
Status: COMPLETED | OUTPATIENT
Start: 2020-12-10 | End: 2020-12-10

## 2020-12-10 RX ORDER — RISPERIDONE 1 MG/1
1 TABLET ORAL 2 TIMES DAILY
Qty: 60 TAB | Refills: 3 | Status: ON HOLD | OUTPATIENT
Start: 2020-12-10 | End: 2020-12-13

## 2020-12-10 RX ADMIN — HALOPERIDOL LACTATE 5 MG: 5 INJECTION, SOLUTION INTRAMUSCULAR at 13:20

## 2020-12-10 RX ADMIN — LORAZEPAM 2 MG: 1 TABLET ORAL at 12:00

## 2020-12-10 ASSESSMENT — FIBROSIS 4 INDEX: FIB4 SCORE: 0.33

## 2020-12-10 ASSESSMENT — LIFESTYLE VARIABLES: DO YOU DRINK ALCOHOL: NO

## 2020-12-10 NOTE — TELEPHONE ENCOUNTER
I called the pt mom Melinda . She stated she increased the dose of the keppra back to 500mg since yesterday night, with that he was able to sleep. But still he is refusing to eat since yesterday morning so she brought him to the ER . She said he is alert and it is only that she is concerned he could be dehydrated since he did not feed for the past 24 hrs. I informed her to continue the Keppra 500 mg and to update us on his condition. She communicated understanding.

## 2020-12-10 NOTE — TELEPHONE ENCOUNTER
"----- Message from Rey Medina sent at 12/9/2020  1:51 PM PST -----  Regarding: RE: A message from Dr Valiente  Contact: [phone]  Good afternoon oJe,    Have you heard anything from Dr. Valiente regarding my question?    I am trying to let my son take a nap right now hoping he could get some sleep since he only had 4 hours of sleep last night but not successful. He is just relaxing in bed.  He doesn't want to eat since this morning but would agree to drink water and take his medications. He only had a small amount of yogurt since this morning. He is mentally active and would respond to me when talking to him. He knew and even told me what the consequences are when he doesn't eat but still would insist what he wants. When I waited for a few minutes and asked if he is ready to eat he answered \" maybe later...\" but it is almost 2pm now and still not eating. I will continue to convince him to eat but I am hoping to hear from Dr. Valiente's advise.    Please kindly follow up and would like to hear at least before the end of the day.    Thank you,    Melinda Mansfield    ----- Message -----  From: Joe Epstein, [facility]  Sent: 12/8/20, 1:00 PM  To: Rey Medina  Subject: A message from Dr Rocco Valiente stated she has reviewed your MRI of the brain and based on the location and the EEG she is comfortable with a very slow wean.  Symptoms to monitor for that may be suggestive of seizure include altered mental status, loss of consciousness, abnormal rhythmic movements, inattentiveness, visual changes.  Please reach out if there are any particular issues.    Wean as follows: levETIRAcetam (KEPPRA) 250 mg once daily for 6 weeks and if tolerated it is okay to stop.  Joe ( medical assistant)  "

## 2020-12-10 NOTE — ED NOTES
1142: Pt has been discharged but is agitated, not allowing clothes to be placed back on. He denies feeling any pain and denies feeling upset, but will not say why he does not want his clothes on. MD ordered one time dose ativan, pt medicated per emar.

## 2020-12-10 NOTE — ED PROVIDER NOTES
ED Provider Note    CHIEF COMPLAINT  Chief Complaint   Patient presents with   • Loss of Appetite       HPI  Rey Medina is a 25 y.o. male who presents for loss of appetite for the last 36 hours.  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  He developed hydrocephalus and has a  shunt.  He had a PEG tube and a trach which have both been taken down.  He has never had a seizure.  He saw his neurologist 2 days ago who plans to taper him off Keppra.  The mother fears that decreasing the dose of Keppra from 500-250 on Tuesday triggered his poor appetite.  Patient had Covid 2 weeks ago but is recovered.  No current cough vomiting diarrhea or shortness of breath.  Patient is verbal at baseline and wheelchair confined.  He is not agitated.    REVIEW OF SYSTEMS  Pertinent positives include: Loss of appetite for food and fluids 36 hours, insomnia 2 nights ago, tapering off Keppra.  Pertinent negatives include: Fever, cough, shortness of breath, vomiting, diarrhea, rash, swelling.  10+ systems reviewed and negative.      PAST MEDICAL HISTORY  Past Medical History:   Diagnosis Date   • ICH (intracerebral hemorrhage) (HCC)    • Stroke (HCC)        FAMILY HISTORY  Family History   Problem Relation Age of Onset   • Hypertension Mother    • Glasses Mother    • Thyroid Mother    • Diabetes Maternal Grandfather    • Stroke Maternal Grandfather    • Diabetes Paternal Grandmother    • Hypertension Maternal Uncle    • Heart Disease Neg Hx    • Cancer Neg Hx        SOCIAL HISTORY  Social History     Tobacco Use   • Smoking status: Never Smoker   • Smokeless tobacco: Never Used   Substance Use Topics   • Alcohol use: Never     Frequency: Never     Drinks per session: Patient refused     Binge frequency: Never   • Drug use: Never     Social History     Substance and Sexual Activity   Drug Use Never       SURGICAL HISTORY  Past Surgical History:   Procedure Laterality Date    • ANAL FISTULOTOMY     • PEG PLACEMENT     • TRACHEOSTOMY     •  SHUNT INSERTION         CURRENT MEDICATIONS  No current facility-administered medications for this encounter.      Current Outpatient Medications   Medication Sig Dispense Refill   • levETIRAcetam (KEPPRA) 500 MG Tab Take 1 Tab by mouth every day at 6 PM. PM only 90 Tab 3   • propranolol (INDERAL) 20 MG Tab TAKE 1 TABLET BY MOUTH THREE TIMES DAILY 180 Tab 2   • Cyanocobalamin (B-12 PO) Take 1 Tab by mouth every day.     • Misc. Devices Misc Incontinence supplies - adult diapers. Length of time needed - lifetime or 999 months. (Patient not taking: Reported on 12/10/2020) 999 Each 999   • mirtazapine (REMERON) 15 MG Tab Take 0.5 Tabs by mouth every bedtime. 30 Tab 2   • tamsulosin (FLOMAX) 0.4 MG capsule Take 1 Cap by mouth every bedtime. 30 Cap 2   • polyethylene glycol/lytes (MIRALAX) 17 g Pack Take 1 Packet by mouth every day. (Patient taking differently: Take 17 g by mouth every evening.) 90 Each 2   • montelukast (SINGULAIR) 10 MG Tab Take 1 Tab by mouth as needed (to reduce amount of pills needing to be taken daily ). 90 Tab 2   • bisacodyl (DULCOLAX) 10 MG Suppos Insert 1 Suppository in rectum as needed (no BM for 3 days). (Patient taking differently: Insert 10 mg into the rectum every 72 hours.) 30 Suppository 2   • Cholecalciferol (VITAMIN D3) 125 MCG (5000 UT) Chew Tab Chew 5,000 Units every day.         ALLERGIES  Allergies   Allergen Reactions   • Vancomycin Swelling     Lips  With red face and neck   • Other Misc Rash     Allergic to name band.       PHYSICAL EXAM  VITAL SIGNS: /78   Pulse 90   Temp 36.6 °C (97.9 °F) (Temporal)   Resp 18   Wt 66 kg (145 lb 8 oz)   SpO2 94%   BMI 22.79 kg/m²   Reviewed and afebrile  Constitutional: Well developed, Well nourished, well-appearing, answer simple questions, appears comfortable.  HENT: Normocephalic, atraumatic, bilateral external ears normal, Wearing mask.   Eyes: PERRLA,  conjunctiva pink, no scleral icterus.   Cardiovascular: Regular S1-S2 without murmur, rub, gallop.  No dependent edema or calf tenderness.  Respiratory: No rales, rhonchi, wheeze.  Unlabored respirations.  He coughed once during the encounter.  Gastrointestinal: Soft, nontender, nondistended, no organomegaly.  Skin: No erythema, no rash.   Genitourinary:  No costovertebral angle tenderness.   Neurologic: Alert & oriented x 3, cranial nerves 2-12 intact by passive exam.  No focal deficit noted.  Psychiatric: Affect normal, Judgment normal, Mood normal.     DIFFERENTIAL DIAGNOSIS:  Dehydration, loss of taste secondary to Covid, UTI I doubt bowel obstruction.    RADIOLOGY/PROCEDURES  DX-CHEST-LIMITED (1 VIEW)   Final Result      Bibasilar patchy consolidation and volume loss.          LABORATORY:  Results for orders placed or performed during the hospital encounter of 12/10/20   CBC WITH DIFFERENTIAL   Result Value Ref Range    WBC 7.4 4.8 - 10.8 K/uL    RBC 5.58 4.70 - 6.10 M/uL    Hemoglobin 16.1 14.0 - 18.0 g/dL    Hematocrit 50.8 42.0 - 52.0 %    MCV 91.0 81.4 - 97.8 fL    MCH 28.9 27.0 - 33.0 pg    MCHC 31.7 (L) 33.7 - 35.3 g/dL    RDW 46.3 35.9 - 50.0 fL    Platelet Count 379 164 - 446 K/uL    MPV 10.8 9.0 - 12.9 fL    Neutrophils-Polys 60.00 44.00 - 72.00 %    Lymphocytes 27.90 22.00 - 41.00 %    Monocytes 8.60 0.00 - 13.40 %    Eosinophils 1.20 0.00 - 6.90 %    Basophils 1.60 0.00 - 1.80 %    Immature Granulocytes 0.70 0.00 - 0.90 %    Nucleated RBC 0.00 /100 WBC    Neutrophils (Absolute) 4.42 1.82 - 7.42 K/uL    Lymphs (Absolute) 2.05 1.00 - 4.80 K/uL    Monos (Absolute) 0.63 0.00 - 0.85 K/uL    Eos (Absolute) 0.09 0.00 - 0.51 K/uL    Baso (Absolute) 0.12 0.00 - 0.12 K/uL    Immature Granulocytes (abs) 0.05 0.00 - 0.11 K/uL    NRBC (Absolute) 0.00 K/uL   Comp Metabolic Panel   Result Value Ref Range    Sodium 136 135 - 145 mmol/L    Potassium 4.6 3.6 - 5.5 mmol/L    Chloride 103 96 - 112 mmol/L    Co2 18 (L)  20 - 33 mmol/L    Anion Gap 15.0 7.0 - 16.0    Glucose 86 65 - 99 mg/dL    Bun 9 8 - 22 mg/dL    Creatinine 0.66 0.50 - 1.40 mg/dL    Calcium 9.7 8.4 - 10.2 mg/dL    AST(SGOT) 27 12 - 45 U/L    ALT(SGPT) 52 (H) 2 - 50 U/L    Alkaline Phosphatase 147 (H) 30 - 99 U/L    Total Bilirubin 0.5 0.1 - 1.5 mg/dL    Albumin 4.0 3.2 - 4.9 g/dL    Total Protein 7.9 6.0 - 8.2 g/dL    Globulin 3.9 (H) 1.9 - 3.5 g/dL    A-G Ratio 1.0 g/dL   URINALYSIS    Specimen: Blood   Result Value Ref Range    Color Yellow     Character Clear     Specific Gravity 1.025 <1.035    Ph 6.5 5.0 - 8.0    Glucose Negative Negative mg/dL    Ketones Trace (A) Negative mg/dL    Protein Negative Negative mg/dL    Bilirubin Negative Negative    Nitrite Negative Negative    Leukocyte Esterase Negative Negative    Occult Blood Negative Negative    Micro Urine Req see below        INTERVENTIONS:  LORazepam (ATIVAN) tablet 2 mg (2 mg Oral Given 12/10/20 1200)   Haldol 5 mg IM  Response: Patient required Ativan for compliance with dressing for discharge..    COURSE & MEDICAL DECISION MAKING  This patient with post stroke encephalopathy presents with decreased appetite and mild dehydration.  He had recent Covid.  There is no evidence of clinical bowel obstruction or worsening pneumonia.  There is no hypoxia.  There is no evidence of UTI.  Patient was also very resistant to being dressed.  He would take his close off on partially dressed.  He was asked if he wanted to go home and would be more comfortable there and he stated no.  The mother states there have been behavioral issues like this at home the last 2 days.  She suspects it is due to tapering Keppra.  She discussed this by phone with neurology and they will continue Keppra at 500 mg.  I will give her Risperdal to try for couple of days to see if this helps with obstinate behavior.  She requested an appetite stimulant and I gave her a weeks worth but recommended she try the behavioral treatments first  before adding a new medicine for appetite.  I discussed medication treatment with the clinic of Dr. Power.    PLAN:  New Prescriptions    DRONABINOL (MARINOL) 2.5 MG CAP    Take 1 Cap by mouth every day for 7 days.   For appetite stimulation 1 week  Follow-up for behavioral medications  Encourage food and fluids    Return for ill appearance, no urination in 8 to 12 hours, abdominal pain    Follow-up with your doctor as needed    CONDITION: Stable.    FINAL IMPRESSION  1. Dehydration    2. Loss of appetite          Electronically signed by: Robert Mcintosh M.D., 12/10/2020 10:01 AM

## 2020-12-10 NOTE — ED NOTES
Med rec completed per pt's mom   Allergies reviewed  No PO antibiotics in the last 14 days    Per pt's mom pt has been taking Keppra 250 mg nightly, but increased the dose back to 500 mg last night (12/09/2020)

## 2020-12-10 NOTE — ED TRIAGE NOTES
Pt BIB mom with c/o loss of appetite. Mom states neurologist told her to lower his keppra dose from 500 to 250. Mom states she did that and pt couldn't sleep and stopped eating and isnt drinking as much. States last night put him back on 500mg of keppra and pt was able to sleep but still has loss of appetite.    Pt was positive for COVID on 11/14. Sx's lasted one week and he has been better.

## 2020-12-10 NOTE — DISCHARGE INSTRUCTIONS
Try dronabinol for 1 week.  Continue Keppra taper.  Encourage fluids and food intake.  Return for ill appearance, weakness, no urination in 12 hours or other concerning symptoms.  Follow-up with your doctor if not better next week.

## 2020-12-11 ENCOUNTER — APPOINTMENT (OUTPATIENT)
Dept: RADIOLOGY | Facility: MEDICAL CENTER | Age: 25
End: 2020-12-11
Attending: EMERGENCY MEDICINE
Payer: MEDICAID

## 2020-12-11 ENCOUNTER — HOSPITAL ENCOUNTER (OUTPATIENT)
Facility: MEDICAL CENTER | Age: 25
End: 2020-12-13
Attending: EMERGENCY MEDICINE | Admitting: HOSPITALIST
Payer: MEDICAID

## 2020-12-11 ENCOUNTER — DOCUMENTATION (OUTPATIENT)
Dept: MEDICAL GROUP | Facility: MEDICAL CENTER | Age: 25
End: 2020-12-11

## 2020-12-11 PROBLEM — U07.1 COVID-19 VIRUS INFECTION: Status: ACTIVE | Noted: 2020-12-11

## 2020-12-11 PROBLEM — R63.0 DECREASED APPETITE: Status: ACTIVE | Noted: 2020-12-11

## 2020-12-11 LAB
ALBUMIN SERPL BCP-MCNC: 4.3 G/DL (ref 3.2–4.9)
ALBUMIN/GLOB SERPL: 1 G/DL
ALP SERPL-CCNC: 152 U/L (ref 30–99)
ALT SERPL-CCNC: 48 U/L (ref 2–50)
ANION GAP SERPL CALC-SCNC: 16 MMOL/L (ref 7–16)
APPEARANCE UR: CLEAR
AST SERPL-CCNC: 20 U/L (ref 12–45)
BASOPHILS # BLD AUTO: 0.8 % (ref 0–1.8)
BASOPHILS # BLD: 0.1 K/UL (ref 0–0.12)
BILIRUB SERPL-MCNC: 0.8 MG/DL (ref 0.1–1.5)
BILIRUB UR QL STRIP.AUTO: NEGATIVE
BUN SERPL-MCNC: 11 MG/DL (ref 8–22)
CALCIUM SERPL-MCNC: 9.8 MG/DL (ref 8.4–10.2)
CHLORIDE SERPL-SCNC: 98 MMOL/L (ref 96–112)
CO2 SERPL-SCNC: 21 MMOL/L (ref 20–33)
COLOR UR: YELLOW
CREAT SERPL-MCNC: 0.74 MG/DL (ref 0.5–1.4)
CRP SERPL HS-MCNC: 0.83 MG/DL (ref 0–0.75)
D DIMER PPP IA.FEU-MCNC: 0.29 UG/ML (FEU) (ref 0–0.5)
EOSINOPHIL # BLD AUTO: 0.1 K/UL (ref 0–0.51)
EOSINOPHIL NFR BLD: 0.8 % (ref 0–6.9)
ERYTHROCYTE [DISTWIDTH] IN BLOOD BY AUTOMATED COUNT: 43.9 FL (ref 35.9–50)
GLOBULIN SER CALC-MCNC: 4.5 G/DL (ref 1.9–3.5)
GLUCOSE SERPL-MCNC: 87 MG/DL (ref 65–99)
GLUCOSE UR STRIP.AUTO-MCNC: NEGATIVE MG/DL
HCT VFR BLD AUTO: 48.5 % (ref 42–52)
HGB BLD-MCNC: 16 G/DL (ref 14–18)
IMM GRANULOCYTES # BLD AUTO: 0.06 K/UL (ref 0–0.11)
IMM GRANULOCYTES NFR BLD AUTO: 0.5 % (ref 0–0.9)
KETONES UR STRIP.AUTO-MCNC: 40 MG/DL
LEUKOCYTE ESTERASE UR QL STRIP.AUTO: NEGATIVE
LIPASE SERPL-CCNC: 39 U/L (ref 7–58)
LYMPHOCYTES # BLD AUTO: 2.23 K/UL (ref 1–4.8)
LYMPHOCYTES NFR BLD: 18.6 % (ref 22–41)
MCH RBC QN AUTO: 29 PG (ref 27–33)
MCHC RBC AUTO-ENTMCNC: 33 G/DL (ref 33.7–35.3)
MCV RBC AUTO: 87.9 FL (ref 81.4–97.8)
MICRO URNS: ABNORMAL
MONOCYTES # BLD AUTO: 0.86 K/UL (ref 0–0.85)
MONOCYTES NFR BLD AUTO: 7.2 % (ref 0–13.4)
NEUTROPHILS # BLD AUTO: 8.67 K/UL (ref 1.82–7.42)
NEUTROPHILS NFR BLD: 72.1 % (ref 44–72)
NITRITE UR QL STRIP.AUTO: NEGATIVE
NRBC # BLD AUTO: 0 K/UL
NRBC BLD-RTO: 0 /100 WBC
PH UR STRIP.AUTO: 5.5 [PH] (ref 5–8)
PLATELET # BLD AUTO: 441 K/UL (ref 164–446)
PMV BLD AUTO: 10.7 FL (ref 9–12.9)
POTASSIUM SERPL-SCNC: 4.2 MMOL/L (ref 3.6–5.5)
PROCALCITONIN SERPL-MCNC: 0.03 NG/ML
PROLACTIN SERPL-MCNC: 38 NG/ML (ref 2.1–17.7)
PROT SERPL-MCNC: 8.8 G/DL (ref 6–8.2)
PROT UR QL STRIP: NEGATIVE MG/DL
RBC # BLD AUTO: 5.52 M/UL (ref 4.7–6.1)
RBC UR QL AUTO: NEGATIVE
SODIUM SERPL-SCNC: 135 MMOL/L (ref 135–145)
SP GR UR REFRACTOMETRY: 1.03
WBC # BLD AUTO: 12 K/UL (ref 4.8–10.8)

## 2020-12-11 PROCEDURE — 70450 CT HEAD/BRAIN W/O DYE: CPT

## 2020-12-11 PROCEDURE — 86140 C-REACTIVE PROTEIN: CPT

## 2020-12-11 PROCEDURE — 99220 PR INITIAL OBSERVATION CARE,LEVL III: CPT | Performed by: HOSPITALIST

## 2020-12-11 PROCEDURE — 84145 PROCALCITONIN (PCT): CPT

## 2020-12-11 PROCEDURE — 700102 HCHG RX REV CODE 250 W/ 637 OVERRIDE(OP): Performed by: HOSPITALIST

## 2020-12-11 PROCEDURE — 83520 IMMUNOASSAY QUANT NOS NONAB: CPT

## 2020-12-11 PROCEDURE — 700111 HCHG RX REV CODE 636 W/ 250 OVERRIDE (IP): Performed by: HOSPITALIST

## 2020-12-11 PROCEDURE — G0378 HOSPITAL OBSERVATION PER HR: HCPCS

## 2020-12-11 PROCEDURE — A9270 NON-COVERED ITEM OR SERVICE: HCPCS | Performed by: HOSPITALIST

## 2020-12-11 PROCEDURE — 96372 THER/PROPH/DIAG INJ SC/IM: CPT

## 2020-12-11 PROCEDURE — 80053 COMPREHEN METABOLIC PANEL: CPT

## 2020-12-11 PROCEDURE — 84146 ASSAY OF PROLACTIN: CPT

## 2020-12-11 PROCEDURE — 700105 HCHG RX REV CODE 258: Performed by: EMERGENCY MEDICINE

## 2020-12-11 PROCEDURE — 81003 URINALYSIS AUTO W/O SCOPE: CPT

## 2020-12-11 PROCEDURE — 85025 COMPLETE CBC W/AUTO DIFF WBC: CPT

## 2020-12-11 PROCEDURE — 99285 EMERGENCY DEPT VISIT HI MDM: CPT

## 2020-12-11 PROCEDURE — 85379 FIBRIN DEGRADATION QUANT: CPT

## 2020-12-11 PROCEDURE — 83690 ASSAY OF LIPASE: CPT

## 2020-12-11 RX ORDER — AMOXICILLIN 250 MG
2 CAPSULE ORAL 2 TIMES DAILY
Status: DISCONTINUED | OUTPATIENT
Start: 2020-12-11 | End: 2020-12-13 | Stop reason: HOSPADM

## 2020-12-11 RX ORDER — ONDANSETRON 4 MG/1
4 TABLET, ORALLY DISINTEGRATING ORAL EVERY 4 HOURS PRN
Status: DISCONTINUED | OUTPATIENT
Start: 2020-12-11 | End: 2020-12-13 | Stop reason: HOSPADM

## 2020-12-11 RX ORDER — PROPRANOLOL HYDROCHLORIDE 20 MG/1
20 TABLET ORAL 3 TIMES DAILY
Status: DISCONTINUED | OUTPATIENT
Start: 2020-12-12 | End: 2020-12-13 | Stop reason: HOSPADM

## 2020-12-11 RX ORDER — ACETAMINOPHEN 325 MG/1
650 TABLET ORAL EVERY 6 HOURS PRN
Status: DISCONTINUED | OUTPATIENT
Start: 2020-12-11 | End: 2020-12-13 | Stop reason: HOSPADM

## 2020-12-11 RX ORDER — PROCHLORPERAZINE EDISYLATE 5 MG/ML
5-10 INJECTION INTRAMUSCULAR; INTRAVENOUS EVERY 4 HOURS PRN
Status: DISCONTINUED | OUTPATIENT
Start: 2020-12-11 | End: 2020-12-13 | Stop reason: HOSPADM

## 2020-12-11 RX ORDER — LEVETIRACETAM 500 MG/1
500 TABLET ORAL DAILY
Status: DISCONTINUED | OUTPATIENT
Start: 2020-12-11 | End: 2020-12-13 | Stop reason: HOSPADM

## 2020-12-11 RX ORDER — HEPARIN SODIUM 5000 [USP'U]/ML
5000 INJECTION, SOLUTION INTRAVENOUS; SUBCUTANEOUS EVERY 8 HOURS
Status: DISCONTINUED | OUTPATIENT
Start: 2020-12-11 | End: 2020-12-13 | Stop reason: HOSPADM

## 2020-12-11 RX ORDER — POLYETHYLENE GLYCOL 3350 17 G/17G
1 POWDER, FOR SOLUTION ORAL
Status: DISCONTINUED | OUTPATIENT
Start: 2020-12-11 | End: 2020-12-13 | Stop reason: HOSPADM

## 2020-12-11 RX ORDER — BISACODYL 10 MG
10 SUPPOSITORY, RECTAL RECTAL
Status: DISCONTINUED | OUTPATIENT
Start: 2020-12-11 | End: 2020-12-13 | Stop reason: HOSPADM

## 2020-12-11 RX ORDER — MIRTAZAPINE 15 MG/1
7.5 TABLET, ORALLY DISINTEGRATING ORAL
Status: DISCONTINUED | OUTPATIENT
Start: 2020-12-11 | End: 2020-12-12

## 2020-12-11 RX ORDER — CHOLECALCIFEROL (VITAMIN D3) 125 MCG
500 CAPSULE ORAL DAILY
Status: DISCONTINUED | OUTPATIENT
Start: 2020-12-12 | End: 2020-12-13 | Stop reason: HOSPADM

## 2020-12-11 RX ORDER — PROMETHAZINE HYDROCHLORIDE 25 MG/1
12.5-25 SUPPOSITORY RECTAL EVERY 4 HOURS PRN
Status: DISCONTINUED | OUTPATIENT
Start: 2020-12-11 | End: 2020-12-13 | Stop reason: HOSPADM

## 2020-12-11 RX ORDER — PROMETHAZINE HYDROCHLORIDE 25 MG/1
12.5-25 TABLET ORAL EVERY 4 HOURS PRN
Status: DISCONTINUED | OUTPATIENT
Start: 2020-12-11 | End: 2020-12-13 | Stop reason: HOSPADM

## 2020-12-11 RX ORDER — ONDANSETRON 2 MG/ML
4 INJECTION INTRAMUSCULAR; INTRAVENOUS EVERY 4 HOURS PRN
Status: DISCONTINUED | OUTPATIENT
Start: 2020-12-11 | End: 2020-12-13 | Stop reason: HOSPADM

## 2020-12-11 RX ORDER — MONTELUKAST SODIUM 10 MG/1
10 TABLET ORAL PRN
Status: DISCONTINUED | OUTPATIENT
Start: 2020-12-11 | End: 2020-12-13 | Stop reason: HOSPADM

## 2020-12-11 RX ORDER — TAMSULOSIN HYDROCHLORIDE 0.4 MG/1
0.4 CAPSULE ORAL
Status: DISCONTINUED | OUTPATIENT
Start: 2020-12-11 | End: 2020-12-13 | Stop reason: HOSPADM

## 2020-12-11 RX ORDER — DRONABINOL 2.5 MG/1
2.5 CAPSULE ORAL
Status: DISCONTINUED | OUTPATIENT
Start: 2020-12-12 | End: 2020-12-12

## 2020-12-11 RX ORDER — SODIUM CHLORIDE, SODIUM LACTATE, POTASSIUM CHLORIDE, CALCIUM CHLORIDE 600; 310; 30; 20 MG/100ML; MG/100ML; MG/100ML; MG/100ML
1000 INJECTION, SOLUTION INTRAVENOUS ONCE
Status: COMPLETED | OUTPATIENT
Start: 2020-12-11 | End: 2020-12-11

## 2020-12-11 RX ORDER — RISPERIDONE 1 MG/1
1 TABLET ORAL 2 TIMES DAILY PRN
Status: DISCONTINUED | OUTPATIENT
Start: 2020-12-11 | End: 2020-12-12

## 2020-12-11 RX ORDER — VITAMIN B COMPLEX
1000 TABLET ORAL DAILY
Status: DISCONTINUED | OUTPATIENT
Start: 2020-12-12 | End: 2020-12-13 | Stop reason: HOSPADM

## 2020-12-11 RX ADMIN — MIRTAZAPINE 7.5 MG: 15 TABLET, ORALLY DISINTEGRATING ORAL at 23:41

## 2020-12-11 RX ADMIN — SODIUM CHLORIDE, POTASSIUM CHLORIDE, SODIUM LACTATE AND CALCIUM CHLORIDE 1000 ML: 600; 310; 30; 20 INJECTION, SOLUTION INTRAVENOUS at 16:16

## 2020-12-11 RX ADMIN — HEPARIN SODIUM 5000 UNITS: 5000 INJECTION, SOLUTION INTRAVENOUS; SUBCUTANEOUS at 23:45

## 2020-12-11 RX ADMIN — TAMSULOSIN HYDROCHLORIDE 0.4 MG: 0.4 CAPSULE ORAL at 23:40

## 2020-12-11 RX ADMIN — LEVETIRACETAM 500 MG: 500 TABLET ORAL at 23:40

## 2020-12-11 ASSESSMENT — FIBROSIS 4 INDEX
FIB4 SCORE: 0.26
FIB4 SCORE: 0.26

## 2020-12-11 ASSESSMENT — PAIN SCALES - WONG BAKER: WONGBAKER_NUMERICALRESPONSE: DOESN'T HURT AT ALL

## 2020-12-11 NOTE — PROGRESS NOTES
Received multiple messages from patient's mom via [employer]  I did call her, 2 identifiers confirmed  I reviewed the ER note, he was agitated and had not had much to eat or drink over the last 36 hours.  She reports that on Wednesday he drank about 750 mL.  Since leaving the hospital on 12/10/2020 at noon, she states that he has had probably less than 8 ounces.  She has been offering him things that he likes to eat, he has only eaten a little bit of chocolate.  He was changed from PEG tube to oral feedings in August, so this is not happened before.  The agitation is now resolved through behavioral modification, she states that the Risperdal is only there for emergencies if he is very agitated.  She has been giving him the dronabinol, which has not helped.  She is very concerned about his poor fluid intake.  She states over the last 24 hours he has had 5 wet diapers, however his urine has been getting darker since then.  She was able to obtain vital signs as well as on the phone with her, BP is 104/80 (normal), O2 sats 95%, heart rate 85.  She states his lips are dry, has been wetting his lips with a wet cotton ball.  He is otherwise normal, no cough, she does not think he is in pain, no nausea vomiting or diarrhea.  UA was done yesterday with trace ketones no nitrites no leuks, labs are normal, chest x-ray was poor inspiratory effort, read was bibasilar patchy consolidations.  I advised patient's mom that since his vital signs are okay, he is not emergent right now.  However he is not drinking very much, would continue to try to push fluids today, if he is still drinking less than 8 ounces in 24 hours then would send him to the emergency room given his frail condition and chronic illness later today.  She is agreeable with this plan.

## 2020-12-12 LAB
ALBUMIN SERPL BCP-MCNC: 3.6 G/DL (ref 3.2–4.9)
ALBUMIN/GLOB SERPL: 1 G/DL
ALP SERPL-CCNC: 132 U/L (ref 30–99)
ALT SERPL-CCNC: 38 U/L (ref 2–50)
ANION GAP SERPL CALC-SCNC: 17 MMOL/L (ref 7–16)
AST SERPL-CCNC: 17 U/L (ref 12–45)
BASOPHILS # BLD AUTO: 1.1 % (ref 0–1.8)
BASOPHILS # BLD: 0.08 K/UL (ref 0–0.12)
BILIRUB SERPL-MCNC: 0.8 MG/DL (ref 0.1–1.5)
BUN SERPL-MCNC: 10 MG/DL (ref 8–22)
CALCIUM SERPL-MCNC: 8.8 MG/DL (ref 8.4–10.2)
CHLORIDE SERPL-SCNC: 100 MMOL/L (ref 96–112)
CO2 SERPL-SCNC: 20 MMOL/L (ref 20–33)
COVID ORDER STATUS COVID19: NORMAL
CREAT SERPL-MCNC: 0.66 MG/DL (ref 0.5–1.4)
EOSINOPHIL # BLD AUTO: 0.15 K/UL (ref 0–0.51)
EOSINOPHIL NFR BLD: 2.1 % (ref 0–6.9)
ERYTHROCYTE [DISTWIDTH] IN BLOOD BY AUTOMATED COUNT: 43.8 FL (ref 35.9–50)
GLOBULIN SER CALC-MCNC: 3.7 G/DL (ref 1.9–3.5)
GLUCOSE SERPL-MCNC: 72 MG/DL (ref 65–99)
HCT VFR BLD AUTO: 43.5 % (ref 42–52)
HGB BLD-MCNC: 14.1 G/DL (ref 14–18)
IMM GRANULOCYTES # BLD AUTO: 0.02 K/UL (ref 0–0.11)
IMM GRANULOCYTES NFR BLD AUTO: 0.3 % (ref 0–0.9)
LYMPHOCYTES # BLD AUTO: 2.52 K/UL (ref 1–4.8)
LYMPHOCYTES NFR BLD: 34.8 % (ref 22–41)
MCH RBC QN AUTO: 28.3 PG (ref 27–33)
MCHC RBC AUTO-ENTMCNC: 32.4 G/DL (ref 33.7–35.3)
MCV RBC AUTO: 87.2 FL (ref 81.4–97.8)
MONOCYTES # BLD AUTO: 0.62 K/UL (ref 0–0.85)
MONOCYTES NFR BLD AUTO: 8.6 % (ref 0–13.4)
NEUTROPHILS # BLD AUTO: 3.85 K/UL (ref 1.82–7.42)
NEUTROPHILS NFR BLD: 53.1 % (ref 44–72)
NRBC # BLD AUTO: 0 K/UL
NRBC BLD-RTO: 0 /100 WBC
PLATELET # BLD AUTO: 370 K/UL (ref 164–446)
PMV BLD AUTO: 11.1 FL (ref 9–12.9)
POTASSIUM SERPL-SCNC: 3.5 MMOL/L (ref 3.6–5.5)
PROT SERPL-MCNC: 7.3 G/DL (ref 6–8.2)
RBC # BLD AUTO: 4.99 M/UL (ref 4.7–6.1)
SODIUM SERPL-SCNC: 137 MMOL/L (ref 135–145)
WBC # BLD AUTO: 7.2 K/UL (ref 4.8–10.8)

## 2020-12-12 PROCEDURE — C9803 HOPD COVID-19 SPEC COLLECT: HCPCS | Performed by: INTERNAL MEDICINE

## 2020-12-12 PROCEDURE — 96372 THER/PROPH/DIAG INJ SC/IM: CPT

## 2020-12-12 PROCEDURE — G0378 HOSPITAL OBSERVATION PER HR: HCPCS

## 2020-12-12 PROCEDURE — U0003 INFECTIOUS AGENT DETECTION BY NUCLEIC ACID (DNA OR RNA); SEVERE ACUTE RESPIRATORY SYNDROME CORONAVIRUS 2 (SARS-COV-2) (CORONAVIRUS DISEASE [COVID-19]), AMPLIFIED PROBE TECHNIQUE, MAKING USE OF HIGH THROUGHPUT TECHNOLOGIES AS DESCRIBED BY CMS-2020-01-R: HCPCS

## 2020-12-12 PROCEDURE — 700102 HCHG RX REV CODE 250 W/ 637 OVERRIDE(OP): Performed by: HOSPITALIST

## 2020-12-12 PROCEDURE — 700102 HCHG RX REV CODE 250 W/ 637 OVERRIDE(OP): Performed by: INTERNAL MEDICINE

## 2020-12-12 PROCEDURE — 99225 PR SUBSEQUENT OBSERVATION CARE,LEVEL II: CPT | Performed by: INTERNAL MEDICINE

## 2020-12-12 PROCEDURE — A9270 NON-COVERED ITEM OR SERVICE: HCPCS | Performed by: HOSPITALIST

## 2020-12-12 PROCEDURE — 85025 COMPLETE CBC W/AUTO DIFF WBC: CPT

## 2020-12-12 PROCEDURE — 700111 HCHG RX REV CODE 636 W/ 250 OVERRIDE (IP): Performed by: HOSPITALIST

## 2020-12-12 PROCEDURE — 92610 EVALUATE SWALLOWING FUNCTION: CPT | Performed by: SPEECH-LANGUAGE PATHOLOGIST

## 2020-12-12 PROCEDURE — 700101 HCHG RX REV CODE 250: Performed by: INTERNAL MEDICINE

## 2020-12-12 PROCEDURE — 80053 COMPREHEN METABOLIC PANEL: CPT

## 2020-12-12 PROCEDURE — A9270 NON-COVERED ITEM OR SERVICE: HCPCS | Performed by: INTERNAL MEDICINE

## 2020-12-12 RX ORDER — DRONABINOL 2.5 MG/1
5 CAPSULE ORAL
Status: DISCONTINUED | OUTPATIENT
Start: 2020-12-13 | End: 2020-12-12

## 2020-12-12 RX ORDER — MIRTAZAPINE 15 MG/1
15 TABLET, ORALLY DISINTEGRATING ORAL
Status: DISCONTINUED | OUTPATIENT
Start: 2020-12-12 | End: 2020-12-13 | Stop reason: HOSPADM

## 2020-12-12 RX ORDER — MEGESTROL ACETATE 20 MG/1
20 TABLET ORAL 4 TIMES DAILY
Status: DISCONTINUED | OUTPATIENT
Start: 2020-12-12 | End: 2020-12-13 | Stop reason: HOSPADM

## 2020-12-12 RX ADMIN — PROPRANOLOL HYDROCHLORIDE 20 MG: 20 TABLET ORAL at 11:17

## 2020-12-12 RX ADMIN — CYANOCOBALAMIN TAB 500 MCG 500 MCG: 500 TAB at 06:37

## 2020-12-12 RX ADMIN — MEGESTROL ACETATE 20 MG: 20 TABLET ORAL at 20:26

## 2020-12-12 RX ADMIN — TAMSULOSIN HYDROCHLORIDE 0.4 MG: 0.4 CAPSULE ORAL at 20:26

## 2020-12-12 RX ADMIN — HEPARIN SODIUM 5000 UNITS: 5000 INJECTION, SOLUTION INTRAVENOUS; SUBCUTANEOUS at 06:37

## 2020-12-12 RX ADMIN — MIRTAZAPINE 15 MG: 15 TABLET, ORALLY DISINTEGRATING ORAL at 20:26

## 2020-12-12 RX ADMIN — LEVETIRACETAM 500 MG: 500 TABLET ORAL at 17:22

## 2020-12-12 RX ADMIN — PROPRANOLOL HYDROCHLORIDE 20 MG: 20 TABLET ORAL at 06:37

## 2020-12-12 RX ADMIN — DRONABINOL 2.5 MG: 2.5 CAPSULE ORAL at 11:17

## 2020-12-12 RX ADMIN — MEGESTROL ACETATE 20 MG: 20 TABLET ORAL at 15:49

## 2020-12-12 RX ADMIN — SENNOSIDES-DOCUSATE SODIUM TAB 8.6-50 MG 2 TABLET: 8.6-5 TAB at 06:37

## 2020-12-12 RX ADMIN — Medication 1000 UNITS: at 06:37

## 2020-12-12 RX ADMIN — HEPARIN SODIUM 5000 UNITS: 5000 INJECTION, SOLUTION INTRAVENOUS; SUBCUTANEOUS at 20:30

## 2020-12-12 RX ADMIN — PROPRANOLOL HYDROCHLORIDE 20 MG: 20 TABLET ORAL at 17:22

## 2020-12-12 RX ADMIN — HEPARIN SODIUM 5000 UNITS: 5000 INJECTION, SOLUTION INTRAVENOUS; SUBCUTANEOUS at 15:49

## 2020-12-12 ASSESSMENT — PAIN SCALES - WONG BAKER
WONGBAKER_NUMERICALRESPONSE: DOESN'T HURT AT ALL
WONGBAKER_NUMERICALRESPONSE: DOESN'T HURT AT ALL

## 2020-12-12 ASSESSMENT — ENCOUNTER SYMPTOMS
FEVER: 0
SHORTNESS OF BREATH: 0
ABDOMINAL PAIN: 0

## 2020-12-12 ASSESSMENT — FIBROSIS 4 INDEX: FIB4 SCORE: 0.19

## 2020-12-12 NOTE — ED NOTES
Attempted PO challenge. PO thickened dairy liquid attempted, pt refuses to drink. Offered other liquids, pt continues to refuse. Mom concerned about pts inability to relax facial muscles. MD is aware and is at bedside to evaluate and discuss plan of care.

## 2020-12-12 NOTE — CARE PLAN
Problem: Nutritional:  Goal: Achieve adequate nutritional intake  Description: Patient will consume 50% of meals  Outcome: NOT MET     See RD note.

## 2020-12-12 NOTE — ED NOTES
Pharmacy Medication Reconciliation      Medication reconciliation updated and complete per pt family at bedside  Allergies have been verified and updated   No oral ABX within the last 14 days  Patient home pharmacy:[facility]               Hematology/Event Note

## 2020-12-12 NOTE — ED PROVIDER NOTES
"ED Provider Note    CHIEF COMPLAINT  Chief Complaint   Patient presents with   • Failure to Thrive     Pt. presents with mother, mother stating pt. is refusing food and fluids at home. Pt. denies nausea, loss of taste. Here yesterday and given \"appetite stimulant\" which mother states is not working.        HPI  Rey Medina is a 25 y.o. male who presents for evaluation of poor appetite failure to thrive.  The patient is a complex history of intracranial hemorrhage.  He required extensive hospitalization craniotomy and had a tracheostomy which has been removed.  He has been cared for by his mother.  The patient was brought in yesterday and seen for failure to thrive poor appetite.  He apparently did test positive for Covid 2 weeks ago but was having a reasonable course.  The practitioner yesterday started the patient on Marinol with no improvement.  Mother reports he has had decreased oral intake decreased urination    REVIEW OF SYSTEMS  See HPI for further details.  No seizures fevers chills cough all other systems are negative.     PAST MEDICAL HISTORY  Past Medical History:   Diagnosis Date   • ICH (intracerebral hemorrhage) (HCC)    • Stroke (HCC)        FAMILY HISTORY  Noncontributory    SOCIAL HISTORY  Social History     Socioeconomic History   • Marital status: Single     Spouse name: Not on file   • Number of children: Not on file   • Years of education: Not on file   • Highest education level: Some college, no degree   Occupational History   • Not on file   Social Needs   • Financial resource strain: Not hard at all   • Food insecurity     Worry: Never true     Inability: Never true   • Transportation needs     Medical: No     Non-medical: No   Tobacco Use   • Smoking status: Never Smoker   • Smokeless tobacco: Never Used   Substance and Sexual Activity   • Alcohol use: Never     Frequency: Never     Drinks per session: Patient refused     Binge frequency: Never   • Drug use: Never   • Sexual " activity: Not Currently   Lifestyle   • Physical activity     Days per week: 3 days     Minutes per session: 20 min   • Stress: Not at all   Relationships   • Social connections     Talks on phone: Never     Gets together: More than three times a week     Attends Jainism service: Never     Active member of club or organization: No     Attends meetings of clubs or organizations: Never     Relationship status: Never    • Intimate partner violence     Fear of current or ex partner: Not on file     Emotionally abused: Not on file     Physically abused: Not on file     Forced sexual activity: Not on file   Other Topics Concern   •  Service No   • Blood Transfusions No   • Caffeine Concern No   • Occupational Exposure No   • Hobby Hazards No   • Sleep Concern No   • Stress Concern No   • Weight Concern No   • Special Diet Yes   • Back Care No   • Exercise Yes   • Bike Helmet No   • Seat Belt Yes   • Self-Exams No   Social History Narrative    Lives with mom       SURGICAL HISTORY  Past Surgical History:   Procedure Laterality Date   • ANAL FISTULOTOMY     • PEG PLACEMENT     • TRACHEOSTOMY     •  SHUNT INSERTION         CURRENT MEDICATIONS  Home Medications     Reviewed by Esteban Ziegler (Pharmacy Tech) on 12/11/20 at 1635  Med List Status: Complete   Medication Last Dose Status   bisacodyl (DULCOLAX) 10 MG Suppos prn Active   Cholecalciferol (VITAMIN D3) 125 MCG (5000 UT) Chew Tab 12/11/2020 Active   Cyanocobalamin (B-12 PO) 12/11/2020 Active   dronabinol (MARINOL) 2.5 MG Cap 12/11/2020 Active   levETIRAcetam (KEPPRA) 500 MG Tab 12/10/2020 Active   mirtazapine (REMERON) 15 MG Tab 12/10/2020 Active   Misc. Devices Misc  Active   montelukast (SINGULAIR) 10 MG Tab 12/8/2020 Active   polyethylene glycol/lytes (MIRALAX) 17 g Pack 12/9/2020 Active   propranolol (INDERAL) 20 MG Tab 12/11/2020 Active   risperiDONE (RISPERDAL) 1 MG Tab not started Active   tamsulosin (FLOMAX) 0.4 MG capsule 12/10/2020  "Active                ALLERGIES  Allergies   Allergen Reactions   • Vancomycin Swelling     Lips  With red face and neck   • Other Misc Rash     Allergic to name band.       PHYSICAL EXAM  VITAL SIGNS: /85   Pulse 78   Temp 36.1 °C (97 °F) (Temporal)   Resp 16   Ht 1.702 m (5' 7\")   Wt 65.8 kg (145 lb)   SpO2 95%   BMI 22.71 kg/m²       Constitutional: Sluggish e.   HENT: Normocephalic, Atraumatic, Bilateral external ears normal, Oropharynx moist, No oral exudates, Nose normal.   Eyes: PERRLA, EOMI, Conjunctiva normal, No discharge.   Neck: Normal range of motion, No tenderness, Supple, No stridor.   Lymphatic: No lymphadenopathy noted  Cardiovascular: Normal heart rate, Normal rhythm, No murmurs, No rubs, No gallops.   Thorax & Lungs: Normal breath sounds, No respiratory distress, No wheezing, No chest tenderness.   Abdomen: Bowel sounds normal, Soft, No tenderness, No masses, No pulsatile masses.   Skin: Warm, Dry, No erythema, No rash.   Extremities: Intact distal pulses, No edema, No tenderness, No cyanosis, No clubbing.   Musculoskeletal: Good range of motion in all major joints. No tenderness to palpation or major deformities noted.   Neurologic: Alert & oriented x 3, Normal motor function, Normal sensory function, No focal deficits noted.   Psychiatric: Affect normal, Judgment normal, Mood normal.       RADIOLOGY/PROCEDURES  CT-HEAD W/O   Final Result      1. Stable left parieto-occipital approach ventriculostomy catheter. No significant hydrocephalus.   2. Cerebellar atrophy.   3. No acute intracranial abnormality.          Results for orders placed or performed during the hospital encounter of 12/11/20   CBC WITH DIFFERENTIAL   Result Value Ref Range    WBC 12.0 (H) 4.8 - 10.8 K/uL    RBC 5.52 4.70 - 6.10 M/uL    Hemoglobin 16.0 14.0 - 18.0 g/dL    Hematocrit 48.5 42.0 - 52.0 %    MCV 87.9 81.4 - 97.8 fL    MCH 29.0 27.0 - 33.0 pg    MCHC 33.0 (L) 33.7 - 35.3 g/dL    RDW 43.9 35.9 - 50.0 fL    " Platelet Count 441 164 - 446 K/uL    MPV 10.7 9.0 - 12.9 fL    Neutrophils-Polys 72.10 (H) 44.00 - 72.00 %    Lymphocytes 18.60 (L) 22.00 - 41.00 %    Monocytes 7.20 0.00 - 13.40 %    Eosinophils 0.80 0.00 - 6.90 %    Basophils 0.80 0.00 - 1.80 %    Immature Granulocytes 0.50 0.00 - 0.90 %    Nucleated RBC 0.00 /100 WBC    Neutrophils (Absolute) 8.67 (H) 1.82 - 7.42 K/uL    Lymphs (Absolute) 2.23 1.00 - 4.80 K/uL    Monos (Absolute) 0.86 (H) 0.00 - 0.85 K/uL    Eos (Absolute) 0.10 0.00 - 0.51 K/uL    Baso (Absolute) 0.10 0.00 - 0.12 K/uL    Immature Granulocytes (abs) 0.06 0.00 - 0.11 K/uL    NRBC (Absolute) 0.00 K/uL   Comp Metabolic Panel   Result Value Ref Range    Sodium 135 135 - 145 mmol/L    Potassium 4.2 3.6 - 5.5 mmol/L    Chloride 98 96 - 112 mmol/L    Co2 21 20 - 33 mmol/L    Anion Gap 16.0 7.0 - 16.0    Glucose 87 65 - 99 mg/dL    Bun 11 8 - 22 mg/dL    Creatinine 0.74 0.50 - 1.40 mg/dL    Calcium 9.8 8.4 - 10.2 mg/dL    AST(SGOT) 20 12 - 45 U/L    ALT(SGPT) 48 2 - 50 U/L    Alkaline Phosphatase 152 (H) 30 - 99 U/L    Total Bilirubin 0.8 0.1 - 1.5 mg/dL    Albumin 4.3 3.2 - 4.9 g/dL    Total Protein 8.8 (H) 6.0 - 8.2 g/dL    Globulin 4.5 (H) 1.9 - 3.5 g/dL    A-G Ratio 1.0 g/dL   LIPASE   Result Value Ref Range    Lipase 39 7 - 58 U/L   URINALYSIS    Specimen: Blood   Result Value Ref Range    Color Yellow     Character Clear     Ph 5.5 5.0 - 8.0    Glucose Negative Negative mg/dL    Ketones 40 (A) Negative mg/dL    Protein Negative Negative mg/dL    Bilirubin Negative Negative    Nitrite Negative Negative    Leukocyte Esterase Negative Negative    Occult Blood Negative Negative    Micro Urine Req see below    REFRACTOMETER SG   Result Value Ref Range    Specific Gravity 1.027    ESTIMATED GFR   Result Value Ref Range    GFR If African American >60 >60 mL/min/1.73 m 2    GFR If Non African American >60 >60 mL/min/1.73 m 2   D-Dimer   Result Value Ref Range    D-Dimer Screen 0.29 0.00 - 0.50 ug/mL (FEU)        COURSE & MEDICAL DECISION MAKING  Pertinent Labs & Imaging studies reviewed. (See chart for details)  Patient was given IV fluids.  The patient appears not to be critically ill but obviously the mother is having difficulty caring for the patient and his not meeting his medical needs.  He will be admitted for IV fluids, dietary consult possible placement in a long-term care facility    FINAL IMPRESSION  1.  Failure to thrive      Electronically signed by: True Rangel M.D., 12/11/2020 5:23 PM

## 2020-12-12 NOTE — DIETARY
"Nutrition services: Day 0 of admit.  Rey Medina is a 25 y.o. male with admitting DX of Failure to Thrive in Adult.   Consult received for FTT.    Per current department guidelines dietary staff not permitted to enter droplet isolation rooms at this time.     Spoke w/ mother (primary caregiver) via telephone. States pt started refusing meals on Wednesday but was still taking fluids at that time. At home, mother makes smoothies with Beneprotein. Mother states pt might be agreeable to vanilla Boost Plus. Will contact MD for orders. Mother denies any recent weight loss but suspects he has likely lost weight in the last 4 days with no PO intake. Mother feels that decreased appetite/PO intake are r/t medication changes.     Assessment:  Height: 170.2 cm (5' 7\")  Weight: 65.8 kg (145 lb)  Body mass index is 22.71 kg/m²., BMI classification: Normal;   Diet/Intake: Level 7 - Easy to Chew, Mildly Thick Liquids. 0% at breakfast this AM.     Evaluation:   1. Per H&P:  · Admitted with hx of spontaneous intracranial hemorrhage status post  shunt who presented 12/11/2020 with decreased appetite.  · Pt with dysphagia 2/2 ICH. Hx of PEG tube, no longer in use, chose not to replace in August 2020 - SLP advanced diet.   · Diagnosed with Covid-19 three weeks ago. Symptomatic until 2 weeks prior to this admit.   · Pt started on appetite stimulant (marinol) 3 days ago, but per mother, it has not increased his appetite.   2. NPO day 4 -- inadequate nutrition.   3. Labs: Na 137, K 3.5, Glucose 72.   4. Meds: B12, marinol, remeron, vitamin D.   5. Weight hx per chart review:  · 12/8/2020: 66 kg (office visit)  · 10/27/2020: 74.8 kg (office visit)  · 8/11/2020: 71.5 kg (hospital admit)  · Pt with ~8% wt loss in 4 months - notable but not severe.     Malnutrition Risk: Pt at risk given wt loss and NPO day 4, however does not meet criteria at this time.     Recommendations/Plan:  1. Add vanilla Boost Plus with meals.  "   2. Encourage intake of meals and supplements.   3. Document intake of all meals and supplements as % taken in ADL's to provide interdisciplinary communication across all shifts.   4. Monitor weight.    RD Following.

## 2020-12-12 NOTE — PROGRESS NOTES
"Received patient from NOC RN. Assessment complete. Pt appears to be oriented to self. States \"No.\", for pain. POC discussed with mother over telephone. Call light within reach. Bed in low, locked position. All needs attended to at this time.   "

## 2020-12-12 NOTE — CARE PLAN
Problem: Knowledge Deficit  Goal: Knowledge of disease process/condition, treatment plan, diagnostic tests, and medications will improve    Outcome: PROGRESSING AS EXPECTED   Pt's family   will learn about disease process and all questions addressed and answered.      Problem: Skin Integrity  Goal: Risk for impaired skin integrity will decrease  Outcome: PROGRESSING AS EXPECTED    Skin and mobility assessment complete. Q2h turns with pillows in place.

## 2020-12-12 NOTE — ASSESSMENT & PLAN NOTE
Patient was infected 3 weeks prior to admission and symptoms resolved 2 weeks prior to mission  Per policy, patient needs to continue isolation  Procalcitonin and D-dimer negative, CRP slightly elevated  No current indication for steroids or remdesivir

## 2020-12-12 NOTE — PROGRESS NOTES
Hospital Medicine Daily Progress Note    Date of Service  12/12/2020    Chief Complaint  25 y.o. male admitted 12/11/2020 with decreased appetite    Hospital Course  25 y.o. male w/h/o spontaneous intracranial hemorrhage status post  shunt who presented 12/11/2020 with decreased appetite.  Patient was previously diagnosed with Covid 3 weeks prior to admission.  He was symptomatic until about 2 weeks prior to admission.  About a week prior to admission, patient started to titrate down on Keppra from 500 down to 250 mg per his neurologist.  However the next day he started having decreased appetite.  Discontinued and eventually mom was worried and brought the patient to the ER where he was sent home with and started on dronabinol.  The mother notes that the patient has increased lethargy on dronabinol but does not have increased appetite.  She does note that he titrated down on mirtazapine from 15 down to 7.5 about a month ago by the PCP.    Interval Problem Update  No acute events overnight.  Patient still with low appetite this morning.  Dietary recommending boost plus with meals.  Pending SLP evaluation.  I spoke with patient's mother, she is agreeable with increasing mirtazapine to 15 mg as she feels like this helped to the patient's appetite previously.  Also discussed that she does not like the dronabinol as it appears to make her son extremely drowsy and makes his speech worse.  I discussed different treatment options with patient's mother and she is agreeable to increasing the mirtazapine to 15 and changing the dronabinol to Megace.    Consultants/Specialty  N/A    Code Status  Full Code    Disposition  Pending     Review of Systems  Review of Systems   Unable to perform ROS: Mental acuity (answers some questions, difficult to understand)   Constitutional: Negative for fever.   Respiratory: Negative for shortness of breath.    Cardiovascular: Negative for chest pain.   Gastrointestinal: Negative for abdominal  pain.   All other systems reviewed and are negative.       Physical Exam  Temp:  [36.1 °C (97 °F)-36.9 °C (98.5 °F)] 36.9 °C (98.5 °F)  Pulse:  [65-87] 78  Resp:  [16-20] 18  BP: (104-125)/(62-85) 125/73  SpO2:  [94 %-99 %] 99 %    Physical Exam  Constitutional:       General: He is not in acute distress.  Neck:      Comments: Scar from previous trach  Cardiovascular:      Rate and Rhythm: Normal rate and regular rhythm.      Heart sounds: Normal heart sounds.   Pulmonary:      Effort: Pulmonary effort is normal. No respiratory distress.      Breath sounds: Normal breath sounds.   Abdominal:      General: Abdomen is flat.      Palpations: Abdomen is soft.   Musculoskeletal:      Right lower leg: No edema.      Left lower leg: No edema.   Skin:     General: Skin is warm and dry.      Findings: No rash.   Neurological:      Mental Status: He is alert. He is disoriented.      Comments: Able to say words, but difficult to understand         Fluids  No intake or output data in the 24 hours ending 12/12/20 1310    Laboratory  Recent Labs     12/10/20  1013 12/11/20  1557 12/12/20  0200   WBC 7.4 12.0* 7.2   RBC 5.58 5.52 4.99   HEMOGLOBIN 16.1 16.0 14.1   HEMATOCRIT 50.8 48.5 43.5   MCV 91.0 87.9 87.2   MCH 28.9 29.0 28.3   MCHC 31.7* 33.0* 32.4*   RDW 46.3 43.9 43.8   PLATELETCT 379 441 370   MPV 10.8 10.7 11.1     Recent Labs     12/10/20  1013 12/11/20  1557 12/12/20  0200   SODIUM 136 135 137   POTASSIUM 4.6 4.2 3.5*   CHLORIDE 103 98 100   CO2 18* 21 20   GLUCOSE 86 87 72   BUN 9 11 10   CREATININE 0.66 0.74 0.66   CALCIUM 9.7 9.8 8.8                   Imaging  CT-HEAD W/O   Final Result      1. Stable left parieto-occipital approach ventriculostomy catheter. No significant hydrocephalus.   2. Cerebellar atrophy.   3. No acute intracranial abnormality.           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)  Assessment & Plan  Requires total care by mother    Dysphagia following nontraumatic intracerebral hemorrhage- (present on admission)  Assessment & Plan  SLP pending  Thickened diet for now    COVID-19 virus infection- (present on admission)  Assessment & Plan  Patient was infected 3 weeks prior to admission and symptoms resolved 2 weeks prior to mission  Per policy, patient needs to continue isolation  Procalcitonin and D-dimer negative, CRP slightly elevated  IL-6 pending  No current indication for steroids or remdesivir    Constipation- (present on admission)  Assessment & Plan  Constipation  - MiraLAX and bowel protocol     (ventriculoperitoneal) shunt status- (present on admission)  Assessment & Plan  Placed previously after spontaneous intraparenchymal hemorrhage    Myopia of both eyes- (present on admission)  Assessment & Plan  Unable to assess due to patient's nonverbal status    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

## 2020-12-12 NOTE — H&P
"Hospital Medicine History & Physical Note    Date of Service  12/11/2020    Primary Care Physician  Sherie Power M.D.    Consultants  None    Code Status  Full code per mother present at bedside as patient is unable to provide CODE STATUS    Chief Complaint  Chief Complaint   Patient presents with   • Failure to Thrive     Pt. presents with mother, mother stating pt. is refusing food and fluids at home. Pt. denies nausea, loss of taste. Here yesterday and given \"appetite stimulant\" which mother states is not working.         History of Presenting Illness  25 y.o. male w/h/o spontaneous intracranial hemorrhage status post  shunt who presented 12/11/2020 with decreased appetite.  Patient was previously diagnosed with Covid 3 weeks prior to admission.  He was symptomatic until about 2 weeks prior to admission.  About a week prior to admission, patient started to titrate down on Keppra from 500 down to 250 mg per his neurologist.  However the next day he started having decreased appetite.  Discontinued and eventually mom was worried and brought the patient to the ER where he was sent home with and started on dronabinol.  The mother notes that the patient has increased lethargy on dronabinol but does not have increased appetite.  She does note that he titrated down on mirtazapine from 15 down to 7.5 about a month ago by the PCP.    Review of Systems  Review of Systems   Unable to perform ROS: Mental acuity   Patient is unable to provide any review of systems at this time, otherwise all systems would be reviewed.    Past Medical History   has a past medical history of ICH (intracerebral hemorrhage) (HCC) and Stroke (HCC).    Surgical History   has a past surgical history that includes anal fistulotomy; peg placement; tracheostomy; and  shunt insertion.    Family History  family history includes Diabetes in his maternal grandfather and paternal grandmother; Glasses in his mother; Hypertension in his maternal uncle and " mother; Stroke in his maternal grandfather; Thyroid in his mother.    Social History   reports that he has never smoked. He has never used smokeless tobacco. He reports that he does not drink alcohol or use drugs.    Allergies  Allergies   Allergen Reactions   • Vancomycin Swelling     Lips  With red face and neck   • Other Misc Rash     Allergic to name band.       Medications  No current facility-administered medications on file prior to encounter.      Current Outpatient Medications on File Prior to Encounter   Medication Sig Dispense Refill   • dronabinol (MARINOL) 2.5 MG Cap Take 1 Cap by mouth every day for 7 days. 7 Cap 0   • risperiDONE (RISPERDAL) 1 MG Tab Take 1 Tab by mouth 2 times a day. (Patient taking differently: Take 1 mg by mouth 2 times a day as needed (agitation).) 60 Tab 3   • levETIRAcetam (KEPPRA) 500 MG Tab Take 1 Tab by mouth every day at 6 PM. PM only 90 Tab 3   • propranolol (INDERAL) 20 MG Tab TAKE 1 TABLET BY MOUTH THREE TIMES DAILY 180 Tab 2   • Cyanocobalamin (B-12 PO) Take 1 Tab by mouth every day.     • Misc. Devices Misc Incontinence supplies - adult diapers. Length of time needed - lifetime or 999 months. (Patient not taking: Reported on 12/10/2020) 999 Each 999   • mirtazapine (REMERON) 15 MG Tab Take 0.5 Tabs by mouth every bedtime. 30 Tab 2   • tamsulosin (FLOMAX) 0.4 MG capsule Take 1 Cap by mouth every bedtime. 30 Cap 2   • polyethylene glycol/lytes (MIRALAX) 17 g Pack Take 1 Packet by mouth every day. (Patient taking differently: Take 17 g by mouth every evening.) 90 Each 2   • montelukast (SINGULAIR) 10 MG Tab Take 1 Tab by mouth as needed (to reduce amount of pills needing to be taken daily ). 90 Tab 2   • bisacodyl (DULCOLAX) 10 MG Suppos Insert 1 Suppository in rectum as needed (no BM for 3 days). 30 Suppository 2   • Cholecalciferol (VITAMIN D3) 125 MCG (5000 UT) Chew Tab Chew 5,000 Units every day.         Physical Exam  Weight/BMI: Body mass index is 22.71 kg/m².  BP  "124/85   Pulse 78   Temp 36.1 °C (97 °F) (Temporal)   Resp 16   Ht 1.702 m (5' 7\")   Wt 65.8 kg (145 lb)   SpO2 95%    Vitals:    12/11/20 1539 12/11/20 1540 12/11/20 1600   BP:  124/85    Pulse:  80 78   Resp:  16    Temp:  36.1 °C (97 °F)    TempSrc:  Temporal    SpO2:  97% 95%   Weight: 66 kg (145 lb 8.1 oz) 65.8 kg (145 lb)    Height: 1.702 m (5' 7\") 1.702 m (5' 7\")     Oxygen Therapy:  Pulse Oximetry: 95 %, O2 Delivery Device: None - Room Air  Physical Exam  Constitutional:       General: He is not in acute distress.     Appearance: He is well-developed. He is not diaphoretic.   HENT:      Head: Normocephalic and atraumatic.      Right Ear: External ear normal.      Left Ear: External ear normal.      Mouth/Throat:      Pharynx: No oropharyngeal exudate or posterior oropharyngeal erythema.   Eyes:      General:         Right eye: No discharge.         Left eye: No discharge.      Extraocular Movements: Extraocular movements intact.   Neck:      Comments: Neck scar from previous trach  Cardiovascular:      Rate and Rhythm: Normal rate.      Heart sounds: No gallop.    Pulmonary:      Effort: No respiratory distress.      Breath sounds: No wheezing.   Abdominal:      General: There is no distension.      Tenderness: There is no abdominal tenderness. There is no guarding.   Skin:     General: Skin is warm.   Neurological:      Mental Status: He is alert. Mental status is at baseline.      Motor: Weakness present.      Comments: Unable to assess as patient is Nonverbal   Psychiatric:      Comments: Unable to assess as patient is nonverbal         Laboratory:   Objective   Recent Results (from the past 24 hour(s))   CBC WITH DIFFERENTIAL    Collection Time: 12/11/20  3:57 PM   Result Value Ref Range    WBC 12.0 (H) 4.8 - 10.8 K/uL    RBC 5.52 4.70 - 6.10 M/uL    Hemoglobin 16.0 14.0 - 18.0 g/dL    Hematocrit 48.5 42.0 - 52.0 %    MCV 87.9 81.4 - 97.8 fL    MCH 29.0 27.0 - 33.0 pg    MCHC 33.0 (L) 33.7 - 35.3 " g/dL    RDW 43.9 35.9 - 50.0 fL    Platelet Count 441 164 - 446 K/uL    MPV 10.7 9.0 - 12.9 fL    Neutrophils-Polys 72.10 (H) 44.00 - 72.00 %    Lymphocytes 18.60 (L) 22.00 - 41.00 %    Monocytes 7.20 0.00 - 13.40 %    Eosinophils 0.80 0.00 - 6.90 %    Basophils 0.80 0.00 - 1.80 %    Immature Granulocytes 0.50 0.00 - 0.90 %    Nucleated RBC 0.00 /100 WBC    Neutrophils (Absolute) 8.67 (H) 1.82 - 7.42 K/uL    Lymphs (Absolute) 2.23 1.00 - 4.80 K/uL    Monos (Absolute) 0.86 (H) 0.00 - 0.85 K/uL    Eos (Absolute) 0.10 0.00 - 0.51 K/uL    Baso (Absolute) 0.10 0.00 - 0.12 K/uL    Immature Granulocytes (abs) 0.06 0.00 - 0.11 K/uL    NRBC (Absolute) 0.00 K/uL   Comp Metabolic Panel    Collection Time: 12/11/20  3:57 PM   Result Value Ref Range    Sodium 135 135 - 145 mmol/L    Potassium 4.2 3.6 - 5.5 mmol/L    Chloride 98 96 - 112 mmol/L    Co2 21 20 - 33 mmol/L    Anion Gap 16.0 7.0 - 16.0    Glucose 87 65 - 99 mg/dL    Bun 11 8 - 22 mg/dL    Creatinine 0.74 0.50 - 1.40 mg/dL    Calcium 9.8 8.4 - 10.2 mg/dL    AST(SGOT) 20 12 - 45 U/L    ALT(SGPT) 48 2 - 50 U/L    Alkaline Phosphatase 152 (H) 30 - 99 U/L    Total Bilirubin 0.8 0.1 - 1.5 mg/dL    Albumin 4.3 3.2 - 4.9 g/dL    Total Protein 8.8 (H) 6.0 - 8.2 g/dL    Globulin 4.5 (H) 1.9 - 3.5 g/dL    A-G Ratio 1.0 g/dL   LIPASE    Collection Time: 12/11/20  3:57 PM   Result Value Ref Range    Lipase 39 7 - 58 U/L   URINALYSIS    Collection Time: 12/11/20  3:57 PM    Specimen: Blood   Result Value Ref Range    Color Yellow     Character Clear     Ph 5.5 5.0 - 8.0    Glucose Negative Negative mg/dL    Ketones 40 (A) Negative mg/dL    Protein Negative Negative mg/dL    Bilirubin Negative Negative    Nitrite Negative Negative    Leukocyte Esterase Negative Negative    Occult Blood Negative Negative    Micro Urine Req see below    REFRACTOMETER SG    Collection Time: 12/11/20  3:57 PM   Result Value Ref Range    Specific Gravity 1.027    ESTIMATED GFR    Collection Time:  12/11/20  3:57 PM   Result Value Ref Range    GFR If African American >60 >60 mL/min/1.73 m 2    GFR If Non African American >60 >60 mL/min/1.73 m 2       (click the triangle to expand results)    Imaging:  CT-HEAD W/O   Final Result      1. Stable left parieto-occipital approach ventriculostomy catheter. No significant hydrocephalus.   2. Cerebellar atrophy.   3. No acute intracranial abnormality.        Assessment/Plan:  I anticipate this patient is appropriate for observation status at this time.    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)  Assessment & Plan  Requires total care by mother    Dysphagia following nontraumatic intracerebral hemorrhage- (present on admission)  Assessment & Plan  SLP pending  Thickened diet for now    COVID-19 virus infection- (present on admission)  Assessment & Plan  Patient was infected 3 weeks prior to admission and symptoms resolved 2 weeks prior to mission  Per policy, patient needs to continue isolation  Procalcitonin and D-dimer negative, CRP slightly elevated  IL-6 pending  No current indication for steroids or remdesivir    Constipation- (present on admission)  Assessment & Plan  Constipation  - MiraLAX and bowel protocol     (ventriculoperitoneal) shunt status- (present on admission)  Assessment & Plan  Placed previously after spontaneous intraparenchymal hemorrhage    Myopia of both eyes- (present on admission)  Assessment & Plan  Unable to assess due to patient's nonverbal status    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

## 2020-12-12 NOTE — ED NOTES
"Pt. Mother stating now that she thinks that the pt. May have had a seizure today. She states \"while I was dressing him he forced his head to the side and his eyes were to the side too\". Dr. Ramirez aware.   "

## 2020-12-12 NOTE — PROGRESS NOTES
Patient transferred to [facility] from [facility]. Report given to Primary RN.

## 2020-12-12 NOTE — ASSESSMENT & PLAN NOTE
Likely multifactorial  Recently tapered down on mirtazapine from 15 down to 7.5 a month ago  Also had recent Covid  Increased mirtazapine back to 15 after discussion with mother  Will change marinol to megace to see if this decreases patient's drowsiness  Dietician consult, rec Boost plus

## 2020-12-12 NOTE — THERAPY
"Speech Language Pathology   Clinical Swallow Evaluation     Patient Name: Rey Medina  AGE:  25 y.o., SEX:  male  Medical Record #: [id number]  Today's Date: 12/12/2020          Assessment    Patient is 25 y.o. male with a diagnosis of decreased appetite and failure to thrive.    Per H&P: \"25 y.o. male w/h/o spontaneous intracranial hemorrhage status post  shunt who presented 12/11/2020 with decreased appetite.  Patient was previously diagnosed with Covid 3 weeks prior to admission.  He was symptomatic until about 2 weeks prior to admission.  About a week prior to admission, patient started to titrate down on Keppra from 500 down to 250 mg per his neurologist.  However the next day he started having decreased appetite.  Discontinued and eventually mom was worried and brought the patient to the ER where he was sent home with and started on dronabinol.\"    Patient received inpatient rehab at [facility] from 2/8/20-3/11/20 and is current followed by outpatient SLP, until recent hospitalization. Per chart review, previous recommendations have been for soft textures and thickened liquids.     Patient attempted to follow commands for labial/lingual movements but presents with decreased ROM and coordination. Patient also presents with moderate oral dysphagia, as characterized by anterior spillage with thin liquids via tsp and small cup sips and oral holding with pureed and soft solids. However, he does not have significant oral cavity residue following PO trials. Patient also presents with suspected pharyngeal dysphagia as characterized by possible delayed pharyngeal response, multiple swallows and weak hyolaryngeal elevation/excursion upon palpation of the swallow. Patient also demonstrated wet vocal quality following trials of thin liquids. Patient was without overt s/sx asp for liquidized X5, puree X5 and soft X10 when alternating with mildly thick liquids via small cup sips.     Plan  -Continue current diet of " Soft and Bite sized textures with Mildly thick liquids (SB6/MT2)  -Strict 1:1 for all PO   -Allow extended time between bites to ensure patient has swallowed  -Recommend Speech Therapy 3 times per week until therapy goals are met for the following treatments:  Dysphagia Training and Patient / Family / Caregiver Education.    Discharge Recommendations: Recommend home health or outpatient for continued speech therapy services    Subjective    Patient was seen for dysphagia eval, seated upright in bed. Patient initially refused PO but complied with SLP encouragement. Patient presents with severe cognitive and communicative deficits at baseline. However, patient is able to communicate with yes/no questions and single words.      Objective       12/12/20 1400   Prior Living Situation   Prior Services Continuous (24 Hour) Care Giving Family   Prior Level Of Function   Swallow Impaired  (SB6/MT2 - baseline diet)   Dentition Intact   Hearing Within Functional Limits for Evaluation   Vision Within Functional Limits for Evaluation   Barriers To Oral Feeding   Barriers To Oral Feeding Generalized Weakness;Prior Dysphagia   Labial Function   Labial Structure At Rest Minimal   Labial Vowel Production / I /, / U / Moderate   Labial Sequence / I /, / U / Moderate   Frown, Pucker Moderate   Lingual Function   Lingual Structure At Rest Minimal   Lingual Protrude Moderate   Elevate In Mouth Moderate   Lateralization Minimal Right;Minimal Left   Jaw   Jaw Structure At Rest Within Functional Limits   Chew (Rotary) Moderate   Velar Function   Velar Structure At Rest Within Functional Limits   / A / Prolonged Moderate   Laryngeal Function   Voice Quality Minimal  (strained at baseline)   Excursion Upon Swallow Weak    Oral Food Presentation   Ice Chips Minimal   Single Swallow Mildly Thick (2) - (Nectar Thick)  Within Functional Limits   Single Swallow Thin (0) Minimal   Liquidised (3) Minimal   Pureed (4) Minimal   Soft & Bite-Sized (6)  - (Dysphagia III) Minimal   Regular (7) Not Tested  (2* results with soft )   Tracheostomy   Tracheostomy  No  (previous tracheostomy )   Dysphagia Strategies / Recommendations   Strategies / Interventions Recommended (Yes / No) Yes   Compensatory Strategies Strict 1:1 Feeding;Multiple Swallows;Single Sips / Bites;Alternate Solids / Liquids;No Straws;Head of Bed 90 Degrees During Eating / Drinking   Diet / Liquid Recommendation Soft & Bite-Sized (6) - (Dysphagia III);Mildly Thick (2) - (Nectar Thick)   Medication Administration  Float Whole with Puree   Therapy Interventions Dysphagia Therapy By Speech Language Pathologist   Dysphagia Rating   Nutritional Liquid Intake Rating Scale Thickened beverages (mildly thick unless otherwise specified)   Nutritional Food Intake Rating Scale Total oral diet with multiple consistencies but requiring special preparation or compensations   Patient / Family Goals   Patient / Family Goal #1 Unable to state   Short Term Goals   Short Term Goal # 1 Pt will tolerate SB6/MT2 diet without overt s/sx of asp for 100% of PO intake.    Goal Outcome # 1 Progressing as expected

## 2020-12-13 VITALS
RESPIRATION RATE: 18 BRPM | WEIGHT: 158.51 LBS | BODY MASS INDEX: 24.88 KG/M2 | HEIGHT: 67 IN | DIASTOLIC BLOOD PRESSURE: 64 MMHG | TEMPERATURE: 99.8 F | SYSTOLIC BLOOD PRESSURE: 105 MMHG | OXYGEN SATURATION: 94 % | HEART RATE: 84 BPM

## 2020-12-13 PROBLEM — R63.0 DECREASED APPETITE: Status: RESOLVED | Noted: 2020-12-11 | Resolved: 2020-12-13

## 2020-12-13 PROBLEM — K59.00 CONSTIPATION: Status: RESOLVED | Noted: 2019-11-25 | Resolved: 2020-12-13

## 2020-12-13 PROBLEM — U07.1 COVID-19 VIRUS INFECTION: Status: RESOLVED | Noted: 2020-12-11 | Resolved: 2020-12-13

## 2020-12-13 LAB
SARS-COV-2 RNA RESP QL NAA+PROBE: NOTDETECTED
SPECIMEN SOURCE: NORMAL

## 2020-12-13 PROCEDURE — 700111 HCHG RX REV CODE 636 W/ 250 OVERRIDE (IP): Performed by: HOSPITALIST

## 2020-12-13 PROCEDURE — G0378 HOSPITAL OBSERVATION PER HR: HCPCS

## 2020-12-13 PROCEDURE — 700102 HCHG RX REV CODE 250 W/ 637 OVERRIDE(OP): Performed by: HOSPITALIST

## 2020-12-13 PROCEDURE — 700101 HCHG RX REV CODE 250: Performed by: INTERNAL MEDICINE

## 2020-12-13 PROCEDURE — A9270 NON-COVERED ITEM OR SERVICE: HCPCS | Performed by: HOSPITALIST

## 2020-12-13 PROCEDURE — 96372 THER/PROPH/DIAG INJ SC/IM: CPT

## 2020-12-13 PROCEDURE — 99217 PR OBSERVATION CARE DISCHARGE: CPT | Performed by: INTERNAL MEDICINE

## 2020-12-13 RX ORDER — MIRTAZAPINE 15 MG/1
15 TABLET, ORALLY DISINTEGRATING ORAL
Qty: 30 TAB | Refills: 0 | Status: SHIPPED | OUTPATIENT
Start: 2020-12-13 | End: 2020-12-19

## 2020-12-13 RX ORDER — MEGESTROL ACETATE 20 MG/1
20 TABLET ORAL 3 TIMES DAILY
Qty: 60 TAB | Refills: 0 | Status: SHIPPED | OUTPATIENT
Start: 2020-12-13 | End: 2020-12-21

## 2020-12-13 RX ADMIN — PROPRANOLOL HYDROCHLORIDE 20 MG: 20 TABLET ORAL at 11:07

## 2020-12-13 RX ADMIN — MEGESTROL ACETATE 20 MG: 20 TABLET ORAL at 12:28

## 2020-12-13 RX ADMIN — MEGESTROL ACETATE 20 MG: 20 TABLET ORAL at 08:36

## 2020-12-13 RX ADMIN — Medication 1000 UNITS: at 06:29

## 2020-12-13 RX ADMIN — HEPARIN SODIUM 5000 UNITS: 5000 INJECTION, SOLUTION INTRAVENOUS; SUBCUTANEOUS at 13:31

## 2020-12-13 RX ADMIN — CYANOCOBALAMIN TAB 500 MCG 500 MCG: 500 TAB at 06:29

## 2020-12-13 RX ADMIN — HEPARIN SODIUM 5000 UNITS: 5000 INJECTION, SOLUTION INTRAVENOUS; SUBCUTANEOUS at 06:30

## 2020-12-13 RX ADMIN — PROPRANOLOL HYDROCHLORIDE 20 MG: 20 TABLET ORAL at 06:30

## 2020-12-13 ASSESSMENT — ENCOUNTER SYMPTOMS
FEVER: 0
SHORTNESS OF BREATH: 0
ABDOMINAL PAIN: 0

## 2020-12-13 ASSESSMENT — PAIN SCALES - WONG BAKER: WONGBAKER_NUMERICALRESPONSE: DOESN'T HURT AT ALL

## 2020-12-13 NOTE — DISCHARGE INSTRUCTIONS
Discharge Instructions    Discharged to home by car with relative. Discharged via wheelchair, hospital escort: Yes.  Special equipment needed: Wheelchair    Be sure to schedule a follow-up appointment with your primary care doctor or any specialists as instructed.     Discharge Plan:   Diet Plan: Discussed  Activity Level: Discussed  Confirmed Follow up Appointment: Patient to Call and Schedule Appointment  Confirmed Symptoms Management: Discussed  Medication Reconciliation Updated: Yes  Influenza Vaccine Indication: Not indicated: Previously immunized this influenza season and > 8 years of age    I understand that a diet low in cholesterol, fat, and sodium is recommended for good health. Unless I have been given specific instructions below for another diet, I accept this instruction as my diet prescription.   Other diet: soft, easy to chew, thickened liquids    Special Instructions: None    · Is patient discharged on Warfarin / Coumadin?   No     Depression / Suicide Risk    As you are discharged from this [facility] facility, it is important to learn how to keep safe from harming yourself.    Recognize the warning signs:  · Abrupt changes in personality, positive or negative- including increase in energy   · Giving away possessions  · Change in eating patterns- significant weight changes-  positive or negative  · Change in sleeping patterns- unable to sleep or sleeping all the time   · Unwillingness or inability to communicate  · Depression  · Unusual sadness, discouragement and loneliness  · Talk of wanting to die  · Neglect of personal appearance   · Rebelliousness- reckless behavior  · Withdrawal from people/activities they love  · Confusion- inability to concentrate     If you or a loved one observes any of these behaviors or has concerns about self-harm, here's what you can do:  · Talk about it- your feelings and reasons for harming yourself  · Remove any means that you might use to hurt yourself  (examples: pills, rope, extension cords, firearm)  · Get professional help from the community (Mental Health, Substance Abuse, psychological counseling)  · Do not be alone:Call your Safe Contact- someone whom you trust who will be there for you.  · Call your local CRISIS HOTLINE 442-6135 or 162-129-4745  · Call your local Children's Mobile Crisis Response Team [place] (539) 013-8911 or www.[employer]  · Call the toll free National Suicide Prevention Hotlines   · National Suicide Prevention Lifeline 337-013-BTEN (9313)  · National Hope Line Network 800-SUICIDE (321-9086)

## 2020-12-13 NOTE — DISCHARGE SUMMARY
"Discharge Summary    CHIEF COMPLAINT ON ADMISSION  Chief Complaint   Patient presents with   • Failure to Thrive     Pt. presents with mother, mother stating pt. is refusing food and fluids at home. Pt. denies nausea, loss of taste. Here yesterday and given \"appetite stimulant\" which mother states is not working.        Reason for Admission  ems     Admission Date  12/11/2020    CODE STATUS  Full Code    HPI & HOSPITAL COURSE  This is a 25 y.o. male here with decreased appetite.    25 y.o. male w/h/o spontaneous intracranial hemorrhage status post  shunt who presented 12/11/2020 with decreased appetite.  Patient was previously diagnosed with Covid 3 weeks prior to admission.  He was symptomatic until about 2 weeks prior to admission.  About a week prior to admission, patient started to titrate down on Keppra from 500 down to 250 mg per his neurologist.  However the next day he started having decreased appetite.  Discontinued and eventually mom was worried and brought the patient to the ER where he was sent home with and started on dronabinol.  The mother notes that the patient has increased lethargy on dronabinol but does not have increased appetite.  She does note that he titrated down on mirtazapine from 15 down to 7.5 about a month ago by the PCP. After discussion with patient's mother increased mirtazapine back to 15 mg and changed from marinol to megace with improvement in lethargy. Patient's appetite improved, he ate breakfast and lunch. Patient's vital signs are stable and he is ready for discharge home with 24 hour care from his mother. He is to return to the ER if his condition worsens.        Therefore, he is discharged in fair and stable condition to home with close outpatient follow-up.      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 This occurred in [facility]      Managed in [facility] until 11/2019, pt moved to [place]      2/5/2020-2/21/2020 admission to inpatient rehab for rehabilitation with       improvement. Trach decannulated at that time, now is healed and follows       with ENT      S/p PEG tube - inadvertently removed 08/2020, decision made not to replace       with strict precautions, 1:1 eating, thickening of thin liquids, doing       well since then        Resolved Problems:    Constipation POA: Yes    COVID-19 virus infection POA: Yes      FOLLOW UP  Future Appointments   Date Time Provider Department Center   12/21/2020  2:05 PM Mandy Luke D.O. [facility] None   1/6/2021 10:00 AM Farhat Dykes, PT [facility] [place] [address]   1/13/2021 10:30 AM Farhat Dykes, PT [facility] [place] [address]   1/27/2021 10:00 AM Sherie Power M.D. [facility]   4/13/2021  1:00 PM Jennifer Valiente M.D. [facility] None   9/1/2021 10:00 AM Everette Romero M.D. [facility] None     Sherie Power M.D.  [address]  [address]  [facility] [address]  [phone]      Follow up with primary care      MEDICATIONS ON DISCHARGE     Medication List      START taking these medications      Instructions   megestrol 20 MG Tabs  Commonly known as: MEGACE   Take 1 Tab by mouth 3 times a day.  Dose: 20 mg     mirtazapine 15 MG Tbdp  Commonly known as: Remeron  Replaces: mirtazapine 15 MG Tabs   Take 1 Tab by mouth every bedtime.  Dose: 15 mg        CHANGE how you take these medications      Instructions    polyethylene glycol/lytes 17 g Pack  What changed: when to take this  Commonly known as: MIRALAX   Take 1 Packet by mouth every day.  Dose: 17 g        CONTINUE taking these medications      Instructions   B-12 PO   Take 1 Tab by mouth every day.  Dose: 1 Tab     bisacodyl 10 MG Supp  Commonly known as: DULCOLAX   Insert 1 Suppository in rectum as needed (no BM for 3 days).  Dose: 10 mg     levETIRAcetam 500 MG Tabs  Commonly known as: KEPPRA   Take 1 Tab by mouth every day at 6 PM. PM only  Dose: 500 mg     Misc. Devices Misc   Incontinence supplies - adult diapers. Length of time needed - lifetime or 999 months.     montelukast 10 MG Tabs  Commonly known as: SINGULAIR   Take 1 Tab by mouth as needed (to reduce amount of pills needing to be taken daily ).  Dose: 10 mg     propranolol 20 MG Tabs  Commonly known as: INDERAL   TAKE 1 TABLET BY MOUTH THREE TIMES DAILY     tamsulosin 0.4 MG capsule  Commonly known as: Flomax   Take 1 Cap by mouth every bedtime.  Dose: 0.4 mg     Vitamin D3 125 MCG (5000 UT) Chew   Chew 5,000 Units every day.  Dose: 5,000 Units        STOP taking these medications    dronabinol 2.5 MG Caps  Commonly known as: MARINOL     mirtazapine 15 MG Tabs  Commonly known as: Remeron  Replaced by: mirtazapine 15 MG Tbdp     risperiDONE 1 MG Tabs  Commonly known as: RisperDAL            Allergies  Allergies   Allergen Reactions   • Vancomycin Swelling     Lips  With red face and neck   • Other Misc Rash     Allergic to name band.       DIET  Orders Placed This Encounter   Procedures   • Diet Order Diet: Level 7 - Easy to Chew (boost plus with meals ); Liquid level: Level 2 - Mildly Thick     Standing Status:   Standing     Number of Occurrences:   1     Order Specific Question:   Diet:     Answer:   Level 7 - Easy to Chew [22]     Comments:   boost plus with meals      Order Specific Question:   Liquid level     Answer:   Level 2 - Mildly Thick       ACTIVITY  As tolerated.  Weight bearing as  tolerated    CONSULTATIONS  N/A    PROCEDURES  N/A    LABORATORY  Lab Results   Component Value Date    SODIUM 137 12/12/2020    POTASSIUM 3.5 (L) 12/12/2020    CHLORIDE 100 12/12/2020    CO2 20 12/12/2020    GLUCOSE 72 12/12/2020    BUN 10 12/12/2020    CREATININE 0.66 12/12/2020        Lab Results   Component Value Date    WBC 7.2 12/12/2020    HEMOGLOBIN 14.1 12/12/2020    HEMATOCRIT 43.5 12/12/2020    PLATELETCT 370 12/12/2020        Total time of the discharge process exceeds 34 minutes.

## 2020-12-13 NOTE — PROGRESS NOTES
Hospital Medicine Daily Progress Note    Date of Service  12/13/2020    Chief Complaint  25 y.o. male admitted 12/11/2020 with decreased appetite    Hospital Course  25 y.o. male w/h/o spontaneous intracranial hemorrhage status post  shunt who presented 12/11/2020 with decreased appetite.  Patient was previously diagnosed with Covid 3 weeks prior to admission.  He was symptomatic until about 2 weeks prior to admission.  About a week prior to admission, patient started to titrate down on Keppra from 500 down to 250 mg per his neurologist.  However the next day he started having decreased appetite.  Discontinued and eventually mom was worried and brought the patient to the ER where he was sent home with and started on dronabinol.  The mother notes that the patient has increased lethargy on dronabinol but does not have increased appetite.  She does note that he titrated down on mirtazapine from 15 down to 7.5 about a month ago by the PCP.    Interval Problem Update  Patient ate breakfast but refusing to each lunch thus far. No acute events overnight vitals stable.     Consultants/Specialty  N/A    Code Status  Full Code    Disposition  Pending appetite     Review of Systems  Review of Systems   Unable to perform ROS: Mental acuity (answers some questions, difficult to understand)   Constitutional: Negative for fever.   Respiratory: Negative for shortness of breath.    Cardiovascular: Negative for chest pain.   Gastrointestinal: Negative for abdominal pain.   All other systems reviewed and are negative.       Physical Exam  Temp:  [36.8 °C (98.3 °F)-37.7 °C (99.8 °F)] 37.7 °C (99.8 °F)  Pulse:  [66-84] 84  Resp:  [16-18] 18  BP: (105-115)/(56-66) 105/64  SpO2:  [93 %-97 %] 94 %    Physical Exam  Constitutional:       General: He is not in acute distress.  Neck:      Comments: Scar from previous trach  Cardiovascular:      Rate and Rhythm: Normal rate and regular rhythm.      Heart sounds: Normal heart sounds.    Pulmonary:      Effort: Pulmonary effort is normal. No respiratory distress.      Breath sounds: Normal breath sounds.   Abdominal:      General: Abdomen is flat.      Palpations: Abdomen is soft.   Musculoskeletal:      Right lower leg: No edema.      Left lower leg: No edema.   Skin:     General: Skin is warm and dry.      Findings: No rash.   Neurological:      Mental Status: He is alert. He is disoriented.      Comments: Able to say words, but difficult to understand         Fluids    Intake/Output Summary (Last 24 hours) at 12/13/2020 1335  Last data filed at 12/13/2020 0744  Gross per 24 hour   Intake 360 ml   Output --   Net 360 ml       Laboratory  Recent Labs     12/11/20  1557 12/12/20  0200   WBC 12.0* 7.2   RBC 5.52 4.99   HEMOGLOBIN 16.0 14.1   HEMATOCRIT 48.5 43.5   MCV 87.9 87.2   MCH 29.0 28.3   MCHC 33.0* 32.4*   RDW 43.9 43.8   PLATELETCT 441 370   MPV 10.7 11.1     Recent Labs     12/11/20  1557 12/12/20  0200   SODIUM 135 137   POTASSIUM 4.2 3.5*   CHLORIDE 98 100   CO2 21 20   GLUCOSE 87 72   BUN 11 10   CREATININE 0.74 0.66   CALCIUM 9.8 8.8                   Imaging  CT-HEAD W/O   Final Result      1. Stable left parieto-occipital approach ventriculostomy catheter. No significant hydrocephalus.   2. Cerebellar atrophy.   3. No acute intracranial abnormality.           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)  Assessment & Plan  Requires total care by mother    Dysphagia following nontraumatic intracerebral hemorrhage- (present on admission)  Assessment & Plan  SLP  pending  Thickened diet for now    COVID-19 virus infection- (present on admission)  Assessment & Plan  Patient was infected 3 weeks prior to admission and symptoms resolved 2 weeks prior to mission  Per policy, patient needs to continue isolation  Procalcitonin and D-dimer negative, CRP slightly elevated  No current indication for steroids or remdesivir    Constipation- (present on admission)  Assessment & Plan  Constipation  - MiraLAX and bowel protocol     (ventriculoperitoneal) shunt status- (present on admission)  Assessment & Plan  Placed previously after spontaneous intraparenchymal hemorrhage    Myopia of both eyes- (present on admission)  Assessment & Plan  Unable to assess due to patient's nonverbal status    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

## 2020-12-15 LAB — IL6 SERPL-MCNC: <2 PG/ML

## 2020-12-16 ENCOUNTER — HOME HEALTH ADMISSION (OUTPATIENT)
Dept: HOME HEALTH SERVICES | Facility: HOME HEALTHCARE | Age: 25
End: 2020-12-16
Payer: MEDICAID

## 2020-12-16 ENCOUNTER — HOSPITAL ENCOUNTER (EMERGENCY)
Facility: MEDICAL CENTER | Age: 25
End: 2020-12-16
Attending: EMERGENCY MEDICINE
Payer: MEDICAID

## 2020-12-16 ENCOUNTER — NURSE TRIAGE (OUTPATIENT)
Dept: HEALTH INFORMATION MANAGEMENT | Facility: OTHER | Age: 25
End: 2020-12-16

## 2020-12-16 VITALS
HEIGHT: 67 IN | DIASTOLIC BLOOD PRESSURE: 87 MMHG | RESPIRATION RATE: 18 BRPM | SYSTOLIC BLOOD PRESSURE: 118 MMHG | TEMPERATURE: 99.1 F | HEART RATE: 81 BPM | OXYGEN SATURATION: 95 % | WEIGHT: 158.51 LBS | BODY MASS INDEX: 24.88 KG/M2

## 2020-12-16 DIAGNOSIS — E86.0 DEHYDRATION: ICD-10-CM

## 2020-12-16 DIAGNOSIS — R63.0 ANOREXIA: ICD-10-CM

## 2020-12-16 PROCEDURE — 700102 HCHG RX REV CODE 250 W/ 637 OVERRIDE(OP): Performed by: EMERGENCY MEDICINE

## 2020-12-16 PROCEDURE — 99283 EMERGENCY DEPT VISIT LOW MDM: CPT

## 2020-12-16 RX ORDER — DRONABINOL 2.5 MG/1
2.5 CAPSULE ORAL ONCE
Status: COMPLETED | OUTPATIENT
Start: 2020-12-16 | End: 2020-12-16

## 2020-12-16 RX ORDER — DRONABINOL 2.5 MG/1
2.5 CAPSULE ORAL 2 TIMES DAILY
Qty: 60 CAP | Refills: 1 | Status: SHIPPED | OUTPATIENT
Start: 2020-12-16 | End: 2020-12-22

## 2020-12-16 RX ADMIN — DRONABINOL 2.5 MG: 2.5 CAPSULE ORAL at 12:39

## 2020-12-16 ASSESSMENT — FIBROSIS 4 INDEX: FIB4 SCORE: 0.19

## 2020-12-16 NOTE — ED NOTES
" RN asked patient if he wanted to go home or stay in hospital, pt states \"home\".  Rn asked pt if he will eat pt shook head no, RN said then you need to stay in hospital, pt said \"eat\"  .  Pt ate applesauce with moms help.    "

## 2020-12-16 NOTE — FACE TO FACE
Face to Face Supporting Documentation - Home Health    The encounter with this patient was in whole or in part the primary reason for home health admission.    Date of encounter:   Patient:                    MRN:                       YOB: 2021  Rey Medina  [id number]  1995     Home health to see patient for:  Skilled Nursing care for assessment, interventions & education    Skilled need for:  Medication Management Marinol    Skilled nursing interventions to include:  Comment: monitor po intake    Homebound status evidenced by:  Have a condition such that leaving his or her home is medically contraindicated. Leaving home requires a considerable and taxing effort. There is a normal inability to leave the home.    Community Physician to provide follow up care: Sherie Power M.D.     Optional Interventions? No      I certify the face to face encounter for this home health care referral meets the CMS requirements and the encounter/clinical assessment with the patient was, in whole, or in part, for the medical condition(s) listed above, which is the primary reason for home health care. Based on my clinical findings: the service(s) are medically necessary, support the need for home health care, and the homebound criteria are met.  I certify that this patient has had a face to face encounter by myself.  Hipolito Feliz M.D. - NPI: [id number]

## 2020-12-16 NOTE — ED NOTES
ERP at bedside. Pt agrees with plan of care discussed by ERP. AIDET acknowledged with patient. Amy in low position, side rail up for pt safety. Call light within reach. Will continue to monitor.

## 2020-12-16 NOTE — DISCHARGE PLANNING
Anticipated Discharge Disposition: Home    Action: Call from ER RN  Mother has Rx for marinol but refuses to give with out RN or MD supervision. Pt did respond appropriately to RN in ER and ate. Call to Dr Feliz reviewed needs. Call to Dr POWER office on [address]  [phone] to see if will follow home health orders.  She spoke with mother last night.  Dr Power has been in close contact. She will follow hh orders. Dr Power is concerned if he does stop eating may need admission. Sent to Dr Feliz for MD to MD    Barriers to Discharge: Mother anxious about DC needs.     Plan: Will cont to follow closely

## 2020-12-16 NOTE — ED NOTES
Pt administered medication as ordered, pt drank thick'n'easy milk product with a straw sitting at 90 degree angle, with assist per mom/cg

## 2020-12-16 NOTE — ED NOTES
"Pt continues to refuse IV placement.  Pt says \"no\" and places his arms under his body.  ERP aware.    "

## 2020-12-16 NOTE — ED NOTES
"Pt refused IV placement, says \"no\" and places arms underneath body.  ERP updated.  Mom says come back later and try because he forgets.    "

## 2020-12-16 NOTE — ED PROVIDER NOTES
ED Provider Note    CHIEF COMPLAINT  Chief Complaint   Patient presents with   • Failure to Thrive     Mother reports pt has not been able to eat or drink anything, has not improved since previous DC from hospital on 12/13/2020       HPI  Rey Medina is a 25 y.o. male who presents with decreased appetite. He was admitted last week for the same. He was placed on dronabinol. It did seem to help some but Kobe got here drowsy so the mother was concerned and he was taken off of that and placed on megistrol. Patient did start eating in the hospital on Saturday and Sunday was discharged. He is taking his water with his pills but he refuses to take water or food at this time. He took 200 mL of water yesterday to hundred cc today. His urine output is once every 24 hours dark. He has no complaints of any pain he has no abdominal pain no diarrhea no vomiting no headache or chest pain or shortness of breath all other systems are negative    REVIEW OF SYSTEMS  See HPI for further details    PAST MEDICAL HISTORY  Past Medical History:   Diagnosis Date   • COVID-19    • ICH (intracerebral hemorrhage) (HCC)    • Stroke (HCC)        FAMILY HISTORY  Family History   Problem Relation Age of Onset   • Hypertension Mother    • Glasses Mother    • Thyroid Mother    • Diabetes Maternal Grandfather    • Stroke Maternal Grandfather    • Diabetes Paternal Grandmother    • Hypertension Maternal Uncle    • Heart Disease Neg Hx    • Cancer Neg Hx        SOCIAL HISTORY  Social History     Socioeconomic History   • Marital status: Single     Spouse name: Not on file   • Number of children: Not on file   • Years of education: Not on file   • Highest education level: Some college, no degree   Occupational History   • Not on file   Social Needs   • Financial resource strain: Not hard at all   • Food insecurity     Worry: Never true     Inability: Never true   • Transportation needs     Medical: No     Non-medical: No   Tobacco Use   •  "Smoking status: Never Smoker   • Smokeless tobacco: Never Used   Substance and Sexual Activity   • Alcohol use: Never     Frequency: Never     Drinks per session: Patient refused     Binge frequency: Never   • Drug use: Never   • Sexual activity: Not Currently   Lifestyle   • Physical activity     Days per week: 3 days     Minutes per session: 20 min   • Stress: Not at all   Relationships   • Social connections     Talks on phone: Never     Gets together: More than three times a week     Attends Scientologist service: Never     Active member of club or organization: No     Attends meetings of clubs or organizations: Never     Relationship status: Never    • Intimate partner violence     Fear of current or ex partner: Not on file     Emotionally abused: Not on file     Physically abused: Not on file     Forced sexual activity: Not on file   Other Topics Concern   •  Service No   • Blood Transfusions No   • Caffeine Concern No   • Occupational Exposure No   • Hobby Hazards No   • Sleep Concern No   • Stress Concern No   • Weight Concern No   • Special Diet Yes   • Back Care No   • Exercise Yes   • Bike Helmet No   • Seat Belt Yes   • Self-Exams No   Social History Narrative    Lives with mom       SURGICAL HISTORY  Past Surgical History:   Procedure Laterality Date   • ANAL FISTULOTOMY     • PEG PLACEMENT     • TRACHEOSTOMY     •  SHUNT INSERTION         CURRENT MEDICATIONS  Home Medications    **Home medications have not yet been reviewed for this encounter**         ALLERGIES  Allergies   Allergen Reactions   • Vancomycin Swelling     Lips  With red face and neck   • Other Misc Rash     Allergic to name band.       PHYSICAL EXAM  VITAL SIGNS: /81   Pulse 72   Temp 37.3 °C (99.1 °F) (Temporal)   Resp 18   Ht 1.702 m (5' 7\")   Wt 71.9 kg (158 lb 8.2 oz)   SpO2 97%   BMI 24.83 kg/m²     Constitutional: Patient is alert and oriented x3 in no distress   HENT: slightly dry mucous membranes  Eyes: "   no conjunctivitis or actress  Neck:  tracheostomy scars noted.  Lymphatic: negative anterior cervical lymphadenopathy  Cardiovascular: Normal heart rate    Thorax & Lungs: Clear to auscultation  Abdomen: nontender bowel sounds present  Extremities: skin is warm and dry  Psychiatric: Affect normal, Judgment normal, Mood normal.           COURSE & MEDICAL DECISION MAKING  Pertinent Labs & Imaging studies reviewed. (See chart for details)  patient refuses to drink refuses to eat refuses IV.    The patient is still refusing any treatment interventions. He refuses IV lab work and refuses to drink. He did state when asked that he is not suicidal. I did discuss with him and his mother the situation and it is been decided to attempt to treat him with Marinol in the emergency department.    The patient was given Marinol in the emergency department he took it. He also took fluid and some applesauce and insured. Dr. Power saw the patient and the mother and their agreeable to home care. I have arranged home health care from the ER. They're being discharged to return if any worsening of symptoms.    FINAL IMPRESSION  1.   1. Anorexia  REFERRAL TO HOME HEALTH   2. Dehydration  REFERRAL TO HOME HEALTH       2.   3.         Electronically signed by: Hipolito Feliz M.D., 12/16/2020 11:24 AM

## 2020-12-16 NOTE — TELEPHONE ENCOUNTER
DC on 12/13, loss of appetite, ER on 12/9 Ed, Admitted on 12/11.    Stopped eating again yesterday.  Smoothie x 2 on 12/14.  12/15 does not want to eat, takes med w/water.  250 ml of water yesterday, this morning 100 ml of water.  Urinated @ 0700 yesterday, did not urinate agin until  0730 this morning.  Dark yellow urine, vitals okay.      Spoke w/PCP yesterday, directed to change med.        Mother is not comfortable changing his medication w/o medical supervision.

## 2020-12-17 NOTE — ED NOTES
Discharge instructions provided.  Pt verbalized the understanding of discharge instructions to follow up with PCP and to return to ER if condition worsens.  Pt wheeled out of ER without difficulty.     HH discussed with mom and told they will call within 48 hours, she verbalizes understanding.

## 2020-12-17 NOTE — DISCHARGE PLANNING
ATTN: Case Management  RE: Referral for Home Health    As of 12/16/20, we have accepted the Home Health referral for the patient listed above.    A [facility] clinician will be out to see the patient within 48 hours. If you have any questions or concerns regarding the patient's transition to Home Health, please do not hesitate to contact us at [phone].      We look forward to collaborating with you,  [facility] Team

## 2020-12-17 NOTE — DISCHARGE PLANNING
Anticipated Discharge Disposition: DC home with HH    Action: Dr Power spoke with mother CRYSTAL left for ER CM at 15:03 that she is agreeable to follow HH orders. Choice form for [facility] completed. Face to Face completed. HH orders hand written for Dr Feliz. HH RN to monitor medication Marinol and Po intake. HH eval and treat. Dr Power will     Barriers to Discharge: Transport HH finalized    Plan: DC home when cleared

## 2020-12-17 NOTE — DISCHARGE PLANNING
Thank you for thinking of [facility] . Currently, there is no  order. Please send us the  order.       Thanks,     [facility]

## 2020-12-18 ENCOUNTER — HOME CARE VISIT (OUTPATIENT)
Dept: HOME HEALTH SERVICES | Facility: HOME HEALTHCARE | Age: 25
End: 2020-12-18
Payer: MEDICAID

## 2020-12-18 VITALS
DIASTOLIC BLOOD PRESSURE: 80 MMHG | OXYGEN SATURATION: 98 % | TEMPERATURE: 99.4 F | HEART RATE: 70 BPM | SYSTOLIC BLOOD PRESSURE: 100 MMHG | RESPIRATION RATE: 16 BRPM

## 2020-12-18 DIAGNOSIS — R63.0 ANOREXIA: ICD-10-CM

## 2020-12-18 PROCEDURE — G0493 RN CARE EA 15 MIN HH/HOSPICE: HCPCS

## 2020-12-18 PROCEDURE — 665001 SOC-HOME HEALTH

## 2020-12-18 ASSESSMENT — ENCOUNTER SYMPTOMS
VOMITING: DENIES
POOR JUDGMENT: 1
NAUSEA: DENIES
DIFFICULTY THINKING: 1
DEPRESSED MOOD: 1
AGITATION: 1

## 2020-12-18 ASSESSMENT — PATIENT HEALTH QUESTIONNAIRE - PHQ9
1. LITTLE INTEREST OR PLEASURE IN DOING THINGS: 00
2. FEELING DOWN, DEPRESSED, IRRITABLE, OR HOPELESS: 00
CLINICAL INTERPRETATION OF PHQ2 SCORE: 0

## 2020-12-18 ASSESSMENT — ACTIVITIES OF DAILY LIVING (ADL): OASIS_M1830: 06

## 2020-12-18 NOTE — PROGRESS NOTES
Admitted to [facility], medication reconciliation completed, no major drug drug interations, for review. Patient's mother manages medications. Thank you, Arlene VARELA

## 2020-12-18 NOTE — Clinical Note
"Okay, thank you. I will call Rey's mother and let her know.    Arlene Rebolledo RN    ----- Message -----  From: Mandy Luke D.O.  Sent: 12/18/2020   1:45 PM PST  To: Radha Rebolledo R.N.      We could do either. Mirtazapine is an anti-depressant and since he is already taking that we can go up on it to 30mg nightly.        ----- Message -----  From: Radha Rebolledo R.N.  Sent: 12/18/2020  12:00 PM PST  To: FRANCO Hernandez Dr.    Rey Medina was admitted to home health care today due to anorexia and dehydration. Over the last 4 days, patient has had decreased oral intake due to refusing to take in his normal food and fluid intake. I talked to Rey and his mother at length today, and Rey denies feelings of depression, hopelessness or feeling sad, and states he \"doesn't know why\" he won't  to eat or drink, but states \"I can't live\" if he doesn't have adequeate intake. He reports he wants to live, and he will drink water with pills only, and take his pills in a spoonful of smoothie, but otherwise adamantly refuses. I also observed him at one point refuse take a spoonful of smoothie because he hold his mother that she \"was lying\" about a pill being placed in the spoon. He says he can taste food, denies nausea or pain, he just won't eat or drink. His VS are all within defined limits, and he is consuming aproxamately 16 total ounces of fluid daily.    Rey's mother was wondering if increasing his mertazapine might be beneficial, or perhaps starting an antidepressant?     Please advise of any recommendations.  Thank you,  Arlene Rebolledo RN  "

## 2020-12-18 NOTE — Clinical Note
"Forwarding this to all of you as IHSAN. I entered the order, and notified Rey's mom of the change.    Arlene    ----- Message -----  From: Mandy Luke D.O.  Sent: 12/18/2020   1:45 PM PST  To: Radha Rebolledo R.N.      We could do either. Mirtazapine is an anti-depressant and since he is already taking that we can go up on it to 30mg nightly.        ----- Message -----  From: Radha Rebolledo R.N.  Sent: 12/18/2020  12:00 PM PST  To: FRANCO Hernandez Dr.,    Rey Medina was admitted to home health care today due to anorexia and dehydration. Over the last 4 days, patient has had decreased oral intake due to refusing to take in his normal food and fluid intake. I talked to Rey and his mother at length today, and Rey denies feelings of depression, hopelessness or feeling sad, and states he \"doesn't know why\" he won't  to eat or drink, but states \"I can't live\" if he doesn't have adequeate intake. He reports he wants to live, and he will drink water with pills only, and take his pills in a spoonful of smoothie, but otherwise adamantly refuses. I also observed him at one point refuse take a spoonful of smoothie because he hold his mother that she \"was lying\" about a pill being placed in the spoon. He says he can taste food, denies nausea or pain, he just won't eat or drink. His VS are all within defined limits, and he is consuming aproxamately 16 total ounces of fluid daily.    Rey's mother was wondering if increasing his mertazapine might be beneficial, or perhaps starting an antidepressant?     Please advise of any recommendations.  Thank you,  Arlene Rebolledo RN  "

## 2020-12-18 NOTE — PROGRESS NOTES
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

## 2020-12-18 NOTE — PROGRESS NOTES
"Dr. Luke,    Rey Medina was admitted to home health care today due to anorexia and dehydration. Over the last 4 days, patient has had decreased oral intake due to refusing to take in his normal food and fluid intake. I talked to Rey and his mother at length today, and Rey denies feelings of depression, hopelessness or feeling sad, and states he \"doesn't know why\" he won't  to eat or drink, but states \"I can't live\" if he doesn't have adequeate intake. He reports he wants to live, and he will drink water with pills only, and take his pills in a spoonful of smoothie, but otherwise adamantly refuses. I also observed him at one point refuse take a spoonful of smoothie because he hold his mother that she \"was lying\" about a pill being placed in the spoon. He says he can taste food, denies nausea or pain, he just won't eat or drink. His VS are all within defined limits, and he is consuming aproxamately 16 total ounces of fluid daily.    Rey's mother was wondering if increasing his mertazapine might be beneficial, or perhaps starting an antidepressant?     Please advise of any recommendations.  Thank you,  Arlene Rebolledo RN"

## 2020-12-19 ENCOUNTER — HOME CARE VISIT (OUTPATIENT)
Dept: HOME HEALTH SERVICES | Facility: HOME HEALTHCARE | Age: 25
End: 2020-12-19
Payer: MEDICAID

## 2020-12-21 ENCOUNTER — ANTICOAGULATION MONITORING (OUTPATIENT)
Dept: MEDICAL GROUP | Facility: PHYSICIAN GROUP | Age: 25
End: 2020-12-21

## 2020-12-21 ENCOUNTER — HOME CARE VISIT (OUTPATIENT)
Dept: HOME HEALTH SERVICES | Facility: HOME HEALTHCARE | Age: 25
End: 2020-12-21
Payer: MEDICAID

## 2020-12-21 ENCOUNTER — TELEPHONE (OUTPATIENT)
Dept: NEUROLOGY | Facility: MEDICAL CENTER | Age: 25
End: 2020-12-21

## 2020-12-21 ENCOUNTER — TELEMEDICINE (OUTPATIENT)
Dept: PHYSICAL MEDICINE AND REHAB | Facility: REHABILITATION | Age: 25
End: 2020-12-21
Payer: MEDICAID

## 2020-12-21 VITALS — HEIGHT: 67 IN | BODY MASS INDEX: 21.97 KG/M2 | WEIGHT: 140 LBS

## 2020-12-21 DIAGNOSIS — I61.5 NONTRAUMATIC INTRAVENTRICULAR INTRACEREBRAL HEMORRHAGE, UNSPECIFIED LATERALITY (HCC): Primary | ICD-10-CM

## 2020-12-21 DIAGNOSIS — F80.9 IMPAIRED VERBAL COMMUNICATION: ICD-10-CM

## 2020-12-21 DIAGNOSIS — R63.0 POOR APPETITE: ICD-10-CM

## 2020-12-21 DIAGNOSIS — N31.9 NEUROGENIC BLADDER: ICD-10-CM

## 2020-12-21 DIAGNOSIS — R47.1 DYSARTHRIA: ICD-10-CM

## 2020-12-21 DIAGNOSIS — Z86.79 H/O SPONT INTRAPARENCHYMAL INTRACRANIAL HEMORRHAGE D/T CEREBRAL AVM: ICD-10-CM

## 2020-12-21 DIAGNOSIS — Z74.09 IMPAIRED MOBILITY AND ACTIVITIES OF DAILY LIVING: ICD-10-CM

## 2020-12-21 DIAGNOSIS — Z78.9 IMPAIRED MOBILITY AND ACTIVITIES OF DAILY LIVING: ICD-10-CM

## 2020-12-21 PROCEDURE — 99213 OFFICE O/P EST LOW 20 MIN: CPT | Mod: CR | Performed by: PHYSICAL MEDICINE & REHABILITATION

## 2020-12-21 RX ORDER — DRONABINOL 2.5 MG/1
2.5 CAPSULE ORAL 2 TIMES DAILY
Qty: 60 CAP | Refills: 1 | OUTPATIENT
Start: 2020-12-21 | End: 2021-12-21

## 2020-12-21 ASSESSMENT — FIBROSIS 4 INDEX: FIB4 SCORE: 0.19

## 2020-12-21 ASSESSMENT — ENCOUNTER SYMPTOMS
CHILLS: 0
SORE THROAT: 0
DIARRHEA: 0
FOCAL WEAKNESS: 1
COUGH: 0
FEVER: 0
CONSTIPATION: 0

## 2020-12-21 ASSESSMENT — PATIENT HEALTH QUESTIONNAIRE - PHQ9
5. POOR APPETITE OR OVEREATING: 1 - SEVERAL DAYS
SUM OF ALL RESPONSES TO PHQ QUESTIONS 1-9: 9
CLINICAL INTERPRETATION OF PHQ2 SCORE: 2

## 2020-12-21 NOTE — TELEPHONE ENCOUNTER
I called the pt mother and she stated he is more aware of what's going on and little agitated.he is still not willing to eat but he is taking smoothies 3-4 times a day. His psychiatrist increased his mirtazapine to 30 mg from 15 mg and he continued the keppra at 500mg daily. I informed her to continue updating us.

## 2020-12-21 NOTE — PROGRESS NOTES
[facility]  PM&R Neuro Rehabilitation Clinic  [facility] [place] [address]  Ph: [phone]    FOLLOW UP PATIENT EVALUATION    This evaluation was conducted via Zoom using secure and encrypted videoconferencing technology. The patient was in a private location in the [facility].  The patient's identity was confirmed and verbal consent was obtained for this virtual visit.    Patient Name: Rey Medina   Patient : 1995  Patient Age: 24 y.o.   PCP: Nirmala Man M.D.    Examining Physician: Dr. 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   - Doesn't want to eat or drink.   - We recently increased Mirtazipine to 30mg. Hasnt noticed big effect yet.   - Patient states he doesn't want to tell why he doesn't want to eat or drink.   - Would like to stop Marinol.   - States there isnt anyone he would like to tell.   - Mom makes Rey smoothies and supplements with protein etc.   - Urinating twice a day and is yellow, mom trying to push liquids.   - Rey likes working on the computer.     Review of Systems:  Review of Systems   Reason unable to perform ROS: Patient's mom helps answer review of system questions as she is with the patient .   Constitutional: Negative for chills and fever.   HENT: Negative for congestion and sore throat.    Respiratory:  Negative for cough.    Cardiovascular: Negative for leg swelling.   Gastrointestinal: Negative for constipation and diarrhea.   Genitourinary: Negative for dysuria and urgency.   Neurological: Positive for focal weakness.        Improving understanding.    Psychiatric/Behavioral:        Agitation.       All other pertinent positive review of systems are noted above in HPI.     Past Medical History:  Past Medical History:   Diagnosis Date   • COVID-19    • ICH (intracerebral hemorrhage) (HCC)    • Stroke (HCC)       Past Surgical History:   Procedure Laterality Date   • ANAL FISTULOTOMY     • PEG PLACEMENT     • TRACHEOSTOMY     •  SHUNT INSERTION          Current Outpatient Medications:   •  Cholecalciferol (VITAMIN D3) 2000 UNIT Cap, Take 2,000 Units by mouth every day., Disp: , Rfl:   •  polyethylene glycol 3350 (MIRALAX) 17 GM/SCOOP Powder, Take 17 g by mouth 1 time a day as needed., Disp: , Rfl:   •  mirtazapine (REMERON) 15 MG Tab, Take 30 mg by mouth every bedtime. Indications: depression, behavior changes, Disp: , Rfl:   •  dronabinol (MARINOL) 2.5 MG Cap, Take 1 Cap by mouth 2 times a day., Disp: 60 Cap, Rfl: 1  •  levETIRAcetam (KEPPRA) 500 MG Tab, Take 1 Tab by mouth every day at 6 PM. PM only, Disp: 90 Tab, Rfl: 3  •  propranolol (INDERAL) 20 MG Tab, TAKE 1 TABLET BY MOUTH THREE TIMES DAILY, Disp: 180 Tab, Rfl: 2  •  Cyanocobalamin (B-12 PO), Take 1 Tab by mouth every morning., Disp: , Rfl:   •  Misc. Devices Misc, Incontinence supplies - adult diapers. Length of time needed - lifetime or 999 months., Disp: 999 Each, Rfl: 999  •  tamsulosin (FLOMAX) 0.4 MG capsule, Take 1 Cap by mouth every bedtime., Disp: 30 Cap, Rfl: 2  •  montelukast (SINGULAIR) 10 MG Tab, Take 1 Tab by mouth as needed (to reduce amount of pills needing to be taken daily ). (Patient taking differently: Take 10 mg by mouth 1 time a day as needed (to reduce amount of pills needing to be taken daily).), Disp: 90 Tab, Rfl: 2  •   bisacodyl (DULCOLAX) 10 MG Suppos, Insert 1 Suppository in rectum as needed (no BM for 3 days)., Disp: 30 Suppository, Rfl: 2  •  non-formulary med, Inhale 1 Inhalation 2 times a day as needed (shortness of breath). Amborxol HCL Mucosolvan  Indications: shorness of breath, Disp: , Rfl:   •  non-formulary med, Take 1 Dose Pack by mouth every day. Emergen C, Disp: , Rfl:   Allergies   Allergen Reactions   • Vancomycin Swelling     Lips  With red face and neck   • Other Misc Rash     Allergic to name band.        Past Social History:  Social History     Socioeconomic History   • Marital status: Single     Spouse name: Not on file   • Number of children: Not on file   • Years of education: Not on file   • Highest education level: Some college, no degree   Occupational History   • Not on file   Social Needs   • Financial resource strain: Not hard at all   • Food insecurity     Worry: Never true     Inability: Never true   • Transportation needs     Medical: No     Non-medical: No   Tobacco Use   • Smoking status: Never Smoker   • Smokeless tobacco: Never Used   Substance and Sexual Activity   • Alcohol use: Never     Frequency: Never     Drinks per session: Patient refused     Binge frequency: Never   • Drug use: Never   • Sexual activity: Not Currently   Lifestyle   • Physical activity     Days per week: 3 days     Minutes per session: 20 min   • Stress: Not at all   Relationships   • Social connections     Talks on phone: Never     Gets together: More than three times a week     Attends Bahai service: Never     Active member of club or organization: No     Attends meetings of clubs or organizations: Never     Relationship status: Never    • Intimate partner violence     Fear of current or ex partner: Not on file     Emotionally abused: Not on file     Physically abused: Not on file     Forced sexual activity: Not on file   Other Topics Concern   •  Service No   • Blood Transfusions No   • Caffeine  Concern No   • Occupational Exposure No   • Hobby Hazards No   • Sleep Concern No   • Stress Concern No   • Weight Concern No   • Special Diet Yes   • Back Care No   • Exercise Yes   • Bike Helmet No   • Seat Belt Yes   • Self-Exams No   Social History Narrative    Lives with mom        Family History:  Family History   Problem Relation Age of Onset   • Hypertension Mother    • Glasses Mother    • Thyroid Mother    • Diabetes Maternal Grandfather    • Stroke Maternal Grandfather    • Diabetes Paternal Grandmother    • Hypertension Maternal Uncle    • Heart Disease Neg Hx    • Cancer Neg Hx        Depression and Opioid Screening  PHQ-9:  Depression Screen (PHQ-2/PHQ-9) 10/20/2020 12/18/2020 12/21/2020   PHQ-2 Total Score - - -   PHQ-2 Total Score 0 0 2   PHQ-9 Total Score - - 9     Interpretation of PHQ-9 Total Score   Score Severity   1-4 No Depression   5-9 Mild Depression   10-14 Moderate Depression   15-19 Moderately Severe Depression   20-27 Severe Depression     Opioid Risk Score: No value filed.  Interpretation of Opioid Risk Score   Score 0-3 = Low risk of abuse. Do UDS at least once per year.  Score 4-7 = Moderate risk of abuse. Do UDS 1-4 times per year.  Score 8+ = High risk of abuse. Refer to specialist.      OBJECTIVE:   Vital Signs:  There were no vitals filed for this visit. Virtual visit.    Pertinent Labs:  Lab Results   Component Value Date/Time    SODIUM 137 12/12/2020 02:00 AM    POTASSIUM 3.5 (L) 12/12/2020 02:00 AM    CHLORIDE 100 12/12/2020 02:00 AM    CO2 20 12/12/2020 02:00 AM    GLUCOSE 72 12/12/2020 02:00 AM    BUN 10 12/12/2020 02:00 AM    CREATININE 0.66 12/12/2020 02:00 AM       Lab Results   Component Value Date/Time    HBA1C 5.7 (H) 02/06/2020 06:07 AM       Lab Results   Component Value Date/Time    WBC 7.2 12/12/2020 02:00 AM    RBC 4.99 12/12/2020 02:00 AM    HEMOGLOBIN 14.1 12/12/2020 02:00 AM    HEMATOCRIT 43.5 12/12/2020 02:00 AM    MCV 87.2 12/12/2020 02:00 AM    MCH 28.3  12/12/2020 02:00 AM    MCHC 32.4 (L) 12/12/2020 02:00 AM    MPV 11.1 12/12/2020 02:00 AM    NEUTSPOLYS 53.10 12/12/2020 02:00 AM    LYMPHOCYTES 34.80 12/12/2020 02:00 AM    MONOCYTES 8.60 12/12/2020 02:00 AM    EOSINOPHILS 2.10 12/12/2020 02:00 AM    BASOPHILS 1.10 12/12/2020 02:00 AM       Lab Results   Component Value Date/Time    ASTSGOT 17 12/12/2020 02:00 AM    ALTSGPT 38 12/12/2020 02:00 AM        Physical Exam:   GEN: No apparent distress  HEENT: Head normocephalic.  Sclera nonicteric bilaterally, no ocular discharge appreciated bilaterally.  Tracheostomy site visualized and is healed.  PULMONARY: Breathing nonlabored on room air, no respiratory accessory muscle use.  Not requiring supplemental oxygen.   PSYCH: Mood and affect within normal limits.  NEURO: Awake, alert.  Answers questions appropriately.  Speech limited.  Dysarthric.  Saying more and more sentences.     Imaging: No new imaging pertinent to today's visit    ASSESSMENT/PLAN: Rey Medina  is a 25 y.o. male with rehabilitation history significant for ICH secondary to AVM rupture on 4/21/2019 s/p AVM repair, hydrocephalus s/p  shunt who was [facility] to 3/12/2020, here for hemorrhagic stroke follow-up. The following plan was discussed with the patient who is in agreement.     Visit Diagnoses     ICD-10-CM   1. Nontraumatic intraventricular intracerebral hemorrhage, unspecified laterality (HCC)  I61.5   2. Poor appetite  R63.0   3. Neurogenic bladder  N31.9   4. Impaired mobility and activities of daily living  Z74.09    Z78.9   5. Dysarthria  R47.1   6. Impaired verbal communication  F80.9        Mom assist with majority of history given patient's aphasia    Rehab/Neuro:   1. ICH secondary to AVM rupture on 4/21/2019 s/p AVM repair, hydrocephalus s/p  shunt who was recently [facility] from [facility] to 3/12/2020.  2.  No history of seizure, on Keppra.  Had EEG 5/21/2020  3.  Poor attention  secondary to ICH requiring need for neuro-stimulant  4.  Blurry vision after ICH, did have reading glasses prior to stroke.  Blurry vision persistent.  Also having problems with peripheral vision on the right.  -Therapy: Continue therapy especially given improvement in speech.  -Functional status: Patient continues to make improvements.  Endurance improving, speech improving.  Spontaneous speech improving. 12/21 continues to be significantly dysarthric but is saying full sentences.    Bladder: Wears adult briefs.  Hesitancy initiating stream, interrupted stream when urinating.  -Med management: Continue Flomax 0.4 mg to be taken nightly.    Adjustment Disorder: Patient gaining more insight and awareness into his injury and deficits.  Understands he had a stroke.  -Med management: Continue mirtazapine at increased dose of 30 mg nightly.  -Support group: Patient declines at this time stating that his injury is different  -Psychology referral: Patient declines    GI: Not eating or drinking very much, suspect this is the one area of his life he can control as he is gaining insight and awareness into his injury.  -Med management: Increase mirtazapine to 30 mg nightly.  -Med management: Okay to stop Marinol  -Counseling: Counseled mom to continue protein supplementation in the smoothies and push fluids as much as possible.    Follow up: 3 months.    Please note that this dictation was created using voice recognition software. I have made every reasonable attempt to correct obvious errors but there may be errors of grammar and content that I may have overlooked prior to finalization of this note.    Dr. Mandy Luke DO, MS  Department of Physical Medicine & Rehabilitation  Neuro Rehabilitation Clinic  [facility]

## 2020-12-21 NOTE — TELEPHONE ENCOUNTER
----- Message from Jennifer Valiente M.D. sent at 12/15/2020  8:34 AM PST -----  Regarding: FW: A message from Dr Valiente  Contact: [phone]      ----- Message -----  From: Joe Epstein, Med Ass't  Sent: 12/14/2020  10:42 AM PST  To: Jennifer Valiente M.D.  Subject: FW: A message from Dr Valiente                          ----- Message -----  From: Rey Medina  Sent: 12/11/2020   4:02 PM PST  To: Joe Clevelanda, Med Ass't  Subject: RE: A message from Dr Rocco Diaz,    Thanks for the message. I decided to take my son back here at [facility] as he still doesn't want to eat and drink. Less agitated than yesterday and I did not give him the prescribed medication for agitation as per agreed with Dr. Valiente.     When I was trying to get him dressed this afternoon he was trying to resist and while resisting he acted something different which is hard to explain here. It looks like a seizure, head and eyes twisting to his right side. and lasted only for a few seconds, I hugged him trying to help him relax and eventually get back to normal. It happened while he was resisting to get dressed. First time I've seen him acted that way.    They are doing another tests here at the emergency.     Please kindly forward this message to Dr Valiente.    Thank you,    Melinda Mansfield     ----- Message -----  From: Joe Epstein, Med Ass't  Sent: 12/11/20, 1:24 PM  To: Rey Medina  Subject: RE: A message from Dr Rocco Rodriguez afternoon Dr. Rocco Lawrence is out of the office on fridays but she will be back on Monday but I have forwarded your message to her.  Joe PAVON)      ----- Message -----       From:Rey Medina       Sent:12/11/2020 12:49 PM PST         To:Joe Epstein, Med Ass't    Subject:RE: A message from Dr Rocco Valiente,     Dronabinol doesn't work for my son. He still doesn't want to eat up to this time. I gave the first dose yesterday at 6pm hoping he would eat before  bedtime. I again give another dose this morning at 7am and up to this time still won't eat or drink. He would only drink when taking his medications.     Mentally alert, vitals are  okay but it is the 3rd day today without any food and not enough liquid. I am getting really worried. I wonder if there's anything for appetite that would work better for him. Please advise. If until tonight and no food and liquid what do I need to do?    Thank you,    Melinda Mansfield       ----- Message -----       From:Joe Epstein, Med Ass't       Sent:12/8/2020  1:00 PM PST         To:Rey Medina    Subject:A message from Dr Rocco Valiente stated she has reviewed your MRI of the brain and based on the location and the EEG she is comfortable with a very slow wean.  Symptoms to monitor for that may be suggestive of seizure include altered mental status, loss of consciousness, abnormal rhythmic movements, inattentiveness, visual changes.  Please reach out if there are any particular issues.    Wean as follows: levETIRAcetam (KEPPRA) 250 mg once daily for 6 weeks and if tolerated it is okay to stop.  Joe ( medical assistant)

## 2020-12-21 NOTE — PROGRESS NOTES
Medication chart review for [facility] services    PCP:  Sherie Power M.D.  [address] [address]  [place] [address]  Fax: [phone]    Current medication list     Current Outpatient Medications:   •  Vitamin D3, 2,000 Units, Oral, DAILY  •  non-formulary med, 1 Inhalation, Inhalation, BID PRN  •  polyethylene glycol 3350, 17 g, Oral, QDAY PRN  •  non-formulary med, 1 Dose Pack, Oral, DAILY  •  mirtazapine, 30 mg, Oral, QHS  •  levETIRAcetam, 500 mg, Oral, DAILY AT 1800  •  propranolol, TAKE 1 TABLET BY MOUTH THREE TIMES DAILY  •  Cyanocobalamin (B-12 PO), 1 Tab, Oral, QAM  •  Misc. Devices, Incontinence supplies - adult diapers. Length of time needed - lifetime or 999 months.  •  tamsulosin, 0.4 mg, Oral, QHS  •  montelukast, 10 mg, Oral, PRN (Patient taking differently: 10 mg, Oral, 1 TIME DAILY PRN, to reduce amount of pills needing to be taken daily)  •  bisacodyl, 10 mg, Rectal, PRN  •  dronabinol, 2.5 mg, Oral, BID    Allergies   Allergen Reactions   • Vancomycin Swelling     Lips  With red face and neck   • Other Misc Rash     Allergic to name band.       Medications on discharge summary that are not on their home medication list (or the dosing interval differs from the discharge summary)             START taking these medications      Instructions   megestrol 20 MG Tabs  Commonly known as: MEGACE    Take 1 Tab by mouth 3 times a day.  Dose: 20 mg        Medications on home medication list not listed on their discharge summary     STOP taking these medications    dronabinol 2.5 MG Caps  Commonly known as: MARINOL             Labs / Images Reviewed:     Lab Results   Component Value Date/Time    SODIUM 137 12/12/2020 02:00 AM    POTASSIUM 3.5 (L) 12/12/2020 02:00 AM    CHLORIDE 100 12/12/2020 02:00 AM    CO2 20 12/12/2020 02:00 AM    GLUCOSE 72 12/12/2020 02:00 AM    BUN 10 12/12/2020 02:00 AM    CREATININE 0.66 12/12/2020 02:00 AM      Lab Results   Component Value Date/Time    ALKPHOSPHAT 132 (H)  12/12/2020 02:00 AM    ASTSGOT 17 12/12/2020 02:00 AM    ALTSGPT 38 12/12/2020 02:00 AM    TBILIRUBIN 0.8 12/12/2020 02:00 AM    ALBUMIN 3.6 12/12/2020 02:00 AM          Potential drug interactions:         Assessment and Plan:   • Received referral from [facility]. Medications reviewed. No clinically significant interactions noted.   • K is a little low, may need repeat lab at some point.         Yfn Bennett, PharmD, MS, BCACP, [facility]  Phone [phone] fax [phone]    This note was created using voice recognition software (Dragon). The accuracy of the dictation is limited by the abilities of the software. I have reviewed the note prior to signing, however some errors in grammar and context are still possible. If you have any questions related to this note please do not hesitate to contact our office.

## 2020-12-22 ENCOUNTER — HOME CARE VISIT (OUTPATIENT)
Dept: HOME HEALTH SERVICES | Facility: HOME HEALTHCARE | Age: 25
End: 2020-12-22
Payer: MEDICAID

## 2020-12-22 ENCOUNTER — TELEPHONE (OUTPATIENT)
Dept: MEDICAL GROUP | Facility: MEDICAL CENTER | Age: 25
End: 2020-12-22

## 2020-12-22 ENCOUNTER — TELEPHONE (OUTPATIENT)
Dept: SPEECH THERAPY | Facility: REHABILITATION | Age: 25
End: 2020-12-22

## 2020-12-22 ENCOUNTER — TELEPHONE (OUTPATIENT)
Dept: PHYSICAL THERAPY | Facility: REHABILITATION | Age: 25
End: 2020-12-22

## 2020-12-22 VITALS
DIASTOLIC BLOOD PRESSURE: 70 MMHG | RESPIRATION RATE: 16 BRPM | HEART RATE: 87 BPM | OXYGEN SATURATION: 92 % | SYSTOLIC BLOOD PRESSURE: 96 MMHG | TEMPERATURE: 98.3 F

## 2020-12-22 PROCEDURE — G0495 RN CARE TRAIN/EDU IN HH: HCPCS

## 2020-12-22 PROCEDURE — G0270 MNT SUBS TX FOR CHANGE DX: HCPCS

## 2020-12-22 ASSESSMENT — FIBROSIS 4 INDEX: FIB4 SCORE: 0.19

## 2020-12-22 ASSESSMENT — ENCOUNTER SYMPTOMS
DIFFICULTY THINKING: 1
LIMITED RANGE OF MOTION: 1
POOR JUDGMENT: 1
MUSCLE WEAKNESS: 1

## 2020-12-22 ASSESSMENT — ACTIVITIES OF DAILY LIVING (ADL): CURRENT_FUNCTION: ONE PERSON

## 2020-12-22 NOTE — OP THERAPY DISCHARGE SUMMARY
Outpatient Physical Therapy  DISCHARGE SUMMARY NOTE      [address]  [address]  [place] [address]  Phone:  [phone]  Fax:  [phone]    Date of Visit: 12/22/2020    Patient: Rey Medina  YOB: 1995  MRN: [id number]     Referring Provider: No referring provider defined for this encounter.   Referring Diagnosis No admission diagnoses are documented for this encounter.         Functional Assessment Used        Your patient is being discharged from Physical Therapy with the following comments:   · Pt will be starting home care PT    Comments:   Patient has been seen for 8 visits and was reaching a plateau in sitting balance, core strengthening and endurance improvements, however, was recently ill with COVID and has regressed. Pt demonstrating increased fatigue, decreased core stability, no longer performing safe transfers with assistance. Pt last seen on 12/1/20. Pt was also recently admitted to ED on several occasions since last seen due to failure to thrive.     Pt will be receiving home care PT in near future and therefore will be DC-ed from OP PT services at this time.    Limitations Remaining:  See progress note 12/1/20 for further details.    Recommendations:  Pt will be receiving home care PT in near future and therefore will be DC-ed from OP PT services at this time.    Farhat Dykes, PT    Date: 12/22/2020

## 2020-12-22 NOTE — TELEPHONE ENCOUNTER
DOCUMENTATION OF PAR STATUS:    1. Name of Medication & Dose: Dronabinol 2.5MG CAP     2. Name of Prescription Coverage Company & phone #: Covermymeds    3. Date Prior Auth Submitted: 12/22/2020    4. What information was given to obtain insurance decision? [place]    5. Prior Auth Status? Pending    6. Patient Notified: N\A

## 2020-12-22 NOTE — OP THERAPY DISCHARGE SUMMARY
Outpatient Speech Language Pathology  DISCHARGE SUMMARY NOTE      [facility] Speech Language Pathology [address]  [address]  [place] [address]  Phone:  [phone]  Fax:  [phone]        Patient Name:  Rey Medina  :  1995  MR#:  [id number]    HICN:       Visits:         Cancel/No-Show:     Diagnosis/ICD-10:     Referring Provider:     SOC Date:    Onset Date:       Your patient is being discharged from Physical therapy with the following comments:  Goals Partially Met      Comments:Patient participated in 7 visits of outpatient speech therapy with break in service due to COVID. Patient has transitioned to home health care services at this time.         Limitations/Remaining:Please refer to progress note dated 12/3/2020 for patient progress and continued need with regards to outpatient speech therapy services.         Recommendations:D/C outpatient speech therapy services.         Tammi Wellington, SLP

## 2020-12-23 SDOH — ECONOMIC STABILITY: HOUSING INSECURITY
HOME SAFETY: FALL RISK OF 6 PER START OF CARE RN CHARTING. MOM AND PATIENT DENY FALLS OR SAFETY CONCERNS.  NEEDS PRINTED LIST, COMPUTER IS UPDATED. RN UPDATED LIST VIA HAND WRITING PRIOR TO RD VISIT.

## 2020-12-23 ASSESSMENT — ENCOUNTER SYMPTOMS: POOR JUDGMENT: 1

## 2020-12-24 ENCOUNTER — HOME CARE VISIT (OUTPATIENT)
Dept: HOME HEALTH SERVICES | Facility: HOME HEALTHCARE | Age: 25
End: 2020-12-24
Payer: MEDICAID

## 2020-12-24 PROCEDURE — G0151 HHCP-SERV OF PT,EA 15 MIN: HCPCS

## 2020-12-24 PROCEDURE — G0180 MD CERTIFICATION HHA PATIENT: HCPCS | Performed by: FAMILY MEDICINE

## 2020-12-24 PROCEDURE — G0495 RN CARE TRAIN/EDU IN HH: HCPCS

## 2020-12-26 ENCOUNTER — HOME CARE VISIT (OUTPATIENT)
Dept: HOME HEALTH SERVICES | Facility: HOME HEALTHCARE | Age: 25
End: 2020-12-26
Payer: MEDICAID

## 2020-12-26 VITALS
RESPIRATION RATE: 17 BRPM | HEART RATE: 88 BPM | SYSTOLIC BLOOD PRESSURE: 102 MMHG | OXYGEN SATURATION: 97 % | DIASTOLIC BLOOD PRESSURE: 66 MMHG | TEMPERATURE: 97.9 F

## 2020-12-26 VITALS
TEMPERATURE: 98.5 F | DIASTOLIC BLOOD PRESSURE: 60 MMHG | OXYGEN SATURATION: 96 % | HEART RATE: 76 BPM | RESPIRATION RATE: 16 BRPM | SYSTOLIC BLOOD PRESSURE: 102 MMHG

## 2020-12-26 PROCEDURE — G0153 HHCP-SVS OF S/L PATH,EA 15MN: HCPCS

## 2020-12-26 ASSESSMENT — ENCOUNTER SYMPTOMS
POOR JUDGMENT: 1
POOR JUDGMENT: 1
AGITATION: 1
MUSCLE WEAKNESS: 1
POOR JUDGMENT: 1
LIMITED RANGE OF MOTION: 1
DIFFICULTY THINKING: 1
DIFFICULTY THINKING: 1

## 2020-12-26 ASSESSMENT — ACTIVITIES OF DAILY LIVING (ADL)
AMBULATION ASSISTANCE: ONE PERSON
CURRENT_FUNCTION: TWO PERSON

## 2020-12-28 ENCOUNTER — HOME CARE VISIT (OUTPATIENT)
Dept: HOME HEALTH SERVICES | Facility: HOME HEALTHCARE | Age: 25
End: 2020-12-28
Payer: MEDICAID

## 2020-12-28 VITALS
HEIGHT: 67 IN | BODY MASS INDEX: 21.97 KG/M2 | DIASTOLIC BLOOD PRESSURE: 70 MMHG | WEIGHT: 140 LBS | TEMPERATURE: 98.3 F | OXYGEN SATURATION: 92 % | HEART RATE: 87 BPM | SYSTOLIC BLOOD PRESSURE: 96 MMHG | RESPIRATION RATE: 16 BRPM

## 2020-12-28 DIAGNOSIS — R63.0 ANOREXIA: ICD-10-CM

## 2020-12-28 DIAGNOSIS — R63.0 POOR APPETITE: ICD-10-CM

## 2020-12-28 DIAGNOSIS — R62.7 FTT (FAILURE TO THRIVE) IN ADULT: ICD-10-CM

## 2020-12-28 PROCEDURE — G0153 HHCP-SVS OF S/L PATH,EA 15MN: HCPCS

## 2020-12-28 RX ORDER — MIRTAZAPINE 30 MG/1
30 TABLET, FILM COATED ORAL
Qty: 30 TAB | Refills: 2 | Status: SHIPPED | OUTPATIENT
Start: 2020-12-28 | End: 2021-03-24 | Stop reason: SDUPTHER

## 2020-12-29 ENCOUNTER — HOME CARE VISIT (OUTPATIENT)
Dept: HOME HEALTH SERVICES | Facility: HOME HEALTHCARE | Age: 25
End: 2020-12-29
Payer: MEDICAID

## 2020-12-29 VITALS
SYSTOLIC BLOOD PRESSURE: 96 MMHG | RESPIRATION RATE: 16 BRPM | TEMPERATURE: 98.2 F | OXYGEN SATURATION: 95 % | HEART RATE: 82 BPM | DIASTOLIC BLOOD PRESSURE: 72 MMHG

## 2020-12-29 VITALS
RESPIRATION RATE: 20 BRPM | SYSTOLIC BLOOD PRESSURE: 104 MMHG | DIASTOLIC BLOOD PRESSURE: 78 MMHG | TEMPERATURE: 98.9 F | HEART RATE: 94 BPM | OXYGEN SATURATION: 98 %

## 2020-12-29 PROCEDURE — G0495 RN CARE TRAIN/EDU IN HH: HCPCS

## 2020-12-29 PROCEDURE — G0151 HHCP-SERV OF PT,EA 15 MIN: HCPCS

## 2020-12-29 ASSESSMENT — ENCOUNTER SYMPTOMS
DIFFICULTY THINKING: 1
MUSCLE WEAKNESS: 1

## 2020-12-29 ASSESSMENT — ACTIVITIES OF DAILY LIVING (ADL): CURRENT_FUNCTION: ONE PERSON

## 2020-12-30 ASSESSMENT — ENCOUNTER SYMPTOMS
DIFFICULTY THINKING: 1
POOR JUDGMENT: 1

## 2020-12-31 ENCOUNTER — HOME CARE VISIT (OUTPATIENT)
Dept: HOME HEALTH SERVICES | Facility: HOME HEALTHCARE | Age: 25
End: 2020-12-31
Payer: MEDICAID

## 2020-12-31 VITALS
OXYGEN SATURATION: 96 % | TEMPERATURE: 98.1 F | RESPIRATION RATE: 18 BRPM | DIASTOLIC BLOOD PRESSURE: 70 MMHG | SYSTOLIC BLOOD PRESSURE: 100 MMHG | HEART RATE: 74 BPM

## 2020-12-31 VITALS
DIASTOLIC BLOOD PRESSURE: 63 MMHG | RESPIRATION RATE: 16 BRPM | SYSTOLIC BLOOD PRESSURE: 90 MMHG | TEMPERATURE: 98.7 F | HEART RATE: 93 BPM | OXYGEN SATURATION: 98 %

## 2020-12-31 VITALS
SYSTOLIC BLOOD PRESSURE: 98 MMHG | RESPIRATION RATE: 16 BRPM | HEART RATE: 85 BPM | DIASTOLIC BLOOD PRESSURE: 72 MMHG | TEMPERATURE: 98.7 F | OXYGEN SATURATION: 98 %

## 2020-12-31 PROCEDURE — G0495 RN CARE TRAIN/EDU IN HH: HCPCS

## 2020-12-31 PROCEDURE — G0152 HHCP-SERV OF OT,EA 15 MIN: HCPCS

## 2020-12-31 PROCEDURE — G0153 HHCP-SVS OF S/L PATH,EA 15MN: HCPCS

## 2020-12-31 ASSESSMENT — ENCOUNTER SYMPTOMS
DIFFICULTY THINKING: 1
THOUGHT CONTENT - SUSPICIOUS: 1
DIFFICULTY THINKING: 1
MUSCLE WEAKNESS: 1
POOR JUDGMENT: 1
POOR JUDGMENT: 1

## 2020-12-31 ASSESSMENT — ACTIVITIES OF DAILY LIVING (ADL): CURRENT_FUNCTION: ONE PERSON

## 2021-01-01 NOTE — PROGRESS NOTES
IHSAN   Mom trialing decreasing Keppra to 250mg daily, which was his previous dose. So far, she has not noticed adverse side effets

## 2021-01-04 ENCOUNTER — HOME CARE VISIT (OUTPATIENT)
Dept: HOME HEALTH SERVICES | Facility: HOME HEALTHCARE | Age: 26
End: 2021-01-04
Payer: MEDICAID

## 2021-01-04 VITALS
HEART RATE: 77 BPM | RESPIRATION RATE: 20 BRPM | OXYGEN SATURATION: 98 % | TEMPERATURE: 98.2 F | SYSTOLIC BLOOD PRESSURE: 102 MMHG | DIASTOLIC BLOOD PRESSURE: 87 MMHG

## 2021-01-04 DIAGNOSIS — I61.0 NONTRAUMATIC SUBCORTICAL HEMORRHAGE OF CEREBRAL HEMISPHERE, UNSPECIFIED LATERALITY (HCC): Primary | ICD-10-CM

## 2021-01-04 PROCEDURE — G0153 HHCP-SVS OF S/L PATH,EA 15MN: HCPCS

## 2021-01-04 RX ORDER — LEVETIRACETAM 500 MG/1
250 TABLET ORAL DAILY
Qty: 90 TAB | Refills: 3 | Status: SHIPPED | OUTPATIENT
Start: 2021-01-04 | End: 2021-07-30

## 2021-01-04 ASSESSMENT — ENCOUNTER SYMPTOMS: DIFFICULTY THINKING: 1

## 2021-01-05 ENCOUNTER — HOME CARE VISIT (OUTPATIENT)
Dept: HOME HEALTH SERVICES | Facility: HOME HEALTHCARE | Age: 26
End: 2021-01-05
Payer: MEDICAID

## 2021-01-05 PROCEDURE — G0270 MNT SUBS TX FOR CHANGE DX: HCPCS

## 2021-01-05 PROCEDURE — G0151 HHCP-SERV OF PT,EA 15 MIN: HCPCS

## 2021-01-05 ASSESSMENT — FIBROSIS 4 INDEX: FIB4 SCORE: 0.19

## 2021-01-06 ENCOUNTER — APPOINTMENT (OUTPATIENT)
Dept: PHYSICAL THERAPY | Facility: REHABILITATION | Age: 26
End: 2021-01-06
Attending: PHYSICAL MEDICINE & REHABILITATION
Payer: MEDICARE

## 2021-01-06 ENCOUNTER — HOME CARE VISIT (OUTPATIENT)
Dept: HOME HEALTH SERVICES | Facility: HOME HEALTHCARE | Age: 26
End: 2021-01-06
Payer: MEDICAID

## 2021-01-06 VITALS
TEMPERATURE: 98.6 F | HEART RATE: 70 BPM | OXYGEN SATURATION: 98 % | RESPIRATION RATE: 17 BRPM | DIASTOLIC BLOOD PRESSURE: 74 MMHG | SYSTOLIC BLOOD PRESSURE: 100 MMHG

## 2021-01-06 VITALS
HEART RATE: 80 BPM | OXYGEN SATURATION: 98 % | WEIGHT: 153 LBS | BODY MASS INDEX: 23.96 KG/M2 | SYSTOLIC BLOOD PRESSURE: 91 MMHG | RESPIRATION RATE: 19 BRPM | TEMPERATURE: 98.7 F | DIASTOLIC BLOOD PRESSURE: 72 MMHG

## 2021-01-06 PROCEDURE — G0153 HHCP-SVS OF S/L PATH,EA 15MN: HCPCS

## 2021-01-06 SDOH — ECONOMIC STABILITY: HOUSING INSECURITY: HOME SAFETY: NO FALLS PER MOM AND NO SAFETY CONCERNS.  MEDICATION LIST IS UP TO DATE PER REVIEW WITH PATIENT'S MOM.

## 2021-01-06 ASSESSMENT — ENCOUNTER SYMPTOMS
DIFFICULTY THINKING: 1
POOR JUDGMENT: 1
DIFFICULTY THINKING: 1

## 2021-01-07 ENCOUNTER — HOME CARE VISIT (OUTPATIENT)
Dept: HOME HEALTH SERVICES | Facility: HOME HEALTHCARE | Age: 26
End: 2021-01-07
Payer: MEDICAID

## 2021-01-07 VITALS
SYSTOLIC BLOOD PRESSURE: 98 MMHG | TEMPERATURE: 97.4 F | DIASTOLIC BLOOD PRESSURE: 62 MMHG | RESPIRATION RATE: 18 BRPM | OXYGEN SATURATION: 94 % | HEART RATE: 89 BPM

## 2021-01-07 VITALS
HEART RATE: 89 BPM | RESPIRATION RATE: 20 BRPM | OXYGEN SATURATION: 94 % | DIASTOLIC BLOOD PRESSURE: 74 MMHG | SYSTOLIC BLOOD PRESSURE: 98 MMHG | TEMPERATURE: 97.9 F

## 2021-01-07 PROCEDURE — G0495 RN CARE TRAIN/EDU IN HH: HCPCS

## 2021-01-07 PROCEDURE — G0152 HHCP-SERV OF OT,EA 15 MIN: HCPCS

## 2021-01-07 PROCEDURE — G0157 HHC PT ASSISTANT EA 15: HCPCS | Mod: CQ

## 2021-01-07 ASSESSMENT — ACTIVITIES OF DAILY LIVING (ADL)
CURRENT_FUNCTION: ONE PERSON
AMBULATION ASSISTANCE: ONE PERSON

## 2021-01-07 ASSESSMENT — ENCOUNTER SYMPTOMS
MUSCLE WEAKNESS: 1
NAUSEA: DENIES
LIMITED RANGE OF MOTION: 1
VOMITING: DENIES

## 2021-01-08 VITALS
SYSTOLIC BLOOD PRESSURE: 98 MMHG | RESPIRATION RATE: 20 BRPM | OXYGEN SATURATION: 94 % | TEMPERATURE: 97.9 F | HEART RATE: 89 BPM | DIASTOLIC BLOOD PRESSURE: 74 MMHG

## 2021-01-08 ASSESSMENT — ENCOUNTER SYMPTOMS
DIFFICULTY THINKING: 1
POOR JUDGMENT: 1

## 2021-01-08 NOTE — PROGRESS NOTES
On arrival Rey was sitting up in  mom was present and OT present for co treatment. Worked on transfer training from  to working on the floor on rubber mat. He misty well and follows commands very well. He did fatigue easily with UE weight bearing. He does well with lowering himself to floor and transfing back up but requires min A x 2. He cont to have a strong carry over with HEP as mom is very supportive. Will cont iwth POC to increase his functional mob and ind to prevent further decline in IND. S/B Miriam Mercado PT

## 2021-01-09 ASSESSMENT — ENCOUNTER SYMPTOMS: POOR JUDGMENT: 1

## 2021-01-11 ENCOUNTER — HOME CARE VISIT (OUTPATIENT)
Dept: HOME HEALTH SERVICES | Facility: HOME HEALTHCARE | Age: 26
End: 2021-01-11
Payer: MEDICAID

## 2021-01-11 VITALS
RESPIRATION RATE: 17 BRPM | SYSTOLIC BLOOD PRESSURE: 106 MMHG | DIASTOLIC BLOOD PRESSURE: 66 MMHG | HEART RATE: 80 BPM | OXYGEN SATURATION: 97 % | TEMPERATURE: 98.2 F

## 2021-01-11 PROCEDURE — G0153 HHCP-SVS OF S/L PATH,EA 15MN: HCPCS

## 2021-01-11 ASSESSMENT — ENCOUNTER SYMPTOMS: DIFFICULTY THINKING: 1

## 2021-01-12 ENCOUNTER — HOME CARE VISIT (OUTPATIENT)
Dept: HOME HEALTH SERVICES | Facility: HOME HEALTHCARE | Age: 26
End: 2021-01-12
Payer: MEDICAID

## 2021-01-12 VITALS
RESPIRATION RATE: 16 BRPM | DIASTOLIC BLOOD PRESSURE: 75 MMHG | HEART RATE: 72 BPM | TEMPERATURE: 98.4 F | SYSTOLIC BLOOD PRESSURE: 105 MMHG | OXYGEN SATURATION: 98 %

## 2021-01-12 PROCEDURE — G0151 HHCP-SERV OF PT,EA 15 MIN: HCPCS

## 2021-01-12 ASSESSMENT — ENCOUNTER SYMPTOMS
POOR JUDGMENT: 1
DIFFICULTY THINKING: 1

## 2021-01-13 ENCOUNTER — HOME CARE VISIT (OUTPATIENT)
Dept: HOME HEALTH SERVICES | Facility: HOME HEALTHCARE | Age: 26
End: 2021-01-13
Payer: MEDICAID

## 2021-01-13 VITALS
TEMPERATURE: 98 F | SYSTOLIC BLOOD PRESSURE: 114 MMHG | RESPIRATION RATE: 16 BRPM | OXYGEN SATURATION: 94 % | HEART RATE: 74 BPM | DIASTOLIC BLOOD PRESSURE: 60 MMHG

## 2021-01-13 PROCEDURE — G0495 RN CARE TRAIN/EDU IN HH: HCPCS

## 2021-01-13 PROCEDURE — G0153 HHCP-SVS OF S/L PATH,EA 15MN: HCPCS

## 2021-01-13 ASSESSMENT — ENCOUNTER SYMPTOMS
DIFFICULTY THINKING: 1
POOR JUDGMENT: 1
LIMITED RANGE OF MOTION: 1
SEVERE DYSPNEA: 1

## 2021-01-14 ENCOUNTER — HOME CARE VISIT (OUTPATIENT)
Dept: HOME HEALTH SERVICES | Facility: HOME HEALTHCARE | Age: 26
End: 2021-01-14
Payer: MEDICAID

## 2021-01-14 PROCEDURE — G0151 HHCP-SERV OF PT,EA 15 MIN: HCPCS

## 2021-01-15 ENCOUNTER — HOME CARE VISIT (OUTPATIENT)
Dept: HOME HEALTH SERVICES | Facility: HOME HEALTHCARE | Age: 26
End: 2021-01-15
Payer: MEDICAID

## 2021-01-15 VITALS
HEART RATE: 82 BPM | DIASTOLIC BLOOD PRESSURE: 72 MMHG | OXYGEN SATURATION: 96 % | RESPIRATION RATE: 18 BRPM | TEMPERATURE: 97.6 F | SYSTOLIC BLOOD PRESSURE: 110 MMHG

## 2021-01-15 DIAGNOSIS — R63.0 ANOREXIA: ICD-10-CM

## 2021-01-15 PROCEDURE — G0155 HHCP-SVS OF CSW,EA 15 MIN: HCPCS

## 2021-01-15 ASSESSMENT — ENCOUNTER SYMPTOMS
POOR JUDGMENT: 1
DIFFICULTY THINKING: 1

## 2021-01-18 ENCOUNTER — HOME CARE VISIT (OUTPATIENT)
Dept: HOME HEALTH SERVICES | Facility: HOME HEALTHCARE | Age: 26
End: 2021-01-18
Payer: MEDICAID

## 2021-01-18 VITALS
OXYGEN SATURATION: 98 % | HEART RATE: 73 BPM | DIASTOLIC BLOOD PRESSURE: 72 MMHG | SYSTOLIC BLOOD PRESSURE: 96 MMHG | RESPIRATION RATE: 18 BRPM

## 2021-01-18 DIAGNOSIS — R39.198 DIFFICULTY VOIDING: ICD-10-CM

## 2021-01-18 DIAGNOSIS — N31.9 NEUROGENIC BLADDER: ICD-10-CM

## 2021-01-18 PROCEDURE — G0270 MNT SUBS TX FOR CHANGE DX: HCPCS

## 2021-01-18 PROCEDURE — 665001 SOC-HOME HEALTH

## 2021-01-18 PROCEDURE — G0153 HHCP-SVS OF S/L PATH,EA 15MN: HCPCS

## 2021-01-18 RX ORDER — TAMSULOSIN HYDROCHLORIDE 0.4 MG/1
0.4 CAPSULE ORAL
Qty: 30 CAP | Refills: 2 | Status: SHIPPED | OUTPATIENT
Start: 2021-01-18 | End: 2021-03-24

## 2021-01-18 ASSESSMENT — FIBROSIS 4 INDEX: FIB4 SCORE: 0.19

## 2021-01-18 ASSESSMENT — ENCOUNTER SYMPTOMS: DIFFICULTY THINKING: 1

## 2021-01-19 ENCOUNTER — HOME CARE VISIT (OUTPATIENT)
Dept: HOME HEALTH SERVICES | Facility: HOME HEALTHCARE | Age: 26
End: 2021-01-19
Payer: MEDICAID

## 2021-01-19 VITALS
OXYGEN SATURATION: 97 % | SYSTOLIC BLOOD PRESSURE: 102 MMHG | TEMPERATURE: 99.5 F | DIASTOLIC BLOOD PRESSURE: 78 MMHG | RESPIRATION RATE: 16 BRPM | BODY MASS INDEX: 24.28 KG/M2 | WEIGHT: 155 LBS | HEART RATE: 85 BPM

## 2021-01-19 PROCEDURE — G0151 HHCP-SERV OF PT,EA 15 MIN: HCPCS

## 2021-01-19 PROCEDURE — G0152 HHCP-SERV OF OT,EA 15 MIN: HCPCS

## 2021-01-19 PROCEDURE — G0155 HHCP-SVS OF CSW,EA 15 MIN: HCPCS

## 2021-01-19 ASSESSMENT — ENCOUNTER SYMPTOMS
DIFFICULTY THINKING: 1
POOR JUDGMENT: 1
DIFFICULTY THINKING: 1

## 2021-01-19 ASSESSMENT — FIBROSIS 4 INDEX: FIB4 SCORE: 0.19

## 2021-01-20 ENCOUNTER — HOME CARE VISIT (OUTPATIENT)
Dept: HOME HEALTH SERVICES | Facility: HOME HEALTHCARE | Age: 26
End: 2021-01-20
Payer: MEDICAID

## 2021-01-20 ENCOUNTER — TELEPHONE (OUTPATIENT)
Dept: MEDICAL GROUP | Facility: MEDICAL CENTER | Age: 26
End: 2021-01-20

## 2021-01-20 VITALS
WEIGHT: 155 LBS | HEART RATE: 88 BPM | SYSTOLIC BLOOD PRESSURE: 118 MMHG | TEMPERATURE: 98.1 F | DIASTOLIC BLOOD PRESSURE: 70 MMHG | BODY MASS INDEX: 24.28 KG/M2 | RESPIRATION RATE: 16 BRPM | OXYGEN SATURATION: 95 %

## 2021-01-20 PROCEDURE — G0153 HHCP-SVS OF S/L PATH,EA 15MN: HCPCS

## 2021-01-20 PROCEDURE — G0495 RN CARE TRAIN/EDU IN HH: HCPCS

## 2021-01-20 ASSESSMENT — ENCOUNTER SYMPTOMS
LIMITED RANGE OF MOTION: 1
SEVERE DYSPNEA: 1
POOR JUDGMENT: 1
NAUSEA: DENIES
MUSCLE WEAKNESS: 1
DIFFICULTY THINKING: 1
VOMITING: DENIES

## 2021-01-20 ASSESSMENT — FIBROSIS 4 INDEX: FIB4 SCORE: 0.19

## 2021-01-20 ASSESSMENT — ACTIVITIES OF DAILY LIVING (ADL)
CURRENT_FUNCTION: ONE PERSON
AMBULATION ASSISTANCE: ONE PERSON

## 2021-01-20 NOTE — TELEPHONE ENCOUNTER
1. Caller Name: Rey Medina                        Call Back Number: [phone]      How would the patient prefer to be contacted with a response: Phone call OK to leave a detailed message    Recieved voicemail from [facility]. They were calling for our fax number to send the medical record and signature.      Called them back at [phone], left a voicemail and gave them our fax number [phone].

## 2021-01-21 ENCOUNTER — HOME CARE VISIT (OUTPATIENT)
Dept: HOME HEALTH SERVICES | Facility: HOME HEALTHCARE | Age: 26
End: 2021-01-21
Payer: MEDICAID

## 2021-01-21 VITALS
TEMPERATURE: 99.5 F | RESPIRATION RATE: 16 BRPM | DIASTOLIC BLOOD PRESSURE: 70 MMHG | OXYGEN SATURATION: 97 % | HEART RATE: 82 BPM | SYSTOLIC BLOOD PRESSURE: 106 MMHG

## 2021-01-21 VITALS
DIASTOLIC BLOOD PRESSURE: 71 MMHG | TEMPERATURE: 98.2 F | BODY MASS INDEX: 24.28 KG/M2 | HEART RATE: 78 BPM | WEIGHT: 155 LBS | SYSTOLIC BLOOD PRESSURE: 99 MMHG | OXYGEN SATURATION: 98 % | RESPIRATION RATE: 16 BRPM

## 2021-01-21 PROCEDURE — G0152 HHCP-SERV OF OT,EA 15 MIN: HCPCS

## 2021-01-21 PROCEDURE — G0151 HHCP-SERV OF PT,EA 15 MIN: HCPCS

## 2021-01-21 SDOH — ECONOMIC STABILITY: HOUSING INSECURITY: HOME SAFETY: NO FALLS OR SAFETY CONCERNS.  NO NEW MEDICATION UPDATES PER PATIENT'S MOM.

## 2021-01-21 ASSESSMENT — ACTIVITIES OF DAILY LIVING (ADL)
BATHING_COMMENTS: PLEASE REFER TO OT ASSESSMENT
ADLS_COMMENTS: PLEASE REFER TO OT ASSESSMENT
FEEDING_COMMENTS: PLEASE REFER TO OT ASSESSMENT
GROOMING_COMMENTS: PLEASE REFER TO OT ASSESSMENT

## 2021-01-21 ASSESSMENT — ENCOUNTER SYMPTOMS
DIFFICULTY THINKING: 1
DIFFICULTY THINKING: 1
POOR JUDGMENT: 1
POOR JUDGMENT: 1

## 2021-01-25 ENCOUNTER — HOME CARE VISIT (OUTPATIENT)
Dept: HOME HEALTH SERVICES | Facility: HOME HEALTHCARE | Age: 26
End: 2021-01-25
Payer: MEDICAID

## 2021-01-25 PROCEDURE — G0152 HHCP-SERV OF OT,EA 15 MIN: HCPCS

## 2021-01-25 PROCEDURE — G0153 HHCP-SVS OF S/L PATH,EA 15MN: HCPCS

## 2021-01-25 PROCEDURE — G0151 HHCP-SERV OF PT,EA 15 MIN: HCPCS

## 2021-01-25 ASSESSMENT — ENCOUNTER SYMPTOMS
AGGRESSION WITHIN DEFINED LIMITS: 1
POOR JUDGMENT: 1
DIFFICULTY THINKING: 1
ANGER WITHIN DEFINED LIMITS: 1
SEVERE DYSPNEA: 1

## 2021-01-26 VITALS
TEMPERATURE: 98.1 F | HEART RATE: 72 BPM | SYSTOLIC BLOOD PRESSURE: 105 MMHG | DIASTOLIC BLOOD PRESSURE: 80 MMHG | RESPIRATION RATE: 16 BRPM | OXYGEN SATURATION: 96 %

## 2021-01-26 ASSESSMENT — ENCOUNTER SYMPTOMS
POOR JUDGMENT: 1
DIFFICULTY THINKING: 1

## 2021-01-27 ENCOUNTER — TELEMEDICINE (OUTPATIENT)
Dept: MEDICAL GROUP | Facility: MEDICAL CENTER | Age: 26
End: 2021-01-27
Attending: FAMILY MEDICINE
Payer: MEDICAID

## 2021-01-27 ENCOUNTER — HOME CARE VISIT (OUTPATIENT)
Dept: HOME HEALTH SERVICES | Facility: HOME HEALTHCARE | Age: 26
End: 2021-01-27
Payer: MEDICAID

## 2021-01-27 VITALS
OXYGEN SATURATION: 97 % | BODY MASS INDEX: 25.02 KG/M2 | RESPIRATION RATE: 16 BRPM | TEMPERATURE: 96.4 F | DIASTOLIC BLOOD PRESSURE: 79 MMHG | HEIGHT: 66 IN | SYSTOLIC BLOOD PRESSURE: 101 MMHG | HEART RATE: 59 BPM

## 2021-01-27 VITALS
SYSTOLIC BLOOD PRESSURE: 100 MMHG | RESPIRATION RATE: 17 BRPM | OXYGEN SATURATION: 98 % | TEMPERATURE: 99.6 F | DIASTOLIC BLOOD PRESSURE: 78 MMHG | HEART RATE: 91 BPM

## 2021-01-27 VITALS
TEMPERATURE: 98.2 F | DIASTOLIC BLOOD PRESSURE: 78 MMHG | SYSTOLIC BLOOD PRESSURE: 100 MMHG | OXYGEN SATURATION: 98 % | HEART RATE: 90 BPM | RESPIRATION RATE: 16 BRPM

## 2021-01-27 DIAGNOSIS — F09 COGNITIVE AND NEUROBEHAVIORAL DYSFUNCTION FOLLOWING BRAIN INJURY (HCC): ICD-10-CM

## 2021-01-27 DIAGNOSIS — S06.9XAS COGNITIVE AND NEUROBEHAVIORAL DYSFUNCTION FOLLOWING BRAIN INJURY (HCC): ICD-10-CM

## 2021-01-27 DIAGNOSIS — G31.89 COGNITIVE AND NEUROBEHAVIORAL DYSFUNCTION FOLLOWING BRAIN INJURY (HCC): ICD-10-CM

## 2021-01-27 DIAGNOSIS — R39.81 URINARY INCONTINENCE DUE TO COGNITIVE IMPAIRMENT: ICD-10-CM

## 2021-01-27 DIAGNOSIS — R63.0 DECREASED APPETITE: ICD-10-CM

## 2021-01-27 DIAGNOSIS — R00.0 TACHYCARDIA: ICD-10-CM

## 2021-01-27 PROBLEM — D72.829 LEUKOCYTOSIS: Status: RESOLVED | Noted: 2020-02-15 | Resolved: 2021-01-27

## 2021-01-27 PROCEDURE — G0495 RN CARE TRAIN/EDU IN HH: HCPCS

## 2021-01-27 PROCEDURE — G0153 HHCP-SVS OF S/L PATH,EA 15MN: HCPCS

## 2021-01-27 PROCEDURE — 99214 OFFICE O/P EST MOD 30 MIN: CPT | Mod: CR | Performed by: FAMILY MEDICINE

## 2021-01-27 ASSESSMENT — ENCOUNTER SYMPTOMS
DEPRESSION: 0
WEIGHT LOSS: 0
DIFFICULTY THINKING: 1
NAUSEA: DENIES
LIMITED RANGE OF MOTION: 1
MUSCLE WEAKNESS: 1
POOR JUDGMENT: 1
VOMITING: DENIES

## 2021-01-27 ASSESSMENT — ACTIVITIES OF DAILY LIVING (ADL)
AMBULATION ASSISTANCE: ONE PERSON
CURRENT_FUNCTION: ONE PERSON

## 2021-01-27 ASSESSMENT — PATIENT HEALTH QUESTIONNAIRE - PHQ9: CLINICAL INTERPRETATION OF PHQ2 SCORE: 0

## 2021-01-27 NOTE — PROGRESS NOTES
"Telemedicine: Established Patient   This evaluation was conducted via Zoom using secure and encrypted videoconferencing technology. The patient was in a private location in the [facility].    The patient's identity was confirmed and verbal consent was obtained for this virtual visit.    Subjective:   CC:   Chief Complaint   Patient presents with   • Follow-Up       Rey Medina is a 25 y.o. male presenting for evaluation and management of:    Decreased appetite  This has been an ongoing issue for the last 1.5 months. Unknown etiology of why pt started refusing eating solids, required a few trips to the emergency room for concern for decreased PO intake of fluids and solids. Pt was ultimately started on home health and has been seen by SLP, PT, OT, skilled nursing, and nutrition.   He now will only eat when mom pretends to place a \"medication\" with his food and will only take pureed foods or smoothies. He will not take solid food and he \"won't tell\" because it is \"his own secret\" and can't trust anyone with his secret. His mirtazipine was initially decreased as he was doing well, as was his keppra, but unsure if this was the inciting event. Mirtazipine is now increased up to 30mg, which mom is happy with. Keppra is continuing to decrease, now to 250mg daily without any seizures. Was evalutated by nutrition, noted adequate nutrition, weight is increasing.     Urinary incontinence due to cognitive impairment  Is now able to use the urinal occasionally, although still requiring diapering    Tachycardia  S/p trial of slow decrease on propranolol, but pt ended up being tachycardic, but also ended up having COVID at the time.   Stable on the propranolol now, TSH normal.     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 "   Psychiatric/Behavioral: Negative for depression.         Allergies   Allergen Reactions   • Vancomycin Swelling     Lips  With red face and neck   • Other Misc Rash     Allergic to name band.       Current medicines (including changes today)  Current Outpatient Medications   Medication Sig Dispense Refill   • tamsulosin (FLOMAX) 0.4 MG capsule Take 1 Cap by mouth every bedtime. 30 Cap 2   • NON SPECIFIED 1 ensure 2 times daily 60 Each 11   • levETIRAcetam (KEPPRA) 500 MG Tab Take 0.5 Tabs by mouth every day at 6 PM. PM only 90 Tab 3   • mirtazapine (REMERON) 30 MG Tab tablet Take 1 Tab by mouth every bedtime. 30 Tab 2   • NON SPECIFIED 4 cans of beneprotein (227g per can) per month 4 Each 11   • Omega-3 Fatty Acids (OMEGA 3 500) 500 MG Cap Take 1 Cap by mouth every day. Indications: Dietary Deficiency     • Magnesium 400 MG Cap Take 1 Cap by mouth every day. Indications: Malnutrition     • RA TURMERIC EXTRA STRENGTH PO Take 1,000 mg by mouth every day. Indications: supplement     • Cholecalciferol (VITAMIN D3) 2000 UNIT Cap Take 2,000 Units by mouth every day.     • non-formulary med Inhale 1 Inhalation 2 times a day as needed (shortness of breath). Amborxol HCL Mucosolvan  Indications: shorness of breath     • polyethylene glycol 3350 (MIRALAX) 17 GM/SCOOP Powder Take 17 g by mouth 1 time a day as needed.     • non-formulary med Take 1 Dose Pack by mouth every day. Emergen C     • propranolol (INDERAL) 20 MG Tab TAKE 1 TABLET BY MOUTH THREE TIMES DAILY 180 Tab 2   • Cyanocobalamin (B-12 PO) Take 1 Tab by mouth every morning.     • Misc. Devices Misc Incontinence supplies - adult diapers. Length of time needed - lifetime or 999 months. 999 Each 999   • montelukast (SINGULAIR) 10 MG Tab Take 1 Tab by mouth as needed (to reduce amount of pills needing to be taken daily ). (Patient taking differently: Take 10 mg by mouth 1 time a day as needed (to reduce amount of pills needing to be taken daily).) 90 Tab 2   •  "bisacodyl (DULCOLAX) 10 MG Suppos Insert 1 Suppository in rectum as needed (no BM for 3 days). 30 Suppository 2     No current facility-administered medications for this visit.        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  He  has a past surgical history that includes anal fistulotomy; peg placement; tracheostomy; and  shunt insertion.       Objective:   /79   Pulse (!) 59   Temp (!) 35.8 °C (96.4 °F) (Temporal)   Resp 16   Ht 1.676 m (5' 6\")   SpO2 97%   BMI 25.02 kg/m²     Physical Exam   Constitutional: Alert, no distress, well-groomed. Responds appropriately to questions  Skin: no lesions  Eye: Conjunctivae are normal  ENMT: Lips without lesions  Neck: Trachea midline  Respiratory: Unlabored respiratory effort, no cough.  MSK: moves his arms, was not able to visualize lower extremities  Neuro: no facial droop,   Psych: Alert and oriented x3, normal affect and mood.      Assessment and Plan:   The following treatment plan was discussed:     1. Decreased " appetite  Chronic, stable. Pt's mom has found new was of helping him take in sufficient categories, which has been working. Still unknown why he won't eat solids. If worsens again, can try 45mg mirtazipine or antipsychotics in the future. Mom is happy with doses right now, prefers not to change anymeds    2. Urinary incontinence due to cognitive impairment  Chronic, stable. This is improving, although still requiring diapers    3. 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 Tachycardia  Chronic, stable. Failed a trial to decrease the dosage but could have been due to concurrent covid infection. Could try again in the future, mom is hesitant in trying to change meds right now, which is understandable.         Follow-up: Return in about 3 months (around 4/27/2021).

## 2021-01-27 NOTE — ASSESSMENT & PLAN NOTE
S/p trial of slow decrease on propranolol, but pt ended up being tachycardic, but also ended up having COVID at the time.   Stable on the propranolol now, TSH normal.

## 2021-01-29 ENCOUNTER — HOME CARE VISIT (OUTPATIENT)
Dept: HOME HEALTH SERVICES | Facility: HOME HEALTHCARE | Age: 26
End: 2021-01-29
Payer: MEDICAID

## 2021-01-29 VITALS
HEART RATE: 84 BPM | OXYGEN SATURATION: 95 % | DIASTOLIC BLOOD PRESSURE: 72 MMHG | RESPIRATION RATE: 17 BRPM | TEMPERATURE: 99.1 F | SYSTOLIC BLOOD PRESSURE: 118 MMHG

## 2021-01-29 PROCEDURE — G0152 HHCP-SERV OF OT,EA 15 MIN: HCPCS

## 2021-01-29 PROCEDURE — G0151 HHCP-SERV OF PT,EA 15 MIN: HCPCS

## 2021-01-29 ASSESSMENT — ENCOUNTER SYMPTOMS: DIFFICULTY THINKING: 1

## 2021-01-31 ENCOUNTER — HOME CARE VISIT (OUTPATIENT)
Dept: HOME HEALTH SERVICES | Facility: HOME HEALTHCARE | Age: 26
End: 2021-01-31
Payer: MEDICAID

## 2021-02-01 ENCOUNTER — HOME CARE VISIT (OUTPATIENT)
Dept: HOME HEALTH SERVICES | Facility: HOME HEALTHCARE | Age: 26
End: 2021-02-01
Payer: MEDICAID

## 2021-02-01 VITALS
TEMPERATURE: 99.7 F | HEART RATE: 83 BPM | DIASTOLIC BLOOD PRESSURE: 64 MMHG | OXYGEN SATURATION: 98 % | SYSTOLIC BLOOD PRESSURE: 98 MMHG | RESPIRATION RATE: 18 BRPM

## 2021-02-01 PROCEDURE — G0153 HHCP-SVS OF S/L PATH,EA 15MN: HCPCS

## 2021-02-01 ASSESSMENT — ENCOUNTER SYMPTOMS
DIFFICULTY THINKING: 1
POOR JUDGMENT: 1
DIFFICULTY THINKING: 1

## 2021-02-02 ENCOUNTER — HOME CARE VISIT (OUTPATIENT)
Dept: HOME HEALTH SERVICES | Facility: HOME HEALTHCARE | Age: 26
End: 2021-02-02
Payer: MEDICAID

## 2021-02-02 VITALS
OXYGEN SATURATION: 97 % | HEART RATE: 89 BPM | RESPIRATION RATE: 16 BRPM | DIASTOLIC BLOOD PRESSURE: 80 MMHG | TEMPERATURE: 98.9 F | SYSTOLIC BLOOD PRESSURE: 108 MMHG

## 2021-02-02 PROCEDURE — G0151 HHCP-SERV OF PT,EA 15 MIN: HCPCS

## 2021-02-02 PROCEDURE — G0152 HHCP-SERV OF OT,EA 15 MIN: HCPCS

## 2021-02-02 ASSESSMENT — ENCOUNTER SYMPTOMS
DIFFICULTY THINKING: 1
POOR JUDGMENT: 1
DIFFICULTY THINKING: 1

## 2021-02-03 ENCOUNTER — HOME CARE VISIT (OUTPATIENT)
Dept: HOME HEALTH SERVICES | Facility: HOME HEALTHCARE | Age: 26
End: 2021-02-03
Payer: MEDICAID

## 2021-02-03 VITALS
TEMPERATURE: 98 F | HEART RATE: 85 BPM | DIASTOLIC BLOOD PRESSURE: 60 MMHG | BODY MASS INDEX: 25.5 KG/M2 | OXYGEN SATURATION: 93 % | RESPIRATION RATE: 18 BRPM | SYSTOLIC BLOOD PRESSURE: 104 MMHG | WEIGHT: 158 LBS

## 2021-02-03 PROCEDURE — G0495 RN CARE TRAIN/EDU IN HH: HCPCS

## 2021-02-03 ASSESSMENT — ENCOUNTER SYMPTOMS
POOR JUDGMENT: 1
MUSCLE WEAKNESS: 1
DIFFICULTY THINKING: 1

## 2021-02-03 ASSESSMENT — ACTIVITIES OF DAILY LIVING (ADL)
CURRENT_FUNCTION: ONE PERSON
AMBULATION ASSISTANCE: NON-AMBULATORY

## 2021-02-03 ASSESSMENT — FIBROSIS 4 INDEX: FIB4 SCORE: 0.19

## 2021-02-04 ENCOUNTER — HOME CARE VISIT (OUTPATIENT)
Dept: HOME HEALTH SERVICES | Facility: HOME HEALTHCARE | Age: 26
End: 2021-02-04
Payer: MEDICAID

## 2021-02-04 VITALS
OXYGEN SATURATION: 97 % | RESPIRATION RATE: 16 BRPM | SYSTOLIC BLOOD PRESSURE: 110 MMHG | DIASTOLIC BLOOD PRESSURE: 75 MMHG | TEMPERATURE: 99.2 F | HEART RATE: 88 BPM

## 2021-02-04 PROCEDURE — G0152 HHCP-SERV OF OT,EA 15 MIN: HCPCS

## 2021-02-04 PROCEDURE — G0151 HHCP-SERV OF PT,EA 15 MIN: HCPCS

## 2021-02-04 ASSESSMENT — ENCOUNTER SYMPTOMS: DIFFICULTY THINKING: 1

## 2021-02-05 ENCOUNTER — HOME CARE VISIT (OUTPATIENT)
Dept: HOME HEALTH SERVICES | Facility: HOME HEALTHCARE | Age: 26
End: 2021-02-05
Payer: MEDICAID

## 2021-02-05 VITALS
OXYGEN SATURATION: 98 % | SYSTOLIC BLOOD PRESSURE: 108 MMHG | DIASTOLIC BLOOD PRESSURE: 78 MMHG | HEART RATE: 84 BPM | TEMPERATURE: 99.6 F | RESPIRATION RATE: 17 BRPM

## 2021-02-05 PROCEDURE — G0153 HHCP-SVS OF S/L PATH,EA 15MN: HCPCS

## 2021-02-05 ASSESSMENT — ENCOUNTER SYMPTOMS
DIFFICULTY THINKING: 1
POOR JUDGMENT: 1
DIFFICULTY THINKING: 1

## 2021-02-08 ENCOUNTER — HOME CARE VISIT (OUTPATIENT)
Dept: HOME HEALTH SERVICES | Facility: HOME HEALTHCARE | Age: 26
End: 2021-02-08
Payer: MEDICAID

## 2021-02-08 VITALS
TEMPERATURE: 99.7 F | DIASTOLIC BLOOD PRESSURE: 78 MMHG | OXYGEN SATURATION: 98 % | RESPIRATION RATE: 15 BRPM | HEART RATE: 81 BPM | SYSTOLIC BLOOD PRESSURE: 122 MMHG

## 2021-02-08 PROCEDURE — G0153 HHCP-SVS OF S/L PATH,EA 15MN: HCPCS

## 2021-02-08 ASSESSMENT — ENCOUNTER SYMPTOMS: DIFFICULTY THINKING: 1

## 2021-02-09 ENCOUNTER — HOME CARE VISIT (OUTPATIENT)
Dept: HOME HEALTH SERVICES | Facility: HOME HEALTHCARE | Age: 26
End: 2021-02-09
Payer: MEDICAID

## 2021-02-09 VITALS
OXYGEN SATURATION: 96 % | SYSTOLIC BLOOD PRESSURE: 100 MMHG | TEMPERATURE: 99.2 F | RESPIRATION RATE: 16 BRPM | HEART RATE: 82 BPM | DIASTOLIC BLOOD PRESSURE: 75 MMHG

## 2021-02-09 PROCEDURE — G0152 HHCP-SERV OF OT,EA 15 MIN: HCPCS

## 2021-02-09 PROCEDURE — G0151 HHCP-SERV OF PT,EA 15 MIN: HCPCS

## 2021-02-09 ASSESSMENT — ENCOUNTER SYMPTOMS
DIFFICULTY THINKING: 1
POOR JUDGMENT: 1
DIFFICULTY THINKING: 1

## 2021-02-11 ENCOUNTER — HOME CARE VISIT (OUTPATIENT)
Dept: HOME HEALTH SERVICES | Facility: HOME HEALTHCARE | Age: 26
End: 2021-02-11
Payer: MEDICAID

## 2021-02-11 VITALS
SYSTOLIC BLOOD PRESSURE: 105 MMHG | TEMPERATURE: 98.3 F | DIASTOLIC BLOOD PRESSURE: 80 MMHG | OXYGEN SATURATION: 97 % | HEART RATE: 72 BPM | RESPIRATION RATE: 16 BRPM

## 2021-02-11 PROCEDURE — G0151 HHCP-SERV OF PT,EA 15 MIN: HCPCS

## 2021-02-11 PROCEDURE — G0153 HHCP-SVS OF S/L PATH,EA 15MN: HCPCS

## 2021-02-11 PROCEDURE — G0152 HHCP-SERV OF OT,EA 15 MIN: HCPCS

## 2021-02-11 ASSESSMENT — PATIENT HEALTH QUESTIONNAIRE - PHQ9: CLINICAL INTERPRETATION OF PHQ2 SCORE: 0

## 2021-02-11 ASSESSMENT — ACTIVITIES OF DAILY LIVING (ADL): OASIS_M1830: 05

## 2021-02-11 ASSESSMENT — ENCOUNTER SYMPTOMS: DIFFICULTY THINKING: 1

## 2021-02-12 VITALS
RESPIRATION RATE: 16 BRPM | HEART RATE: 72 BPM | DIASTOLIC BLOOD PRESSURE: 80 MMHG | TEMPERATURE: 98.3 F | SYSTOLIC BLOOD PRESSURE: 105 MMHG | OXYGEN SATURATION: 97 %

## 2021-02-12 ASSESSMENT — ENCOUNTER SYMPTOMS
DIFFICULTY THINKING: 1
POOR JUDGMENT: 1

## 2021-02-13 ASSESSMENT — ENCOUNTER SYMPTOMS: DIFFICULTY THINKING: 1

## 2021-02-15 ENCOUNTER — HOME CARE VISIT (OUTPATIENT)
Dept: HOME HEALTH SERVICES | Facility: HOME HEALTHCARE | Age: 26
End: 2021-02-15
Payer: MEDICAID

## 2021-02-15 VITALS
HEART RATE: 86 BPM | DIASTOLIC BLOOD PRESSURE: 70 MMHG | SYSTOLIC BLOOD PRESSURE: 110 MMHG | TEMPERATURE: 99.2 F | RESPIRATION RATE: 16 BRPM | OXYGEN SATURATION: 94 %

## 2021-02-15 PROCEDURE — G0152 HHCP-SERV OF OT,EA 15 MIN: HCPCS

## 2021-02-15 ASSESSMENT — ENCOUNTER SYMPTOMS: DIFFICULTY THINKING: 1

## 2021-02-16 ENCOUNTER — HOME CARE VISIT (OUTPATIENT)
Dept: HOME HEALTH SERVICES | Facility: HOME HEALTHCARE | Age: 26
End: 2021-02-16
Payer: MEDICAID

## 2021-02-16 VITALS
SYSTOLIC BLOOD PRESSURE: 108 MMHG | TEMPERATURE: 99 F | OXYGEN SATURATION: 95 % | DIASTOLIC BLOOD PRESSURE: 80 MMHG | HEART RATE: 80 BPM | RESPIRATION RATE: 16 BRPM

## 2021-02-16 PROCEDURE — G0151 HHCP-SERV OF PT,EA 15 MIN: HCPCS

## 2021-02-16 PROCEDURE — G0152 HHCP-SERV OF OT,EA 15 MIN: HCPCS

## 2021-02-16 PROCEDURE — 665003 FOLLOW UP-HOME HEALTH

## 2021-02-16 ASSESSMENT — ENCOUNTER SYMPTOMS
DIFFICULTY THINKING: 1
DIFFICULTY THINKING: 1
POOR JUDGMENT: 1

## 2021-02-17 ENCOUNTER — HOME CARE VISIT (OUTPATIENT)
Dept: HOME HEALTH SERVICES | Facility: HOME HEALTHCARE | Age: 26
End: 2021-02-17
Payer: MEDICAID

## 2021-02-17 VITALS
SYSTOLIC BLOOD PRESSURE: 110 MMHG | HEART RATE: 78 BPM | DIASTOLIC BLOOD PRESSURE: 74 MMHG | RESPIRATION RATE: 17 BRPM | OXYGEN SATURATION: 98 % | TEMPERATURE: 99.1 F

## 2021-02-17 PROCEDURE — G0153 HHCP-SVS OF S/L PATH,EA 15MN: HCPCS

## 2021-02-17 ASSESSMENT — ENCOUNTER SYMPTOMS
APPETITE LEVEL: POOR
CHANGE IN APPETITE: ABSENT
DIFFICULTY THINKING: 1
POOR JUDGMENT: 1

## 2021-02-18 ENCOUNTER — HOME CARE VISIT (OUTPATIENT)
Dept: HOME HEALTH SERVICES | Facility: HOME HEALTHCARE | Age: 26
End: 2021-02-18
Payer: MEDICAID

## 2021-02-18 ENCOUNTER — ANTICOAGULATION MONITORING (OUTPATIENT)
Dept: MEDICAL GROUP | Facility: PHYSICIAN GROUP | Age: 26
End: 2021-02-18

## 2021-02-18 PROCEDURE — G0151 HHCP-SERV OF PT,EA 15 MIN: HCPCS

## 2021-02-18 PROCEDURE — G0153 HHCP-SVS OF S/L PATH,EA 15MN: HCPCS

## 2021-02-19 ENCOUNTER — HOME CARE VISIT (OUTPATIENT)
Dept: HOME HEALTH SERVICES | Facility: HOME HEALTHCARE | Age: 26
End: 2021-02-19
Payer: MEDICAID

## 2021-02-19 VITALS
OXYGEN SATURATION: 92 % | HEART RATE: 76 BPM | RESPIRATION RATE: 16 BRPM | TEMPERATURE: 97.9 F | DIASTOLIC BLOOD PRESSURE: 80 MMHG | SYSTOLIC BLOOD PRESSURE: 105 MMHG

## 2021-02-19 ASSESSMENT — ENCOUNTER SYMPTOMS
POOR JUDGMENT: 1
DIFFICULTY THINKING: 1

## 2021-02-19 NOTE — PROGRESS NOTES
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

## 2021-02-19 NOTE — PROGRESS NOTES
Medication chart review for [facility] services    PCP:  Sherie Power M.D.  [address] [address]  [place] [address]  Fax: [phone]    Current medication list     Current Outpatient Medications:   •  tamsulosin, 0.4 mg, Oral, QHS  •  NON SPECIFIED, 1 ensure 2 times daily  •  levETIRAcetam, 250 mg, Oral, DAILY AT 1800  •  mirtazapine, 30 mg, Oral, QHS  •  NON SPECIFIED, 4 cans of beneprotein (227g per can) per month  •  Omega 3 500, 1 capsule, Oral, DAILY  •  Magnesium, 1 capsule, Oral, DAILY  •  RA TURMERIC EXTRA STRENGTH PO, 1,000 mg, Oral, DAILY  •  Vitamin D3, 2,000 Units, Oral, DAILY  •  non-formulary med, 1 Inhalation, Inhalation, BID PRN  •  polyethylene glycol 3350, 17 g, Oral, QDAY PRN  •  non-formulary med, 1 Dose Pack, Oral, DAILY  •  propranolol, TAKE 1 TABLET BY MOUTH THREE TIMES DAILY  •  Cyanocobalamin (B-12 PO), 1 tablet, Oral, QAM  •  Misc. Devices, Incontinence supplies - adult diapers. Length of time needed - lifetime or 999 months.  •  montelukast, 10 mg, Oral, PRN (Patient taking differently: 10 mg, Oral, 1 TIME DAILY PRN, to reduce amount of pills needing to be taken daily)  •  bisacodyl, 10 mg, Rectal, PRN    Allergies   Allergen Reactions   • Vancomycin Swelling     Lips  With red face and neck   • Other Misc Rash     Allergic to name band.       Medications on discharge summary that are not on their home medication list (or the dosing interval differs from the discharge summary)     No recent hospital discharge summary    Medications on home medication list not listed on their discharge summary     Not applicable    Labs / Images Reviewed:     Lab Results   Component Value Date/Time    SODIUM 137 12/12/2020 02:00 AM    POTASSIUM 3.5 (L) 12/12/2020 02:00 AM    CHLORIDE 100 12/12/2020 02:00 AM    CO2 20 12/12/2020 02:00 AM    GLUCOSE 72 12/12/2020 02:00 AM    BUN 10 12/12/2020 02:00 AM    CREATININE 0.66 12/12/2020 02:00 AM      Lab Results   Component Value Date/Time    ALKPHOSPHAT 132  (H) 12/12/2020 02:00 AM    ASTSGOT 17 12/12/2020 02:00 AM    ALTSGPT 38 12/12/2020 02:00 AM    TBILIRUBIN 0.8 12/12/2020 02:00 AM    ALBUMIN 3.6 12/12/2020 02:00 AM    INR 0.99 08/11/2020 10:00 AM          Potential drug interactions:         Assessment and Plan:   • No clinically significant drug interactions          Yfn Bennett, PharmD, MS, BCACP, LCC  [facility]  Phone [phone] fax [phone]    This note was created using voice recognition software (Dragon). The accuracy of the dictation is limited by the abilities of the software. I have reviewed the note prior to signing, however some errors in grammar and context are still possible. If you have any questions related to this note please do not hesitate to contact our office.

## 2021-02-22 ENCOUNTER — HOME CARE VISIT (OUTPATIENT)
Dept: HOME HEALTH SERVICES | Facility: HOME HEALTHCARE | Age: 26
End: 2021-02-22
Payer: MEDICAID

## 2021-02-22 VITALS
TEMPERATURE: 99.5 F | SYSTOLIC BLOOD PRESSURE: 114 MMHG | DIASTOLIC BLOOD PRESSURE: 78 MMHG | HEART RATE: 78 BPM | RESPIRATION RATE: 18 BRPM | OXYGEN SATURATION: 98 %

## 2021-02-22 VITALS
SYSTOLIC BLOOD PRESSURE: 108 MMHG | HEART RATE: 88 BPM | OXYGEN SATURATION: 97 % | RESPIRATION RATE: 16 BRPM | DIASTOLIC BLOOD PRESSURE: 78 MMHG | TEMPERATURE: 98.3 F

## 2021-02-22 PROCEDURE — G0153 HHCP-SVS OF S/L PATH,EA 15MN: HCPCS

## 2021-02-22 PROCEDURE — G0152 HHCP-SERV OF OT,EA 15 MIN: HCPCS

## 2021-02-22 ASSESSMENT — ENCOUNTER SYMPTOMS
DIFFICULTY THINKING: 1
DIFFICULTY THINKING: 1

## 2021-02-23 ENCOUNTER — HOME CARE VISIT (OUTPATIENT)
Dept: HOME HEALTH SERVICES | Facility: HOME HEALTHCARE | Age: 26
End: 2021-02-23
Payer: MEDICAID

## 2021-02-23 VITALS
DIASTOLIC BLOOD PRESSURE: 80 MMHG | HEART RATE: 75 BPM | RESPIRATION RATE: 14 BRPM | TEMPERATURE: 99 F | OXYGEN SATURATION: 97 % | SYSTOLIC BLOOD PRESSURE: 112 MMHG

## 2021-02-23 PROCEDURE — G0151 HHCP-SERV OF PT,EA 15 MIN: HCPCS

## 2021-02-23 PROCEDURE — G0179 MD RECERTIFICATION HHA PT: HCPCS | Performed by: FAMILY MEDICINE

## 2021-02-23 PROCEDURE — G0152 HHCP-SERV OF OT,EA 15 MIN: HCPCS

## 2021-02-23 ASSESSMENT — ENCOUNTER SYMPTOMS
POOR JUDGMENT: 1
POOR JUDGMENT: 1
DIFFICULTY THINKING: 1
DIFFICULTY THINKING: 1

## 2021-02-24 ENCOUNTER — HOME CARE VISIT (OUTPATIENT)
Dept: HOME HEALTH SERVICES | Facility: HOME HEALTHCARE | Age: 26
End: 2021-02-24
Payer: MEDICAID

## 2021-02-24 VITALS
OXYGEN SATURATION: 98 % | DIASTOLIC BLOOD PRESSURE: 72 MMHG | TEMPERATURE: 99.2 F | HEART RATE: 82 BPM | SYSTOLIC BLOOD PRESSURE: 104 MMHG | RESPIRATION RATE: 15 BRPM

## 2021-02-24 PROCEDURE — G0153 HHCP-SVS OF S/L PATH,EA 15MN: HCPCS

## 2021-02-24 ASSESSMENT — ENCOUNTER SYMPTOMS: DIFFICULTY THINKING: 1

## 2021-02-25 ENCOUNTER — HOME CARE VISIT (OUTPATIENT)
Dept: HOME HEALTH SERVICES | Facility: HOME HEALTHCARE | Age: 26
End: 2021-02-25
Payer: MEDICAID

## 2021-02-25 VITALS
OXYGEN SATURATION: 95 % | HEART RATE: 79 BPM | DIASTOLIC BLOOD PRESSURE: 78 MMHG | RESPIRATION RATE: 14 BRPM | TEMPERATURE: 99.1 F | SYSTOLIC BLOOD PRESSURE: 105 MMHG

## 2021-02-25 PROCEDURE — G0152 HHCP-SERV OF OT,EA 15 MIN: HCPCS

## 2021-02-25 PROCEDURE — G0151 HHCP-SERV OF PT,EA 15 MIN: HCPCS

## 2021-02-25 ASSESSMENT — ENCOUNTER SYMPTOMS: DIFFICULTY THINKING: 1

## 2021-02-26 ASSESSMENT — ENCOUNTER SYMPTOMS
DIFFICULTY THINKING: 1
POOR JUDGMENT: 1

## 2021-03-01 ENCOUNTER — HOME CARE VISIT (OUTPATIENT)
Dept: HOME HEALTH SERVICES | Facility: HOME HEALTHCARE | Age: 26
End: 2021-03-01
Payer: MEDICAID

## 2021-03-01 PROCEDURE — G0152 HHCP-SERV OF OT,EA 15 MIN: HCPCS

## 2021-03-01 PROCEDURE — G0153 HHCP-SVS OF S/L PATH,EA 15MN: HCPCS

## 2021-03-01 ASSESSMENT — ENCOUNTER SYMPTOMS: DIFFICULTY THINKING: 1

## 2021-03-02 ENCOUNTER — HOME CARE VISIT (OUTPATIENT)
Dept: HOME HEALTH SERVICES | Facility: HOME HEALTHCARE | Age: 26
End: 2021-03-02
Payer: MEDICAID

## 2021-03-02 VITALS
DIASTOLIC BLOOD PRESSURE: 78 MMHG | OXYGEN SATURATION: 98 % | RESPIRATION RATE: 16 BRPM | TEMPERATURE: 98.9 F | SYSTOLIC BLOOD PRESSURE: 102 MMHG | HEART RATE: 75 BPM

## 2021-03-02 VITALS
OXYGEN SATURATION: 96 % | HEART RATE: 81 BPM | TEMPERATURE: 99.6 F | DIASTOLIC BLOOD PRESSURE: 82 MMHG | SYSTOLIC BLOOD PRESSURE: 108 MMHG | RESPIRATION RATE: 16 BRPM

## 2021-03-02 PROCEDURE — G0152 HHCP-SERV OF OT,EA 15 MIN: HCPCS

## 2021-03-02 PROCEDURE — G0151 HHCP-SERV OF PT,EA 15 MIN: HCPCS

## 2021-03-02 ASSESSMENT — ENCOUNTER SYMPTOMS
POOR JUDGMENT: 1
DIFFICULTY THINKING: 1
DIFFICULTY THINKING: 1

## 2021-03-03 ENCOUNTER — HOME CARE VISIT (OUTPATIENT)
Dept: HOME HEALTH SERVICES | Facility: HOME HEALTHCARE | Age: 26
End: 2021-03-03
Payer: MEDICAID

## 2021-03-03 PROCEDURE — G0153 HHCP-SVS OF S/L PATH,EA 15MN: HCPCS

## 2021-03-04 ENCOUNTER — HOME CARE VISIT (OUTPATIENT)
Dept: HOME HEALTH SERVICES | Facility: HOME HEALTHCARE | Age: 26
End: 2021-03-04
Payer: MEDICAID

## 2021-03-04 VITALS
TEMPERATURE: 98.8 F | RESPIRATION RATE: 16 BRPM | HEART RATE: 75 BPM | OXYGEN SATURATION: 98 % | DIASTOLIC BLOOD PRESSURE: 64 MMHG | SYSTOLIC BLOOD PRESSURE: 96 MMHG

## 2021-03-04 PROCEDURE — G0151 HHCP-SERV OF PT,EA 15 MIN: HCPCS

## 2021-03-04 ASSESSMENT — ENCOUNTER SYMPTOMS: DIFFICULTY THINKING: 1

## 2021-03-05 VITALS
OXYGEN SATURATION: 96 % | RESPIRATION RATE: 16 BRPM | SYSTOLIC BLOOD PRESSURE: 110 MMHG | HEART RATE: 72 BPM | TEMPERATURE: 98.6 F | DIASTOLIC BLOOD PRESSURE: 80 MMHG

## 2021-03-05 ASSESSMENT — ENCOUNTER SYMPTOMS
DIFFICULTY THINKING: 1
POOR JUDGMENT: 1

## 2021-03-08 ENCOUNTER — HOME CARE VISIT (OUTPATIENT)
Dept: HOME HEALTH SERVICES | Facility: HOME HEALTHCARE | Age: 26
End: 2021-03-08
Payer: MEDICAID

## 2021-03-08 PROCEDURE — G0153 HHCP-SVS OF S/L PATH,EA 15MN: HCPCS

## 2021-03-09 ENCOUNTER — HOME CARE VISIT (OUTPATIENT)
Dept: HOME HEALTH SERVICES | Facility: HOME HEALTHCARE | Age: 26
End: 2021-03-09
Payer: MEDICAID

## 2021-03-09 VITALS
OXYGEN SATURATION: 96 % | RESPIRATION RATE: 15 BRPM | TEMPERATURE: 98.4 F | DIASTOLIC BLOOD PRESSURE: 78 MMHG | SYSTOLIC BLOOD PRESSURE: 104 MMHG | HEART RATE: 63 BPM

## 2021-03-09 VITALS
HEART RATE: 69 BPM | RESPIRATION RATE: 16 BRPM | OXYGEN SATURATION: 94 % | TEMPERATURE: 98.8 F | DIASTOLIC BLOOD PRESSURE: 78 MMHG | SYSTOLIC BLOOD PRESSURE: 105 MMHG

## 2021-03-09 PROCEDURE — G0152 HHCP-SERV OF OT,EA 15 MIN: HCPCS

## 2021-03-09 PROCEDURE — G0151 HHCP-SERV OF PT,EA 15 MIN: HCPCS

## 2021-03-09 ASSESSMENT — ENCOUNTER SYMPTOMS
DIFFICULTY THINKING: 1
POOR JUDGMENT: 1
DIFFICULTY THINKING: 1
DIFFICULTY THINKING: 1

## 2021-03-10 ENCOUNTER — HOME CARE VISIT (OUTPATIENT)
Dept: HOME HEALTH SERVICES | Facility: HOME HEALTHCARE | Age: 26
End: 2021-03-10
Payer: MEDICAID

## 2021-03-10 VITALS
HEART RATE: 78 BPM | OXYGEN SATURATION: 98 % | DIASTOLIC BLOOD PRESSURE: 75 MMHG | RESPIRATION RATE: 16 BRPM | TEMPERATURE: 99.2 F | SYSTOLIC BLOOD PRESSURE: 102 MMHG

## 2021-03-10 VITALS
OXYGEN SATURATION: 97 % | SYSTOLIC BLOOD PRESSURE: 104 MMHG | HEART RATE: 81 BPM | RESPIRATION RATE: 15 BRPM | TEMPERATURE: 99 F | DIASTOLIC BLOOD PRESSURE: 62 MMHG

## 2021-03-10 PROCEDURE — G0152 HHCP-SERV OF OT,EA 15 MIN: HCPCS

## 2021-03-10 PROCEDURE — G0153 HHCP-SVS OF S/L PATH,EA 15MN: HCPCS

## 2021-03-10 ASSESSMENT — ENCOUNTER SYMPTOMS
DIFFICULTY THINKING: 1
DIFFICULTY THINKING: 1

## 2021-03-11 ENCOUNTER — HOME CARE VISIT (OUTPATIENT)
Dept: HOME HEALTH SERVICES | Facility: HOME HEALTHCARE | Age: 26
End: 2021-03-11
Payer: MEDICAID

## 2021-03-11 VITALS
RESPIRATION RATE: 16 BRPM | OXYGEN SATURATION: 98 % | DIASTOLIC BLOOD PRESSURE: 80 MMHG | TEMPERATURE: 99.2 F | HEART RATE: 75 BPM | SYSTOLIC BLOOD PRESSURE: 105 MMHG

## 2021-03-11 PROCEDURE — G0152 HHCP-SERV OF OT,EA 15 MIN: HCPCS

## 2021-03-11 PROCEDURE — G0151 HHCP-SERV OF PT,EA 15 MIN: HCPCS

## 2021-03-11 ASSESSMENT — ENCOUNTER SYMPTOMS: DIFFICULTY THINKING: 1

## 2021-03-11 NOTE — Clinical Note
PT reassessment completed, requesting follow up visits with frequency of 2w4 and 4 prn effective week of 3/14/2021.

## 2021-03-12 ASSESSMENT — ACTIVITIES OF DAILY LIVING (ADL)
ADLS_COMMENTS: PLEASE REFER TO OT ASSESSMENT
AMBULATION ASSISTANCE ON FLAT SURFACES: 1
FEEDING_COMMENTS: PLEASE REFER TO OT ASSESSMENT
BATHING_COMMENTS: PLEASE REFER TO OT ASSESSMENT
GROOMING_COMMENTS: PLEASE REFER TO OT ASSESSMENT

## 2021-03-12 ASSESSMENT — ENCOUNTER SYMPTOMS
DIFFICULTY THINKING: 1
POOR JUDGMENT: 1

## 2021-03-15 ENCOUNTER — HOME CARE VISIT (OUTPATIENT)
Dept: HOME HEALTH SERVICES | Facility: HOME HEALTHCARE | Age: 26
End: 2021-03-15
Payer: MEDICAID

## 2021-03-15 VITALS
DIASTOLIC BLOOD PRESSURE: 78 MMHG | RESPIRATION RATE: 16 BRPM | TEMPERATURE: 98.5 F | SYSTOLIC BLOOD PRESSURE: 104 MMHG | HEART RATE: 84 BPM | OXYGEN SATURATION: 98 %

## 2021-03-15 PROCEDURE — G0153 HHCP-SVS OF S/L PATH,EA 15MN: HCPCS

## 2021-03-15 ASSESSMENT — ENCOUNTER SYMPTOMS: DIFFICULTY THINKING: 1

## 2021-03-16 ENCOUNTER — HOME CARE VISIT (OUTPATIENT)
Dept: HOME HEALTH SERVICES | Facility: HOME HEALTHCARE | Age: 26
End: 2021-03-16
Payer: MEDICAID

## 2021-03-16 VITALS
RESPIRATION RATE: 16 BRPM | OXYGEN SATURATION: 96 % | HEART RATE: 85 BPM | TEMPERATURE: 99 F | DIASTOLIC BLOOD PRESSURE: 75 MMHG | SYSTOLIC BLOOD PRESSURE: 102 MMHG

## 2021-03-16 PROCEDURE — G0152 HHCP-SERV OF OT,EA 15 MIN: HCPCS

## 2021-03-16 ASSESSMENT — ENCOUNTER SYMPTOMS: DIFFICULTY THINKING: 1

## 2021-03-17 ENCOUNTER — HOME CARE VISIT (OUTPATIENT)
Dept: HOME HEALTH SERVICES | Facility: HOME HEALTHCARE | Age: 26
End: 2021-03-17
Payer: MEDICAID

## 2021-03-17 VITALS
TEMPERATURE: 99.3 F | OXYGEN SATURATION: 96 % | SYSTOLIC BLOOD PRESSURE: 102 MMHG | RESPIRATION RATE: 16 BRPM | DIASTOLIC BLOOD PRESSURE: 75 MMHG | HEART RATE: 80 BPM

## 2021-03-17 PROCEDURE — G0152 HHCP-SERV OF OT,EA 15 MIN: HCPCS

## 2021-03-17 ASSESSMENT — ENCOUNTER SYMPTOMS: DIFFICULTY THINKING: 1

## 2021-03-17 NOTE — PROGRESS NOTES
noted  ----- Message -----  From: Adrianne Raphael, PT  Sent: 3/12/2021   1:12 PM PDT  To: Norma Holliday R.N.      PT reassessment completed, requesting follow up visits with frequency of 2w4 and 4 prn effective week of 3/14/2021.

## 2021-03-17 NOTE — PROGRESS NOTES
noted  ----- Message -----  From: Adrianne Rapahel, PT  Sent: 3/12/2021   1:12 PM PDT  To: Norma Holliday R.N.      PT reassessment completed, requesting follow up visits with frequency of 2w4 and 4 prn effective week of 3/14/2021.

## 2021-03-18 ENCOUNTER — HOME CARE VISIT (OUTPATIENT)
Dept: HOME HEALTH SERVICES | Facility: HOME HEALTHCARE | Age: 26
End: 2021-03-18
Payer: MEDICAID

## 2021-03-18 VITALS
HEART RATE: 89 BPM | DIASTOLIC BLOOD PRESSURE: 72 MMHG | RESPIRATION RATE: 16 BRPM | OXYGEN SATURATION: 96 % | TEMPERATURE: 99.2 F | SYSTOLIC BLOOD PRESSURE: 102 MMHG

## 2021-03-18 VITALS
SYSTOLIC BLOOD PRESSURE: 102 MMHG | DIASTOLIC BLOOD PRESSURE: 22 MMHG | OXYGEN SATURATION: 96 % | TEMPERATURE: 99.2 F | RESPIRATION RATE: 16 BRPM | HEART RATE: 89 BPM

## 2021-03-18 PROCEDURE — 665003 FOLLOW UP-HOME HEALTH

## 2021-03-18 PROCEDURE — G0152 HHCP-SERV OF OT,EA 15 MIN: HCPCS

## 2021-03-18 PROCEDURE — G0153 HHCP-SVS OF S/L PATH,EA 15MN: HCPCS

## 2021-03-18 PROCEDURE — G0157 HHC PT ASSISTANT EA 15: HCPCS | Mod: CQ

## 2021-03-18 ASSESSMENT — ENCOUNTER SYMPTOMS
SEVERE DYSPNEA: 1
DIFFICULTY THINKING: 1
DIFFICULTY THINKING: 1

## 2021-03-19 NOTE — PROGRESS NOTES
Worked with Rey today with co treatment with OT. He cont to struggle with controlling his gross motor movements but is able to work on trying to control. He cont to have good family support and carry over with HEP. PT will f/u on next visit Will cont with POC to increase his functional mob and ind to prevent further decline in IND. S/B Miriam Najera PT

## 2021-03-22 ENCOUNTER — HOME CARE VISIT (OUTPATIENT)
Dept: HOME HEALTH SERVICES | Facility: HOME HEALTHCARE | Age: 26
End: 2021-03-22
Payer: MEDICAID

## 2021-03-22 VITALS
SYSTOLIC BLOOD PRESSURE: 106 MMHG | RESPIRATION RATE: 16 BRPM | TEMPERATURE: 98.8 F | HEART RATE: 77 BPM | DIASTOLIC BLOOD PRESSURE: 80 MMHG | OXYGEN SATURATION: 97 %

## 2021-03-22 PROCEDURE — G0152 HHCP-SERV OF OT,EA 15 MIN: HCPCS

## 2021-03-22 ASSESSMENT — ENCOUNTER SYMPTOMS: DIFFICULTY THINKING: 1

## 2021-03-23 ENCOUNTER — HOME CARE VISIT (OUTPATIENT)
Dept: HOME HEALTH SERVICES | Facility: HOME HEALTHCARE | Age: 26
End: 2021-03-23
Payer: MEDICAID

## 2021-03-23 VITALS
HEART RATE: 82 BPM | DIASTOLIC BLOOD PRESSURE: 72 MMHG | TEMPERATURE: 99.1 F | RESPIRATION RATE: 16 BRPM | OXYGEN SATURATION: 98 % | SYSTOLIC BLOOD PRESSURE: 98 MMHG

## 2021-03-23 PROCEDURE — G0153 HHCP-SVS OF S/L PATH,EA 15MN: HCPCS

## 2021-03-23 PROCEDURE — G0152 HHCP-SERV OF OT,EA 15 MIN: HCPCS

## 2021-03-23 PROCEDURE — G0151 HHCP-SERV OF PT,EA 15 MIN: HCPCS

## 2021-03-23 ASSESSMENT — ENCOUNTER SYMPTOMS
DIFFICULTY THINKING: 1
DIFFICULTY THINKING: 1

## 2021-03-24 ENCOUNTER — OFFICE VISIT (OUTPATIENT)
Dept: PHYSICAL MEDICINE AND REHAB | Facility: REHABILITATION | Age: 26
End: 2021-03-24
Payer: MEDICAID

## 2021-03-24 VITALS
TEMPERATURE: 97.6 F | WEIGHT: 160 LBS | RESPIRATION RATE: 18 BRPM | SYSTOLIC BLOOD PRESSURE: 98 MMHG | HEART RATE: 80 BPM | OXYGEN SATURATION: 95 % | BODY MASS INDEX: 25.71 KG/M2 | HEIGHT: 66 IN | DIASTOLIC BLOOD PRESSURE: 62 MMHG

## 2021-03-24 DIAGNOSIS — R47.01 APHASIA: ICD-10-CM

## 2021-03-24 DIAGNOSIS — I61.5 NONTRAUMATIC INTRAVENTRICULAR INTRACEREBRAL HEMORRHAGE, UNSPECIFIED LATERALITY (HCC): Primary | ICD-10-CM

## 2021-03-24 DIAGNOSIS — R63.0 POOR APPETITE: ICD-10-CM

## 2021-03-24 DIAGNOSIS — N31.9 NEUROGENIC BLADDER: ICD-10-CM

## 2021-03-24 DIAGNOSIS — F80.9 IMPAIRED VERBAL COMMUNICATION: ICD-10-CM

## 2021-03-24 PROCEDURE — 99213 OFFICE O/P EST LOW 20 MIN: CPT | Performed by: PHYSICAL MEDICINE & REHABILITATION

## 2021-03-24 RX ORDER — MIRTAZAPINE 30 MG/1
30 TABLET, FILM COATED ORAL
Qty: 30 TABLET | Refills: 2 | Status: SHIPPED | OUTPATIENT
Start: 2021-03-24 | End: 2021-06-23 | Stop reason: SDUPTHER

## 2021-03-24 ASSESSMENT — ENCOUNTER SYMPTOMS
DIFFICULTY THINKING: 1
FALLS: 0
BRUISES/BLEEDS EASILY: 0
POOR JUDGMENT: 1
PALPITATIONS: 0
CHILLS: 0
SHORTNESS OF BREATH: 0
DIARRHEA: 0
COUGH: 0
FEVER: 0
MEMORY LOSS: 0
CONSTIPATION: 0
SORE THROAT: 0

## 2021-03-24 ASSESSMENT — FIBROSIS 4 INDEX: FIB4 SCORE: 0.19

## 2021-03-24 NOTE — PROGRESS NOTES
[facility]  [facility]  [address] [place] [address]  Ph: [phone]    FOLLOW UP PATIENT EVALUATION    Patient Name: Rey Medina   Patient : 1995  Patient Age: 24 y.o.   PCP: Nirmala Man M.D.    Examining Physician: Dr. 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  Discussed through messaging with therapist.  -Appetite: Remains improved on mirtazapine.  Continues to need to meals puréed.  -Sleep: Sleeping well on mirtazapine.  -Bladder: Continent, volitional.  No longer wearing adult briefs.  Still taking Flomax.  -Bowel: Continent, volitional.  No longer wearing adult briefs.  Communicates needs about bowel and bladder to mom.  -Spasticity: None appreciated on exam.  -Mood: Continues to use propranolol 10 mg 3 times daily.    Review of Systems:  Review of Systems   Constitutional: Negative for chills and fever.   HENT: Negative for congestion and sore throat.    Respiratory: Negative for cough and shortness of breath.    Cardiovascular: Negative for palpitations and leg swelling.   Gastrointestinal: Negative for constipation and diarrhea.        Diet improved.   Musculoskeletal: Negative for falls and joint pain.   Neurological:        Strength improving, coordination improving.   Endo/Heme/Allergies: Does  not bruise/bleed easily.   Psychiatric/Behavioral: Negative for memory loss.      All other pertinent positive review of systems are noted above in HPI.     Past Medical History:  Past Medical History:   Diagnosis Date   • COVID-19    • ICH (intracerebral hemorrhage) (HCC)    • Stroke (HCC)       Past Surgical History:   Procedure Laterality Date   • ANAL FISTULOTOMY     • PEG PLACEMENT     • TRACHEOSTOMY     •  SHUNT INSERTION          Current Outpatient Medications:   •  mirtazapine (REMERON) 30 MG Tab tablet, Take 1 tablet by mouth every bedtime., Disp: 30 tablet, Rfl: 2  •  NON SPECIFIED, 1 ensure 2 times daily, Disp: 60 Each, Rfl: 11  •  levETIRAcetam (KEPPRA) 500 MG Tab, Take 0.5 Tabs by mouth every day at 6 PM. PM only, Disp: 90 Tab, Rfl: 3  •  NON SPECIFIED, 4 cans of beneprotein (227g per can) per month, Disp: 4 Each, Rfl: 11  •  Omega-3 Fatty Acids (OMEGA 3 500) 500 MG Cap, Take 1 Cap by mouth every day. Indications: Dietary Deficiency, Disp: , Rfl:   •  Magnesium 400 MG Cap, Take 1 Cap by mouth every day. Indications: Malnutrition, Disp: , Rfl:   •  RA TURMERIC EXTRA STRENGTH PO, Take 1,000 mg by mouth every day. Indications: supplement, Disp: , Rfl:   •  Cholecalciferol (VITAMIN D3) 2000 UNIT Cap, Take 2,000 Units by mouth every day., Disp: , Rfl:   •  non-formulary med, Inhale 1 Inhalation 2 times a day as needed (shortness of breath). Amborxol HCL Mucosolvan  Indications: shorness of breath, Disp: , Rfl:   •  polyethylene glycol 3350 (MIRALAX) 17 GM/SCOOP Powder, Take 17 g by mouth 1 time a day as needed (constipation)., Disp: , Rfl:   •  non-formulary med, Take 1 Dose Pack by mouth every day. Emergen C, Disp: , Rfl:   •  propranolol (INDERAL) 20 MG Tab, TAKE 1 TABLET BY MOUTH THREE TIMES DAILY, Disp: 180 Tab, Rfl: 2  •  Cyanocobalamin (B-12 PO), Take 1 Tab by mouth every morning., Disp: , Rfl:   •  Misc. Devices Misc, Incontinence supplies - adult diapers. Length of time needed - lifetime or 999  months., Disp: 999 Each, Rfl: 999  •  montelukast (SINGULAIR) 10 MG Tab, Take 1 Tab by mouth as needed (to reduce amount of pills needing to be taken daily ). (Patient taking differently: Take 10 mg by mouth 1 time a day as needed (to reduce amount of pills needing to be taken daily).), Disp: 90 Tab, Rfl: 2  Allergies   Allergen Reactions   • Vancomycin Swelling     Lips  With red face and neck   • Other Misc Rash     Allergic to name band.        Past Social History:  Social History     Socioeconomic History   • Marital status: Single     Spouse name: Not on file   • Number of children: Not on file   • Years of education: Not on file   • Highest education level: Some college, no degree   Occupational History   • Not on file   Tobacco Use   • Smoking status: Never Smoker   • Smokeless tobacco: Never Used   Substance and Sexual Activity   • Alcohol use: Never   • Drug use: Never   • Sexual activity: Not Currently   Other Topics Concern   •  Service No   • Blood Transfusions No   • Caffeine Concern No   • Occupational Exposure No   • Hobby Hazards No   • Sleep Concern No   • Stress Concern No   • Weight Concern No   • Special Diet Yes   • Back Care No   • Exercise Yes   • Bike Helmet No   • Seat Belt Yes   • Self-Exams No   Social History Narrative    Lives with mom     Social Determinants of Health     Financial Resource Strain: Low Risk    • Difficulty of Paying Living Expenses: Not hard at all   Food Insecurity: No Food Insecurity   • Worried About Running Out of Food in the Last Year: Never true   • Ran Out of Food in the Last Year: Never true   Transportation Needs: No Transportation Needs   • Lack of Transportation (Medical): No   • Lack of Transportation (Non-Medical): No   Physical Activity: Insufficiently Active   • Days of Exercise per Week: 3 days   • Minutes of Exercise per Session: 20 min   Stress: No Stress Concern Present   • Feeling of Stress : Not at all   Social Connections: Moderately  Isolated   • Frequency of Communication with Friends and Family: Never   • Frequency of Social Gatherings with Friends and Family: More than three times a week   • Attends Evangelical Services: Never   • Active Member of Clubs or Organizations: No   • Attends Club or Organization Meetings: Never   • Marital Status: Never    Intimate Partner Violence:    • Fear of Current or Ex-Partner:    • Emotionally Abused:    • Physically Abused:    • Sexually Abused:         Family History:  Family History   Problem Relation Age of Onset   • Hypertension Mother    • Glasses Mother    • Thyroid Mother    • Diabetes Maternal Grandfather    • Stroke Maternal Grandfather    • Diabetes Paternal Grandmother    • Hypertension Maternal Uncle    • Heart Disease Neg Hx    • Cancer Neg Hx        Depression and Opioid Screening  PHQ-9:  Depression Screen (PHQ-2/PHQ-9) 12/21/2020 1/27/2021 2/11/2021   PHQ-2 Total Score - - -   PHQ-2 Total Score 2 0 0   PHQ-9 Total Score 9 - -     Interpretation of PHQ-9 Total Score   Score Severity   1-4 No Depression   5-9 Mild Depression   10-14 Moderate Depression   15-19 Moderately Severe Depression   20-27 Severe Depression     Opioid Risk Score: No value filed.  Interpretation of Opioid Risk Score   Score 0-3 = Low risk of abuse. Do UDS at least once per year.  Score 4-7 = Moderate risk of abuse. Do UDS 1-4 times per year.  Score 8+ = High risk of abuse. Refer to specialist.      OBJECTIVE:   Vital Signs:  Vitals:    03/24/21 1245   BP: (!) 98/62   Pulse: 80   Resp: 18   Temp: 36.4 °C (97.6 °F)   SpO2: 95%    Virtual visit.    Pertinent Labs:  Lab Results   Component Value Date/Time    SODIUM 137 12/12/2020 02:00 AM    POTASSIUM 3.5 (L) 12/12/2020 02:00 AM    CHLORIDE 100 12/12/2020 02:00 AM    CO2 20 12/12/2020 02:00 AM    GLUCOSE 72 12/12/2020 02:00 AM    BUN 10 12/12/2020 02:00 AM    CREATININE 0.66 12/12/2020 02:00 AM       Lab Results   Component Value Date/Time    HBA1C 5.7 (H) 02/06/2020  06:07 AM       Lab Results   Component Value Date/Time    WBC 7.2 12/12/2020 02:00 AM    RBC 4.99 12/12/2020 02:00 AM    HEMOGLOBIN 14.1 12/12/2020 02:00 AM    HEMATOCRIT 43.5 12/12/2020 02:00 AM    MCV 87.2 12/12/2020 02:00 AM    MCH 28.3 12/12/2020 02:00 AM    MCHC 32.4 (L) 12/12/2020 02:00 AM    MPV 11.1 12/12/2020 02:00 AM    NEUTSPOLYS 53.10 12/12/2020 02:00 AM    LYMPHOCYTES 34.80 12/12/2020 02:00 AM    MONOCYTES 8.60 12/12/2020 02:00 AM    EOSINOPHILS 2.10 12/12/2020 02:00 AM    BASOPHILS 1.10 12/12/2020 02:00 AM       Lab Results   Component Value Date/Time    ASTSGOT 17 12/12/2020 02:00 AM    ALTSGPT 38 12/12/2020 02:00 AM        Physical Exam:   GEN: No apparent distress  HEENT: Head normocephalic, atraumatic.  Sclera nonicteric bilaterally, no ocular discharge appreciated bilaterally.  Trach site well-healed.  Mask donned.  CV: Extremities warm and well-perfused, no peripheral edema appreciated bilaterally.  PULMONARY: Breathing nonlabored on room air, no respiratory accessory muscle use.  Not requiring supplemental oxygen.  ABD: Soft, nontender.  SKIN: No appreciable skin breakdown on exposed areas of skin.  PSYCH: Mood and affect within normal limits.  NEURO: Awake alert.  Minimally conversive, but significantly better than prior.  Answers questions appropriately.  Dysarthric.    Imaging: No new imaging pertinent to today's visit    ASSESSMENT/PLAN: Rey Medina  is a 25 y.o. male with rehabilitation history significant for ICH secondary to AVM rupture on 4/21/2019 s/p AVM repair, hydrocephalus s/p  shunt who [facility] 3/12/2020, here for hemorrhagic stroke follow-up. The following plan was discussed with the patient who is in agreement.     Visit Diagnoses     ICD-10-CM   1. Nontraumatic intraventricular intracerebral hemorrhage, unspecified laterality (HCC)  I61.5   2. Poor appetite  R63.0   3. Neurogenic bladder  N31.9   4. Impaired verbal communication   F80.9   5. Aphasia  R47.01        Mom assist with majority of history given patient's aphasia    Rehab/Neuro:   1. ICH secondary to AVM rupture on 4/21/2019 s/p AVM repair, hydrocephalus s/p  shunt who was recently admitted to [facility] from 2/5/2020 to 3/12/2020.  2.  No history of seizure, on Keppra.  Had EEG 5/21/2020  3.  Poor attention secondary to ICH requiring need for neuro-stimulant  4.  Blurry vision after ICH, did have reading glasses prior to stroke.  Blurry vision persistent.  Also having problems with peripheral vision on the right.  -Therapy: Continue home health therapy.  -Discuss with neurology movement disorder clinic regarding potential pharmacologic intervention to assist with dyskinesia which interferes with patient's progression physically.    Bladder: Continent, volitional.  -Med management: Stop Flomax.    Bowel:  -Status: Continent, volitional.  -Continue bowel meds daily to assist with bowel movements.    Adjustment Disorder:   -Med management: Prescribed mirtazapine 30 mg nightly.    GI: Feeding himself.  Puréed foods.  -Med management: Prescription for mirtazapine 30 mg daily.    Follow up: 3 months.    Please note that this dictation was created using voice recognition software. I have made every reasonable attempt to correct obvious errors but there may be errors of grammar and content that I may have overlooked prior to finalization of this note.    Dr. Mandy Luke DO, MS  Department of Physical Medicine & Rehabilitation  Neuro Rehabilitation Clinic  [facility]

## 2021-03-25 ENCOUNTER — HOME CARE VISIT (OUTPATIENT)
Dept: HOME HEALTH SERVICES | Facility: HOME HEALTHCARE | Age: 26
End: 2021-03-25
Payer: MEDICAID

## 2021-03-25 PROCEDURE — G0152 HHCP-SERV OF OT,EA 15 MIN: HCPCS

## 2021-03-25 PROCEDURE — G0153 HHCP-SVS OF S/L PATH,EA 15MN: HCPCS

## 2021-03-25 PROCEDURE — G0151 HHCP-SERV OF PT,EA 15 MIN: HCPCS

## 2021-03-25 ASSESSMENT — ENCOUNTER SYMPTOMS
DIFFICULTY THINKING: 1
DIFFICULTY THINKING: 1
POOR JUDGMENT: 1

## 2021-03-26 VITALS
DIASTOLIC BLOOD PRESSURE: 80 MMHG | HEART RATE: 83 BPM | RESPIRATION RATE: 16 BRPM | OXYGEN SATURATION: 95 % | TEMPERATURE: 98.1 F | SYSTOLIC BLOOD PRESSURE: 110 MMHG

## 2021-03-26 DIAGNOSIS — R15.9 INCONTINENCE OF FECES, UNSPECIFIED FECAL INCONTINENCE TYPE: ICD-10-CM

## 2021-03-26 RX ORDER — POLYETHYLENE GLYCOL 3350 17 G/17G
17 POWDER, FOR SOLUTION ORAL
Qty: 507 G | Refills: 2 | Status: SHIPPED | OUTPATIENT
Start: 2021-03-26 | End: 2021-06-23

## 2021-03-26 ASSESSMENT — ENCOUNTER SYMPTOMS
DIFFICULTY THINKING: 1
POOR JUDGMENT: 1

## 2021-03-26 NOTE — TELEPHONE ENCOUNTER
Received request via: Patient    Was the patient seen in the last year in this department? Yes    Does the patient have an active prescription (recently filled or refills available) for medication(s) requested? No       Pt is asking for a refill request for Miralax

## 2021-03-29 ENCOUNTER — HOME CARE VISIT (OUTPATIENT)
Dept: HOME HEALTH SERVICES | Facility: HOME HEALTHCARE | Age: 26
End: 2021-03-29
Payer: MEDICAID

## 2021-03-29 VITALS
DIASTOLIC BLOOD PRESSURE: 64 MMHG | HEART RATE: 79 BPM | TEMPERATURE: 99.4 F | RESPIRATION RATE: 16 BRPM | SYSTOLIC BLOOD PRESSURE: 106 MMHG | OXYGEN SATURATION: 96 %

## 2021-03-29 PROCEDURE — G0153 HHCP-SVS OF S/L PATH,EA 15MN: HCPCS

## 2021-03-29 ASSESSMENT — ENCOUNTER SYMPTOMS: DIFFICULTY THINKING: 1

## 2021-03-30 ENCOUNTER — HOME CARE VISIT (OUTPATIENT)
Dept: HOME HEALTH SERVICES | Facility: HOME HEALTHCARE | Age: 26
End: 2021-03-30
Payer: MEDICAID

## 2021-03-30 PROCEDURE — G0151 HHCP-SERV OF PT,EA 15 MIN: HCPCS

## 2021-03-31 ENCOUNTER — HOME CARE VISIT (OUTPATIENT)
Dept: HOME HEALTH SERVICES | Facility: HOME HEALTHCARE | Age: 26
End: 2021-03-31
Payer: MEDICAID

## 2021-03-31 VITALS
RESPIRATION RATE: 16 BRPM | HEART RATE: 72 BPM | OXYGEN SATURATION: 95 % | DIASTOLIC BLOOD PRESSURE: 80 MMHG | SYSTOLIC BLOOD PRESSURE: 110 MMHG | TEMPERATURE: 98.4 F

## 2021-03-31 VITALS
DIASTOLIC BLOOD PRESSURE: 75 MMHG | SYSTOLIC BLOOD PRESSURE: 105 MMHG | OXYGEN SATURATION: 97 % | TEMPERATURE: 99.1 F | RESPIRATION RATE: 16 BRPM | HEART RATE: 79 BPM

## 2021-03-31 PROCEDURE — G0153 HHCP-SVS OF S/L PATH,EA 15MN: HCPCS

## 2021-03-31 PROCEDURE — G0152 HHCP-SERV OF OT,EA 15 MIN: HCPCS

## 2021-03-31 ASSESSMENT — ACTIVITIES OF DAILY LIVING (ADL): AMBULATION ASSISTANCE ON FLAT SURFACES: 1

## 2021-03-31 ASSESSMENT — ENCOUNTER SYMPTOMS
DIFFICULTY THINKING: 1
POOR JUDGMENT: 1

## 2021-04-01 ENCOUNTER — HOME CARE VISIT (OUTPATIENT)
Dept: HOME HEALTH SERVICES | Facility: HOME HEALTHCARE | Age: 26
End: 2021-04-01
Payer: MEDICAID

## 2021-04-01 VITALS
DIASTOLIC BLOOD PRESSURE: 75 MMHG | OXYGEN SATURATION: 97 % | TEMPERATURE: 99.5 F | HEART RATE: 73 BPM | SYSTOLIC BLOOD PRESSURE: 102 MMHG | RESPIRATION RATE: 16 BRPM

## 2021-04-01 DIAGNOSIS — R15.9 INCONTINENCE OF FECES, UNSPECIFIED FECAL INCONTINENCE TYPE: ICD-10-CM

## 2021-04-01 PROCEDURE — G0152 HHCP-SERV OF OT,EA 15 MIN: HCPCS

## 2021-04-01 PROCEDURE — G0151 HHCP-SERV OF PT,EA 15 MIN: HCPCS

## 2021-04-01 RX ORDER — POLYETHYLENE GLYCOL 3350 17 G/17G
17 POWDER, FOR SOLUTION ORAL
Qty: 507 G | Refills: 0 | OUTPATIENT
Start: 2021-04-01

## 2021-04-01 ASSESSMENT — ENCOUNTER SYMPTOMS: DIFFICULTY THINKING: 1

## 2021-04-01 NOTE — PROGRESS NOTES
I AGRE  ----- Message -----  From: Ana Garcia R.N.  Sent: 2/19/2021   8:38 AM PDT  To: Shameka Aguiar R.N.      Primary Diagnosis: Anorexia, dehydration, deconditioning, H/O spont intraparenchymal intracranial hemorrhage d/t cerebral AVM   Skilled Need: Continue home therapy with SLP, OT and PT   Zip Code: [address]    Frequency: none ordered  Disciplines ordered: PT, OT, SLP  Insurance and Authorization: Medicaid   Certification Period: 2/16/21 to 4/16/21   Special Considerations: None

## 2021-04-02 ASSESSMENT — ENCOUNTER SYMPTOMS
DIFFICULTY THINKING: 1
POOR JUDGMENT: 1

## 2021-04-06 ENCOUNTER — HOME CARE VISIT (OUTPATIENT)
Dept: HOME HEALTH SERVICES | Facility: HOME HEALTHCARE | Age: 26
End: 2021-04-06
Payer: MEDICAID

## 2021-04-06 VITALS
TEMPERATURE: 98.6 F | SYSTOLIC BLOOD PRESSURE: 100 MMHG | RESPIRATION RATE: 16 BRPM | OXYGEN SATURATION: 98 % | DIASTOLIC BLOOD PRESSURE: 70 MMHG | HEART RATE: 76 BPM

## 2021-04-06 PROCEDURE — G0152 HHCP-SERV OF OT,EA 15 MIN: HCPCS

## 2021-04-06 PROCEDURE — G0151 HHCP-SERV OF PT,EA 15 MIN: HCPCS

## 2021-04-06 ASSESSMENT — ENCOUNTER SYMPTOMS
DIFFICULTY THINKING: 1
DIFFICULTY THINKING: 1
POOR JUDGMENT: 1

## 2021-04-07 ENCOUNTER — HOME CARE VISIT (OUTPATIENT)
Dept: HOME HEALTH SERVICES | Facility: HOME HEALTHCARE | Age: 26
End: 2021-04-07
Payer: MEDICAID

## 2021-04-07 VITALS
HEART RATE: 89 BPM | TEMPERATURE: 98.7 F | SYSTOLIC BLOOD PRESSURE: 110 MMHG | RESPIRATION RATE: 16 BRPM | OXYGEN SATURATION: 97 % | DIASTOLIC BLOOD PRESSURE: 70 MMHG

## 2021-04-07 PROCEDURE — G0152 HHCP-SERV OF OT,EA 15 MIN: HCPCS

## 2021-04-07 PROCEDURE — G0153 HHCP-SVS OF S/L PATH,EA 15MN: HCPCS

## 2021-04-07 ASSESSMENT — ENCOUNTER SYMPTOMS
DIFFICULTY THINKING: 1
DIFFICULTY THINKING: 1

## 2021-04-08 ENCOUNTER — HOME CARE VISIT (OUTPATIENT)
Dept: HOME HEALTH SERVICES | Facility: HOME HEALTHCARE | Age: 26
End: 2021-04-08
Payer: MEDICAID

## 2021-04-08 PROCEDURE — G0151 HHCP-SERV OF PT,EA 15 MIN: HCPCS

## 2021-04-08 PROCEDURE — G0152 HHCP-SERV OF OT,EA 15 MIN: HCPCS

## 2021-04-08 ASSESSMENT — ENCOUNTER SYMPTOMS: DIFFICULTY THINKING: 1

## 2021-04-08 NOTE — Clinical Note
OT reassessment completed 4/8/21; requesting auth for additional OT visits 2w1 effective 4/11/21 to end of current cert period w plans to recertify for continued therapy needs

## 2021-04-08 NOTE — Clinical Note
Please request additional HHPT visits with frequency of 2w1 effective week of 04/11/2021 to facilitate recertification/reassessment.

## 2021-04-09 ENCOUNTER — HOME CARE VISIT (OUTPATIENT)
Dept: HOME HEALTH SERVICES | Facility: HOME HEALTHCARE | Age: 26
End: 2021-04-09
Payer: MEDICAID

## 2021-04-09 VITALS
OXYGEN SATURATION: 96 % | DIASTOLIC BLOOD PRESSURE: 60 MMHG | TEMPERATURE: 98.4 F | HEART RATE: 76 BPM | RESPIRATION RATE: 16 BRPM | SYSTOLIC BLOOD PRESSURE: 100 MMHG

## 2021-04-09 PROCEDURE — G0153 HHCP-SVS OF S/L PATH,EA 15MN: HCPCS

## 2021-04-09 ASSESSMENT — ENCOUNTER SYMPTOMS
POOR JUDGMENT: 1
DIFFICULTY THINKING: 1
DIFFICULTY THINKING: 1

## 2021-04-09 NOTE — PROGRESS NOTES
noted  ----- Message -----  From: Norma Mathews MS,OTR/L  Sent: 4/8/2021  12:44 PM PDT  To: Norma Holliday R.N.      OT reassessment completed 4/8/21; requesting auth for additional OT visits 2w1 effective 4/11/21 to end of current cert period w plans to recertify for continued therapy needs

## 2021-04-09 NOTE — Clinical Note
Requesting further auth 2w1 and then will have reverification to be completed for further treatment, effective 4/11/21.

## 2021-04-10 NOTE — PROGRESS NOTES
noted  ----- Message -----  From: MARQUIS Blackburn  Sent: 4/9/2021   3:58 PM PDT  To: Norma Holliday R.N.      Requesting further auth 2w1 and then will have reverification to be completed for further treatment, effective 4/11/21.

## 2021-04-10 NOTE — PROGRESS NOTES
noted  ----- Message -----  From: Adrianne Raphael, PT  Sent: 4/9/2021   8:11 AM PDT  To: Norma Holliday R.N.      Please request additional HHPT visits with frequency of 2w1 effective week of 04/11/2021 to facilitate recertification/reassessment.

## 2021-04-12 ENCOUNTER — HOME CARE VISIT (OUTPATIENT)
Dept: HOME HEALTH SERVICES | Facility: HOME HEALTHCARE | Age: 26
End: 2021-04-12
Payer: MEDICAID

## 2021-04-12 VITALS
RESPIRATION RATE: 16 BRPM | TEMPERATURE: 99.3 F | OXYGEN SATURATION: 96 % | DIASTOLIC BLOOD PRESSURE: 60 MMHG | SYSTOLIC BLOOD PRESSURE: 90 MMHG | HEART RATE: 69 BPM

## 2021-04-12 PROCEDURE — G0153 HHCP-SVS OF S/L PATH,EA 15MN: HCPCS

## 2021-04-12 ASSESSMENT — ENCOUNTER SYMPTOMS: DIFFICULTY THINKING: 1

## 2021-04-13 ENCOUNTER — TELEMEDICINE (OUTPATIENT)
Dept: NEUROLOGY | Facility: MEDICAL CENTER | Age: 26
End: 2021-04-13
Attending: PSYCHIATRY & NEUROLOGY
Payer: MEDICAID

## 2021-04-13 ENCOUNTER — HOME CARE VISIT (OUTPATIENT)
Dept: HOME HEALTH SERVICES | Facility: HOME HEALTHCARE | Age: 26
End: 2021-04-13
Payer: MEDICAID

## 2021-04-13 VITALS — HEIGHT: 66 IN | WEIGHT: 162 LBS | BODY MASS INDEX: 26.03 KG/M2

## 2021-04-13 VITALS
DIASTOLIC BLOOD PRESSURE: 82 MMHG | HEART RATE: 78 BPM | RESPIRATION RATE: 16 BRPM | BODY MASS INDEX: 26.15 KG/M2 | SYSTOLIC BLOOD PRESSURE: 102 MMHG | OXYGEN SATURATION: 98 % | TEMPERATURE: 99.3 F | WEIGHT: 162 LBS

## 2021-04-13 DIAGNOSIS — Z86.79 H/O SPONT INTRAPARENCHYMAL INTRACRANIAL HEMORRHAGE D/T CEREBRAL AVM: ICD-10-CM

## 2021-04-13 PROCEDURE — G0152 HHCP-SERV OF OT,EA 15 MIN: HCPCS

## 2021-04-13 PROCEDURE — G0151 HHCP-SERV OF PT,EA 15 MIN: HCPCS

## 2021-04-13 PROCEDURE — 99214 OFFICE O/P EST MOD 30 MIN: CPT | Mod: CR | Performed by: PSYCHIATRY & NEUROLOGY

## 2021-04-13 ASSESSMENT — ENCOUNTER SYMPTOMS
DIFFICULTY THINKING: 1
DIFFICULTY THINKING: 1
POOR JUDGMENT: 1

## 2021-04-13 ASSESSMENT — FIBROSIS 4 INDEX
FIB4 SCORE: 0.19
FIB4 SCORE: 0.19

## 2021-04-13 NOTE — PROGRESS NOTES
Chief Complaint   Patient presents with   • Follow-Up     /O spont intraparenchymal intracranial hemorrhage d/t cerebral AVM     This evaluation was conducted via Zoom using secure and encrypted videoconferencing technology. The patient was in a private location in the [facility].    The patient's identity was confirmed and verbal consent was obtained for this virtual visit.      History of present illness:  Rey Medina 25 y.o. male presents today for cranial hemorrhage follow-up  History is obtained from patient and mom.  and Patient is accompanied by Mom Gem.  She is his primary caretaker.     Duration/timing: acute onset 4/2019   Context: AVM with intraventricular hemorrhage: Patient was in the [facility] with acute onset of intraventricular hemorrhage secondary to AVM status post  shunt.  He has since been a long-term recovery but residual deficits include the following: Short term memory difficulties; was having difficulty with initating conversation but now he is doing it more often. Non ambulatory - stands with support also with transfers which is also improvement. Some constipation. ?Bladder incontinence with use of diaper - especially after tracheostomy removal he is less able to communicate that he has to use the restroom. No clear weakness. + Incoordination in the extremities.  Swallowing improving. He can feed himself though sloppy.  Learning to brush his teeth.  No clear seizures - Keppra was started in hospital and not removed? Previously some agitation.   Location: Posterior fossa  Quality: Hemorrhage  Severity: Severe  Modifying factors: None  Associated signs/symptoms: As above  Denies: weakness, numbness/tingling, depression, anxiety, loss of consciousness, seizures, abnormal movements and falls     Patient has tried:  -Amantadine - tried by Dr. ALBERTO, stopped by Dr. Luke (psych), since focus is better  -Keppra 500mg daily - decreased in 7/2020 by Dr. ALBERTO.  Patient did not  tolerate doses less than 250 mg daily, to be continued.  -Speech therapy - with improvement, on thickener  -OT/PT - current for weakness   -Physiatrist Dr. Luke  -Neurosurgeon Dr. Walton -Next appointment in December 2020    Subjective: Patient was last seen in neurology clinic on December 2020.     AVM with IVH: He is feeling good. He has been slowly improving. Patient can put on his own shoes and socks and and dress to a certain bit. He still cannot stand on his own.     Seizures: No known seizures since last visit.     PM&R Dr. Luke noticed some issues in both arms and legs. No abnormal facial movement. He overshoots his legs when he is trying to walk. He has poor control with throwing a ball at PT.  Mom shows me a video today.  No unprovoked abnormal movements.    Past medical history:   Past Medical History:   Diagnosis Date   • COVID-19    • ICH (intracerebral hemorrhage) (HCC)    • Stroke (HCC)        Past surgical history:   Past Surgical History:   Procedure Laterality Date   • ANAL FISTULOTOMY     • PEG PLACEMENT     • TRACHEOSTOMY     •  SHUNT INSERTION         Family history:   Family History   Problem Relation Age of Onset   • Hypertension Mother    • Glasses Mother    • Thyroid Mother    • Diabetes Maternal Grandfather    • Stroke Maternal Grandfather    • Diabetes Paternal Grandmother    • Hypertension Maternal Uncle    • Heart Disease Neg Hx    • Cancer Neg Hx        Social history:   Tobacco Use   • Smoking status: Never Smoker   • Smokeless tobacco: Never Used   Substance and Sexual Activity   • Alcohol use: Never     Frequency: Never     Drinks per session: Patient refused     Binge frequency: Never   • Drug use: Never     Current medications:   Current Outpatient Medications   Medication   • polyethylene glycol 3350 (MIRALAX) 17 GM/SCOOP Powder   • mirtazapine (REMERON) 30 MG Tab tablet   • NON SPECIFIED   • levETIRAcetam (KEPPRA) 500 MG Tab   • NON SPECIFIED   • Omega-3 Fatty Acids (OMEGA 3  "500) 500 MG Cap   • Magnesium 400 MG Cap   • RA TURMERIC EXTRA STRENGTH PO   • Cholecalciferol (VITAMIN D3) 2000 UNIT Cap   • non-formulary med   • non-formulary med   • propranolol (INDERAL) 20 MG Tab   • Cyanocobalamin (B-12 PO)   • Misc. Devices Misc   • montelukast (SINGULAIR) 10 MG Tab     No current facility-administered medications for this visit.       Medication Allergy:  Allergies   Allergen Reactions   • Vancomycin Swelling     Lips  With red face and neck   • Other Misc Rash     Allergic to name band.       ROS: As per HPI    Physical examination:   Vitals:    04/13/21 1253   Weight: 73.5 kg (162 lb)   Height: 1.676 m (5' 6\")      Prior exam as detailed below. On today's exam, patient was Awake, speech is scanning in nature..    General: Patient in well nourished in no apparent distress.  In his wheelchair.  Eyes: Ophthalmoscopic examination performed but discs cannot be visualized well enough to characterize bilaterally.  HENT: Normocephalic, atraumatic.  Cardiovascular: No lower extremity edema.  Respiratory: Normal respiratory effort.   Skin: No appreciable signs of acute rashes or bruising.   Musculoskeletal: No signs of joint or muscle swelling.   Psychiatric: Pleasant.     NEUROLOGICAL EXAM:   Mental status: Awake, alert and fully oriented to person.  Otherwise difficulty assessing for the orientation.  He does seem to maintain attention to conversation when spoken to.  Speech and language: Speech is scanning in nature.  He speaks in very short responses.  He is able to name objects.  Will to follow commands.  Cranial nerve exam:  II: Pupils are equally round and reactive to light. Visual fields are intact by confrontation -grossly.   III, IV, VI: EOMI -with evidence of nystagmus and difficulty with smooth pursuit, no diplopia, no ptosis.  V: Sensation to light touch is normal over V1-3 distributions bilaterally.  .  VII: Facial movements are symmetrical. There is no facial droop. .  VIII: Hearing " intact to soft speech and finger rub bilaterally  IX: Palate elevates symmetrically, uvula is midline. Dysarthria/scanning speech.  XI: Shoulder shrug are symmetrical and strong.   XII: Tongue protrudes midline.    Motor exam:  Some increase in muscle tone in the upper and lower extremities but not significantly.  There is no strong evidence of spasticity.    Muscle strength: He is globally 5 out of 5 upper and lower extremity    Sensory exam:  Intact to Light touch in bilateral upper and lower extremity.    Reflexes:       Right  Left  Biceps   3/4  3/4  Triceps  3/4  3/4  Brachioradialis 3/4  3/4  Knee jerk  3/4  3/4  Ankle jerk  NT/4  NT/4       Coordination: Ataxia with finger-to-nose, heel-to-shin difficult to perform.  Gait: Nonambulatory    ANCILLARY DATA REVIEWED:   Lab Data Review:  Lab Results   Component Value Date/Time    WBC 7.2 12/12/2020 02:00 AM    RBC 4.99 12/12/2020 02:00 AM    HEMOGLOBIN 14.1 12/12/2020 02:00 AM    HEMATOCRIT 43.5 12/12/2020 02:00 AM    MCV 87.2 12/12/2020 02:00 AM    MCH 28.3 12/12/2020 02:00 AM    MCHC 32.4 (L) 12/12/2020 02:00 AM    MPV 11.1 12/12/2020 02:00 AM    NEUTSPOLYS 53.10 12/12/2020 02:00 AM    LYMPHOCYTES 34.80 12/12/2020 02:00 AM    MONOCYTES 8.60 12/12/2020 02:00 AM    EOSINOPHILS 2.10 12/12/2020 02:00 AM    BASOPHILS 1.10 12/12/2020 02:00 AM      Lab Results   Component Value Date/Time    SODIUM 137 12/12/2020 02:00 AM    POTASSIUM 3.5 (L) 12/12/2020 02:00 AM    CHLORIDE 100 12/12/2020 02:00 AM    CO2 20 12/12/2020 02:00 AM    GLUCOSE 72 12/12/2020 02:00 AM    BUN 10 12/12/2020 02:00 AM    CREATININE 0.66 12/12/2020 02:00 AM     Lab Results   Component Value Date/Time    ASTSGOT 17 11/14/2020 0520    ALTSGPT 35 11/14/2020 0520    ALKPHOSPHAT 126 (H) 11/14/2020 0520    ALBUMIN 4.2 11/14/2020 0520     Lab Results   Component Value Date/Time    HBA1C 5.7 (H) 02/06/2020 06:07 AM      EEG routine (Dr. ALBERTO) May 2020: This is a abnormal routine EEG recording in the awake  and  Drowsy states.  Intermittent slowing suggests mild encephalopathy and is in correlation with patient's history of intracerebral hemorrhage. There were no seizures recorded.Clinical correlation is recommended.  TSH1.61    Imaging:   MRI brain without contrast December 2020:   1.  Postsurgical changes in the posterior fossa history of AVM rupture and surgical treatment. There is no large abnormal flow voids identified. However the possibility of small residual AVM cannot be assessed in this study. If needed this can be further   with the catheter angiogram.  2.  Stable ventriculoperitoneal shunt without any hydrocephalus.  3.  Mild cerebral volume loss.  4.  No acute infarct or hemorrhage.    I have personally reviewed the patient's imaging with neuroradiology.     Records reviewed: Chart reviewed.  The Keppra wean was initiated after chart review, patient had worsening behavior with wean therefore stopped.          ASSESSMENT AND PLAN:    1. H/O spont intraparenchymal intracranial hemorrhage d/t cerebral AVM: Patient with a history of intraparenchymal hemorrhage in the posterior fossa secondary to AVM in the [facility] status post  shunt with prolonged and slow recovery.  Unfortunately patient is suffered significant neurological sequela from the intracranial hemorrhage as documented per HPI.  He is slowly improving and regaining some degree of independence though will likely not return to his prior baseline.  The history is quite uncertain but there is no history per mom who is very reliable of a known seizure secondary to this or in the past and unrelated circumstances.  He has since been placed on Keppra 500 mg 2 times daily during admission with EEG performed in May 2020 revealing intermittent mild slowing that is otherwise nonspecific or focal nature.  On review of his MRI of the brain given the location of the insult, there is no significant cortical involvement which may increase his seizure risk  significantly.  He may be also experiencing a rash from the Keppra as well - wean was not tolerated.  Repeat MRI without any significant structural changes.  He is otherwise doing well with gradual improvement.   -Continue levETIRAcetam (KEPPRA) 500 MG Tab; 250mg or half tablet once daily  -Discussed risks/benefits/side effects of medication  -I reached out to Dr. 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  Agree with continuing speech, PT and OT.    3.  (ventriculoperitoneal) shunt status: Following Dr. Walton, pending December 2020 appointment    4. Dysphagia following nontraumatic intracerebral hemorrhage, not addressed today: Following with speech        FOLLOW-UP: Return in about 3 months (around 7/13/2021). per patient/mom preference    EDUCATION AND COUNSELING:  -I personally discussed the following with the patient:   Medical reasoning as above    This dictation was created using voice recognition software. I have made every reasonable attempt to avoid dictation errors, but this document may contain an error not identified before finalizing. If the error changes the accuracy of the document, I would appreciate it being brought to my attention. Thank you very much.     Jennifer Valiente MD  Neurology  [facility]

## 2021-04-14 ENCOUNTER — TELEPHONE (OUTPATIENT)
Dept: NEUROLOGY | Facility: MEDICAL CENTER | Age: 26
End: 2021-04-14

## 2021-04-14 ENCOUNTER — HOME CARE VISIT (OUTPATIENT)
Dept: HOME HEALTH SERVICES | Facility: HOME HEALTHCARE | Age: 26
End: 2021-04-14
Payer: MEDICAID

## 2021-04-14 VITALS
RESPIRATION RATE: 16 BRPM | OXYGEN SATURATION: 98 % | TEMPERATURE: 98.7 F | SYSTOLIC BLOOD PRESSURE: 104 MMHG | HEART RATE: 78 BPM | DIASTOLIC BLOOD PRESSURE: 82 MMHG

## 2021-04-14 PROCEDURE — G0153 HHCP-SVS OF S/L PATH,EA 15MN: HCPCS

## 2021-04-14 ASSESSMENT — ENCOUNTER SYMPTOMS: DIFFICULTY THINKING: 1

## 2021-04-14 NOTE — TELEPHONE ENCOUNTER
I called the pt mother and informed her the above per Dr Valiente and she communicated understanding.

## 2021-04-14 NOTE — TELEPHONE ENCOUNTER
----- Message from Jennifer Valiente M.D. sent at 4/14/2021 10:56 AM PDT -----  Please let patient and mom Germaine know that Dr. Luke and I agree his overshooting and movements in the video are likely secondary to the bleed that he had.  On review of his MRI scan with neuroradiology there is evidence of some cerebellar damage/atrophy which can lead to the symptoms he is experiencing as well.  Please let me know if there are any further questions.

## 2021-04-15 ENCOUNTER — HOME CARE VISIT (OUTPATIENT)
Dept: HOME HEALTH SERVICES | Facility: HOME HEALTHCARE | Age: 26
End: 2021-04-15
Payer: MEDICAID

## 2021-04-15 VITALS
DIASTOLIC BLOOD PRESSURE: 80 MMHG | SYSTOLIC BLOOD PRESSURE: 106 MMHG | HEART RATE: 78 BPM | TEMPERATURE: 99.4 F | OXYGEN SATURATION: 98 % | RESPIRATION RATE: 16 BRPM

## 2021-04-15 PROCEDURE — G0152 HHCP-SERV OF OT,EA 15 MIN: HCPCS

## 2021-04-15 PROCEDURE — G0151 HHCP-SERV OF PT,EA 15 MIN: HCPCS

## 2021-04-15 ASSESSMENT — ENCOUNTER SYMPTOMS
DIFFICULTY THINKING: 1
DIFFICULTY THINKING: 1
POOR JUDGMENT: 1

## 2021-04-15 ASSESSMENT — ACTIVITIES OF DAILY LIVING (ADL)
GROOMING_COMMENTS: PLEASE REFER TO OT ASSESSMENT
FEEDING_COMMENTS: PLEASE REFER TO OT ASSESSMENT
OASIS_M1830: 05
ADLS_COMMENTS: PLEASE REFER TO OT ASSESSMENT
BATHING_COMMENTS: PLEASE REFER TO OT ASSESSMENT

## 2021-04-15 ASSESSMENT — PATIENT HEALTH QUESTIONNAIRE - PHQ9: CLINICAL INTERPRETATION OF PHQ2 SCORE: 0

## 2021-04-15 NOTE — Clinical Note
PT reassessment completed, requesting additional PT visits for next certification period with frequency of 2w4 effective week of 04/18/2021.

## 2021-04-15 NOTE — Clinical Note
OT [facility] recertification / OT reassessment completed 4/15/21; requesting auth for additional OT visits 2w4, 4 prn effective 4/18/21; requesting auth for PT reassessment and SLP reassessment 1w1 effective 4/18/21

## 2021-04-19 ENCOUNTER — HOME CARE VISIT (OUTPATIENT)
Dept: HOME HEALTH SERVICES | Facility: HOME HEALTHCARE | Age: 26
End: 2021-04-19
Payer: MEDICAID

## 2021-04-19 VITALS
HEART RATE: 68 BPM | DIASTOLIC BLOOD PRESSURE: 58 MMHG | RESPIRATION RATE: 19 BRPM | OXYGEN SATURATION: 98 % | TEMPERATURE: 98.9 F | SYSTOLIC BLOOD PRESSURE: 106 MMHG

## 2021-04-19 VITALS
TEMPERATURE: 99.8 F | RESPIRATION RATE: 16 BRPM | OXYGEN SATURATION: 96 % | SYSTOLIC BLOOD PRESSURE: 104 MMHG | DIASTOLIC BLOOD PRESSURE: 78 MMHG | HEART RATE: 78 BPM

## 2021-04-19 PROCEDURE — G0153 HHCP-SVS OF S/L PATH,EA 15MN: HCPCS

## 2021-04-19 PROCEDURE — G0152 HHCP-SERV OF OT,EA 15 MIN: HCPCS

## 2021-04-19 PROCEDURE — 665003 FOLLOW UP-HOME HEALTH

## 2021-04-19 ASSESSMENT — ENCOUNTER SYMPTOMS
DIFFICULTY THINKING: 1
DIFFICULTY THINKING: 1

## 2021-04-19 NOTE — PROGRESS NOTES
noted  ----- Message -----  From: Norma Mathews MS,OTR/L  Sent: 4/15/2021   8:51 PM PDT  To: Norma Holliday R.N.      OT [facility] recertification / OT reassessment completed 4/15/21; requesting auth for additional OT visits 2w4, 4 prn effective 4/18/21; requesting auth for PT reassessment and SLP reassessment 1w1 effective 4/18/21

## 2021-04-19 NOTE — PROGRESS NOTES
noted  ----- Message -----  From: Adrianne Raphael, PT  Sent: 4/15/2021  10:53 PM PDT  To: Norma Holliday R.N.      PT reassessment completed, requesting additional PT visits for next certification period with frequency of 2w4 effective week of 04/18/2021.

## 2021-04-20 ENCOUNTER — HOME CARE VISIT (OUTPATIENT)
Dept: HOME HEALTH SERVICES | Facility: HOME HEALTHCARE | Age: 26
End: 2021-04-20
Payer: MEDICAID

## 2021-04-20 VITALS
HEART RATE: 71 BPM | DIASTOLIC BLOOD PRESSURE: 80 MMHG | TEMPERATURE: 97.8 F | OXYGEN SATURATION: 91 % | RESPIRATION RATE: 16 BRPM | SYSTOLIC BLOOD PRESSURE: 110 MMHG

## 2021-04-20 PROCEDURE — G0152 HHCP-SERV OF OT,EA 15 MIN: HCPCS

## 2021-04-20 PROCEDURE — G0151 HHCP-SERV OF PT,EA 15 MIN: HCPCS

## 2021-04-20 ASSESSMENT — ENCOUNTER SYMPTOMS
DIFFICULTY THINKING: 1
POOR JUDGMENT: 1
DIFFICULTY THINKING: 1

## 2021-04-21 ENCOUNTER — HOME CARE VISIT (OUTPATIENT)
Dept: HOME HEALTH SERVICES | Facility: HOME HEALTHCARE | Age: 26
End: 2021-04-21
Payer: MEDICAID

## 2021-04-21 VITALS
DIASTOLIC BLOOD PRESSURE: 76 MMHG | OXYGEN SATURATION: 96 % | HEART RATE: 74 BPM | RESPIRATION RATE: 16 BRPM | TEMPERATURE: 99.1 F | SYSTOLIC BLOOD PRESSURE: 108 MMHG

## 2021-04-21 PROCEDURE — G0153 HHCP-SVS OF S/L PATH,EA 15MN: HCPCS

## 2021-04-21 SDOH — ECONOMIC STABILITY: HOUSING INSECURITY
HOME SAFETY: PT IS IN AN APT AND NOT ALLOWED TO ADD GRAB BARS; FAMILY AWARE THAT SHOWER TRANSFERS WILL HAVE TO WAIT UNTIL BALANCE IMPROVES  OCCASIONAL FRAGRANT CANDLES USED, OTHERWISE PT'S MOTHER KEEPS A VERY SAFE AND ORDERLY HOME W ALL NECESSARY FIRE PREVENTION/

## 2021-04-21 SDOH — ECONOMIC STABILITY: HOUSING INSECURITY: HOME SAFETY: SAFETY DEVICES IN PLACE

## 2021-04-21 ASSESSMENT — ENCOUNTER SYMPTOMS: DIFFICULTY THINKING: 1

## 2021-04-21 NOTE — PROGRESS NOTES
noted  ----- Message -----  From: Saray Islas, SLP  Sent: 4/19/2021   4:33 PM PDT  To: Norma Holliday R.N.      SLP reassessment completed 4/19/21.  Requesting auth 2w8.

## 2021-04-21 NOTE — Clinical Note
ok - done :)  ----- Message -----  From: Ana Garcia R.N.  Sent: 4/21/2021   7:49 AM PDT  To: Norma Mathews, MS,OTR/L      Quality Review for 4.15.21 Recert [facility] performed on by EUGENIO Garcia, RN on 4.21, 2021:    Edits completed by EUGENIO Garcia RN:  1. Sent case com to pharm for med rec  2. Changed  and  to 2 per narrative pt needs setup/supervision with dressing UB/LB. Changed  to 2 per [id number] E,F response of 3  3. Added pt care preferences per chart review  4. Added fall risk to safety measures. Added pureed diet to nutritional requirements per narrative  5. Filled out DC plan per chart review

## 2021-04-21 NOTE — PROGRESS NOTES
noted  ----- Message -----  From: MARQUIS Blackburn  Sent: 4/19/2021   4:33 PM PDT  To: Norma Holliday R.N.      FYI: b/p 106/58, all other vitals within parameters

## 2021-04-21 NOTE — CASE COMMUNICATION
Quality Review for 4.15.21 Recert [facility] performed on by EUGENIO Garcia RN on 4.21, 2021:    Edits completed by EUGENIO Garcia RN:  1. Sent case com to pharm for med rec  2. Changed  and  to 2 per narrative pt needs setup/supervision with dressing UB/LB. Changed  to 2 per [id number] E,F response of 3  3. Added pt care preferences per chart review  4. Added fall risk to safety measures. Added pureed diet to nutritional requirements per narrative  5. Filled out DC plan per chart review

## 2021-04-22 ENCOUNTER — HOME CARE VISIT (OUTPATIENT)
Dept: HOME HEALTH SERVICES | Facility: HOME HEALTHCARE | Age: 26
End: 2021-04-22
Payer: MEDICAID

## 2021-04-22 ENCOUNTER — TELEPHONE (OUTPATIENT)
Dept: MEDICAL GROUP | Facility: PHYSICIAN GROUP | Age: 26
End: 2021-04-22

## 2021-04-22 VITALS
DIASTOLIC BLOOD PRESSURE: 78 MMHG | OXYGEN SATURATION: 97 % | HEART RATE: 80 BPM | TEMPERATURE: 98.9 F | RESPIRATION RATE: 14 BRPM | SYSTOLIC BLOOD PRESSURE: 105 MMHG

## 2021-04-22 PROCEDURE — G0152 HHCP-SERV OF OT,EA 15 MIN: HCPCS

## 2021-04-22 PROCEDURE — G0151 HHCP-SERV OF PT,EA 15 MIN: HCPCS

## 2021-04-22 PROCEDURE — G0179 MD RECERTIFICATION HHA PT: HCPCS | Performed by: FAMILY MEDICINE

## 2021-04-22 ASSESSMENT — ENCOUNTER SYMPTOMS
DIFFICULTY THINKING: 1
POOR JUDGMENT: 1
DIFFICULTY THINKING: 1

## 2021-04-22 NOTE — CASE COMMUNICATION
agree with changes    ----- Message -----  From: Ana Garcia R.N.  Sent: 4/21/2021   7:49 AM PDT  To: Norma Mathews, MS,OTR/L      Quality Review for 4.15.21 Recert [facility] performed on by EUGENIO Garcia, RN on 4.21, 2021:    Edits completed by EUGENIO Garcia RN:  1. Sent case com to pharm for med rec  2. Changed  and  to 2 per narrative pt needs setup/supervision with dressing UB/LB. Changed  to 2 per [id number] E,F response of 3  3. Added pt care preferences per chart review  4. Added fall risk to safety measures. Added pureed diet to nutritional requirements per narrative  5. Filled out DC plan per chart review

## 2021-04-22 NOTE — TELEPHONE ENCOUNTER
Medication chart review for [facility] services    PCP:  Sherie Power M.D.  [address] [address]  [place] [address]  Fax: [phone]    Current medication list     Current Outpatient Medications:   •  polyethylene glycol 3350, 17 g, Oral, QDAY PRN  •  mirtazapine, 30 mg, Oral, QHS  •  NON SPECIFIED, 1 ensure 2 times daily  •  levETIRAcetam, 250 mg, Oral, DAILY AT 1800  •  NON SPECIFIED, 4 cans of beneprotein (227g per can) per month  •  Omega 3 500, 1 capsule, Oral, DAILY  •  Magnesium, 1 capsule, Oral, DAILY  •  RA TURMERIC EXTRA STRENGTH PO, 1,000 mg, Oral, DAILY  •  Vitamin D3, 2,000 Units, Oral, DAILY  •  non-formulary med, 1 Inhalation, Inhalation, BID PRN  •  non-formulary med, 1 Dose Pack, Oral, DAILY  •  propranolol, TAKE 1 TABLET BY MOUTH THREE TIMES DAILY  •  Cyanocobalamin (B-12 PO), 1 tablet, Oral, QAM  •  Misc. Devices, Incontinence supplies - adult diapers. Length of time needed - lifetime or 999 months.  •  montelukast, 10 mg, Oral, PRN (Patient taking differently: 10 mg, Oral, 1 TIME DAILY PRN, to reduce amount of pills needing to be taken daily)    Allergies   Allergen Reactions   • Vancomycin Swelling     Lips  With red face and neck   • Other Misc Rash     Allergic to name band.           Labs / Images Reviewed:     Lab Results   Component Value Date/Time    SODIUM 137 12/12/2020 02:00 AM    POTASSIUM 3.5 (L) 12/12/2020 02:00 AM    CHLORIDE 100 12/12/2020 02:00 AM    CO2 20 12/12/2020 02:00 AM    GLUCOSE 72 12/12/2020 02:00 AM    BUN 10 12/12/2020 02:00 AM    CREATININE 0.66 12/12/2020 02:00 AM      Lab Results   Component Value Date/Time    ALKPHOSPHAT 132 (H) 12/12/2020 02:00 AM    ASTSGOT 17 12/12/2020 02:00 AM    ALTSGPT 38 12/12/2020 02:00 AM    TBILIRUBIN 0.8 12/12/2020 02:00 AM    ALBUMIN 3.6 12/12/2020 02:00 AM    INR 0.99 08/11/2020 10:00 AM            Assessment and Plan:   • Received referral from [facility]. Medications reviewed. No clinically significant interactions noted.              Yfn Bennett, PharmD, MS, BCACP, [facility]  Phone [phone] fax [phone]    This note was created using voice recognition software (Dragon). The accuracy of the dictation is limited by the abilities of the software. I have reviewed the note prior to signing, however some errors in grammar and context are still possible. If you have any questions related to this note please do not hesitate to contact our office.

## 2021-04-26 ENCOUNTER — HOME CARE VISIT (OUTPATIENT)
Dept: HOME HEALTH SERVICES | Facility: HOME HEALTHCARE | Age: 26
End: 2021-04-26
Payer: MEDICAID

## 2021-04-26 VITALS
TEMPERATURE: 98.3 F | HEART RATE: 69 BPM | SYSTOLIC BLOOD PRESSURE: 100 MMHG | DIASTOLIC BLOOD PRESSURE: 70 MMHG | OXYGEN SATURATION: 99 % | RESPIRATION RATE: 16 BRPM

## 2021-04-26 VITALS
OXYGEN SATURATION: 98 % | HEART RATE: 73 BPM | RESPIRATION RATE: 16 BRPM | TEMPERATURE: 98.7 F | SYSTOLIC BLOOD PRESSURE: 104 MMHG | DIASTOLIC BLOOD PRESSURE: 54 MMHG

## 2021-04-26 PROCEDURE — G0152 HHCP-SERV OF OT,EA 15 MIN: HCPCS

## 2021-04-26 PROCEDURE — G0153 HHCP-SVS OF S/L PATH,EA 15MN: HCPCS

## 2021-04-26 ASSESSMENT — ENCOUNTER SYMPTOMS: DIFFICULTY THINKING: 1

## 2021-04-27 ENCOUNTER — TELEMEDICINE (OUTPATIENT)
Dept: MEDICAL GROUP | Facility: MEDICAL CENTER | Age: 26
End: 2021-04-27
Attending: FAMILY MEDICINE
Payer: MEDICAID

## 2021-04-27 ENCOUNTER — HOME CARE VISIT (OUTPATIENT)
Dept: HOME HEALTH SERVICES | Facility: HOME HEALTHCARE | Age: 26
End: 2021-04-27
Payer: MEDICAID

## 2021-04-27 VITALS
SYSTOLIC BLOOD PRESSURE: 100 MMHG | OXYGEN SATURATION: 97 % | DIASTOLIC BLOOD PRESSURE: 70 MMHG | HEART RATE: 77 BPM | TEMPERATURE: 98.5 F | RESPIRATION RATE: 16 BRPM

## 2021-04-27 VITALS
HEIGHT: 66 IN | BODY MASS INDEX: 26.2 KG/M2 | SYSTOLIC BLOOD PRESSURE: 100 MMHG | DIASTOLIC BLOOD PRESSURE: 70 MMHG | WEIGHT: 163 LBS

## 2021-04-27 DIAGNOSIS — G31.89 COGNITIVE AND NEUROBEHAVIORAL DYSFUNCTION FOLLOWING BRAIN INJURY (HCC): ICD-10-CM

## 2021-04-27 DIAGNOSIS — R39.81 URINARY INCONTINENCE DUE TO COGNITIVE IMPAIRMENT: ICD-10-CM

## 2021-04-27 DIAGNOSIS — F09 COGNITIVE AND NEUROBEHAVIORAL DYSFUNCTION FOLLOWING BRAIN INJURY (HCC): ICD-10-CM

## 2021-04-27 DIAGNOSIS — R15.9 INCONTINENCE OF FECES, UNSPECIFIED FECAL INCONTINENCE TYPE: ICD-10-CM

## 2021-04-27 DIAGNOSIS — S06.9XAS COGNITIVE AND NEUROBEHAVIORAL DYSFUNCTION FOLLOWING BRAIN INJURY (HCC): ICD-10-CM

## 2021-04-27 PROCEDURE — G0151 HHCP-SERV OF PT,EA 15 MIN: HCPCS

## 2021-04-27 PROCEDURE — 99213 OFFICE O/P EST LOW 20 MIN: CPT | Mod: CR | Performed by: FAMILY MEDICINE

## 2021-04-27 PROCEDURE — G0152 HHCP-SERV OF OT,EA 15 MIN: HCPCS

## 2021-04-27 ASSESSMENT — FIBROSIS 4 INDEX: FIB4 SCORE: 0.19

## 2021-04-27 ASSESSMENT — ENCOUNTER SYMPTOMS
POOR JUDGMENT: 1
COUGH: 0
SHORTNESS OF BREATH: 0
DIFFICULTY THINKING: 1
DIFFICULTY THINKING: 1
ABDOMINAL PAIN: 0

## 2021-04-27 NOTE — PROGRESS NOTES
Telemedicine: Established Patient   This evaluation was conducted via Zoom using secure and encrypted videoconferencing technology. The patient was in a private location in the state of [place].    The patient's identity was confirmed and verbal consent was obtained for this virtual visit.    Subjective:   CC:   Chief Complaint   Patient presents with   • Follow-Up       Rey Medina is a 25 y.o. 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 Walking with walker. Communication is much improved   Mom states that she is slowly introducing small chunks of food, and patient is able to eat on his own.  Weight has been stable  Has been doing well on mirtazapine 30 mg nightly.    Urinary incontinence due to cognitive impairment  Toileting well now and no longer needing diapers.  He is able to let his mom know when he needs to use the restroom.          Review of Systems   Respiratory: Negative for cough and shortness of breath.    Cardiovascular: Negative for chest pain.   Gastrointestinal: Negative for abdominal pain.         Allergies   Allergen Reactions   • Vancomycin Swelling     Lips  With red face and neck   • Other Misc Rash     Allergic to name band.       Current medicines (including changes today)  Current Outpatient Medications   Medication Sig Dispense Refill   • polyethylene glycol 3350 (MIRALAX) 17 GM/SCOOP Powder Take 17 g by mouth 1 time a day as needed (constipation). 507 g 2   • mirtazapine (REMERON) 30 MG Tab tablet Take 1 tablet by mouth every bedtime. 30 tablet 2   • NON SPECIFIED 1 ensure 2 times daily 60 Each 11   • levETIRAcetam (KEPPRA) 500 MG Tab Take 0.5 Tabs by mouth every day at 6 PM. PM only 90 Tab 3   • NON SPECIFIED 4 cans of beneprotein (227g per can) per month 4 Each 11   • Omega-3 Fatty Acids (OMEGA 3 500) 500 MG Cap  Take 1 Cap by mouth every day. Indications: Dietary Deficiency     • Magnesium 400 MG Cap Take 1 Cap by mouth every day. Indications: Malnutrition     • RA TURMERIC EXTRA STRENGTH PO Take 1,000 mg by mouth every day. Indications: supplement     • Cholecalciferol (VITAMIN D3) 2000 UNIT Cap Take 2,000 Units by mouth every day.     • non-formulary med Inhale 1 Inhalation 2 times a day as needed (shortness of breath). Amborxol HCL Mucosolvan  Indications: shorness of breath     • non-formulary med Take 1 Dose Pack by mouth every day. Emergen C     • propranolol (INDERAL) 20 MG Tab TAKE 1 TABLET BY MOUTH THREE TIMES DAILY 180 Tab 2   • Cyanocobalamin (B-12 PO) Take 1 Tab by mouth every morning.     • Misc. Devices Misc Incontinence supplies - adult diapers. Length of time needed - lifetime or 999 months. 999 Each 999   • montelukast (SINGULAIR) 10 MG Tab Take 1 Tab by mouth as needed (to reduce amount of pills needing to be taken daily ). (Patient taking differently: Take 10 mg by mouth 1 time a day as needed (to reduce amount of pills needing to be taken daily).) 90 Tab 2     No current facility-administered medications for this visit.       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  He  has a past surgical history that includes anal fistulotomy; peg placement; tracheostomy; and  shunt insertion.       Objective:   /70   Ht 1.676 m (5' 5.98\")   Wt 73.9 kg (163 lb)   BMI 26.32 kg/m²     Physical Exam  Constitutional: Alert, no distress, well-groomed.  Skin: no lesions  Eye: Conjunctivae are normal  ENMT: Lips without lesions  Neck: Trachea midline  Respiratory: Unlabored respiratory effort, no cough.  Psych: Answers questions and communicating well.        Assessment and Plan:   The following treatment plan was discussed:     1. 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  Mom has no complaints.    2. Urinary incontinence due to cognitive impairment    3. Incontinence of feces, unspecified fecal incontinence type        Follow-up: Return in about 3 months (around 7/27/2021).         "

## 2021-04-27 NOTE — ASSESSMENT & PLAN NOTE
Doing well with all home therapies  Speech is better, Left leg still mildly weak. Walking with walker. Communication is much improved   Mom states that she is slowly introducing small chunks of food, and patient is able to eat on his own.  Weight has been stable  Has been doing well on mirtazapine 30 mg nightly.

## 2021-04-27 NOTE — ASSESSMENT & PLAN NOTE
Toileting well now and no longer needing diapers.  He is able to let his mom know when he needs to use the restroom.

## 2021-04-28 ENCOUNTER — TELEPHONE (OUTPATIENT)
Dept: OCCUPATIONAL THERAPY | Facility: REHABILITATION | Age: 26
End: 2021-04-28

## 2021-04-28 ENCOUNTER — HOME CARE VISIT (OUTPATIENT)
Dept: HOME HEALTH SERVICES | Facility: HOME HEALTHCARE | Age: 26
End: 2021-04-28
Payer: MEDICAID

## 2021-04-28 VITALS
OXYGEN SATURATION: 98 % | RESPIRATION RATE: 17 BRPM | HEART RATE: 106 BPM | TEMPERATURE: 98.4 F | SYSTOLIC BLOOD PRESSURE: 120 MMHG | DIASTOLIC BLOOD PRESSURE: 68 MMHG

## 2021-04-28 PROCEDURE — G0153 HHCP-SVS OF S/L PATH,EA 15MN: HCPCS

## 2021-04-28 ASSESSMENT — ENCOUNTER SYMPTOMS: DIFFICULTY THINKING: 1

## 2021-04-28 NOTE — OP THERAPY DISCHARGE SUMMARY
Outpatient Occupational Therapy  DISCHARGE SUMMARY NOTE    [facility] [address].  [address]  [place] [address]  Phone:  [phone]  Fax:  [phone]    Date of Visit: 04/28/2021    Patient: Rey Medina  YOB: 1995  MRN: [id number]     Referring Provider: No referring provider defined for this encounter.   Referring Diagnosis: No admission diagnoses are documented for this encounter.     Occupational Therapy Occurrence Codes               Your patient is being discharged from Occupational Therapy with the following comments:   · Patient has failed to schedule or reschedule follow-up visits    Comments:  Pt was seen for 7 OT visits up until 11/11/2020 but has not returned for any follow-up appointments.     Limitations Remaining:  unkonwn    Recommendations:  D/C to HEP.  D/C OT.    Antonio Rae MS,OTR/L    Date: 4/28/2021

## 2021-04-29 ENCOUNTER — HOME CARE VISIT (OUTPATIENT)
Dept: HOME HEALTH SERVICES | Facility: HOME HEALTHCARE | Age: 26
End: 2021-04-29
Payer: MEDICAID

## 2021-04-29 VITALS
OXYGEN SATURATION: 98 % | DIASTOLIC BLOOD PRESSURE: 70 MMHG | HEART RATE: 72 BPM | TEMPERATURE: 98.7 F | SYSTOLIC BLOOD PRESSURE: 106 MMHG | RESPIRATION RATE: 16 BRPM

## 2021-04-29 PROCEDURE — G0152 HHCP-SERV OF OT,EA 15 MIN: HCPCS

## 2021-04-29 PROCEDURE — G0151 HHCP-SERV OF PT,EA 15 MIN: HCPCS

## 2021-04-29 ASSESSMENT — ENCOUNTER SYMPTOMS
DIFFICULTY THINKING: 1
POOR JUDGMENT: 1
DIFFICULTY THINKING: 1

## 2021-04-30 NOTE — CASE COMMUNICATION
noted  ----- Message -----  From: Saray Islas, MARQUIS  Sent: 4/28/2021  10:45 AM PDT  To: EVER Bearden: heart rate 106-115, all other vital within parameters.

## 2021-05-03 ENCOUNTER — HOME CARE VISIT (OUTPATIENT)
Dept: HOME HEALTH SERVICES | Facility: HOME HEALTHCARE | Age: 26
End: 2021-05-03
Payer: MEDICAID

## 2021-05-03 VITALS — HEART RATE: 77 BPM | OXYGEN SATURATION: 94 % | TEMPERATURE: 98.6 F | RESPIRATION RATE: 14 BRPM

## 2021-05-03 VITALS
DIASTOLIC BLOOD PRESSURE: 68 MMHG | SYSTOLIC BLOOD PRESSURE: 112 MMHG | RESPIRATION RATE: 15 BRPM | OXYGEN SATURATION: 97 % | HEART RATE: 68 BPM | TEMPERATURE: 98.4 F

## 2021-05-03 PROCEDURE — G0153 HHCP-SVS OF S/L PATH,EA 15MN: HCPCS

## 2021-05-03 PROCEDURE — G0152 HHCP-SERV OF OT,EA 15 MIN: HCPCS

## 2021-05-03 ASSESSMENT — ENCOUNTER SYMPTOMS
DIFFICULTY THINKING: 1
DIFFICULTY THINKING: 1

## 2021-05-04 ENCOUNTER — HOME CARE VISIT (OUTPATIENT)
Dept: HOME HEALTH SERVICES | Facility: HOME HEALTHCARE | Age: 26
End: 2021-05-04
Payer: MEDICAID

## 2021-05-04 VITALS
DIASTOLIC BLOOD PRESSURE: 70 MMHG | OXYGEN SATURATION: 96 % | HEART RATE: 84 BPM | RESPIRATION RATE: 16 BRPM | TEMPERATURE: 98.7 F | SYSTOLIC BLOOD PRESSURE: 100 MMHG

## 2021-05-04 PROCEDURE — G0151 HHCP-SERV OF PT,EA 15 MIN: HCPCS

## 2021-05-04 PROCEDURE — G0152 HHCP-SERV OF OT,EA 15 MIN: HCPCS

## 2021-05-04 ASSESSMENT — ENCOUNTER SYMPTOMS
POOR JUDGMENT: 1
DIFFICULTY THINKING: 1
DIFFICULTY THINKING: 1

## 2021-05-05 ENCOUNTER — HOME CARE VISIT (OUTPATIENT)
Dept: HOME HEALTH SERVICES | Facility: HOME HEALTHCARE | Age: 26
End: 2021-05-05
Payer: MEDICAID

## 2021-05-05 VITALS
DIASTOLIC BLOOD PRESSURE: 64 MMHG | TEMPERATURE: 99.2 F | RESPIRATION RATE: 16 BRPM | HEART RATE: 81 BPM | SYSTOLIC BLOOD PRESSURE: 94 MMHG | OXYGEN SATURATION: 97 %

## 2021-05-05 DIAGNOSIS — I61.0 NONTRAUMATIC SUBCORTICAL HEMORRHAGE OF CEREBRAL HEMISPHERE, UNSPECIFIED LATERALITY (HCC): ICD-10-CM

## 2021-05-05 PROCEDURE — G0153 HHCP-SVS OF S/L PATH,EA 15MN: HCPCS

## 2021-05-05 RX ORDER — LEVETIRACETAM 500 MG/1
250 TABLET ORAL DAILY
Qty: 90 TABLET | Refills: 3 | OUTPATIENT
Start: 2021-05-05

## 2021-05-05 NOTE — TELEPHONE ENCOUNTER
----- Message from Rey Medina sent at 5/5/2021  9:31 AM PDT -----  Regarding: RE: Prescription Question  Contact: [phone]  David Diaz,    I called the pharmacy and based on the automated system, I will need a doctors approval for the refill. Please kindly follow up with them as I could not let my son out of this medication. This is the medication that he can be very sensitive each time there are changes.    If u look at the chart the last refill was Jan 4, 2021.    Thank you    ----- Message -----  From: Medical Assistant Joe ALBERTO  Sent: 5/5/21, 9:20 AM  To: Rey Medina  Subject: RE: Prescription Question    Good Morning  I called your pharmacy and they said there is enough refill that is good for a year. So please contact them.    Joe (Medical Assistant)  With regards      ----- Message -----       From:Rey Medina       Sent:5/5/2021  7:43 AM PDT         To:Physician Jennifer Valiente    Subject:Prescription Question    Good morning Dr. Valiente,    We will be out of [facility] in 3 days. I would like to request for a refill. Our pharmacy is still at [facility].    Thank you,    Melinda Mansfield

## 2021-05-06 ENCOUNTER — HOME CARE VISIT (OUTPATIENT)
Dept: HOME HEALTH SERVICES | Facility: HOME HEALTHCARE | Age: 26
End: 2021-05-06
Payer: MEDICAID

## 2021-05-06 VITALS
OXYGEN SATURATION: 98 % | SYSTOLIC BLOOD PRESSURE: 104 MMHG | TEMPERATURE: 98.6 F | HEART RATE: 78 BPM | DIASTOLIC BLOOD PRESSURE: 80 MMHG | RESPIRATION RATE: 16 BRPM

## 2021-05-06 PROCEDURE — G0151 HHCP-SERV OF PT,EA 15 MIN: HCPCS

## 2021-05-06 PROCEDURE — G0152 HHCP-SERV OF OT,EA 15 MIN: HCPCS

## 2021-05-06 ASSESSMENT — ENCOUNTER SYMPTOMS
DIFFICULTY THINKING: 1
DIFFICULTY THINKING: 1
POOR JUDGMENT: 1

## 2021-05-10 ENCOUNTER — HOME CARE VISIT (OUTPATIENT)
Dept: HOME HEALTH SERVICES | Facility: HOME HEALTHCARE | Age: 26
End: 2021-05-10
Payer: MEDICAID

## 2021-05-10 VITALS
RESPIRATION RATE: 15 BRPM | TEMPERATURE: 98.3 F | SYSTOLIC BLOOD PRESSURE: 96 MMHG | DIASTOLIC BLOOD PRESSURE: 74 MMHG | HEART RATE: 81 BPM | OXYGEN SATURATION: 98 %

## 2021-05-10 PROCEDURE — G0153 HHCP-SVS OF S/L PATH,EA 15MN: HCPCS

## 2021-05-10 ASSESSMENT — ENCOUNTER SYMPTOMS: DIFFICULTY THINKING: 1

## 2021-05-11 ENCOUNTER — HOME CARE VISIT (OUTPATIENT)
Dept: HOME HEALTH SERVICES | Facility: HOME HEALTHCARE | Age: 26
End: 2021-05-11
Payer: MEDICAID

## 2021-05-11 VITALS
HEART RATE: 77 BPM | SYSTOLIC BLOOD PRESSURE: 104 MMHG | TEMPERATURE: 97.8 F | RESPIRATION RATE: 16 BRPM | DIASTOLIC BLOOD PRESSURE: 72 MMHG | OXYGEN SATURATION: 97 %

## 2021-05-11 PROCEDURE — G0152 HHCP-SERV OF OT,EA 15 MIN: HCPCS

## 2021-05-11 PROCEDURE — G0151 HHCP-SERV OF PT,EA 15 MIN: HCPCS

## 2021-05-11 ASSESSMENT — ENCOUNTER SYMPTOMS
DIFFICULTY THINKING: 1
POOR JUDGMENT: 1
DIFFICULTY THINKING: 1

## 2021-05-12 ENCOUNTER — HOME CARE VISIT (OUTPATIENT)
Dept: HOME HEALTH SERVICES | Facility: HOME HEALTHCARE | Age: 26
End: 2021-05-12
Payer: MEDICAID

## 2021-05-12 VITALS
TEMPERATURE: 98.2 F | RESPIRATION RATE: 14 BRPM | SYSTOLIC BLOOD PRESSURE: 104 MMHG | HEART RATE: 76 BPM | DIASTOLIC BLOOD PRESSURE: 72 MMHG | OXYGEN SATURATION: 96 %

## 2021-05-12 PROCEDURE — G0153 HHCP-SVS OF S/L PATH,EA 15MN: HCPCS

## 2021-05-12 PROCEDURE — G0152 HHCP-SERV OF OT,EA 15 MIN: HCPCS

## 2021-05-12 ASSESSMENT — ENCOUNTER SYMPTOMS
DIFFICULTY THINKING: 1
DIFFICULTY THINKING: 1

## 2021-05-13 ENCOUNTER — HOME CARE VISIT (OUTPATIENT)
Dept: HOME HEALTH SERVICES | Facility: HOME HEALTHCARE | Age: 26
End: 2021-05-13
Payer: MEDICAID

## 2021-05-13 VITALS
OXYGEN SATURATION: 94 % | TEMPERATURE: 98.2 F | RESPIRATION RATE: 16 BRPM | DIASTOLIC BLOOD PRESSURE: 80 MMHG | HEART RATE: 80 BPM | SYSTOLIC BLOOD PRESSURE: 110 MMHG

## 2021-05-13 PROCEDURE — G0151 HHCP-SERV OF PT,EA 15 MIN: HCPCS

## 2021-05-13 PROCEDURE — G0152 HHCP-SERV OF OT,EA 15 MIN: HCPCS

## 2021-05-13 ASSESSMENT — ENCOUNTER SYMPTOMS
POOR JUDGMENT: 1
DIFFICULTY THINKING: 1
DIFFICULTY THINKING: 1

## 2021-05-13 NOTE — Clinical Note
PT reassessment completed, requesting additional HHPT visits with frequency of 2w4 effective as of 05/16/2021.

## 2021-05-17 ENCOUNTER — HOME CARE VISIT (OUTPATIENT)
Dept: HOME HEALTH SERVICES | Facility: HOME HEALTHCARE | Age: 26
End: 2021-05-17
Payer: MEDICAID

## 2021-05-17 VITALS
DIASTOLIC BLOOD PRESSURE: 78 MMHG | SYSTOLIC BLOOD PRESSURE: 104 MMHG | OXYGEN SATURATION: 97 % | HEART RATE: 74 BPM | TEMPERATURE: 98.1 F | RESPIRATION RATE: 14 BRPM

## 2021-05-17 PROCEDURE — 665003 FOLLOW UP-HOME HEALTH

## 2021-05-17 PROCEDURE — G0152 HHCP-SERV OF OT,EA 15 MIN: HCPCS

## 2021-05-17 PROCEDURE — G0153 HHCP-SVS OF S/L PATH,EA 15MN: HCPCS

## 2021-05-17 ASSESSMENT — ENCOUNTER SYMPTOMS
DIFFICULTY THINKING: 1
DIFFICULTY THINKING: 1

## 2021-05-18 ENCOUNTER — HOME CARE VISIT (OUTPATIENT)
Dept: HOME HEALTH SERVICES | Facility: HOME HEALTHCARE | Age: 26
End: 2021-05-18
Payer: MEDICAID

## 2021-05-18 VITALS
DIASTOLIC BLOOD PRESSURE: 75 MMHG | TEMPERATURE: 98.5 F | OXYGEN SATURATION: 98 % | HEART RATE: 79 BPM | SYSTOLIC BLOOD PRESSURE: 100 MMHG | RESPIRATION RATE: 16 BRPM

## 2021-05-18 PROCEDURE — G0152 HHCP-SERV OF OT,EA 15 MIN: HCPCS

## 2021-05-18 PROCEDURE — G0151 HHCP-SERV OF PT,EA 15 MIN: HCPCS

## 2021-05-18 ASSESSMENT — ENCOUNTER SYMPTOMS
POOR JUDGMENT: 1
DIFFICULTY THINKING: 1
DIFFICULTY THINKING: 1

## 2021-05-19 ENCOUNTER — HOME CARE VISIT (OUTPATIENT)
Dept: HOME HEALTH SERVICES | Facility: HOME HEALTHCARE | Age: 26
End: 2021-05-19
Payer: MEDICAID

## 2021-05-19 VITALS
OXYGEN SATURATION: 97 % | DIASTOLIC BLOOD PRESSURE: 68 MMHG | HEART RATE: 70 BPM | SYSTOLIC BLOOD PRESSURE: 98 MMHG | TEMPERATURE: 99.4 F | RESPIRATION RATE: 14 BRPM

## 2021-05-19 PROCEDURE — G0153 HHCP-SVS OF S/L PATH,EA 15MN: HCPCS

## 2021-05-19 ASSESSMENT — ENCOUNTER SYMPTOMS: DIFFICULTY THINKING: 1

## 2021-05-20 ENCOUNTER — HOME CARE VISIT (OUTPATIENT)
Dept: HOME HEALTH SERVICES | Facility: HOME HEALTHCARE | Age: 26
End: 2021-05-20
Payer: MEDICAID

## 2021-05-20 PROCEDURE — G0151 HHCP-SERV OF PT,EA 15 MIN: HCPCS

## 2021-05-20 PROCEDURE — G0152 HHCP-SERV OF OT,EA 15 MIN: HCPCS

## 2021-05-20 ASSESSMENT — ENCOUNTER SYMPTOMS: DIFFICULTY THINKING: 1

## 2021-05-21 VITALS
TEMPERATURE: 97.6 F | OXYGEN SATURATION: 96 % | DIASTOLIC BLOOD PRESSURE: 80 MMHG | HEART RATE: 68 BPM | SYSTOLIC BLOOD PRESSURE: 110 MMHG | RESPIRATION RATE: 16 BRPM

## 2021-05-21 ASSESSMENT — ENCOUNTER SYMPTOMS
POOR JUDGMENT: 1
DIFFICULTY THINKING: 1

## 2021-05-24 ENCOUNTER — HOME CARE VISIT (OUTPATIENT)
Dept: HOME HEALTH SERVICES | Facility: HOME HEALTHCARE | Age: 26
End: 2021-05-24
Payer: MEDICAID

## 2021-05-24 VITALS
SYSTOLIC BLOOD PRESSURE: 102 MMHG | DIASTOLIC BLOOD PRESSURE: 70 MMHG | HEART RATE: 78 BPM | RESPIRATION RATE: 17 BRPM | OXYGEN SATURATION: 98 % | TEMPERATURE: 98.8 F

## 2021-05-24 PROCEDURE — G0153 HHCP-SVS OF S/L PATH,EA 15MN: HCPCS

## 2021-05-24 ASSESSMENT — ENCOUNTER SYMPTOMS: DIFFICULTY THINKING: 1

## 2021-05-25 ENCOUNTER — HOME CARE VISIT (OUTPATIENT)
Dept: HOME HEALTH SERVICES | Facility: HOME HEALTHCARE | Age: 26
End: 2021-05-25
Payer: MEDICAID

## 2021-05-25 VITALS
DIASTOLIC BLOOD PRESSURE: 80 MMHG | SYSTOLIC BLOOD PRESSURE: 110 MMHG | HEART RATE: 73 BPM | OXYGEN SATURATION: 98 % | TEMPERATURE: 98.1 F | RESPIRATION RATE: 16 BRPM

## 2021-05-25 VITALS
DIASTOLIC BLOOD PRESSURE: 80 MMHG | RESPIRATION RATE: 16 BRPM | TEMPERATURE: 98.1 F | HEART RATE: 73 BPM | SYSTOLIC BLOOD PRESSURE: 110 MMHG | OXYGEN SATURATION: 98 %

## 2021-05-25 PROCEDURE — G0152 HHCP-SERV OF OT,EA 15 MIN: HCPCS

## 2021-05-25 PROCEDURE — G0151 HHCP-SERV OF PT,EA 15 MIN: HCPCS

## 2021-05-25 ASSESSMENT — ENCOUNTER SYMPTOMS
DIFFICULTY THINKING: 1
DIFFICULTY THINKING: 1
POOR JUDGMENT: 1

## 2021-05-26 ENCOUNTER — HOME CARE VISIT (OUTPATIENT)
Dept: HOME HEALTH SERVICES | Facility: HOME HEALTHCARE | Age: 26
End: 2021-05-26
Payer: MEDICAID

## 2021-05-26 VITALS
RESPIRATION RATE: 18 BRPM | HEART RATE: 70 BPM | SYSTOLIC BLOOD PRESSURE: 94 MMHG | OXYGEN SATURATION: 98 % | DIASTOLIC BLOOD PRESSURE: 78 MMHG | TEMPERATURE: 98.6 F

## 2021-05-26 VITALS
SYSTOLIC BLOOD PRESSURE: 98 MMHG | RESPIRATION RATE: 16 BRPM | TEMPERATURE: 98 F | HEART RATE: 79 BPM | DIASTOLIC BLOOD PRESSURE: 70 MMHG | OXYGEN SATURATION: 95 %

## 2021-05-26 PROCEDURE — G0153 HHCP-SVS OF S/L PATH,EA 15MN: HCPCS

## 2021-05-26 PROCEDURE — G0152 HHCP-SERV OF OT,EA 15 MIN: HCPCS

## 2021-05-26 ASSESSMENT — ENCOUNTER SYMPTOMS
DIFFICULTY THINKING: 1
DIFFICULTY THINKING: 1

## 2021-05-27 ENCOUNTER — HOME CARE VISIT (OUTPATIENT)
Dept: HOME HEALTH SERVICES | Facility: HOME HEALTHCARE | Age: 26
End: 2021-05-27
Payer: MEDICAID

## 2021-05-27 VITALS
HEART RATE: 72 BPM | OXYGEN SATURATION: 96 % | SYSTOLIC BLOOD PRESSURE: 110 MMHG | RESPIRATION RATE: 16 BRPM | DIASTOLIC BLOOD PRESSURE: 80 MMHG | TEMPERATURE: 97.8 F

## 2021-05-27 PROCEDURE — G0152 HHCP-SERV OF OT,EA 15 MIN: HCPCS

## 2021-05-27 PROCEDURE — G0151 HHCP-SERV OF PT,EA 15 MIN: HCPCS

## 2021-05-27 ASSESSMENT — ENCOUNTER SYMPTOMS
POOR JUDGMENT: 1
DIFFICULTY THINKING: 1
DIFFICULTY THINKING: 1

## 2021-05-31 ENCOUNTER — HOME CARE VISIT (OUTPATIENT)
Dept: HOME HEALTH SERVICES | Facility: HOME HEALTHCARE | Age: 26
End: 2021-05-31
Payer: MEDICAID

## 2021-05-31 VITALS
SYSTOLIC BLOOD PRESSURE: 104 MMHG | OXYGEN SATURATION: 98 % | RESPIRATION RATE: 14 BRPM | HEART RATE: 70 BPM | TEMPERATURE: 98.1 F | DIASTOLIC BLOOD PRESSURE: 76 MMHG

## 2021-05-31 PROCEDURE — G0152 HHCP-SERV OF OT,EA 15 MIN: HCPCS

## 2021-05-31 ASSESSMENT — ENCOUNTER SYMPTOMS: DIFFICULTY THINKING: 1

## 2021-06-01 ENCOUNTER — HOME CARE VISIT (OUTPATIENT)
Dept: HOME HEALTH SERVICES | Facility: HOME HEALTHCARE | Age: 26
End: 2021-06-01
Payer: MEDICAID

## 2021-06-01 VITALS
SYSTOLIC BLOOD PRESSURE: 110 MMHG | DIASTOLIC BLOOD PRESSURE: 80 MMHG | TEMPERATURE: 97.8 F | HEART RATE: 71 BPM | RESPIRATION RATE: 16 BRPM | OXYGEN SATURATION: 96 %

## 2021-06-01 PROCEDURE — G0151 HHCP-SERV OF PT,EA 15 MIN: HCPCS

## 2021-06-01 PROCEDURE — G0152 HHCP-SERV OF OT,EA 15 MIN: HCPCS

## 2021-06-01 ASSESSMENT — ENCOUNTER SYMPTOMS
DIFFICULTY THINKING: 1
DIFFICULTY THINKING: 1
POOR JUDGMENT: 1

## 2021-06-02 ENCOUNTER — HOME CARE VISIT (OUTPATIENT)
Dept: HOME HEALTH SERVICES | Facility: HOME HEALTHCARE | Age: 26
End: 2021-06-02
Payer: MEDICAID

## 2021-06-02 VITALS
DIASTOLIC BLOOD PRESSURE: 78 MMHG | OXYGEN SATURATION: 98 % | RESPIRATION RATE: 16 BRPM | HEART RATE: 73 BPM | TEMPERATURE: 98.8 F | SYSTOLIC BLOOD PRESSURE: 102 MMHG

## 2021-06-02 PROCEDURE — G0152 HHCP-SERV OF OT,EA 15 MIN: HCPCS

## 2021-06-02 PROCEDURE — G0153 HHCP-SVS OF S/L PATH,EA 15MN: HCPCS

## 2021-06-02 ASSESSMENT — ENCOUNTER SYMPTOMS
DIFFICULTY THINKING: 1
DIFFICULTY THINKING: 1

## 2021-06-03 ENCOUNTER — HOME CARE VISIT (OUTPATIENT)
Dept: HOME HEALTH SERVICES | Facility: HOME HEALTHCARE | Age: 26
End: 2021-06-03
Payer: MEDICAID

## 2021-06-03 VITALS
TEMPERATURE: 97.8 F | HEART RATE: 71 BPM | OXYGEN SATURATION: 96 % | DIASTOLIC BLOOD PRESSURE: 80 MMHG | SYSTOLIC BLOOD PRESSURE: 110 MMHG | RESPIRATION RATE: 16 BRPM

## 2021-06-03 PROCEDURE — G0151 HHCP-SERV OF PT,EA 15 MIN: HCPCS

## 2021-06-03 ASSESSMENT — ACTIVITIES OF DAILY LIVING (ADL): AMBULATION ASSISTANCE ON FLAT SURFACES: 1

## 2021-06-03 ASSESSMENT — ENCOUNTER SYMPTOMS
DIFFICULTY THINKING: 1
POOR JUDGMENT: 1

## 2021-06-04 ENCOUNTER — HOME CARE VISIT (OUTPATIENT)
Dept: HOME HEALTH SERVICES | Facility: HOME HEALTHCARE | Age: 26
End: 2021-06-04
Payer: MEDICAID

## 2021-06-04 VITALS
SYSTOLIC BLOOD PRESSURE: 106 MMHG | HEART RATE: 74 BPM | DIASTOLIC BLOOD PRESSURE: 78 MMHG | TEMPERATURE: 98.5 F | OXYGEN SATURATION: 97 % | RESPIRATION RATE: 15 BRPM

## 2021-06-04 PROCEDURE — G0153 HHCP-SVS OF S/L PATH,EA 15MN: HCPCS

## 2021-06-04 ASSESSMENT — ENCOUNTER SYMPTOMS: DIFFICULTY THINKING: 1

## 2021-06-07 ENCOUNTER — HOME CARE VISIT (OUTPATIENT)
Dept: HOME HEALTH SERVICES | Facility: HOME HEALTHCARE | Age: 26
End: 2021-06-07
Payer: MEDICAID

## 2021-06-07 VITALS
HEART RATE: 83 BPM | RESPIRATION RATE: 15 BRPM | TEMPERATURE: 98.1 F | OXYGEN SATURATION: 96 % | DIASTOLIC BLOOD PRESSURE: 64 MMHG | SYSTOLIC BLOOD PRESSURE: 104 MMHG

## 2021-06-07 PROCEDURE — G0153 HHCP-SVS OF S/L PATH,EA 15MN: HCPCS

## 2021-06-07 ASSESSMENT — ENCOUNTER SYMPTOMS: DIFFICULTY THINKING: 1

## 2021-06-08 ENCOUNTER — HOME CARE VISIT (OUTPATIENT)
Dept: HOME HEALTH SERVICES | Facility: HOME HEALTHCARE | Age: 26
End: 2021-06-08
Payer: MEDICAID

## 2021-06-08 VITALS
SYSTOLIC BLOOD PRESSURE: 106 MMHG | RESPIRATION RATE: 16 BRPM | TEMPERATURE: 98.4 F | HEART RATE: 76 BPM | OXYGEN SATURATION: 96 % | DIASTOLIC BLOOD PRESSURE: 68 MMHG

## 2021-06-08 PROCEDURE — G0151 HHCP-SERV OF PT,EA 15 MIN: HCPCS

## 2021-06-08 PROCEDURE — G0152 HHCP-SERV OF OT,EA 15 MIN: HCPCS

## 2021-06-08 ASSESSMENT — ENCOUNTER SYMPTOMS
POOR JUDGMENT: 1
DIFFICULTY THINKING: 1
SEVERE DYSPNEA: 1

## 2021-06-08 ASSESSMENT — ACTIVITIES OF DAILY LIVING (ADL): AMBULATION ASSISTANCE ON FLAT SURFACES: 1

## 2021-06-09 ENCOUNTER — HOME CARE VISIT (OUTPATIENT)
Dept: HOME HEALTH SERVICES | Facility: HOME HEALTHCARE | Age: 26
End: 2021-06-09
Payer: MEDICAID

## 2021-06-09 PROCEDURE — G0153 HHCP-SVS OF S/L PATH,EA 15MN: HCPCS

## 2021-06-10 ENCOUNTER — HOME CARE VISIT (OUTPATIENT)
Dept: HOME HEALTH SERVICES | Facility: HOME HEALTHCARE | Age: 26
End: 2021-06-10
Payer: MEDICAID

## 2021-06-10 VITALS
DIASTOLIC BLOOD PRESSURE: 62 MMHG | SYSTOLIC BLOOD PRESSURE: 98 MMHG | OXYGEN SATURATION: 97 % | TEMPERATURE: 98.8 F | RESPIRATION RATE: 16 BRPM | HEART RATE: 71 BPM

## 2021-06-10 VITALS
DIASTOLIC BLOOD PRESSURE: 60 MMHG | TEMPERATURE: 98.2 F | SYSTOLIC BLOOD PRESSURE: 96 MMHG | HEART RATE: 68 BPM | OXYGEN SATURATION: 98 % | RESPIRATION RATE: 16 BRPM

## 2021-06-10 PROCEDURE — G0152 HHCP-SERV OF OT,EA 15 MIN: HCPCS

## 2021-06-10 PROCEDURE — G0151 HHCP-SERV OF PT,EA 15 MIN: HCPCS

## 2021-06-10 ASSESSMENT — ENCOUNTER SYMPTOMS
DIFFICULTY THINKING: 1
POOR JUDGMENT: 1
DIFFICULTY THINKING: 1

## 2021-06-10 ASSESSMENT — ACTIVITIES OF DAILY LIVING (ADL): AMBULATION ASSISTANCE ON FLAT SURFACES: 1

## 2021-06-10 NOTE — Clinical Note
Requesting additional HHPT visits with frequency of 1w1 effective week of 06/13/21 to facilitate home health recertification.

## 2021-06-11 NOTE — CASE COMMUNICATION
noted  ----- Message -----  From: Adrianne Raphael, PT  Sent: 6/10/2021  10:47 PM PDT  To: Norma Holliday R.N.      Requesting additional HHPT visits with frequency of 1w1 effective week of 06/13/21 to facilitate home health recertification.

## 2021-06-11 NOTE — CASE COMMUNICATION
noted  ----- Message -----  From: MARQUIS Blackburn  Sent: 6/10/2021   8:42 AM PDT  To: Norma Holliday R.N.      Requesting further auth 1w1 effective 6/13.

## 2021-06-14 ENCOUNTER — HOME CARE VISIT (OUTPATIENT)
Dept: HOME HEALTH SERVICES | Facility: HOME HEALTHCARE | Age: 26
End: 2021-06-14
Payer: MEDICAID

## 2021-06-14 VITALS
HEART RATE: 83 BPM | SYSTOLIC BLOOD PRESSURE: 98 MMHG | RESPIRATION RATE: 16 BRPM | TEMPERATURE: 98.4 F | DIASTOLIC BLOOD PRESSURE: 70 MMHG | OXYGEN SATURATION: 98 %

## 2021-06-14 PROCEDURE — G0153 HHCP-SVS OF S/L PATH,EA 15MN: HCPCS

## 2021-06-14 ASSESSMENT — ENCOUNTER SYMPTOMS: DIFFICULTY THINKING: 1

## 2021-06-15 ENCOUNTER — HOME CARE VISIT (OUTPATIENT)
Dept: HOME HEALTH SERVICES | Facility: HOME HEALTHCARE | Age: 26
End: 2021-06-15
Payer: MEDICAID

## 2021-06-15 VITALS
SYSTOLIC BLOOD PRESSURE: 100 MMHG | DIASTOLIC BLOOD PRESSURE: 80 MMHG | TEMPERATURE: 98.7 F | HEART RATE: 82 BPM | OXYGEN SATURATION: 95 % | RESPIRATION RATE: 16 BRPM

## 2021-06-15 PROCEDURE — G0151 HHCP-SERV OF PT,EA 15 MIN: HCPCS

## 2021-06-15 ASSESSMENT — PATIENT HEALTH QUESTIONNAIRE - PHQ9: CLINICAL INTERPRETATION OF PHQ2 SCORE: 0

## 2021-06-15 ASSESSMENT — ACTIVITIES OF DAILY LIVING (ADL)
OASIS_M1830: 05
AMBULATION ASSISTANCE ON FLAT SURFACES: 1

## 2021-06-15 ASSESSMENT — ENCOUNTER SYMPTOMS
DIFFICULTY THINKING: 1
POOR JUDGMENT: 1

## 2021-06-15 NOTE — Clinical Note
Patient recertified from homehealth effective 06/16/2021; requesting follow up visits as follows:   1. HH PT 2w4 effective 06/16/2021   2. HH OT 1w1 effective week of 06/16/2021 for reassessment and treat.   3. HH SLP 1w1 effective week of 06/16/2021 for reassessment and treat.

## 2021-06-15 NOTE — Clinical Note
Patient recertified from homehealth effective 06/15/2021; requesting follow up visits as follows:  1. HH PT 2w4 effective 06/15/2021  2. HH OT 1w1 effective week of 06/15/2021 for reassessment and treat.  3. HH SLP 1w1 effective week of 06/15/2021 for reassessment and treat.

## 2021-06-16 ENCOUNTER — HOME CARE VISIT (OUTPATIENT)
Dept: HOME HEALTH SERVICES | Facility: HOME HEALTHCARE | Age: 26
End: 2021-06-16
Payer: MEDICAID

## 2021-06-16 VITALS
RESPIRATION RATE: 14 BRPM | HEART RATE: 67 BPM | TEMPERATURE: 99.2 F | OXYGEN SATURATION: 97 % | DIASTOLIC BLOOD PRESSURE: 76 MMHG | SYSTOLIC BLOOD PRESSURE: 102 MMHG

## 2021-06-16 PROCEDURE — 665003 FOLLOW UP-HOME HEALTH

## 2021-06-16 PROCEDURE — G0153 HHCP-SVS OF S/L PATH,EA 15MN: HCPCS

## 2021-06-16 PROCEDURE — G0152 HHCP-SERV OF OT,EA 15 MIN: HCPCS

## 2021-06-16 ASSESSMENT — ENCOUNTER SYMPTOMS
DIFFICULTY THINKING: 1
DIFFICULTY THINKING: 1

## 2021-06-16 NOTE — CASE COMMUNICATION
noted  ----- Message -----  From: Adrianne Raphael, PT  Sent: 6/15/2021  10:49 PM PDT  To: Norma Holliday R.N.      Patient recertified from homehealth effective 06/16/2021; requesting follow up visits as follows:   1.  PT 2w4 effective 06/16/2021   2.  OT 1w1 effective week of 06/16/2021 for reassessment and treat.   3.  SLP 1w1 effective week of 06/16/2021 for reassessment and treat.

## 2021-06-17 ENCOUNTER — HOME CARE VISIT (OUTPATIENT)
Dept: HOME HEALTH SERVICES | Facility: HOME HEALTHCARE | Age: 26
End: 2021-06-17
Payer: MEDICAID

## 2021-06-17 VITALS
RESPIRATION RATE: 16 BRPM | DIASTOLIC BLOOD PRESSURE: 80 MMHG | TEMPERATURE: 98.9 F | SYSTOLIC BLOOD PRESSURE: 110 MMHG | HEART RATE: 77 BPM | OXYGEN SATURATION: 95 %

## 2021-06-17 PROCEDURE — G0152 HHCP-SERV OF OT,EA 15 MIN: HCPCS

## 2021-06-17 PROCEDURE — G0151 HHCP-SERV OF PT,EA 15 MIN: HCPCS

## 2021-06-17 ASSESSMENT — ENCOUNTER SYMPTOMS: DIFFICULTY THINKING: 1

## 2021-06-17 NOTE — CASE COMMUNICATION
nnoted  ----- Message -----  From: MARQUIS Blackburn  Sent: 6/16/2021   5:11 PM PDT  To: Norma Holliday R.N.      SLP Reassessment completed 6/16/21.  Requesting auth 1w1, 2w4

## 2021-06-18 ASSESSMENT — ACTIVITIES OF DAILY LIVING (ADL): AMBULATION ASSISTANCE ON FLAT SURFACES: 1

## 2021-06-18 ASSESSMENT — ENCOUNTER SYMPTOMS
POOR JUDGMENT: 1
DIFFICULTY THINKING: 1

## 2021-06-18 NOTE — CASE COMMUNICATION
noted  ----- Message -----  From: Adrianne Raphael, PT  Sent: 5/13/2021  10:07 PM PDT  To: Norma Holliday R.N.      PT reassessment completed, requesting additional HHPT visits with frequency of 2w4 effective as of 05/16/2021.

## 2021-06-21 ENCOUNTER — HOME CARE VISIT (OUTPATIENT)
Dept: HOME HEALTH SERVICES | Facility: HOME HEALTHCARE | Age: 26
End: 2021-06-21
Payer: MEDICAID

## 2021-06-21 VITALS
HEART RATE: 68 BPM | OXYGEN SATURATION: 98 % | RESPIRATION RATE: 15 BRPM | DIASTOLIC BLOOD PRESSURE: 66 MMHG | TEMPERATURE: 97.9 F | SYSTOLIC BLOOD PRESSURE: 100 MMHG

## 2021-06-21 PROCEDURE — G0153 HHCP-SVS OF S/L PATH,EA 15MN: HCPCS

## 2021-06-21 ASSESSMENT — ENCOUNTER SYMPTOMS: DIFFICULTY THINKING: 1

## 2021-06-22 ENCOUNTER — HOME CARE VISIT (OUTPATIENT)
Dept: HOME HEALTH SERVICES | Facility: HOME HEALTHCARE | Age: 26
End: 2021-06-22
Payer: MEDICAID

## 2021-06-22 VITALS
RESPIRATION RATE: 14 BRPM | DIASTOLIC BLOOD PRESSURE: 75 MMHG | OXYGEN SATURATION: 98 % | HEART RATE: 70 BPM | TEMPERATURE: 97.4 F | SYSTOLIC BLOOD PRESSURE: 102 MMHG

## 2021-06-22 PROCEDURE — G0151 HHCP-SERV OF PT,EA 15 MIN: HCPCS

## 2021-06-22 PROCEDURE — G0152 HHCP-SERV OF OT,EA 15 MIN: HCPCS

## 2021-06-22 ASSESSMENT — ENCOUNTER SYMPTOMS
POOR JUDGMENT: 1
DIFFICULTY THINKING: 1
DIFFICULTY THINKING: 1

## 2021-06-22 ASSESSMENT — ACTIVITIES OF DAILY LIVING (ADL): AMBULATION ASSISTANCE ON FLAT SURFACES: 1

## 2021-06-23 ENCOUNTER — HOME CARE VISIT (OUTPATIENT)
Dept: HOME HEALTH SERVICES | Facility: HOME HEALTHCARE | Age: 26
End: 2021-06-23
Payer: MEDICAID

## 2021-06-23 ENCOUNTER — OFFICE VISIT (OUTPATIENT)
Dept: PHYSICAL MEDICINE AND REHAB | Facility: REHABILITATION | Age: 26
End: 2021-06-23
Payer: MEDICAID

## 2021-06-23 VITALS
DIASTOLIC BLOOD PRESSURE: 64 MMHG | HEIGHT: 66 IN | BODY MASS INDEX: 26.03 KG/M2 | HEART RATE: 70 BPM | SYSTOLIC BLOOD PRESSURE: 106 MMHG | WEIGHT: 162 LBS | RESPIRATION RATE: 16 BRPM | OXYGEN SATURATION: 95 %

## 2021-06-23 VITALS
OXYGEN SATURATION: 98 % | SYSTOLIC BLOOD PRESSURE: 104 MMHG | TEMPERATURE: 98.1 F | DIASTOLIC BLOOD PRESSURE: 70 MMHG | RESPIRATION RATE: 15 BRPM | HEART RATE: 72 BPM

## 2021-06-23 DIAGNOSIS — N31.9 NEUROGENIC BLADDER: ICD-10-CM

## 2021-06-23 DIAGNOSIS — I61.5 NONTRAUMATIC INTRAVENTRICULAR INTRACEREBRAL HEMORRHAGE, UNSPECIFIED LATERALITY (HCC): Primary | ICD-10-CM

## 2021-06-23 DIAGNOSIS — K59.2 NEUROGENIC BOWEL: ICD-10-CM

## 2021-06-23 DIAGNOSIS — F80.9 IMPAIRED VERBAL COMMUNICATION: ICD-10-CM

## 2021-06-23 DIAGNOSIS — R63.0 POOR APPETITE: ICD-10-CM

## 2021-06-23 DIAGNOSIS — R47.1 DYSARTHRIA: ICD-10-CM

## 2021-06-23 PROCEDURE — G0153 HHCP-SVS OF S/L PATH,EA 15MN: HCPCS

## 2021-06-23 PROCEDURE — 99214 OFFICE O/P EST MOD 30 MIN: CPT | Performed by: PHYSICAL MEDICINE & REHABILITATION

## 2021-06-23 RX ORDER — MIRTAZAPINE 30 MG/1
30 TABLET, FILM COATED ORAL
Qty: 90 TABLET | Refills: 1 | Status: SHIPPED | OUTPATIENT
Start: 2021-06-23 | End: 2021-12-15

## 2021-06-23 ASSESSMENT — FIBROSIS 4 INDEX: FIB4 SCORE: 0.19

## 2021-06-23 NOTE — CASE COMMUNICATION
noted  ----- Message -----  From: Norma Mathews, MS,OTR/L  Sent: 6/22/2021  12:49 PM PDT  To: Norma Holliday R.N.      temp is 97.4 today; other vitals fine

## 2021-06-23 NOTE — CASE COMMUNICATION
Quality Review for 6.15.21 [facility] OASIS performed on by EUGENIO Garcia RN on 6.23, 2021:    Edits completed by EUGENIO Garcia RN:  1. Case comm sent to pharm to review meds  2. Changed flu question to yes and answered #3 per Epic  3. Added proper medication use, smoke detectors and fall risk to safety measures  4. Changed  to 2 per GP6579 of being independent with bed mobility and needing partial/mod assist with sit to stand and chair transfers. Changed [id number] I,J to 3 per gait assessment reporting amb 80 feet on level surfaces with mod assist.   5. Entered DC plan per chart review

## 2021-06-23 NOTE — PROGRESS NOTES
[facility]  [facility]  [address] [place] [address]  Ph: [phone]    FOLLOW UP PATIENT EVALUATION    Patient Name: Rey Medina   Patient : 1995  Patient Age: 25 y.o.   PCP: Nirmala Man M.D.    Examining Physician: Dr. 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   - Therapy: Practicing with the walker now. Timing is more towards normal. Physically still has balance issues, but improving. Gets dressed on his own including his own shoes.   - Appetite: Eating on his own now. Mostly healthy food. Still on Remeron at night.   - Bowel: Not using suppositories for a while. However, when started regular food has gone sometimes 3 days without BM. Doesn't have hard pellets. Gives Miralax daily. Has tried castor oil as well. Rey doesn't like the suppositories.   - Bladder: Going volitionally. Asks for urinal. No more adult brief.   - Mood: Still takes Propranolol 20mg TID.   - Making great gains overall. Will be getting OP therapy.     Review of Systems:  All other pertinent positive review of systems are noted above in HPI.     Past Medical History:  Past Medical History:   Diagnosis Date   • COVID-19    • ICH (intracerebral hemorrhage) (HCC)    • Stroke (HCC)       Past Surgical History:   Procedure Laterality Date   • ANAL  FISTULOTOMY     • PEG PLACEMENT     • TRACHEOSTOMY     •  SHUNT INSERTION          Current Outpatient Medications:   •  polyethylene glycol 3350 (MIRALAX) 17 GM/SCOOP Powder, DISSOLVE 17 GRAMS IN LIQUID AND DRINK ONCE DAILY AS NEEDED FOR CONSTIPATION, Disp: 510 g, Rfl: 5  •  mirtazapine (REMERON) 30 MG Tab tablet, Take 1 tablet by mouth at bedtime., Disp: 90 tablet, Rfl: 1  •  NON SPECIFIED, 1 ensure 2 times daily, Disp: 60 Each, Rfl: 11  •  levETIRAcetam (KEPPRA) 500 MG Tab, Take 0.5 Tabs by mouth every day at 6 PM. PM only, Disp: 90 Tab, Rfl: 3  •  NON SPECIFIED, 4 cans of beneprotein (227g per can) per month, Disp: 4 Each, Rfl: 11  •  Omega-3 Fatty Acids (OMEGA 3 500) 500 MG Cap, Take 1 Cap by mouth every day. Indications: Dietary Deficiency, Disp: , Rfl:   •  Magnesium 400 MG Cap, Take 1 Cap by mouth every day. Indications: Malnutrition, Disp: , Rfl:   •  RA TURMERIC EXTRA STRENGTH PO, Take 1,000 mg by mouth every day. Indications: supplement, Disp: , Rfl:   •  Cholecalciferol (VITAMIN D3) 2000 UNIT Cap, Take 2,000 Units by mouth every day., Disp: , Rfl:   •  non-formulary med, Inhale 1 Inhalation 2 times a day as needed (shortness of breath). Amborxol HCL Mucosolvan  Indications: shorness of breath, Disp: , Rfl:   •  non-formulary med, Take 1 Dose Pack by mouth every day. Emergen ERIKA, Disp: , Rfl:   •  propranolol (INDERAL) 20 MG Tab, TAKE 1 TABLET BY MOUTH THREE TIMES DAILY, Disp: 180 Tab, Rfl: 2  •  Cyanocobalamin (B-12 PO), Take 1 Tab by mouth every morning., Disp: , Rfl:   •  Misc. Devices Misc, Incontinence supplies - adult diapers. Length of time needed - lifetime or 999 months., Disp: 999 Each, Rfl: 999  •  montelukast (SINGULAIR) 10 MG Tab, Take 1 Tab by mouth as needed (to reduce amount of pills needing to be taken daily ). (Patient taking differently: Take 10 mg by mouth 1 time a day as needed (to reduce amount of pills needing to be taken daily).), Disp: 90 Tab, Rfl: 2  •  magnesium hydroxide (MILK  OF MAGNESIA) 400 MG/5ML Suspension, Take 30 mL by mouth 1 time a day as needed., Disp: , Rfl:   Allergies   Allergen Reactions   • Vancomycin Swelling     Lips  With red face and neck   • Other Misc Rash     Allergic to name band.        Past Social History:  Social History     Socioeconomic History   • Marital status: Single     Spouse name: Not on file   • Number of children: Not on file   • Years of education: Not on file   • Highest education level: Some college, no degree   Occupational History   • Not on file   Tobacco Use   • Smoking status: Never Smoker   • Smokeless tobacco: Never Used   Vaping Use   • Vaping Use: Never used   Substance and Sexual Activity   • Alcohol use: Never   • Drug use: Never   • Sexual activity: Not Currently   Other Topics Concern   •  Service No   • Blood Transfusions No   • Caffeine Concern No   • Occupational Exposure No   • Hobby Hazards No   • Sleep Concern No   • Stress Concern No   • Weight Concern No   • Special Diet Yes   • Back Care No   • Exercise Yes   • Bike Helmet No   • Seat Belt Yes   • Self-Exams No   Social History Narrative    Lives with mom     Social Determinants of Health     Financial Resource Strain: Low Risk    • Difficulty of Paying Living Expenses: Not hard at all   Food Insecurity: No Food Insecurity   • Worried About Running Out of Food in the Last Year: Never true   • Ran Out of Food in the Last Year: Never true   Transportation Needs: No Transportation Needs   • Lack of Transportation (Medical): No   • Lack of Transportation (Non-Medical): No   Physical Activity: Insufficiently Active   • Days of Exercise per Week: 3 days   • Minutes of Exercise per Session: 20 min   Stress: No Stress Concern Present   • Feeling of Stress : Not at all   Social Connections: Socially Isolated   • Frequency of Communication with Friends and Family: Never   • Frequency of Social Gatherings with Friends and Family: More than three times a week   • Attends Christian  Services: Never   • Active Member of Clubs or Organizations: No   • Attends Club or Organization Meetings: Never   • Marital Status: Never    Intimate Partner Violence:    • Fear of Current or Ex-Partner:    • Emotionally Abused:    • Physically Abused:    • Sexually Abused:         Family History:  Family History   Problem Relation Age of Onset   • Hypertension Mother    • Glasses Mother    • Thyroid Mother    • Diabetes Maternal Grandfather    • Stroke Maternal Grandfather    • Diabetes Paternal Grandmother    • Hypertension Maternal Uncle    • Heart Disease Neg Hx    • Cancer Neg Hx        Depression and Opioid Screening  PHQ-9:  Depression Screen (PHQ-2/PHQ-9) 2/11/2021 4/15/2021 6/15/2021   PHQ-2 Total Score - - -   PHQ-2 Total Score 0 0 0   PHQ-9 Total Score - - -     Interpretation of PHQ-9 Total Score   Score Severity   1-4 No Depression   5-9 Mild Depression   10-14 Moderate Depression   15-19 Moderately Severe Depression   20-27 Severe Depression     Opioid Risk Score: No value filed.  Interpretation of Opioid Risk Score   Score 0-3 = Low risk of abuse. Do UDS at least once per year.  Score 4-7 = Moderate risk of abuse. Do UDS 1-4 times per year.  Score 8+ = High risk of abuse. Refer to specialist.      OBJECTIVE:   Vital Signs:  Vitals:    06/23/21 1326   BP: 106/64   Pulse: 70   Resp: 16   SpO2: 95%        Pertinent Labs:  Lab Results   Component Value Date/Time    SODIUM 137 12/12/2020 02:00 AM    POTASSIUM 3.5 (L) 12/12/2020 02:00 AM    CHLORIDE 100 12/12/2020 02:00 AM    CO2 20 12/12/2020 02:00 AM    GLUCOSE 72 12/12/2020 02:00 AM    BUN 10 12/12/2020 02:00 AM    CREATININE 0.66 12/12/2020 02:00 AM       Lab Results   Component Value Date/Time    HBA1C 5.7 (H) 02/06/2020 06:07 AM       Lab Results   Component Value Date/Time    WBC 7.2 12/12/2020 02:00 AM    RBC 4.99 12/12/2020 02:00 AM    HEMOGLOBIN 14.1 12/12/2020 02:00 AM    HEMATOCRIT 43.5 12/12/2020 02:00 AM    MCV 87.2 12/12/2020 02:00 AM     MCH 28.3 12/12/2020 02:00 AM    MCHC 32.4 (L) 12/12/2020 02:00 AM    MPV 11.1 12/12/2020 02:00 AM    NEUTSPOLYS 53.10 12/12/2020 02:00 AM    LYMPHOCYTES 34.80 12/12/2020 02:00 AM    MONOCYTES 8.60 12/12/2020 02:00 AM    EOSINOPHILS 2.10 12/12/2020 02:00 AM    BASOPHILS 1.10 12/12/2020 02:00 AM       Lab Results   Component Value Date/Time    ASTSGOT 17 12/12/2020 02:00 AM    ALTSGPT 38 12/12/2020 02:00 AM        Physical Exam:   GEN: No apparent distress  HEENT: Head normocephalic, atraumatic.  Sclera nonicteric bilaterally, no ocular discharge appreciated bilaterally.  CV: Extremities warm and well-perfused, no peripheral edema appreciated bilaterally.  PULMONARY: Breathing nonlabored on room air, no respiratory accessory muscle use.  Not requiring supplemental oxygen.  ABD: Soft, nontender.  SKIN: No appreciable skin breakdown on exposed areas of skin.  PSYCH: Mood and affect within normal limits.  NEURO: Awake alert.  Conversational.  Logical thought content.    Imaging: No new imaging pertinent to today's visit    ASSESSMENT/PLAN: Rey Medina  is a 25 y.o. 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 The following plan was discussed with the patient who is in agreement.     Visit Diagnoses     ICD-10-CM   1. Nontraumatic intraventricular intracerebral hemorrhage, unspecified laterality (HCC)  I61.5   2. Poor appetite  R63.0   3. Neurogenic bladder  N31.9   4. Impaired verbal communication  F80.9   5. Dysarthria  R47.1   6. Neurogenic bowel  K59.2        Mom assist with majority of history given patient's dysarthria    Rehab/Neuro:   1. 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  2.  No history of seizure, on  Keppra.  Had EEG 5/21/2020  3.  Poor attention secondary to ICH requiring need for neuro-stimulant  4.  Blurry vision after ICH, did have reading glasses prior to stroke.  Blurry vision persistent.  Also having problems with peripheral vision on the right.  5.  Irritability/agitation: Controlled on propranolol.  -Therapy: Continue home health therapy, to transition to outpatient with initial evaluation July 21.  -Function: Cognitively significantly improved, physically still with difficult motor control secondary to apraxia but improving.  -Counseling: Counseled can try to reduce propranolol by half a tablet each week with close monitoring.    Bladder:   -Status: Continent, volitional.  No longer requiring adult briefs.    Bowel:  -Status: Continent, volitional.  Occasionally with constipation.  Patient dislikes suppositories.  -Med management: Continue MiraLAX daily  -Med management: Recommend senna 1 tablet q. noon with goal of every afternoon bowel movement.    Adjustment Disorder:   -Med management: Prescription for mirtazapine 30 mg nightly    GI: Feeding himself.  On regular diet.  -Med management: Prescription for mirtazapine 30 mg daily.    Follow up: 6 months or sooner if needed.    Please note that this dictation was created using voice recognition software. I have made every reasonable attempt to correct obvious errors but there may be errors of grammar and content that I may have overlooked prior to finalization of this note.    Dr. Mandy Luke DO, MS  Department of Physical Medicine & Rehabilitation  Neuro Rehabilitation Clinic  [facility]

## 2021-06-24 ENCOUNTER — DOCUMENTATION (OUTPATIENT)
Dept: MEDICAL GROUP | Facility: PHYSICIAN GROUP | Age: 26
End: 2021-06-24

## 2021-06-24 ENCOUNTER — HOME CARE VISIT (OUTPATIENT)
Dept: HOME HEALTH SERVICES | Facility: HOME HEALTHCARE | Age: 26
End: 2021-06-24
Payer: MEDICAID

## 2021-06-24 VITALS
HEART RATE: 70 BPM | SYSTOLIC BLOOD PRESSURE: 108 MMHG | RESPIRATION RATE: 14 BRPM | DIASTOLIC BLOOD PRESSURE: 79 MMHG | TEMPERATURE: 98.1 F | OXYGEN SATURATION: 97 %

## 2021-06-24 PROCEDURE — G0151 HHCP-SERV OF PT,EA 15 MIN: HCPCS

## 2021-06-24 PROCEDURE — G0179 MD RECERTIFICATION HHA PT: HCPCS | Performed by: FAMILY MEDICINE

## 2021-06-24 PROCEDURE — G0152 HHCP-SERV OF OT,EA 15 MIN: HCPCS

## 2021-06-24 ASSESSMENT — ENCOUNTER SYMPTOMS: DIFFICULTY THINKING: 1

## 2021-06-24 NOTE — PROGRESS NOTES
Medication chart review for [facility] services    PCP:  Sherie Power M.D.  [address] [address]  [place] [address]  Fax: [phone]    Current medication list     Current Outpatient Medications:   •  polyethylene glycol 3350, DISSOLVE 17 GRAMS IN LIQUID AND DRINK ONCE DAILY AS NEEDED FOR CONSTIPATION  •  magnesium hydroxide, 30 mL, Oral, QDAY PRN  •  mirtazapine, 30 mg, Oral, QHS  •  NON SPECIFIED, 1 ensure 2 times daily  •  levETIRAcetam, 250 mg, Oral, DAILY AT 1800  •  NON SPECIFIED, 4 cans of beneprotein (227g per can) per month  •  Omega 3 500, 1 capsule, Oral, DAILY  •  Magnesium, 1 capsule, Oral, DAILY  •  RA TURMERIC EXTRA STRENGTH PO, 1,000 mg, Oral, DAILY  •  Vitamin D3, 2,000 Units, Oral, DAILY  •  non-formulary med, 1 Inhalation, Inhalation, BID PRN  •  non-formulary med, 1 Dose Pack, Oral, DAILY  •  propranolol, TAKE 1 TABLET BY MOUTH THREE TIMES DAILY  •  Cyanocobalamin (B-12 PO), 1 tablet, Oral, QAM  •  Misc. Devices, Incontinence supplies - adult diapers. Length of time needed - lifetime or 999 months.  •  montelukast, 10 mg, Oral, PRN (Patient taking differently: 10 mg, Oral, 1 TIME DAILY PRN, to reduce amount of pills needing to be taken daily)    Allergies   Allergen Reactions   • Vancomycin Swelling     Lips  With red face and neck   • Other Misc Rash     Allergic to name band.       Labs     Lab Results   Component Value Date/Time    SODIUM 137 12/12/2020 02:00 AM    POTASSIUM 3.5 (L) 12/12/2020 02:00 AM    CHLORIDE 100 12/12/2020 02:00 AM    CO2 20 12/12/2020 02:00 AM    GLUCOSE 72 12/12/2020 02:00 AM    BUN 10 12/12/2020 02:00 AM    CREATININE 0.66 12/12/2020 02:00 AM      Lab Results   Component Value Date/Time    ALKPHOSPHAT 132 (H) 12/12/2020 02:00 AM    ASTSGOT 17 12/12/2020 02:00 AM    ALTSGPT 38 12/12/2020 02:00 AM    TBILIRUBIN 0.8 12/12/2020 02:00 AM    ALBUMIN 3.6 12/12/2020 02:00 AM    INR 0.99 08/11/2020 10:00 AM          Assessment and Plan:   • Received referral from [facility].  Medications reviewed.         Yfn Bennett, PharmD, MS, BCACP, [facility]  Phone [phone] fax [phone]    This note was created using voice recognition software (Dragon). The accuracy of the dictation is limited by the abilities of the software. I have reviewed the note prior to signing, however some errors in grammar and context are still possible. If you have any questions related to this note please do not hesitate to contact our office.

## 2021-06-25 ASSESSMENT — ENCOUNTER SYMPTOMS
POOR JUDGMENT: 1
DIFFICULTY THINKING: 1

## 2021-06-25 ASSESSMENT — ACTIVITIES OF DAILY LIVING (ADL): AMBULATION ASSISTANCE ON FLAT SURFACES: 1

## 2021-06-25 NOTE — CASE COMMUNICATION
I agree to these changes  ----- Message -----  From: Ana Garcia R.N.  Sent: 6/23/2021   9:55 AM PDT  To: Adrianne Raphael PT      Quality Review for 6.15.21 Recert OASIS performed on by EUGENIO Garcia RN on 6.23, 2021:    Edits completed by EUGENIO Garcia RN:  1. Case comm sent to pharm to review meds  2. Changed flu question to yes and answered #3 per Epic  3. Added proper medication use, smoke detectors and fall risk to safety measures  4. Changed  to 2 per HH2088 of being independent with bed mobility and needing partial/mod assist with sit to stand and chair transfers. Changed [id number] I,J to 3 per gait assessment reporting amb 80 feet on level surfaces with mod assist.   5. Entered DC plan per chart review

## 2021-06-28 ENCOUNTER — HOME CARE VISIT (OUTPATIENT)
Dept: HOME HEALTH SERVICES | Facility: HOME HEALTHCARE | Age: 26
End: 2021-06-28
Payer: MEDICAID

## 2021-06-28 VITALS
TEMPERATURE: 98 F | HEART RATE: 65 BPM | DIASTOLIC BLOOD PRESSURE: 72 MMHG | RESPIRATION RATE: 20 BRPM | OXYGEN SATURATION: 93 % | SYSTOLIC BLOOD PRESSURE: 102 MMHG

## 2021-06-28 VITALS
RESPIRATION RATE: 14 BRPM | SYSTOLIC BLOOD PRESSURE: 110 MMHG | OXYGEN SATURATION: 98 % | HEART RATE: 61 BPM | DIASTOLIC BLOOD PRESSURE: 78 MMHG | TEMPERATURE: 97.4 F

## 2021-06-28 DIAGNOSIS — I69.191 DYSPHAGIA FOLLOWING NONTRAUMATIC INTRACEREBRAL HEMORRHAGE: ICD-10-CM

## 2021-06-28 PROCEDURE — G0153 HHCP-SVS OF S/L PATH,EA 15MN: HCPCS

## 2021-06-28 PROCEDURE — G0152 HHCP-SERV OF OT,EA 15 MIN: HCPCS

## 2021-06-28 ASSESSMENT — ENCOUNTER SYMPTOMS
DIFFICULTY THINKING: 1
DIFFICULTY THINKING: 1

## 2021-06-29 ENCOUNTER — HOME CARE VISIT (OUTPATIENT)
Dept: HOME HEALTH SERVICES | Facility: HOME HEALTHCARE | Age: 26
End: 2021-06-29
Payer: MEDICAID

## 2021-06-29 VITALS
OXYGEN SATURATION: 96 % | DIASTOLIC BLOOD PRESSURE: 80 MMHG | HEART RATE: 64 BPM | TEMPERATURE: 98.2 F | SYSTOLIC BLOOD PRESSURE: 110 MMHG | RESPIRATION RATE: 16 BRPM

## 2021-06-29 PROCEDURE — G0151 HHCP-SERV OF PT,EA 15 MIN: HCPCS

## 2021-06-29 ASSESSMENT — ACTIVITIES OF DAILY LIVING (ADL)
AMBULATION ASSISTANCE ON FLAT SURFACES: 1
AMBULATION_DISTANCE/DURATION_TOLERATED: <150 FT ONE WAY

## 2021-06-29 ASSESSMENT — ENCOUNTER SYMPTOMS
POOR JUDGMENT: 1
DIFFICULTY THINKING: 1

## 2021-06-29 NOTE — CASE COMMUNICATION
noted  ----- Message -----  From: Norma Mathews, MS,OTR/L  Sent: 6/28/2021  10:49 AM PDT  To: Norma Holliday R.N.      temp today is 97.4;other vitals fine; first covid vaccine 10 days ago

## 2021-06-30 ENCOUNTER — HOME CARE VISIT (OUTPATIENT)
Dept: HOME HEALTH SERVICES | Facility: HOME HEALTHCARE | Age: 26
End: 2021-06-30
Payer: MEDICAID

## 2021-06-30 VITALS
TEMPERATURE: 98.8 F | HEART RATE: 82 BPM | SYSTOLIC BLOOD PRESSURE: 92 MMHG | DIASTOLIC BLOOD PRESSURE: 82 MMHG | OXYGEN SATURATION: 98 % | RESPIRATION RATE: 16 BRPM

## 2021-06-30 PROCEDURE — G0152 HHCP-SERV OF OT,EA 15 MIN: HCPCS

## 2021-06-30 PROCEDURE — G0153 HHCP-SVS OF S/L PATH,EA 15MN: HCPCS

## 2021-06-30 ASSESSMENT — ENCOUNTER SYMPTOMS: DIFFICULTY THINKING: 1

## 2021-07-01 ENCOUNTER — HOME CARE VISIT (OUTPATIENT)
Dept: HOME HEALTH SERVICES | Facility: HOME HEALTHCARE | Age: 26
End: 2021-07-01
Payer: MEDICAID

## 2021-07-01 VITALS
HEART RATE: 76 BPM | DIASTOLIC BLOOD PRESSURE: 80 MMHG | RESPIRATION RATE: 16 BRPM | SYSTOLIC BLOOD PRESSURE: 110 MMHG | TEMPERATURE: 97.6 F | OXYGEN SATURATION: 96 %

## 2021-07-01 VITALS
SYSTOLIC BLOOD PRESSURE: 110 MMHG | DIASTOLIC BLOOD PRESSURE: 80 MMHG | TEMPERATURE: 98.6 F | RESPIRATION RATE: 14 BRPM | OXYGEN SATURATION: 97 % | HEART RATE: 73 BPM

## 2021-07-01 PROCEDURE — G0151 HHCP-SERV OF PT,EA 15 MIN: HCPCS

## 2021-07-01 ASSESSMENT — ENCOUNTER SYMPTOMS
DIFFICULTY THINKING: 1
DIFFICULTY THINKING: 1
POOR JUDGMENT: 1

## 2021-07-01 ASSESSMENT — ACTIVITIES OF DAILY LIVING (ADL): AMBULATION ASSISTANCE ON FLAT SURFACES: 1

## 2021-07-05 ENCOUNTER — HOME CARE VISIT (OUTPATIENT)
Dept: HOME HEALTH SERVICES | Facility: HOME HEALTHCARE | Age: 26
End: 2021-07-05
Payer: MEDICAID

## 2021-07-05 VITALS
DIASTOLIC BLOOD PRESSURE: 78 MMHG | RESPIRATION RATE: 16 BRPM | OXYGEN SATURATION: 98 % | TEMPERATURE: 98.9 F | SYSTOLIC BLOOD PRESSURE: 96 MMHG | HEART RATE: 87 BPM

## 2021-07-05 PROCEDURE — G0151 HHCP-SERV OF PT,EA 15 MIN: HCPCS

## 2021-07-05 PROCEDURE — G0153 HHCP-SVS OF S/L PATH,EA 15MN: HCPCS

## 2021-07-05 ASSESSMENT — ENCOUNTER SYMPTOMS: DIFFICULTY THINKING: 1

## 2021-07-06 ENCOUNTER — HOME CARE VISIT (OUTPATIENT)
Dept: HOME HEALTH SERVICES | Facility: HOME HEALTHCARE | Age: 26
End: 2021-07-06
Payer: MEDICAID

## 2021-07-06 VITALS
RESPIRATION RATE: 16 BRPM | TEMPERATURE: 98.6 F | DIASTOLIC BLOOD PRESSURE: 80 MMHG | SYSTOLIC BLOOD PRESSURE: 110 MMHG | HEART RATE: 87 BPM | OXYGEN SATURATION: 96 %

## 2021-07-06 VITALS
RESPIRATION RATE: 14 BRPM | OXYGEN SATURATION: 98 % | HEART RATE: 77 BPM | TEMPERATURE: 98.9 F | SYSTOLIC BLOOD PRESSURE: 94 MMHG | DIASTOLIC BLOOD PRESSURE: 70 MMHG

## 2021-07-06 PROCEDURE — G0151 HHCP-SERV OF PT,EA 15 MIN: HCPCS

## 2021-07-06 PROCEDURE — G0152 HHCP-SERV OF OT,EA 15 MIN: HCPCS

## 2021-07-06 ASSESSMENT — ACTIVITIES OF DAILY LIVING (ADL): AMBULATION ASSISTANCE ON FLAT SURFACES: 1

## 2021-07-06 NOTE — Clinical Note
Please request additional HHPT visits with frequency of 2w1 effective week of 07/11/2021 to facilitate dc plan.

## 2021-07-07 ENCOUNTER — HOME CARE VISIT (OUTPATIENT)
Dept: HOME HEALTH SERVICES | Facility: HOME HEALTHCARE | Age: 26
End: 2021-07-07
Payer: MEDICAID

## 2021-07-07 VITALS
RESPIRATION RATE: 16 BRPM | TEMPERATURE: 97.9 F | DIASTOLIC BLOOD PRESSURE: 78 MMHG | OXYGEN SATURATION: 98 % | SYSTOLIC BLOOD PRESSURE: 92 MMHG | HEART RATE: 72 BPM

## 2021-07-07 PROCEDURE — G0153 HHCP-SVS OF S/L PATH,EA 15MN: HCPCS

## 2021-07-07 ASSESSMENT — ENCOUNTER SYMPTOMS: DIFFICULTY THINKING: 1

## 2021-07-08 ENCOUNTER — HOME CARE VISIT (OUTPATIENT)
Dept: HOME HEALTH SERVICES | Facility: HOME HEALTHCARE | Age: 26
End: 2021-07-08
Payer: MEDICAID

## 2021-07-08 VITALS
SYSTOLIC BLOOD PRESSURE: 100 MMHG | RESPIRATION RATE: 14 BRPM | TEMPERATURE: 98.3 F | OXYGEN SATURATION: 98 % | HEART RATE: 71 BPM | DIASTOLIC BLOOD PRESSURE: 65 MMHG

## 2021-07-08 PROCEDURE — G0152 HHCP-SERV OF OT,EA 15 MIN: HCPCS

## 2021-07-08 ASSESSMENT — ENCOUNTER SYMPTOMS: DIFFICULTY THINKING: 1

## 2021-07-12 ENCOUNTER — HOME CARE VISIT (OUTPATIENT)
Dept: HOME HEALTH SERVICES | Facility: HOME HEALTHCARE | Age: 26
End: 2021-07-12
Payer: MEDICAID

## 2021-07-12 VITALS
OXYGEN SATURATION: 98 % | SYSTOLIC BLOOD PRESSURE: 102 MMHG | DIASTOLIC BLOOD PRESSURE: 62 MMHG | HEART RATE: 63 BPM | TEMPERATURE: 98.3 F | RESPIRATION RATE: 14 BRPM

## 2021-07-12 PROCEDURE — G0153 HHCP-SVS OF S/L PATH,EA 15MN: HCPCS

## 2021-07-12 PROCEDURE — G0152 HHCP-SERV OF OT,EA 15 MIN: HCPCS

## 2021-07-12 ASSESSMENT — ENCOUNTER SYMPTOMS
DIFFICULTY THINKING: 1
DIFFICULTY THINKING: 1

## 2021-07-13 ENCOUNTER — HOME CARE VISIT (OUTPATIENT)
Dept: HOME HEALTH SERVICES | Facility: HOME HEALTHCARE | Age: 26
End: 2021-07-13
Payer: MEDICAID

## 2021-07-13 ENCOUNTER — TELEMEDICINE (OUTPATIENT)
Dept: NEUROLOGY | Facility: MEDICAL CENTER | Age: 26
End: 2021-07-13
Attending: PSYCHIATRY & NEUROLOGY
Payer: MEDICAID

## 2021-07-13 VITALS
SYSTOLIC BLOOD PRESSURE: 100 MMHG | HEART RATE: 75 BPM | OXYGEN SATURATION: 96 % | TEMPERATURE: 98.1 F | BODY MASS INDEX: 26.03 KG/M2 | HEIGHT: 66 IN | DIASTOLIC BLOOD PRESSURE: 80 MMHG | WEIGHT: 162 LBS

## 2021-07-13 VITALS
HEART RATE: 76 BPM | SYSTOLIC BLOOD PRESSURE: 100 MMHG | OXYGEN SATURATION: 96 % | DIASTOLIC BLOOD PRESSURE: 80 MMHG | TEMPERATURE: 98.1 F | RESPIRATION RATE: 16 BRPM

## 2021-07-13 DIAGNOSIS — I61.0 NONTRAUMATIC SUBCORTICAL HEMORRHAGE OF CEREBRAL HEMISPHERE, UNSPECIFIED LATERALITY (HCC): ICD-10-CM

## 2021-07-13 PROCEDURE — 999999 HB NO CHARGE: Performed by: PSYCHIATRY & NEUROLOGY

## 2021-07-13 PROCEDURE — G0151 HHCP-SERV OF PT,EA 15 MIN: HCPCS

## 2021-07-13 PROCEDURE — 99214 OFFICE O/P EST MOD 30 MIN: CPT | Performed by: PSYCHIATRY & NEUROLOGY

## 2021-07-13 ASSESSMENT — ACTIVITIES OF DAILY LIVING (ADL)
AMBULATION ASSISTANCE ON FLAT SURFACES: 1
AMBULATION_DISTANCE/DURATION_TOLERATED: <150 FT ONE WAY

## 2021-07-13 ASSESSMENT — ENCOUNTER SYMPTOMS
DIFFICULTY THINKING: 1
POOR JUDGMENT: 1

## 2021-07-13 ASSESSMENT — FIBROSIS 4 INDEX: FIB4 SCORE: 0.19

## 2021-07-13 NOTE — PROGRESS NOTES
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 The patient was in a private location in the [facility].    The patient's identity was confirmed and verbal consent was obtained for this virtual visit.    History of present illness:  Rey Medina 25 y.o. male presents today for cranial hemorrhage follow-up.   History is obtained from patient and mom.  and Patient is accompanied by Mom Gem.  She is his primary caretaker.     Duration/timing: acute onset 4/2019   Context: AVM with intraventricular hemorrhage: Patient was in the [facility] with acute onset of intraventricular hemorrhage secondary to AVM status post  shunt.  He has since been a long-term recovery but residual deficits include the following: Short term memory difficulties; was having difficulty with initating conversation but now he is doing it more often. Non ambulatory - stands with support also with transfers which is also improvement. Some constipation. ?Bladder incontinence with use of diaper - especially after tracheostomy removal he is less able to communicate that he has to use the restroom. No clear weakness. + Incoordination in the extremities.  Swallowing improving. He can feed himself though sloppy.  Learning to brush his teeth.  No clear seizures - Keppra was started in hospital and not removed? Previously some agitation.   Location: Posterior fossa  Quality: Hemorrhage  Severity: Severe  Modifying factors: None  Associated signs/symptoms: As above  Denies: weakness, numbness/tingling, depression, anxiety, loss of consciousness, seizures, abnormal movements and falls     Patient has tried:  -Amantadine - tried by Dr. ALBERTO, stopped by Dr. Luke (psych), since focus is better  -Keppra 500mg daily - decreased in 7/2020 by Dr. ALBERTO.  Patient did not  tolerate doses less than 250 mg daily, to be continued.  -Speech therapy - with improvement, on thickener  -OT/PT - current for weakness   -Physiatrist Dr. Luke  -Neurosurgeon Dr. Walton -Next appointment in December 2020    Subjective: Patient was last seen in neurology clinic on April 2021.    AVM with IVH: Stable, no new symptoms. He has made a lot of progress. Speech, strength, and coordination have all improved. They want to see if the medication off. He is 99% back to cognitive baseline though speech is still affected.     Seizures: None. No spells of incontinence, AMS, LOC.    Past medical history:   Past Medical History:   Diagnosis Date   • COVID-19    • ICH (intracerebral hemorrhage) (HCC)    • Stroke (HCC)        Past surgical history:   Past Surgical History:   Procedure Laterality Date   • ANAL FISTULOTOMY     • PEG PLACEMENT     • TRACHEOSTOMY     •  SHUNT INSERTION         Family history:   Family History   Problem Relation Age of Onset   • Hypertension Mother    • Glasses Mother    • Thyroid Mother    • Diabetes Maternal Grandfather    • Stroke Maternal Grandfather    • Diabetes Paternal Grandmother    • Hypertension Maternal Uncle    • Heart Disease Neg Hx    • Cancer Neg Hx        Social history:   Tobacco Use   • Smoking status: Never Smoker   • Smokeless tobacco: Never Used   Substance and Sexual Activity   • Alcohol use: Never     Frequency: Never     Drinks per session: Patient refused     Binge frequency: Never   • Drug use: Never     Current medications:   Current Outpatient Medications   Medication   • NON SPECIFIED   • polyethylene glycol 3350 (MIRALAX) 17 GM/SCOOP Powder   • magnesium hydroxide (MILK OF MAGNESIA) 400 MG/5ML Suspension   • mirtazapine (REMERON) 30 MG Tab tablet   • levETIRAcetam (KEPPRA) 500 MG Tab   • NON SPECIFIED   • Omega-3 Fatty Acids (OMEGA 3 500) 500 MG Cap   • Magnesium 400 MG Cap   • RA TURMERIC EXTRA STRENGTH PO   • Cholecalciferol (VITAMIN D3) 2000 UNIT Cap  "  • non-formulary med   • non-formulary med   • propranolol (INDERAL) 20 MG Tab   • Cyanocobalamin (B-12 PO)   • Misc. Devices Misc   • montelukast (SINGULAIR) 10 MG Tab     No current facility-administered medications for this visit.       Medication Allergy:  Allergies   Allergen Reactions   • Vancomycin Swelling     Lips  With red face and neck   • Other Misc Rash     Allergic to name band.       ROS: As per HPI    Physical examination:   Vitals:    07/13/21 1408   BP: 100/80   Pulse: 75   Temp: 36.7 °C (98.1 °F)   SpO2: 96%   Weight: 73.5 kg (162 lb)   Height: 1.676 m (5' 6\")      Prior exam as detailed below. On today's exam, patient was Awake, speech is scanning in nature.  Patient is alert and answering questions appropriately.    General: Patient in well nourished in no apparent distress.  In his wheelchair.  Eyes: Ophthalmoscopic examination performed but discs cannot be visualized well enough to characterize bilaterally.  HENT: Normocephalic, atraumatic.  Cardiovascular: No lower extremity edema.  Respiratory: Normal respiratory effort.   Skin: No appreciable signs of acute rashes or bruising.   Musculoskeletal: No signs of joint or muscle swelling.   Psychiatric: Pleasant.     NEUROLOGICAL EXAM:   Mental status: Awake, alert and fully oriented to person.  Otherwise difficulty assessing for the orientation.  He does seem to maintain attention to conversation when spoken to.  Speech and language: Speech is scanning in nature.  He speaks in very short responses.  He is able to name objects.  Will to follow commands.  Cranial nerve exam:  II: Pupils are equally round and reactive to light. Visual fields are intact by confrontation -grossly.   III, IV, VI: EOMI -with evidence of nystagmus and difficulty with smooth pursuit, no diplopia, no ptosis.  V: Sensation to light touch is normal over V1-3 distributions bilaterally.  .  VII: Facial movements are symmetrical. There is no facial droop. .  VIII: Hearing " intact to soft speech and finger rub bilaterally  IX: Palate elevates symmetrically, uvula is midline. Dysarthria/scanning speech.  XI: Shoulder shrug are symmetrical and strong.   XII: Tongue protrudes midline.    Motor exam:  Some increase in muscle tone in the upper and lower extremities but not significantly.  There is no strong evidence of spasticity.    Muscle strength: He is globally 5 out of 5 upper and lower extremity    Sensory exam:  Intact to Light touch in bilateral upper and lower extremity.    Reflexes:       Right  Left  Biceps   3/4  3/4  Triceps  3/4  3/4  Brachioradialis 3/4  3/4  Knee jerk  3/4  3/4  Ankle jerk  NT/4  NT/4       Coordination: Ataxia with finger-to-nose, heel-to-shin difficult to perform.  Gait: Nonambulatory    ANCILLARY DATA REVIEWED:   Lab Data Review:  Lab Results   Component Value Date/Time    WBC 7.2 12/12/2020 02:00 AM    RBC 4.99 12/12/2020 02:00 AM    HEMOGLOBIN 14.1 12/12/2020 02:00 AM    HEMATOCRIT 43.5 12/12/2020 02:00 AM    MCV 87.2 12/12/2020 02:00 AM    MCH 28.3 12/12/2020 02:00 AM    MCHC 32.4 (L) 12/12/2020 02:00 AM    MPV 11.1 12/12/2020 02:00 AM    NEUTSPOLYS 53.10 12/12/2020 02:00 AM    LYMPHOCYTES 34.80 12/12/2020 02:00 AM    MONOCYTES 8.60 12/12/2020 02:00 AM    EOSINOPHILS 2.10 12/12/2020 02:00 AM    BASOPHILS 1.10 12/12/2020 02:00 AM      Lab Results   Component Value Date/Time    SODIUM 137 12/12/2020 02:00 AM    POTASSIUM 3.5 (L) 12/12/2020 02:00 AM    CHLORIDE 100 12/12/2020 02:00 AM    CO2 20 12/12/2020 02:00 AM    GLUCOSE 72 12/12/2020 02:00 AM    BUN 10 12/12/2020 02:00 AM    CREATININE 0.66 12/12/2020 02:00 AM     Lab Results   Component Value Date/Time    ASTSGOT 17 11/14/2020 0520    ALTSGPT 35 11/14/2020 0520    ALKPHOSPHAT 126 (H) 11/14/2020 0520    ALBUMIN 4.2 11/14/2020 0520     Lab Results   Component Value Date/Time    HBA1C 5.7 (H) 02/06/2020 06:07 AM      EEG routine (Dr. ALBERTO) May 2020: This is a abnormal routine EEG recording in the awake  and  Drowsy states.  Intermittent slowing suggests mild encephalopathy and is in correlation with patient's history of intracerebral hemorrhage. There were no seizures recorded.Clinical correlation is recommended.  TSH1.61    Imaging: None    Records reviewed: None      ASSESSMENT AND PLAN:    1. 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  Unfortunately patient is suffered significant neurological sequela from the intracranial hemorrhage as documented per HPI but has made significant improvements with aggressive rehabilitation. The history is quite uncertain but there is no history of a known seizure per mom.  He has since been placed on Keppra 500 mg 2 times daily during admission with EEG performed in May 2020 revealing intermittent mild slowing that is otherwise nonspecific or focal nature.  On review of his MRI of the brain given the location of the insult, there is no significant cortical involvement.  He may be also experiencing a rash from the Keppra as well - wean was not tolerated when attempted and he has remained on Keppra 250 daily due to the acute decompensation.  Repeat MRI without any significant structural changes.  He is otherwise doing well with gradual improvement.  He is not driving.  -Risk benefits discussion with regards to further titration off medications which both mom and patient are agreeable to.  Given the EEG was last performed over a year ago we will repeat EEG for risk stratification.  No reason to repeat head imaging given HPI.  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 Following Dr. Luke.    3.  (ventriculoperitoneal) shunt status: Following Dr. Walton    4. Dysphagia following nontraumatic intracerebral hemorrhage, not addressed today: Following with speech        FOLLOW-UP: Return for After EEG.    EDUCATION AND COUNSELING:  -I personally discussed the following with the patient:   Medical reasoning as above    This dictation was created using voice recognition software. I have made every reasonable attempt to avoid dictation errors, but this document may contain an error not identified before finalizing. If the error changes the accuracy of the document, I would appreciate it being brought to my attention. Thank you very much.     Jennifer Valiente MD  Neurology     Implemented All Universal Safety Interventions:  [place] to call system. Call bell, personal items and telephone within reach. Instruct patient to call for assistance. Room bathroom lighting operational. Non-slip footwear when patient is off stretcher. Physically safe environment: no spills, clutter or unnecessary equipment. Stretcher in lowest position, wheels locked, appropriate side rails in place.

## 2021-07-13 NOTE — CASE COMMUNICATION
noted  ----- Message -----  From: Adrianne Raphael, PT  Sent: 7/6/2021   9:43 PM PDT  To: Norma Holliday R.N.      Please request additional HHPT visits with frequency of 2w1 effective week of 07/11/2021 to facilitate dc plan.

## 2021-07-14 ENCOUNTER — HOME CARE VISIT (OUTPATIENT)
Dept: HOME HEALTH SERVICES | Facility: HOME HEALTHCARE | Age: 26
End: 2021-07-14
Payer: MEDICAID

## 2021-07-14 VITALS
HEART RATE: 70 BPM | OXYGEN SATURATION: 98 % | TEMPERATURE: 98.6 F | DIASTOLIC BLOOD PRESSURE: 68 MMHG | RESPIRATION RATE: 16 BRPM | SYSTOLIC BLOOD PRESSURE: 100 MMHG

## 2021-07-14 PROCEDURE — G0153 HHCP-SVS OF S/L PATH,EA 15MN: HCPCS

## 2021-07-14 PROCEDURE — G0152 HHCP-SERV OF OT,EA 15 MIN: HCPCS

## 2021-07-14 ASSESSMENT — ENCOUNTER SYMPTOMS
DIFFICULTY THINKING: 1
DIFFICULTY THINKING: 1

## 2021-07-14 NOTE — Clinical Note
OT discipline discharge completed 7/14/21 w all goals partially met; pt discharged to OP and in care of family

## 2021-07-15 ENCOUNTER — HOME CARE VISIT (OUTPATIENT)
Dept: HOME HEALTH SERVICES | Facility: HOME HEALTHCARE | Age: 26
End: 2021-07-15
Payer: MEDICAID

## 2021-07-15 VITALS
HEART RATE: 71 BPM | OXYGEN SATURATION: 96 % | RESPIRATION RATE: 18 BRPM | DIASTOLIC BLOOD PRESSURE: 80 MMHG | SYSTOLIC BLOOD PRESSURE: 105 MMHG | TEMPERATURE: 98.5 F

## 2021-07-15 PROCEDURE — G0151 HHCP-SERV OF PT,EA 15 MIN: HCPCS

## 2021-07-15 ASSESSMENT — PATIENT HEALTH QUESTIONNAIRE - PHQ9: CLINICAL INTERPRETATION OF PHQ2 SCORE: 0

## 2021-07-15 ASSESSMENT — ACTIVITIES OF DAILY LIVING (ADL)
HOME_HEALTH_OASIS: 01
OASIS_M1830: 03

## 2021-07-15 ASSESSMENT — ENCOUNTER SYMPTOMS
POOR JUDGMENT: 1
DIFFICULTY THINKING: 1

## 2021-07-15 NOTE — CASE COMMUNICATION
noted  ----- Message -----  From: MARQUIS Blackburn  Sent: 7/14/2021   4:06 PM PDT  To: EVER Bearden d/c goals partially met effective 7/14.

## 2021-07-15 NOTE — CASE COMMUNICATION
noted  ----- Message -----  From: Norma Mathews, MS,OTR/L  Sent: 7/14/2021  12:25 PM PDT  To: Norma Holliday R.N.      OT discipline discharge completed 7/14/21 w all goals partially met; pt discharged to OP and in care of family

## 2021-07-15 NOTE — Clinical Note
[facility], patient transitioning to outpatient PT next week.

## 2021-07-15 NOTE — CASE COMMUNICATION
noted  ----- Message -----  From: Adrianne Raphael, PT  Sent: 7/13/2021  10:22 PM PDT  To: Norma Holliday R.N.      Patient will be discarged from home health agency effective 0715/2021 with goals met.

## 2021-07-16 ENCOUNTER — HOME CARE VISIT (OUTPATIENT)
Dept: HOME HEALTH SERVICES | Facility: HOME HEALTHCARE | Age: 26
End: 2021-07-16
Payer: MEDICAID

## 2021-07-16 NOTE — CASE COMMUNICATION
Quality Review for 7/15/21 OR OASIS by STEPHANIE Goss, NICHOLE on  July 16, 2021      Edits completed by STEPHANIE Goss RN:  1.  E is yes per the care plan

## 2021-07-19 NOTE — CASE COMMUNICATION
I agree with the change. Thanks.  ----- Message -----  From: Veronica Goss R.N.  Sent: 7/16/2021  11:20 AM PDT  To: Adrianne Raphael, PT      Quality Review for 7/15/21 LA OASIS by STEPHANIE Goss, RN on  July 16, 2021      Edits completed by STEPHANIE Goss, RN:  1.  E is yes per the care plan

## 2021-07-19 NOTE — CASE COMMUNICATION
noted  ----- Message -----  From: Adrianne Raphael, PT  Sent: 7/15/2021   9:54 PM PDT  To: Norma Holliday R.N.      Patient discharged from homehealth agency effective 07/15/2021, patient transitioning to outpatient PT next week.

## 2021-07-20 ENCOUNTER — NON-PROVIDER VISIT (OUTPATIENT)
Dept: NEUROLOGY | Facility: MEDICAL CENTER | Age: 26
End: 2021-07-20
Attending: PSYCHIATRY & NEUROLOGY
Payer: MEDICAID

## 2021-07-20 DIAGNOSIS — Z86.79 H/O SPONT INTRAPARENCHYMAL INTRACRANIAL HEMORRHAGE D/T CEREBRAL AVM: ICD-10-CM

## 2021-07-20 PROCEDURE — 95816 EEG AWAKE AND DROWSY: CPT | Mod: 26 | Performed by: PSYCHIATRY & NEUROLOGY

## 2021-07-20 PROCEDURE — 95816 EEG AWAKE AND DROWSY: CPT | Performed by: PSYCHIATRY & NEUROLOGY

## 2021-07-20 NOTE — PROCEDURES
ROUTINE VIDEO ELECTROENCEPHALOGRAM REPORT      REFERRING PROVIDER: Jennifer Valiente MD  DOS: 7/20/2021 (total recording of 28 minutes)    INDICATION:  Rey Medina 25 y.o. male presenting with a history of intracranial hemorrhage seeking wean off of seizure medication.  CURRENT ANTIEPILEPTIC REGIMEN:     TECHNIQUE: 30 channel routine video electroencephalogram (EEG) was performed in accordance with the international 10-20 system. The study was reviewed in bipolar and referential montages. The recording examined the patient during wakeful and drowsy/sleep state(s).     DESCRIPTION OF RECORD:  When maximally alert, the background activity of this recording was characterized by a well preserved frontal beta to occipital alpha gradient with a posterior dominant 9 Hz rhythm that attenuated appropriate to eye opening.    The patient was drowsy but did not reach stage II sleep during this study.       ACTIVATION PROCEDURES:   Hyperventilation was not performed.     Photic stimulation induced a symmetric occipital driving response.      ICTAL AND/OR INTERICTAL FINDINGS:   Frontal intermittent rhythmic delta activity (FIRDA) was noted.  No focal or generalized epileptiform activity noted. No regional slowing was seen during this routine study.  No clinical events or seizures were reported or recorded during the study.     EKG: sampling of the EKG recording demonstrated sinus rhythm.       INTERPRETATION:  This is an abnormal video EEG due to the presence of KUSUM.       CLINICAL CORRELATION:  FIRDA can be seen in widespread cortical dysfunction/encephalopathic conditions or underlying structural lesions.  Recommend clinical correlation.  If clinical suspicion is high for seizures recommend prolonged monitoring.    Jennifer Valiente MD  Neurology

## 2021-07-21 ENCOUNTER — PHYSICAL THERAPY (OUTPATIENT)
Dept: PHYSICAL THERAPY | Facility: REHABILITATION | Age: 26
End: 2021-07-21
Attending: FAMILY MEDICINE
Payer: MEDICAID

## 2021-07-21 ENCOUNTER — OCCUPATIONAL THERAPY (OUTPATIENT)
Dept: OCCUPATIONAL THERAPY | Facility: REHABILITATION | Age: 26
End: 2021-07-21
Attending: FAMILY MEDICINE
Payer: MEDICAID

## 2021-07-21 ENCOUNTER — SPEECH THERAPY (OUTPATIENT)
Dept: SPEECH THERAPY | Facility: REHABILITATION | Age: 26
End: 2021-07-21
Attending: FAMILY MEDICINE
Payer: MEDICAID

## 2021-07-21 DIAGNOSIS — R26.89 OTHER ABNORMALITIES OF GAIT AND MOBILITY: ICD-10-CM

## 2021-07-21 DIAGNOSIS — G81.90 HEMIPLEGIA, UNSPECIFIED ETIOLOGY, UNSPECIFIED HEMIPLEGIA TYPE, UNSPECIFIED LATERALITY (HCC): ICD-10-CM

## 2021-07-21 DIAGNOSIS — R26.2 DIFFICULTY IN WALKING, NOT ELSEWHERE CLASSIFIED: ICD-10-CM

## 2021-07-21 DIAGNOSIS — R41.841 COGNITIVE COMMUNICATION DEFICIT: ICD-10-CM

## 2021-07-21 DIAGNOSIS — R47.1 DYSARTHRIA AND ANARTHRIA: ICD-10-CM

## 2021-07-21 PROCEDURE — 97110 THERAPEUTIC EXERCISES: CPT

## 2021-07-21 PROCEDURE — 97163 PT EVAL HIGH COMPLEX 45 MIN: CPT

## 2021-07-21 PROCEDURE — 97165 OT EVAL LOW COMPLEX 30 MIN: CPT

## 2021-07-21 PROCEDURE — 92523 SPEECH SOUND LANG COMPREHEN: CPT

## 2021-07-21 SDOH — ECONOMIC STABILITY: GENERAL: QUALITY OF LIFE: GOOD

## 2021-07-21 ASSESSMENT — BALANCE ASSESSMENTS
BALANCE - STANDING DYNAMIC: TRACE +
WEIGHT SHIFT SITTING: FAIR
BALANCE - STANDING STATIC: POOR +
BALANCE - SITTING DYNAMIC: FAIR -
WEIGHT SHIFT STANDING: POOR
BALANCE - SITTING STATIC: FAIR

## 2021-07-21 ASSESSMENT — ACTIVITIES OF DAILY LIVING (ADL): AMBULATION_WITHOUT_ASSISTIVE_DEVICE: UNABLE

## 2021-07-21 ASSESSMENT — SOCIAL DETERMINANTS OF HEALTH (SDOH): SOCIAL_SUPPORT_SYSTEM: PARENT

## 2021-07-21 ASSESSMENT — ENCOUNTER SYMPTOMS
PAIN SCALE AT LOWEST: 0
PAIN SCALE AT HIGHEST: 0
PAIN SCALE: 0

## 2021-07-21 NOTE — OP THERAPY EVALUATION
Outpatient Physical Therapy  INITIAL NEUROLOGICAL EVALUATION    [facility] [place] [address]  [address]  [place] [address]  Phone:  [phone]  Fax:  [phone]    Date of Evaluation: 07/21/2021    Patient: Rey Medina  YOB: 1995  MRN: [id number]     Referring Provider: Sherie Power M.D.  [address]  [place] [address]   Referring Diagnosis Other abnormalities of gait and mobility [R26.89];Difficulty in walking, not elsewhere classified [R26.2]     Time Calculation    Start time: 1345  Stop time: 1430 Time Calculation (min): 45 minutes             Chief Complaint: Difficulty Walking and Loss Of Balance    Visit Diagnoses     ICD-10-CM   1. Other abnormalities of gait and mobility  R26.89   2. Difficulty in walking, not elsewhere classified  R26.2       Subjective:   History of Present Illness:     Date of onset:  4/2/2019    Mechanism of injury:  April 2019: intraventricular hemorrhage secondary to AVM, status post  shunt.  Pt has been receiving home health therapies from December 2020 until last week (7/16/21).  Per home health PT, pt improved in strength and motor control, to reach SBA/CGA for stand pivot transfers, and amb 150feet with 4WW with CGA/Mikhail of two people (one in front controlling walker and one behind for cues at pelvis).  Pt is currently dressing independently, bathing independently, feeding independently.    Walks every morning with tall 4WW with his mom and dad providing assistance.  Pt reports he is tired after walking.  Has a transfer pole at bedside for CGA SPT to/from bed and w/c or BSC.    Pt self-propels manual w/c from bed to living room, sits on couch most of the day.  Uses w/c to get to BSC, with assist.  Two hours nap in afternoon.  Pt can stand at countertop to play cards for about 30 min, with intermittent short seated breaks.  Social Support:     Lives in:  Apartment (two story apartment, pt's bedroom is on ground  floor)    Lives with:  Parents  Treatments:     Previous treatment:  Home therapy    Current treatment:  Home exercise program  Patient Goals:     Patient goals for therapy:  Improved balance and increased strength    Other patient goals:  To walk with less assistance      Past Medical History:   Diagnosis Date   • COVID-19    • ICH (intracerebral hemorrhage) (HCC)    • Stroke (HCC)      Past Surgical History:   Procedure Laterality Date   • ANAL FISTULOTOMY     • PEG PLACEMENT     • TRACHEOSTOMY     •  SHUNT INSERTION       Social History     Tobacco Use   • Smoking status: Never Smoker   • Smokeless tobacco: Never Used   Substance Use Topics   • Alcohol use: Never     Family and Occupational History     Socioeconomic History   • Marital status: Single     Spouse name: Not on file   • Number of children: Not on file   • Years of education: Not on file   • Highest education level: Some college, no degree   Occupational History   • Not on file       Objective:   Active Range of Motion:   Lower extremity (left):     All left lower extremity active range of motion: All within functional limits  Lower extremity (right):     All right lower extremity active range of motion: All within functional limits      Strength:   Lower extremity (left):     Hip flexion: 5    Hip extension: 3+    Hip abduction: 5    Hip adduction: 5    Knee flexion: 5    Knee extension: 5    Ankle dorsiflexion: 5    Ankle plantar flexion: 5  Lower extremity (right):     Hip flexion: 5    Hip extension: 3    Hip abduction: 5    Hip adduction: 5    Knee flexion: 5    Knee extension: 5    Ankle dorsiflexion: 5    Ankle plantar flexion: 5    Tone, Sensation and Coordination:     Modified Anusha:   Lower extremity (left):     Knee flexors: 1  Lower extremity (right):     Knee flexors: 1+    Plantar flexors: 2    Sensation     Sensation comments:   Pt denies numbness/ tingling and impaired sensation.    Coordination   Lower extremity (left):     Slow  alternating movements: Impaired    Toe tapping: Impaired  Lower extremity (right):     Slow alternating movements: Within functional limits    Toe tapping: Within functional limits    Postural Control (Balance)     Sitting (static): Fair    Sitting (dynamic): Fair -    Standing (static): Poor +    Standing (dynamic): Trace +    Weight shift (sitting): Fair    Weight shift (standing): Poor    Postural control (balance) comments:  Static standing balance, unsupported, with CGA at gait belt.  Pt initially with posterior lean.  Able to correct with verbal cues to shift weight from heels toward balls of feet.  Pt leaning toward L, able to make some correction toward midline with verbal cues, but not completely to center.  Standing with grab bar for BUE support, pt able to step one foot forward and back, but demo ataxia and inconsistency in step length and foot placement.  Required CGA/Mikhail for balance, and extra time to complete task.  Pt with heavy reliance on UE support during dynamic standing tasks requiring any SL stance.    Ambulation: Level Surfaces   Ambulation with assistive device: two person assist.  Ambulation without assistive device: unable    Observational Gait   Gait: ataxia and asymmetric     Walking speed below functional limits.      Additional Observational Gait Details  Steps discontinuous and varied in length, width, and burke.    Quality of Movement During Gait   Trunk   Posterior lean.     Balance/Gait Comments   Pt's mother, Melinda, providing Mikhail at front of ARJO walker and PT providing CGA behind pt at elisha belt and pelvis.  Pt amb 60 feet.  Pt fatigued following gait.  Verbal and tactile cues provided during gait for standing tall and smaller steps to improve balance and control.    Activities of Daily Living:   Transfers/Mobility:     Bed/chair transfers: minimum assist    Wheelchair transfers: minimum assist    Sit to stand: minimum assist    Sit to supine: stand by assist    Bed mobility:  stand by assist      Exercises/Treatment  Time-based treatments/modalities:           Assessment, Response and Plan:   Impairments: abnormal coordination, abnormal gait, abnormal muscle tone, activity intolerance and impaired functional mobility    Assessment details:  25 y.o. male presents with abnormal gait and balance s/p IVH due to AVM, with  shunt.  Pt demonstrates incoordination and impaired motor control, limiting safety and independence with functional mobility and ADLs.  Pt will benefit from skilled PT services to address stated impairments in order to minimize fall risk and improve safety and independence in home.  Barriers to therapy:  Communication  Prognosis: good    Goals:   Short Term Goals:   1. Pt will require CGA/SBA for SPT with UE support at transfer pole or bar.   2. Pt will perform gait with tall 4WW with one person assist, x25 feet.  3. Pt will be able to demonstrate tall kneeling indep in order to demonstrate incr core strength.  Short term goal time span:  2-4 weeks      Long Term Goals:    1. Pt will demonstrate safe transfers with SPV and LRAD in order to increase independence and decrease caregiver burden.  2. Pt will perform STS with LRD with adequate control on descent with SBA.  3. Pt will ambulate 100 ft with LRAD and Mikhail in order to increase independence.   4. Pt will misty unsupported standing x1 min with SBA.  Long term goal time span:  6-8 weeks    Plan:   Therapy options:  Physical therapy treatment to continue  Planned therapy interventions:  Therapeutic Exercise (CPT 75438) and Neuromuscular Re-education (CPT 11707)  Frequency:  2x week  Duration in visits:  20  Discussed with:  Patient          Referring provider co-signature:  I have reviewed this plan of care and my co-signature certifies the need for services.    Certification Period: 07/21/2021 to  09/29/21    Physician Signature: ________________________________ Date: ______________

## 2021-07-21 NOTE — OP THERAPY EVALUATION
Outpatient Speech Therapy  INITIAL EVALUATION    [facility]  [address]  [address]  [place] [address]  Phone:  [phone]  Fax:  [phone]    Date of Evaluation: 07/21/2021    Patient: Rey Medina  YOB: 1995  MRN: [id number]     Referring Provider: Sherie Power M.D.  [address]  [place] [address]   Referring Diagnosis Dysarthria and anarthria [R47.1];Cognitive communication deficit [R41.841]     Time Calculation    Start time: 1430  Stop time: 1515 Time Calculation (min): 45 minutes           Chief Complaint: Not Understood By Others    Visit Diagnoses     ICD-10-CM   1. Dysarthria and anarthria  R47.1   2. 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 Patient returns to outpatient therapy setting following participation in home health services.       Social Support:     Accompanied By:  Parent (mother, Melinda, present and supportive)    Patient Mental Status:  Alert and Responsive  Progress Factors:     Progression:  Getting Better  Therapy History:     Previous Treatments and Dates:  Patient was previously seen by outpatient speech therapy, participating in 7 sessions of direct, outpatient speech therapy. Patient was transitioned to home health services due to concerns regarding COVID.    Patient participated in home health PT/OT/ST services with patient home health speech therapy notes reviewed revealing improvement in speech production skills from severe to moderate dysarthria.     Current Diet:  Regular Diet, Normal Consistency  and Thin Liquids    Nutritional Concerns Restrictions and Allergies:  Patient endorses no difficulty or concern with swallow function at this time, endorsing a regular IDDSI Level 7 solid and thin IDDSI Level 0 liquid diet.     Hearing:  No Deficits    Handedness:  Right-handed  Additional Subjective Comments:      Patient presented with outpatient speech therapy with good participation in all evaluation tasks. Patient was independent in communicating a variety of personal, functional and medically relevant information with mother assisting only due to deficits in intelligibility.     Past Medical History:   Diagnosis Date   • COVID-19    • ICH (intracerebral hemorrhage) (HCC)    • Stroke (HCC)      Past Surgical History:   Procedure Laterality Date   • ANAL FISTULOTOMY     • PEG PLACEMENT     • TRACHEOSTOMY     •  SHUNT INSERTION       Objective:   Treatments/Interventions Performed:  Patient/Caregiver education  Other Treatment Interventions:  Assessment of voice, speech production through administration of Frenchay Dysarthria Assessment 2/ Informal assessment of cognitive communication function through administration of portions of Adden[place]oke's Cognitive Examination - ACE III  Objective Details:  Frenchay Dysarthria Assessment 2 revealed:  Reflexes (a) normal  Respiration (c) moderate: at rest, in speech. Patient with increased difficulty in performing deep inhalations, with inhalation shallow and not smooth.  Lips (a) normal: at rest, spread. (c) moderate: seal, alternate, in speech. Consistently poor movements represented as weak, highly variable with omissions of labial shaping during rapid alternating movements.  Palate (c) moderate. Occasional difficulty reported of liquid loss from nose. Moderate hypernasality with nasal bursts present in speech production.   Laryngeal (c) moderate. Length of phonation /ah/ limited to 3-4 seconds clearly. Patient able to represent four distinct pitch changes of uneven  "progression. Changes in volume present, but noticeably uneven progression with lack of control. Overall voice production required effort and attention, deteriorated and noted to be unpredictable. Patient voice largely monotone to independent productions with excessive loudness.  Tongue (b) mild at rest, protrusion, lateral. (c) moderate elevation, in speech. Patient tongue demonstrated correct articulation points with slow articulation movements resulting in labored speech production. Accuracy of tongue movement decreased during completion of rapid alternating movements. Omissions of consonants noted with increased difficulty in production of /s/ and /k/.     Portions of Addenbrooke's Cognitive Examination III - ACE III  Attention 9 (out of a possible 18), Memory 23 (out of a possible 26), Fluency 8 (out of a possible 14), Language 24 (out of a possible 26)    Speech Therapy Assessment:     Expressive Language Assessment:     Ability to exhibit appropriate naming: Minimal.    Explains function of object WFL.    Repeats speech accurately WFL (sentence level).    Patient's ability to use automatic language appropriately is WFL.    Patient's ability to verbalize wants and needs is Minimal.    Patient's ability to sustain dialogues within a given topic is Minimal (appropriate to answer clinician inquiries to independent level).    Expressive language comments: Results of the ACE III revealed patient completed confrontation naming of 12 items with 11/12 (91.6%) only missing accordion. Patient was accurate in identifying object based on description 4/4 (100%). Patient was queries regarding his communication of wants and needs. Patient verbalized \"control\" with patient mother acknowledging that patient at times will not communicate wants or needs due to this.      With regards to social-pragmatic use of language, patient was independent in engaging in simple greeting/ leave taking to independent level. Patient " "participation in simple conversation was largely limited to response only. Given assessment measures, patient independent in seeking clarification to phrase/ simple sentence level, for example \"what is that?\"    Written Language Assessment:       Patient ability to write full name accurately WFL.    Ability to write single words (spontaneously) WFL (dog, newspaper, photography).     Ability to generate phrases WFL (to spontaneous phrase/ simple sentence level \"the dog is brown\").    Receptive Language Assessment:     Patient ability to identify objects: WFL    Personal information yes/no questions: WFL    Simple yes/no questions: WFL    Patient follows 1 step simple commands: WFL    Patient follows 2 step simple commands:WFL    Patient follows 3 step simple commands: WFL    Patient understands simple conversation: WFL    Cognitive Linguistic Assessment:     Patient attention sustained: Moderate    Patient attention selective: Moderate    Patient attention divided: Moderate    Patient orientation to day: Profound    Patient orientation to month: Profound    Patient orientation to self: WFL    Patient orientation to place: WFL    Patient immediate memory: Minimal    Patient recent and short term memory: Supervision Required (3/3 items recalled with delay and distraction)    Patient prospective and time delay memory: Supervision Required (patient was independent in recall of 7/7 details to recall name, address, city and state as presented at start of assessment)    Cognitive-Linguistic comments: Results of the ACE III revealed patient with reduced orientation answering correctly to year, place, city and country. Patient accurate in subtraction from 100 by 7s to 2/5 (40%). During fluency task, patient was noted to demonstrate consistent perseveration of response beyond a 30 second level to both concrete and abstract categories presented. Patient with noted strength in memory recall to immediate, short and time delay " recall to information presented auditorily.       Speech Mechanism Assessment:     Patient voice description: Hoarse (monotone, hoarse, strained vocal quality)    Patient's oral movements are voluntary and coordination: Moderate    Patient speaks fluently: Minimal    Patient exhibits articulatory precision: Moderate (intelligibility of single words significantly decreased during production of multi-syllable words)    Patient exhibits single word intelligibility: Moderate    Patient exhibits sentence level intelligibility: Moderate (phrase level)    Patient dysarthria: Moderate    Patient uses adequate breath support: No  Speech mechanism comments: Results of [facility] Dysarthria Assessment 2 revealed patient presented with a moderate dysarthria characterized as speech production that is labored, monotone voice with excessive loudness revealing poor voice control and pitch modulation. Patient with accurate articulation on a whole; however, consistent breakdown in articulation accuracy demonstrated during speech production tasks of increased demand, such as beyond a single, two word level, or during rapid alternating movements, such as co-articulatory changes.     Speech Therapy Plan :   Prognosis & Recommendations  Impression Summary:  Mr. 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 Sherie Power.    Results of today's evaluation revealed patient presented with a moderate dysarthria with an intelligibility rating of 50-75% as deemed by examiner to word, phrase level speech productions. With regards to cognitive communication function, results of evaluation suggested that patient memory is area of strength, with patient and mother encouraged in establishment of external  visual aids in the home setting, such as use of daily calendar, to increase patient orientation.     Given patient marked improvement demonstrated since patient was previously seen by outpatient speech therapy, participation in direct services is recommended and warranted.       Prognosis:  Good  Prognosis Details:  Review of patient progress with direct, home health speech therapy services, revealed patient made significant progress in all areas targeted. It is expected that patient will continue to demonstrate progress, given his motivation, family support and verbalization of goals to improve articulation to increase intelligibility.   Goals  Short Term Goals:  1. Patient will improve orientation, answering orientation questions to day, date, month, season, etc with 100% accuracy over two consecutive sessions within 4 weeks.  2. Patient will improve attention completing sustained, progressing to selective and divided attention tasks with 85% or greater accuracy given min verbal prompts/ instruction/ cues as necessary.  3. Patient will improve working memory completing mental manipulation tasks of 3, progressing to 4 items, with 85% or greater accuracy implementing external and/or internal memory strategies as necessary provided minimal verbal prompts/ instruction/ cues.  4. Patient will demonstrate accuracy and independence in completion of language related, activities of daily living, including reading a calendar, reading instructions, completing tasks with a minimum of 85% accuracy; independent.   5. The patient will improve intelligibility completing structured speech tasks targeting accuracy in articulation of multi-syllable words, progressing to phrase level productions implementing compensatory speech strategies as/ if necessary including slowing rate of speech, easy onset to voice production and over-articulation,  each word 7/10 trials, 75% accuracy given minimal to moderate verbal cues as  necessary.   6. The patient will improve speech production through structured speech production tasks of increasing complexity targeting prosodic features of speech production completing with 75% accuracy, minimal to moderate verbal prompts/ instruction/ cues.   Long Term Goals:  1.Patient will make ideas known across settings and to various conversational partners with 90% effectiveness.   2. Patient will improve speech-language function to allow for understanding of complex information presented both written and auditorily and participate in a 5 minute novel conversation regarding personal, functional or medically relevant information demonstrating appropriate turn-taking and initiation of conversation independently within 12 weeks.  3. Patient will develop functional, cognitive-linguistic based skills and utilize compensatory strategies to communicate wants and needs effectively to different conversational partners, maintain safety, and participate socially in a functional living environment within 12 weeks.   Long Term Goal Duration (Weeks):  2-4 months  Patient Stated Goal:  To articulate  Therapy Recommendations  Recommendation:  Individual Speech Therapy,  Planned Therapy Interventions:  Home Program, Patient/Caregiver Education, Compensatory Strategy Training, Speech/Language training, Cognitive-Linguistic training and Respiratory coordination/support training,   Plan Details:  16 units (18728)  Frequency:  2x week  Duration (in visits):  16  Duration (in weeks):  10      Functional Assessment Used  Frenchay Dysarthria Assessment 2  Portions of Addenbrooke's Cognitive Examination - ACE III    Referring provider co-signature:  I have reviewed this plan of care and my co-signature certifies the need for services.    Certification Period: 07/21/2021 to  09/29/21    Physician Signature: ________________________________ Date: ______________

## 2021-07-21 NOTE — OP THERAPY EVALUATION
Outpatient Occupational Therapy  INITIAL NEUROLOGICAL EVALUATION    [facility] Occupational Therapy [facility]  [address]  [address]  [place] [address]  Phone:  [phone]  Fax:  [phone]    Date of Evaluation: 2021    Patient: Rey Medina  YOB: 1995  MRN: [id number]     Referring Provider: Sherie Power M.D.  [address]  [place] [address]   Referring Diagnosis Hemiplegia, unspecified affecting unspecified side [G81.90]     Time Calculation    Start time: 0100  Stop time: 0145 Time Calculation (min): 45 minutes             Chief Complaint: Extremity Weakness and Self Care Duties    Visit Diagnoses     ICD-10-CM   1. Hemiplegia, unspecified etiology, unspecified hemiplegia type, unspecified laterality (HCC)  G81.90       Subjective:   History of Present Illness:     Date of onset:  2019    Mechanism of injury:  Ruptured AVM in 2019 and just finished HH OT/PT/ST with improvements noted in each area.    Quality of life:  Good  Prior level of function:  Was independent with all ADLs prior to incident  Headaches:  no headaches  Sleep disturbance:  Not disrupted  Pain:     Current pain ratin    At best pain ratin    At worst pain ratin  Social Support:     Lives in:  Multiple-level home  Hand dominance:  Right  Diagnostic Tests:     None      Diagnostic Tests Comments:  No recent tests since initial injury  Treatments:     Previous treatment:  Physical therapy, occupational therapy and speech therapy    Current treatment:  Occupational therapy and physical therapy    Treatment Comments:  Completed home health on the .  Activities of Daily Living:     Patient reported ADL status: Able to dress, complete hygiene and min A for showering due to difficulty holding up hand held shower head for a certain period of time.  Unable to cut food and participate in light meal prep.   Patient Goals:     Patient goals for therapy:  Increased  strength, increased motion and independence with ADLs/IADLs      Past Medical History:   Diagnosis Date   • COVID-19    • ICH (intracerebral hemorrhage) (HCC)    • Stroke (HCC)      Past Surgical History:   Procedure Laterality Date   • ANAL FISTULOTOMY     • PEG PLACEMENT     • TRACHEOSTOMY     •  SHUNT INSERTION       Social History     Tobacco Use   • Smoking status: Never Smoker   • Smokeless tobacco: Never Used   Substance Use Topics   • Alcohol use: Never     Family and Occupational History     Socioeconomic History   • Marital status: Single     Spouse name: Not on file   • Number of children: Not on file   • Years of education: Not on file   • Highest education level: Some college, no degree   Occupational History   • Not on file       Objective:   Active Range of Motion:   Upper extremity (left):     All left upper extremity active range of motion: All within functional limits  Upper extremity (right):     All right upper extremity active range of motion: All within functional limits      Strength:   Upper extremity strength (right):    Shoulder flexion: 5    Shoulder extension: 5    Shoulder abduction: 5    Shoulder adduction: 5    Shoulder external rotation: 5    Shoulder internal rotation: 5    Elbow flexion: 5    Elbow extension: 5    Forearm pronation: 5    Forearm supination: 5    Wrist flexion: 5    Wrist extension: 4+    Fingers flexion: 4+    Fingers extension: 4+    Fingers abduction: 4+    Fingers adduction: 4+    , Prehension, Pinch:  /Prehension (left):      strength: Within functional limits  /Prehension (right):      strength: Within functional limits    Strength Comments:  R=  56 lbs   L= 52 pounds    Pinch     R    L  Lateral  14    12  3 point  12    15    Tone, Sensation and Coordination:   Tone:     Right upper extremity muscle tone: Normal    Modified Anusha:   Upper extremity (right):     Shoulder flexors: 0    Shoulder extensors: 0    Shoulder external  rotators: 0    Shoulder internal rotators: 0    Elbow flexors: 0    Elbow extensors: 0    Wrist flexors: 0    Wrist extensors: 0    Fingers flexors: 0    Finger extensors: 0    Coordination   Upper extremity (left):     Fine motor: Impaired    Gross motor: Impaired    Slow alternating movements: Impaired    Rapid alternating movements: Impaired    Finger to finger: Impaired    Finger touch to nose: Impaired  Upper extremity (right):     Fine motor: Impaired    Gross motor: Impaired    Slow alternating movements: Impaired    Rapid alternating movements: Impaired    Finger to finger: Impaired    Finger touch to nose: Impaired      Coordination comments:   9 hole peg test   R= 2 minutes and 21 seconds     L= 2 minutes and 02 seconds    Cognition:     Orientation: normal to time, normal to place and normal to person    Direction following: two step    Vision/Perception:     Visual tracking: intact    Convergence: intact    Visual attentions: intact    Visual acuity: impaired (wears corrective glasses)        Therapeutic Exercises (CPT 57443):     1. Theraputty    Therapeutic Exercise Summary:  Issued HEP with written, verbal and visual training for medium resistance theraputty for gross/fine strength and coordination/integration of movement.  To complete 3 x a day  Patient already has a set HEP for gross and fine motor coordination exercises set up by  OT      Time-based treatments/modalities:    Occupational Therapy Timed Treatment Charges  Therapeutic exercise minutes (CPT 23441): 15 minutes      Assessment, Response and Plan:   Impairments: abnormal coordination, activity intolerance, fine motor function, impaired physical strength, lacks appropriate home exercise program and limited ADL's    Assessment details:  Patient is a 24 y/o male 3 years s/p ruptured AVM who has recently completed  OT/ST/PT with good progress and now starting outpatient therapy to continue with functional progression.  Patient presenting  with impaired gross and fine motor coordination, impaired strength and impaired ability to integrate smooth movements in B UE , impairing functional ability.  Patient would benefit from OT intervention to enhance B UE function, activity tolerance and level of function.   Barriers to therapy:  None  Prognosis: good    Prognosis details:  Patient has supportive family and motivated to participate in therapy  Goals:   Short Term Goals:   Patient and caregiver will be independent with HEP  Patient will be min A cutting own food with AE  Patient will be min A with light meal prep  Patient will have enhance  strength of 5-7 pounds to enhance personal and domestic ADLs.   Short term goal timespan:  2-4 weeks    Long Term Goals:   Patient will be mod I cutting own food with AE  Patient will set up/sup with light meal prep using AE  Patient will have minimal impairment of gross motor coordination in B UE to enhance functional use during ADLs  Patient will enhance bi-manual integration to improve function with light domestic tasks/leisure tasks.   Patient will score 50/80 on the UEFI  Long term goal timespan:  6-8 weeks    Plan:   Therapy options:  Occupational therapy treatment to continue  Planned therapy interventions:  Therapeutic Exercise (CPT 17437)  Frequency:  2x week  Duration in weeks:  8  Duration in visits:  16  Discussed with:  Patient, family and caregiver      Functional Assessment Used  UEFI=35/80        Referring provider co-signature:  I have reviewed this plan of care and my co-signature certifies the need for services.    Certification Period: 07/21/2021 to  09/22/21    Physician Signature: ________________________________ Date: ______________

## 2021-07-21 NOTE — PROGRESS NOTES
Personally spoke with mom regarding his EEG result.  I think it is reasonable to get him off of the Keppra.  She will stop the Keppra evening dose this coming Sunday.  Prior risk of medication wean was discussed during telemedicine visit.  She will reach out with any concerns.

## 2021-07-30 ENCOUNTER — TELEMEDICINE (OUTPATIENT)
Dept: MEDICAL GROUP | Facility: MEDICAL CENTER | Age: 26
End: 2021-07-30
Attending: FAMILY MEDICINE
Payer: MEDICAID

## 2021-07-30 VITALS
BODY MASS INDEX: 25.71 KG/M2 | SYSTOLIC BLOOD PRESSURE: 104 MMHG | HEIGHT: 66 IN | DIASTOLIC BLOOD PRESSURE: 73 MMHG | WEIGHT: 160 LBS | HEART RATE: 83 BPM

## 2021-07-30 DIAGNOSIS — G31.89 COGNITIVE AND NEUROBEHAVIORAL DYSFUNCTION FOLLOWING BRAIN INJURY (HCC): ICD-10-CM

## 2021-07-30 DIAGNOSIS — I69.191 DYSPHAGIA FOLLOWING NONTRAUMATIC INTRACEREBRAL HEMORRHAGE: ICD-10-CM

## 2021-07-30 DIAGNOSIS — S06.9XAS COGNITIVE AND NEUROBEHAVIORAL DYSFUNCTION FOLLOWING BRAIN INJURY (HCC): ICD-10-CM

## 2021-07-30 DIAGNOSIS — F09 COGNITIVE AND NEUROBEHAVIORAL DYSFUNCTION FOLLOWING BRAIN INJURY (HCC): ICD-10-CM

## 2021-07-30 DIAGNOSIS — Z86.79 H/O SPONT INTRAPARENCHYMAL INTRACRANIAL HEMORRHAGE D/T CEREBRAL AVM: ICD-10-CM

## 2021-07-30 PROCEDURE — 99213 OFFICE O/P EST LOW 20 MIN: CPT | Performed by: FAMILY MEDICINE

## 2021-07-30 ASSESSMENT — FIBROSIS 4 INDEX: FIB4 SCORE: 0.19

## 2021-07-30 NOTE — PROGRESS NOTES
Telemedicine: Established Patient   This evaluation was conducted via Zoom using secure and encrypted videoconferencing technology. The patient was in a private location in the state of [place].    The patient's identity was confirmed and verbal consent was obtained for this virtual visit.    Subjective:   CC:   Chief Complaint   Patient presents with   • Follow-Up       Rey Medina is a 25 y.o. 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     Dysphagia following nontraumatic intracerebral hemorrhage  Eating well, taking solids in stead of liquids. Weight has been stable.     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 Recently had EEG off of keppra to make sure he didn't have seizure activity, so keppra was d/c'd from his medication list.        ROS  Gen: no fevers/chills  Pulm: no sob, no cough  CV: no chest pain  GI: no abd pain  : occ constipation      Allergies   Allergen Reactions   • Vancomycin Swelling     Lips  With red face and neck   • Other Misc Rash     Allergic to name band.       Current medicines (including changes today)  Current Outpatient Medications   Medication Sig Dispense Refill   • NON SPECIFIED 1 ensure 1 time daily 60 Each 11   • polyethylene glycol 3350 (MIRALAX) 17 GM/SCOOP Powder DISSOLVE 17 GRAMS IN LIQUID AND DRINK ONCE DAILY AS NEEDED FOR CONSTIPATION 510 g 5   • magnesium hydroxide (MILK OF MAGNESIA) 400 MG/5ML Suspension Take 30 mL by mouth 1 time a day as needed.     • mirtazapine (REMERON) 30 MG Tab tablet Take 1 tablet by mouth at bedtime. 90 tablet 1   • NON SPECIFIED 4 cans of beneprotein (227g per can) per month 4 Each 11   • Omega-3 Fatty Acids (OMEGA 3 500) 500 MG Cap Take 1 Cap by mouth every day. Indications: Dietary  Deficiency     • Magnesium 400 MG Cap Take 1 Cap by mouth every day. Indications: Malnutrition     • RA TURMERIC EXTRA STRENGTH PO Take 1,000 mg by mouth every day. Indications: supplement     • Cholecalciferol (VITAMIN D3) 2000 UNIT Cap Take 2,000 Units by mouth every day.     • non-formulary med Inhale 1 Inhalation 2 times a day as needed (shortness of breath). Amborxol HCL Mucosolvan  Indications: shorness of breath     • non-formulary med Take 1 Dose Pack by mouth every day. Emergen C     • propranolol (INDERAL) 20 MG Tab TAKE 1 TABLET BY MOUTH THREE TIMES DAILY 180 Tab 2   • Cyanocobalamin (B-12 PO) Take 1 Tab by mouth every morning.     • Misc. Devices Misc Incontinence supplies - adult diapers. Length of time needed - lifetime or 999 months. 999 Each 999   • montelukast (SINGULAIR) 10 MG Tab Take 1 Tab by mouth as needed (to reduce amount of pills needing to be taken daily ). (Patient taking differently: Take 10 mg by mouth 1 time a day as needed (to reduce amount of pills needing to be taken daily).) 90 Tab 2     No current facility-administered medications for this visit.       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  He  has a past surgical history that includes anal fistulotomy; peg placement; tracheostomy; and  shunt insertion.       Objective:   /73   Pulse 83   Ht 1.676 m (5' 5.98\")   Wt 72.6 kg (160 lb)   BMI 25.84 kg/m²     Physical Exam   Constitutional: Alert, no distress, well-groomed.  Respiratory: Unlabored respiratory effort, no cough.  MSK: moves all extremities.  Psych: Alert and oriented x3, normal affect and mood.    Assessment and Plan:   The following treatment plan was discussed:     1. 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 Dysphagia following nontraumatic intracerebral hemorrhage    3. 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 Having regular f/u with neuro, pm&R, neurosurgery. No complaints or concerns. Will let us know if he has any needs.       Follow-up: Return in about 6 months (around 1/30/2022).           "

## 2021-07-30 NOTE — ASSESSMENT & PLAN NOTE
Having regular f/u with neurology and PM&R. Recently had EEG off of keppra to make sure he didn't have seizure activity, so keppra was d/c'd from his medication list.

## 2021-08-10 ENCOUNTER — OCCUPATIONAL THERAPY (OUTPATIENT)
Dept: OCCUPATIONAL THERAPY | Facility: REHABILITATION | Age: 26
End: 2021-08-10
Attending: FAMILY MEDICINE
Payer: MEDICAID

## 2021-08-10 ENCOUNTER — SPEECH THERAPY (OUTPATIENT)
Dept: SPEECH THERAPY | Facility: REHABILITATION | Age: 26
End: 2021-08-10
Attending: FAMILY MEDICINE
Payer: MEDICAID

## 2021-08-10 ENCOUNTER — PHYSICAL THERAPY (OUTPATIENT)
Dept: PHYSICAL THERAPY | Facility: REHABILITATION | Age: 26
End: 2021-08-10
Attending: FAMILY MEDICINE
Payer: MEDICAID

## 2021-08-10 DIAGNOSIS — G81.90 HEMIPLEGIA, UNSPECIFIED ETIOLOGY, UNSPECIFIED HEMIPLEGIA TYPE, UNSPECIFIED LATERALITY (HCC): ICD-10-CM

## 2021-08-10 DIAGNOSIS — R26.89 OTHER ABNORMALITIES OF GAIT AND MOBILITY: ICD-10-CM

## 2021-08-10 DIAGNOSIS — R41.841 COGNITIVE COMMUNICATION DEFICIT: ICD-10-CM

## 2021-08-10 DIAGNOSIS — R47.1 DYSARTHRIA AND ANARTHRIA: ICD-10-CM

## 2021-08-10 DIAGNOSIS — R26.2 DIFFICULTY IN WALKING, NOT ELSEWHERE CLASSIFIED: ICD-10-CM

## 2021-08-10 PROCEDURE — 92507 TX SP LANG VOICE COMM INDIV: CPT

## 2021-08-10 PROCEDURE — 97110 THERAPEUTIC EXERCISES: CPT

## 2021-08-10 ASSESSMENT — SOCIAL DETERMINANTS OF HEALTH (SDOH): SOCIAL_SUPPORT_SYSTEM: PARENT

## 2021-08-10 NOTE — OP THERAPY DAILY TREATMENT
"  Outpatient Physical Therapy  DAILY TREATMENT     [facility] Physical [address]  [address]  [place] [address]  Phone:  [phone]  Fax:  [phone]    Date: 08/10/2021    Patient: Rey Medina  YOB: 1995  MRN: [id number]     Time Calculation    Start time: 1345  Stop time: 1430 Time Calculation (min): 45 minutes         Chief Complaint: Difficulty Walking    Visit #: 2    SUBJECTIVE:  Continues standing and walking routine at home with parents.    OBJECTIVE:        Therapeutic Exercises (CPT 77640):     1. STS, from EOM- x3 with CGA/Mikhail, standing 30-45 sec with CGA- Mikhail for balance.  Verbal cues for correcting for posterior LOB.  Pt often overcorrects for excess lean or LOB.    2. Quadruped, hip flex x10B with CGA/ Mikhail at pelvis/trunk for balance    3. Quadruped, hands reach for targets x10B with CGA/Mikhail at pelvis    4. Reclined sit ups, x6 with SBA., tactile and verbal cues upon sitting up from reclined position to attain and maintain spine extension (\"tall and long through spine\")    5. Lateral ball roll- seated EOM, x8 B with SBA    6. Kneeling squats, x10 on mat with CGA/Mikhail, poor ecc control of descent    7. Seated leg press into dynadisc, x10B    8. LAQ, x10B    9. Seated hip abd and add with heavy resistance band, x10B    10. Seated single UE push-pull, x10B, verbal and visual cues for upright sitting posture      Time-based treatments/modalities:    Physical Therapy Timed Treatment Charges  Therapeutic exercise minutes (CPT 67324): 45 minutes      ASSESSMENT:   Response to treatment: Pt presents with excess posterior pelvic tilt and spine flexion, able to correct with tactile, verbal, and visual cues.  Limited tolerance/ muscle endurance for maintaining spine neutral in unsupported positions.    PLAN/RECOMMENDATIONS:   Plan for treatment: therapy treatment to continue next visit.  Planned interventions for next visit: continue with current " treatment.

## 2021-08-10 NOTE — OP THERAPY DAILY TREATMENT
"  Outpatient Speech Therapy  DAILY TREATMENT     [address]  [address]  [place] [address]  Phone:  [phone]  Fax:  [phone]    Date: 08/10/2021    Patient: Rey Medina  YOB: 1995  MRN: [id number]     Time Calculation    Start time: 1435  Stop time: 1515 Time Calculation (min): 40 minutes         Chief Complaint: Not Understood By Others    Visit #: 2    Subjective:   Social Support:     Accompanied By:  Parent (mother, present and supportive)    Patient Mental Status:  Alert and Responsive  Progress Factors:     Progression:  Getting Better  Additional Subjective Comments:      Patient returned to outpatient speech therapy [place] which was information requested as part of initial evaluation completed 3 weeks prior. Parent reports that patient is doing better with taking medications by allowing choice based on wall clock. Patient noted to remain very motivated to improve speech production skills to improve intelligibility.       Objective:   Treatments/Interventions Performed:  Patient/Caregiver education, Compensatory strategy training, Home program and Speech/Language treatment  Other Treatment Interventions:  Establishment of 1-5 voice scale to assist with self-awareness regarding vocal loudness  Objective Details:  Speech production targeted through production of palatal 'ch' with completion to CVC level improved to 11/12 = 91.6% accuracy in intelligibility; although continued, slight distortion of sound demonstrated. Multi-syllable word production incorporating accuracy in 'ch' completed with 12/14 = 85.7% accuracy, independent.     Vocal loudness targeted through use of 1-5 voice scale with patient demonstrating improved accuracy in volume control to target level (3) talking loudness to independent level with use of verbal, visual cues.          Speech Therapy Assessment:     " "Expressive Language Assessment:     Expressive language comments: Communication strategies were discussed and education during today's session. Patient noted to provide automatic response \"I don't know\" to clinician inquiries to a variety of topics and during structured sound production tasks. Patient encouraged to pause in response to provide opportunity for response more accurate to inquiry/ topic of discussion. Following education, gentle verbal prompting provided when necessary with good response, resulting in improved accuracy in response and communication of wants, needs and ideas to an independent level.     Speech Mechanism Assessment:     Patient voice description: Clear (increased vocal intensity of speech production)    Patient exhibits single word intelligibility: Moderate (multi-syllable words; improved with use of compensatory speech strategies including  each word)    Patient uses adequate breath support: Yes  Speech mechanism comments: Patient vocal intensty was noted to be congributing factor to intelligibility in combination with deletion/ omission of syllables in mutli-syllable words. Use of 1-5 vocal loudness scale/ chart proved beneficial in increasing patient self-awareness with regards to overall loudness in speech production, positively supporting intelligibility to novel word, phrase level productions completed during today's session.       Speech Therapy Plan :   Prognosis & Recommendations  Impression Summary:  Rey Medina returned for his first follow up visit of direct, outpatient speech therapy addressing voice, speech production and cognitive communication skills in the setting of spontaneous intraparenchymal intracranial hemorrhage due to cerebral AVM.    Patient returned demonstrating consistent vocal loudness impacting accuracy intelligibility at the novel phrase, simple sentence levels. Education provided on vocal intensity related to intelligibility with strategies of " speech production also educated. Vocal intensity chart was established to improve patient self-awareness regarding intensity of voice with good results. Use of voice consistent with talking level loudness resulted in improved intelligibility and accuracy in speech production to multi-syllable word level during structured practice.    Of note, patient returned independently stating 6/7 details of name, address, city and state learned during initial evaluation. Results suggest patient memory recall to newly learned information will be area of strength as patient progresses through outpatient speech therapy services.   Therapy Recommendations  Recommendation:  Individual Speech Therapy,  Planned Therapy Interventions:  Home Program, Compensatory Strategy Training, Patient/Caregiver Education, Cognitive-Linguistic training and Speech/Language training,   Plan Details:  Continue with structured articulation for multi-syllable words, incorporating use of vocal loudness chart as indicated. Introduced structured tasks addressing cognitive communication deficits.

## 2021-08-10 NOTE — OP THERAPY DAILY TREATMENT
Outpatient Occupational Therapy  DAILY TREATMENT     [facility] [address].  [address]  [place] [address]  Phone:  [phone]  Fax:  [phone]    Date: 08/10/2021    Patient: Rey Medina  YOB: 1995  MRN: [id number]     Time Calculation  Start time: 0100  Stop time: 0145 Time Calculation (min): 45 minutes         Chief Complaint: Extremity Weakness    Visit #: 2    SUBJECTIVE:  I have been doing my exercises at least once a day    OBJECTIVE:  Current objective measures:      Strength:   Upper extremity strength (right):    Shoulder flexion: 5    Shoulder extension: 5    Shoulder abduction: 5    Shoulder adduction: 5    Shoulder external rotation: 5    Shoulder internal rotation: 5    Elbow flexion: 5    Elbow extension: 5    Forearm pronation: 5    Forearm supination: 5    Wrist flexion: 5    Wrist extension: 4+    Fingers flexion: 4+    Fingers extension: 4+    Fingers abduction: 4+    Fingers adduction: 4+        Strength Comments:  R=  64 lbs   L= 56 pounds     Pinch     R    L  Lateral  22    24  3 point  15    21     Tone, Sensation and Coordination:   Tone:     Right upper extremity muscle tone: Normal       Coordination   Upper extremity (left):     Fine motor: Impaired    Gross motor: Impaired    Slow alternating movements: Impaired    Rapid alternating movements: Impaired    Finger to finger: Impaired    Finger touch to nose: Impaired  Upper extremity (right):     Fine motor: Impaired    Gross motor: Impaired    Slow alternating movements: Impaired    Rapid alternating movements: Impaired    Finger to finger: Impaired    Finger touch to nose: Impaired      Coordination comments:   9 hole peg test   R= 1 minute and 55 seconds     L= 2 minutes and 02 seconds        Therapeutic Exercises (CPT 32451):     1. Flex/ext exercise stick in horizontal and vertical position x 10 reps    2. Theraputty    3. Velcro roll utilizing cylindrical grasp 3 x  for each hand.     4. Fine motor coordination/manipulation and dexterity exercises, playing with a deck of cards and use of coins during exercises       Time-based treatments/modalities:  Therapeutic exercise minutes (CPT 81022): 45 minutes        Pain rating before treatment: 0  Pain rating after treatment: 0    ASSESSMENT:   Response to treatment: improvements noted in B  and pinch strength and R hand fine coordination.      PLAN/RECOMMENDATIONS:   Plan for treatment: therapy treatment to continue next visit.  Planned interventions for next visit: continue with current treatment and therapeutic exercise (CPT 07005)

## 2021-08-12 ENCOUNTER — OCCUPATIONAL THERAPY (OUTPATIENT)
Dept: OCCUPATIONAL THERAPY | Facility: REHABILITATION | Age: 26
End: 2021-08-12
Attending: FAMILY MEDICINE
Payer: MEDICAID

## 2021-08-12 ENCOUNTER — SPEECH THERAPY (OUTPATIENT)
Dept: SPEECH THERAPY | Facility: REHABILITATION | Age: 26
End: 2021-08-12
Attending: FAMILY MEDICINE
Payer: MEDICAID

## 2021-08-12 ENCOUNTER — PHYSICAL THERAPY (OUTPATIENT)
Dept: PHYSICAL THERAPY | Facility: REHABILITATION | Age: 26
End: 2021-08-12
Attending: FAMILY MEDICINE
Payer: MEDICAID

## 2021-08-12 DIAGNOSIS — R41.841 COGNITIVE COMMUNICATION DEFICIT: ICD-10-CM

## 2021-08-12 DIAGNOSIS — R26.89 OTHER ABNORMALITIES OF GAIT AND MOBILITY: ICD-10-CM

## 2021-08-12 DIAGNOSIS — R47.1 DYSARTHRIA AND ANARTHRIA: ICD-10-CM

## 2021-08-12 DIAGNOSIS — R26.2 DIFFICULTY IN WALKING, NOT ELSEWHERE CLASSIFIED: ICD-10-CM

## 2021-08-12 DIAGNOSIS — I61.5 NONTRAUMATIC INTRAVENTRICULAR INTRACEREBRAL HEMORRHAGE, UNSPECIFIED LATERALITY (HCC): ICD-10-CM

## 2021-08-12 DIAGNOSIS — G81.90 HEMIPLEGIA, UNSPECIFIED ETIOLOGY, UNSPECIFIED HEMIPLEGIA TYPE, UNSPECIFIED LATERALITY (HCC): ICD-10-CM

## 2021-08-12 PROCEDURE — 92507 TX SP LANG VOICE COMM INDIV: CPT

## 2021-08-12 PROCEDURE — 97110 THERAPEUTIC EXERCISES: CPT

## 2021-08-12 PROCEDURE — 97530 THERAPEUTIC ACTIVITIES: CPT

## 2021-08-12 ASSESSMENT — SOCIAL DETERMINANTS OF HEALTH (SDOH): SOCIAL_SUPPORT_SYSTEM: PARENT

## 2021-08-12 NOTE — OP THERAPY DAILY TREATMENT
"  Outpatient Physical Therapy  DAILY TREATMENT     [facility] Physical [address]  [address]  [place] [address]  Phone:  [phone]  Fax:  [phone]    Date: 08/12/2021    Patient: Rey Medina  YOB: 1995  MRN: [id number]     Time Calculation    Start time: 1345  Stop time: 1430 Time Calculation (min): 45 minutes         Chief Complaint: Difficulty Walking    Visit #: 3    SUBJECTIVE:  Nothing new to report since last PT visit.  States he felt ok following last PT visit, no pain, soreness, or fatigue.    OBJECTIVE:        Therapeutic Exercises (CPT 95545):     1. STS, from EOM- x5 with CGA/Adarsh    2. Bridges, x15 with LEs over bolster    3. SLR, 10B    4. Reclined sit ups, x6 with SBA., tactile and verbal cues upon sitting up from reclined position to attain and maintain spine extension (\"tall and long through spine\"), and for slow eccentric descent    5. LTR with ball, x10B, x10 with manual resistance B    6. Gait with tall walker, 92feet.  PT providing CGA/Adarsh at pelvis with gait belt and pt's mother providing Adarsh at front of walker for walker management/ speed control, Pt demo inconsistent step length and burke.  Intermittent posterior lean requiring manual and verbal cues to correct.    7. Deadbug, x6B    8. LAQ, x10B    9. SAQ over bolster, x10B    10. Seated single UE push-pull, x10B, verbal and visual cues for upright sitting posture    11. Standing minisquat, x10 with CGA/adarsh, cues for form    12. Standing scap retraction, x3    13. Standing balance with BUE AROM, OH flex, 90 deg abd, arms at side, CGA/Adarsh for balance, pt demo good correction of COM when arms overhead.      Time-based treatments/modalities:    Physical Therapy Timed Treatment Charges  Therapeutic exercise minutes (CPT 81850): 40 minutes      ASSESSMENT:   Response to treatment: Pt demo increased standing tolerance today, improved static standing balance and posture with UE " AROM.    PLAN/RECOMMENDATIONS:   Plan for treatment: therapy treatment to continue next visit.  Planned interventions for next visit: continue with current treatment.  Continue to work on active spine extension/ scap retraction and core stability in sitting and standing.

## 2021-08-12 NOTE — OP THERAPY DAILY TREATMENT
Outpatient Occupational Therapy  DAILY TREATMENT     [address]  [address]  [place] [address]  Phone:  [phone]  Fax:  [phone]    Date: 08/12/2021    Patient: Rey Medina  YOB: 1995  MRN: [id number]     Time Calculation  Start time: 0100  Stop time: 0145 Time Calculation (min): 45 minutes         Chief Complaint: Extremity Weakness    Visit #: 3    SUBJECTIVE:  I have been working hard with my exercises    OBJECTIVE:  Current objective measures:   Strength:   Upper extremity strength (right):    Shoulder flexion: 5    Shoulder extension: 5    Shoulder abduction: 5    Shoulder adduction: 5    Shoulder external rotation: 5    Shoulder internal rotation: 5    Elbow flexion: 5    Elbow extension: 5    Forearm pronation: 5    Forearm supination: 5    Wrist flexion: 5    Wrist extension: 4+    Fingers flexion: 4+    Fingers extension: 4+    Fingers abduction: 4+    Fingers adduction: 4+        Strength Comments:  R=  64 lbs   L= 61 pounds     Pinch     R    L  Lateral  22    24  3 point  19    21     Tone, Sensation and Coordination:   Tone:     Right upper extremity muscle tone: Normal        Coordination   Upper extremity (left):     Fine motor: Impaired    Gross motor: Impaired    Slow alternating movements: Impaired    Rapid alternating movements: Impaired    Finger to finger: Impaired    Finger touch to nose: Impaired  Upper extremity (right):     Fine motor: Impaired    Gross motor: Impaired    Slow alternating movements: Impaired    Rapid alternating movements: Impaired    Finger to finger: Impaired    Finger touch to nose: Impaired      Coordination comments:   9 hole peg test   R= 1 minute and 35 seconds     L= 1 minute and 32 seconds        Therapeutic Exercises (CPT 60061):     1. Flex/ext exercise stick in horizontal and vertical position x 10 reps, 10 reps in each position.    2. Theraputty, gross and fine motor strength     3. Velcro roll utilizing cylindrical grasp 3 x for each hand.     4. Fine motor coordination/manipulation and dexterity exercises, playing with a deck of cards and use of coins during exercises     5. Digi-flex 9 # resistance, 10 reps with 5 second hold for both hands    6. Pre-handwriting exercises      Time-based treatments/modalities:  Therapeutic exercise minutes (CPT 40444): 45 minutes        Pain rating before treatment: 0  Pain rating after treatment: 0    ASSESSMENT:   Response to treatment: improvement with , pinch and coordination.     PLAN/RECOMMENDATIONS:   Plan for treatment: therapy treatment to continue next visit.  Planned interventions for next visit: continue with current treatment and therapeutic exercise (CPT 69133)

## 2021-08-12 NOTE — OP THERAPY DAILY TREATMENT
Outpatient Speech Therapy  DAILY TREATMENT     [address]  [address]  [place] [address]  Phone:  [phone]  Fax:  [phone]    Date: 08/12/2021    Patient: Rey Medina  YOB: 1995  MRN: [id number]     Time Calculation    Start time: 1430  Stop time: 1515 Time Calculation (min): 45 minutes         Chief Complaint: No chief complaint on file.    Visit #: 3    Subjective:   Social Support:     Accompanied By:  Parent (mother, present and supportive)    Patient Mental Status:  Alert and Responsive  Progress Factors:     Progression:  Getting Better  Additional Subjective Comments:      Patient returns to outpatient speech therapy demonstrating good motivation to participate in structured, outpatient speech therapy tasks.       Objective:   Treatments/Interventions Performed:  Patient/Caregiver education, Compensatory strategy training, Home program, Cognitive-Linguistic training and Speech/Language treatment  Other Treatment Interventions:  Use of 1-5 voice chart/ scale  Treatment Intervention tool(s) used:  SLP provided structured practice in articulation, targeting accuracy in production of multisyllable words  Cognitive function targeted to stating orientation information, participation in expressive language tasks naming items to categories  Objective Details:  Multisyllable words with focus on labiodentals/ interdentals completed with: Given direct imitation cues 25/ 30 = 83.3% accuracy. Independent productions 17/30 = 56.6% accuracy, repetitions necessary x 2 to increase understanding. With reduced understanding, self-corrections resulted in improved intelligibility 5/17 (29%).              Speech Therapy Assessment:     Cognitive Linguistic Assessment:     Patient orientation to day: [facility]    Patient orientation to month: Severe    Patient orientation to time: [facility]    Patient orientation to self: [facility]    Patient orientation to place: [facility]     Cognitive-Linguistic comments: Patient returns with recall of 3 items (apple, coin, chair) presented at end of last therapy session to independent level. Orientation encouraged with strategies to improve orientation, including using visual cues in environment, educated and practiced with good results. Patient encouraged to focus on improving orientation to level he demonstrated in recall to newly learned information (such as remember 7 details from initial evaluation).       Speech Mechanism Assessment:   Speech mechanism comments: Patient was very response to cues to lower volume of speech to increase intelligibility during independent speech production tasks at the novel word, phrase levels.      Speech Therapy Plan :   Prognosis & Recommendations  Impression Summary:  Patient seen for ongoing, direct, outpatient speech therapy addressing dysarthria and cognitive communication deficits.    Patient demonstrated a significant difference in accuracy of articulation when direct cues were presented as compared to independent productions. As such, recommendations for home program addressing accuracy in voice, speech production to focus on patient production of words with listener intelligibility to allow patient to focus on independence in self-awareness, self-monitoring and self-corrections of speech production.   Therapy Recommendations  Recommendation:  Individual Speech Therapy,  Planned Therapy Interventions:  Home Program, Compensatory Strategy Training, Patient/Caregiver Education, Cognitive-Linguistic training and Speech/Language training,   Plan Details:  Continue with structured articulation for multi-syllable words, incorporating use of vocal loudness chart as indicated. Review orientation information and progress to attention, memory recall tasks of increasing complexity.

## 2021-08-17 ENCOUNTER — OCCUPATIONAL THERAPY (OUTPATIENT)
Dept: OCCUPATIONAL THERAPY | Facility: REHABILITATION | Age: 26
End: 2021-08-17
Attending: FAMILY MEDICINE
Payer: MEDICAID

## 2021-08-17 ENCOUNTER — PHYSICAL THERAPY (OUTPATIENT)
Dept: PHYSICAL THERAPY | Facility: REHABILITATION | Age: 26
End: 2021-08-17
Attending: FAMILY MEDICINE
Payer: MEDICAID

## 2021-08-17 ENCOUNTER — SPEECH THERAPY (OUTPATIENT)
Dept: SPEECH THERAPY | Facility: REHABILITATION | Age: 26
End: 2021-08-17
Attending: FAMILY MEDICINE
Payer: MEDICAID

## 2021-08-17 DIAGNOSIS — R26.2 DIFFICULTY IN WALKING, NOT ELSEWHERE CLASSIFIED: ICD-10-CM

## 2021-08-17 DIAGNOSIS — G81.90 HEMIPLEGIA, UNSPECIFIED ETIOLOGY, UNSPECIFIED HEMIPLEGIA TYPE, UNSPECIFIED LATERALITY (HCC): ICD-10-CM

## 2021-08-17 DIAGNOSIS — R47.1 DYSARTHRIA AND ANARTHRIA: ICD-10-CM

## 2021-08-17 DIAGNOSIS — R26.89 OTHER ABNORMALITIES OF GAIT AND MOBILITY: ICD-10-CM

## 2021-08-17 DIAGNOSIS — R41.841 COGNITIVE COMMUNICATION DEFICIT: ICD-10-CM

## 2021-08-17 PROCEDURE — 97110 THERAPEUTIC EXERCISES: CPT

## 2021-08-17 PROCEDURE — 97110 THERAPEUTIC EXERCISES: CPT | Mod: XE

## 2021-08-17 PROCEDURE — 92507 TX SP LANG VOICE COMM INDIV: CPT

## 2021-08-17 PROCEDURE — 97112 NEUROMUSCULAR REEDUCATION: CPT

## 2021-08-17 ASSESSMENT — SOCIAL DETERMINANTS OF HEALTH (SDOH): SOCIAL_SUPPORT_SYSTEM: PARENT

## 2021-08-17 NOTE — OP THERAPY DAILY TREATMENT
Outpatient Speech Therapy  DAILY TREATMENT     [facility] Speech [address]  [address]  [place] [address]  Phone:  [phone]  Fax:  [phone]    Date: 08/17/2021    Patient: Rey Medina  YOB: 1995  MRN: [id number]     Time Calculation    Start time: 1435  Stop time: 1515 Time Calculation (min): 40 minutes         Chief Complaint: No chief complaint on file.    Visit #: 4    Subjective:   Social Support:     Accompanied By:  Parent (mother, present and supportive)    Patient Mental Status:  Alert and Responsive  Additional Subjective Comments:      Patient reports he has no complaints. Patient remains very motivated to improve speech production, cognitive communication function to increase independence.       Objective:   Treatments/Interventions Performed:  Patient/Caregiver education, Home program, Cognitive-Linguistic training, Speech/Language treatment, Compensatory strategy training and Respiratory Coordination / Support training  Treatment Intervention tool(s) used:  SLP provided education and structured practice in activities addressing memory and speech production  Objective Details:  Memory targeted through completion of structured, mental manipulation tasks: repeat 3 words in order they would occur 5/5, 3 words in alphabetical order 5/5 (100%). Progressed to 4 unrelated words: alphabetical order: need for minimum of x 2 verbal repeats 4/5 (80%) accuracy.     Speech production with focus on palatals: 'ch' CVC words: 9/15 = 60% accuracy; moderate cues to support accuracy in articulation. Bisyllabic words: word initial 4/10 (40%) self-correction to verbal prompts 3/6 (50%). Word medial 9/10 (90%). Word final 6/7 (85.7%).  Phrases and short sentences 8/10 = 80% accuracy given minimal-moderate verbal prompts/ instruction/ cues.          Speech Therapy Assessment:     Cognitive Linguistic Assessment:     Patient orientation to day: [facility]    Patient  orientation to month: [facility]    Patient orientation to time: [facility]    Patient orientation to self: [facility]    Patient orientation to place: [facility]    Patient immediate memory: Minimal (3 words within function limits; need for increased repetition cues to increase accuracy in recall to 4 words)    Cognitive-Linguistic comments: Following education provided at last outpatient speech therapy session, patient returned demonstrating independence in orientation. Patient demonstrated good accuracy in mental manipulation tasks to 3 word level. Need for consistent repetitions and use of internal memory strategy, repetition, necessary to recall information to a 4 word level.       Speech Mechanism Assessment:   Speech mechanism comments: Given structured sound production with target for accuracy in production of 'ch' patient with need for cues to open mouth to address teeth clenched position often noted to independent productions. Cues for tongue placement to palate and open mouth resulted in greatest accuracy of sound production.       Speech Therapy Plan :   Prognosis & Recommendations  Impression Summary: Patient seen for ongoing, direct, outpatient speech therapy addressing dysarthria and cognitive communication deficits.    Patient demonstrated carryover in information learned from one therapy session to the next, as evidenced by independence in orientation as discussed from previous outpatient speech therapy session. Likewise, patient is independent in carryover of strategies learned to support accuracy and independence, during today's session demonstrating improved accuracy in speech production of 'ch' given structured practice in words of increasing complexity implementing speech production techniques as educated, practiced to an independent level given minimal verbal prompts/ instruction/ cues.   Therapy Recommendations  Recommendation:  Individual Speech Therapy, Education/ discussion completed with parent regarding patient  potential to benefit from language based applications targeting accuracy in expressive language skills, written expression of tasks of increasing complexity, such as those provided through [employer] applications  Planned Therapy Interventions:  Home Program, Compensatory Strategy Training, Patient/Caregiver Education, Speech/Language training, Respiratory coordination/support training and Cognitive-Linguistic training,   Plan Details:  Continue with structured articulation for multi-syllable words, incorporating use of vocal loudness chart as indicated. Review orientation information and progress to attention, memory recall tasks of increasing complexity.

## 2021-08-17 NOTE — OP THERAPY DAILY TREATMENT
Outpatient Physical Therapy  DAILY TREATMENT     [facility] Physical [address]  [address]  [place] [address]  Phone:  [phone]  Fax:  [phone]    Date: 08/17/2021    Patient: Rey Medina  YOB: 1995  MRN: [id number]     Time Calculation    Start time: 1345  Stop time: 1430 Time Calculation (min): 45 minutes         Chief Complaint: Difficulty Walking and Loss Of Balance    Visit #: 4    SUBJECTIVE:  Pt has been walking with tall walker with his parents, not daily due to poor air quality.  Continues standing exercises and squats at home.    OBJECTIVE:        Therapeutic Exercises (CPT 21002):     1. STS, from EOM- x5 with CGA/Mikhail    2. Bridges, x15     3. Prone modified plank press, x10    4. Reclined hooklying sit ups, x10    5. LTR with ball, x10 with manual resistance B    6. Hooklying hip abd and add with heavy resistance band, x10B    7. Sidelying hip abd, isometric hold x10 sec B, hip circles x10B    8. SAQ over ball, x10B    9. DKC with ball, x15 with manual resistance    Therapeutic Treatments and Modalities:     1. Neuromuscular Re-education (CPT 61907)    Therapeutic Treatment and Modalities Summary: -Standing minisquat, x10 with CGA/mikhail, cues for form  -Standing scap retraction, x15.  Verbal and tactile cues for form and to avoid posterior LOB.  -Seated balance with BUE AROM, OH flex, 90 deg abd, 90 deg flex, with ball behind back, CGA for balance  -Standing with BUE support on ball at table, with CGA/Mikhail small roll forward for forward flexion, then rolling back to starting position, standing tall.  Tactile and verbal cues for controlled movement and correcting for LOB.  -seated lateral ball roll x8B with SBA.    Time-based treatments/modalities:    Physical Therapy Timed Treatment Charges  Neuromusc re-ed, balance, coor, post minutes (CPT 74781): 20 minutes  Therapeutic exercise minutes (CPT 40113): 25 minutes    ASSESSMENT:   Response to  treatment: Pt demo impaired coordination and balance.  Responds well to interventions and cues.    PLAN/RECOMMENDATIONS:   Plan for treatment: therapy treatment to continue next visit.  Planned interventions for next visit: continue with current treatment.  Standing frame?

## 2021-08-17 NOTE — OP THERAPY DAILY TREATMENT
Outpatient Occupational Therapy  DAILY TREATMENT     [address]  [address]  [place] [address]  Phone:  [phone]  Fax:  [phone]    Date: 08/17/2021    Patient: Rey Medina  YOB: 1995  MRN: [id number]     Time Calculation  Start time: 0100  Stop time: 0145 Time Calculation (min): 45 minutes         Chief Complaint: Extremity Weakness    Visit #: 4    SUBJECTIVE:  I do my exercises every day    OBJECTIVE:  Current objective measures:   Upper extremity strength (right):    Shoulder flexion: 5    Shoulder extension: 5    Shoulder abduction: 5    Shoulder adduction: 5    Shoulder external rotation: 5    Shoulder internal rotation: 5    Elbow flexion: 5    Elbow extension: 5    Forearm pronation: 5    Forearm supination: 5    Wrist flexion: 5    Wrist extension: 4+    Fingers flexion: 4+    Fingers extension: 4+    Fingers abduction: 4+    Fingers adduction: 4+        Strength Comments:  R=  64 lbs   L= 71 pounds     Pinch     R    L  Lateral  22    24  3 point  19    21     Tone, Sensation and Coordination:   Tone:     Right upper extremity muscle tone: Normal        Coordination   Upper extremity (left):     Fine motor: Impaired    Gross motor: Impaired    Slow alternating movements: Impaired    Rapid alternating movements: Impaired    Finger to finger: Impaired    Finger touch to nose: Impaired  Upper extremity (right):     Fine motor: Impaired    Gross motor: Impaired    Slow alternating movements: Impaired    Rapid alternating movements: Impaired    Finger to finger: Impaired    Finger touch to nose: Impaired      Coordination comments:   9 hole peg test   R= 1 minute and 33 seconds     L= 1 minute and 29 seconds        Therapeutic Exercises (CPT 87308):     1. Flex/ext exercise stick in horizontal and vertical position x 10 reps, 10 reps in each position.    2. Theraputty, gross and fine motor strength    3. Use of knife for  cutting medium resistance theraputty , incoroporating bi-manual integreation.     4. Fine motor coordination/manipulation and dexterity exercises, playing with a deck of cards and use of coins during exercises     5. Digi-flex 9 # resistance, 10 reps with 5 second hold for both hands      Time-based treatments/modalities:  Therapeutic exercise minutes (CPT 49314): 45 minutes        Pain rating before treatment: 0  Pain rating after treatment: 0    ASSESSMENT:   Response to treatment: Continues to improve with strength and dexterity and bi-manual integration.     PLAN/RECOMMENDATIONS:   Plan for treatment: therapy treatment to continue next visit.  Planned interventions for next visit: continue with current treatment and therapeutic exercise (CPT 89484)

## 2021-08-19 ENCOUNTER — OCCUPATIONAL THERAPY (OUTPATIENT)
Dept: OCCUPATIONAL THERAPY | Facility: REHABILITATION | Age: 26
End: 2021-08-19
Attending: FAMILY MEDICINE
Payer: MEDICAID

## 2021-08-19 ENCOUNTER — PHYSICAL THERAPY (OUTPATIENT)
Dept: PHYSICAL THERAPY | Facility: REHABILITATION | Age: 26
End: 2021-08-19
Attending: FAMILY MEDICINE
Payer: MEDICAID

## 2021-08-19 ENCOUNTER — SPEECH THERAPY (OUTPATIENT)
Dept: SPEECH THERAPY | Facility: REHABILITATION | Age: 26
End: 2021-08-19
Attending: FAMILY MEDICINE
Payer: MEDICAID

## 2021-08-19 DIAGNOSIS — R26.2 DIFFICULTY IN WALKING, NOT ELSEWHERE CLASSIFIED: ICD-10-CM

## 2021-08-19 DIAGNOSIS — R26.89 OTHER ABNORMALITIES OF GAIT AND MOBILITY: ICD-10-CM

## 2021-08-19 DIAGNOSIS — R41.841 COGNITIVE COMMUNICATION DEFICIT: ICD-10-CM

## 2021-08-19 DIAGNOSIS — I61.5 NONTRAUMATIC INTRAVENTRICULAR INTRACEREBRAL HEMORRHAGE, UNSPECIFIED LATERALITY (HCC): ICD-10-CM

## 2021-08-19 DIAGNOSIS — G81.90 HEMIPLEGIA, UNSPECIFIED ETIOLOGY, UNSPECIFIED HEMIPLEGIA TYPE, UNSPECIFIED LATERALITY (HCC): ICD-10-CM

## 2021-08-19 DIAGNOSIS — R47.1 DYSARTHRIA AND ANARTHRIA: ICD-10-CM

## 2021-08-19 PROCEDURE — 97110 THERAPEUTIC EXERCISES: CPT

## 2021-08-19 PROCEDURE — 97112 NEUROMUSCULAR REEDUCATION: CPT

## 2021-08-19 PROCEDURE — 92507 TX SP LANG VOICE COMM INDIV: CPT

## 2021-08-19 NOTE — OP THERAPY DAILY TREATMENT
Outpatient Physical Therapy  DAILY TREATMENT     [facility] Physical Therapy [address].  [address]  [place] [address]  Phone:  [phone]  Fax:  [phone]    Date: 08/19/2021    Patient: Rey Medina  YOB: 1995  MRN: [id number]     Time Calculation                   Chief Complaint: No chief complaint on file.    Visit #: 5    SUBJECTIVE:  Nothing new to report.    OBJECTIVE:        Therapeutic Exercises (CPT 99960):     2. Bridges, x15     3. Prone on elbows alt R/L UE reach, x10    5. DKC with ball, x15    7. Sidelying hip abd, isometric hold x10 sec B, running man x10B    8. Prone hip ext, x10B    9. Semi quadruped alt R/L arm lift, x8    10. March from LEs over ball, x10 alt R/L, required verbal cues to slow down to stabilize ball with stable leg.    Therapeutic Treatments and Modalities:     1. Neuromuscular Re-education (CPT 67377)    Therapeutic Treatment and Modalities Summary: -Standing frame x10 min:     minisquat, x15     Balloon batting 2x 8 B     Scap squeezes into pool noodle x10, scap retraction AROM x10     Static standing balance with BUEs 90 deg flex, OH reach, 90 deg abd, x2.    Time-based treatments/modalities:         ASSESSMENT:   Response to treatment: Pt demo increased fatigue today.  Increased difficulty with extension exercises in prone.  Able to attain improved standing posture in standing frame, without UE support.    PLAN/RECOMMENDATIONS:   Plan for treatment: therapy treatment to continue next visit.  Re-assess objective measures and goals.  Planned interventions for next visit: continue with current treatment.

## 2021-08-19 NOTE — OP THERAPY DAILY TREATMENT
Outpatient Occupational Therapy  DAILY TREATMENT     [facility] Occupational [address]  [address]  [place] [address]  Phone:  [phone]  Fax:  [phone]    Date: 08/19/2021    Patient: Rey Medina  YOB: 1995  MRN: [id number]     Time Calculation  Start time: 0100  Stop time: 0145 Time Calculation (min): 45 minutes         Chief Complaint: Extremity Weakness and Self Care Duties    Visit #: 5    SUBJECTIVE:  I need to practice cutting my food    OBJECTIVE:  Current objective measures:        Strength Comments:  R=  64 lbs   L= 71 pounds     Pinch     R    L  Lateral  22    24  3 point  19    21     Tone, Sensation and Coordination:   Tone:     Right upper extremity muscle tone: Normal        Coordination   Upper extremity (left):     Fine motor: Impaired    Gross motor: Impaired    Slow alternating movements: Impaired    Rapid alternating movements: Impaired    Finger to finger: Impaired    Finger touch to nose: Impaired  Upper extremity (right):     Fine motor: Impaired    Gross motor: Impaired    Slow alternating movements: Impaired    Rapid alternating movements: Impaired    Finger to finger: Impaired    Finger touch to nose: Impaired      Coordination comments:   9 hole peg test   R= 1 minute and 29 seconds     L= 1 minute and 17 seconds        Therapeutic Exercises (CPT 31498):     1. Flex/ext exercise stick in horizontal and vertical position x 10 reps, 10 reps in each position.    2. Theraputty, gross and fine motor strength    3. Wrist maze, for controlled wrist movement     4. Fine motor coordination/manipulation and dexterity exercises, 9 hole peg test, grooved pegboard and use of coins during exercises     5. Digi-flex 9 # resistance, 10 reps with 5 second hold for both hands      Time-based treatments/modalities:  Therapeutic exercise minutes (CPT 94026): 45 minutes        Pain rating before treatment: 0  Pain rating after treatment:  0    ASSESSMENT:   Response to treatment: Continues to improve as per tx plan.     PLAN/RECOMMENDATIONS:   Plan for treatment: therapy treatment to continue next visit.  Planned interventions for next visit: continue with current treatment and therapeutic exercise (CPT 62686)

## 2021-08-19 NOTE — OP THERAPY DAILY TREATMENT
Outpatient Speech Therapy  DAILY TREATMENT     [facility] Speech [address].  [address]  [place] [address]  Phone:  [phone]  Fax:  [phone]    Date: 08/19/2021    Patient: Rey Medina  YOB: 1995  MRN: [id number]     Time Calculation    Start time: 1435  Stop time: 1515 Time Calculation (min): 40 minutes         Chief Complaint: No chief complaint on file.    Visit #: 5    Subjective:   Additional Subjective Comments:      Patient endorses no complaints. Patient endorses reading aloud completed as part of home program.       Objective:   Treatments/Interventions Performed:  Patient/Caregiver education, Compensatory strategy training, Speech/Language treatment, Home program and Cognitive-Linguistic training  Treatment Intervention tool(s) used:  SLP provided education and structured practice in activities addressing attention, memory and speech production  Objective Details:  Attention to task targeted selective attention to participation in 20 minute memory game: patient maintained attention to entire game, demonstrating appropriate attention, turn taking to independent level. Working memory to matches during games targeted to field of 20 given memory game with patient demonstrating accuracy in memory recall to obtain 7/10 matches.     Simple mathematics to single digit addition, subtraction targeted with patient completing with 9/10 = 90% accuracy.     Speech production with focus on palatals: 'sh' CVC words: 12/17 = 70.6% accuracy; direct imitation cues to support accuracy in articulation. Bisyllabic words: word initial and medial positions: 22/25 = 88% accuracy, direct imitation cues.          Speech Therapy Assessment:     Cognitive Linguistic Assessment:     Patient attention sustained: [facility]    Patient orientation to day: [facility]    Patient orientation to month: [facility]    Patient orientation to time: [facility]    Patient orientation to self: [facility]    Patient  orientation to place: [facility]    Patient immediate memory: Minimal    Patient recent and short term memory: Minimal    Patient simple problem solving ability: [facility]    Cognitive-Linguistic comments: Patient demonstrated continued improvements in cognitive function as evidenced by maintaining attention given minimal environmental distractions to 20 minutes independent. Additionally, patient demonstrated accuracy in simple problem solving including single digit addition/ subtraction as presented auditorily by the clinician with 90% accuracy, independent. Patient also demonstrated accuracy in memory recall, specific to number of matches identified during memory game to both himself and the clinician with 80% accuracy, again independent.       Speech Mechanism Assessment:     Patient exhibits articulatory precision: Moderate (for palatal sound 'sh' as targeted during today's session)      Speech Therapy Plan :   Prognosis & Recommendations  Impression Summary:  Patient seen for ongoing, direct, outpatient speech therapy addressing dysarthria and cognitive communication deficits.    Patient demonstrated improvements in all areas targeted, demonstrating improved attention, short term memory recall and simple problem solving to an independent level. Patient also demonstrating improved accuracy in speech production; although continued moderate deficits continue with production of palatal sounds.   Therapy Recommendations  Recommendation:  Individual Speech Therapy,  Planned Therapy Interventions:  Home Program, Compensatory Strategy Training, Patient/Caregiver Education, Speech/Language training and Cognitive-Linguistic training,   Plan Details:  Continue with structured articulation for multi-syllable words, incorporating use of vocal loudness chart as indicated. Review orientation information and progress to attention, memory recall tasks of increasing complexity and problem solving

## 2021-08-24 ENCOUNTER — OCCUPATIONAL THERAPY (OUTPATIENT)
Dept: OCCUPATIONAL THERAPY | Facility: REHABILITATION | Age: 26
End: 2021-08-24
Attending: FAMILY MEDICINE
Payer: MEDICAID

## 2021-08-24 DIAGNOSIS — G81.90 HEMIPLEGIA, UNSPECIFIED ETIOLOGY, UNSPECIFIED HEMIPLEGIA TYPE, UNSPECIFIED LATERALITY (HCC): ICD-10-CM

## 2021-08-24 PROCEDURE — 97110 THERAPEUTIC EXERCISES: CPT

## 2021-08-24 NOTE — OP THERAPY DAILY TREATMENT
Outpatient Occupational Therapy  DAILY TREATMENT     [facility] Occupational [address].  [address]  [place] [address]  Phone:  [phone]  Fax:  [phone]    Date: 08/24/2021    Patient: Rey Medina  YOB: 1995  MRN: [id number]     Time Calculation  Start time: 0300  Stop time: 0345 Time Calculation (min): 45 minutes         Chief Complaint: Extremity Weakness and Self Care Duties    Visit #: 6    SUBJECTIVE:  I am tired today, I usually have my nap at this time.     OBJECTIVE:  Current objective measures:   Strength Comments:  R=  64 lbs   L= 71 pounds     Pinch     R    L  Lateral  22    24  3 point  19    21     Tone, Sensation and Coordination:   Tone:     Right upper extremity muscle tone: Normal        Coordination   Upper extremity (left):     Fine motor: Impaired    Gross motor: Impaired    Slow alternating movements: Impaired    Rapid alternating movements: Impaired    Finger to finger: Impaired    Finger touch to nose: Impaired  Upper extremity (right):     Fine motor: Impaired    Gross motor: Impaired    Slow alternating movements: Impaired    Rapid alternating movements: Impaired    Finger to finger: Impaired    Finger touch to nose: Impaired      Coordination comments:   9 hole peg test   R= 1 minute and 43 seconds     L= 1 minute and 20 seconds        Therapeutic Exercises (CPT 71754):     1. Flex/ext exercise stick in horizontal and vertical position x 10 reps, 10 reps in each position.    2. Theraputty, gross and fine motor strength    3. Wrist maze, for controlled wrist movement     4. Fine motor coordination/manipulation and dexterity exercises, 9 hole peg test, grooved pegboard and use of coins during exercises     5. Digi-flex 9 # resistance, 10 reps with 5 second hold for both hands    6. 3 point prehension for pre-handwriting exercises       Time-based treatments/modalities:  Therapeutic exercise minutes (CPT 86554): 45 minutes         Pain rating before treatment: 0  Pain rating after treatment: 0    ASSESSMENT:   Response to treatment: Continues to be motivated to participate in therapy.  Rey and his mother Melinda, stated that Rey is now putting syrup on his pancakes, able to mix his drink and has started using microwave with mother's cues and supervision.  Planning on continuing to practice with cutting food and making a light snack like a sandwhich    PLAN/RECOMMENDATIONS:   Plan for treatment: therapy treatment to continue next visit.  Planned interventions for next visit: continue with current treatment and therapeutic exercise (CPT 22317)

## 2021-08-26 ENCOUNTER — OCCUPATIONAL THERAPY (OUTPATIENT)
Dept: OCCUPATIONAL THERAPY | Facility: REHABILITATION | Age: 26
End: 2021-08-26
Attending: FAMILY MEDICINE
Payer: MEDICAID

## 2021-08-26 ENCOUNTER — PHYSICAL THERAPY (OUTPATIENT)
Dept: PHYSICAL THERAPY | Facility: REHABILITATION | Age: 26
End: 2021-08-26
Attending: FAMILY MEDICINE
Payer: MEDICAID

## 2021-08-26 DIAGNOSIS — R26.89 OTHER ABNORMALITIES OF GAIT AND MOBILITY: ICD-10-CM

## 2021-08-26 DIAGNOSIS — I61.5 NONTRAUMATIC INTRAVENTRICULAR INTRACEREBRAL HEMORRHAGE, UNSPECIFIED LATERALITY (HCC): ICD-10-CM

## 2021-08-26 DIAGNOSIS — G81.90 HEMIPLEGIA, UNSPECIFIED ETIOLOGY, UNSPECIFIED HEMIPLEGIA TYPE, UNSPECIFIED LATERALITY (HCC): ICD-10-CM

## 2021-08-26 DIAGNOSIS — R26.2 DIFFICULTY IN WALKING, NOT ELSEWHERE CLASSIFIED: ICD-10-CM

## 2021-08-26 PROCEDURE — 97112 NEUROMUSCULAR REEDUCATION: CPT

## 2021-08-26 PROCEDURE — 97110 THERAPEUTIC EXERCISES: CPT

## 2021-08-26 NOTE — OP THERAPY PROGRESS SUMMARY
Outpatient Physical Therapy  PROGRESS SUMMARY NOTE      [facility] Physical Therapy [place] [address]  [address]  [facility] [address]  Phone:  [phone]  Fax:  [phone]    Date of Visit: 08/26/2021    Patient: Rey Medina  YOB: 1995  MRN: [id number]     Referring Provider: Sherie Power M.D.  [address] [address]   Referring Diagnosis Other abnormalities of gait and mobility [R26.89];Difficulty in walking, not elsewhere classified [R26.2]     Visit Diagnoses     ICD-10-CM   1. Other abnormalities of gait and mobility  R26.89   2. Difficulty in walking, not elsewhere classified  R26.2   3. Nontraumatic intraventricular intracerebral hemorrhage, unspecified laterality (HCC)  I61.5       Rehab Potential: good    Progress Report Period: 7/21 to 8/26/21    Functional Assessment Used  PT Functional Assessment Tool Used: 5 times STS  PT Functional Assessment Score: 23.62 sec       Objective Findings and Assessment:   Patient progression towards goals: Pt continues standing and floor exercises at home with assist/supervision.  Walking with tall walker with parents outside daily (or as weather/air quality permits).    Objective findings and assessment details: Strength:   Lower extremity (left):     Hip flexion: 5    Hip extension: 4    Hip abduction: 5    Hip adduction: 5    Knee flexion: 5    Knee extension: 5    Ankle dorsiflexion: 5    Ankle plantar flexion: 5  Lower extremity (right):     Hip flexion: 5    Hip extension: 3    Hip abduction: 5    Hip adduction: 5    Knee flexion: 5    Knee extension: 5    Ankle dorsiflexion: 5    Ankle plantar flexion: 5     No LE tone appreciated this visit in short sitting PROM.     5 times STS= 23.62 sec from EOM with Mikhail/CGA.  Static unsupported standing (CGA at gait belt) x1min.    Goals:   Short Term Goals:   1. Pt will require CGA/SBA for SPT with UE support at transfer pole or bar. - NOT MET, CONTINUE.  2. Pt will  perform gait with tall 4WW with one person assist, x25 feet.- NOT MET, CONTINUE.  3. Pt will be able to demonstrate tall kneeling indep in order to demonstrate incr core strength.- NOT MET, CONTINUE.  Short term goal time span:  2-4 weeks      Long Term Goals:    1. Pt will demonstrate safe transfers with SPV and LRAD in order to increase independence and decrease caregiver burden.- NOT MET, CONTINUE.  2. Pt will perform STS with LRD with adequate control on descent with SBA.- NOT MET, CONTINUE.  3. Pt will ambulate 100 ft with LRAD and Mikhail in order to increase independence. - NOT MET, CONTINUE.  4. Pt will misty unsupported standing x1 min with SBA.- NOT MET, CONTINUE.  Long term goal time span:  2-4 months    Plan:   Planned therapy interventions:  Therapeutic Exercise (CPT 66739) and Neuromuscular Re-education (CPT 93391)  Frequency:  2x week  Duration in visits:  12      Referring provider co-signature:  I have reviewed this plan of care and my co-signature certifies the need for services.     Certification Period: 08/26/2021 to 10/21/21    Physician Signature: ________________________________ Date: ______________

## 2021-08-26 NOTE — OP THERAPY DAILY TREATMENT
Outpatient Occupational Therapy  DAILY TREATMENT     [facility] Occupational [address]  [address]  [place] [address]  Phone:  [phone]  Fax:  [phone]    Date: 08/26/2021    Patient: Rey Medina  YOB: 1995  MRN: [id number]     Time Calculation  Start time: 0230  Stop time: 0315 Time Calculation (min): 45 minutes         Chief Complaint: Extremity Weakness    Visit #: 7    SUBJECTIVE:  No issues noted since last visit.     OBJECTIVE:  Current objective measures:   Strength Comments:  R=  64 lbs   L= 71 pounds     Pinch     R    L  Lateral  22    24  3 point  19    21     Tone, Sensation and Coordination:   Tone:     Right upper extremity muscle tone: Normal        Coordination   Upper extremity (left):     Fine motor: Impaired    Gross motor: Impaired    Slow alternating movements: Impaired    Rapid alternating movements: Impaired    Finger to finger: Impaired    Finger touch to nose: Impaired  Upper extremity (right):     Fine motor: Impaired    Gross motor: Impaired    Slow alternating movements: Impaired    Rapid alternating movements: Impaired    Finger to finger: Impaired    Finger touch to nose: Impaired      Coordination comments:   9 hole peg test   R= 1 minute and 43 seconds     L= 1 minute and 20 seconds        Therapeutic Exercises (CPT 93296):     1. Flex/ext exercise stick in horizontal and vertical position x 10 reps, 10 reps in each position.    2. 3 point prehension for pre-handwriting exercises     3. Wrist maze, for controlled wrist movement     4. Fine motor coordination/manipulation and dexterity exercises, grooved pegboard and card shuffling.      5. Pegboard solitaire to enhance prehension of dominant hand.      6. 3 point prehension for pre-handwriting exercises     Therapeutic Exercise Summary:  Grooved pegboard completed today:  R=  5 minutes and 11 seconds  L = 7 minutes and  28 seconds        Time-based  treatments/modalities:  Therapeutic exercise minutes (CPT 94888): 45 minutes        Pain rating before treatment: 0  Pain rating after treatment: 0    ASSESSMENT:   Response to treatment: Motivated to participate in therapy    PLAN/RECOMMENDATIONS:   Plan for treatment: therapy treatment to continue next visit.  Planned interventions for next visit: continue with current treatment and therapeutic exercise (CPT 60560)

## 2021-08-26 NOTE — OP THERAPY DAILY TREATMENT
Outpatient Physical Therapy  DAILY TREATMENT     [facility] Physical [address].  [address]  [place] [address]  Phone:  [phone]  Fax:  [phone]    Date: 08/26/2021    Patient: Rey Medina  YOB: 1995  MRN: [id number]     Time Calculation    Start time: 1520  Stop time: 1600 Time Calculation (min): 40 minutes         Chief Complaint: Difficulty Walking    Visit #: 6    SUBJECTIVE:  Pt continues standing and floor exercises at home.  Walking when he can with tall walker outside (when air quality allows).      OBJECTIVE:  Current objective measures:   Strength:   Lower extremity (left):     Hip flexion: 5    Hip extension: 4    Hip abduction: 5    Hip adduction: 5    Knee flexion: 5    Knee extension: 5    Ankle dorsiflexion: 5    Ankle plantar flexion: 5  Lower extremity (right):     Hip flexion: 5    Hip extension: 3    Hip abduction: 5    Hip adduction: 5    Knee flexion: 5    Knee extension: 5    Ankle dorsiflexion: 5    Ankle plantar flexion: 5    No LE tone appreciated this visit in short sitting PROM.    5 times STS= 23.62 sec from EOM with Mikhail/CGA.  Static unsupported standing (CGA at gait belt) x1min.        Therapeutic Exercises (CPT 73649):     2. Prone on elbows stretch, x1 min    3. Quadruped alt R/L shoulder flex UE reach, x10    4. Quadruped rows, x10B    6. Sidelying clams, x10B    7. Sidelying runner , with resisted extension, x10B    Therapeutic Treatments and Modalities:     1. Neuromuscular Re-education (CPT 33282)    Therapeutic Treatment and Modalities Summary: -standing at tall walker: alt heel raises x10 with CGA, AP weight shift in stride stance x10B with Mikhail/CGA and tactile cues for appropriate weight shift and weight acceptance through LEs  -Gait with tall walker 90feet, pt's mother guiding and providing assist at front of walker, PT with gait belt behind pt for CGA/Mikhail for balance.  Pt presents with posterior lean, losing weight  acceptance through LEs.  PT provided tactile and verbal cues to lean forward to get body over legs, and to keep chest up and shoulders back.  -seated ball isometric TA press 5sec x10 with SBA.  -seated scap retraction with spine ext x10, isometric hold with hands in lap x30 sec.  -static sitting balance in neutral spine: single UE AROM (flex to 90, abd to 90, OH flex) x2B.  -squats with light BUE support, x10 at EOM.  PT provides tactile and verbal cues for increased hip hinge, and keeping chest up.      Time-based treatments/modalities:    Physical Therapy Timed Treatment Charges  Neuromusc re-ed, balance, coor, post minutes (CPT 40584): 30 minutes  Therapeutic exercise minutes (CPT 43821): 15 minutes    ASSESSMENT:   Response to treatment: Pt demo improved static standing balance and misty, but dynamically still has coordinating spine posture and weight distribution to LEs.    PLAN/RECOMMENDATIONS:   Plan for treatment: therapy treatment to continue next visit.  Planned interventions for next visit: continue with current treatment.

## 2021-08-31 ENCOUNTER — SPEECH THERAPY (OUTPATIENT)
Dept: SPEECH THERAPY | Facility: REHABILITATION | Age: 26
End: 2021-08-31
Attending: FAMILY MEDICINE
Payer: MEDICAID

## 2021-08-31 ENCOUNTER — OCCUPATIONAL THERAPY (OUTPATIENT)
Dept: OCCUPATIONAL THERAPY | Facility: REHABILITATION | Age: 26
End: 2021-08-31
Attending: FAMILY MEDICINE
Payer: MEDICAID

## 2021-08-31 DIAGNOSIS — R41.841 COGNITIVE COMMUNICATION DEFICIT: ICD-10-CM

## 2021-08-31 DIAGNOSIS — R13.12 OROPHARYNGEAL DYSPHAGIA: ICD-10-CM

## 2021-08-31 DIAGNOSIS — G81.90 HEMIPLEGIA, UNSPECIFIED ETIOLOGY, UNSPECIFIED HEMIPLEGIA TYPE, UNSPECIFIED LATERALITY (HCC): ICD-10-CM

## 2021-08-31 DIAGNOSIS — R47.1 DYSARTHRIA AND ANARTHRIA: ICD-10-CM

## 2021-08-31 PROCEDURE — 92507 TX SP LANG VOICE COMM INDIV: CPT

## 2021-08-31 PROCEDURE — 97110 THERAPEUTIC EXERCISES: CPT

## 2021-08-31 ASSESSMENT — SOCIAL DETERMINANTS OF HEALTH (SDOH): SOCIAL_SUPPORT_SYSTEM: PARENT

## 2021-08-31 NOTE — OP THERAPY DAILY TREATMENT
Outpatient Speech Therapy  DAILY TREATMENT     [facility] Speech [address].  [address]  [place] [address]  Phone:  [phone]  Fax:  [phone]    Date: 08/31/2021    Patient: Rey Medina  YOB: 1995  MRN: [id number]     Time Calculation    Start time: 1236  Stop time: 1330 Time Calculation (min): 54 minutes         Chief Complaint: Not Understood By Others    Visit #: 6    Subjective:   Reason for Therapy:     Reason For Evaluation:  Cognition and Dysarthria  Social Support:     Accompanied By:  Parent (mom in session)    Patient Mental Status:  Alert and Responsive  Additional Subjective Comments:      Patient doing well and recalled 3 words from previous session.      Objective:   Treatments/Interventions Performed:  Home program, Patient/Caregiver education, Speech/Language treatment, Compensatory strategy training and Cognitive-Linguistic training  Objective Details:  Immediate recall of 3 words 6/6 no assist.  Manipulation of those words 5/6 correct.  Recalled 4 words at 100% 5/6 attempts, no assist.  With phonetic cue recalled last item.  Manipulation of those words 5/6 no assist.    Mental manipulation opposites with delay completed 90% accuracy.  Math manipulation with adding and subtracting numbers continuously 90% accuracy with self correction.  Extended processing.    Problem solved homonyms and multiple meanings to words min assist 100%.    Sentence development with given word 5/6 for 90+ intelligibility. Min cues for adequate breath support and decreased volume.    Orientation questions completed to 100% no assist.         Speech Therapy Assessment:     Cognitive Linguistic Assessment:     Patient immediate memory: Supervision Required    Patient recent and short term memory: Minimal and Supervision Required    Patient prospective and time delay memory: Supervision Required and Minimal      Speech Mechanism Assessment:     Patient voice  description: Clear    Patient's oral movements are voluntary and coordination: Minimal    Patient speaks fluently: Minimal    Patient exhibits articulatory precision: Minimal    Patient exhibits sentence level intelligibility: Minimal    Patient dysarthria: Moderate  Speech mechanism comments: Significant improvements with speech intelligibility from when saw this SLP in home health setting.      Speech Therapy Plan :   Prognosis & Recommendations  Impression Summary:  Patient continues to improve with insight and self correction of speech and cognitive errors.  Prognosis Details:  Progressing towards goals, requires skilled need  Goals  Short Term Goals:  1. Patient will improve orientation, answering orientation questions to day, date, month, season, etc with 100% accuracy over two consecutive sessions within 4 weeks.  2. Patient will improve attention completing sustained, progressing to selective and divided attention tasks with 85% or greater accuracy given min verbal prompts/ instruction/ cues as necessary.  3. Patient will improve working memory completing mental manipulation tasks of 3, progressing to 4 items, with 85% or greater accuracy implementing external and/or internal memory strategies as necessary provided minimal verbal prompts/ instruction/ cues.  4. Patient will demonstrate accuracy and independence in completion of language related, activities of daily living, including reading a calendar, reading instructions, completing tasks with a minimum of 85% accuracy; independent.   5. The patient will improve intelligibility completing structured speech tasks targeting accuracy in articulation of multi-syllable words, progressing to phrase level productions implementing compensatory speech strategies as/ if necessary including slowing rate of speech, easy onset to voice production and over-articulation,  each word 7/10 trials, 75% accuracy given minimal to moderate verbal cues as necessary.    6. The patient will improve speech production through structured speech production tasks of increasing complexity targeting prosodic features of speech production completing with 75% accuracy, minimal to moderate verbal prompts/ instruction/ cues.   Long Term Goals:     Potential barriers to Goal Achievement:  None  Therapy Recommendations  Recommendation:  Individual Speech Therapy,  Planned Therapy Interventions:  Home Program, Patient/Caregiver Education, Compensatory Strategy Training, Speech/Language training, Cognitive-Linguistic training and Respiratory coordination/support training,   Plan Details:  Attention  Duration (in weeks):  10

## 2021-09-02 ENCOUNTER — PHYSICAL THERAPY (OUTPATIENT)
Dept: PHYSICAL THERAPY | Facility: REHABILITATION | Age: 26
End: 2021-09-02
Attending: FAMILY MEDICINE
Payer: MEDICAID

## 2021-09-02 ENCOUNTER — SPEECH THERAPY (OUTPATIENT)
Dept: SPEECH THERAPY | Facility: REHABILITATION | Age: 26
End: 2021-09-02
Attending: FAMILY MEDICINE
Payer: MEDICAID

## 2021-09-02 ENCOUNTER — OCCUPATIONAL THERAPY (OUTPATIENT)
Dept: OCCUPATIONAL THERAPY | Facility: REHABILITATION | Age: 26
End: 2021-09-02
Attending: FAMILY MEDICINE
Payer: MEDICAID

## 2021-09-02 DIAGNOSIS — R47.1 DYSARTHRIA AND ANARTHRIA: ICD-10-CM

## 2021-09-02 DIAGNOSIS — I61.5 NONTRAUMATIC INTRAVENTRICULAR INTRACEREBRAL HEMORRHAGE, UNSPECIFIED LATERALITY (HCC): ICD-10-CM

## 2021-09-02 DIAGNOSIS — R26.89 OTHER ABNORMALITIES OF GAIT AND MOBILITY: ICD-10-CM

## 2021-09-02 DIAGNOSIS — G81.90 HEMIPLEGIA, UNSPECIFIED ETIOLOGY, UNSPECIFIED HEMIPLEGIA TYPE, UNSPECIFIED LATERALITY (HCC): ICD-10-CM

## 2021-09-02 DIAGNOSIS — R26.2 DIFFICULTY IN WALKING, NOT ELSEWHERE CLASSIFIED: ICD-10-CM

## 2021-09-02 DIAGNOSIS — R41.841 COGNITIVE COMMUNICATION DEFICIT: ICD-10-CM

## 2021-09-02 PROCEDURE — 97110 THERAPEUTIC EXERCISES: CPT | Mod: XU

## 2021-09-02 PROCEDURE — 92507 TX SP LANG VOICE COMM INDIV: CPT

## 2021-09-02 PROCEDURE — 97112 NEUROMUSCULAR REEDUCATION: CPT | Mod: XU

## 2021-09-02 NOTE — OP THERAPY DAILY TREATMENT
Outpatient Speech Therapy  DAILY TREATMENT     [address]  [address]  [place] [address]  Phone:  [phone]  Fax:  [phone]    Date: 09/02/2021    Patient: Rey Medina  YOB: 1995  MRN: [id number]     Time Calculation    Start time: 1015  Stop time: 1100 Time Calculation (min): 45 minutes         Chief Complaint: Not Understood By Others    Visit #: 7    Subjective:   Additional Subjective Comments:      Patient returned to outpatient speech therapy with continued motivation to work with direct speech therapy services to improve cognitive and speech production skills. Patient demonstrated excellent participation in all structured therapy tasks completed during today's session.       Objective:   Treatments/Interventions Performed:  Patient/Caregiver education, Cognitive-Linguistic training, Home program, Compensatory strategy training and Speech/Language treatment  Treatment Intervention tool(s) used:  SLP provided structured therapy tasks with focus on improving attention, memory recall through completion of mental manipulation tasks, speech production to word, phrase level productions through picture naming and picture description tasks  Objective Details:  Attention and working memory targeted to single digit addition of 2 numbers given direct visual (number) cues: completed with 18/20 = 90% accuracy, independent. 2 digit addition, then subtraction of a single digit completed with 8/12 = 66.7% accuracy, minimal verbal prompts/ instruction/ cues.    Memory recall to recall of values of cards presented recalled 5/5 obligatory opportunities to an independent level.    Expressive language skills targeted in combination with accuracy in speech production for single words: confrontation naming of common objects: 10/11 = 90.9% accuracy, independent. Intelligibility 100% during structured naming task. Identification of feelings given picture  "cards completed with 8/10 = 80% accuracy, intelligibility rating 80% during structured naming task.     Speech production targeted intelligibility of phrase level productions through picture description cues: patient demonstrated intelligibility 8/10 = 80% accuracy to independent level.            Speech Therapy Assessment:     Expressive Language Assessment:     Communicates using gestures: Moderate.    Expressive language comments: During structured, confrontation naming task, patient was noted to demonstrate periods of word finding difficulty \"what is that called?\" with clinician providing prompts for circumlocution/ categories of semantic feature analysis resulting in accuracy in recall 2/2 obligatory opportunities.     With regards to social-pragmatics, patient demonstrated periods of humor during today's session (although flat affect remained). For example, when prompted of a picture of a couch potato, patient described self as a \"computer potato\" referring to his hobby of playing video games.     Cognitive Linguistic Assessment:     Patient attention divided: Minimal    Patient immediate memory: Minimal      Speech Mechanism Assessment:     Patient exhibits articulatory precision: Minimal    Patient exhibits single word intelligibility: Minimal (90% accuracy to confrontation naming)    Patient exhibits sentence level intelligibility: Minimal  Speech mechanism comments: Patient noted to demonstrate improved volume/ control of intensity of voicing during word, phrase level production tasks. Patient also with improved use of compensatory speech strategies to separate each word, also supporting overall improved intelligibility.      Speech Therapy Plan :   Prognosis & Recommendations  Impression Summary:  Patient seen for ongoing, direct, outpatient speech therapy addressing dysarthria and cognitive communication deficits.    Patient continues to demonstrate improvements in speech production, expressive language " skills and cognitive function with patient overall improvements in memory recall resulting in improved carryover of skills learned during structured therapy session to the following session; allowing continued progression in all activities targeted. Patient remains very motivated to improve cognitive communication skills for independence in communication with a variety of listeners across conversational settings.   Therapy Recommendations  Recommendation:  Individual Speech Therapy,  Planned Therapy Interventions:  Home Program, Compensatory Strategy Training, Patient/Caregiver Education, Cognitive-Linguistic training, Speech/Language training and Respiratory coordination/support training,   Plan Details:   Continue with structured articulation for multi-syllable words, incorporating use of vocal loudness chart as indicated. Review orientation information and progress to attention, memory recall tasks of increasing complexity and problem solving

## 2021-09-02 NOTE — OP THERAPY DAILY TREATMENT
Outpatient Physical Therapy  DAILY TREATMENT     [facility] Physical [address].  [address]  [place] [address]  Phone:  [phone]  Fax:  [phone]    Date: 09/02/2021    Patient: Rey Medina  YOB: 1995  MRN: [id number]     Time Calculation    Start time: 1430  Stop time: 1515 Time Calculation (min): 45 minutes         Chief Complaint: Difficulty Walking and Loss Of Balance    Visit #: 7    SUBJECTIVE:  Pt reports he continues exercises at home with parents.    OBJECTIVE:        Therapeutic Exercises (CPT 86155):     1. Bridges, x10    2. Prone on elbows stretch, x1 min    3. Quadruped alt R/L shoulder flex UE reach, x10    4. Quadruped push up, x10 with CGA    5. Sidelying hip circles, x10 B CW/CCW    6. Sidelying clams, x10B    7. Long sitting:, scap retraction x15, diagonal flexion ball roll stretch x10B    Therapeutic Treatments and Modalities:     1. Neuromuscular Re-education (CPT 89715)    Therapeutic Treatment and Modalities Summary: -standing at tall walker with 3# ankle weights (brakes locked and pt's mother providing stability at front of walker as needed): alt heel raises x10 with SBA, side steps x10B with tactile and verbal cues for weight shift onto stepping LE, step forward to towel target alt R/L x10 with SBA/CGA and cues for weight shift, lateral pelvic shift with weight shift x10.  Standing frame x10min: balloon batting with RUE, LUE, and BUEs, modified STS 2x10, static standing posture with scap retraction and stacking shoulders over hips, static standing with BUEs forward flexion, OH, and abducted x3.    Time-based treatments/modalities:    Physical Therapy Timed Treatment Charges  Neuromusc re-ed, balance, coor, post minutes (CPT 86471): 25 minutes  Therapeutic exercise minutes (CPT 06050): 20 minutes    ASSESSMENT:   Response to treatment: Pt demo improved standing posture in standing frame, and ability to perform dynamic  tasks.    PLAN/RECOMMENDATIONS:   Plan for treatment: therapy treatment to continue next visit.  Planned interventions for next visit: continue with current treatment.

## 2021-09-02 NOTE — OP THERAPY DAILY TREATMENT
Outpatient Occupational Therapy  DAILY TREATMENT     [facility] Occupational [address]  [address]  [place] [address]  Phone:  [phone]  Fax:  [phone]    Date: 09/02/2021    Patient: Rey Medina  YOB: 1995  MRN: [id number]     Time Calculation  Start time: 0145  Stop time: 0230 Time Calculation (min): 45 minutes         Chief Complaint: Extremity Weakness and Self Care Duties    Visit #: 9    SUBJECTIVE:  No issues noted since last visit     OBJECTIVE:  Current objective measures:   Strength Comments:  R=  64 lbs   L= 72 pounds     Pinch     R    L  Lateral  22    24  3 point  19    21     Tone, Sensation and Coordination:   Tone:     Right upper extremity muscle tone: Normal        Coordination   Upper extremity (left):     Fine motor: Impaired    Gross motor: Impaired    Slow alternating movements: Impaired    Rapid alternating movements: Impaired    Finger to finger: Impaired    Finger touch to nose: Impaired  Upper extremity (right):     Fine motor: Impaired    Gross motor: Impaired    Slow alternating movements: Impaired    Rapid alternating movements: Impaired    Finger to finger: Impaired    Finger touch to nose: Impaired      Coordination comments:   9 hole peg test   R= 1 minute and 35 seconds     L= 1 minute and 20 seconds        Therapeutic Exercises (CPT 81972):     1. Flex/ext exercise stick in horizontal and vertical position x 10 reps, 10 reps in each position.    2. 3 point prehension for pre-handwriting exercises     3. Wrist maze, for controlled wrist movement     4. Fine motor coordination/manipulation and dexterity exercises, grooved pegboard and card shuffling.      5. Pegboard solitaire to enhance prehension of dominant hand.      6. 3 point prehension for pre-handwriting exercises     7. Medium resistance putty      Time-based treatments/modalities:  Therapeutic exercise minutes (CPT 93119): 45 minutes        Pain rating before  treatment: 0  Pain rating after treatment: 0    ASSESSMENT:   Response to treatment: motivated to participate in treatment    PLAN/RECOMMENDATIONS:   Plan for treatment: therapy treatment to continue next visit.  Planned interventions for next visit: continue with current treatment

## 2021-09-07 ENCOUNTER — OCCUPATIONAL THERAPY (OUTPATIENT)
Dept: OCCUPATIONAL THERAPY | Facility: REHABILITATION | Age: 26
End: 2021-09-07
Attending: FAMILY MEDICINE
Payer: MEDICAID

## 2021-09-07 ENCOUNTER — SPEECH THERAPY (OUTPATIENT)
Dept: SPEECH THERAPY | Facility: REHABILITATION | Age: 26
End: 2021-09-07
Attending: FAMILY MEDICINE
Payer: MEDICAID

## 2021-09-07 DIAGNOSIS — G81.90 HEMIPLEGIA, UNSPECIFIED ETIOLOGY, UNSPECIFIED HEMIPLEGIA TYPE, UNSPECIFIED LATERALITY (HCC): ICD-10-CM

## 2021-09-07 DIAGNOSIS — R47.1 DYSARTHRIA AND ANARTHRIA: ICD-10-CM

## 2021-09-07 DIAGNOSIS — R41.841 COGNITIVE COMMUNICATION DEFICIT: ICD-10-CM

## 2021-09-07 PROCEDURE — 92507 TX SP LANG VOICE COMM INDIV: CPT

## 2021-09-07 PROCEDURE — 97110 THERAPEUTIC EXERCISES: CPT | Mod: XU

## 2021-09-07 ASSESSMENT — SOCIAL DETERMINANTS OF HEALTH (SDOH): SOCIAL_SUPPORT_SYSTEM: PARENT

## 2021-09-07 NOTE — OP THERAPY DAILY TREATMENT
Outpatient Speech Therapy  DAILY TREATMENT     [facility] Speech [address]  [address]  [place] [address]  Phone:  [phone]  Fax:  [phone]    Date: 09/07/2021    Patient: Rey Medina  YOB: 1995  MRN: [id number]     Time Calculation    Start time: 1017  Stop time: 1100 Time Calculation (min): 43 minutes         Chief Complaint: Poor Memory    Visit #: 8    Subjective:   Reason for Therapy:     Reason For Evaluation:  Cognition  Social Support:     Accompanied By:  Parent (Melinda, in session)    Patient Mental Status:  Alert and Responsive  Additional Subjective Comments:      Completing HEP 30 minutes a day with improved processing. Patient with Medicare starting in October.  May start inpatient rehabilitation if approved.  Haven't heard from MD.      Objective:   Treatments/Interventions Performed:  Cognitive-Linguistic training, Home program, Compensatory strategy training, Patient/Caregiver education and Speech/Language treatment  Objective Details:  Orientation questions 100% no assist.    Immediate recall 4/5 and 5/5 with no assist.  2 minute delay 0/5 no assist.  Min cues recalled remainder.  SLP educated on importance of association and visualization for improved retrieval and after 2 minute delay 5/5 no assist.  4 minute delay  5/5 no assist.   5 min delay 5/5 no assist.  Counted backwards from 100 by 7 with 6/6 no assist, extended processing.    Problem solved simple math story problems no assist to 100% processing improved.   Discourse tasks % intelligibility with min cues for strategies.  Sentence generation 90% intelligibility.  Recalled details in story 5/5 no assist.           Speech Therapy Assessment:     Cognitive Linguistic Assessment:     Patient immediate memory: Minimal    Patient recent and short term memory: Moderate    Patient prospective and time delay memory: Minimal    Patient biographical information memory: WFL    Patient  simple reasoning ability: Supervision Required    Patient simple problem solving ability: Supervision Required      Speech Mechanism Assessment:     Patient exhibits articulatory precision: Minimal    Patient exhibits sentence level intelligibility: Minimal    Patient exhibits conversational intelligibility: Moderate      Speech Therapy Plan :   Prognosis & Recommendations  Impression Summary:  Patient with improved understanding of how visualization initially for improved retrieval of salient information increases amount of information retained for longer periods of time.  Prognosis Details: Progressing towards goals, requires skilled need  Goals  Short Term Goals:  1. Patient will improve orientation, answering orientation questions to day, date, month, season, etc with 100% accuracy over two consecutive sessions within 4 weeks.  2. Patient will improve attention completing sustained, progressing to selective and divided attention tasks with 85% or greater accuracy given min verbal prompts/ instruction/ cues as necessary.  3. Patient will improve working memory completing mental manipulation tasks of 3, progressing to 4 items, with 85% or greater accuracy implementing external and/or internal memory strategies as necessary provided minimal verbal prompts/ instruction/ cues.  4. Patient will demonstrate accuracy and independence in completion of language related, activities of daily living, including reading a calendar, reading instructions, completing tasks with a minimum of 85% accuracy; independent.   5. The patient will improve intelligibility completing structured speech tasks targeting accuracy in articulation of multi-syllable words, progressing to phrase level productions implementing compensatory speech strategies as/ if necessary including slowing rate of speech, easy onset to voice production and over-articulation,  each word 7/10 trials, 75% accuracy given minimal to moderate verbal cues as  necessary.   6. The patient will improve speech production through structured speech production tasks of increasing complexity targeting prosodic features of speech production completing with 75% accuracy, minimal to moderate verbal prompts/ instruction/ cues.   Long Term Goals:     Potential barriers to Goal Achievement:  None  Therapy Recommendations  Recommendation:  Individual Speech Therapy,  Planned Therapy Interventions:  Home Program, Patient/Caregiver Education, Compensatory Strategy Training, Speech/Language training, Cognitive-Linguistic training and Respiratory coordination/support training,   Plan Details:  Attention, recall, p/s

## 2021-09-07 NOTE — OP THERAPY DAILY TREATMENT
Outpatient Occupational Therapy  DAILY TREATMENT     [facility] Occupational [address]  [address]  [place] [address]  Phone:  [phone]  Fax:  [phone]    Date: 09/07/2021    Patient: Rey Medina  YOB: 1995  MRN: [id number]     Time Calculation  Start time: 1100  Stop time: 1145 Time Calculation (min): 45 minutes         Chief Complaint: Extremity Weakness and Self Care Duties    Visit #: 10    SUBJECTIVE:no issues noted since last visit with OT    OBJECTIVE:  Current objective measures:   Strength Comments:  R=  67 lbs   L= 72 pounds     Pinch     R    L  Lateral  22    24  3 point  19    21     Tone, Sensation and Coordination:   Tone:     Right upper extremity muscle tone: Normal        Coordination   Upper extremity (left):     Fine motor: Impaired    Gross motor: Impaired    Slow alternating movements: Impaired    Rapid alternating movements: Impaired    Finger to finger: Impaired    Finger touch to nose: Impaired  Upper extremity (right):     Fine motor: Impaired    Gross motor: Impaired    Slow alternating movements: Impaired    Rapid alternating movements: Impaired    Finger to finger: Impaired    Finger touch to nose: Impaired      Coordination comments:   9 hole peg test   R= 1 minute and 25 seconds     L= 1 minute and 20 seconds        Therapeutic Exercises (CPT 80234):     1. Flex/ext exercise stick in horizontal and vertical position x 10 reps, 10 reps in each position.    2. 3 point prehension for pre-handwriting exercises     3. Wrist maze, for controlled wrist movement     4. Fine motor coordination/manipulation and dexterity exercises, grooved pegboard and card shuffling.      5. Connect four game to enhance prehension and manipulation    6. 3 point prehension for pre-handwriting exercises     7. Medium resistance putty    Therapeutic Exercise Summary:  Grooved pegboard  R= 4 minutes and 44 seconds  L=  6 minutes and 55 seconds.        Time-based treatments/modalities:  Therapeutic exercise minutes (CPT 78666): 45 minutes        Pain rating before treatment: 0  Pain rating after treatment: 0    ASSESSMENT:   Response to treatment: increase noted in strength, dexterity and manipulation     PLAN/RECOMMENDATIONS:   Plan for treatment: therapy treatment to continue next visit.  Planned interventions for next visit: continue with current treatment and therapeutic exercise (CPT 19019)

## 2021-09-09 ENCOUNTER — APPOINTMENT (OUTPATIENT)
Dept: OCCUPATIONAL THERAPY | Facility: REHABILITATION | Age: 26
End: 2021-09-09
Attending: FAMILY MEDICINE
Payer: MEDICAID

## 2021-09-09 ENCOUNTER — APPOINTMENT (OUTPATIENT)
Dept: PHYSICAL THERAPY | Facility: REHABILITATION | Age: 26
End: 2021-09-09
Attending: FAMILY MEDICINE
Payer: MEDICAID

## 2021-09-09 ENCOUNTER — SPEECH THERAPY (OUTPATIENT)
Dept: SPEECH THERAPY | Facility: REHABILITATION | Age: 26
End: 2021-09-09
Attending: FAMILY MEDICINE
Payer: MEDICAID

## 2021-09-09 DIAGNOSIS — R41.841 COGNITIVE COMMUNICATION DEFICIT: ICD-10-CM

## 2021-09-09 DIAGNOSIS — R47.1 DYSARTHRIA AND ANARTHRIA: ICD-10-CM

## 2021-09-09 PROCEDURE — 92507 TX SP LANG VOICE COMM INDIV: CPT

## 2021-09-09 NOTE — OP THERAPY DAILY TREATMENT
Outpatient Speech Therapy  DAILY TREATMENT     [facility] Speech [address]  [address]  [place] [address]  Phone:  [phone]  Fax:  [phone]    Date: 09/09/2021    Patient: Rey Medina  YOB: 1995  MRN: [id number]     Time Calculation    Start time: 1147  Stop time: 1234 Time Calculation (min): 47 minutes         Chief Complaint: Poor Memory and Poor Speech    Visit #: 9    Subjective:   Reason for Therapy:     Reason For Evaluation:  Dysarthria and Cognition  Social Support:     Social support system: Mom in session.    Patient Mental Status:  Alert and Responsive  Additional Subjective Comments:      No updates.    Objective:   Treatments/Interventions Performed:  Home program, Cognitive-Linguistic training, Speech/Language treatment, Patient/Caregiver education and Compensatory strategy training  Objective Details:  Recalled immediately with use of visualization details in story 6/6, 6/6, 1/3, 3/6 no assist.    Sentence generation completed with 70-90% intelligibility with min to mod cues to implement slower rate and decreased volume.    Problem solved time story problems with 80- 90% accuracy, min assist, asked for repetition as needed. Extended processing    Auditory attention completed with identification on a specific consonant letter in information 10/10. 5/10 with vowel letter given.  Identification of numbers 10/10, when identifying 2 numbers 10/10.         Speech Therapy Assessment:     Cognitive Linguistic Assessment:     Patient attention divided: Minimal    Patient immediate memory: Minimal      Speech Mechanism Assessment:     Patient exhibits articulatory precision: Moderate    Patient dysarthria: Moderate    Speech Therapy Plan :   Prognosis & Recommendations  Impression Summary:  Patient continues to improve insight with use of speech and cognitive strategies.  Agreed to have try ordering out for himself with 3 attempts for repetition and  then to use paper with written requests on it in real life opportunity.  Prognosis Details: Progressing towards goals, requires skilled need  Goals  Short Term Goals:  1. Patient will improve orientation, answering orientation questions to day, date, month, season, etc with 100% accuracy over two consecutive sessions within 4 weeks.  2. Patient will improve attention completing sustained, progressing to selective and divided attention tasks with 85% or greater accuracy given min verbal prompts/ instruction/ cues as necessary.  3. Patient will improve working memory completing mental manipulation tasks of 3, progressing to 4 items, with 85% or greater accuracy implementing external and/or internal memory strategies as necessary provided minimal verbal prompts/ instruction/ cues.  4. Patient will demonstrate accuracy and independence in completion of language related, activities of daily living, including reading a calendar, reading instructions, completing tasks with a minimum of 85% accuracy; independent.   5. The patient will improve intelligibility completing structured speech tasks targeting accuracy in articulation of multi-syllable words, progressing to phrase level productions implementing compensatory speech strategies as/ if necessary including slowing rate of speech, easy onset to voice production and over-articulation,  each word 7/10 trials, 75% accuracy given minimal to moderate verbal cues as necessary.   6. The patient will improve speech production through structured speech production tasks of increasing complexity targeting prosodic features of speech production completing with 75% accuracy, minimal to moderate verbal prompts/ instruction/ cues.   Long Term Goals:     Potential barriers to Goal Achievement:  None  Therapy Recommendations  Recommendation:  Individual Speech Therapy,  Planned Therapy Interventions:  Home Program, Patient/Caregiver Education, Compensatory Strategy Training,  Speech/Language training, Cognitive-Linguistic training and Respiratory coordination/support training,   Plan Details:  Attention, assess real life requests in community   HEP to order

## 2021-09-14 ENCOUNTER — OCCUPATIONAL THERAPY (OUTPATIENT)
Dept: OCCUPATIONAL THERAPY | Facility: REHABILITATION | Age: 26
End: 2021-09-14
Attending: FAMILY MEDICINE
Payer: MEDICAID

## 2021-09-14 ENCOUNTER — PHYSICAL THERAPY (OUTPATIENT)
Dept: PHYSICAL THERAPY | Facility: REHABILITATION | Age: 26
End: 2021-09-14
Attending: FAMILY MEDICINE
Payer: MEDICAID

## 2021-09-14 ENCOUNTER — SPEECH THERAPY (OUTPATIENT)
Dept: SPEECH THERAPY | Facility: REHABILITATION | Age: 26
End: 2021-09-14
Attending: FAMILY MEDICINE
Payer: MEDICAID

## 2021-09-14 DIAGNOSIS — R41.841 COGNITIVE COMMUNICATION DEFICIT: ICD-10-CM

## 2021-09-14 DIAGNOSIS — G81.90 HEMIPLEGIA, UNSPECIFIED ETIOLOGY, UNSPECIFIED HEMIPLEGIA TYPE, UNSPECIFIED LATERALITY (HCC): ICD-10-CM

## 2021-09-14 DIAGNOSIS — R26.89 OTHER ABNORMALITIES OF GAIT AND MOBILITY: ICD-10-CM

## 2021-09-14 DIAGNOSIS — R26.2 DIFFICULTY IN WALKING, NOT ELSEWHERE CLASSIFIED: ICD-10-CM

## 2021-09-14 DIAGNOSIS — I61.5 NONTRAUMATIC INTRAVENTRICULAR INTRACEREBRAL HEMORRHAGE, UNSPECIFIED LATERALITY (HCC): ICD-10-CM

## 2021-09-14 DIAGNOSIS — R47.1 DYSARTHRIA AND ANARTHRIA: ICD-10-CM

## 2021-09-14 PROCEDURE — 97112 NEUROMUSCULAR REEDUCATION: CPT | Mod: XU

## 2021-09-14 PROCEDURE — 92507 TX SP LANG VOICE COMM INDIV: CPT

## 2021-09-14 PROCEDURE — 97110 THERAPEUTIC EXERCISES: CPT | Mod: XU

## 2021-09-14 ASSESSMENT — SOCIAL DETERMINANTS OF HEALTH (SDOH): SOCIAL_SUPPORT_SYSTEM: PARENT

## 2021-09-14 NOTE — OP THERAPY DAILY TREATMENT
"  Outpatient Speech Therapy  DAILY TREATMENT     [address]  [address]  [place] [address]  Phone:  [phone]  Fax:  [phone]    Date: 09/14/2021    Patient: Rey Medina  YOB: 1995  MRN: [id number]     Time Calculation    Start time: 1435  Stop time: 1515 Time Calculation (min): 40 minutes         Chief Complaint: No chief complaint on file.    Visit #: 10    Subjective:   Social Support:     Accompanied By:  Parent (mother, present and supportive)    Patient Mental Status:  Alert and Responsive  Progress Factors:     Progression:  Getting Better  Additional Subjective Comments:      Patient arrived to outpatient speech therapy reporting feeling good with no complaints. Patient endorses he feels improvement as \"getting better because I can talk better\". Patient also endorses an increase in independence, for example, patient stated first and last name to check in with unfamiliar listener at  prior to beginning today's session.       Objective:   Treatments/Interventions Performed:  Patient/Caregiver education, Home program, Speech/Language treatment, Compensatory strategy training and Cognitive-Linguistic training  Objective Details:  Visual memory targeted to recall of details present in picture given 10 minute delay: patient was accurate in answering yes/ no questions with 10/10 = 100% accuracy, independent.      Sentence generation to picture cues completed with % intelligibility with min to no verbal cues to implement compensatory speech strategies: slower rate and decreased volume.    Intelligibility targeted to paragraph level reading. Patient demonstrated intelligibility with 110/132 = 83.3% accuracy to an independent level.      Written expression targeted to first, second, middle and last name. Patient was asked to write a sentence regarding something he learned from previously read paragraph. Patient was independent " in sentence formation to a complex sentence level with 100% accuracy independent.          Speech Therapy Assessment:     Expressive Language Assessment:     Expressive language comments: Use of social-pragmatic language skills was targeted, specific to generation of follow up questions to continue simple conversation given guided prompting from clinician. Patient required minimal verbal, visual prompts to initiate follow up question, however, then patient was noted to demonstrate independence in generation of second and third questions to an independent level.     Written Language Assessment:     Ability to generate sentences WFL (sentence level to recall detail of information from previously read paragraph).    Reading Comprehension Assessment:     Patient ability to comprehend simple paragraphs: WFL    Cognitive Linguistic Assessment:     Patient prospective and time delay memory: WFL (visual memory targeted with 10/10 details recalled from previously presented picture independent)      Speech Mechanism Assessment:     Patient exhibits sentence level intelligibility: Minimal  Speech mechanism comments: Patient continues to demonstrate difficulty in accuracy of articulation of complex multi-syllable words, including words containing /s/ and /r/ consonant clusters; although patient demonstrated independence in use of strategies to improve accuracy of articulation to an independent level.       Speech Therapy Plan :   Prognosis & Recommendations  Impression Summary:  Patient with noted improvement in length and complexity of speech production with improved intelligibility at the novel, sentence level. Patient continues to benefit from minimal verbal cues to implement speech production strategies; although independence in use of strategies continues to improve.   Therapy Recommendations  Recommendation:  Individual Speech Therapy,  Planned Therapy Interventions:  Home Program, Compensatory Strategy Training,  Patient/Caregiver Education, Speech/Language training, Respiratory coordination/support training, Voice training and Cognitive-Linguistic training,   Plan Details:  Continue to reinforcement development of social-pragmatic language skills for simple conversation given noted improvement in expressive and receptive language skills. Continue with cognitive development in areas of attention, memory and problem solving

## 2021-09-14 NOTE — OP THERAPY DAILY TREATMENT
Outpatient Occupational Therapy  DAILY TREATMENT     [facility] Occupational [address].  [address]  [place] [address]  Phone:  [phone]  Fax:  [phone]    Date: 09/14/2021    Patient: Rey Medina  YOB: 1995  MRN: [id number]     Time Calculation  Start time: 0315  Stop time: 0400 Time Calculation (min): 45 minutes         Chief Complaint: Extremity Weakness and Self Care Duties    Visit #: 11    SUBJECTIVE:  Sometimes I cut my food and other times I need help.    OBJECTIVE:  Current objective measures:   Strength Comments:  R=  67 lbs   L= 72 pounds     Pinch     R    L  Lateral  22    24  3 point  19    24     Tone, Sensation and Coordination:   Tone:     Right upper extremity muscle tone: Normal     Coordination   Upper extremity (left):     Fine motor: Impaired    Gross motor: Impaired    Slow alternating movements: Impaired    Rapid alternating movements: Impaired    Finger to finger: Impaired    Finger touch to nose: Impaired  Upper extremity (right):     Fine motor: Impaired    Gross motor: Impaired    Slow alternating movements: Impaired    Rapid alternating movements: Impaired    Finger to finger: Impaired    Finger touch to nose: Impaired      Coordination comments:   9 hole peg test   R= 1 minute and 24 seconds     L= 1 minute and 20 seconds        Therapeutic Exercises (CPT 43720):     1. Flex/ext exercise stick in horizontal and vertical position x 10 reps, 10 reps in each position.    2. 3 point prehension for pre-handwriting exercises     3. Velcro roll for cylindrical grasp for B hands, 3 sets for each hand     4. Fine motor coordination/manipulation and dexterity exercises, 9 hole peg test and card shuffling    5. Medium resistance putty, use of knife to practice cutting.  pulling coins out of putty    6. Digi-flex 9# for both hands, 10 reps with 5 second hold.        Time-based treatments/modalities:  Therapeutic exercise minutes (CPT  85802): 45 minutes        Pain rating before treatment: 0  Pain rating after treatment: 0    ASSESSMENT:   Response to treatment: progressing well with pre-handwriting skills.     PLAN/RECOMMENDATIONS:   Plan for treatment: therapy treatment to continue next visit.  Planned interventions for next visit: continue with current treatment and therapeutic exercise (CPT 36318)

## 2021-09-14 NOTE — OP THERAPY DAILY TREATMENT
Outpatient Physical Therapy  DAILY TREATMENT     [facility] Physical [address]  [address]  [place] [address]  Phone:  [phone]  Fax:  [phone]    Date: 09/14/2021    Patient: Rey Medina  YOB: 1995  MRN: [id number]     Time Calculation    Start time: 1345  Stop time: 1430 Time Calculation (min): 45 minutes         Chief Complaint: Difficulty Walking    Visit #: 8    SUBJECTIVE:  Nothing new to report per patient.    OBJECTIVE:        Therapeutic Exercises (CPT 31330):     1. Bridges, x10    2. Prone hip ext, x10B    3. Quadruped, hip ext x10B, UE row x10B    4. Quadruped push up, x10 with CGA    5. Sidelying hip abd, x10B    6. Hooklying alt R/L clams with med band, x10    7. Prone Ts and Is, x10 all    8. SLR, x10B    9. Arnold pose, x30 sec     Therapeutic Treatments and Modalities:     1. Neuromuscular Re-education (CPT 88654)    Therapeutic Treatment and Modalities Summary: -SPT toward right (w/c to EOM) with mod/maxA.  -standing at tall walker (brakes locked and pt's mother providing stability at front of walker as needed): alt heel raises x10 with SBA/CGA, AP weight shift in stride stance x10B with verbal, visual and tactile cues for loading LLE, lateral weight shift x10, squats x10 with verbal and tactile cues for hip hinge and upright spine posture.  -standing balance with CGA/Mikhail x1 min, single UE OH reach x10sec x2B with CGA.  -SPT toward left (EOM to w/c), with CGA/Mikhail.    Time-based treatments/modalities:    Physical Therapy Timed Treatment Charges  Neuromusc re-ed, balance, coor, post minutes (CPT 24439): 20 minutes  Therapeutic exercise minutes (CPT 63969): 25 minutes        ASSESSMENT:   Response to treatment: Improved standing posture and requires less assist in static standing for balance.    PLAN/RECOMMENDATIONS:   Plan for treatment: therapy treatment to continue next visit.  Planned interventions for next visit: continue with  current treatment.

## 2021-09-16 ENCOUNTER — SPEECH THERAPY (OUTPATIENT)
Dept: SPEECH THERAPY | Facility: REHABILITATION | Age: 26
End: 2021-09-16
Attending: FAMILY MEDICINE
Payer: MEDICAID

## 2021-09-16 ENCOUNTER — OCCUPATIONAL THERAPY (OUTPATIENT)
Dept: OCCUPATIONAL THERAPY | Facility: REHABILITATION | Age: 26
End: 2021-09-16
Attending: FAMILY MEDICINE
Payer: MEDICAID

## 2021-09-16 DIAGNOSIS — G81.90 HEMIPLEGIA, UNSPECIFIED ETIOLOGY, UNSPECIFIED HEMIPLEGIA TYPE, UNSPECIFIED LATERALITY (HCC): ICD-10-CM

## 2021-09-16 DIAGNOSIS — R41.841 COGNITIVE COMMUNICATION DEFICIT: ICD-10-CM

## 2021-09-16 DIAGNOSIS — R47.1 DYSARTHRIA AND ANARTHRIA: ICD-10-CM

## 2021-09-16 PROCEDURE — 92507 TX SP LANG VOICE COMM INDIV: CPT

## 2021-09-16 PROCEDURE — 97110 THERAPEUTIC EXERCISES: CPT | Mod: XU

## 2021-09-16 ASSESSMENT — SOCIAL DETERMINANTS OF HEALTH (SDOH): SOCIAL_SUPPORT_SYSTEM: PARENT

## 2021-09-16 NOTE — OP THERAPY DAILY TREATMENT
Outpatient Speech Therapy  DAILY TREATMENT     [facility] Speech [address]  [address]  [place] [address]  Phone:  [phone]  Fax:  [phone]    Date: 09/16/2021    Patient: Rey Medina  YOB: 1995  MRN: [id number]     Time Calculation    Start time: 1440  Stop time: 1520 Time Calculation (min): 40 minutes         Chief Complaint: Not Understood By Others    Visit #: 11    Subjective:   Social Support:     Accompanied By:  Parent (mother, who is primary caregiver, present and supportive)    Patient Mental Status:  Alert and Responsive  Progress Factors:     Progression:  Getting Better  Additional Subjective Comments:      Patient demonstrated excellent motivation to structured therapy tasks targeting accuracy in speech production, expressive language skills during today's session.       Objective:   Treatments/Interventions Performed:  Compensatory strategy training, Patient/Caregiver education, Speech/Language treatment, Home program, Respiratory Coordination / Support training and Cognitive-Linguistic training  Objective Details:  Review of compensatory speech strategies stated by patient independent to: speak slowly, open my mouth, move lips and tongue.    Use of breath support with coordination of breath-speech targeted during structured speech production tasks initially with focus on breath-speech to automatic speech productions: counting 1-10, 1-20, days of the week, months of the year with repetitive practice provided     Coordination of breath-speech production for sentence level descriptions completed with 8/10 = 80% accuracy. Verbal prompts provided to assist patient in awareness to utterances with reduced intelligibility, with patient generating a self-correction through use of breath-speech strategies 1/2 (50%)    Memory recall targeted time delay recall to 5 items: patient was accuracy in recall of 5/5 items - 100% accuracy given 10 minute time  delay to an independent level.          Speech Therapy Assessment:     Cognitive Linguistic Assessment:     Patient prospective and time delay memory: WFL (recall of 5 related items given 10 minute time delay)      Speech Mechanism Assessment:     Patient voice description: Clear    Patient speaks fluently: Minimal (fluency of speech improved with improvements in breath support)    Patient exhibits articulatory precision: Minimal  Speech mechanism comments: Patient was encouraged to identify reduced breath support for speech production tasks based on excessive loudness demonstrated. For example, when patient was noted to produce voice with increased loudness resulting in distortions of speech production, he was encouraged to recast first taking a breath and then speech to ensure adequate breath support and fluency (or improved use of prosody).       Speech Therapy Plan :   Prognosis & Recommendations  Impression Summary:  Patient demonstrated benefit from direct instruction in breath support for speech production, resulting in overall improved intelligibility given patient continues to grow in his length and complexity of speech productions.   Therapy Recommendations  Recommendation:  Individual Speech Therapy,  Planned Therapy Interventions:  Home Program, Patient/Caregiver Education, Compensatory Strategy Training, Cognitive-Linguistic training, Speech/Language training, Respiratory coordination/support training and Voice training,   Plan Details:  Continue to reinforcement development of social-pragmatic language skills for simple conversation given noted improvement in expressive and receptive language skills. Continue with cognitive development in areas of attention, memory and problem solving

## 2021-09-16 NOTE — OP THERAPY PROGRESS SUMMARY
harriet  Outpatient Occupational Therapy  PROGRESS SUMMARY NOTE    [facility] Occupational Therapy [facility]  [address]  [address]  [place] [address]  Phone:  [phone]  Fax:  [phone]    Date of Visit: 09/16/2021    Patient: Rey Medina  YOB: 1995  MRN: [id number]     Referring Provider: Sherie Power M.D.  [address] [facility]  [address]  [place] [address]   Referring Diagnosis Hemiplegia, unspecified affecting unspecified side [G81.90]     Visit Diagnoses     ICD-10-CM   1. Hemiplegia, unspecified etiology, unspecified hemiplegia type, unspecified laterality (HCC)  G81.90       Rehab Potential: good    Progress Report Period: 7/21/21-9/16/21    Functional Assessment Used UEFI=-40/80           Objective Findings and Assessment:   Patient progression towards goals: Patient and mother have been actively attending OT 2 x a week and carrying out HEP.  Mother noted that patient is stronger and participating more with some ADLs.  Able to rakesh UB clothing and min/mod A LB.  Min A bathing and hygiene/grooming.  Aiming to participate more with light domestic tasks and meal prep.  Patient would benefit from continued OT intervention to further enhance coordination, bi-manual integration and participation in light household tasks.      Objective findings and assessment details: Current objective measures:   Strength Comments:  R=  67 lbs   L= 72 pounds     Pinch     R    L  Lateral  22    24  3 point  19    24     Tone, Sensation and Coordination:   Tone:     Right upper extremity muscle tone: Normal     Coordination   Upper extremity (left):     Fine motor: Impaired    Gross motor: Impaired    Slow alternating movements: Impaired    Rapid alternating movements: Impaired    Finger to finger: Impaired    Finger touch to nose: Impaired  Upper extremity (right):     Fine motor: Impaired    Gross motor: Impaired    Slow alternating movements: Impaired    Rapid alternating movements: Impaired    Finger to  finger: Impaired    Finger touch to nose: Impaired      Coordination comments:   9 hole peg test   R= 1 minute and 24 seconds     L= 1 minute and 20 seconds    Goals:   Short Term Goals:   Patient and caregiver will be independent with HEP-met  Patient will be min A cutting own food with AE-met  Patient will be min A with light meal prep-met  Patient will have enhance  strength of 5-7 pounds to enhance personal and domestic ADLs. -met  New goals:  Patient will be able to cut food with AE with set up/supervision  Patient will be supervision using microwave  Patient will be min A making a sandwich, including retrieving items to make sandwich  Short term goal timespan:  2-4 weeks    Long Term Goals:   Patient will be mod I cutting own food with AE-not met  Patient will set up/sup with light meal prep using AE-not met  Patient will have minimal impairment of gross motor coordination in B UE to enhance functional use during ADLs-not met  Patient will enhance bi-manual integration to improve function with light domestic tasks/leisure tasks. - partially met  Patient will score 50/80 on the UEFI-not met, currently 40/80 with is an improvement of 5 points since initial evaluation  Long term goal timespan:  6-8 weeks    Plan:   Planned therapy interventions:  Therapeutic Exercise (CPT 63041)  Other planned therapy interventions:  97110 x 3  Frequency:  2x week  Duration in weeks:  6  Duration in visits:  12      Referring provider co-signature:  I have reviewed this plan of care and my co-signature certifies the need for services.    Certification Period: 09/16/2021 to 10/28/21    Physician Signature: ________________________________ Date: ______________

## 2021-09-21 ENCOUNTER — PHYSICAL THERAPY (OUTPATIENT)
Dept: PHYSICAL THERAPY | Facility: REHABILITATION | Age: 26
End: 2021-09-21
Attending: FAMILY MEDICINE
Payer: MEDICAID

## 2021-09-21 ENCOUNTER — SPEECH THERAPY (OUTPATIENT)
Dept: SPEECH THERAPY | Facility: REHABILITATION | Age: 26
End: 2021-09-21
Attending: FAMILY MEDICINE
Payer: MEDICAID

## 2021-09-21 DIAGNOSIS — R47.1 DYSARTHRIA AND ANARTHRIA: ICD-10-CM

## 2021-09-21 DIAGNOSIS — R41.841 COGNITIVE COMMUNICATION DEFICIT: ICD-10-CM

## 2021-09-21 DIAGNOSIS — R26.2 DIFFICULTY IN WALKING, NOT ELSEWHERE CLASSIFIED: ICD-10-CM

## 2021-09-21 DIAGNOSIS — I61.5 NONTRAUMATIC INTRAVENTRICULAR INTRACEREBRAL HEMORRHAGE, UNSPECIFIED LATERALITY (HCC): ICD-10-CM

## 2021-09-21 PROCEDURE — 97110 THERAPEUTIC EXERCISES: CPT | Mod: XU

## 2021-09-21 PROCEDURE — 97112 NEUROMUSCULAR REEDUCATION: CPT | Mod: XU

## 2021-09-21 PROCEDURE — 92507 TX SP LANG VOICE COMM INDIV: CPT

## 2021-09-21 NOTE — OP THERAPY DAILY TREATMENT
"  Outpatient Speech Therapy  DAILY TREATMENT     [facility] Speech [address]  [address]  [place] [address]  Phone:  [phone]  Fax:  [phone]    Date: 09/21/2021    Patient: Rey Medina  YOB: 1995  MRN: [id number]     Time Calculation    Start time: 1300  Stop time: 1345 Time Calculation (min): 45 minutes         Chief Complaint: Not Understood By Others    Visit #: 12    Subjective:   Additional Subjective Comments:      Patient mother reports that patient was video chating yesterday with his friend and it was reported that friend understood patient \"the majority of the time.\"       Objective:   Treatments/Interventions Performed:  Patient/Caregiver education, Home program, Speech/Language treatment, Respiratory Coordination / Support training and Compensatory strategy training  Treatment Intervention tool(s) used:  Review of compensatory speech strategies stated by patient independent to: speak slowly, speak in low volume, put my tongue in the right place.  Objective Details:  Use of compensatory speech strategies given direct imitation, producing words of increasing length and complexity. Patient completed with 14/15 = 93.3% accuracy.    Intelligibility targeted to production of single words (1-3 syllable words), resulting in a 10/11 = 90.9% intelligibility.    Use of social-pragmatic language skills targeted to multi-level turn taking: patient was accurate in a minimum of 3 turns given minimal verbal prompts/ instruction/ cues. Patient demonstrated improved independence in generation of topics to an independent level during novel task.            Speech Therapy Assessment:     Speech Mechanism Assessment:   Speech mechanism comments: Patient demonstrated benefit from direct cues as necessary to increase breath support and relax during speech production (cued to open mouth, use lips and tongue) resulting in improved intelligibility to single words and " simple phrases. Patient speech remains most intelligible when context is known.       Speech Therapy Plan :   Prognosis & Recommendations  Impression Summary:  Patient demonstrated excellent response to clinician cues for breath support to improve coordination of breath-speech, allowing for more relaxed speech productions at the complex word, phrase and sentence levels. Patient intelligibility continues to be impacted by presence of distortions in speech production, mostly likely to be present when patient is seen pushing his voice as compared to taking a breath. Increasing self-awareness to this targeted throughout session.     Patient was also noted to demonstrate continued improvements in social-pragmatic language skills, characterized by improved turn taking, including appropriate topic maintenance and initiation of questions to continue simple conversation to an independent level.   Therapy Recommendations  Recommendation:  Individual Speech Therapy,  Planned Therapy Interventions:  Home Program, Patient/Caregiver Education, Compensatory Strategy Training, Speech/Language training and Voice training,   Plan Details:   Continue to reinforcement development of social-pragmatic language skills for simple conversation given noted improvement in expressive and receptive language skills. Continue with cognitive development in areas of attention, memory and problem solving

## 2021-09-21 NOTE — OP THERAPY DAILY TREATMENT
Outpatient Physical Therapy  DAILY TREATMENT     [facility] Physical [address]  [address]  [place] [address]  Phone:  [phone]  Fax:  [phone]    Date: 09/21/2021    Patient: Rey Medina  YOB: 1995  MRN: [id number]     Time Calculation    Start time: 1345  Stop time: 1430 Time Calculation (min): 45 minutes         Chief Complaint: Difficulty Walking    Visit #: 9    SUBJECTIVE:  Pt continues extensive HEP including core strengthening, standing, balance, etc.    OBJECTIVE:        Therapeutic Exercises (CPT 32456):     1. NuStep level 4, x7 minutes    2. Unsupported sitting: , scap retraction x10, manual resistance single UE push/pull x10B, hands clasped trunk rotation x10B, forward flexion with hands on legs x5, with hands reaching out in forward flexion x5    Therapeutic Treatments and Modalities:     1. Neuromuscular Re-education (CPT 12114)    Therapeutic Treatment and Modalities Summary: -at //bars: single UE support balance x30 sec, toe taps x10 B with Mikhail/CGA  -gait at //bars: forward and reverse with min/modA for balance at gait belt.  Cues for stacking body/ shoulders over feet and keeping head lifted, looking ahead in mirror.  -step forward/back in place x10B with //bars with min/modA for balance.  -unsupported standing in wide stance with min/CGA and verbal cues to correct for posterior lean x1 min.  -unsupported minisquat x10 with CGA/Mikhail.  -SPT toward right and left x5 in //bars with min/modA for balance .  Cues for lifting feet and posture.      Time-based treatments/modalities:    Physical Therapy Timed Treatment Charges  Neuromusc re-ed, balance, coor, post minutes (CPT 07819): 30 minutes  Therapeutic exercise minutes (CPT 42314): 15 minutes    ASSESSMENT:   Response to treatment: Improved standing misty and balance.    PLAN/RECOMMENDATIONS:   Plan for treatment: therapy treatment to continue next visit.  Planned interventions for next  visit: continue with current treatment.

## 2021-09-22 ENCOUNTER — OFFICE VISIT (OUTPATIENT)
Dept: PHYSICAL MEDICINE AND REHAB | Facility: REHABILITATION | Age: 26
End: 2021-09-22
Payer: MEDICAID

## 2021-09-22 VITALS
HEART RATE: 70 BPM | RESPIRATION RATE: 18 BRPM | SYSTOLIC BLOOD PRESSURE: 106 MMHG | OXYGEN SATURATION: 96 % | DIASTOLIC BLOOD PRESSURE: 64 MMHG

## 2021-09-22 DIAGNOSIS — F80.9 IMPAIRED VERBAL COMMUNICATION: ICD-10-CM

## 2021-09-22 DIAGNOSIS — N31.9 NEUROGENIC BLADDER: ICD-10-CM

## 2021-09-22 DIAGNOSIS — R41.89 IMPAIRED COGNITION: ICD-10-CM

## 2021-09-22 DIAGNOSIS — R47.1 DYSARTHRIA: ICD-10-CM

## 2021-09-22 DIAGNOSIS — I61.5 NONTRAUMATIC INTRAVENTRICULAR INTRACEREBRAL HEMORRHAGE, UNSPECIFIED LATERALITY (HCC): Primary | ICD-10-CM

## 2021-09-22 DIAGNOSIS — R47.01 APHASIA: ICD-10-CM

## 2021-09-22 DIAGNOSIS — Z78.9 IMPAIRED MOBILITY AND ACTIVITIES OF DAILY LIVING: ICD-10-CM

## 2021-09-22 DIAGNOSIS — K59.2 NEUROGENIC BOWEL: ICD-10-CM

## 2021-09-22 DIAGNOSIS — Z74.09 IMPAIRED MOBILITY AND ACTIVITIES OF DAILY LIVING: ICD-10-CM

## 2021-09-22 PROCEDURE — 99215 OFFICE O/P EST HI 40 MIN: CPT | Performed by: PHYSICAL MEDICINE & REHABILITATION

## 2021-09-22 NOTE — PROGRESS NOTES
[facility]  [facility]  [address] [place] [address]  Ph: [phone]    FOLLOW UP PATIENT EVALUATION    Patient Name: Rey Medina   Patient : 1995  Patient Age: 25 y.o.   PCP: Nirmala Man M.D.    Examining Physician: Dr. 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  Outpatient therapist contacted this writer directly in order to discuss his progress and emphasized that inpatient rehabilitation would really be beneficial for the patient at this point in his recovery given he has made significant gains since his initial injury in .  -Bowel: Improving.  Continent and volitional.  Able to have bowel movement on his own, however, does need assistance with wiping.  Still takes MiraLAX daily.  -Bladder: Continent and volitional within the urinal  -Pain: Patient denies any significant pain sources  -Speech: Improving: Today is the first day that the patient has been able to converse with this writer independently of his mom.  He still has significant dysarthria but is intelligible and asks appropriate questions and gives appropriate answers.  -Mood: Improved but still has some times of depression.  On mirtazapine at night.  Also takes propranolol 10 mg 3 times daily, weaned down from 20 mg 3 times daily.  -GI: Diet is regular, eating independently now.  Does have dysmetria which hinders ability to feed self at times.  Appetite improved on  Remeron.    Review of Systems:  All other pertinent positive review of systems are noted above in HPI.     Past Medical History:  Past Medical History:   Diagnosis Date   • COVID-19    • ICH (intracerebral hemorrhage) (HCC)    • Stroke (HCC)       Past Surgical History:   Procedure Laterality Date   • ANAL FISTULOTOMY     • PEG PLACEMENT     • TRACHEOSTOMY     •  SHUNT INSERTION          Current Outpatient Medications:   •  NON SPECIFIED, 1 ensure 1 time daily, Disp: 60 Each, Rfl: 11  •  polyethylene glycol 3350 (MIRALAX) 17 GM/SCOOP Powder, DISSOLVE 17 GRAMS IN LIQUID AND DRINK ONCE DAILY AS NEEDED FOR CONSTIPATION, Disp: 510 g, Rfl: 5  •  magnesium hydroxide (MILK OF MAGNESIA) 400 MG/5ML Suspension, Take 30 mL by mouth 1 time a day as needed., Disp: , Rfl:   •  mirtazapine (REMERON) 30 MG Tab tablet, Take 1 tablet by mouth at bedtime., Disp: 90 tablet, Rfl: 1  •  NON SPECIFIED, 4 cans of beneprotein (227g per can) per month, Disp: 4 Each, Rfl: 11  •  Omega-3 Fatty Acids (OMEGA 3 500) 500 MG Cap, Take 1 Cap by mouth every day. Indications: Dietary Deficiency, Disp: , Rfl:   •  Magnesium 400 MG Cap, Take 1 Cap by mouth every day. Indications: Malnutrition, Disp: , Rfl:   •  RA TURMERIC EXTRA STRENGTH PO, Take 1,000 mg by mouth every day. Indications: supplement, Disp: , Rfl:   •  Cholecalciferol (VITAMIN D3) 2000 UNIT Cap, Take 2,000 Units by mouth every day., Disp: , Rfl:   •  non-formulary med, Inhale 1 Inhalation 2 times a day as needed (shortness of breath). Amborxol HCL Mucosolvan  Indications: shorness of breath, Disp: , Rfl:   •  non-formulary med, Take 1 Dose Pack by mouth every day. Emergen C, Disp: , Rfl:   •  propranolol (INDERAL) 20 MG Tab, TAKE 1 TABLET BY MOUTH THREE TIMES DAILY, Disp: 180 Tab, Rfl: 2  •  Cyanocobalamin (B-12 PO), Take 1 Tab by mouth every morning., Disp: , Rfl:   •  Misc. Devices Misc, Incontinence supplies - adult diapers. Length of time needed - lifetime or 999 months., Disp: 999  Each, Rfl: 999  •  montelukast (SINGULAIR) 10 MG Tab, Take 1 Tab by mouth as needed (to reduce amount of pills needing to be taken daily ). (Patient taking differently: Take 10 mg by mouth 1 time a day as needed (to reduce amount of pills needing to be taken daily).), Disp: 90 Tab, Rfl: 2  Allergies   Allergen Reactions   • Vancomycin Swelling     Lips  With red face and neck   • Other Misc Rash     Allergic to name band.        Past Social History:  Social History     Socioeconomic History   • Marital status: Single     Spouse name: Not on file   • Number of children: Not on file   • Years of education: Not on file   • Highest education level: Some college, no degree   Occupational History   • Not on file   Tobacco Use   • Smoking status: Never Smoker   • Smokeless tobacco: Never Used   Vaping Use   • Vaping Use: Never used   Substance and Sexual Activity   • Alcohol use: Never   • Drug use: Never   • Sexual activity: Not Currently   Other Topics Concern   •  Service No   • Blood Transfusions No   • Caffeine Concern No   • Occupational Exposure No   • Hobby Hazards No   • Sleep Concern No   • Stress Concern No   • Weight Concern No   • Special Diet Yes   • Back Care No   • Exercise Yes   • Bike Helmet No   • Seat Belt Yes   • Self-Exams No   Social History Narrative    Lives with mom     Social Determinants of Health     Financial Resource Strain: Low Risk    • Difficulty of Paying Living Expenses: Not hard at all   Food Insecurity: No Food Insecurity   • Worried About Running Out of Food in the Last Year: Never true   • Ran Out of Food in the Last Year: Never true   Transportation Needs: No Transportation Needs   • Lack of Transportation (Medical): No   • Lack of Transportation (Non-Medical): No   Physical Activity: Insufficiently Active   • Days of Exercise per Week: 3 days   • Minutes of Exercise per Session: 20 min   Stress: No Stress Concern Present   • Feeling of Stress : Not at all   Social  Connections: Socially Isolated   • Frequency of Communication with Friends and Family: Never   • Frequency of Social Gatherings with Friends and Family: More than three times a week   • Attends [employer] Services: Never   • Active Member of Clubs or Organizations: No   • Attends Club or Organization Meetings: Never   • Marital Status: Never    Intimate Partner Violence:    • Fear of Current or Ex-Partner:    • Emotionally Abused:    • Physically Abused:    • Sexually Abused:         Family History:  Family History   Problem Relation Age of Onset   • Hypertension Mother    • Glasses Mother    • Thyroid Mother    • Diabetes Maternal Grandfather    • Stroke Maternal Grandfather    • Diabetes Paternal Grandmother    • Hypertension Maternal Uncle    • Heart Disease Neg Hx    • Cancer Neg Hx        Depression and Opioid Screening  PHQ-9:  Depression Screen (PHQ-2/PHQ-9) 4/15/2021 6/15/2021 7/15/2021   PHQ-2 Total Score - - -   PHQ-2 Total Score 0 0 0   PHQ-9 Total Score - - -     Interpretation of PHQ-9 Total Score   Score Severity   1-4 No Depression   5-9 Mild Depression   10-14 Moderate Depression   15-19 Moderately Severe Depression   20-27 Severe Depression     Opioid Risk Score: No value filed.  Interpretation of Opioid Risk Score   Score 0-3 = Low risk of abuse. Do UDS at least once per year.  Score 4-7 = Moderate risk of abuse. Do UDS 1-4 times per year.  Score 8+ = High risk of abuse. Refer to specialist.      OBJECTIVE:   Vital Signs:  Vitals:    09/22/21 1428   BP: 106/64   Pulse: 70   Resp: 18   SpO2: 96%        Pertinent Labs:  Lab Results   Component Value Date/Time    SODIUM 137 12/12/2020 02:00 AM    POTASSIUM 3.5 (L) 12/12/2020 02:00 AM    CHLORIDE 100 12/12/2020 02:00 AM    CO2 20 12/12/2020 02:00 AM    GLUCOSE 72 12/12/2020 02:00 AM    BUN 10 12/12/2020 02:00 AM    CREATININE 0.66 12/12/2020 02:00 AM       Lab Results   Component Value Date/Time    HBA1C 5.7 (H) 02/06/2020 06:07 AM       Lab  Results   Component Value Date/Time    WBC 7.2 12/12/2020 02:00 AM    RBC 4.99 12/12/2020 02:00 AM    HEMOGLOBIN 14.1 12/12/2020 02:00 AM    HEMATOCRIT 43.5 12/12/2020 02:00 AM    MCV 87.2 12/12/2020 02:00 AM    MCH 28.3 12/12/2020 02:00 AM    MCHC 32.4 (L) 12/12/2020 02:00 AM    MPV 11.1 12/12/2020 02:00 AM    NEUTSPOLYS 53.10 12/12/2020 02:00 AM    LYMPHOCYTES 34.80 12/12/2020 02:00 AM    MONOCYTES 8.60 12/12/2020 02:00 AM    EOSINOPHILS 2.10 12/12/2020 02:00 AM    BASOPHILS 1.10 12/12/2020 02:00 AM       Lab Results   Component Value Date/Time    ASTSGOT 17 12/12/2020 02:00 AM    ALTSGPT 38 12/12/2020 02:00 AM        Physical Exam:   GEN: No apparent distress  HEENT: Head normocephalic, atraumatic.  Sclera nonicteric bilaterally, no ocular discharge appreciated bilaterally.  CV: Extremities warm and well-perfused, no peripheral edema appreciated bilaterally.  PULMONARY: Breathing nonlabored on room air, no respiratory accessory muscle use.  Not requiring supplemental oxygen.  Trach site well-healed.  ABD: Soft, nontender.  Nondistended.  SKIN: No appreciable skin breakdown on exposed areas of skin.  PSYCH: Mood and affect within normal limits.  NEURO: Awake alert.  Dysarthric. Logical thought content.  Asks and answers questions appropriately.    Motor Exam Upper Extremities   ? Myotome R L   Shoulder Abduction C5 5 5   Elbow flexion C5 5 5   Wrist extension C6 5 5   Elbow extension C7 5 5   Finger flexion C8 5 5   Finger abduction T1 5 5     Motor Exam Lower Extremities  ? Myotome R L   Hip flexion L2 5 5   Knee extension L3 5 5   Ankle dorsiflexion L4 5 5   Toe extension L5 5 5   Ankle plantarflexion S1 5 5     Dysmetria appreciated bilaterally in the upper extremities.    No clonus bilateral ankles    Therapy:  Occupational therapy assessment 9/16/2021  Patient progression towards goals: Patient and mother have been actively attending OT 2 x a week and carrying out HEP.  Mother noted that patient is stronger  and participating more with some ADLs.  Able to rakesh UB clothing and min/mod A LB.  Min A bathing and hygiene/grooming.  Aiming to participate more with light domestic tasks and meal prep.  Patient would benefit from continued OT intervention to further enhance coordination, bi-manual integration and participation in light household tasks.       Objective findings and assessment details: Current objective measures:   Strength Comments:  R=  67 lbs   L= 72 pounds     Pinch     R    L  Lateral  22    24  3 point  19    24     Tone, Sensation and Coordination:   Tone:     Right upper extremity muscle tone: Normal     Coordination   Upper extremity (left):     Fine motor: Impaired    Gross motor: Impaired    Slow alternating movements: Impaired    Rapid alternating movements: Impaired    Finger to finger: Impaired    Finger touch to nose: Impaired  Upper extremity (right):     Fine motor: Impaired    Gross motor: Impaired    Slow alternating movements: Impaired    Rapid alternating movements: Impaired    Finger to finger: Impaired    Finger touch to nose: Impaired      Coordination comments:   9 hole peg test   R= 1 minute and 24 seconds     L= 1 minute and 20 seconds    Physical therapy assessment 9/21/2021  Therapeutic Exercises (CPT 25440):     1. NuStep level 4, x7 minutes    2. Unsupported sitting: , scap retraction x10, manual resistance single UE push/pull x10B, hands clasped trunk rotation x10B, forward flexion with hands on legs x5, with hands reaching out in forward flexion x5     Therapeutic Treatments and Modalities:     1. Neuromuscular Re-education (CPT 94728)    Therapeutic Treatment and Modalities Summary: -at //bars: single UE support balance x30 sec, toe taps x10 B with Mikhail/CGA  -gait at //bars: forward and reverse with min/modA for balance at gait belt.  Cues for stacking body/ shoulders over feet and keeping head lifted, looking ahead in mirror.  -step forward/back in place x10B with //bars with  min/modA for balance.  -unsupported standing in wide stance with min/CGA and verbal cues to correct for posterior lean x1 min.  -unsupported minisquat x10 with CGA/Mikhail.  -SPT toward right and left x5 in //bars with min/modA for balance .  Cues for lifting feet and posture.     Speech therapy assessment 9/21/2021  Treatments/Interventions Performed:  Patient/Caregiver education, Home program, Speech/Language treatment, Respiratory Coordination / Support training and Compensatory strategy training  Treatment Intervention tool(s) used:  Review of compensatory speech strategies stated by patient independent to: speak slowly, speak in low volume, put my tongue in the right place.  Objective Details:  Use of compensatory speech strategies given direct imitation, producing words of increasing length and complexity. Patient completed with 14/15 = 93.3% accuracy.     Intelligibility targeted to production of single words (1-3 syllable words), resulting in a 10/11 = 90.9% intelligibility.     Use of social-pragmatic language skills targeted to multi-level turn taking: patient was accurate in a minimum of 3 turns given minimal verbal prompts/ instruction/ cues. Patient demonstrated improved independence in generation of topics to an independent level during novel task.        ASSESSMENT/PLAN: Rey Medina  is a 25 y.o. male with rehabilitation history significant for ICH secondary to AVM rupture on 4/21/2019 s/p AVM repair, hydrocephalus s/p  shunt who was admitted to [facility] from 2/5/[facility] to 3/12/2020, here for hemorrhagic stroke follow-up. The following plan was discussed with the patient who is in agreement.     Visit Diagnoses     ICD-10-CM   1. Nontraumatic intraventricular intracerebral hemorrhage, unspecified laterality (HCC)  I61.5   2. Neurogenic bladder  N31.9   3. Dysarthria  R47.1   4. Aphasia  R47.01   5. Impaired cognition  R41.89   6. Impaired mobility and activities of daily living  Z74.09     Z78.9   7. Neurogenic bowel  K59.2   8. Impaired verbal communication  F80.9        Mom assists with history.    Rehab/Neuro:   1. ICH secondary to AVM rupture on 4/21/2019 s/p AVM repair, hydrocephalus s/p  shunt who [facility] to 3/12/2020.  2.  No history of seizure, on Keppra.  Had EEG 5/21/2020. Keppra d/c.   3.  Poor attention secondary to ICH requiring need for neuro-stimulant; discontinued.  4.  Blurry vision after ICH, did have reading glasses prior to stroke.  Blurry vision persistent.  Also having problems with peripheral vision on the right. Resolved with new glasses rx.   5.  Irritability/agitation: Controlled on propranolol.  6.  Dysmetria  7.  Poor balance    Recommendation: Patient's initial injury occurred in 2019.  The patient was in acute rehab in 2020 here at [facility].  Since that time patient has been diligent in working with home health therapy and now outpatient therapy.  This writer was contacted with regards to his therapy needs.  Outpatient therapists are recommending readmission into acute inpatient rehabilitation given that he has made significant gains since 2019 and would benefit greatly from the aggressive inpatient therapy to capitalize on the improvements he has had over the past year and a half.  He would require acute speech therapist, physical therapist, occupational therapist to maximize his speech/cognition, gait, ADLs, respectively.  He still requires acute nursing for bowel and bladder given that he does need some assistance with bowel hygiene and requires medications for bowel management.  Additionally, he requires daily physician evaluation for management of his medications and medical comorbidities including depression, irritability.  The patient is expected to make maximal functional improvement in a reasonable amount of time justifying inpatient rehabilitation.  Additionally, his functional status is significantly improved since he  was last in rehab and additional inpatient stay would significantly increase his chances of being independent in the community.  He has good social support including his mom who is his primary caregiver who will be taking him home after inpatient rehabilitation stay.    Patient has progressed to the point where he does not need 24-hour supervision.  He is able to stay independently within the rehabilitation hospital.    Neuropathic Pain: Denies neuropathic pain.   Bladder: Status: Continent, volitional. Independent with bladder function in urinal   Bowel: Continent and volitional. Has BM on his own without suppository. Needs assistance with wiping. Med management: Continue Miralax daily.   Adjustment Disorder: Med management: Continue Mirtazapine 30mg nightly.   Irritability: Continue propranolol 10 mg 3 times daily.  This was weaned down from 20 mg 3 times daily.  GI: Feeding himself.  On regular diet.  Has difficulty due to dysmetria.      Follow up: Pending admission to ARU    Total time spent was 40 minutes.  Included in this time is the time spent preparing for the visit including record review, my exam and evaluation, counseling and education regarding that which is aforementioned in the assessment and plan. Time was spent ordering the appropriate labs, tests, procedures, referrals, medications.  Including this time was also the time spent in care coordination with transitional care coordinators. Included this time as the time spent obtaining history from someone other than the patient. Discussion involved the patient and mom.    Please note that this dictation was created using voice recognition software. I have made every reasonable attempt to correct obvious errors but there may be errors of grammar and content that I may have overlooked prior to finalization of this note.    Dr. Mandy Luke DO, MS  Department of Physical Medicine & Rehabilitation  Neuro Rehabilitation Clinic  [facility]  Group

## 2021-09-23 ENCOUNTER — SPEECH THERAPY (OUTPATIENT)
Dept: SPEECH THERAPY | Facility: REHABILITATION | Age: 26
End: 2021-09-23
Attending: FAMILY MEDICINE
Payer: MEDICAID

## 2021-09-23 ENCOUNTER — PHYSICAL THERAPY (OUTPATIENT)
Dept: PHYSICAL THERAPY | Facility: REHABILITATION | Age: 26
End: 2021-09-23
Attending: FAMILY MEDICINE
Payer: MEDICAID

## 2021-09-23 ENCOUNTER — OCCUPATIONAL THERAPY (OUTPATIENT)
Dept: OCCUPATIONAL THERAPY | Facility: REHABILITATION | Age: 26
End: 2021-09-23
Attending: FAMILY MEDICINE
Payer: MEDICAID

## 2021-09-23 DIAGNOSIS — I61.5 NONTRAUMATIC INTRAVENTRICULAR INTRACEREBRAL HEMORRHAGE, UNSPECIFIED LATERALITY (HCC): ICD-10-CM

## 2021-09-23 DIAGNOSIS — R47.1 DYSARTHRIA AND ANARTHRIA: ICD-10-CM

## 2021-09-23 DIAGNOSIS — R26.2 DIFFICULTY IN WALKING, NOT ELSEWHERE CLASSIFIED: ICD-10-CM

## 2021-09-23 DIAGNOSIS — R41.841 COGNITIVE COMMUNICATION DEFICIT: ICD-10-CM

## 2021-09-23 DIAGNOSIS — G81.90 HEMIPLEGIA, UNSPECIFIED ETIOLOGY, UNSPECIFIED HEMIPLEGIA TYPE, UNSPECIFIED LATERALITY (HCC): ICD-10-CM

## 2021-09-23 PROCEDURE — 92507 TX SP LANG VOICE COMM INDIV: CPT

## 2021-09-23 PROCEDURE — 97110 THERAPEUTIC EXERCISES: CPT | Mod: XU

## 2021-09-23 PROCEDURE — 97112 NEUROMUSCULAR REEDUCATION: CPT | Mod: XU

## 2021-09-23 ASSESSMENT — SOCIAL DETERMINANTS OF HEALTH (SDOH): SOCIAL_SUPPORT_SYSTEM: PARENT

## 2021-09-23 NOTE — OP THERAPY DAILY TREATMENT
"  Outpatient Physical Therapy  DAILY TREATMENT     [facility] Physical [address]  [address]  [place] [address]  Phone:  [phone]  Fax:  [phone]    Date: 09/23/2021    Patient: Rey Medina  YOB: 1995  MRN: [id number]     Time Calculation    Start time: 1345  Stop time: 1425 Time Calculation (min): 40 minutes         Chief Complaint: Difficulty Walking    Visit #: 10    SUBJECTIVE:  Nothing new to report.    OBJECTIVE:        Therapeutic Exercises (CPT 19189):     2. Hooklying alt R/L clams, x10 with med band    3. Supine combined LE flex/ext, x10B with manual resistance    5. SL bridges, opp LE over small ball x10B    6. Tall kneeling, squats x10, single HHA, cues for increased hip ext and scap retraction for \"up tall\", lateral steps x5B, with B HHA, unsupported tall kneeling balance, x15 sec with SBA.    Therapeutic Treatments and Modalities:     1. Neuromuscular Re-education (CPT 76637)    Therapeutic Treatment and Modalities Summary: -gait at //bars: forward and reverse 4x 10feet with min/modA for balance at gait belt.  Cues for upright posture (looking ahead in mirror), lifting heel and knee for swing.  -standing hip abd tap at //bars with BUE support, x10B, with min/CGA for balance.  Manual facil at iliac crest for planting, increased weight bearing stability in stance limb.  -unsupported minisquat x10 with CGA/Mikhail.  -stride stance balance without UE support, with CGA/Mikhail x1 min B.  Verbal cues to correct to center when starts to lean out of SHAHEEN.  -lateral gait at //bars a29xfpi B with Mikhail.  Cues to lift heel and bend knee for stepping/ foot clearance, and verbal and tactile cues for weight shift to stance limb.  -marching x10B at //bars with Mikhail for balance, manual facil at iliac crest for stance limb weight acceptance.  Facing bar for visual target for knee lifting.      Time-based treatments/modalities:    Physical Therapy Timed Treatment " Charges  Neuromusc re-ed, balance, coor, post minutes (CPT 83296): 25 minutes  Therapeutic exercise minutes (CPT 84480): 15 minutes    ASSESSMENT:   Response to treatment: Improved standing posture and increased endurance for maintaining posture during session.    PLAN/RECOMMENDATIONS:   Plan for treatment: therapy treatment to continue next visit.  Re-assess objective measures, POC and goals.  Planned interventions for next visit: continue with current treatment.

## 2021-09-23 NOTE — OP THERAPY DAILY TREATMENT
"  Outpatient Speech Therapy  DAILY TREATMENT     [address]  [address]  [place] [address]  Phone:  [phone]  Fax:  [phone]    Date: 09/23/2021    Patient: Rey Medina  YOB: 1995  MRN: [id number]     Time Calculation    Start time: 1430  Stop time: 1515 Time Calculation (min): 45 minutes         Chief Complaint: No chief complaint on file.    Visit #: 13    Subjective:   Social Support:     Accompanied By:  Parent (present and supportive)    Patient Mental Status:  Alert and Responsive  Progress Factors:     Progression:  Getting Better  Additional Subjective Comments:      Patient returned to outpatient speech therapy, reporting he met with Dr. Luke yesterday and \"asked questions\" specifically to possibility of acute inpatient rehabilitation stay given continued improvements demonstrated in the outpatient speech therapy setting.       Objective:   Treatments/Interventions Performed:  Patient/Caregiver education, Compensatory strategy training, Home program, Cognitive-Linguistic training and Speech/Language treatment  Other Treatment Interventions:  Assessment of cognitive communication function through administration of portion of the Assessment of Language Related Functional Activities (DANIEL)  Objective Details:  Portions of DANIEL  Subtest 1. Telling time 10/10 - 100% accuracy  Subtest 7. Using a Calendar 8/10 - 80% accuracy  Subtest 8. Reading Instructions 10/10 - 100% accuracy    Patient performance on subtests of DANIEL administered revealed an independent functional rating of 1 = high probability of independent functioning on task    Counting money targeted: patient independent in stating coin name and amount. Patient able to add various coins together accurately given minimal verbal prompts/ instruction/ cues to amounts under $2.00.    Speech production targeted during structured reading to sentence level with patient completing with " 10/10 - 100% accuracy, independent.          Speech Therapy Assessment:     Expressive Language Assessment:     Expressive language comments: Patient encouraged in use of social-pragmatic skills, including smiling with greeting and leave taking with others. Patient demonstrated good accuracy in use of smile following education to an independent level upon leave taking from today's session.     Cognitive Linguistic Assessment:     Patient complex problem solving ability: Supervision Required (need for repetitions to complex sentence level information in the presence of immediate/ short term memory deficits)    Cognitive-Linguistic comments: Deficits in memory recall to 3 units of newly learned information demonstrated during structured calendar activity, impacting accuracy in task completion. Patient was consistent in recall of 2/3 units of newly learned information when presented in a complex, paragraph format.       Speech Mechanism Assessment:     Patient speaks fluently: Supervision Required (fluency of speech production is improving with improved understanding and coordination of breath-speech)    Patient exhibits articulatory precision: Minimal    Patient exhibits sentence level intelligibility: Minimal    Patient dysarthria: Moderate      Speech Therapy Plan :   Therapy Recommendations  Recommendation: Individual Speech Therapy,  Planned Therapy Interventions:  Home Program, Patient/Caregiver Education, Compensatory Strategy Training, Respiratory coordination/support training, Voice training and Speech/Language training,   Plan Details:  Continue to reinforcement development of social-pragmatic language skills for simple conversation given noted improvement in expressive and receptive language skills. Continue with cognitive development in areas of attention, memory and problem solving

## 2021-09-23 NOTE — OP THERAPY DAILY TREATMENT
Outpatient Occupational Therapy  DAILY TREATMENT     [address]  [address]  [place] [address]  Phone:  [phone]  Fax:  [phone]    Date: 09/23/2021    Patient: Rey Medina  YOB: 1995  MRN: [id number]     Time Calculation  Start time: 0315  Stop time: 0400 Time Calculation (min): 45 minutes         Chief Complaint: Hand Weakness and Hand Problem    Visit #: 13    SUBJECTIVE:Patient's mother stated she has re-arranged her kitchen so Rey can now access the microwave and sink area    OBJECTIVE:  Current objective measures:   Strength Comments:  R=  67 lbs   L= 72 pounds     Pinch     R    L  Lateral  22    24  3 point  19    24     Tone, Sensation and Coordination:   Tone:     Right upper extremity muscle tone: Normal     Coordination   Upper extremity (left):     Fine motor: Impaired    Gross motor: Impaired    Slow alternating movements: Impaired    Rapid alternating movements: Impaired    Finger to finger: Impaired    Finger touch to nose: Impaired  Upper extremity (right):     Fine motor: Impaired    Gross motor: Impaired    Slow alternating movements: Impaired    Rapid alternating movements: Impaired    Finger to finger: Impaired    Finger touch to nose: Impaired      Coordination comments:   9 hole peg test   R= 1 minute and 24 seconds     L= 1 minute and 20 seconds        Therapeutic Exercises (CPT 96076):     1. Flex/ext exercise stick in horizontal and vertical position x 10 reps, 10 reps in each position.    2. 3 point prehension for pre-handwriting exercises     3. Fine motor strengthening with medium theraputty to enhance prehension.      4. Fine motor coordination/manipulation and dexterity exercises, grooved pegboard, card shuffling and beading onto  utilizing bi-manual integration.     5. Connect four game to enhance prehension and manipulation    Therapeutic Exercise Summary:  Grooved pegboard  R= 5 minutes  and 35 seconds  L=  6 minutes and 17 seconds.       Time-based treatments/modalities:  Therapeutic exercise minutes (CPT 60859): 45 minutes        Pain rating before treatment: 0  Pain rating after treatment: 0    ASSESSMENT:   Response to treatment: enhanced fine motor dexterity/manipulation of L hand.     PLAN/RECOMMENDATIONS:   Plan for treatment: therapy treatment to continue next visit.  Planned interventions for next visit: continue with current treatment and therapeutic exercise (CPT 17302)

## 2021-09-28 ENCOUNTER — PHYSICAL THERAPY (OUTPATIENT)
Dept: PHYSICAL THERAPY | Facility: REHABILITATION | Age: 26
End: 2021-09-28
Attending: FAMILY MEDICINE
Payer: MEDICAID

## 2021-09-28 ENCOUNTER — SPEECH THERAPY (OUTPATIENT)
Dept: SPEECH THERAPY | Facility: REHABILITATION | Age: 26
End: 2021-09-28
Attending: FAMILY MEDICINE
Payer: MEDICAID

## 2021-09-28 DIAGNOSIS — I61.5 NONTRAUMATIC INTRAVENTRICULAR INTRACEREBRAL HEMORRHAGE, UNSPECIFIED LATERALITY (HCC): ICD-10-CM

## 2021-09-28 DIAGNOSIS — R41.841 COGNITIVE COMMUNICATION DEFICIT: ICD-10-CM

## 2021-09-28 DIAGNOSIS — R47.1 DYSARTHRIA AND ANARTHRIA: ICD-10-CM

## 2021-09-28 DIAGNOSIS — R26.2 DIFFICULTY IN WALKING, NOT ELSEWHERE CLASSIFIED: ICD-10-CM

## 2021-09-28 PROCEDURE — 92507 TX SP LANG VOICE COMM INDIV: CPT

## 2021-09-28 PROCEDURE — 97112 NEUROMUSCULAR REEDUCATION: CPT | Mod: XU

## 2021-09-28 PROCEDURE — 97110 THERAPEUTIC EXERCISES: CPT | Mod: XU

## 2021-09-28 ASSESSMENT — SOCIAL DETERMINANTS OF HEALTH (SDOH): SOCIAL_SUPPORT_SYSTEM: PARENT

## 2021-09-28 NOTE — OP THERAPY DAILY TREATMENT
Outpatient Speech Therapy  DAILY TREATMENT     [address]  [address]  [place] [address]  Phone:  [phone]  Fax:  [phone]    Date: 09/28/2021    Patient: Rey Medina  YOB: 1995  MRN: [id number]     Time Calculation    Start time: 1435  Stop time: 1515 Time Calculation (min): 40 minutes         Chief Complaint: No chief complaint on file.    Visit #: 14    Subjective:   Social Support:     Accompanied By:  Parent (father, Paulino, present and supportive)    Patient Mental Status:  Alert and Responsive  Progress Factors:     Progression:  Getting Better  Additional Subjective Comments:      Patient returns to outpatient speech therapy with no primary complaints, reporting he is doing well. Patient endorses he feels as though his intelligibility has improved and is having more conversations with other as a result.       Objective:   Treatments/Interventions Performed:  Patient/Caregiver education, Home program, Compensatory strategy training and Speech/Language treatment  Treatment Intervention tool(s) used:  SLP provided structured therapy task with target on attention and memory recall  Objective Details:  Memory recall targeted to recall of 6 items: patient was independent in recall 6/6 (100%). 6 items recalled to 25 minute delay with 100% accuracy, independent.     Patient was taught a new game with 6 steps: following instruction, patient was asked to recall steps to repeat instruction. Patient completed with 100% accuracy to independent level.     Production of bilabials, labiodentals/ interdetnals targeted to structured, read aloud at phrase level completed with 6/7 = 85.7% accuracy, minimal verbal prompts/ instruction/ cues for movement of articulators as compared to use of glottal in voice. Production of sentences targeted with patient completing with 3/4 = 75% accuracy, again minimal verbal prompts/ instruction/ cues.          Speech  Therapy Assessment:     Speech Mechanism Assessment:     Patient voice description: Clear    Patient's oral movements are voluntary and coordination: Minimal    Patient speaks fluently: WFL    Patient exhibits articulatory precision: Minimal  Speech mechanism comments: Speech strategies reviewed with patient able to state to an independent level.       Speech Therapy Plan :   Therapy Recommendations  Recommendation: Individual Speech Therapy,  Planned Therapy Interventions:  Patient/Caregiver Education, Compensatory Strategy Training, Speech/Language training, Respiratory coordination/support training and Cognitive-Linguistic training,   Plan Details:   Continue to reinforcement development of social-pragmatic language skills for simple conversation given noted improvement in expressive and receptive language skills. Continue with cognitive development in areas of attention, memory and problem solving

## 2021-09-28 NOTE — OP THERAPY DAILY TREATMENT
"  Outpatient Physical Therapy  DAILY TREATMENT     [facility] Physical [address]  [address]  [place] [address]  Phone:  [phone]  Fax:  [phone]    Date: 09/28/2021    Patient: Rey Medina  YOB: 1995  MRN: [id number]     Time Calculation    Start time: 1345  Stop time: 1430 Time Calculation (min): 45 minutes         Chief Complaint: Difficulty Walking    Visit #: 11    SUBJECTIVE:  Pt reports he is doing well.    OBJECTIVE:  Current objective measures:   STS: from EOM with BUE use with CGA.    Lower extremity (left):     Hip flexion: 5-    Hip extension: 4+    Hip abduction: 4+    Hip adduction: 5    Knee flexion: 5    Knee extension: 5    Ankle dorsiflexion: 5    Ankle plantar flexion: 5  Lower extremity (right):     Hip flexion: 5    Hip extension: 4+    Hip abduction: 4+    Hip adduction: 5    Knee flexion: 5    Knee extension: 5    Ankle dorsiflexion: 5    Ankle plantar flexion: 5     B HS tone appreciated this visit in supine.     5 times STS= 25.5 sec from EOM with CGA, with BUE use.  L foot slips out x2.  Static unsupported standing (CGA at gait belt) x1min.  Requires cues occasionally for correcting leaning out of SHAHEEN.  PT Functional Assessment Tool Used: 5 times STS  PT Functional Assessment Score: 25.5 sec     Therapeutic Exercises (CPT 65209):     1. Seated forward flexion ball roll, x10    2. Seated scap retraction, 10    3. Seated reclined sit ups, 10    4. Seated lateral roll forward flexion ball roll, 10    5. STS, x10, tactile and verbal cues to plant L foot into floor to avoid L foot slipping forward out from under him.    6. Tall kneeling, squats x10, single HHA, cues for increased hip ext and scap retraction for \"up tall\", unsupported tall kneeling balance, x15 sec with SBA.    Therapeutic Treatments and Modalities:     1. Neuromuscular Re-education (CPT 13935)    Therapeutic Treatment and Modalities Summary: -SPT to/from w/c with pt's " arms wrapped around PT thoracic spine, with Mikhail, verbal cues to lift and turn feet.  -pivot turns, 90deg, at //bars for BUE support, x5 with CGA/Mikhail.  Cues to lift feet for pivot turn.  -marching x10B at //bars with BUEs with Mikhail for balance. Facing bar for visual target for knee lifting.  -gait forward and backward at //bars: 3x10 feet with CGA/Mikhail for balance.  Cues to stay up tall, avoid forward flexion.      Time-based treatments/modalities:    Physical Therapy Timed Treatment Charges  Neuromusc re-ed, balance, coor, post minutes (CPT 61361): 20 minutes  Therapeutic exercise minutes (CPT 30085): 25 minutes  ASSESSMENT:   Response to treatment: Pt demo improved control, decreased ataxia with functional mobility, and decreased need for assistance with functional mobility.    PLAN/RECOMMENDATIONS:   Plan for treatment: therapy treatment to continue next visit.  Planned interventions for next visit: continue with current treatment.

## 2021-09-28 NOTE — OP THERAPY PROGRESS SUMMARY
Outpatient Physical Therapy  PROGRESS SUMMARY NOTE      [facility] Physical Therapy [place] [address]  [address]  [facility] [address]  Phone:  [phone]  Fax:  [phone]    Date of Visit: 09/28/2021    Patient: Rey Medina  YOB: 1995  MRN: [id number]     Referring Provider: Sherie Power M.D.  [address] [place] [address]   Referring Diagnosis Other abnormalities of gait and mobility [R26.89];Difficulty in walking, not elsewhere classified [R26.2]     Visit Diagnoses     ICD-10-CM   1. Difficulty in walking, not elsewhere classified  R26.2   2. Nontraumatic intraventricular intracerebral hemorrhage, unspecified laterality (HCC)  I61.5       Rehab Potential: good    Progress Report Period: 8/26 to 9/28/21    Functional Assessment Used  PT Functional Assessment Tool Used: 5 times STS  PT Functional Assessment Score: 25.5 sec       Objective Findings and Assessment:   Patient progression towards goals: Pt demo decreased ataxia and improved motor control with functional mobility, requiring decreased assistance for functional mobility.    Objective findings and assessment details: STS: from EOM with BUE use with CGA.     Lower extremity (left) MMTs:     Hip flexion: 5-    Hip extension: 4+    Hip abduction: 4+    Hip adduction: 5    Knee flexion: 5    Knee extension: 5    Ankle dorsiflexion: 5    Ankle plantar flexion: 5  Lower extremity (right) MMTs:     Hip flexion: 5    Hip extension: 4+    Hip abduction: 4+    Hip adduction: 5    Knee flexion: 5    Knee extension: 5    Ankle dorsiflexion: 5    Ankle plantar flexion: 5     B HS tone appreciated this visit in supine.     5 times STS= 25.5 sec from EOM with CGA, with BUE use.  L foot slips out x2.  Static unsupported standing (CGA at gait belt) x1min.  Requires cues occasionally for correcting leaning out of SHAHEEN.       Goals:   Short Term Goals:   1. Pt will require CGA/SBA for SPT with UE support at transfer pole or  bar. - NOT MET, CONTINUE.  2. Pt will perform gait with tall 4WW with one person assist, x25 feet.- NOT MET, CONTINUE.  3. Pt will be able to demonstrate tall kneeling indep in order to demonstrate incr core strength.- NOT MET, CONTINUE.  Short term goal time span:  2-4 weeks      Long Term Goals:    1. Pt will demonstrate safe transfers with SPV and LRAD in order to increase independence and decrease caregiver burden.- NOT MET, CONTINUE.  2. Pt will perform STS with LRD with adequate control on descent with SBA.- NOT MET, CONTINUE.  3. Pt will ambulate 100 ft with LRAD and Mikhail in order to increase independence. - NOT MET, CONTINUE.  4. Pt will misty unsupported standing x1 min with SBA.- NOT MET, CONTINUE.  Long term goal time span:  2-4 months    Plan:   Planned therapy interventions:  Neuromuscular Re-education (CPT 34768) and Therapeutic Exercise (CPT 81392)  Frequency:  2x week  Duration in visits:  12      Referring provider co-signature:  I have reviewed this plan of care and my co-signature certifies the need for services.     Certification Period: 09/28/2021 to 11/30/21    Physician Signature: ________________________________ Date: ______________

## 2021-09-30 ENCOUNTER — PHYSICAL THERAPY (OUTPATIENT)
Dept: PHYSICAL THERAPY | Facility: REHABILITATION | Age: 26
End: 2021-09-30
Attending: FAMILY MEDICINE
Payer: MEDICAID

## 2021-09-30 ENCOUNTER — SPEECH THERAPY (OUTPATIENT)
Dept: SPEECH THERAPY | Facility: REHABILITATION | Age: 26
End: 2021-09-30
Attending: FAMILY MEDICINE
Payer: MEDICAID

## 2021-09-30 ENCOUNTER — OCCUPATIONAL THERAPY (OUTPATIENT)
Dept: OCCUPATIONAL THERAPY | Facility: REHABILITATION | Age: 26
End: 2021-09-30
Attending: FAMILY MEDICINE
Payer: MEDICAID

## 2021-09-30 DIAGNOSIS — R26.2 DIFFICULTY IN WALKING, NOT ELSEWHERE CLASSIFIED: ICD-10-CM

## 2021-09-30 DIAGNOSIS — I61.5 NONTRAUMATIC INTRAVENTRICULAR INTRACEREBRAL HEMORRHAGE, UNSPECIFIED LATERALITY (HCC): ICD-10-CM

## 2021-09-30 DIAGNOSIS — R47.1 DYSARTHRIA AND ANARTHRIA: ICD-10-CM

## 2021-09-30 DIAGNOSIS — G81.90 HEMIPLEGIA, UNSPECIFIED ETIOLOGY, UNSPECIFIED HEMIPLEGIA TYPE, UNSPECIFIED LATERALITY (HCC): ICD-10-CM

## 2021-09-30 DIAGNOSIS — R41.841 COGNITIVE COMMUNICATION DEFICIT: ICD-10-CM

## 2021-09-30 PROCEDURE — 97110 THERAPEUTIC EXERCISES: CPT | Mod: XU

## 2021-09-30 PROCEDURE — 97112 NEUROMUSCULAR REEDUCATION: CPT | Mod: XU

## 2021-09-30 PROCEDURE — 92507 TX SP LANG VOICE COMM INDIV: CPT

## 2021-09-30 ASSESSMENT — SOCIAL DETERMINANTS OF HEALTH (SDOH): SOCIAL_SUPPORT_SYSTEM: PARENT

## 2021-09-30 NOTE — OP THERAPY DAILY TREATMENT
Outpatient Physical Therapy  DAILY TREATMENT     [facility] Physical [address]  [address]  [place] [address]  Phone:  [phone]  Fax:  [phone]    Date: 09/30/2021    Patient: Rey Medina  YOB: 1995  MRN: [id number]     Time Calculation    Start time: 1345  Stop time: 1430 Time Calculation (min): 45 minutes         Chief Complaint: Difficulty Walking and Loss Of Balance    Visit #: 12    SUBJECTIVE:  Pt feeling good.    OBJECTIVE:        Therapeutic Exercises (CPT 04259):     1. NuStep, level 4, x8 min    2. Seated with feet on dynadisc, scap retraction x10, sit tall unsupported balance, trunk rotation with hands clasped outstretched arms x8B, B shoulder horiz abd/add x10.    3. Quadruped, hip flex- knee to opp hand x10B, with CGA/SBA.  Facil for weight acceptance through opp side for stability.    4. LAQ with VMO ball squeeze, x10    5. LAQ with band around knees, x10    9. Seated leg press into dynadisc, x10B    10. Seated leg press into med band, x10B    Therapeutic Treatments and Modalities:     1. Neuromuscular Re-education (CPT 83739)    Therapeutic Treatment and Modalities Summary: -STS to transfer pole with CGA and pivot turn toward right and left with transfer pole with CGA.  -Lateral step in place, standing with hands on PT's shoulders (PT sitting facing pt), with CGA to modA for balance.  Pt demo improved stability in L stance with R LE step rather than opposite.  Tactile and verbal cues to stay tall, lift chest, head high.  -staggered stance STS from EOM with Mikhail.  Tactile and verbal cues to plant foot flat into floor, find midline balance upon standing.  With R foot back, pt demo R lean in standing.  Able to correct with cues.  -Tall kneeling (on floor with mat for UE support as needed): squats x10 with intermittent UE support for balance, single arm OH reach x10B with SBA, unsupported tall kneeling balance with cues for scap retraction and  hip ext.      Time-based treatments/modalities:    Physical Therapy Timed Treatment Charges  Neuromusc re-ed, balance, coor, post minutes (CPT 00906): 20 minutes  Therapeutic exercise minutes (CPT 39570): 25 minutes    ASSESSMENT:   Response to treatment: Pt demo improving standing balance and misty.    PLAN/RECOMMENDATIONS:   Plan for treatment: therapy treatment to continue next visit.  Planned interventions for next visit: continue with current treatment.

## 2021-09-30 NOTE — OP THERAPY DAILY TREATMENT
"  Outpatient Speech Therapy  DAILY TREATMENT     [address]  [address]  [place] [address]  Phone:  [phone]  Fax:  [phone]    Date: 09/30/2021    Patient: Rey Medina  YOB: 1995  MRN: [id number]     Time Calculation    Start time: 1430  Stop time: 1515 Time Calculation (min): 45 minutes         Chief Complaint: No chief complaint on file.    Visit #: 15    Subjective:   Social Support:     Accompanied By:  Parent (patient brought to therapy session by his mother)    Patient Mental Status:  Alert and Responsive  Progress Factors:     Progression:  Getting Better  Additional Subjective Comments:      Patient returned to outpatient speech therapy with no complaints. Patient does report \"my friend is having a difficult time understanding me.\"       Objective:   Treatments/Interventions Performed:  Patient/Caregiver education, Home program, Compensatory strategy training, Respiratory Coordination / Support training and Speech/Language treatment  Treatment Intervention tool(s) used:  SLP provided structured therapy task with target on intelligibility, attention and memory recall  Objective Details:  Memory recall targeted to recall of previously taught card game. Patient was independent in recall of sequence of events, including values of card (4/4) with 100% accuracy. Patient participated in sequence of game for participation with 100% accuracy.  Problem solving to simple addition (3-4 digit) completed with 7/15 = 46.6% accuracy to independent level. 12/15 = 80% accuracy given minimal-direct verbal, visual prompts/ instruction/ cues.     Intelligible utterances to novel speech production with unfamiliar listener rated at 27/30 = 90% intelligibility rating.          Speech Therapy Assessment:     Cognitive Linguistic Assessment:     Patient immediate memory: L    Patient recent and short term memory: [facility]    Patient prospective and time delay " memory: Minimal and Supervision Required    Patient simple problem solving ability: WFL    Patient complex problem solving ability: Minimal      Speech Mechanism Assessment:     Patient exhibits articulatory precision: Minimal    Patient exhibits sentence level intelligibility: Minimal    Patient exhibits conversational intelligibility: Minimal    Patient dysarthria: Minimal    Patient uses adequate breath support: Yes      Speech Therapy Plan :   Prognosis & Recommendations  Impression Summary:  Patient continues to demonstrate marked progress with intelligibility to both structured and novel speech production tasks with patient also demonstrating improved problem solving for tasks of increasing complexity; although minimal-direct verbal cues remain necessary to support accuracy.   Therapy Recommendations  Recommendation:  Individual Speech Therapy,  Planned Therapy Interventions:  Home Program, Patient/Caregiver Education, Compensatory Strategy Training, Cognitive-Linguistic training, Speech/Language training and Respiratory coordination/support training,   Plan Details:  Continue to reinforcement development of social-pragmatic language skills for simple conversation given noted improvement in expressive and receptive language skills. Continue with cognitive development in areas of attention, memory and problem solving

## 2021-09-30 NOTE — OP THERAPY DAILY TREATMENT
Outpatient Occupational Therapy  DAILY TREATMENT     [facility] Occupational [address].  [address]  [place] [address]  Phone:  [phone]  Fax:  [phone]    Date: 09/30/2021    Patient: Rey Medina  YOB: 1995  MRN: [id number]     Time Calculation  Start time: 0315  Stop time: 0400 Time Calculation (min): 45 minutes         Chief Complaint: Hand Weakness, Hand Problem, and Self Care Duties    Visit #: 13    SUBJECTIVE:  I was able to use my bedside urinal by myself     OBJECTIVE:  Current objective measures:   Strength Comments:  R=  67 lbs   L= 72 pounds     Pinch     R    L  Lateral  22    24  3 point  19    24     Tone, Sensation and Coordination:   Tone:     Right upper extremity muscle tone: Normal     Coordination   Upper extremity (left):     Fine motor: Impaired    Gross motor: Impaired    Slow alternating movements: Impaired    Rapid alternating movements: Impaired    Finger to finger: Impaired    Finger touch to nose: Impaired  Upper extremity (right):     Fine motor: Impaired    Gross motor: Impaired    Slow alternating movements: Impaired    Rapid alternating movements: Impaired    Finger to finger: Impaired    Finger touch to nose: Impaired      Coordination comments:   9 hole peg test   R= 1 minute and 24 seconds     L= 1 minute and 20 seconds        Therapeutic Exercises (CPT 58032):     1. Flex/ext exercise stick in horizontal and vertical position x 10 reps, 10 reps in each position.    2. 3 point prehension for pre-handwriting exercises     3. Velcro roll for cylindrical grasp for B hands, 3 sets for each hand     4. Fine motor coordination/manipulation and dexterity exercises, fine motor beading activity/exercise encouraging bi-manual integration of hands. And use of tweezers to lift and place pins.     5. Digi-flex 9# for both hands, 10 reps with 5 second hold.        Time-based treatments/modalities:  Therapeutic exercise minutes (CPT  72271): 45 minutes        Pain rating before treatment: 0  Pain rating after treatment: 0    ASSESSMENT:   Response to treatment: Progressing well with enhanced fine motor control of hands during tasks/exercises.     PLAN/RECOMMENDATIONS:   Plan for treatment: therapy treatment to continue next visit.  Planned interventions for next visit: continue with current treatment and therapeutic exercise (CPT 09708)

## 2021-10-05 ENCOUNTER — OCCUPATIONAL THERAPY (OUTPATIENT)
Dept: OCCUPATIONAL THERAPY | Facility: REHABILITATION | Age: 26
End: 2021-10-05
Attending: FAMILY MEDICINE
Payer: MEDICARE

## 2021-10-05 ENCOUNTER — PHYSICAL THERAPY (OUTPATIENT)
Dept: PHYSICAL THERAPY | Facility: REHABILITATION | Age: 26
End: 2021-10-05
Attending: FAMILY MEDICINE
Payer: MEDICARE

## 2021-10-05 ENCOUNTER — SPEECH THERAPY (OUTPATIENT)
Dept: SPEECH THERAPY | Facility: REHABILITATION | Age: 26
End: 2021-10-05
Attending: FAMILY MEDICINE
Payer: MEDICARE

## 2021-10-05 DIAGNOSIS — R47.1 DYSARTHRIA AND ANARTHRIA: ICD-10-CM

## 2021-10-05 DIAGNOSIS — R26.2 DIFFICULTY IN WALKING, NOT ELSEWHERE CLASSIFIED: ICD-10-CM

## 2021-10-05 DIAGNOSIS — R41.841 COGNITIVE COMMUNICATION DEFICIT: ICD-10-CM

## 2021-10-05 DIAGNOSIS — I61.5 NONTRAUMATIC INTRAVENTRICULAR INTRACEREBRAL HEMORRHAGE, UNSPECIFIED LATERALITY (HCC): ICD-10-CM

## 2021-10-05 DIAGNOSIS — G81.90 HEMIPLEGIA, UNSPECIFIED ETIOLOGY, UNSPECIFIED HEMIPLEGIA TYPE, UNSPECIFIED LATERALITY (HCC): ICD-10-CM

## 2021-10-05 PROCEDURE — 97110 THERAPEUTIC EXERCISES: CPT

## 2021-10-05 PROCEDURE — 92507 TX SP LANG VOICE COMM INDIV: CPT

## 2021-10-05 PROCEDURE — 97110 THERAPEUTIC EXERCISES: CPT | Mod: XE

## 2021-10-05 PROCEDURE — 97112 NEUROMUSCULAR REEDUCATION: CPT | Mod: XE

## 2021-10-05 ASSESSMENT — SOCIAL DETERMINANTS OF HEALTH (SDOH): SOCIAL_SUPPORT_SYSTEM: OTHER

## 2021-10-05 NOTE — OP THERAPY DAILY TREATMENT
Outpatient Occupational Therapy  DAILY TREATMENT     [address]  [address]  [place] [address]  Phone:  [phone]  Fax:  [phone]    Date: 10/05/2021    Patient: Rey Medina  YOB: 1995  MRN: [id number]     Time Calculation  Start time: 0315  Stop time: 0400 Time Calculation (min): 45 minutes         Chief Complaint: No chief complaint on file.    Visit #: 14    SUBJECTIVE:  I am cutting my food more now.     OBJECTIVE:  Current objective measures:   Strength Comments:  R=  67 lbs   L= 72 pounds     Pinch     R    L  Lateral  22    24  3 point  19    24     Tone, Sensation and Coordination:   Tone:     Right upper extremity muscle tone: Normal     Coordination   Upper extremity (left):     Fine motor: Impaired    Gross motor: Impaired    Slow alternating movements: Impaired    Rapid alternating movements: Impaired    Finger to finger: Impaired    Finger touch to nose: Impaired  Upper extremity (right):     Fine motor: Impaired    Gross motor: Impaired    Slow alternating movements: Impaired    Rapid alternating movements: Impaired    Finger to finger: Impaired    Finger touch to nose: Impaired      Coordination comments:   9 hole peg test   R= 1 minute and 24 seconds     L= 1 minute and 20 seconds        Therapeutic Exercises (CPT 28916):     1. Flex/ext exercise stick in horizontal and vertical position x 10 reps, 10 reps in each position.    2. 3 point prehension for pre-handwriting exercises     3. Velcro roll for cylindrical grasp for B hands, 3 sets for each hand     4. Fine motor coordination/manipulation and dexterity exercises, fine motor beading activity/exercise encouraging bi-manual integration of hands. And use of tweezers to lift and place pins.     5. Digi-flex 9# for both hands, 10 reps with 5 second hold.      Therapeutic Exercise Summary:  Grooved pegboard:  Left:   6 minutes and 20 seconds.       Time-based  treatments/modalities:  Therapeutic exercise minutes (CPT 07230): 45 minutes        Pain rating before treatment: 0  Pain rating after treatment: 0    ASSESSMENT:   Response to treatment: enhanced fine motor control during intricate exercises/tasks.      PLAN/RECOMMENDATIONS:   Plan for treatment: therapy treatment to continue next visit.  Planned interventions for next visit: continue with current treatment and therapeutic exercise (CPT 48736)

## 2021-10-05 NOTE — OP THERAPY DAILY TREATMENT
"  Outpatient Physical Therapy  DAILY TREATMENT     [address]  [address]  [place] [address]  Phone:  [phone]  Fax:  [phone]    Date: 10/05/2021    Patient: Rey Medina  YOB: 1995  MRN: [id number]     Time Calculation    Start time: 1345  Stop time: 1430 Time Calculation (min): 45 minutes         Chief Complaint: Difficulty Walking    Visit #: 13    SUBJECTIVE:  Feeling good.    OBJECTIVE:        Therapeutic Exercises (CPT 45147):     1. NuStep, level 5, x8 min, 0.25 miles    2. DKC with ball, x10    3. Quadruped, UE row x10B, LE lift x10B, with CGA/SBA.  Facil for weight acceptance through opp side for stability.    4. LTR with ball, x10    5. Ball bridges, x15    6. Tall kneeling, manual resisted UE push/pull x10B    Therapeutic Treatments and Modalities:     1. Neuromuscular Re-education (CPT 24880)    Therapeutic Treatment and Modalities Summary: -modified SLS with opp foot on 4\" step, at //bars with BUE support -> single UE support with CGA/Mikhail at gait belt, without UE support with Mikhail at gait belt, mini lunge in place over forward foot with BUE support and CGA at gait belt x10B  -Lateral steps at //bars 2x 10 feet B with CGA and verbal cues for lifting L foot/knee for L step, and planting L foot for weight acceptance.  -static standing balance in regular stance, unsupported at //bars with CGA.  Verbal cues required to correct for posterior lean.  -staggered stance STS from EOM: x5B.  With L foot back, CGA/Mikhail.  With R foot back, requires min/modA and tactile and verbal cues to plant L foot into floor as it tends to slide forward, out from under him with extensor thrust.  Verbal and visual cues for cervicothoracic extension and scap retraction once in standing.    Time-based treatments/modalities:    Physical Therapy Timed Treatment Charges  Neuromusc re-ed, balance, coor, post minutes (CPT 25283): 25 minutes  Therapeutic " exercise minutes (CPT 83561): 20 minutes      ASSESSMENT:   Response to treatment: Pt demo improved control of weight shift and weight acceptance in standing.      PLAN/RECOMMENDATIONS:   Plan for treatment: therapy treatment to continue next visit.  Planned interventions for next visit: continue with current treatment. Continue and progress gait training, core/ trunk postural strength in standing/ tall or half kneeling, unsupported sitting.

## 2021-10-05 NOTE — OP THERAPY DAILY TREATMENT
Outpatient Speech Therapy  DAILY TREATMENT     [facility] Speech [address].  [address]  [place] [address]  Phone:  [phone]  Fax:  [phone]    Date: 10/05/2021    Patient: Rey Medina  YOB: 1995  MRN: [id number]     Time Calculation    Start time: 1251  Stop time: 1334 Time Calculation (min): 43 minutes         Chief Complaint: Speech Problem    Visit #: 16    Subjective:   Reason for Therapy:     Reason For Evaluation:  Dysarthria and Cognition  Social Support:     Accompanied By:  Other (grandpa in session)    Patient Mental Status:  Alert  Progress Factors:     Progression:  Getting Better  Additional Subjective Comments:      Feeling better in talking with more clarity with talking.  Dr. Luke stated that okay to go to rehab and should be going this month.  Rey is excited.      Objective:   Treatments/Interventions Performed:  Cognitive-Linguistic training, Home program, Patient/Caregiver education and Speech/Language treatment  Objective Details:  Recalled 50% of details in short story. Answered questions regarding story 80% accuracy.  Describing items 90% intelligibility.  Determining how story ends min cues to recall what was said to expand story and min to mod cues for use of strategies for increased intelligibility varied 70-90%.  Visual logic determining items missing items in picture had 10/14 with no assist, 90% intelligible.  Reading sentence and answering questions 50-70% intelligibility, mod cues to use strategies.           Speech Therapy Assessment:     Cognitive Linguistic Assessment:     Patient immediate memory: Supervision Required    Patient procedural memory: Supervision Required      Speech Mechanism Assessment:     Patient voice description: Clear    Patient speaks fluently: Supervision Required    Patient exhibits articulatory precision: Moderate    Patient exhibits single word intelligibility: Supervision Required    Patient  exhibits sentence level intelligibility: Minimal    Patient exhibits conversational intelligibility: Moderate    Patient dysarthria: Minimal    Nasality is minimal    Hypernasality is minimal    Patient uses adequate breath support: Yes    Patient breath rate: Normal  Speech mechanism comments: Significant improvements with ability to have general conversation.  Endurance is decreased however and as time increases, intelligibility decreases.      Speech Therapy Plan :   Prognosis & Recommendations  Impression Summary:  Patient with improved stability with volume and breath control in speech tasks which improved his intelligibility significantly.  Will decrease with use of strategies after time and then will have difficulty monitor and being understood.  Prognosis Details:  Progressing towards goals, requires skilled need  Goals  Short Term Goals:  1. Patient will improve orientation, answering orientation questions to day, date, month, season, etc with 100% accuracy over two consecutive sessions within 4 weeks.  2. Patient will improve attention completing sustained, progressing to selective and divided attention tasks with 85% or greater accuracy given min verbal prompts/ instruction/ cues as necessary.  3. Patient will improve working memory completing mental manipulation tasks of 3, progressing to 4 items, with 85% or greater accuracy implementing external and/or internal memory strategies as necessary provided minimal verbal prompts/ instruction/ cues.  4. Patient will demonstrate accuracy and independence in completion of language related, activities of daily living, including reading a calendar, reading instructions, completing tasks with a minimum of 85% accuracy; independent.   5. The patient will improve intelligibility completing structured speech tasks targeting accuracy in articulation of multi-syllable words, progressing to phrase level productions implementing compensatory speech strategies as/ if  necessary including slowing rate of speech, easy onset to voice production and over-articulation,  each word 7/10 trials, 75% accuracy given minimal to moderate verbal cues as necessary.   6. The patient will improve speech production through structured speech production tasks of increasing complexity targeting prosodic features of speech production completing with 75% accuracy, minimal to moderate verbal prompts/ instruction/ cues.   Long Term Goals:     Potential barriers to Goal Achievement:  None  Therapy Recommendations  Recommendation:  Individual Speech Therapy,  Planned Therapy Interventions:  Home Program, Patient/Caregiver Education, Compensatory Strategy Training, Speech/Language training, Cognitive-Linguistic training and Respiratory coordination/support training,   Plan Details:  Attention tasks, language related tasks.

## 2021-10-07 ENCOUNTER — PHYSICAL THERAPY (OUTPATIENT)
Dept: PHYSICAL THERAPY | Facility: REHABILITATION | Age: 26
End: 2021-10-07
Attending: FAMILY MEDICINE
Payer: MEDICARE

## 2021-10-07 ENCOUNTER — OCCUPATIONAL THERAPY (OUTPATIENT)
Dept: OCCUPATIONAL THERAPY | Facility: REHABILITATION | Age: 26
End: 2021-10-07
Attending: FAMILY MEDICINE
Payer: MEDICARE

## 2021-10-07 DIAGNOSIS — I61.5 NONTRAUMATIC INTRAVENTRICULAR INTRACEREBRAL HEMORRHAGE, UNSPECIFIED LATERALITY (HCC): ICD-10-CM

## 2021-10-07 DIAGNOSIS — G81.90 HEMIPLEGIA, UNSPECIFIED ETIOLOGY, UNSPECIFIED HEMIPLEGIA TYPE, UNSPECIFIED LATERALITY (HCC): ICD-10-CM

## 2021-10-07 DIAGNOSIS — R26.2 DIFFICULTY IN WALKING, NOT ELSEWHERE CLASSIFIED: ICD-10-CM

## 2021-10-07 PROCEDURE — 97110 THERAPEUTIC EXERCISES: CPT

## 2021-10-07 PROCEDURE — 97112 NEUROMUSCULAR REEDUCATION: CPT

## 2021-10-07 NOTE — OP THERAPY DAILY TREATMENT
Outpatient Occupational Therapy  DAILY TREATMENT     [facility] Occupational [address].  [address]  [place] [address]  Phone:  [phone]  Fax:  [phone]    Date: 10/07/2021    Patient: Rey Medina  YOB: 1995  MRN: [id number]     Time Calculation  Start time: 1215  Stop time: 1300 Time Calculation (min): 45 minutes         Chief Complaint: Extremity Weakness and Self Care Duties    Visit #: 15    SUBJECTIVE:  I am now using microwave by myself sitting in my wheelchair.     OBJECTIVE:  Current objective measures:   Strength Comments:  R=  67 lbs   L= 72 pounds     Pinch     R    L  Lateral  22    24  3 point  19    24     Tone, Sensation and Coordination:   Tone:     Right upper extremity muscle tone: Normal     Coordination   Upper extremity (left):     Fine motor: Impaired    Gross motor: Impaired    Slow alternating movements: Impaired    Rapid alternating movements: Impaired    Finger to finger: Impaired    Finger touch to nose: Impaired  Upper extremity (right):     Fine motor: Impaired    Gross motor: Impaired    Slow alternating movements: Impaired    Rapid alternating movements: Impaired    Finger to finger: Impaired    Finger touch to nose: Impaired      Coordination comments:   9 hole peg test   R= 1 minute and 24 seconds     L= 1 minute and 20 seconds        Therapeutic Exercises (CPT 98145):     1. Flex/ext exercise stick in horizontal and vertical position x 10 reps, 10 reps in each position.    2. 3 point prehension for pre-handwriting exercises     3. Digi-flex 9# for both hands, 10 reps with 5 second hold.      4. Fine motor coordination/manipulation and dexterity exercises, fine motor beading activity/exercise encouraging bi-manual integration of hands. And use of tweezers to lift and place pins.     Therapeutic Exercise Summary:  Operation game  Connect 4 game  Pegboard solitaire      Time-based treatments/modalities:  Therapeutic exercise  minutes (CPT 62468): 45 minutes        Pain rating before treatment: 0  Pain rating after treatment: 0    ASSESSMENT:   Response to treatment: Conitnues to be motivated to participate in therapy and HEP.  Increased participation in light snack preparation and cutting own food now.      PLAN/RECOMMENDATIONS:   Plan for treatment: therapy treatment to continue next visit.  Planned interventions for next visit: continue with current treatment and therapeutic exercise (CPT 43125)

## 2021-10-07 NOTE — OP THERAPY DAILY TREATMENT
"  Outpatient Physical Therapy  DAILY TREATMENT     [facility] Physical [address]  [address]  [place] [address]  Phone:  [phone]  Fax:  [phone]    Date: 10/07/2021    Patient: Rey Medina  YOB: 1995  MRN: [id number]     Time Calculation    Start time: 1100  Stop time: 1145 Time Calculation (min): 45 minutes         Chief Complaint: Difficulty Walking    Visit #: 14    SUBJECTIVE:  Nothing to report.    OBJECTIVE:        Therapeutic Treatments and Modalities:     1. Neuromuscular Re-education (CPT 11824)    Therapeutic Treatment and Modalities Summary: -unsupported sitting at EOM: single UE manual push/pull x10B, scap retraction against ball x10, seated tall single UE reach forward and overhead x3B with max tactile and visual cues for maintaining anterior pelvic tilt and thoracic extension/scap retraction, forward flexion ball roll x10, semi reclined sit up from ball x10, lateral ball roll x10B.  All with SBA, occ CGA for balance.  Manual facil provided as needed to facilitate scap retraction, lumbar extension/ anterior pelvic tilt for upright sitting posture.  -STS from EOM with reg position and stride stance x4 all with CGA/Mikhail at gait belt.  Pt initiates stand with forward weight shift and knee extension, but lacks hip extension until end of stand.  Once standing, provided verbal cues for correction of lean to avoid LOB.  Pt able to correct when given cues \"you are leaning too far forward, can you pull yourself back a bit?\", or whichever direction applies.  Sometimes overcorrects.  -modified SLS with opp foot on 4\" step, at //bars with BUE support -> single UE support and opp UE reach overhead with CGA/Mikhail at gait belt, tactile and verbal cues to shift weight over stance limb and plant tall into that leg.  -Lateral steps at //bars 2x 10 feet B with CGA and verbal cues for lifting L foot/knee for L step, and shifting weight over LLE for weight " acceptance.  -Gait forward and reverse in //bars 2x10 feet with CGA/Mikhail at gait belt.  tActile and verbal cues to keep trunk and shoulders stacked over feet, rei with backward walking.  -Step through over pole, forward and back, at //bars with BUE support x10B.  Tactile cues/ manual facilitation at stance limb hip/pelvis for weight acceptance and stance stability, verbal cues to stay tall over stance limb.    Time-based treatments/modalities:    Physical Therapy Timed Treatment Charges  Neuromusc re-ed, balance, coor, post minutes (CPT 08515): 45 minutes    ASSESSMENT:   Response to treatment: Pt continues to demonstrate increased endurance in standing and upright positions, with improved posture for longer duration.    PLAN/RECOMMENDATIONS:   Plan for treatment: therapy treatment to continue next visit.  Planned interventions for next visit: continue with current treatment.

## 2021-10-11 ENCOUNTER — PHYSICAL THERAPY (OUTPATIENT)
Dept: PHYSICAL THERAPY | Facility: REHABILITATION | Age: 26
End: 2021-10-11
Attending: FAMILY MEDICINE
Payer: MEDICARE

## 2021-10-11 ENCOUNTER — OCCUPATIONAL THERAPY (OUTPATIENT)
Dept: OCCUPATIONAL THERAPY | Facility: REHABILITATION | Age: 26
End: 2021-10-11
Attending: FAMILY MEDICINE
Payer: MEDICARE

## 2021-10-11 DIAGNOSIS — I61.5 NONTRAUMATIC INTRAVENTRICULAR INTRACEREBRAL HEMORRHAGE, UNSPECIFIED LATERALITY (HCC): ICD-10-CM

## 2021-10-11 DIAGNOSIS — G81.90 HEMIPLEGIA, UNSPECIFIED ETIOLOGY, UNSPECIFIED HEMIPLEGIA TYPE, UNSPECIFIED LATERALITY (HCC): ICD-10-CM

## 2021-10-11 DIAGNOSIS — R26.89 OTHER ABNORMALITIES OF GAIT AND MOBILITY: ICD-10-CM

## 2021-10-11 DIAGNOSIS — R26.2 DIFFICULTY IN WALKING, NOT ELSEWHERE CLASSIFIED: ICD-10-CM

## 2021-10-11 PROCEDURE — 97110 THERAPEUTIC EXERCISES: CPT

## 2021-10-11 PROCEDURE — 97112 NEUROMUSCULAR REEDUCATION: CPT

## 2021-10-11 NOTE — OP THERAPY DAILY TREATMENT
Outpatient Occupational Therapy  DAILY TREATMENT     [facility] Occupational [address]  [address]  [place] [address]  Phone:  [phone]  Fax:  [phone]    Date: 10/11/2021    Patient: Rey Medina  YOB: 1995  MRN: [id number]     Time Calculation  Start time: 1250  Stop time: 1335 Time Calculation (min): 45 minutes         Chief Complaint: Hand Weakness, Hand Problem, and Self Care Duties    Visit #: 16    SUBJECTIVE:  No issues noted since last visit.      OBJECTIVE:  Current objective measures:   Strength Comments:  R=  67 lbs   L= 72 pounds     Pinch     R    L  Lateral  22    24  3 point  19    24     Tone, Sensation and Coordination:   Tone:     Right upper extremity muscle tone: Normal     Coordination   Upper extremity (left):     Fine motor: Impaired    Gross motor: Impaired    Slow alternating movements: Impaired    Rapid alternating movements: Impaired    Finger to finger: Impaired    Finger touch to nose: Impaired  Upper extremity (right):     Fine motor: Impaired    Gross motor: Impaired    Slow alternating movements: Impaired    Rapid alternating movements: Impaired    Finger to finger: Impaired    Finger touch to nose: Impaired      Coordination comments:   9 hole peg test   R= 1 minute and 24 seconds     L= 1 minute and 20 seconds        Therapeutic Exercises (CPT 02000):     1. Flex/ext exercise stick in horizontal and vertical position x 10 reps, 10 reps in each position.    2. Velcro roll with cylindrical grasp for each hand, 5 reps each.      3. Digi-flex 9# for both hands, 10 reps with 5 second hold.      4. Fine motor coordination/manipulation and dexterity exercises, 3 point prehension, including use of tweezers for fine motor control.  prehension and placement of items in different planes along with problem solving.      Therapeutic Exercise Summary:  Operation game  Connect 4 game  Pegboard [employer]      Time-based  treatments/modalities:  Therapeutic exercise minutes (CPT 66338): 45 minutes        Pain rating before treatment: 0  Pain rating after treatment: 0    ASSESSMENT:   Response to treatment: continues to have improved fine motor prehension and control.     PLAN/RECOMMENDATIONS:   Plan for treatment: therapy treatment to continue next visit.  Planned interventions for next visit: continue with current treatment and therapeutic exercise (CPT 23924)

## 2021-10-11 NOTE — OP THERAPY DAILY TREATMENT
Outpatient Physical Therapy  DAILY TREATMENT     [address]  [address]  [place] [address]  Phone:  [phone]  Fax:  [phone]    Date: 10/11/2021    Patient: Rey Medina  YOB: 1995  MRN: [id number]     Time Calculation    Start time: 1345  Stop time: 1425 Time Calculation (min): 40 minutes         Chief Complaint: Difficulty Walking and Loss Of Balance    Visit #: 15    SUBJECTIVE:  Doing well.    OBJECTIVE:        Therapeutic Exercises (CPT 97296):     1. NuStep, level 1, x6 min    Therapeutic Treatments and Modalities:     1. Neuromuscular Re-education (CPT 86829)    Therapeutic Treatment and Modalities Summary: -unsupported sitting at EOM: trunk rotation holding physioball x6B with light CGA at belt, OH reach with physioball x10 with light CGA at gait belt, scap retraction against ball x10, semi reclined sit up from ball x10,  All with SBA, occ CGA for balance.  Verbal cues for lifting chest to sit tall and facilitate anterior pelvic tilt.  -modified SLS with opp foot on Airex, at //bars with BUE support -> single UE support and opp UE reach overhead with Mikhail at gait belt, tactile and verbal cues to shift weight over stance limb and plant tall into that leg.  Increased sway and assist required for modified L SLS vs R.    -Stride stance balance unsupported at //bars with CGA/ Mikhail.  -AP weight shift in stride stance at //bars, unsupported.  Verbal cues to get tall over stance limb, keep weight shift small in order to prevent falling outside of SHAHEEN.  Increased R/L sway.  -Step over in place ankle weight, x10B with BUE support at //bars.  Difficulty maintaining balance when bringing L LE back over weight, demo posterior LOB requiring mod/maxA for recovery.    -Gait forward and reverse in //bars 2x10 feet with CGA/Mikhail at gait belt.  tactile and verbal cues to stay tall.    Time-based treatments/modalities:    Physical Therapy Timed  Treatment Charges  Neuromusc re-ed, balance, coor, post minutes (CPT 19625): 34 minutes  Therapeutic exercise minutes (CPT 41272): 6 minutes    ASSESSMENT:   Response to treatment: Presented increased standing challenges today with weight shifting and decreased or no UE support at //bars.  Pt misty new challenges well, responds appropriately to cues for correction of balance or weight.    PLAN/RECOMMENDATIONS:   Plan for treatment: therapy treatment to continue next visit.  Planned interventions for next visit: continue with current treatment.

## 2021-10-13 ENCOUNTER — OCCUPATIONAL THERAPY (OUTPATIENT)
Dept: OCCUPATIONAL THERAPY | Facility: REHABILITATION | Age: 26
End: 2021-10-13
Attending: FAMILY MEDICINE
Payer: MEDICARE

## 2021-10-13 ENCOUNTER — PHYSICAL THERAPY (OUTPATIENT)
Dept: PHYSICAL THERAPY | Facility: REHABILITATION | Age: 26
End: 2021-10-13
Attending: FAMILY MEDICINE
Payer: MEDICARE

## 2021-10-13 DIAGNOSIS — G81.90 HEMIPLEGIA, UNSPECIFIED ETIOLOGY, UNSPECIFIED HEMIPLEGIA TYPE, UNSPECIFIED LATERALITY (HCC): ICD-10-CM

## 2021-10-13 DIAGNOSIS — R26.2 DIFFICULTY IN WALKING, NOT ELSEWHERE CLASSIFIED: ICD-10-CM

## 2021-10-13 DIAGNOSIS — I61.5 NONTRAUMATIC INTRAVENTRICULAR INTRACEREBRAL HEMORRHAGE, UNSPECIFIED LATERALITY (HCC): ICD-10-CM

## 2021-10-13 PROCEDURE — 97110 THERAPEUTIC EXERCISES: CPT

## 2021-10-13 PROCEDURE — 97116 GAIT TRAINING THERAPY: CPT

## 2021-10-13 PROCEDURE — 97112 NEUROMUSCULAR REEDUCATION: CPT

## 2021-10-13 NOTE — OP THERAPY DAILY TREATMENT
Outpatient Occupational Therapy  DAILY TREATMENT     [facility] Occupational [address].  [address]  [place] [address]  Phone:  [phone]  Fax:  [phone]    Date: 10/13/2021    Patient: Rey Medina  YOB: 1995  MRN: [id number]     Time Calculation  Start time: 0145  Stop time: 0230 Time Calculation (min): 45 minutes         Chief Complaint: Extremity Weakness and Self Care Duties    Visit #: 17    SUBJECTIVE:  No issues noted since last visit. I am cutting most of my food now.    OBJECTIVE:  Current objective measures:   Strength Comments:  R=  68 lbs   L= 72 pounds     Pinch     R    L  Lateral  22    24  3 point  19    24     Tone, Sensation and Coordination:   Tone:     Right upper extremity muscle tone: Normal     Coordination   Upper extremity (left):     Fine motor: Impaired    Gross motor: Impaired    Slow alternating movements: Impaired    Rapid alternating movements: Impaired    Finger to finger: Impaired    Finger touch to nose: Impaired  Upper extremity (right):     Fine motor: Impaired    Gross motor: Impaired    Slow alternating movements: Impaired    Rapid alternating movements: Impaired    Finger to finger: Impaired    Finger touch to nose: Impaired      Coordination comments:   9 hole peg test   R= 1 minute and 21 seconds     L= 1 minute and 20 seconds        Therapeutic Exercises (CPT 36992):     1. Flex/ext exercise stick in horizontal and vertical position x 10 reps, 10 reps in each position.    2. Velcro roll with cylindrical grasp for each hand, 5 reps each.      3. Digi-flex 9# for both hands, 10 reps with 5 second hold.      4. Fine motor coordination/manipulation and dexterity exercises, 3 point prehension, including use of tweezers for fine motor control.  prehension and placement of items in different planes along with problem solving.      Therapeutic Exercise Summary:  Operation game  Connect 4 game  Shuffling a deck of cards.         Time-based treatments/modalities:  Therapeutic exercise minutes (CPT 71462): 45 minutes        Pain rating before treatment: 0  Pain rating after treatment: 0    ASSESSMENT:   Response to treatment: progressing well with strength, pinch, dexterity and manipulation.      PLAN/RECOMMENDATIONS:   Plan for treatment: therapy treatment to continue next visit.  Planned interventions for next visit: continue with current treatment and therapeutic exercise (CPT 44896)

## 2021-10-13 NOTE — OP THERAPY DAILY TREATMENT
Outpatient Physical Therapy  DAILY TREATMENT     [address]  [address]  [place] [address]  Phone:  [phone]  Fax:  [phone]    Date: 10/13/2021    Patient: Rey Medina  YOB: 1995  MRN: [id number]     Time Calculation    Start time: 1430  Stop time: 1515 Time Calculation (min): 45 minutes         Chief Complaint: Difficulty Walking    Visit #: 16    SUBJECTIVE:  Patient present with father. Reports that their goal is for patient to walk. State that he can independently maneuver both manual and power wheelchair within the home but really want to walk. Mother present after therapy and would like to see if it is possible to have patient increase to 3x/week for PT to work on walking.     OBJECTIVE:  Current objective measures:           Therapeutic Treatments and Modalities:     1. Neuromuscular Re-education (CPT 85027), Squat pivot transfer from wheelchair to level surface x 5, Cues for hand placement, slow movement, and trunk forward. Able to complete with supervision due to ataxia. , STS practice from wheelchair to FWW , Static standing balance: light perturbations at trunk, bilateral shoulder flexion, trunk rotation without UE support, weight shifting.     4. Gait Training (CPT 66768), Standing marching with 20# weighted walker -> gait with weighted walker and ModA for balance x 50 feet, Verbal cues provided for each step, which appeared to decrease speed.     Time-based treatments/modalities:    Physical Therapy Timed Treatment Charges  Gait training minutes (CPT 77360): 15 minutes  Neuromusc re-ed, balance, coor, post minutes (CPT 75624): 30 minutes      ASSESSMENT:   Response to treatment: Patient is greatly limited in functional independence due to ataxia. However, with weighted walker and assistance for balance, patient demonstrated good foot placement during gait training with FWW. Uncertain if this is a functional task to work  toward due to level of ataxia.     PLAN/RECOMMENDATIONS:   Plan for treatment: therapy treatment to continue next visit.  Planned interventions for next visit: continue with current treatment.

## 2021-10-18 ENCOUNTER — PHYSICAL THERAPY (OUTPATIENT)
Dept: PHYSICAL THERAPY | Facility: REHABILITATION | Age: 26
End: 2021-10-18
Attending: FAMILY MEDICINE
Payer: MEDICARE

## 2021-10-18 ENCOUNTER — OCCUPATIONAL THERAPY (OUTPATIENT)
Dept: OCCUPATIONAL THERAPY | Facility: REHABILITATION | Age: 26
End: 2021-10-18
Attending: FAMILY MEDICINE
Payer: MEDICARE

## 2021-10-18 DIAGNOSIS — R26.2 DIFFICULTY IN WALKING, NOT ELSEWHERE CLASSIFIED: ICD-10-CM

## 2021-10-18 DIAGNOSIS — I61.5 NONTRAUMATIC INTRAVENTRICULAR INTRACEREBRAL HEMORRHAGE, UNSPECIFIED LATERALITY (HCC): ICD-10-CM

## 2021-10-18 DIAGNOSIS — G81.90 HEMIPLEGIA, UNSPECIFIED ETIOLOGY, UNSPECIFIED HEMIPLEGIA TYPE, UNSPECIFIED LATERALITY (HCC): ICD-10-CM

## 2021-10-18 PROCEDURE — 97110 THERAPEUTIC EXERCISES: CPT

## 2021-10-18 PROCEDURE — 97116 GAIT TRAINING THERAPY: CPT

## 2021-10-18 NOTE — OP THERAPY DAILY TREATMENT
Outpatient Physical Therapy  DAILY TREATMENT     [facility] Physical [address]  [address]  [place] [address]  Phone:  [phone]  Fax:  [phone]    Date: 10/18/2021    Patient: Rey Medina  YOB: 1995  MRN: [id number]     Time Calculation    Start time: 1430  Stop time: 1515 Time Calculation (min): 45 minutes         Chief Complaint: Difficulty Walking    Visit #: 17    SUBJECTIVE:  Pt reports he is doing good.    OBJECTIVE:        Therapeutic Exercises (CPT 85370):     1. NuStep, level 3, 65spm pace keeper,0.25miles    2. Standing frame, x10 min, arm circles x30 sec forward, x30 sec backward, STS x10, STS with B UEs forward flexed x10, arms reaching OH, abd, flex x5 single UE, x5 BUEs    6. Seated manually resisted UE push-pull, x10B, with cues for maintaining upright posture (scap retraction, thoracic ext, and anterior pelvic tilt)    7. Seated pelvic tilt, x10    Therapeutic Treatments and Modalities:     1. Gait Training (CPT 90018)    Therapeutic Treatment and Modalities Summary: -At edge of therapy mat: STS with UEs x6 with CGA/Mikhail.  STS with arms crossed x5 with CGA/Mikhail.  -gait with FWW, 15# weighted walker, 50feet with one seated rest, with mod/maxA at gait belt for posterior lean.  Pt intermittently able to correct for posterior lean with verbal and tactile cues, but returns to posterior lean tendencies.  Pt demo inadequate weight shift over stance limb, delayed and impaired balance reactions to respond to posterior LOB, where front wheels of walker are lifted from floor.    Time-based treatments/modalities:    Physical Therapy Timed Treatment Charges  Gait training minutes (CPT 32053): 25 minutes  Therapeutic exercise minutes (CPT 79518): 20 minutes    ASSESSMENT:   Response to treatment: Pt demo impaired standing balance with walker versus at //bars.  Impaired anterior translation of COM over SHAHEEN.    PLAN/RECOMMENDATIONS:   Plan for treatment:  therapy treatment to continue next visit.  Planned interventions for next visit: continue with current treatment.

## 2021-10-18 NOTE — OP THERAPY DAILY TREATMENT
Outpatient Occupational Therapy  DAILY TREATMENT     [address]  [address]  [place] [address]  Phone:  [phone]  Fax:  [phone]    Date: 10/18/2021    Patient: Rey Medina  YOB: 1995  MRN: [id number]     Time Calculation  Start time: 0145  Stop time: 0230 Time Calculation (min): 45 minutes         Chief Complaint: Extremity Weakness and Self Care Duties    Visit #: 18    SUBJECTIVE:  I am putting together my own breakfast.      OBJECTIVE:  Current objective measures:   Strength Comments:  R=  68 lbs   L= 72 pounds     Pinch     R    L  Lateral  22    24  3 point  19    24     Tone, Sensation and Coordination:   Tone:     Right upper extremity muscle tone: Normal     Coordination   Upper extremity (left):     Fine motor: Impaired    Gross motor: WFL    Slow alternating movements: WFL    Rapid alternating movements: Impaired    Finger to finger: Impaired    Finger touch to nose: Impaired  Upper extremity (right):     Fine motor: Impaired    Gross motor: WFL    Slow alternating movements: WFL    Rapid alternating movements: Impaired    Finger to finger: Impaired    Finger touch to nose: Impaired      Coordination comments:   9 hole peg test   R= 1 minute and 21 seconds     L= 1 minute and 20 seconds        Therapeutic Exercises (CPT 35875):     1. Flex/ext exercise stick in horizontal and vertical position x 10 reps, 10 reps in each position.    2. Velcro roll with cylindrical grasp for each hand, 5 reps each.      3. Digi-flex 9# for both hands, 10 reps with 5 second hold.      4. Fine motor coordination/manipulation and dexterity exercises, 3 point prehension, including use of tweezers for fine motor control.  prehension and placement of items in different planes along with problem solving.      Therapeutic Exercise Summary:  Operation game  Connect 4 game  Shuffling a deck of cards.    Pegboard solitaire.      Time-based  treatments/modalities:  Therapeutic exercise minutes (CPT 36861): 45 minutes        Pain rating before treatment: 0  Pain rating after treatment: 0    ASSESSMENT:   Response to treatment: Increased participation with meal prep and light kitchen tasks.      PLAN/RECOMMENDATIONS:   Plan for treatment: therapy treatment to continue next visit.  Planned interventions for next visit: continue with current treatment and therapeutic exercise (CPT 20668)

## 2021-10-19 ENCOUNTER — SPEECH THERAPY (OUTPATIENT)
Dept: SPEECH THERAPY | Facility: REHABILITATION | Age: 26
End: 2021-10-19
Attending: FAMILY MEDICINE
Payer: MEDICARE

## 2021-10-19 DIAGNOSIS — R47.1 DYSARTHRIA AND ANARTHRIA: ICD-10-CM

## 2021-10-19 DIAGNOSIS — R41.841 COGNITIVE COMMUNICATION DEFICIT: ICD-10-CM

## 2021-10-19 PROCEDURE — 92507 TX SP LANG VOICE COMM INDIV: CPT

## 2021-10-19 NOTE — OP THERAPY DAILY TREATMENT
Outpatient Speech Therapy  DAILY TREATMENT     [facility] Speech [address]  [address]  [place] [address]  Phone:  [phone]  Fax:  [phone]    Date: 10/19/2021    Patient: Rey Medina  YOB: 1995  MRN: [id number]     Time Calculation    Start time: 1248  Stop time: 1333 Time Calculation (min): 45 minutes         Chief Complaint: Speech Problem    Visit #: 17    Subjective:   Reason for Therapy:     Reason For Evaluation:  Dysarthria and Speech/Language  Social Support:     Patient Mental Status:  Alert and Responsive  Progress Factors:     Progression:  Getting Better  Additional Subjective Comments:      Talking better.  Got denied for rehabilitation. Ordered out at a restaurant, but required assistance from family to help and not able to produce /s/ sound better.      Objective:   Treatments/Interventions Performed:  Home program, Cognitive-Linguistic training, Patient/Caregiver education, Speech/Language treatment, Compensatory strategy training and Respiratory Coordination / Support training  Objective Details:  Drawing conclusions completed categorizing life attributes 5/5 no assist..    Determining visual attention 4/4 self correcting, cues to slow down and think about answer before giving.  Following written directions with spatial relations 100% light assist.    Visual attention task with determining  words with distorted visual 95% no assist..   Matching riddles to answers complete 100% no assist.    Letter rebuses completed with min assist and required extended processing.   Determining multiple errors in visual stimuli mod assist for 80% accuracy.  Felt it was difficult.    Significant improvement with managing speech and volume with reading out loud.% intelligible with context and 70-85% intelligible with no context.         Speech Therapy Assessment:     Reading Comprehension Assessment:     Reading comprehension comments: Cues  to attend to details    Cognitive Linguistic Assessment:     Patient attention selective: Minimal    Patient simple reasoning ability: Supervision Required    Patient simple problem solving ability: Supervision Required      Speech Mechanism Assessment:     Patient exhibits articulatory precision: Minimal    Patient exhibits single word intelligibility: Supervision Required    Patient exhibits sentence level intelligibility: Minimal    Patient exhibits conversational intelligibility: Moderate    Patient dysarthria: Minimal      Speech Therapy Plan :   Prognosis & Recommendations  Impression Summary:  Rey continues to demonstrate significant improvements with speech intelligibility each week.  Less and less repetitions of verbal expression required and more control over respiratory system.  Attention to details requires cues, but patient able to recognize errors when addressed.  Prognosis Details:  Progressing towards goals, requires skilled need  Goals  Short Term Goals:  1. Patient will improve orientation, answering orientation questions to day, date, month, season, etc with 100% accuracy over two consecutive sessions within 4 weeks.  2. Patient will improve attention completing sustained, progressing to selective and divided attention tasks with 85% or greater accuracy given min verbal prompts/ instruction/ cues as necessary.  3. Patient will improve working memory completing mental manipulation tasks of 3, progressing to 4 items, with 85% or greater accuracy implementing external and/or internal memory strategies as necessary provided minimal verbal prompts/ instruction/ cues.  4. Patient will demonstrate accuracy and independence in completion of language related, activities of daily living, including reading a calendar, reading instructions, completing tasks with a minimum of 85% accuracy; independent.   5. The patient will improve intelligibility completing structured speech tasks targeting accuracy in  articulation of multi-syllable words, progressing to phrase level productions implementing compensatory speech strategies as/ if necessary including slowing rate of speech, easy onset to voice production and over-articulation,  each word 7/10 trials, 75% accuracy given minimal to moderate verbal cues as necessary.   6. The patient will improve speech production through structured speech production tasks of increasing complexity targeting prosodic features of speech production completing with 75% accuracy, minimal to moderate verbal prompts/ instruction/ cues.   Long Term Goals:     Potential barriers to Goal Achievement:  None  Therapy Recommendations  Recommendation:  Individual Speech Therapy,  Planned Therapy Interventions:  Home Program, Patient/Caregiver Education, Compensatory Strategy Training, Speech/Language training, Cognitive-Linguistic training and Respiratory coordination/support training,   Plan Details:  Speech and attention

## 2021-10-20 ENCOUNTER — OCCUPATIONAL THERAPY (OUTPATIENT)
Dept: OCCUPATIONAL THERAPY | Facility: REHABILITATION | Age: 26
End: 2021-10-20
Attending: FAMILY MEDICINE
Payer: MEDICARE

## 2021-10-20 ENCOUNTER — PHYSICAL THERAPY (OUTPATIENT)
Dept: PHYSICAL THERAPY | Facility: REHABILITATION | Age: 26
End: 2021-10-20
Attending: FAMILY MEDICINE
Payer: MEDICARE

## 2021-10-20 DIAGNOSIS — G81.90 HEMIPLEGIA, UNSPECIFIED ETIOLOGY, UNSPECIFIED HEMIPLEGIA TYPE, UNSPECIFIED LATERALITY (HCC): ICD-10-CM

## 2021-10-20 DIAGNOSIS — I61.5 NONTRAUMATIC INTRAVENTRICULAR INTRACEREBRAL HEMORRHAGE, UNSPECIFIED LATERALITY (HCC): ICD-10-CM

## 2021-10-20 DIAGNOSIS — R26.2 DIFFICULTY IN WALKING, NOT ELSEWHERE CLASSIFIED: ICD-10-CM

## 2021-10-20 PROCEDURE — 97116 GAIT TRAINING THERAPY: CPT

## 2021-10-20 PROCEDURE — 97110 THERAPEUTIC EXERCISES: CPT

## 2021-10-20 NOTE — OP THERAPY DAILY TREATMENT
Outpatient Physical Therapy  DAILY TREATMENT     [facility] Physical [address]  [address]  [place] [address]  Phone:  [phone]  Fax:  [phone]    Date: 10/20/2021    Patient: Rey Medina  YOB: 1995  MRN: [id number]     Time Calculation    Start time: 1515  Stop time: 1557 Time Calculation (min): 42 minutes         Chief Complaint: Difficulty Walking    Visit #: 18    SUBJECTIVE:  Pt reports he is doing well.  Has not been walking with tall walker at home due to weather.    OBJECTIVE:        Therapeutic Exercises (CPT 17113):     1. Quadruped, UE forward reach x10B, KTOS x10 alt R/L, 10 sec hold UE lift B, 10 sec hold LE lift B    2. Tall kneeling, single HHA static balance, then without UE support with SBA. squats x10 with HHA     3. Seated forward ball roll flexion, x10, R<->L x10    4. Seated scap retraction against ball, x15, tactile and verbal cues to avoid spine extension, isolate scap AROM    5. Seated leg press into dynadisc, x10B    Therapeutic Treatments and Modalities:     1. Gait Training (CPT 97924)    Therapeutic Treatment and Modalities Summary: -At edge of therapy mat: STS with arms crossed over chest x10 with CGA/Mikhail at gait belt.  Tactile and verbal cues to avoid back of legs against edge of mat for leverage.  Pt unable to perform without touching edge of mat with back of legs.  -gait with tall rolling walker 81 feet with CGA/Mikhail at gait belt, occ mod A.  Pt requires assist for directing/maneuvering walker, and for balance.  Pt steps forward prior to shifting weight forward to creating anterior translation of walker and trunk to match LEs.  Provided tactile and verbal cues to push walker forward in order to keep his body and shoulders stacked over feet.  -single UE support at //bars: steps in place, with mod/maxA for balance.  Tactile cues and visual cues (mirror) for getting tall over stance limb.  -static standing balance with single  UE cross body reach, OH reach, forward reach x5B with CGA.      Time-based treatments/modalities:    Physical Therapy Timed Treatment Charges  Gait training minutes (CPT 47875): 25 minutes  Therapeutic exercise minutes (CPT 49464): 17 minutes      ASSESSMENT:   Response to treatment: Pt able to perform gait with decreased assist for stability and balance.  Pt demo decreased frequency and amplitude of ataxic movements during gait and weight bearing activities.    PLAN/RECOMMENDATIONS:   Plan for treatment: therapy treatment to continue next visit.  Planned interventions for next visit: continue with current treatment.

## 2021-10-20 NOTE — OP THERAPY DAILY TREATMENT
Outpatient Occupational Therapy  DAILY TREATMENT     [address]  [address]  [place] [address]  Phone:  [phone]  Fax:  [phone]    Date: 10/20/2021    Patient: Rey Medina  YOB: 1995  MRN: [id number]     Time Calculation  Start time: 0100  Stop time: 0145 Time Calculation (min): 45 minutes         Chief Complaint: Extremity Weakness and Self Care Duties    Visit #: 19    SUBJECTIVE:  No issues noted since last visit    OBJECTIVE:  Current objective measures:   Strength Comments:  R=  68 lbs   L= 72 pounds     Pinch     R    L  Lateral  22    24  3 point  19    24     Tone, Sensation and Coordination:   Tone:     Right upper extremity muscle tone: Normal     Coordination   Upper extremity (left):     Fine motor: Impaired    Gross motor: WFL    Slow alternating movements: WFL    Rapid alternating movements: Impaired    Finger to finger: Impaired    Finger touch to nose: Impaired  Upper extremity (right):     Fine motor: Impaired    Gross motor: WFL    Slow alternating movements: WFL    Rapid alternating movements: Impaired    Finger to finger: Impaired    Finger touch to nose: Impaired      Coordination comments:   9 hole peg test   R= 1 minute and 21 seconds     L= 1 minute and 20 seconds        Therapeutic Exercises (CPT 41287):     1. Flex/ext exercise stick in horizontal and vertical position x 10 reps, 10 reps in each position.    2. Velcro roll with cylindrical grasp for each hand, 5 reps each.      3. Digi-flex 9# for both hands, 10 reps with 5 second hold.      4. Fine motor coordination/manipulation and dexterity exercises, 3 point prehension, including use of tweezers for fine motor control.  prehension and placement of items in different planes along with problem solving.      5. Nu-Step for 4 minutes on level 2 to enhance gross motor coordination and strength of all extremities     Therapeutic Exercise Summary:  Operation  game  Connect 4 game  Shuffling a deck of cards.    Tri-onimos      Time-based treatments/modalities:  Therapeutic exercise minutes (CPT 12959): 45 minutes        Pain rating before treatment: 0  Pain rating after treatment: 0    ASSESSMENT:   Response to treatment: improved problem solving noted during Tri-onimos and Connect 4 games while working on fine motor prehension and coordination tasks. Min A / CGA functional transfers with verbal cues.     PLAN/RECOMMENDATIONS:   Plan for treatment: therapy treatment to continue next visit.  Planned interventions for next visit: continue with current treatment and therapeutic exercise (CPT 20644)

## 2021-10-25 ENCOUNTER — APPOINTMENT (OUTPATIENT)
Dept: OCCUPATIONAL THERAPY | Facility: REHABILITATION | Age: 26
End: 2021-10-25
Attending: FAMILY MEDICINE
Payer: MEDICARE

## 2021-10-25 ENCOUNTER — APPOINTMENT (OUTPATIENT)
Dept: PHYSICAL THERAPY | Facility: REHABILITATION | Age: 26
End: 2021-10-25
Attending: FAMILY MEDICINE
Payer: MEDICARE

## 2021-10-25 NOTE — OP THERAPY DAILY TREATMENT
"  Outpatient Occupational Therapy  DAILY TREATMENT     [facility] Occupational [address]  [address]  [place] [address]  Phone:  [phone]  Fax:  [phone]    Date: 10/25/2021    Patient: Rey Medina  YOB: 1995  MRN: [id number]     Time Calculation                 Chief Complaint: No chief complaint on file.    Visit #: 20    SUBJECTIVE:  ***    OBJECTIVE:  Current objective measures:   Strength Comments:  R=  68 lbs   L= 72 pounds     Pinch     R    L  Lateral  22    24  3 point  19    24     Tone, Sensation and Coordination:   Tone:     Right upper extremity muscle tone: Normal     Coordination   Upper extremity (left):     Fine motor: Impaired    Gross motor: WFL    Slow alternating movements: WFL    Rapid alternating movements: Impaired    Finger to finger: Impaired    Finger touch to nose: Impaired  Upper extremity (right):     Fine motor: Impaired    Gross motor: WFL    Slow alternating movements: WFL    Rapid alternating movements: Impaired    Finger to finger: Impaired    Finger touch to nose: Impaired      Coordination comments:   9 hole peg test   R= 1 minute and 21 seconds     L= 1 minute and 20 seconds      Exercises/Treatments  Time-based treatments/modalities:           Pain rating before treatment: {PAIN NUMBERS_1-10:65975}  Pain rating after treatment: {PAIN NUMBERS_1-10:95898}    ASSESSMENT:   Response to treatment: ***    PLAN/RECOMMENDATIONS:   Plan for treatment: {Therapy Plan:947571624::\"therapy treatment to continue next visit\"}.  Planned interventions for next visit: {planned OT Interventions:[id number]}       "

## 2021-10-26 ENCOUNTER — SPEECH THERAPY (OUTPATIENT)
Dept: SPEECH THERAPY | Facility: REHABILITATION | Age: 26
End: 2021-10-26
Attending: FAMILY MEDICINE
Payer: MEDICARE

## 2021-10-26 ENCOUNTER — OFFICE VISIT (OUTPATIENT)
Dept: OPHTHALMOLOGY | Facility: MEDICAL CENTER | Age: 26
End: 2021-10-26
Payer: MEDICARE

## 2021-10-26 DIAGNOSIS — H52.13 MYOPIA OF BOTH EYES: ICD-10-CM

## 2021-10-26 DIAGNOSIS — R41.841 COGNITIVE COMMUNICATION DEFICIT: ICD-10-CM

## 2021-10-26 DIAGNOSIS — H49.9 OPHTHALMOPLEGIA: ICD-10-CM

## 2021-10-26 DIAGNOSIS — Z86.79 H/O SPONT INTRAPARENCHYMAL INTRACRANIAL HEMORRHAGE D/T CEREBRAL AVM: ICD-10-CM

## 2021-10-26 DIAGNOSIS — R47.1 DYSARTHRIA AND ANARTHRIA: ICD-10-CM

## 2021-10-26 PROCEDURE — 92507 TX SP LANG VOICE COMM INDIV: CPT

## 2021-10-26 PROCEDURE — 99214 OFFICE O/P EST MOD 30 MIN: CPT | Performed by: OPHTHALMOLOGY

## 2021-10-26 PROCEDURE — 92015 DETERMINE REFRACTIVE STATE: CPT | Performed by: OPHTHALMOLOGY

## 2021-10-26 ASSESSMENT — SLIT LAMP EXAM - LIDS
COMMENTS: NORMAL
COMMENTS: NORMAL

## 2021-10-26 ASSESSMENT — REFRACTION_WEARINGRX
OS_CYLINDER: +2.00
OD_SPHERE: -6.00
OD_CYLINDER: +1.50
OD_AXIS: 100
OS_AXIS: 082
SPECS_TYPE: SVL
OS_SPHERE: -6.75

## 2021-10-26 ASSESSMENT — CUP TO DISC RATIO
OS_RATIO: 0.2
OD_RATIO: 0.2

## 2021-10-26 ASSESSMENT — REFRACTION_MANIFEST
OD_CYLINDER: +1.00
OD_AXIS: 098
OD_SPHERE: -8.00
METHOD_AUTOREFRACTION: 1
OS_AXIS: 068
OS_CYLINDER: +1.50
OS_SPHERE: -8.00

## 2021-10-26 ASSESSMENT — CONF VISUAL FIELD
OS_NORMAL: 1
OD_NORMAL: 1

## 2021-10-26 ASSESSMENT — REFRACTION
OD_AXIS: 096
OS_CYLINDER: +1.75
OS_SPHERE: -6.50
OD_SPHERE: -6.00
OD_CYLINDER: +1.25
OS_AXIS: 067

## 2021-10-26 ASSESSMENT — VISUAL ACUITY
CORRECTION_TYPE: GLASSES
OS_CC: 20/50
METHOD: SNELLEN - LINEAR
OD_CC: 20/80

## 2021-10-26 ASSESSMENT — TONOMETRY
IOP_METHOD: I-CARE
OS_IOP_MMHG: 22
OD_IOP_MMHG: 22

## 2021-10-26 ASSESSMENT — EXTERNAL EXAM - RIGHT EYE: OD_EXAM: NORMAL

## 2021-10-26 ASSESSMENT — SOCIAL DETERMINANTS OF HEALTH (SDOH): SOCIAL_SUPPORT_SYSTEM: PARENT

## 2021-10-26 ASSESSMENT — EXTERNAL EXAM - LEFT EYE: OS_EXAM: NORMAL

## 2021-10-26 NOTE — ASSESSMENT & PLAN NOTE
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 Has some end gaze nystagmus worse to the right than the left as well as a bilateral elevation deficit, probably secondary to involvement of the RIMLF. There is no evidence of involvement of the third nucleus in that has good adduction and depression and the pupils are not fixed and dilated. In primary position is relatively orthotropic so no need for prisms or eye muscle surgery to further elevate the eyes. Also obtained OCT NFL thickness that was normal at 99 OD and 102 OS.   10.26.2021 - Nervus appear healthy

## 2021-10-26 NOTE — ASSESSMENT & PLAN NOTE
8/25/2020 - progressive myopia - will give glasses rx  10/26/2021 - pre cyclo was more myopic and could manifest improved acuity. Post cyclo less myopia. Thus may be over accommodating to control some of the nystagmus. Thus will give new rx based on pre cyclo manifest.

## 2021-10-26 NOTE — OP THERAPY DAILY TREATMENT
Outpatient Speech Therapy  DAILY TREATMENT     [facility] Speech [address].  [address]  [place] [address]  Phone:  [phone]  Fax:  [phone]    Date: 10/26/2021    Patient: Rey Medina  YOB: 1995  MRN: [id number]     Time Calculation    Start time: 1245  Stop time: 1332 Time Calculation (min): 47 minutes         Chief Complaint: Speech Problem    Visit #: 18    Subjective:   Reason for Therapy:     Reason For Evaluation:  Speech/Language and Dysarthria  Social Support:     Accompanied By:  Parent (In session)    Patient Mental Status:  Alert and Responsive  Additional Subjective Comments:      Patient continues to progress at home with improved communication.      Objective:   Treatments/Interventions Performed:  Patient/Caregiver education, Compensatory strategy training, Speech/Language treatment, Cognitive-Linguistic training, Home program and Respiratory Coordination / Support training  Objective Details:  Sentence completion completed with no SOB, adequate volume control and 90%+ articulation for 17/20.  Prosody speech tasks completed with mod cues for 60% accuracy and 80% intelligibility.  Sentence development with prompts 85% intelligible.  Structured response 85% intelligible.   Unstructured 50%-70% intelligible.         Speech Therapy Assessment:     Speech Mechanism Assessment:     Patient voice description: Clear    Patient exhibits articulatory precision: Minimal    Patient exhibits single word intelligibility: Supervision Required    Patient exhibits sentence level intelligibility: Minimal    Patient exhibits conversational intelligibility: Moderate    Patient dysarthria: Supervision Required    Patient uses adequate breath support: Yes    Patient breath rate: Normal  Speech mechanism comments: Patient with difficulty decreasing and increasing volume through verbal expression tasks.  Significant improvement coordination of breath, volume, and  phonation.      Speech Therapy Plan :   Prognosis & Recommendations  Impression Summary:  .Patient with difficulty decreasing and increasing volume through verbal expression tasks.  Significant improvement coordination of breath, volume, and phonation.  Prognosis Details:  Progressing towards goals, requires skilled need  Goals  Short Term Goals:  1. Patient will improve orientation, answering orientation questions to day, date, month, season, etc with 100% accuracy over two consecutive sessions within 4 weeks.  2. Patient will improve attention completing sustained, progressing to selective and divided attention tasks with 85% or greater accuracy given min verbal prompts/ instruction/ cues as necessary.  3. Patient will improve working memory completing mental manipulation tasks of 3, progressing to 4 items, with 85% or greater accuracy implementing external and/or internal memory strategies as necessary provided minimal verbal prompts/ instruction/ cues.  4. Patient will demonstrate accuracy and independence in completion of language related, activities of daily living, including reading a calendar, reading instructions, completing tasks with a minimum of 85% accuracy; independent.   5. The patient will improve intelligibility completing structured speech tasks targeting accuracy in articulation of multi-syllable words, progressing to phrase level productions implementing compensatory speech strategies as/ if necessary including slowing rate of speech, easy onset to voice production and over-articulation,  each word 7/10 trials, 75% accuracy given minimal to moderate verbal cues as necessary.   6. The patient will improve speech production through structured speech production tasks of increasing complexity targeting prosodic features of speech production completing with 75% accuracy, minimal to moderate verbal prompts/ instruction/ cues.   Long Term Goals:     Potential barriers to Goal Achievement:   None  Therapy Recommendations  Recommendation:  Individual Speech Therapy,  Planned Therapy Interventions:  Home Program, Patient/Caregiver Education, Compensatory Strategy Training, Speech/Language training, Cognitive-Linguistic training and Respiratory coordination/support training,   Plan Details:  Attention

## 2021-10-26 NOTE — PROGRESS NOTES
Peds/Neuro Ophthalmology:   Everette Romero M.D.    Date & Time note created:    10/26/2021   9:53 AM     Referring MD / APRN:  Sherie Power M.D., No att. providers found    Patient ID:  Name:             Rey Medina   YOB: 1995  Age:                 26 y.o.  male   MRN:               [id number]    Chief Complaint/Reason for Visit:     Other (1 year F/u for Hemorrhagic stroke and Ophthalmoplegia)      History of Present Illness:    Rey Medina is a 26 y.o. male   Pt is here for 1 year F/u for Hemorrhagic stroke and Ophthalmoplegia. Pt mom states she has noticed vision is stable. Pt does not wear his glass all the time only when he reads or watch's TV. Pt denies pain or discomfort OU. Pt denies Headaches.      Review of Systems:  Review of Systems   Eyes:        Ophthalmoplegia  Myopia of both eyes   Cardiovascular:        Hemorrhagic stroke   All other systems reviewed and are negative.      Past Medical History:   Past Medical History:   Diagnosis Date   • COVID-19    • ICH (intracerebral hemorrhage) (HCC)    • Stroke (HCC)        Past Surgical History:  Past Surgical History:   Procedure Laterality Date   • ANAL FISTULOTOMY     • PEG PLACEMENT     • TRACHEOSTOMY     •  SHUNT INSERTION         Current Outpatient Medications:  Current Outpatient Medications   Medication Sig Dispense Refill   • NON SPECIFIED 1 ensure 1 time daily 60 Each 11   • polyethylene glycol 3350 (MIRALAX) 17 GM/SCOOP Powder DISSOLVE 17 GRAMS IN LIQUID AND DRINK ONCE DAILY AS NEEDED FOR CONSTIPATION 510 g 5   • magnesium hydroxide (MILK OF MAGNESIA) 400 MG/5ML Suspension Take 30 mL by mouth 1 time a day as needed.     • mirtazapine (REMERON) 30 MG Tab tablet Take 1 tablet by mouth at bedtime. 90 tablet 1   • NON SPECIFIED 4 cans of beneprotein (227g per can) per month 4 Each 11   • Omega-3 Fatty Acids (OMEGA 3 500) 500 MG Cap Take 1 Cap by mouth every day. Indications: Dietary Deficiency     •  Magnesium 400 MG Cap Take 1 Cap by mouth every day. Indications: Malnutrition     • RA TURMERIC EXTRA STRENGTH PO Take 1,000 mg by mouth every day. Indications: supplement     • Cholecalciferol (VITAMIN D3) 2000 UNIT Cap Take 2,000 Units by mouth every day.     • non-formulary med Inhale 1 Inhalation 2 times a day as needed (shortness of breath). Amborxol HCL Mucosolvan  Indications: shorness of breath     • non-formulary med Take 1 Dose Pack by mouth every day. Emergen C     • propranolol (INDERAL) 20 MG Tab TAKE 1 TABLET BY MOUTH THREE TIMES DAILY 180 Tab 2   • Cyanocobalamin (B-12 PO) Take 1 Tab by mouth every morning.     • Misc. Devices Misc Incontinence supplies - adult diapers. Length of time needed - lifetime or 999 months. 999 Each 999   • montelukast (SINGULAIR) 10 MG Tab Take 1 Tab by mouth as needed (to reduce amount of pills needing to be taken daily ). (Patient taking differently: Take 10 mg by mouth 1 time a day as needed (to reduce amount of pills needing to be taken daily).) 90 Tab 2     No current facility-administered medications for this visit.       Allergies:  Allergies   Allergen Reactions   • Vancomycin Swelling     Lips  With red face and neck   • Other Misc Rash     Allergic to name band.       Family History:  Family History   Problem Relation Age of Onset   • Hypertension Mother    • Glasses Mother    • Thyroid Mother    • Diabetes Maternal Grandfather    • Stroke Maternal Grandfather    • Diabetes Paternal Grandmother    • Hypertension Maternal Uncle    • Heart Disease Neg Hx    • Cancer Neg Hx        Social History:  Social History     Socioeconomic History   • Marital status: Single     Spouse name: Not on file   • Number of children: Not on file   • Years of education: Not on file   • Highest education level: Some college, no degree   Occupational History   • Not on file   Tobacco Use   • Smoking status: Never Smoker   • Smokeless tobacco: Never Used   Vaping Use   • Vaping Use: Never  used   Substance and Sexual Activity   • Alcohol use: Never   • Drug use: Never   • Sexual activity: Not Currently   Other Topics Concern   •  Service No   • Blood Transfusions No   • Caffeine Concern No   • Occupational Exposure No   • Hobby Hazards No   • Sleep Concern No   • Stress Concern No   • Weight Concern No   • Special Diet Yes   • Back Care No   • Exercise Yes   • Bike Helmet No   • Seat Belt Yes   • Self-Exams No   Social History Narrative    Lives with mom     Social Determinants of Health     Financial Resource Strain: Low Risk    • Difficulty of Paying Living Expenses: Not hard at all   Food Insecurity: No Food Insecurity   • Worried About Running Out of Food in the Last Year: Never true   • Ran Out of Food in the Last Year: Never true   Transportation Needs: No Transportation Needs   • Lack of Transportation (Medical): No   • Lack of Transportation (Non-Medical): No   Physical Activity: Insufficiently Active   • Days of Exercise per Week: 3 days   • Minutes of Exercise per Session: 20 min   Stress: No Stress Concern Present   • Feeling of Stress : Not at all   Social Connections: Socially Isolated   • Frequency of Communication with Friends and Family: Never   • Frequency of Social Gatherings with Friends and Family: More than three times a week   • Attends Protestant Services: Never   • Active Member of Clubs or Organizations: No   • Attends Club or Organization Meetings: Never   • Marital Status: Never    Intimate Partner Violence:    • Fear of Current or Ex-Partner:    • Emotionally Abused:    • Physically Abused:    • Sexually Abused:           Physical Exam:  Physical Exam    Oriented x 3  Weight/BMI: There is no height or weight on file to calculate BMI.  There were no vitals taken for this visit.    Base Eye Exam     Visual Acuity (Snellen - Linear)       Right Left    Dist cc 20/80 20/50    Correction: Glasses          Tonometry (I-care, 8:52 AM)       Right Left    Pressure 22  22          Pupils       Pupils Dark Shape React    Right PERRL 2 Round Minimal    Left PERRL 2 Round Minimal          Visual Fields       Right Left     Full Full          Neuro/Psych     Oriented x3: Yes    Mood/Affect: Normal          Dilation     Both eyes: Tropicamide (MYDRIACYL) 1% ophthalmic solution, Phenylephrine (NEOSYNEPHRINE) ophthalmic solution 2.5%, Cyclopentolate (CYCLOGYL) 1% ophthalmic solution @ 9:10 AM            Strabismus Exam     Observations: Ortho    Distance Near Near +3DS N Bifocals                    -2 -2 -2   -2 -2 -2                       0  0   0  0                       0 0 0   0 0 0                Convergence retraction on down gaze, end gaze abducting nysagmus     Slit Lamp and Fundus Exam     External Exam       Right Left    External Normal Normal          Slit Lamp Exam       Right Left    Lids/Lashes Normal Normal    Conjunctiva/Sclera White and quiet White and quiet    Cornea Clear Clear    Anterior Chamber Deep and quiet Deep and quiet    Iris Round and reactive Round and reactive    Lens Clear Clear    Vitreous Normal Normal          Fundus Exam       Right Left    Disc Normal Normal    C/D Ratio 0.2 0.2    Macula Normal Normal    Vessels Normal Normal    Periphery Normal Normal            Refraction     Wearing Rx       Sphere Cylinder Axis    Right -6.00 +1.50 100    Left -6.75 +2.00 082    Age: 1yr    Type: SVL          Manifest Refraction (Auto)       Sphere Cylinder Axis    Right -8.00 +1.00 098    Left -8.00 +1.50 068          Cycloplegic Refraction (Auto)       Sphere Cylinder Axis    Right -6.00 +1.25 096    Left -6.50 +1.75 067          Final Rx       Sphere Cylinder Axis    Right -7.00 +1.25 100    Left -7.50 +1.50 070                Pertinent Lab/Test/Imaging Review:      Assessment and Plan:     Ophthalmoplegia  8/25/2020 - Bilateral elevation deficit secondary to AVM / Stroke involving the area of the RIMLF  10/26/2021 - continued elevation deficit, some  convergent retraction movement on downgaze and end horizontal nystagmus.     Myopia of both eyes  8/25/2020 - progressive myopia - will give glasses rx  10/26/2021 - pre cyclo was more myopic and could manifest improved acuity. Post cyclo less myopia. Thus may be over accommodating to control some of the nystagmus. Thus will give new rx based on pre cyclo manifest.     H/O spont intraparenchymal intracranial hemorrhage d/t cerebral AVM  8/25/2020 - 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 Has some end gaze nystagmus worse to the right than the left as well as a bilateral elevation deficit, probably secondary to involvement of the RIMLF. There is no evidence of involvement of the third nucleus in that has good adduction and depression and the pupils are not fixed and dilated. In primary position is relatively orthotropic so no need for prisms or eye muscle surgery to further elevate the eyes. Also obtained OCT NFL thickness that was normal at 99 OD and 102 OS.   10.26.2021 - Nervus appear healthy        Everette Romero M.D.

## 2021-10-26 NOTE — ASSESSMENT & PLAN NOTE
8/25/2020 - Bilateral elevation deficit secondary to AVM / Stroke involving the area of the RIMLF  10/26/2021 - continued elevation deficit, some convergent retraction movement on downgaze and end horizontal nystagmus.

## 2021-10-27 ENCOUNTER — APPOINTMENT (OUTPATIENT)
Dept: OCCUPATIONAL THERAPY | Facility: REHABILITATION | Age: 26
End: 2021-10-27
Attending: FAMILY MEDICINE
Payer: MEDICARE

## 2021-10-28 ENCOUNTER — APPOINTMENT (OUTPATIENT)
Dept: SPEECH THERAPY | Facility: REHABILITATION | Age: 26
End: 2021-10-28
Attending: FAMILY MEDICINE
Payer: MEDICARE

## 2021-11-02 ENCOUNTER — OCCUPATIONAL THERAPY (OUTPATIENT)
Dept: OCCUPATIONAL THERAPY | Facility: REHABILITATION | Age: 26
End: 2021-11-02
Attending: FAMILY MEDICINE
Payer: MEDICARE

## 2021-11-02 ENCOUNTER — SPEECH THERAPY (OUTPATIENT)
Dept: SPEECH THERAPY | Facility: REHABILITATION | Age: 26
End: 2021-11-02
Attending: FAMILY MEDICINE
Payer: MEDICARE

## 2021-11-02 DIAGNOSIS — R47.1 DYSARTHRIA AND ANARTHRIA: ICD-10-CM

## 2021-11-02 DIAGNOSIS — R41.841 COGNITIVE COMMUNICATION DEFICIT: ICD-10-CM

## 2021-11-02 DIAGNOSIS — G81.90 HEMIPLEGIA, UNSPECIFIED ETIOLOGY, UNSPECIFIED HEMIPLEGIA TYPE, UNSPECIFIED LATERALITY (HCC): ICD-10-CM

## 2021-11-02 PROCEDURE — 92507 TX SP LANG VOICE COMM INDIV: CPT

## 2021-11-02 PROCEDURE — 97110 THERAPEUTIC EXERCISES: CPT | Mod: XU

## 2021-11-02 ASSESSMENT — SOCIAL DETERMINANTS OF HEALTH (SDOH): SOCIAL_SUPPORT_SYSTEM: PARENTS

## 2021-11-02 NOTE — OP THERAPY DAILY TREATMENT
Outpatient Speech Therapy  DAILY TREATMENT     [facility] Speech [address].  [address]  [place] [address]  Phone:  [phone]  Fax:  [phone]    Date: 11/02/2021    Patient: Rey Medina  YOB: 1995  MRN: [id number]     Time Calculation    Start time: 1245  Stop time: 1335 Time Calculation (min): 50 minutes         Chief Complaint: Inability To Function At Work (home) and Poor Speech    Visit #: 19    Subjective:   Reason for Therapy:     Reason For Evaluation:  Cognition, CVA, Speech/Language and Stroke Rehabilitaion  Social Support:     Accompanied By:  Parents (Papa in session)    Patient Mental Status:  Alert and Responsive  Progress Factors:     Progression:  Getting Better  Additional Subjective Comments:      Completed HEP.        Objective:   Treatments/Interventions Performed:  Home program, Cognitive-Linguistic training, Speech/Language treatment, Patient/Caregiver education and Compensatory strategy training  Treatment Intervention tool(s) used:  CADL3 - Communication Activities of Daily Living  Objective Details:  CADL3- raw score 93 = 12% rank, index score 115         Speech Therapy Assessment:     Expressive Language Assessment:     Patient's ability to sustain dialogues within a given topic is WFL.    Patient's ability to formulate complex/abstract language is Moderate.    Written Language Assessment:     Ability to write single words (spontaneously) WFL.     Ability to generate phrases WFL.    Receptive Language Assessment:     Patient follows 1 step simple commands: WFL    Reading Comprehension Assessment:     Patient ability to comprehend simple notes: Supervision Required    Cognitive Linguistic Assessment:     Patient attention selective: Minimal    Patient attention divided: Minimal    Patient complex reasoning ability: Moderate    Patient complex problem solving ability: Moderate    Patient decision making and planning ability: Minimal     Patient initiation and self monitoring ability: Minimal    Written sequencing ability: Minimal    Auditory sequencing ability: Minimal      Speech Mechanism Assessment:     Patient's oral movements are voluntary and coordination: Minimal    Patient speaks fluently: Moderate    Patient exhibits articulatory precision: Minimal    Patient exhibits sentence level intelligibility: Minimal    Patient exhibits conversational intelligibility: Moderate  Speech mechanism comments: /r/ in all positions continues to be difficult to comprehend.      Speech Therapy Plan :   Prognosis & Recommendations  Impression Summary:  Rey continue to progress with function in all cognitive linguistic abilities and soul benefit from continues services.  Prognosis Details:  Progressing towards goals, requires skilled need  Goals  Short Term Goals:  1. Patient will improve orientation, answering orientation questions to day, date, month, season, etc with 100% accuracy over two consecutive sessions within 4 weeks.  2. Patient will improve attention completing sustained, progressing to selective and divided attention tasks with 85% or greater accuracy given min verbal prompts/ instruction/ cues as necessary.  3. Patient will improve working memory completing mental manipulation tasks of 3, progressing to 4 items, with 85% or greater accuracy implementing external and/or internal memory strategies as necessary provided minimal verbal prompts/ instruction/ cues.  4. Patient will demonstrate accuracy and independence in completion of language related, activities of daily living, including reading a calendar, reading instructions, completing tasks with a minimum of 85% accuracy; independent.   5. The patient will improve intelligibility completing structured speech tasks targeting accuracy in articulation of multi-syllable words, progressing to phrase level productions implementing compensatory speech strategies as/ if necessary including slowing  rate of speech, easy onset to voice production and over-articulation,  each word 7/10 trials, 75% accuracy given minimal to moderate verbal cues as necessary.   6. The patient will improve speech production through structured speech production tasks of increasing complexity targeting prosodic features of speech production completing with 75% accuracy, minimal to moderate verbal prompts/ instruction/ cues.   Long Term Goals:     Potential barriers to Goal Achievement:  None  Therapy Recommendations  Recommendation:  Individual Speech Therapy,  Planned Therapy Interventions:  Home Program, Patient/Caregiver Education, Compensatory Strategy Training, Speech/Language training, Cognitive-Linguistic training and Respiratory coordination/support training,   Plan Details:  Attention, problem solving, speech /r/

## 2021-11-02 NOTE — OP THERAPY DAILY TREATMENT
Outpatient Occupational Therapy  DAILY TREATMENT     [address]  [address]  [place] [address]  Phone:  [phone]  Fax:  [phone]    Date: 11/02/2021    Patient: Rey Medina  YOB: 1995  MRN: [id number]     Time Calculation  Start time: 0145  Stop time: 0230 Time Calculation (min): 45 minutes         Chief Complaint: Extremity Weakness and Self Care Duties    Visit #: 20    SUBJECTIVE:  No issues noted since last appointment     OBJECTIVE:  Current objective measures:   Strength Comments:  R=  68 lbs   L= 72 pounds     Pinch     R    L  Lateral  22    24  3 point  19    24     Tone, Sensation and Coordination:   Tone:     Right upper extremity muscle tone: Normal     Coordination   Upper extremity (left):     Fine motor: Impaired    Gross motor: WFL    Slow alternating movements: WFL    Rapid alternating movements: Impaired    Finger to finger: Impaired    Finger touch to nose: Impaired  Upper extremity (right):     Fine motor: Impaired    Gross motor: WFL    Slow alternating movements: WFL    Rapid alternating movements: Impaired    Finger to finger: Impaired    Finger touch to nose: Impaired      Coordination comments:   9 hole peg test   R= 1 minute and 18 seconds     L= 1 minute and 16 seconds    Grooved Pegboard:  L= 5 minutes and 30 seconds.          Therapeutic Exercises (CPT 18231):     1. Flex/ext exercise stick in horizontal and vertical position x 10 reps, 10 reps in each position.    2. Velcro roll with cylindrical grasp for each hand, 5 reps each.      3. Digi-flex 9# for both hands, 10 reps with 5 second hold.      4. Fine motor coordination/manipulation and dexterity exercises, 3 point prehension, including use of tweezers for fine motor control.  prehension and placement of items in different planes along with problem solving.      Therapeutic Exercise Summary:  Operation game  Connect 4 game  Shuffling a deck of  cards.    Tri-onimos      Time-based treatments/modalities:  Therapeutic exercise minutes (CPT 13666): 45 minutes        Pain rating before treatment: 0  Pain rating after treatment: 0    ASSESSMENT:   Response to treatment: increased pinch,  and dexterity noted today.  Goal planning completed for progress note today, please refer to for details.      PLAN/RECOMMENDATIONS:   Plan for treatment: therapy treatment to continue next visit.  Planned interventions for next visit: continue with current treatment and therapeutic exercise (CPT 79938)

## 2021-11-02 NOTE — OP THERAPY PROGRESS SUMMARY
Outpatient Speech Therapy  PROGRESS SUMMARY NOTE      [facility] Speech Therapy [facility]  [address]  [address]  [place] [address]  Phone:  [phone]  Fax:  [phone]    Date of Visit: 11/02/2021    Patient: Rey Medina  YOB: 1995  MRN: [id number]     Referring Provider: Sherie Power M.D.  [address] [facility]  [address]  [place] [address]   Referring Diagnosis Dysarthria and anarthria [R47.1];Cognitive communication deficit [R41.841]      Visit #: 19    Progress Report Period: 7/21/21-11/2/21    Time Calculation    Start time: 1245  Stop time: 1335 Time Calculation (min): 50 minutes         Chief Complaint: Inability To Function At Work (home) and Poor Speech    Visit Diagnoses     ICD-10-CM   1. Cognitive communication deficit  R41.841   2. Dysarthria and anarthria  R47.1       Subjective:   Reason for Therapy:     Reason For Evaluation:  Cognition and CVA  Social Support:     Accompanied By:  Parents (in session)    Patient Mental Status:  Responsive and Alert  Progress Factors:     Progression:  Getting Better  Additional Subjective Comments:      Completed HEP      Objective:   Treatments/Interventions Performed:  Respiratory Coordination / Support training, Home program, Cognitive-Linguistic training, Speech/Language treatment, Compensatory strategy training and Patient/Caregiver education  Treatment Intervention tool(s) used:  CADL3 - Communication Activities of Daily Living  Objective Details:  CADL3- raw score 93 = 12% rank, index score 115      Speech Therapy Assessment:     Expressive Language Assessment:     Patient's ability to sustain dialogues within a given topic is WFL.    Patient's ability to formulate complex/abstract language is Moderate.    Written Language Assessment:     Ability to write single words (spontaneously) WFL.     Ability to generate phrases WFL.    Language comments: Extended processing, decreased legibility    Receptive Language Assessment:      Patient follows 1 step simple commands: WFL    Patient understands simple conversation: WFL    Patient understands complex conversation: Minimal    Reading Comprehension Assessment:     Patient ability to comprehend simple notes: Supervision Required    Cognitive Linguistic Assessment:     Patient attention selective: Minimal    Patient attention divided: Minimal    Patient complex reasoning ability: Moderate    Patient complex problem solving ability: Moderate    Patient initiation and self monitoring ability: Minimal    Written sequencing ability: Minimal    Auditory sequencing ability: Minimal      Speech Mechanism Assessment:     Patient voice description: Clear    Patient's oral movements are voluntary and coordination: Minimal    Patient speaks fluently: Moderate    Patient exhibits articulatory precision: Minimal    Patient exhibits single word intelligibility: Supervision Required    Patient exhibits sentence level intelligibility: Minimal    Patient exhibits conversational intelligibility: Moderate    Patient uses adequate breath support: Yes (continues to require cues in discourse tasks)    Patient breath rate: Normal  Speech mechanism comments: /r/ in all positions continues to be difficult to comprehend.      Speech Therapy Plan :   Prognosis & Recommendations  Impression Summary:  Progress note delayed.  Rey Medina, a 25 year old male, has been attending outpatient speech therapy for voice, speech production, and cognitive communication skills in the setting of spontaneous intraparenchymal intracranial hemorrhage due.    He has demonstrated improved breath support in order to phonate and when from moderate to mild deficits with dysarthria with an intelligibility rating of % in discourse tasks.  Significant improved from initial evaluation of phrase level.     Cognitive communication function has significantly improved with increased attention to detail and problem solving.  Results of  Communication Activities of daily Living (CADL) demonstrated high levels of functional communication developing.  CADL noted with continued deficits mainly in reading, writing, and using numbers specifically with calendar use, math, and time tasks.  These areas also required a significant time for him to process and determine answers for as well as sequential relation relationships and contextual communication.  Other areas noted with minimal errors were in social interactions, sequential relationships, contextual communication, nonverbal communication, and humor, metaphor, and absurdities.  All of these areas negatively impact patient's abiltiy to function independently in real life situations.  Prognosis:  Excellent  Prognosis Details:  Given patient's continued marked improvement in outpatient speech therapy, participation in direct services is recommended to continue and warranted. It is expected that patient will continue to demonstrate progress, given his motivation, and family support.     Goals  Short Term Goals:  1. Patient will improve orientation, answering orientation questions to day, date, month, season, etc with 100% accuracy over two consecutive sessions within 4 weeks. MET  2. Patient will improve attention completing sustained, progressing to selective and divided attention tasks with 85% or greater accuracy given min verbal prompts/ instruction/ cues as necessary. PROGRESSING, continue  3. Patient will improve working memory completing mental manipulation tasks of 3, progressing to 4 items, with 85% or greater accuracy implementing external and/or internal memory strategies as necessary provided minimal verbal prompts/ instruction/ cues. MET  4. Patient will demonstrate accuracy and independence in completion of language related, activities of daily living, including reading a calendar, reading instructions, completing tasks with a minimum of 85% accuracy; independent. PROGRESSING, continue  5.  The patient will improve intelligibility completing structured speech tasks targeting accuracy in articulation of multi-syllable words, progressing to phrase level productions implementing compensatory speech strategies as/ if necessary including slowing rate of speech, easy onset to voice production and over-articulation,  each word 7/10 trials, 75% accuracy given minimal to moderate verbal cues as necessary. MET  6. The patient will improve speech production through structured speech production tasks of increasing complexity targeting prosodic features of speech production completing with 75% accuracy, minimal to moderate verbal prompts/ instruction/ cues. PROGRESSING, continue    NEW GOALS:    -Patient to complete math related tasks with 90% accuracy given min assist.  -Patient to complete structured and unstructured speech tasks in discourse level to 90% intelligibility given min cues in 5/6 sessions.  Short Term Goal Duration (Weeks):  1-2 months  Patient progression on Short Term Goals:  3/6 goals met  Long Term Goals:  1.Patient will make ideas known across settings and to various conversational partners with 90% effectiveness. PROGRESSING, continue  2. Patient will improve speech-language function to allow for understanding of complex information presented both written and auditorily and participate in a 5 minute novel conversation regarding personal, functional or medically relevant information demonstrating appropriate turn-taking and initiation of conversation independently within 12 weeks. PROGRESSING, continue   3. Patient will develop functional, cognitive-linguistic based skills and utilize compensatory strategies to communicate wants and needs effectively to different conversational partners, maintain safety, and participate socially in a functional living environment within 12 weeks. PROGRESSING, continue  Long Term Goal Duration (Weeks):  2-4 months  Patient progression on Long Term  Goals:  Progressing, continue all  Potential barriers to Goal Achievement:  None  Therapy Recommendations  Recommendation:  Individual Speech Therapy,  Planned Therapy Interventions:  Home Program, Patient/Caregiver Education, Compensatory Strategy Training, Speech/Language training, Cognitive-Linguistic training and Respiratory coordination/support training,   Plan Details:  Attention, problem solving, speech /r/  Frequency:  2x week (24 units (82345))  Duration (in weeks):  12      Functional Assessment Used    CADL3    Referring provider co-signature:  I have reviewed this plan of care and my co-signature certifies the need for services.    Certification Period: 11/02/2021 to 01/25/22    Physician Signature: ________________________________ Date: ______________

## 2021-11-02 NOTE — OP THERAPY PROGRESS SUMMARY
Outpatient Occupational Therapy  PROGRESS SUMMARY NOTE    [facility] Occupational Therapy [address]  [address]  [place] [address]  Phone:  [phone]  Fax:  [phone]    Date of Visit: 11/02/2021    Patient: Rey Medina  YOB: 1995  MRN: [id number]     Referring Provider: No referring provider defined for this encounter.   Referring Diagnosis No admission diagnoses are documented for this encounter.     Visit Diagnoses     ICD-10-CM   1. Hemiplegia, unspecified etiology, unspecified hemiplegia type, unspecified laterality ([facility])  G81.90       Rehab Potential: good    Progress Report Period: 9/23/21-11/2/21    Functional Assessment Used UEFI=42/80          Objective Findings and Assessment:   Patient progression towards goals: Patient continues to attend outpatient OT 1-2 x a week working on enhancing B UE strength, manipulation and coordination.  He has increased his participation in light meal prep tasks in the kitchen and working on bi-manual integration of B UE during ADLs. Patient would benefit from continued OT intervention to further enhance B UE strength and coordination/manipulation and gain further independence with ADLs.     Objective findings and assessment details: Current objective measures:   Strength Comments:  R=  68 lbs   L= 72 pounds     Pinch     R    L  Lateral  22    24  3 point  19    24     Tone, Sensation and Coordination:   Tone:     Right upper extremity muscle tone: Normal     Coordination   Upper extremity (left):     Fine motor: Impaired    Gross motor: WFL    Slow alternating movements: WFL    Rapid alternating movements: Impaired    Finger to finger: Impaired    Finger touch to nose: Impaired  Upper extremity (right):     Fine motor: Impaired    Gross motor: WFL    Slow alternating movements: WFL    Rapid alternating movements: Impaired    Finger to finger: Impaired    Finger touch to nose: Impaired      Coordination comments:   9 hole  peg test   R= 1 minute and 18 seconds     L= 1 minute and 16 seconds     Grooved Pegboard:  L= 5 minutes and 30 seconds.      Goals:   Short Term Goals:     Patient will be able to cut food with AE with set up/supervision-met  Patient will be supervision using microwave-met  Patient will be min A making a sandwich, including retrieving items to make sandwich-not met, currently mod A  Patient will be min/mod A clothing management while standing  Short term goal timespan:  2-4 weeks    Long Term Goals:   Patient will be mod I cutting own food with AE-not met  Patient will set up/sup with light meal prep using AE-not met  Patient will have minimal impairment of gross motor coordination in B UE to enhance functional use during ADLs-not met  Patient will enhance bi-manual integration to improve function with light domestic tasks/leisure tasks. - partially met  Patient will score 50/80 on the UEFI-not met, currently 42/80 with is an improvement of 7 points since initial evaluation    Long term goal timespan:  4-6 weeks    Plan:   Planned therapy interventions:  Therapeutic Exercise (CPT 19724)  Frequency:  2x week  Duration in weeks:  6  Duration in visits:  12      Referring provider co-signature:  I have reviewed this plan of care and my co-signature certifies the need for services.    Certification Period: 11/02/2021 to 12/14/21    Physician Signature: ________________________________ Date: ______________

## 2021-11-03 ASSESSMENT — SOCIAL DETERMINANTS OF HEALTH (SDOH): SOCIAL_SUPPORT_SYSTEM: PARENTS

## 2021-11-04 ENCOUNTER — PHYSICAL THERAPY (OUTPATIENT)
Dept: PHYSICAL THERAPY | Facility: REHABILITATION | Age: 26
End: 2021-11-04
Attending: FAMILY MEDICINE
Payer: MEDICARE

## 2021-11-04 ENCOUNTER — OCCUPATIONAL THERAPY (OUTPATIENT)
Dept: OCCUPATIONAL THERAPY | Facility: REHABILITATION | Age: 26
End: 2021-11-04
Attending: FAMILY MEDICINE
Payer: MEDICARE

## 2021-11-04 DIAGNOSIS — I61.5 NONTRAUMATIC INTRAVENTRICULAR INTRACEREBRAL HEMORRHAGE, UNSPECIFIED LATERALITY (HCC): ICD-10-CM

## 2021-11-04 DIAGNOSIS — R26.89 OTHER ABNORMALITIES OF GAIT AND MOBILITY: ICD-10-CM

## 2021-11-04 DIAGNOSIS — R26.2 DIFFICULTY IN WALKING, NOT ELSEWHERE CLASSIFIED: ICD-10-CM

## 2021-11-04 DIAGNOSIS — G81.90 HEMIPLEGIA, UNSPECIFIED ETIOLOGY, UNSPECIFIED HEMIPLEGIA TYPE, UNSPECIFIED LATERALITY (HCC): ICD-10-CM

## 2021-11-04 PROCEDURE — 97110 THERAPEUTIC EXERCISES: CPT

## 2021-11-04 PROCEDURE — 97116 GAIT TRAINING THERAPY: CPT

## 2021-11-04 NOTE — OP THERAPY DAILY TREATMENT
Outpatient Occupational Therapy  DAILY TREATMENT     [facility] Occupational [address]  [address]  [place] [address]  Phone:  [phone]  Fax:  [phone]    Date: 11/04/2021    Patient: Rey Medina  YOB: 1995  MRN: [id number]     Time Calculation  Start time: 0230  Stop time: 0315 Time Calculation (min): 45 minutes         Chief Complaint: Extremity Weakness and Self Care Duties    Visit #: 21    SUBJECTIVE:  No issues noted since last visit.  Seen today after PT appointment where patient completed ambulation with platform walker.  Too tired to do standing with OTR today.  Wanted to focus more on his hand function.      OBJECTIVE:  Current objective measures:   Strength Comments:  R=  68 lbs   L= 72 pounds     Pinch     R    L  Lateral  22    24  3 point  19    24     Tone, Sensation and Coordination:   Tone:     Right upper extremity muscle tone: Normal     Coordination   Upper extremity (left):     Fine motor: Impaired    Gross motor: WFL    Slow alternating movements: WFL    Rapid alternating movements: Impaired    Finger to finger: Impaired    Finger touch to nose: Impaired  Upper extremity (right):     Fine motor: Impaired    Gross motor: WFL    Slow alternating movements: WFL    Rapid alternating movements: Impaired    Finger to finger: Impaired    Finger touch to nose: Impaired      Coordination comments:   9 hole peg test   R= 1 minute and 18 seconds     L= 1 minute and 16 seconds     Grooved Pegboard:  L= 5 minutes and 30 seconds.  *      Therapeutic Exercises (CPT 79170):     1. Flex/ext exercise stick in horizontal and vertical position x 10 reps, 10 reps in each position.    2. Velcro roll with cylindrical grasp for each hand, 5 reps each.      3. Digi-flex 9# for both hands, 10 reps with 5 second hold.      4. Fine motor coordination/manipulation and dexterity exercises, 3 point prehension, including use of tweezers for fine motor control.   prehension and placement of items in different planes along with problem solving.      Therapeutic Exercise Summary:  [email or web address] game  Connect 4 game  Shuffling a deck of cards.          Time-based treatments/modalities:  Therapeutic exercise minutes (CPT 98766): 45 minutes        Pain rating before treatment: 0  Pain rating after treatment: 0    ASSESSMENT:   Response to treatment: smooth control of B hands during functional tasks in therapy session today.  Highly motivated to participate in therapy and doing more at home at w/c level.  Continue to focus on standing balance to enhance clothing management    PLAN/RECOMMENDATIONS:   Plan for treatment: therapy treatment to continue next visit.  Planned interventions for next visit: continue with current treatment and therapeutic exercise (CPT 32125)

## 2021-11-04 NOTE — OP THERAPY DAILY TREATMENT
Outpatient Physical Therapy  DAILY TREATMENT     [address]  [address]  [place] [address]  Phone:  [phone]  Fax:  [phone]    Date: 11/04/2021    Patient: Rey Medina  YOB: 1995  MRN: [id number]     Time Calculation    Start time: 1315  Stop time: 1359 Time Calculation (min): 44 minutes         Chief Complaint: Difficulty Walking    Visit #: 19    SUBJECTIVE:  Patient present with father; no new complaints. States that he started using his computer for shopping and was excited about the new activity in his life.     OBJECTIVE:  Current objective measures:           Therapeutic Exercises (CPT 22316):     1. Seated on dynadisc at edge of mat, accepting perturbations at trunk ->reach outside of SHAHEEN->horizontal shoulder abduction ->D2 shoulder flexion , All activities performed with SBA-CGA and cues for upright posture. Green TB used for UE strengthening exercises     2. Quadruped , Crawling forward and backward -> alt UE lifts -> alt LE lifts 2 x 5, Able to assume quadruped with supervision, crawling required SBA, and alt. LE lift required CGA-Del.     Therapeutic Treatments and Modalities:     1. Gait Training (CPT 20090), Gait with tall rolling walker 1 x 157 feet, 1 x 213 feet with CGA-Del. One episode of poor L foot clearance required ModA to prevent LOB, Cues for keeping body stacked over feet and keeping steps slightly wider to avoid scissoring. Patient inconsistent with stride length and cues also provided to improve his awareness of ideal foot placement.     Time-based treatments/modalities:    Physical Therapy Timed Treatment Charges  Gait training minutes (CPT 47397): 14 minutes  Therapeutic exercise minutes (CPT 50704): 30 minutes      ASSESSMENT:   Response to treatment: Continued to challenge trunk stabilization prior to working on walking (patient's goal). Ataxic movements continue to limit patient's independence with  gait, but supportive walker allowed patient to perform gait with decreased need for physical assistance and challenged his LE proprioception.     PLAN/RECOMMENDATIONS:   Plan for treatment: therapy treatment to continue next visit.  Planned interventions for next visit: continue with current treatment.

## 2021-11-09 ENCOUNTER — APPOINTMENT (OUTPATIENT)
Dept: SPEECH THERAPY | Facility: REHABILITATION | Age: 26
End: 2021-11-09
Attending: FAMILY MEDICINE
Payer: MEDICARE

## 2021-11-09 ENCOUNTER — APPOINTMENT (OUTPATIENT)
Dept: OCCUPATIONAL THERAPY | Facility: REHABILITATION | Age: 26
End: 2021-11-09
Attending: FAMILY MEDICINE
Payer: MEDICARE

## 2021-11-11 ENCOUNTER — APPOINTMENT (OUTPATIENT)
Dept: OCCUPATIONAL THERAPY | Facility: REHABILITATION | Age: 26
End: 2021-11-11
Attending: FAMILY MEDICINE
Payer: MEDICARE

## 2021-11-11 ENCOUNTER — TELEPHONE (OUTPATIENT)
Dept: OCCUPATIONAL THERAPY | Facility: REHABILITATION | Age: 26
End: 2021-11-11

## 2021-11-11 ENCOUNTER — APPOINTMENT (OUTPATIENT)
Dept: SPEECH THERAPY | Facility: REHABILITATION | Age: 26
End: 2021-11-11
Attending: FAMILY MEDICINE
Payer: MEDICARE

## 2021-11-11 NOTE — OP THERAPY DISCHARGE SUMMARY
Outpatient Occupational Therapy  DISCHARGE SUMMARY NOTE    [facility] Therapy [place] [address]  [address]  [facility] [address]  Phone:  [phone]  Fax:  [phone]    Date of Visit: 11/11/2021    Patient: Rey Medina  YOB: 1995  MRN: [id number]     Referring Provider: No referring provider defined for this encounter.   Referring Diagnosis: No admission diagnoses are documented for this encounter.         Functional Assessment Used        Your patient is being discharged from Occupational Therapy with the following comments:   · Patient's mother decided to end both OT and ST therapy at this time    Comments:  Patient was continuing to progress in OT with increased hand function and recent progress note with added goals of increasing static standing balance and endurance for LB clothing management; however therapy has been cancelled until further notice    Limitations Remaining:  Impaired ADL function. Ataxic movement during transfers and mobility with AE.    Recommendations:  Continue HEP    KITTY Moreau/L    Date: 11/11/2021

## 2021-11-16 ENCOUNTER — APPOINTMENT (OUTPATIENT)
Dept: SPEECH THERAPY | Facility: REHABILITATION | Age: 26
End: 2021-11-16
Attending: FAMILY MEDICINE
Payer: MEDICARE

## 2021-11-16 ENCOUNTER — PHYSICAL THERAPY (OUTPATIENT)
Dept: PHYSICAL THERAPY | Facility: REHABILITATION | Age: 26
End: 2021-11-16
Attending: FAMILY MEDICINE
Payer: MEDICARE

## 2021-11-16 ENCOUNTER — APPOINTMENT (OUTPATIENT)
Dept: OCCUPATIONAL THERAPY | Facility: REHABILITATION | Age: 26
End: 2021-11-16
Attending: FAMILY MEDICINE
Payer: MEDICARE

## 2021-11-16 DIAGNOSIS — R26.2 DIFFICULTY IN WALKING, NOT ELSEWHERE CLASSIFIED: ICD-10-CM

## 2021-11-16 DIAGNOSIS — R26.89 OTHER ABNORMALITIES OF GAIT AND MOBILITY: ICD-10-CM

## 2021-11-16 DIAGNOSIS — I61.5 NONTRAUMATIC INTRAVENTRICULAR INTRACEREBRAL HEMORRHAGE, UNSPECIFIED LATERALITY (HCC): ICD-10-CM

## 2021-11-16 PROCEDURE — 97116 GAIT TRAINING THERAPY: CPT

## 2021-11-16 PROCEDURE — 97112 NEUROMUSCULAR REEDUCATION: CPT

## 2021-11-16 NOTE — OP THERAPY PROGRESS SUMMARY
Outpatient Physical Therapy  PROGRESS SUMMARY NOTE      [facility] Physical Therapy [facility]  [address]  [address]  [place] [address]  Phone:  [phone]  Fax:  [phone]    Date of Visit: 11/16/2021    Patient: Rey Medina  YOB: 1995  MRN: [id number]     Referring Provider: Sherie Power M.D.  [address] [facility]  [address]  [place] [address]   Referring Diagnosis Difficulty in walking, not elsewhere classified [R26.2];Other abnormalities of gait and mobility [R26.89]     Visit Diagnoses     ICD-10-CM   1. Difficulty in walking, not elsewhere classified  R26.2   2. Nontraumatic intraventricular intracerebral hemorrhage, unspecified laterality ([facility])  I61.5   3. Other abnormalities of gait and mobility  R26.89       Rehab Potential: good    Progress Report Period: 9/28/2021 - 11/16/2021    Functional Assessment Used          Objective Findings and Assessment:   Patient progression towards goals: Patient has been seen for a total of 20 visits since evaluation. He has demonstrated increased trunk stability as noted with ability to perform increased ambulation with platform rolling walker as well as improved ability to complete static standing without external support. Both of these indicate progression toward goals of increased independence and ambulation.     Progress toward specific set goals:  1. Pt will require CGA/SBA for SPT with UE support at transfer pole or bar. - MET, DISCONTINUE  2. Pt will perform gait with tall 4WW with one person assist, x25 feet.- MET, DISCONTINUE  3. Pt will be able to demonstrate tall kneeling indep in order to demonstrate incr core strength.- PARTIALLY MET, CONTINUE.  4. Pt will demonstrate safe transfers with SPV and LRAD in order to increase independence and decrease caregiver burden.- INCONSISTENTLY MET, CONTINUE.  5. Pt will perform STS with LRD with adequate control on descent with SBA.- NOT MET, CONTINUE.  6. Pt will ambulate 100 ft with LRAD and  Mikhail in order to increase independence. - MET, CONTINUE.  7. Pt will misty unsupported standing x1 min with SBA.- NOT MET, CONTINUE.    Objective findings and assessment details: STS: from EOM with BUE use with CGA.  5 times STS= 37.8 seconds from EOM with CGA- Mikhail, with BUE use. Forward LOB x 2.   Static unsupported standing (CGA at gait belt) x1min.  Requires cues occasionally for correcting leaning out of SHAHEEN.   Static unsupported standing with SBA: 18.6 seconds without assistance but right lateral trunk lean.    Goals:   Short Term Goals:   1. Pt will be able to demonstrate tall kneeling indep x 1 minute in order to demonstrate incr core strength.  2. Pt and family will consistently perform HEP for strengthening and walking daily.   3. Pt will demonstrate safe transfers with SPV and LRAD in order to increase independence and decrease caregiver burden.     Short term goal time span:  2-4 weeks      Long Term Goals:    1. Pt will perform STS with LRD with adequate control on descent with SBA.  2. Pt will ambulate 100 ft with LRAD and CGA in order to increase independence.   3. Pt will misty unsupported standing x1 min with SBA.    Long term goal time span:  6-8 weeks    Plan:   Planned therapy interventions:  Gait Training (CPT 61760), Neuromuscular Re-education (CPT 38093) and Therapeutic Exercise (CPT 09507)  Frequency:  2x week  Duration in weeks:  8      Referring provider co-signature:  I have reviewed this plan of care and my co-signature certifies the need for services.     Certification Period: 11/16/2021 to 01/11/22    Physician Signature: ________________________________ Date: ______________

## 2021-11-16 NOTE — OP THERAPY DAILY TREATMENT
Outpatient Physical Therapy  DAILY TREATMENT     [facility] [address]  [address]  [place] [address]  Phone:  [phone]  Fax:  [phone]    Date: 11/16/2021    Patient: Rey Medina  YOB: 1995  MRN: [id number]     Time Calculation    Start time: 1225  Stop time: 1315 Time Calculation (min): 50 minutes         Chief Complaint: Difficulty Walking and Loss Of Balance    Visit #: 20    SUBJECTIVE:  Patient and father present for session. Both report their goals are to increase independence and walk. Patient is currently using PWC at home as it offers him the most independence. Although he does have a manual wheelchair at home. Continues to require physical assistance to perform sit to stand, toilet transfers (PWC doesn't fit into bathroom), preparing food, showering/bathing, and walking. Does have a large platform rolling walker at home, which he primarily uses outside. He has not performed outdoor walking recently because of smoke in the summer and now it is getting too cold.     Current HEP includes two sessions of exercises. Morning routine: floor exercises - bridge, tall kneeling, quadruped. Afternoon routine: lateral walking, standing x 15 mintues, squatting.     OBJECTIVE:  Current objective measures:   STS: from EOM with BUE use with CGA.  5 times STS= 37.8 seconds from EOM with CGA- Jeremy, with BUE use. Forward LOB x 2.   Static unsupported standing (CGA at gait belt) x1min.  Requires cues occasionally for correcting leaning out of SHAHEEN.   Static unsupported standing with SBA: 18.6 seconds without assistance but right lateral trunk lean.           Therapeutic Treatments and Modalities:     1. Neuromuscular Re-education (CPT 92537), Re-assessment testing , See above     3. Gait Training (CPT 54056), Gait with tall rolling walker 1 x 300 feet with one seated rest break., Performed with CGA-Jeremy for balance and walker management. Cues for keeping  feet under body vs. at front of walker as well as increased space between feet to decrease frequency of scissoring.     Therapeutic Treatment and Modalities Summary: Instructed on performing increased walking at home as part of HEP indoors even if that means walking forward and backward in short hallway. Patient and father agreeable.     Time-based treatments/modalities:    Physical Therapy Timed Treatment Charges  Gait training minutes (CPT 59514): 15 minutes  Neuromusc re-ed, balance, coor, post minutes (CPT 02925): 35 minutes      ASSESSMENT:   Response to treatment: Patient has demonstrated increased trunk stability as noted with ability to perform increased ambulation with platform rolling walker. Also noted improved ability to complete static standing without external support. He would benefit from continued trunk stabilization and working toward goal of increased independence.     PLAN/RECOMMENDATIONS:   Plan for treatment: therapy treatment to continue next visit.  Planned interventions for next visit: continue with current treatment.

## 2021-11-18 ENCOUNTER — APPOINTMENT (OUTPATIENT)
Dept: SPEECH THERAPY | Facility: REHABILITATION | Age: 26
End: 2021-11-18
Attending: FAMILY MEDICINE
Payer: MEDICARE

## 2021-11-18 ENCOUNTER — APPOINTMENT (OUTPATIENT)
Dept: OCCUPATIONAL THERAPY | Facility: REHABILITATION | Age: 26
End: 2021-11-18
Attending: FAMILY MEDICINE
Payer: MEDICARE

## 2021-11-23 ENCOUNTER — APPOINTMENT (OUTPATIENT)
Dept: OCCUPATIONAL THERAPY | Facility: REHABILITATION | Age: 26
End: 2021-11-23
Attending: FAMILY MEDICINE
Payer: MEDICARE

## 2021-11-23 ENCOUNTER — APPOINTMENT (OUTPATIENT)
Dept: SPEECH THERAPY | Facility: REHABILITATION | Age: 26
End: 2021-11-23
Attending: FAMILY MEDICINE
Payer: MEDICARE

## 2021-11-24 ENCOUNTER — PHYSICAL THERAPY (OUTPATIENT)
Dept: PHYSICAL THERAPY | Facility: MEDICAL CENTER | Age: 26
End: 2021-11-24
Attending: FAMILY MEDICINE
Payer: MEDICARE

## 2021-11-24 DIAGNOSIS — I61.5 NONTRAUMATIC INTRAVENTRICULAR INTRACEREBRAL HEMORRHAGE, UNSPECIFIED LATERALITY (HCC): ICD-10-CM

## 2021-11-24 DIAGNOSIS — R26.2 DIFFICULTY IN WALKING, NOT ELSEWHERE CLASSIFIED: ICD-10-CM

## 2021-11-24 PROCEDURE — 97112 NEUROMUSCULAR REEDUCATION: CPT

## 2021-11-24 PROCEDURE — 97110 THERAPEUTIC EXERCISES: CPT

## 2021-11-24 NOTE — OP THERAPY DAILY TREATMENT
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 He has been seen at another clinic within our system but is transitioning to [facility] due to logistics. He is accompanied by his father Yolanda. He has most trouble with coordination in standing and gravity challenged positions.     OBJECTIVE:  Current objective measures:  From Progress note 11/16/21  STS: from EOM with BUE use with CGA.  5 times STS= 37.8 seconds from EOM with CGA- Jeremy, with BUE use. Forward LOB x 2.   Static unsupported standing (CGA at gait belt) x1min.  Requires cues occasionally for correcting leaning out of SHAHEEN.   Static unsupported standing with SBA: 18.6 seconds without assistance but right lateral trunk lean.            Therapeutic Exercises (CPT 31652):     1. Seated MMT to UE and LE, Good strength, L arm weaker then R    2. Bed mobilty to back and prone, 3 trials, needs close guard needed     3. Supine ball exercises , HS curl, trunk rotation, bridge on ball and bridge with hs curl , 10 reps each     4. Dead bug, 5 reps holding for 10 sec    5. Prone to quadruped transition , 5 reps    6. Quadruped bird dog progression, hand only x 10, legs only x 10, cannot perfrom arm and leg     8. Sit to stand at table with close guard, x 10,     9. Standing balance with walker and without, Able to make 1 min with walker with 1 x LOB, Only able to balance 20 sec without walker without guard for midline  stability     Therapeutic Treatments and Modalities:     1. Neuromuscular Re-education (CPT 06106), Ankle strategies, 4 way shift, small keeping LOS, working towards larger , Hip strategies , knee bend reaching forward, not able to do this smoothly with without LOB     Time-based treatments/modalities:    Physical Therapy Timed Treatment Charges  Neuromusc re-ed, balance, coor, post minutes (CPT 35399): 15 minutes  Therapeutic exercise minutes (CPT 55235): 30 minutes          ASSESSMENT:   Response to treatment: He works very hard with the goal to become safer with ambulation with LRAD. He shows some good strength and ability to follow directions. He struggles with keeping LOS and correcting LOB.     PLAN/RECOMMENDATIONS:   Plan for treatment: therapy treatment to continue next visit.  Planned interventions for next visit: continue with current treatment.

## 2021-11-30 ENCOUNTER — APPOINTMENT (OUTPATIENT)
Dept: PHYSICAL THERAPY | Facility: REHABILITATION | Age: 26
End: 2021-11-30
Attending: FAMILY MEDICINE
Payer: MEDICARE

## 2021-11-30 ENCOUNTER — APPOINTMENT (OUTPATIENT)
Dept: OCCUPATIONAL THERAPY | Facility: REHABILITATION | Age: 26
End: 2021-11-30
Attending: FAMILY MEDICINE
Payer: MEDICARE

## 2021-11-30 ENCOUNTER — PHYSICAL THERAPY (OUTPATIENT)
Dept: PHYSICAL THERAPY | Facility: MEDICAL CENTER | Age: 26
End: 2021-11-30
Attending: FAMILY MEDICINE
Payer: MEDICARE

## 2021-11-30 DIAGNOSIS — R26.2 DIFFICULTY IN WALKING, NOT ELSEWHERE CLASSIFIED: ICD-10-CM

## 2021-11-30 DIAGNOSIS — I61.5 NONTRAUMATIC INTRAVENTRICULAR INTRACEREBRAL HEMORRHAGE, UNSPECIFIED LATERALITY (HCC): ICD-10-CM

## 2021-11-30 PROCEDURE — 97110 THERAPEUTIC EXERCISES: CPT

## 2021-11-30 PROCEDURE — 97112 NEUROMUSCULAR REEDUCATION: CPT

## 2021-11-30 NOTE — OP THERAPY DAILY TREATMENT
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 He has been seen at another clinic within our system but is transitioning to [facility] due to logistics. He is accompanied by his father Yolanda. He has most trouble with coordination in standing and gravity challenged positions.     OBJECTIVE:  Current objective measures:  From Progress note 11/16/21  STS: from EOM with BUE use with CGA.  5 times STS= 37.8 seconds from EOM with CGA- Jeremy, with BUE use. Forward LOB x 2.   11/30/2021: Static unsupported standing (CGA at gait belt) x1min.  Requires cues occasionally for correcting leaning out of SHAHEEN.    11/30/2021: Static unsupported standing with SBA: 21.2 seconds without assistance but significant sway...good carry over from prior treatment  Poor eccentric control, max verbal cues to control descent with sitting and returning to prone from quad              Therapeutic Exercises (CPT 47912):     2. Bed mobilty to back and prone, 3 trials, needs close guard needed     3. Supine ball exercises , HS curl, trunk rotation, bridge on ball and bridge with hs curl , 10 reps each     4. Dead bug, 5 reps holding for 10 sec, difficult time having     5. Prone to quadruped transition , 5 reps    6. Quadruped bird dog progression, hand only x 10, legs only x 10, cannot perfrom arm and leg     8. Sit to stand at table with close guard, x 10,     9. Standing balance with walker and  without, Able to make 1 min with walker with 1 x LOB, Only able to balance 20 sec without walker without guard for midline stability     11. Standing marches in walker, 3x10 marches, sit to stand with with use of FWW to maintain upright balance    Therapeutic Treatments and Modalities:     1. Neuromuscular Re-education (CPT 31964), Ankle strategies, 4 way shift, small keeping LOS, working towards larger , Hip strategies , knee bend reaching forward, not able to do this smoothly with without LOB , seated posture/core, maintainig upright posture with alternating high fives    4. Gait Training (CPT 61637), gait mechanics, ambulation with mod/max A 2x20 ft, Cues to maintain feet in walker and prevent loss of balance backwards    Time-based treatments/modalities:    Physical Therapy Timed Treatment Charges  Gait training minutes (CPT 04295): 5 minutes  Neuromusc re-ed, balance, coor, post minutes (CPT 08589): 25 minutes  Therapeutic exercise minutes (CPT 68198): 15 minutes          ASSESSMENT:   Response to treatment: He works very hard with the goal to become safer with ambulation with LRAD. He shows some good strength and ability to follow directions. He struggles with keeping LOS and correcting LOB. Walked about 40 ft today with mod to max A and FWW; tendency to lean back and step beyond walker, max verbal cues to correct upright posture and maintain center of gravity over feet to prevent loss of balance. Potential for being a functional ambulator is low and focus on functional independence from wheelchair level.     PLAN/RECOMMENDATIONS:   Plan for treatment: therapy treatment to continue next visit.  Planned interventions for next visit: continue with current treatment.

## 2021-12-01 ENCOUNTER — TELEPHONE (OUTPATIENT)
Dept: SPEECH THERAPY | Facility: REHABILITATION | Age: 26
End: 2021-12-01

## 2021-12-01 NOTE — OP THERAPY DISCHARGE SUMMARY
Outpatient Speech Language Pathology  DISCHARGE SUMMARY NOTE      [facility] Speech Language Pathology [facility]  [address]  [address]  [facility] [address]  Phone:  [phone]  Fax:  [phone]        Patient Name:  Rey Medina  :  1995  MR#:  [id number]    Referring Provider: Sherie Power M.D.  [address]  [place] [address]   Referring Diagnosis Dysarthria and anarthria [R47.1];Cognitive communication deficit [R41.841]            Your patient is being discharged from Speech therapy with the following comments:  Goals Partially Met, Pt. cancelled or missed more than 2 scheduled appointments/non-compliant, Pt. has failed to schedule or reschedule follow up visits and patient's mother requested discharge at this time.      Comments: Rey has been making continued progress.  When able, continued ST would likely benefit continues cognitive linguistic and speech functions.    Thanks for the referral.        Saray Islas, SLP

## 2021-12-02 ENCOUNTER — APPOINTMENT (OUTPATIENT)
Dept: PHYSICAL THERAPY | Facility: REHABILITATION | Age: 26
End: 2021-12-02
Attending: FAMILY MEDICINE
Payer: MEDICARE

## 2021-12-02 ENCOUNTER — APPOINTMENT (OUTPATIENT)
Dept: OCCUPATIONAL THERAPY | Facility: REHABILITATION | Age: 26
End: 2021-12-02
Attending: FAMILY MEDICINE
Payer: MEDICARE

## 2021-12-02 ENCOUNTER — PHYSICAL THERAPY (OUTPATIENT)
Dept: PHYSICAL THERAPY | Facility: MEDICAL CENTER | Age: 26
End: 2021-12-02
Attending: FAMILY MEDICINE
Payer: MEDICARE

## 2021-12-02 DIAGNOSIS — I61.5 NONTRAUMATIC INTRAVENTRICULAR INTRACEREBRAL HEMORRHAGE, UNSPECIFIED LATERALITY (HCC): ICD-10-CM

## 2021-12-02 PROCEDURE — 97110 THERAPEUTIC EXERCISES: CPT | Mod: KX

## 2021-12-02 PROCEDURE — 97112 NEUROMUSCULAR REEDUCATION: CPT | Mod: KX

## 2021-12-02 NOTE — OP THERAPY DAILY TREATMENT
Outpatient Physical Therapy  DAILY TREATMENT     [facility] Outpatient Physical Therapy  [address]  [place] [address]  Phone:  [phone]  Fax:  [phone]    Date: 12/02/2021    Patient: Rey Medina  YOB: 1995  MRN: [id number]     Time Calculation    Start time: 0828  Stop time: 0913 Time Calculation (min): 45 minutes         Chief Complaint: No chief complaint on file.    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 He is accompanied by his father Yolanda. He has most trouble with coordination in standing and gravity challenged positions.   He reports being tired following the last visit.     OBJECTIVE:  Current objective measures:  From Progress note 11/16/21  STS: from EOM with BUE use with CGA.  5 times STS= 37.8 seconds from EOM with CGA- Jeremy, with BUE use. Forward LOB x 2.   11/30/2021: Static unsupported standing (CGA at gait belt) x1min.  Requires cues occasionally for correcting leaning out of SHAHEEN.    11/30/2021: Static unsupported standing with SBA: 21.2 seconds without assistance but significant sway...good carry over from prior treatment  Poor eccentric control, max verbal cues to control descent with sitting and returning to prone from quad  Low tone throughout              Therapeutic Exercises (CPT 98052):     1. NuStep, 5 mins, level 4    2. Bed mobilty to back and prone, 3 trials, needs close guard needed     3. Supine ball exercises , HS curl, trunk rotation, bridge on ball and bridge with hs curl , 10 reps each     4. Dead bug, 5 reps holding for 10 sec, difficult time having     5. Supine -> prone -> quad -> prone ->supine, 5 reps    6. Quadruped bird dog progression, hand only x 10, legs only x 10, cannot perfrom arm and leg     7. Wheelchair to floor transfer, x1    8. Floor to mat transfer, x3    10. Mat to wheelchair transfer,  x3    Therapeutic Treatments and Modalities:     1. Neuromuscular Re-education (CPT 51509), crawling, maintaing strong core, coordination, retrieving an object and returning to mat, seated posture/core, maintainig upright posture with alternating high fives    Time-based treatments/modalities:    Physical Therapy Timed Treatment Charges  Neuromusc re-ed, balance, coor, post minutes (CPT 55948): 33 minutes  Therapeutic exercise minutes (CPT 00314): 12 minutes          ASSESSMENT:   Response to treatment: He works very hard with the goal to become safer with independent mobility. He shows some good strength and ability to follow directions. He struggles with keeping LOS and correcting LOB. Lengthy discussion with akira and Rey about functional ambulation potentials and focus shifting to crawling and more functional lateral transfers. Rey and akira are on board with this plan.  Potential for being a functional ambulator is low and focus should be on functional independence from wheelchair level. Plan for next visit is more lateral transfers. Patient was very fatigued following treatment today and independent movements started to become unsafe.    PLAN/RECOMMENDATIONS:   Plan for treatment: therapy treatment to continue next visit.  Planned interventions for next visit: continue with current treatment.

## 2021-12-07 ENCOUNTER — PHYSICAL THERAPY (OUTPATIENT)
Dept: PHYSICAL THERAPY | Facility: MEDICAL CENTER | Age: 26
End: 2021-12-07
Attending: FAMILY MEDICINE
Payer: MEDICARE

## 2021-12-07 DIAGNOSIS — R26.2 DIFFICULTY IN WALKING, NOT ELSEWHERE CLASSIFIED: ICD-10-CM

## 2021-12-07 DIAGNOSIS — R26.89 OTHER ABNORMALITIES OF GAIT AND MOBILITY: ICD-10-CM

## 2021-12-07 DIAGNOSIS — I61.5 NONTRAUMATIC INTRAVENTRICULAR INTRACEREBRAL HEMORRHAGE, UNSPECIFIED LATERALITY (HCC): ICD-10-CM

## 2021-12-07 PROCEDURE — 97112 NEUROMUSCULAR REEDUCATION: CPT

## 2021-12-07 PROCEDURE — 97110 THERAPEUTIC EXERCISES: CPT

## 2021-12-07 NOTE — OP THERAPY DAILY TREATMENT
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 He is accompanied by his father Yolanda.     OBJECTIVE:  Current objective measures           Therapeutic Exercises (CPT 48855):     1. NuStep, 5 mins, level 4, Seat and UE level 8    3. Sit to stand , Bench and trx for assist, 2 x 10, with gait belt and assist for balance     5. Table work , Sit sitting scoot around table x 2 laps , Needs SBA and CGA x 2 for corners and potential LOB, Supine to quad positon x 3 , Again needs SBA for safety, Quad to tall kneeling x 15 reps , Requires CGA for stability, Mod ball roll out tall kneeling with yellow ball, needs mod assist to prevent from rolling lateral and falling     10. Walking with walker 20 feet between equipment , Needs Mod to max assist to prevent falling, steady walker , Static balance is fair and can weightbear but for him to initiate movement he will have LOB and require assist.     12. Seated hip hinge at table , Empty, 5# lift, 7.5# lift x 10 each , Trunk rotation 5#/7.5# x 10 reps     15. Seated ball toss, Sitting EOB x 15 reps, sitting on dynadisk x 15 reps , on disk needs constant CGA to prevent falling      Time-based treatments/modalities:    Physical Therapy Timed Treatment Charges  Neuromusc re-ed, balance, coor, post minutes (CPT 95571): 15 minutes  Therapeutic exercise minutes (CPT 87857): 30 minutes          ASSESSMENT:   Response to  treatment With previous discussion with last therapist it seems like likely that he will be a functional and safe Ambulator. Focus has been more on transfer, sitting balance. His strength is good but his ataxia is so strong that it constantly throws him off balance. Will trial some time on the solo step to see if he can show some control with assist.     PLAN/RECOMMENDATIONS:   Plan for treatment: therapy treatment to continue next visit.  Planned interventions for next visit: continue with current treatment.

## 2021-12-08 ENCOUNTER — OFFICE VISIT (OUTPATIENT)
Dept: PHYSICAL MEDICINE AND REHAB | Facility: REHABILITATION | Age: 26
End: 2021-12-08
Payer: MEDICARE

## 2021-12-08 VITALS
HEART RATE: 82 BPM | SYSTOLIC BLOOD PRESSURE: 108 MMHG | TEMPERATURE: 98.3 F | DIASTOLIC BLOOD PRESSURE: 70 MMHG | OXYGEN SATURATION: 95 % | RESPIRATION RATE: 16 BRPM

## 2021-12-08 DIAGNOSIS — R47.1 DYSARTHRIA: ICD-10-CM

## 2021-12-08 DIAGNOSIS — Z86.73 HISTORY OF STROKE: Primary | ICD-10-CM

## 2021-12-08 DIAGNOSIS — R41.89 IMPAIRED COGNITION: ICD-10-CM

## 2021-12-08 DIAGNOSIS — R47.01 APHASIA: ICD-10-CM

## 2021-12-08 DIAGNOSIS — H53.9 VISION CHANGES: ICD-10-CM

## 2021-12-08 PROCEDURE — 99214 OFFICE O/P EST MOD 30 MIN: CPT | Performed by: PHYSICAL MEDICINE & REHABILITATION

## 2021-12-08 NOTE — LETTER
DOS: 12/8/2021  Re: Rey Medina    To Whom It May Concern,     Rey Medina has been a patient of mine and is under my professional care. He meets the criteria and definition of disabled under the Americans with disabilities act (ADA). I am aware of Rey's medical history and his limitations due to his condition.    As a result of Rey's disability, he has certain limitations in physical function, social interaction. Therefore, to assist Rey with these difficulties and for his overall wellbeing I am recommending an emotional support animal to assist him.    As a medical professional, I am well aware of the significant benefits and emotional support animal can provide for patient such as Rey. Please allow Rey to obtain an emotional support animal.    Dr. Mandy Hankins DO, MS  Department of Physical Medicine & Rehabilitation  [facility]  Phone: [phone]  Fax: [phone]

## 2021-12-09 ENCOUNTER — PHYSICAL THERAPY (OUTPATIENT)
Dept: PHYSICAL THERAPY | Facility: MEDICAL CENTER | Age: 26
End: 2021-12-09
Attending: FAMILY MEDICINE
Payer: MEDICARE

## 2021-12-09 DIAGNOSIS — R26.2 DIFFICULTY IN WALKING, NOT ELSEWHERE CLASSIFIED: ICD-10-CM

## 2021-12-09 DIAGNOSIS — R26.89 OTHER ABNORMALITIES OF GAIT AND MOBILITY: ICD-10-CM

## 2021-12-09 DIAGNOSIS — I61.5 NONTRAUMATIC INTRAVENTRICULAR INTRACEREBRAL HEMORRHAGE, UNSPECIFIED LATERALITY (HCC): ICD-10-CM

## 2021-12-09 PROCEDURE — 97112 NEUROMUSCULAR REEDUCATION: CPT

## 2021-12-09 PROCEDURE — 97110 THERAPEUTIC EXERCISES: CPT

## 2021-12-09 NOTE — OP THERAPY DAILY TREATMENT
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 He is accompanied by his father Yolanda. Agreeable to therapy, no status change.      OBJECTIVE:  Current objective measures           Therapeutic Exercises (CPT 97987):     1. NuStep, 5 mins, level 4, Seat and UE level 8    2. Gastroc stretch , slant board , 2 mins     3. Squats using p bar to target , low bench and 1 ait ext pad, Very UE dominnat    Therapeutic Treatments and Modalities:     1. Neuromuscular Re-education (CPT 63828), P bar work , Walking foward and backwards x 5 reps, need cga and min assist , Standing kicking ball x 5 mins , alt kicking apporx 75% accuracy of contact, no smooth , Solo step walking 20 feet several trials for 15 mins , Needs assist to ground walker, support post shifts backwards    Time-based treatments/modalities:    Physical Therapy Timed Treatment Charges  Neuromusc re-ed, balance, coor, post minutes (CPT 13854): 35 minutes  Therapeutic exercise minutes (CPT 87233): 20 minutes          ASSESSMENT:   Response to treatment: He works so hard during therapy. He shows good strength but his smooth coordination control is very poor. With practice in the clinic he shows some short term improvement in skill. It is yet to be seen if this translates to sustained improvement       PLAN/RECOMMENDATIONS:   Plan for treatment: therapy treatment to continue next visit.  Planned interventions for next visit:  continue with current treatment.

## 2021-12-09 NOTE — PROGRESS NOTES
[facility]  [facility]  [address] [place] [address]  Ph: [phone]    FOLLOW UP PATIENT EVALUATION    Patient Name: Rey Medina   Patient : 1995  Patient Age: 25 y.o.   PCP: Nirmala Man M.D.    Examining Physician: Dr. 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   - Currently on Propranolol 10mg BID.   -Patient is doing better every day.  -Has a Twitter account in which he is telling his story.  -Does complain of vision issues.  States that his vision feels as though he is dizzy but he is not.  Denies vertigo symptoms denies blurriness, denies double vision but states that his vision is like when you cross your eyes.  Received new prescription from neuro-ophthalmology in October.    Review of Systems:  All other pertinent positive review of systems are noted above in HPI.     Past Medical History:  Past Medical History:   Diagnosis Date   • COVID-19    • ICH (intracerebral hemorrhage) (HCC)    • Stroke (HCC)       Past Surgical History:   Procedure Laterality Date   • ANAL FISTULOTOMY     • PEG PLACEMENT     • TRACHEOSTOMY     •  SHUNT INSERTION          Current Outpatient Medications:   •  NON SPECIFIED, 1 ensure 1 time daily, Disp: 60 Each, Rfl: 11  •  polyethylene glycol 3350 (MIRALAX) 17 GM/SCOOP Powder, DISSOLVE 17 GRAMS IN LIQUID AND DRINK ONCE DAILY AS NEEDED FOR CONSTIPATION, Disp: 510 g, Rfl: 5  •  magnesium hydroxide (MILK OF MAGNESIA) 400 MG/5ML Suspension, Take 30 mL by  mouth 1 time a day as needed., Disp: , Rfl:   •  mirtazapine (REMERON) 30 MG Tab tablet, Take 1 tablet by mouth at bedtime., Disp: 90 tablet, Rfl: 1  •  NON SPECIFIED, 4 cans of beneprotein (227g per can) per month, Disp: 4 Each, Rfl: 11  •  Omega-3 Fatty Acids (OMEGA 3 500) 500 MG Cap, Take 1 Cap by mouth every day. Indications: Dietary Deficiency, Disp: , Rfl:   •  Magnesium 400 MG Cap, Take 1 Cap by mouth every day. Indications: Malnutrition, Disp: , Rfl:   •  RA TURMERIC EXTRA STRENGTH PO, Take 1,000 mg by mouth every day. Indications: supplement, Disp: , Rfl:   •  Cholecalciferol (VITAMIN D3) 2000 UNIT Cap, Take 2,000 Units by mouth every day., Disp: , Rfl:   •  non-formulary med, Inhale 1 Inhalation 2 times a day as needed (shortness of breath). Amborxol HCL Mucosolvan  Indications: shorness of breath, Disp: , Rfl:   •  non-formulary med, Take 1 Dose Pack by mouth every day. Emergen C, Disp: , Rfl:   •  Cyanocobalamin (B-12 PO), Take 1 Tab by mouth every morning., Disp: , Rfl:   •  Misc. Devices Misc, Incontinence supplies - adult diapers. Length of time needed - lifetime or 999 months., Disp: 999 Each, Rfl: 999  •  montelukast (SINGULAIR) 10 MG Tab, Take 1 Tab by mouth as needed (to reduce amount of pills needing to be taken daily ). (Patient taking differently: Take 10 mg by mouth 1 time a day as needed (to reduce amount of pills needing to be taken daily).), Disp: 90 Tab, Rfl: 2  Allergies   Allergen Reactions   • Vancomycin Swelling     Lips  With red face and neck   • Other Misc Rash     Allergic to name band.        Past Social History:  Social History     Socioeconomic History   • Marital status: Single     Spouse name: Not on file   • Number of children: Not on file   • Years of education: Not on file   • Highest education level: Some college, no degree   Occupational History   • Not on file   Tobacco Use   • Smoking status: Never Smoker   • Smokeless tobacco: Never Used   Vaping Use   • Vaping Use:  Never used   Substance and Sexual Activity   • Alcohol use: Never   • Drug use: Never   • Sexual activity: Not Currently   Other Topics Concern   •  Service No   • Blood Transfusions No   • Caffeine Concern No   • Occupational Exposure No   • Hobby Hazards No   • Sleep Concern No   • Stress Concern No   • Weight Concern No   • Special Diet Yes   • Back Care No   • Exercise Yes   • Bike Helmet No   • Seat Belt Yes   • Self-Exams No   Social History Narrative    Lives with mom     Social Determinants of Health     Financial Resource Strain:    • Difficulty of Paying Living Expenses: Not on file   Food Insecurity:    • Worried About Running Out of Food in the Last Year: Not on file   • Ran Out of Food in the Last Year: Not on file   Transportation Needs:    • Lack of Transportation (Medical): Not on file   • Lack of Transportation (Non-Medical): Not on file   Physical Activity:    • Days of Exercise per Week: Not on file   • Minutes of Exercise per Session: Not on file   Stress:    • Feeling of Stress : Not on file   Social Connections:    • Frequency of Communication with Friends and Family: Not on file   • Frequency of Social Gatherings with Friends and Family: Not on file   • Attends Tenriism Services: Not on file   • Active Member of Clubs or Organizations: Not on file   • Attends Club or Organization Meetings: Not on file   • Marital Status: Not on file   Intimate Partner Violence:    • Fear of Current or Ex-Partner: Not on file   • Emotionally Abused: Not on file   • Physically Abused: Not on file   • Sexually Abused: Not on file   Housing Stability:    • Unable to Pay for Housing in the Last Year: Not on file   • Number of Places Lived in the Last Year: Not on file   • Unstable Housing in the Last Year: Not on file        Family History:  Family History   Problem Relation Age of Onset   • Hypertension Mother    • Glasses Mother    • Thyroid Mother    • Diabetes Maternal Grandfather    • Stroke Maternal  Grandfather    • Diabetes Paternal Grandmother    • Hypertension Maternal Uncle    • Heart Disease Neg Hx    • Cancer Neg Hx        Depression and Opioid Screening  PHQ-9:  Depression Screen (PHQ-2/PHQ-9) 4/15/2021 6/15/2021 7/15/2021   PHQ-2 Total Score - - -   PHQ-2 Total Score 0 0 0   PHQ-9 Total Score - - -     Interpretation of PHQ-9 Total Score   Score Severity   1-4 No Depression   5-9 Mild Depression   10-14 Moderate Depression   15-19 Moderately Severe Depression   20-27 Severe Depression     Opioid Risk Score: No value filed.  Interpretation of Opioid Risk Score   Score 0-3 = Low risk of abuse. Do UDS at least once per year.  Score 4-7 = Moderate risk of abuse. Do UDS 1-4 times per year.  Score 8+ = High risk of abuse. Refer to specialist.      OBJECTIVE:   Vital Signs:  Vitals:    12/08/21 1556   BP: 108/70   Pulse: 82   Resp: 16   Temp: 36.8 °C (98.3 °F)   SpO2: 95%        Pertinent Labs:  Lab Results   Component Value Date/Time    SODIUM 137 12/12/2020 02:00 AM    POTASSIUM 3.5 (L) 12/12/2020 02:00 AM    CHLORIDE 100 12/12/2020 02:00 AM    CO2 20 12/12/2020 02:00 AM    GLUCOSE 72 12/12/2020 02:00 AM    BUN 10 12/12/2020 02:00 AM    CREATININE 0.66 12/12/2020 02:00 AM       Lab Results   Component Value Date/Time    HBA1C 5.7 (H) 02/06/2020 06:07 AM       Lab Results   Component Value Date/Time    WBC 7.2 12/12/2020 02:00 AM    RBC 4.99 12/12/2020 02:00 AM    HEMOGLOBIN 14.1 12/12/2020 02:00 AM    HEMATOCRIT 43.5 12/12/2020 02:00 AM    MCV 87.2 12/12/2020 02:00 AM    MCH 28.3 12/12/2020 02:00 AM    MCHC 32.4 (L) 12/12/2020 02:00 AM    MPV 11.1 12/12/2020 02:00 AM    NEUTSPOLYS 53.10 12/12/2020 02:00 AM    LYMPHOCYTES 34.80 12/12/2020 02:00 AM    MONOCYTES 8.60 12/12/2020 02:00 AM    EOSINOPHILS 2.10 12/12/2020 02:00 AM    BASOPHILS 1.10 12/12/2020 02:00 AM       Lab Results   Component Value Date/Time    ASTSGOT 17 12/12/2020 02:00 AM    ALTSGPT 38 12/12/2020 02:00 AM        Physical Exam:   GEN: No  apparent distress  HEENT: Head normocephalic, atraumatic.  Sclera nonicteric bilaterally, no ocular discharge appreciated bilaterally.  Right peripheral vision deficit compared to left.   CV: Extremities warm and well-perfused, no peripheral edema appreciated bilaterally.  PULMONARY: Breathing nonlabored on room air, no respiratory accessory muscle use.  Not requiring supplemental oxygen.  SKIN: No appreciable skin breakdown on exposed areas of skin.  PSYCH: Mood and affect within normal limits.  NEURO: Awake alert.  Conversational.  Logical thought content.  Answers questions appropriately.  Dysarthric.  Presents in manual wheelchair.    ASSESSMENT/PLAN: Rey Medina  is a 25 y.o. male with rehabilitation history significant for ICH secondary to AVM rupture on 4/21/2019 s/p AVM repair, hydrocephalus s/p  shunt who was admitted to [facility] from 2/5/2020 to 3/12/2020, here for hemorrhagic stroke follow-up. The following plan was discussed with the patient who is in agreement.     Visit Diagnoses     ICD-10-CM   1. History of stroke  Z86.73   2. Dysarthria  R47.1   3. Impaired cognition  R41.89   4. Aphasia  R47.01   5. Vision changes  H53.9        Mom assists with history.    Rehab/Neuro:   1. ICH secondary to AVM rupture on 4/21/2019 s/p AVM repair, hydrocephalus s/p  shunt who was recently admitted to [facility] from 2/5/2020 to 3/12/2020.  2.  No history of seizure, on Keppra.  Had EEG 5/21/2020. Keppra d/c.   3.  Poor attention secondary to ICH requiring need for neuro-stimulant; discontinued.  4.  Blurry vision after ICH, did have reading glasses prior to stroke.  Blurry vision persistent.  Also having problems with peripheral vision on the right. Resolved with new glasses rx.   5.  Irritability/agitation: Controlled on propranolol.  6.  Dysmetria  7.  Poor balance  -Neuro: Improving.  -Counseled I highly recommend that they revisit with neuro-ophthalmology given his visual complaints today.   Especially now that patient can verbalize his complaints better.  -Time spent drafting letter which was provided today for patient to obtain support animal.    Irritability: Continue propranolol 10 mg twice daily.  This is slowly being titrated by his mom.  Rey does not notice any additional irritability or other symptoms, neither does Melinda.    The following are all stable:  Bladder: Status: Continent, volitional. Independent with bladder function in urinal   Bowel: Continent and volitional. Has BM on his own without suppository. Needs assistance with wiping. Med management: Continue Miralax daily.   Adjustment Disorder: Med management: Continue Mirtazapine 30mg nightly.   GI: Feeding himself.  On regular diet.  Has difficulty due to dysmetria.      Follow up: 4-6 months for general reevaluation.    Total time spent was 22 minutes.  Included in this time is the time spent preparing for the visit including record review, my exam and evaluation, counseling and education regarding that which is aforementioned in the assessment and plan. Time was spent ordering the appropriate labs, tests, procedures, referrals, medications. Included this time as the time spent obtaining history from someone other than the patient. Discussion involved the patient and mom.    Please note that this dictation was created using voice recognition software. I have made every reasonable attempt to correct obvious errors but there may be errors of grammar and content that I may have overlooked prior to finalization of this note.    Dr. Mandy Luke DO, MS  Department of Physical Medicine & Rehabilitation  Neuro Rehabilitation Clinic  [facility]

## 2021-12-13 ENCOUNTER — PHYSICAL THERAPY (OUTPATIENT)
Dept: PHYSICAL THERAPY | Facility: MEDICAL CENTER | Age: 26
End: 2021-12-13
Attending: FAMILY MEDICINE
Payer: MEDICARE

## 2021-12-13 DIAGNOSIS — R26.2 DIFFICULTY IN WALKING, NOT ELSEWHERE CLASSIFIED: ICD-10-CM

## 2021-12-13 DIAGNOSIS — I61.5 NONTRAUMATIC INTRAVENTRICULAR INTRACEREBRAL HEMORRHAGE, UNSPECIFIED LATERALITY (HCC): ICD-10-CM

## 2021-12-13 PROCEDURE — 97112 NEUROMUSCULAR REEDUCATION: CPT

## 2021-12-13 PROCEDURE — 97110 THERAPEUTIC EXERCISES: CPT

## 2021-12-13 NOTE — OP THERAPY DAILY TREATMENT
Outpatient Physical Therapy  DAILY TREATMENT     [facility] Outpatient Physical Therapy  [address]  [place] [address]  Phone:  [phone]  Fax:  [phone]    Date: 12/13/2021    Patient: Rey Medina  YOB: 1995  MRN: [id number]     Time Calculation    Start time: 0755  Stop time: 0845 Time Calculation (min): 50 minutes         Chief Complaint: Difficulty Walking, Fall, and Loss Of Balance    Visit #: 26    SUBJECTIVE:  Rey presents for follow up for balance and ataxia related to intraventricular hemorrhage secondary to AVM. Agreeable to therapy, no status change.  With the weather and busy holiday season they are not leaving the house much.     OBJECTIVE:  Current objective measures           Therapeutic Exercises (CPT 25146):     1. NuStep, 5 mins, level 4, Seat and UE level 8    2. Gastroc stretch , slant board , 2 mins     4. Seated sled pull 15 ft x 12 reps , 4 in kasie cross sitting     5. Increasing weight 35#/40#, 4 in tall kneeling, 4 halk kneel/lunge position    Therapeutic Treatments and Modalities:     1. Neuromuscular Re-education (CPT 13168), P bar work , Walking foward and backwards x 5 reps, need cga and min assist , Seated ball work , 4 way shift    Time-based treatments/modalities:    Physical Therapy Timed Treatment Charges  Neuromusc re-ed, balance, coor, post minutes (CPT 25166): 25 minutes  Therapeutic exercise minutes (CPT 29238): 25 minutes          ASSESSMENT:   Response to treatment: He show good strength but poor motor control for smooth motions. He needs more fine motor abilities as well. Keep working hard on transfer and safety tasks as able.     PLAN/RECOMMENDATIONS:   Plan for treatment: therapy treatment to continue next visit.  Planned interventions for next visit: continue with current treatment.

## 2021-12-15 ENCOUNTER — TELEPHONE (OUTPATIENT)
Dept: PHYSICAL MEDICINE AND REHAB | Facility: REHABILITATION | Age: 26
End: 2021-12-15

## 2021-12-15 ENCOUNTER — PHYSICAL THERAPY (OUTPATIENT)
Dept: PHYSICAL THERAPY | Facility: MEDICAL CENTER | Age: 26
End: 2021-12-15
Attending: FAMILY MEDICINE
Payer: MEDICARE

## 2021-12-15 DIAGNOSIS — R26.2 DIFFICULTY IN WALKING, NOT ELSEWHERE CLASSIFIED: ICD-10-CM

## 2021-12-15 DIAGNOSIS — I61.5 NONTRAUMATIC INTRAVENTRICULAR INTRACEREBRAL HEMORRHAGE, UNSPECIFIED LATERALITY (HCC): ICD-10-CM

## 2021-12-15 PROCEDURE — 97110 THERAPEUTIC EXERCISES: CPT

## 2021-12-15 PROCEDURE — 97112 NEUROMUSCULAR REEDUCATION: CPT

## 2021-12-15 NOTE — OP THERAPY DAILY TREATMENT
"  Outpatient Physical Therapy  DAILY TREATMENT     [facility] Outpatient Physical Therapy  [address]  [place] [address]  Phone:  [phone]  Fax:  [phone]    Date: 12/15/2021    Patient: Rey Medina  YOB: 1995  MRN: [id number]     Time Calculation    Start time: 0800  Stop time: 0845 Time Calculation (min): 45 minutes         Chief Complaint: Difficulty Walking, Fall, Loss Of Balance, and Gait Problem    Visit #: 27    SUBJECTIVE:  Rey presents for follow up for balance and ataxia related to intraventricular hemorrhage secondary to AVM. Agreeable to therapy, His mother is present today who is more helpful with information and health history. She reports he has an MRI to monitor the qualify and past surgery of the shunt  OBJECTIVE:  Current objective measures           Therapeutic Exercises (CPT 04240):     1. NuStep, 5 mins, level 4, Seat and UE level 8    2. Gastroc stretch , slant board , 2 mins     4. Shuttle , Double and single leg, 3 and 4 bands, Double 3 x 10, 3 ane 4 bands, Single 2 x 10 , 2 and 3 bands     8. Shuttle , Over head pull, 2 bands needs hha with therapist , 2 x 10 reps with min assist for control     Therapeutic Treatments and Modalities:     1. Neuromuscular Re-education (CPT 26307), P bar work , Walking foward and backwards x 5 reps, need cga and min assist , Standing step up in p bars, 6\" step touch  x 20 steps , Step up on 6\" , need mod hand on for balance backwards stepping is slightl better than forwards     Time-based treatments/modalities:    Physical Therapy Timed Treatment Charges  Neuromusc re-ed, balance, coor, post minutes (CPT 21773): 20 minutes  Therapeutic exercise minutes (CPT 27744): 25 minutes          ASSESSMENT:   Response to treatment: Shows better control with walker and balance stance in P bars. Will try and qualify with standard measure or mod balance test      PLAN/RECOMMENDATIONS:   Plan for " treatment: therapy treatment to continue next visit.  Planned interventions for next visit: continue with current treatment.

## 2021-12-15 NOTE — TELEPHONE ENCOUNTER
Patient's mother called for Dr. Mandy Luke and is requesting a new referral to be placed for Dr. Everette Romero as their current one has .

## 2021-12-19 DIAGNOSIS — I61.5 NONTRAUMATIC INTRAVENTRICULAR INTRACEREBRAL HEMORRHAGE, UNSPECIFIED LATERALITY (HCC): ICD-10-CM

## 2021-12-19 DIAGNOSIS — H53.9 VISION CHANGES: ICD-10-CM

## 2021-12-21 ENCOUNTER — PHYSICAL THERAPY (OUTPATIENT)
Dept: PHYSICAL THERAPY | Facility: MEDICAL CENTER | Age: 26
End: 2021-12-21
Attending: FAMILY MEDICINE
Payer: MEDICARE

## 2021-12-21 DIAGNOSIS — I61.5 NONTRAUMATIC INTRAVENTRICULAR INTRACEREBRAL HEMORRHAGE, UNSPECIFIED LATERALITY (HCC): ICD-10-CM

## 2021-12-21 DIAGNOSIS — R26.2 DIFFICULTY IN WALKING, NOT ELSEWHERE CLASSIFIED: ICD-10-CM

## 2021-12-21 DIAGNOSIS — R26.89 OTHER ABNORMALITIES OF GAIT AND MOBILITY: ICD-10-CM

## 2021-12-21 PROCEDURE — 97112 NEUROMUSCULAR REEDUCATION: CPT

## 2021-12-21 PROCEDURE — 97110 THERAPEUTIC EXERCISES: CPT

## 2021-12-21 PROCEDURE — 97116 GAIT TRAINING THERAPY: CPT

## 2021-12-21 NOTE — OP THERAPY DAILY TREATMENT
Outpatient Physical Therapy  DAILY TREATMENT     [facility] Outpatient Physical Therapy  [address]  [place] [address]  Phone:  [phone]  Fax:  [phone]    Date: 12/21/2021    Patient: Rey Medina  YOB: 1995  MRN: [id number]     Time Calculation    Start time: 1030  Stop time: 1115 Time Calculation (min): 45 minutes         Chief Complaint: Difficulty Walking, Loss Of Balance, and Gait Problem    Visit #: 28    SUBJECTIVE:  Rey presents for follow up for balance and ataxia related to intraventricular hemorrhage secondary to AVM. Agreeable to therapy, He is tired today and he has low energy but no major status change.     OBJECTIVE:  Current objective measures           Therapeutic Exercises (CPT 83490):     1. NuStep, 5 mins, level 4, Seat and UE level 8    2. Gastroc stretch , slant board , 2 mins     4. P bars , Forward and backwards walking , x 5 reps , Forward and backwards walking with pvc, between feet , x 5 reps    Therapeutic Treatments and Modalities:     1. Neuromuscular Re-education (CPT 32215), Sittin UE balloon hit, 3 mins , Sitting with air ex and dynadisk , 2 min , Standing with hha x 1 , x 2 min each side     4. Gait Training (CPT 98009), Walking 25 feet around equipment to wheelchair, needs assist to ground walker. tends to push backwards     Time-based treatments/modalities:    Physical Therapy Timed Treatment Charges  Gait training minutes (CPT 70737): 10 minutes  Neuromusc re-ed, balance, coor, post minutes (CPT 31254): 15 minutes  Therapeutic exercise minutes (CPT 44983): 20 minutes          ASSESSMENT:   Response to treatment: Not able to stand without UE so we will have to find some other way to quantify if balance is improving. He has tall kneeling goal of 1 min that we will look at next visit.     PLAN/RECOMMENDATIONS:   Plan for treatment: therapy treatment to continue next visit.  Planned interventions for next  visit: continue with current treatment.

## 2021-12-23 ENCOUNTER — PHYSICAL THERAPY (OUTPATIENT)
Dept: PHYSICAL THERAPY | Facility: MEDICAL CENTER | Age: 26
End: 2021-12-23
Attending: FAMILY MEDICINE
Payer: MEDICARE

## 2021-12-23 DIAGNOSIS — I61.5 NONTRAUMATIC INTRAVENTRICULAR INTRACEREBRAL HEMORRHAGE, UNSPECIFIED LATERALITY (HCC): ICD-10-CM

## 2021-12-23 DIAGNOSIS — R26.2 DIFFICULTY IN WALKING, NOT ELSEWHERE CLASSIFIED: ICD-10-CM

## 2021-12-23 PROCEDURE — 97116 GAIT TRAINING THERAPY: CPT

## 2021-12-23 PROCEDURE — 97110 THERAPEUTIC EXERCISES: CPT

## 2021-12-23 PROCEDURE — 97112 NEUROMUSCULAR REEDUCATION: CPT

## 2021-12-23 NOTE — OP THERAPY DAILY TREATMENT
Outpatient Physical Therapy  DAILY TREATMENT     [facility] Outpatient Physical Therapy  [address]  [place] [address]  Phone:  [phone]  Fax:  [phone]    Date: 12/23/2021    Patient: Rey Medina  YOB: 1995  MRN: [id number]     Time Calculation    Start time: 1030  Stop time: 1115 Time Calculation (min): 45 minutes         Chief Complaint: Difficulty Walking, Fall, and Gait Problem    Visit #: 29    SUBJECTIVE:  Rey presents for follow up for balance and ataxia related to intraventricular hemorrhage secondary to AVM. It is cold and the weather is poor. He was nervous to walk and move around coming to clinic  OBJECTIVE:  Current objective measures           Therapeutic Exercises (CPT 59148):     1. NuStep,  level 4, 1 lap 5:32, Seat and UE level 8    2. Gastroc stretch , slant board , 2 mins     4. Standing endurance only 5 min, Does standing exercise for 10-15 min at home     Therapeutic Treatments and Modalities:     1. Neuromuscular Re-education (CPT 21908), Stepping tasks at p bar, Marching with knee touch, standing cone 3 way touch x3 min each side     4. Gait Training (CPT 03365), Walking 50 feet from lobby to nu step , needs assist to ground walker. tends to push backwards , Walking between tasks    Time-based treatments/modalities:    Physical Therapy Timed Treatment Charges  Gait training minutes (CPT 10581): 15 minutes  Neuromusc re-ed, balance, coor, post minutes (CPT 62936): 15 minutes  Therapeutic exercise minutes (CPT 54278): 15 minutes          ASSESSMENT:   Response to treatment: Not able to stand without UE so we will have to find some other way to quantify if balance is improving. He has tall kneeling goal of 1 min that we will look at next visit.     PLAN/RECOMMENDATIONS:   Plan for treatment: therapy treatment to continue next visit.  Planned interventions for next visit: continue with current treatment.

## 2021-12-27 ENCOUNTER — APPOINTMENT (OUTPATIENT)
Dept: PHYSICAL THERAPY | Facility: MEDICAL CENTER | Age: 26
End: 2021-12-27
Attending: FAMILY MEDICINE
Payer: MEDICARE

## 2021-12-30 ENCOUNTER — PHYSICAL THERAPY (OUTPATIENT)
Dept: PHYSICAL THERAPY | Facility: MEDICAL CENTER | Age: 26
End: 2021-12-30
Attending: FAMILY MEDICINE
Payer: MEDICARE

## 2021-12-30 DIAGNOSIS — R26.89 OTHER ABNORMALITIES OF GAIT AND MOBILITY: ICD-10-CM

## 2021-12-30 DIAGNOSIS — I61.5 NONTRAUMATIC INTRAVENTRICULAR INTRACEREBRAL HEMORRHAGE, UNSPECIFIED LATERALITY (HCC): ICD-10-CM

## 2021-12-30 DIAGNOSIS — R26.2 DIFFICULTY IN WALKING, NOT ELSEWHERE CLASSIFIED: ICD-10-CM

## 2021-12-30 PROCEDURE — 97112 NEUROMUSCULAR REEDUCATION: CPT

## 2021-12-30 PROCEDURE — 97110 THERAPEUTIC EXERCISES: CPT

## 2021-12-30 NOTE — OP THERAPY DAILY TREATMENT
"  Outpatient Physical Therapy  DAILY TREATMENT     [facility] Outpatient Physical Therapy  [address]  [place] [address]  Phone:  [phone]  Fax:  [phone]    Date: 12/30/2021    Patient: Rey Medina  YOB: 1995  MRN: [id number]     Time Calculation    Start time: 1030  Stop time: 1115 Time Calculation (min): 45 minutes         Chief Complaint: Loss Of Balance, Difficulty Walking, Fall, and Gait Problem    Visit #: 30    SUBJECTIVE:  Rey presents for follow up for balance and ataxia related to intraventricular hemorrhage secondary to AVM. No status change. Agreable to therapy     OBJECTIVE:  Current objective measures           Therapeutic Exercises (CPT 21911):     1. NuStep,  level 4, x 8 min , Seat and UE level 8    2. Gastroc stretch , slant board , 2 mins     Therapeutic Treatments and Modalities:     1. Neuromuscular Re-education (CPT 11552), Stair climbing 4\" and 6\" , Several trials forward with cues for step through pattern, Lateral step up 6\" side , Walking around     4. Gait Training (CPT 81568), Walking 50 feet from lobby to nu step , needs assist to ground walker. tends to push backwards     Time-based treatments/modalities:    Physical Therapy Timed Treatment Charges  Gait training minutes (CPT 76067): 10 minutes  Neuromusc re-ed, balance, coor, post minutes (CPT 12158): 25 minutes  Therapeutic exercise minutes (CPT 50607): 10 minutes          ASSESSMENT:   Response to treatment: He did well with the challenge of stairs. He is not safe to do this alone but it forces him to be accurate with his feet.    PLAN/RECOMMENDATIONS:   Plan for treatment: therapy treatment to continue next visit.  Planned interventions for next visit: continue with current treatment.       "

## 2022-01-05 ENCOUNTER — PHYSICAL THERAPY (OUTPATIENT)
Dept: PHYSICAL THERAPY | Facility: MEDICAL CENTER | Age: 27
End: 2022-01-05
Attending: FAMILY MEDICINE
Payer: MEDICARE

## 2022-01-05 DIAGNOSIS — I61.5 NONTRAUMATIC INTRAVENTRICULAR INTRACEREBRAL HEMORRHAGE, UNSPECIFIED LATERALITY (HCC): ICD-10-CM

## 2022-01-05 DIAGNOSIS — R26.2 DIFFICULTY IN WALKING, NOT ELSEWHERE CLASSIFIED: ICD-10-CM

## 2022-01-05 DIAGNOSIS — R26.89 OTHER ABNORMALITIES OF GAIT AND MOBILITY: ICD-10-CM

## 2022-01-05 PROCEDURE — 97112 NEUROMUSCULAR REEDUCATION: CPT

## 2022-01-05 PROCEDURE — 97116 GAIT TRAINING THERAPY: CPT

## 2022-01-05 PROCEDURE — 97110 THERAPEUTIC EXERCISES: CPT

## 2022-01-05 NOTE — OP THERAPY DAILY TREATMENT
Outpatient Physical Therapy  DAILY TREATMENT     [facility] Outpatient Physical Therapy  [address]  [place] [address]  Phone:  [phone]  Fax:  [phone]    Date: 01/05/2022    Patient: Rey Medina  YOB: 1995  MRN: [id number]     Time Calculation    Start time: 0800  Stop time: 0900 Time Calculation (min): 60 minutes         Chief Complaint: Difficulty Walking, Fall, and Other (Ataxia)    Visit #: 31    SUBJECTIVE:  Rey presents for follow up for balance and ataxia related to intraventricular hemorrhage secondary to AVM. No status change.Doing better with transfer via control to standing.     OBJECTIVE:  Current objective measures           Therapeutic Exercises (CPT 68760):     1. NuStep,  level 4, x 10 min , Seat and UE level 8    2. Gastroc stretch , slant board , 2 mins     Therapeutic Treatments and Modalities:     1. Neuromuscular Re-education (CPT 30650), Standing weight shifts, seated disc weight shifts with cone reaches, seated weight shifts with discs under feet with cone reaches   , 1 LOB backwards , Table to seat transfers at 90     4. Gait Training (CPT 74087), Walking 50 feet from lobby to nu step, walking 12 feet from chair to table    Therapeutic Treatment and Modalities Summary: Neuro work x 30 mins     Time-based treatments/modalities:    Physical Therapy Timed Treatment Charges  Gait training minutes (CPT 82405): 15 minutes  Neuromusc re-ed, balance, coor, post minutes (CPT 53163): 30 minutes  Therapeutic exercise minutes (CPT 31162): 15 minutes          ASSESSMENT:   Response to treatment: With walking, patient had better control using the walker when entering the clinic to the nu step. Worked on seated transfers from the table to the chair where it varied on his ability to control his trunk sway due to his ataxia   PLAN/RECOMMENDATIONS:   Plan for treatment: therapy treatment to continue next visit.  Planned  interventions for next visit: continue with current treatment.

## 2022-01-07 ENCOUNTER — PHYSICAL THERAPY (OUTPATIENT)
Dept: PHYSICAL THERAPY | Facility: MEDICAL CENTER | Age: 27
End: 2022-01-07
Attending: FAMILY MEDICINE
Payer: MEDICARE

## 2022-01-07 DIAGNOSIS — R26.89 OTHER ABNORMALITIES OF GAIT AND MOBILITY: ICD-10-CM

## 2022-01-07 DIAGNOSIS — I61.5 NONTRAUMATIC INTRAVENTRICULAR INTRACEREBRAL HEMORRHAGE, UNSPECIFIED LATERALITY (HCC): ICD-10-CM

## 2022-01-07 PROCEDURE — 97112 NEUROMUSCULAR REEDUCATION: CPT

## 2022-01-07 PROCEDURE — 97110 THERAPEUTIC EXERCISES: CPT

## 2022-01-07 NOTE — OP THERAPY DAILY TREATMENT
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 Again asked father any status change. He reports none, Patient reports he is \"good\"  OBJECTIVE:  Current objective measures    Walking 50 ft in 4 min  Tall kneeling 1 min        Therapeutic Exercises (CPT 03463):     1. NuStep,  level 4, x 10 min , Seat and UE level 8    2. Gastroc stretch , slant board , 2 mins     Therapeutic Treatments and Modalities:     1. Neuromuscular Re-education (CPT 54313), Trunk/LE, Mat to floor developmental skills, crawling and floor skills, struggle with anterior chain in kneeling and half kneeling     4. Gait Training (CPT 41906), Walking 50 feet from lobby to nu step, approx 4 min, FWW with Min-mod asssit the prevent LOB    Therapeutic Treatment and Modalities Summary: Neuro work x 30 mins     Time-based treatments/modalities:    Physical Therapy Timed Treatment Charges  Neuromusc re-ed, balance, coor, post minutes (CPT 99902): 30 minutes  Therapeutic exercise minutes (CPT 58320): 15 minutes          ASSESSMENT:   Response to treatment: Worked on transfer skills from the mat to the floor. practiced with crawling ability with backward and forward motions. Patient has trouble maintaining his balance in kneeling and half kneeling due to tightness to his anterior chain. Patient was able to determine to transfer from floor to the mat with no cueing. Will want to work on skills that contribute to " kneeling and half kneeling activities.   PLAN/RECOMMENDATIONS:   Plan for treatment: therapy treatment to continue next visit.  Planned interventions for next visit: continue with current treatment.

## 2022-01-12 ENCOUNTER — PHYSICAL THERAPY (OUTPATIENT)
Dept: PHYSICAL THERAPY | Facility: MEDICAL CENTER | Age: 27
End: 2022-01-12
Attending: FAMILY MEDICINE
Payer: MEDICARE

## 2022-01-12 DIAGNOSIS — I69.193: ICD-10-CM

## 2022-01-12 PROCEDURE — 97110 THERAPEUTIC EXERCISES: CPT

## 2022-01-12 PROCEDURE — 97112 NEUROMUSCULAR REEDUCATION: CPT

## 2022-01-12 NOTE — OP THERAPY DAILY TREATMENT
Outpatient Physical Therapy  DAILY TREATMENT     [facility] Outpatient Physical Therapy  [address]  [place] [address]  Phone:  [phone]  Fax:  [phone]    Date: 01/12/2022    Patient: Rey Medina  YOB: 1995  MRN: [id number]     Time Calculation    Start time: 0800  Stop time: 0845 Time Calculation (min): 45 minutes         Chief Complaint: Difficulty Walking and Loss Of Balance    Visit #: 33    SUBJECTIVE:  Patient is doing well since last visit. Father of the patient stated there was no change.     OBJECTIVE:  Current objective measures:   -Gait from lobby to nu step: 4min           Therapeutic Exercises (CPT 42386):     1. Nu Step, arm an chair at 8, level 4    2. Gastroc Stretch, on slant board, 2 min     Therapeutic Treatments and Modalities:     1. Neuromuscular Re-education (CPT 98472), Stair Climbing up/down 5x each, harder for pt to use the 6in steps compared to 4 in steps , Cruising around objects to chair, had trouble finding  placement to cruise to the chair    Time-based treatments/modalities:    Physical Therapy Timed Treatment Charges  Neuromusc re-ed, balance, coor, post minutes (CPT 34317): 30 minutes  Therapeutic exercise minutes (CPT 32279): 15 minutes      ASSESSMENT:   Response to treatment: Patient focused on stair navigation and cruising through objects without using a walker. Patient had better control of his feet with the 4in steps compared to the 6in steps indicating difficulty with higher step lengths. Patient needs to practice with cruising using different objects to reduce his use of the walker and assistance. Next visits will incorporate father's assistance to educate and teach techniques to practice stair climbing at the patient's home.     PLAN/RECOMMENDATIONS:   Plan for treatment: therapy treatment to continue next visit.  Planned interventions for next visit: continue with current  treatment.      [x] As the licensed therapist supervising this student, I was present during the entire treatment session directing the care and reviewing the assessment plan.  I reviewed all documentation prior to signing.

## 2022-01-14 ENCOUNTER — PHYSICAL THERAPY (OUTPATIENT)
Dept: PHYSICAL THERAPY | Facility: MEDICAL CENTER | Age: 27
End: 2022-01-14
Attending: FAMILY MEDICINE
Payer: MEDICARE

## 2022-01-14 DIAGNOSIS — R26.2 DIFFICULTY IN WALKING: ICD-10-CM

## 2022-01-14 DIAGNOSIS — I69.193: ICD-10-CM

## 2022-01-14 DIAGNOSIS — R26.89 OTHER ABNORMALITIES OF GAIT AND MOBILITY: ICD-10-CM

## 2022-01-14 DIAGNOSIS — I61.5 NONTRAUMATIC INTRAVENTRICULAR INTRACEREBRAL HEMORRHAGE, UNSPECIFIED LATERALITY (HCC): ICD-10-CM

## 2022-01-14 PROCEDURE — 97110 THERAPEUTIC EXERCISES: CPT

## 2022-01-14 PROCEDURE — 97116 GAIT TRAINING THERAPY: CPT

## 2022-01-14 PROCEDURE — 97112 NEUROMUSCULAR REEDUCATION: CPT

## 2022-01-14 NOTE — OP THERAPY DAILY TREATMENT
Outpatient Physical Therapy  DAILY TREATMENT     [facility] Outpatient Physical Therapy  [address]  [place] [address]  Phone:  [phone]  Fax:  [phone]    Date: 01/14/2022    Patient: Rey Medina  YOB: 1995  MRN: [id number]     Time Calculation    Start time: 0800  Stop time: 0845 Time Calculation (min): 45 minutes         Chief Complaint: Loss Of Balance and Difficulty Walking    Visit #: 34    SUBJECTIVE:  Patient and father stated that they were doing well today. Father of the patient stated that there is no change in status. Asked his about walking outside, He reported no    OBJECTIVE:  Current objective measures:           Therapeutic Exercises (CPT 02090):     1. Nu Step, Chair at 7, UE at 8; x10 min    2. Gastroc stretch, 2 min     Therapeutic Treatments and Modalities:     1. Neuromuscular Re-education (CPT 20636), Solo Step- 36 yrds walking , Double stance with solo step and dual tasking (squigz on the mirror)    3. Gait Training (CPT 13066), Walking from lobby to nustep, walking to coutner for gastroc and walking in clinic to activities, approx 100 feet to transition to activities.     Time-based treatments/modalities:    Physical Therapy Timed Treatment Charges  Gait training minutes (CPT 65543): 15 minutes  Neuromusc re-ed, balance, coor, post minutes (CPT 93051): 20 minutes  Therapeutic exercise minutes (CPT 90933): 10 minutes      ASSESSMENT:   Response to treatment: Patient focused on exercise and gait training using the solo step. With the solo step, patient had some trouble walking where he would lean backwards. Also focused on double stance balancing with the assistance of the solo step to work on stance control and balancing when reaching and grabbing items from the mirror where patient had trouble controlling pulling apart the squigz while maintaining his balance.       PLAN/RECOMMENDATIONS:   Plan for treatment:  therapy treatment to continue next visit.  Planned interventions for next visit: continue with current treatment.      [x] As the licensed therapist supervising this student, I was present during the entire treatment session directing the care and reviewing the assessment plan.  I reviewed all documentation prior to signing.

## 2022-01-18 ENCOUNTER — PHYSICAL THERAPY (OUTPATIENT)
Dept: PHYSICAL THERAPY | Facility: MEDICAL CENTER | Age: 27
End: 2022-01-18
Attending: FAMILY MEDICINE
Payer: MEDICARE

## 2022-01-18 DIAGNOSIS — I69.193: ICD-10-CM

## 2022-01-18 DIAGNOSIS — R26.2 DIFFICULTY IN WALKING: ICD-10-CM

## 2022-01-18 PROCEDURE — 97110 THERAPEUTIC EXERCISES: CPT

## 2022-01-18 PROCEDURE — 97112 NEUROMUSCULAR REEDUCATION: CPT

## 2022-01-18 PROCEDURE — 97116 GAIT TRAINING THERAPY: CPT

## 2022-01-18 NOTE — OP THERAPY PROGRESS SUMMARY
Outpatient Physical Therapy  PROGRESS SUMMARY NOTE      [facility] Outpatient Physical Therapy  [address]  [place] [address]  Phone:  [phone]  Fax:  [phone]    Date of Visit: 01/18/2022    Patient: Rey Medina  YOB: 1995  MRN: [id number]     Referring Provider: Sherie Power M.D.  [address]  [place] [address]   Referring Diagnosis Other abnormalities of gait and mobility [R26.89]     Visit Diagnoses     ICD-10-CM   1. Difficulty in walking  R26.2   2. 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 He show s ability to use FWW for ambulation but requires SBA to MOD assist with poor ws and LOB to L side. During standing with 1 HHA x he can maintain balance for 1+ min. He also show improved ability to transfer with VC and SBA to MOD assist for safety. We would like to continue with therapy for progression of transfer skills and safety awareness.     Short Term Goals:   1. Pt will be able to demonstrate tall kneeling indep x 1 minute in order to demonstrate incr core strength. GOAL MET  2. Pt and family will consistently perform HEP for strengthening and walking daily. GOAL MET  3. Pt will demonstrate safe transfers with SPV and LRAD in order to increase independence and decrease caregiver burden. GOAL PARTIALLY MET     Short term goal time span:  2-4 weeks      Long Term Goals:    1. Pt will perform STS with LRD with adequate control on descent with SBA. GOAL MET  2. Pt will ambulate 100 ft with  LRAD and CGA in order to increase independence. GOAL NOT MET  3. Pt will misty unsupported standing x1 min with SBA. GOAL NOT MET    Objective findings and assessment details: 11/16  Objective findings and assessment details:   5 times STS= 37.8 seconds from EOM with CGA- Jeremy, with BUE use. Forward LOB x 2.   Static unsupported standing (CGA at gait belt) x1min.  Requires cues occasionally for correcting leaning out of SHAHEEN.   Static unsupported standing with SBA: 18.6 seconds without assistance but right lateral trunk lean.    1/18  5 x STS: 26 sec EOM BUE to FWW, backward LOB x 1   Static unspported stance with UE x 2 FWW 1 min +/ UE x 1 on FWW 1 min +  Static unsupported standing with SBA: 15-35 sec in 3 trials     Seated transfers chair to wheelchair with use of 2 UE 50% min to mod assist/50% SBA    Gait 50 feet with FWW and SBA to Mod a: 3: 16 sec     Seated  Floor transfer with 8 ft crawl x 2 and back to chair 2 min         Goals:   Short Term Goals:   1.Be able to walk 50 feet with FWW with SBA to MIN A in 3 minutes to progress for independence in walking.   2. Seated floor transfer with 8 ft crawl x 2 and back to chair in 1:30 secs for independence in home tasks.  3. Seated transfers chair to wheelchair with use of 2UE 25% min to mod assist/75% SBA to reduce outside assistance   Short term goal time span:  2-4 weeks      Long Term Goals:    1. Establish HARRISON balance score  2. Transfer from chair to mat with setup, VC/TC and SBA only 3/4 trials   Long term goal time span:  4-6 weeks    Plan:   Planned therapy interventions:  Neuromuscular Re-education (CPT 15248), Gait Training (CPT 26835) and Therapeutic Exercise (CPT 92696)  Frequency:  2x week  Duration in weeks:  4  Duration in visits:  8  Plan details:  Update progress in 30 days per medicare      Referring provider co-signature:  I have reviewed this plan of care and my co-signature certifies the need for services.     Certification Period: 01/18/2022 to  02/22/22    Physician Signature: ________________________________ Date: ______________

## 2022-01-20 ENCOUNTER — PHYSICAL THERAPY (OUTPATIENT)
Dept: PHYSICAL THERAPY | Facility: MEDICAL CENTER | Age: 27
End: 2022-01-20
Attending: FAMILY MEDICINE
Payer: MEDICARE

## 2022-01-20 DIAGNOSIS — I69.193: ICD-10-CM

## 2022-01-20 PROCEDURE — 97112 NEUROMUSCULAR REEDUCATION: CPT

## 2022-01-20 PROCEDURE — 97110 THERAPEUTIC EXERCISES: CPT

## 2022-01-20 NOTE — OP THERAPY DAILY TREATMENT
Outpatient Physical Therapy  DAILY TREATMENT     [facility] Outpatient Physical Therapy  [address]  [place] [address]  Phone:  [phone]  Fax:  [phone]    Date: 01/20/2022    Patient: Rey Medina  YOB: 1995  MRN: [id number]     Time Calculation    Start time: 0945  Stop time: 1030 Time Calculation (min): 45 minutes         Chief Complaint: Difficulty Walking and Loss Of Balance    Visit #: 36    SUBJECTIVE:  Patient states that he is doing well today. Patient and father stated that he has been watching tv on the couch and does not like to go outside.     OBJECTIVE:  Current objective measures:     In Parallel Bars: standing balance with no HHA with contact guard - 1:06          Therapeutic Exercises (CPT 10661):     1. Upright Bike , seat at level 4, 0.5 mi at     2. Gastroc Stretch , 2min    Therapeutic Treatments and Modalities:     1. Neuromuscular Re-education (CPT 67595), Parallel bars: forward/backwards walking 8ft x 2, lateral stepping with cones 8ftx2, Static standing balance with balloon reaction, step ups with 6in step (6x each leg), standing balance with 1HHA [id number], standing balance with 1HHA with UE reaching 2x30 secs, standing balance with no HHA with contact guard 2x30 secs    Time-based treatments/modalities:    Physical Therapy Timed Treatment Charges  Neuromusc re-ed, balance, coor, post minutes (CPT 62895): 30 minutes  Therapeutic exercise minutes (CPT 97840): 15 minutes          ASSESSMENT:   Response to treatment: Today worked on static and dynamic balancing tasks in the parallel bars. With lateral stepping towards the left side, patient had trouble coordinating space in between his feet where his right leg would step right next to the left leg. Patient had constant contact guard throughout the session. Needed min to mod assist with step up exercises. Patient was making improvements with his ability to adjust his  balancing when his body started leaning forward/backward.     PLAN/RECOMMENDATIONS:   Plan for treatment: therapy treatment to continue next visit.  Planned interventions for next visit: continue with current treatment.      [x] As the licensed therapist supervising this student, I was present during the entire treatment session directing the care and reviewing the assessment plan.  I reviewed all documentation prior to signing.

## 2022-01-25 ENCOUNTER — PHYSICAL THERAPY (OUTPATIENT)
Dept: PHYSICAL THERAPY | Facility: MEDICAL CENTER | Age: 27
End: 2022-01-25
Attending: FAMILY MEDICINE
Payer: MEDICARE

## 2022-01-25 DIAGNOSIS — I69.193: ICD-10-CM

## 2022-01-25 PROCEDURE — 97110 THERAPEUTIC EXERCISES: CPT

## 2022-01-25 PROCEDURE — 97112 NEUROMUSCULAR REEDUCATION: CPT

## 2022-01-25 NOTE — OP THERAPY DAILY TREATMENT
Outpatient Physical Therapy  DAILY TREATMENT     [facility] Outpatient Physical Therapy  [address]  [place] [address]  Phone:  [phone]  Fax:  [phone]    Date: 01/25/2022    Patient: Rey Medina  YOB: 1995  MRN: [id number]     Time Calculation    Start time: 0815  Stop time: 0900 Time Calculation (min): 45 minutes         Chief Complaint: Difficulty Walking and Loss Of Balance    Visit #: 37    SUBJECTIVE:  Patient stated that he was doing well this weekend. The father of the patient stated that they went to the sister's house this weekend and ate a lot of food     OBJECTIVE:  Current objective measures:             Therapeutic Exercises (CPT 84946):     1. Nu Step, UE and chair at 8, ;level 4; 7min    2. Gastroc Stretch , 2min    Therapeutic Treatments and Modalities:     1. Neuromuscular Re-education (CPT 92178), Seated Balance with reaches, seated balance with narrow feet on the disc, table to floor transfers and floor to table (trouble trying to intiially push himself from half kneeling on the floor to the table) transfers, half kneeling for 1min each side (trouble weight shifting to clear leg for half kneeling), high kneeling push ups 10x, crawling 8ft x 2    2. Gait Training (CPT 79384), Gait from lobby to nu step x 4 mins, transtion from nu step to mat table 2-3 mins     Time-based treatments/modalities:    Physical Therapy Timed Treatment Charges  Gait training minutes (CPT 87630): 10 minutes  Neuromusc re-ed, balance, coor, post minutes (CPT 59294): 25 minutes  Therapeutic exercise minutes (CPT 56724): 10 minutes        ASSESSMENT:   Response to treatment: Patient worked on seated balancing and transitions from seated to floor tasks. Patient was able to safely transfer from the mat to the floor. Patient had trouble with weight shifting to clear the leg for half kneeling. Patient did well in the high kneeling push ups and was  cued for straightening his upper body when high kneeling.      PLAN/RECOMMENDATIONS:   Plan for treatment: therapy treatment to continue next visit.  Planned interventions for next visit: continue with current treatment.      [x] As the licensed therapist supervising this student, I was present during the entire treatment session directing the care and reviewing the assessment plan.  I reviewed all documentation prior to signing.

## 2022-01-27 ENCOUNTER — PHYSICAL THERAPY (OUTPATIENT)
Dept: PHYSICAL THERAPY | Facility: MEDICAL CENTER | Age: 27
End: 2022-01-27
Attending: FAMILY MEDICINE
Payer: MEDICARE

## 2022-01-27 DIAGNOSIS — I69.193: ICD-10-CM

## 2022-01-27 PROCEDURE — 97110 THERAPEUTIC EXERCISES: CPT

## 2022-01-27 PROCEDURE — 97112 NEUROMUSCULAR REEDUCATION: CPT

## 2022-01-27 NOTE — OP THERAPY DAILY TREATMENT
Outpatient Physical Therapy  DAILY TREATMENT     [facility] Outpatient Physical Therapy  [address]  [place] [address]  Phone:  [phone]  Fax:  [phone]    Date: 01/27/2022    Patient: Rey Medina  YOB: 1995  MRN: [id number]     Time Calculation    Start time: 0815  Stop time: 0905 Time Calculation (min): 50 minutes         Chief Complaint: Loss Of Balance and Difficulty Walking    Visit #: 38    SUBJECTIVE:  Patient states that he was doing well. Patient mentions that he watches tv when he gets home.     OBJECTIVE:  Current objective measures:           Therapeutic Exercises (CPT 59497):     1. Nu Step , Chair and UE at 9, level 4,     2. Gastroc Stretch , Slant Board 2 min     3. True Stretch , hip flexor stretch, lateral weight shifting, LE stretch     Therapeutic Treatments and Modalities:     1. Gait Training (CPT 32535), Walking with mod assistance from lobby to Nu Step 4 min,     2. Neuromuscular Re-education (CPT 50676), Seated transfers from various seating surfaces (seat with arms, seat without arms, 8in seat, 18in seat, table mat) 6x , transfer from 18in box to chair with arms and back 4x , min to moderate assistance     Time-based treatments/modalities:    Physical Therapy Timed Treatment Charges  Neuromusc re-ed, balance, coor, post minutes (CPT 56549): 25 minutes  Therapeutic exercise minutes (CPT 90142): 20 minutes    ASSESSMENT:   Response to treatment: Patient focused on problem solving with different hand placements in the true stretch to work on neuro control of the hands with various movements for weight shifting. Patient transferred in various seats to work on safe hand placements when maneuvering through different objects. Patient had trouble from the seat with arms to the 18in box so we worked on practicing varying hand placements for the patient to transfer with minimum assistance.   PLAN/RECOMMENDATIONS:   Plan for  treatment: therapy treatment to continue next visit.  Planned interventions for next visit: continue with current treatment.      [x] As the licensed therapist supervising this student, I was present during the entire treatment session directing the care and reviewing the assessment plan.  I reviewed all documentation prior to signing.

## 2022-02-02 ENCOUNTER — PHYSICAL THERAPY (OUTPATIENT)
Dept: PHYSICAL THERAPY | Facility: MEDICAL CENTER | Age: 27
End: 2022-02-02
Attending: FAMILY MEDICINE
Payer: MEDICARE

## 2022-02-02 DIAGNOSIS — I69.193: ICD-10-CM

## 2022-02-02 PROCEDURE — 97116 GAIT TRAINING THERAPY: CPT

## 2022-02-02 PROCEDURE — 97112 NEUROMUSCULAR REEDUCATION: CPT

## 2022-02-02 NOTE — OP THERAPY DAILY TREATMENT
Outpatient Physical Therapy  DAILY TREATMENT     [facility] Outpatient Physical Therapy  [address]  [place] [address]  Phone:  [phone]  Fax:  [phone]    Date: 02/02/2022    Patient: Rey Medina  YOB: 1995  MRN: [id number]     Time Calculation    Start time: 0800  Stop time: 0840 Time Calculation (min): 40 minutes         Chief Complaint: Difficulty Walking and Loss Of Balance    Visit #: 39    SUBJECTIVE:  Patient states that he is doing well. They continue to do their morning floor exercises and tasks every day.     OBJECTIVE:          Therapeutic Exercises (CPT 97902):     1. Nu Step , chair and UE at 10, level 4, 8 min    2. Calf stretch on slant board , 2 min     Therapeutic Treatments and Modalities:     1. Gait Training (CPT 95227), walking from lobby to Nu step , 4 min, min to mod assist with walker, parallel bars forward and backward walking with contact guard-min assist 2x, forward walking with turns 4x    2. Neuromuscular Re-education (CPT 32980), Stair ascent/descent; 6x with 4in, 4x with 6in, struggles with turning around after descending stairs     Time-based treatments/modalities:    Physical Therapy Timed Treatment Charges  Gait training minutes (CPT 03348): 15 minutes  Neuromusc re-ed, balance, coor, post minutes (CPT 33176): 25 minutes        ASSESSMENT:   Response to treatment: Patient focused on stair navigation and gait training into the parallel bars. Patient had some trouble turning his body 180 degrees after he descends the stairs as he lets g0 of the railing with his right hand too early and losses balance. During the parallel bars, worked on turning at the end of the bars so he can practice maneuver his hands around the bars.   PLAN/RECOMMENDATIONS:   Plan for treatment: therapy treatment to continue next visit.  Planned interventions for next visit: continue with current treatment.      [x] As the licensed  therapist supervising this student, I was present during the entire treatment session directing the care and reviewing the assessment plan.  I reviewed all documentation prior to signing.

## 2022-02-08 ENCOUNTER — PHYSICAL THERAPY (OUTPATIENT)
Dept: PHYSICAL THERAPY | Facility: MEDICAL CENTER | Age: 27
End: 2022-02-08
Attending: FAMILY MEDICINE
Payer: MEDICARE

## 2022-02-08 DIAGNOSIS — I69.193: ICD-10-CM

## 2022-02-08 PROCEDURE — 97110 THERAPEUTIC EXERCISES: CPT

## 2022-02-08 PROCEDURE — 97112 NEUROMUSCULAR REEDUCATION: CPT

## 2022-02-08 PROCEDURE — 97116 GAIT TRAINING THERAPY: CPT

## 2022-02-08 NOTE — OP THERAPY DAILY TREATMENT
Outpatient Physical Therapy  DAILY TREATMENT     [facility] Outpatient Physical Therapy  [address]  [place] [address]  Phone:  [phone]  Fax:  [phone]    Date: 02/08/2022    Patient: Rey Medina  YOB: 1995  MRN: [id number]     Time Calculation    Start time: 0945  Stop time: 1030 Time Calculation (min): 45 minutes         Chief Complaint: Difficulty Walking and Loss Of Balance    Visit #: 40    SUBJECTIVE:  Rey states that he is doing well today. His father mentioned that they went outside on a walk because the patient wanted to go outside. They used the transfer chair to maneuver Rey around for the walk. Father mentioned that he also has a power wheelchair and a wheelchair where the patient can maneuver on his own. The father of the patient stated that the patient maneuvers around the house using a power wheelchair and manual chair independently. The only issues that the patient has is going into the shower by himself since the bathroom is small and has a step to get into the shower.      OBJECTIVE:  Current objective measures:           Therapeutic Exercises (CPT 22818):     1. Nu Step , 8 min     Therapeutic Treatments and Modalities:     1. Neuromuscular Re-education (CPT 41241), transfer from Nu step to wheelchair min assist, wheelchair maneuvering from Nu step to wide chair (80 feet), sit to stands from wheelchair to counter, lateral shuffling 10 feet x5, Forward reaching on mirror while 1HHA on the bar, lateral shuffling maneuvering around the high-low table (x1)    2. Gait Training (CPT 38819), min-mod assistance with gait belt and walker from lobby to Nu Step ; 5 min     Time-based treatments/modalities:    Physical Therapy Timed Treatment Charges  Gait training minutes (CPT 97702): 15 minutes  Neuromusc re-ed, balance, coor, post minutes (CPT 20710): 20 minutes  Therapeutic exercise minutes (CPT 97839): 10  minutes  ASSESSMENT:   Response to treatment: Patient worked on maneuvering through different counter surfaces (those that can  and slippery surfaces). Wanted to see how patient can move through the manual wheelchair to determine how he moves around his house. Patient had some trouble from sitting in a wheelchair to standing and he uses his momentum to bring his body forward. Surfaces that have no ability to  (high-low table) where the surface is slippery had the patient struggle on how to position his hands to keep his stability in standing. With lateral walking, patient uses momentum to bring his right foot next to his left foot when shifting to the left, over shooting his base of support more towards the left.      PLAN/RECOMMENDATIONS:   Plan for treatment: therapy treatment to continue next visit.  Planned interventions for next visit: continue with current treatment.      [x] As the licensed therapist supervising this student, I was present during the entire treatment session directing the care and reviewing the assessment plan.  I reviewed all documentation prior to signing.

## 2022-02-11 ENCOUNTER — PHYSICAL THERAPY (OUTPATIENT)
Dept: PHYSICAL THERAPY | Facility: MEDICAL CENTER | Age: 27
End: 2022-02-11
Attending: FAMILY MEDICINE
Payer: MEDICARE

## 2022-02-11 DIAGNOSIS — I69.193: ICD-10-CM

## 2022-02-11 PROCEDURE — 97112 NEUROMUSCULAR REEDUCATION: CPT

## 2022-02-11 PROCEDURE — 97110 THERAPEUTIC EXERCISES: CPT

## 2022-02-11 PROCEDURE — 97116 GAIT TRAINING THERAPY: CPT

## 2022-02-11 NOTE — OP THERAPY DAILY TREATMENT
Outpatient Physical Therapy  DAILY TREATMENT     [facility] Outpatient Physical Therapy  [address]  [place] [address]  Phone:  [phone]  Fax:  [phone]    Date: 02/11/2022    Patient: Rey Medina  YOB: 1995  MRN: [id number]     Time Calculation    Start time: 1427  Stop time: 1515 Time Calculation (min): 48 minutes         Chief Complaint: Difficulty Walking and Loss Of Balance    Visit #: 41    SUBJECTIVE:  Patient is doing well and is motivated for therapy today.     OBJECTIVE:  Current objective measures:           Therapeutic Exercises (CPT 30571):     1. Nu Step, 8 min, seat ad arms at 8    2. Gastroc Stretch on incline , 2 min     3. Supine Bridges , 10x    4. Supine bridges with ball rolls , 10x    Therapeutic Treatments and Modalities:     1. Neuromuscular Re-education (CPT 88687), seated resisted forward/backward/side reaction motions x5 each way( then x5 with airex pad under patient), reaching forward with cones 3min, mat to floor transfer, high kneeling balance 4x 15 secs    2. Gait Training (CPT 38806), Walker with min-mod assist from lobby to Nu Step 4 min     Time-based treatments/modalities:         ASSESSMENT:   Response to treatment: Patient focused on balancing technique that included resisted reactions and forward reaching. With the resisted reactions, patient did well where he was able to put resistance against my force and when I let go, he is able to get back into neutral position. With the airex pad under the patient, hehad trouble with the backwards resisted motions when he placed moderate force against me, having him have a limited righting reacting, showing limited anterior core. Patient was able to lift his hips off the table and curl his legs with the ball.     PLAN/RECOMMENDATIONS:   Plan for treatment: therapy treatment to continue next visit.  Planned interventions for next visit: continue with current  treatment.      [x] As the licensed therapist supervising this student, I was present during the entire treatment session directing the care and reviewing the assessment plan.  I reviewed all documentation prior to signing.

## 2022-02-15 ENCOUNTER — PHYSICAL THERAPY (OUTPATIENT)
Dept: PHYSICAL THERAPY | Facility: MEDICAL CENTER | Age: 27
End: 2022-02-15
Attending: FAMILY MEDICINE
Payer: MEDICARE

## 2022-02-15 DIAGNOSIS — I69.193: ICD-10-CM

## 2022-02-15 PROCEDURE — 97112 NEUROMUSCULAR REEDUCATION: CPT

## 2022-02-15 PROCEDURE — 97110 THERAPEUTIC EXERCISES: CPT

## 2022-02-15 NOTE — OP THERAPY DAILY TREATMENT
Outpatient Physical Therapy  DAILY TREATMENT     [facility] Outpatient Physical Therapy  [address]  [place] [address]  Phone:  [phone]  Fax:  [phone]    Date: 02/15/2022    Patient: Rey Medina  YOB: 1995  MRN: [id number]     Time Calculation    Start time: 0946  Stop time: 1031 Time Calculation (min): 45 minutes         Chief Complaint: Loss Of Balance and Difficulty Walking    Visit #: 42    SUBJECTIVE:  Father of the patient mentioned that they went to the park where Rey worked on some walking with assistance by the father. He was able to walk about 70 meters before he needed to sit back down.     OBJECTIVE:  Current objective measures:           Therapeutic Exercises (CPT 83027):     1. Nu Step, 8 min, seat ad arms at 8    2. Gastroc Stretch on incline , 2 min     Therapeutic Treatments and Modalities:     1. Neuromuscular Re-education (CPT 63617), sit to stands to the bar (10x with  min assistance), alternating leg lifts to a 6in step 10x each side with min assist, squats with UE asssiatcne on bar (2x 10 with min assist), banded leg press on the bar with blue band (10x with contact guard), lateral stepping with UE assistance on the bar (4x4 with contact guard)    2. Gait Training (CPT 73835), Walker with min-mod assist from lobby to Nu Step 5 min , walking min to mod from nu setp tp chair 50 feet     Time-based treatments/modalities:    Physical Therapy Timed Treatment Charges  Gait training minutes (CPT 32835): 10 minutes  Neuromusc re-ed, balance, coor, post minutes (CPT 50877): 25 minutes  Therapeutic exercise minutes (CPT 30585): 10 minutes    ASSESSMENT:   Response to treatment: Patient worked on some strengthening of the legs with slow tempo to control the leg. With the banded leg press, the patient is able to control the right leg better than the left, with the right foot wanting to stay back behind the leg. With the left  foot, the patient has trouble controlling the leg up when the band is pulling him up. With lateral stepping the patient kicked the cone with the left leg, further finding that the left side of his LE is more difficult for his body to control.     PLAN/RECOMMENDATIONS:   Plan for treatment: therapy treatment to continue next visit.  Planned interventions for next visit: continue with current treatment.      [x] As the licensed therapist supervising this student, I was present during the entire treatment session directing the care and reviewing the assessment plan.  I reviewed all documentation prior to signing.

## 2022-02-18 ENCOUNTER — PHYSICAL THERAPY (OUTPATIENT)
Dept: PHYSICAL THERAPY | Facility: MEDICAL CENTER | Age: 27
End: 2022-02-18
Attending: FAMILY MEDICINE
Payer: MEDICARE

## 2022-02-18 DIAGNOSIS — I69.193: ICD-10-CM

## 2022-02-18 PROCEDURE — 97110 THERAPEUTIC EXERCISES: CPT

## 2022-02-18 PROCEDURE — 97116 GAIT TRAINING THERAPY: CPT

## 2022-02-18 NOTE — OP THERAPY DAILY TREATMENT
Outpatient Physical Therapy  DAILY TREATMENT     [facility] Outpatient Physical Therapy  [address]  [place] [address]  Phone:  [phone]  Fax:  [phone]    Date: 02/18/2022    Patient: Rey Medina  YOB: 1995  MRN: [id number]     Time Calculation    Start time: 1430  Stop time: 1515 Time Calculation (min): 45 minutes         Chief Complaint: Loss Of Balance and Difficulty Walking    Visit #: 43    SUBJECTIVE:  Patient and the father of the patient stated that he is doing well. They did their morning exercises but other than that did not do much activity today.     OBJECTIVE:  Current objective measures:           Therapeutic Exercises (CPT 94480):     1. Nu Step , seat and chair at 9, L3,, 8 min     2. Gastroc Stretch , 2 min     Therapeutic Treatments and Modalities:     1. Gait Training (CPT 14521), From lobby to Nu Step 5 min, moderate assistance, had more difficulty with leaning towards the posterior left and losing some control with the walker.     2. Neuromuscular Re-education (CPT 45042), See summary     Therapeutic Treatment and Modalities Summary: Lite Gait   1st round  - 0.5 mph  - stopped at 3:50  2nd round   - 0.3mph  - stopped at 6:25  3rd round (reverse)  -0.3 mph  - stopped at 8:00    Total 222 feet, 8 min       Time-based treatments/modalities:    Physical Therapy Timed Treatment Charges  Gait training minutes (CPT 32243): 35 minutes  Therapeutic exercise minutes (CPT 46100): 10 minutes          ASSESSMENT:   Response to treatment: Patient was on the LiteGait for today's session. During the first round, patient was stepping ahead of the harness but towards the end of the round, he was getting tired. During the second round took a slower pace and he was able to go for longer with better stepping mechanics. Will work on patient's gait mobility and continue on with balance techniques.     PLAN/RECOMMENDATIONS:   Plan for  treatment: therapy treatment to continue next visit.  Planned interventions for next visit: continue with current treatment.      [x] As the licensed therapist supervising this student, I was present during the entire treatment session directing the care and reviewing the assessment plan.  I reviewed all documentation prior to signing.

## 2022-02-18 NOTE — THERAPY
"Occupational Therapy  Daily Treatment     Patient Name: Rey Medina  Age:  24 y.o., Sex:  male  Medical Record #: [id number]  Today's Date: 3/10/2020     Precautions  Precautions: Fall Risk, PEG Tube, Other (See Comments)  Comments: dysarthric (however improving), shunt, modified diet    Safety   ADL Safety : Requires Physical Assist for Safety  Bathroom Safety: Requires Physical Assist for Safety  Comments: See FIMs for ADL performance details    Subjective    \"No! I have no reason!\"     Objective       03/10/20 1301   Precautions   Precautions Fall Risk;PEG Tube;Other (See Comments)   Comments dysarthric (however improving), shunt, modified diet   Vitals   O2 Delivery Device None - Room Air   Non Verbal Descriptors   Non Verbal Scale  Tense Body Language   Bed Mobility    Supine to Sit Total Assist X 2   Comments Therapist attempted to transition pt from supine to EOB sitting for transfer to manual wc.  Pt required frqnt tactile/VQ's to encourage participation.  Pt refused and returned to supine   Interdisciplinary Plan of Care Collaboration   IDT Collaboration with  Family / Caregiver;Physical Therapist;Therapy Tech;Physician   Patient Position at End of Therapy In Bed;Bed Alarm On;Call Light within Reach;Tray Table within Reach;Phone within Reach;Family / Friend in Room   Collaboration Comments Sister present throughout tx session.  Therapist attempted to transfer pt from bed to manual wc (see notes above).  Pt refused.  Pt attempted in bed to manage upper body clothing - pt refused.   Therapy Missed   Missed Therapy (Minutes) 15   Reason For Missed Therapy Non-Medical - Patient Refused  (Pt refused tx.  Therapist attempted several times to encoura)   OT Total Time Spent   OT Individual Total Time Spent (Mins) 15   OT Charge Group   OT Therapy Activity 1         Assessment    Pt required Max tactile/VQ's to encourage participation (bed/chair transfer and UB clothing management) - pt refused w/out reason.  " See notes above    Plan    ADL retraining while incorporating low-stim environment as needed, BUE strength and coordination     same name as above

## 2022-02-24 ENCOUNTER — PHYSICAL THERAPY (OUTPATIENT)
Dept: PHYSICAL THERAPY | Facility: MEDICAL CENTER | Age: 27
End: 2022-02-24
Attending: FAMILY MEDICINE
Payer: MEDICARE

## 2022-02-24 DIAGNOSIS — I61.5 NONTRAUMATIC INTRAVENTRICULAR INTRACEREBRAL HEMORRHAGE, UNSPECIFIED LATERALITY (HCC): ICD-10-CM

## 2022-02-24 DIAGNOSIS — I69.193: ICD-10-CM

## 2022-02-24 DIAGNOSIS — R26.2 DIFFICULTY IN WALKING: ICD-10-CM

## 2022-02-24 PROCEDURE — 97112 NEUROMUSCULAR REEDUCATION: CPT

## 2022-02-24 PROCEDURE — 97110 THERAPEUTIC EXERCISES: CPT

## 2022-02-24 NOTE — OP THERAPY DAILY TREATMENT
Outpatient Physical Therapy  DAILY TREATMENT     [facility] Outpatient Physical Therapy  [address]  [place] [address]  Phone:  [phone]  Fax:  [phone]    Date: 02/24/2022    Patient: Rey Medina  YOB: 1995  MRN: [id number]     Time Calculation    Start time: 1030  Stop time: 1115 Time Calculation (min): 45 minutes         Chief Complaint: Loss Of Balance and Difficulty Walking    Visit #: 44    SUBJECTIVE:  Patient states that they went out a few days ago and worked on walking at the park. He practiced about 70 feet again before resting. Today, patient states that he is doing well.     OBJECTIVE:  Current objective measures:           Therapeutic Exercises (CPT 01721):     1. Nu Step , seat and chair at 9, L3,, 8 min     2. Gastroc Stretch , 2 min     3. Seated ball rolls , 10x forward, then each side    4. Sit to stands , 2x10    5. Supine ball rolls , 2 x 10     6. Bridges with ball, 2 x 10     Therapeutic Treatments and Modalities:     1. Gait Training (CPT 99394), From lobby to Nu Step 4 min, moderate assistance, improved on his ability to walk with contact guard to min assistance    2. Neuromuscular Re-education (CPT 02821), mat to floor transfer and floor to mat transfer 5x, high kneeling balance 30 secs x 5,, (From mat to floor transfer- patient has an easier time moving towards the left side)    Therapeutic Treatment and Modalities Summary:       Time-based treatments/modalities:    Physical Therapy Timed Treatment Charges  Gait training minutes (CPT 56834): 10 minutes  Neuromusc re-ed, balance, coor, post minutes (CPT 20957): 25 minutes  Therapeutic exercise minutes (CPT 86531): 10 minutes      ASSESSMENT:   Response to treatment: Patient did well today, worked on balance in sitting and in high kneeling. Patient worked on transitions from mat to floor and floor to mat, seeing a difference on what side he goes over (better to his  L). When walking with him from lobby to Nu-step, patient needed less assistance from contact guard to min assistance, showed improvements on less assistance.     PLAN/RECOMMENDATIONS:   Plan for treatment: therapy treatment to continue next visit.  Planned interventions for next visit: continue with current treatment.      [x] As the licensed therapist supervising this student, I was present during the entire treatment session directing the care and reviewing the assessment plan.  I reviewed all documentation prior to signing.

## 2022-03-04 ENCOUNTER — PHYSICAL THERAPY (OUTPATIENT)
Dept: PHYSICAL THERAPY | Facility: MEDICAL CENTER | Age: 27
End: 2022-03-04
Attending: FAMILY MEDICINE
Payer: MEDICARE

## 2022-03-04 DIAGNOSIS — I69.193: ICD-10-CM

## 2022-03-04 PROCEDURE — 97112 NEUROMUSCULAR REEDUCATION: CPT

## 2022-03-04 PROCEDURE — 97110 THERAPEUTIC EXERCISES: CPT

## 2022-03-04 NOTE — OP THERAPY PROGRESS SUMMARY
Outpatient Physical Therapy  PROGRESS SUMMARY NOTE      [facility] Outpatient Physical Therapy  [address]  [place] [address]  Phone:  [phone]  Fax:  [phone]    Date of Visit: 03/04/2022    Patient: Rey Medina  YOB: 1995  MRN: [id number]     Referring Provider: Sherie Power M.D.  [address]  [place] [address]   Referring Diagnosis Other abnormalities of gait and mobility [R26.89];Difficulty in walking, not elsewhere classified [R26.2]     Visit Diagnoses     ICD-10-CM   1. 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 Rey has been making improvements in needing less outside assistance during transfers and standing static balancing. Patient continues to struggle with waking using a FWW as the FWW does not give him enough stability to take steps without losing his balance. Overall, patient is doing well with activities that focuses on his balance and transfers to be able to use these skills at home.     Objective findings and assessment details:   1/18  5 x STS: 26 sec EOM BUE to FWW, backward LOB x 1   Static unspported stance with UE x 2 FWW 1 min +/ UE x 1 on FWW 1 min +  Static unsupported standing with SBA: 15-35 sec in 3 trials   Seated transfers chair to wheelchair with use of 2 UE 50% min to mod assist/50% SBA   Gait 50 feet with FWW and SBA to Mod a: 3: 16 sec    Seated  Floor transfer with 8 ft crawl x 2 and back to chair 2 min     Today  5x STS: 33 secs with BUE EOM   Static unsupported stance with UEx2 w/FWW : 1min+  Static unsupported stance with UE x1 w/FWW: 1min+  Static unsupported stance with SBA: 1min   Seated transfer chair to wheelchair: with use of 2 UE  25% min assist/ 75% SBA  Gait 50 feet with FWW SBA to mod assist: 2:58min (2-3x loss of balance leaning backwards)  Seated Floor transfer with 8ft crawl x 2 and back to chair : 1:49    Goals:   Short Term Goals:   1.Be able to walk 50 feet with FWW with SBA to MIN A in 3 minutes to progress for independence in walking. GOAL PARTIALLY MET  2. Seated floor transfer with 8 ft crawl x 2 and back to chair in 1:30 secs for independence in home tasks. GOAL NOT MET  3. Seated transfers chair to wheelchair with use of 2UE 25% min to mod assist/75% SBA to reduce outside assistance GOAL MET        Long Term Goals:    1. Establish HARRISON balance score GOAL NOT TESTED  2. Transfer from chair to mat with setup, VC/TC and SBA only 3/4 trials GOAL NOT TESTED      Goals:   Short Term Goals:   1. Be able to walk 50 feet with FWW with SBA to MIN A in 2: 40 min to progress for independence in walking  2. Seated Floor transfer with 8ft crawl x 2 and back to chair in 1:40 secs for independence in home tasks.   Short term goal time span:  2-4 weeks      Long Term Goals:    1. Establish HARRISON balance score   2. Transfer from chair to mat with setup, VC/TC and SBA only 3/4 trials   Long term goal time span:  4-6 weeks    Plan:   Planned therapy interventions:  Neuromuscular Re-education (CPT 82291) and Therapeutic Exercise (CPT 62495)  Frequency:  2x week  Duration in weeks:  4  Duration in visits:  8      Referring provider co-signature:  I have reviewed this plan of care and my co-signature certifies the need for services.     Certification Period: 03/04/2022 to 04/08/22    Physician Signature: ________________________________ Date: ______________       [x] As the licensed therapist supervising this student, I was present during the entire treatment session directing the care and reviewing the assessment plan.  I reviewed all documentation prior to signing.

## 2022-03-04 NOTE — OP THERAPY DAILY TREATMENT
Outpatient Physical Therapy  DAILY TREATMENT     [facility] Outpatient Physical Therapy  [address]  [place] [address]  Phone:  [phone]  Fax:  [phone]    Date: 03/04/2022    Patient: Rey Medina  YOB: 1995  MRN: [id number]     Time Calculation    Start time: 0800  Stop time: 0845 Time Calculation (min): 45 minutes         Chief Complaint: Loss Of Balance and Difficulty Walking    Visit #: 45    SUBJECTIVE:  Patient states that he is doing well today. He states that he went to the [employer] to work on walking.     OBJECTIVE:  Current objective measures:                 Therapeutic Exercises (CPT 06122):     1. Nu Step , seat and chair at 9, L3, 8 min     Therapeutic Treatments and Modalities:     1. Neuromuscular Re-education (CPT 73552), See Below     Therapeutic Treatment and Modalities Summary: 5x STS: 33 secs with BUE EOM   Static unsupported stance with UEx2 w/FWW : 1min+  Static unsupported stance with UE x1 w/FWW: 1min+  Static unsupported stance with SBA: 1min   Seated transfer chair to wheelchair on the right: with use of 2 UE 25% min assist/ 75% SBA  Gait 50 feet with FWW SBA to mod assist: 2:58min (2-3x loss of balance leaning backwards)  Seated Floor transfer with 8ft crawl x 2 and back to chair- 1:49      Time-based treatments/modalities:    Physical Therapy Timed Treatment Charges  Neuromusc re-ed, balance, coor, post minutes (CPT 28435): 30 minutes  Therapeutic exercise minutes (CPT 68270): 15 minutes    ASSESSMENT:   Response to treatment: Patient made improvements with the static balancing in standing and being able to transfer with less assistance. Patient had trouble today walking with the FWW as he had a few instances where he lost his balance, took a few larger steps where he lost his balance and leaned back 4-5 times. Patient will be seen for a few more visits as he is running out of authorization and will be  taking a break.     PLAN/RECOMMENDATIONS:   Plan for treatment: therapy treatment to continue next visit.  Planned interventions for next visit: continue with current treatment.      [x] As the licensed therapist supervising this student, I was present during the entire treatment session directing the care and reviewing the assessment plan.  I reviewed all documentation prior to signing.

## 2022-03-08 ENCOUNTER — PHYSICAL THERAPY (OUTPATIENT)
Dept: PHYSICAL THERAPY | Facility: MEDICAL CENTER | Age: 27
End: 2022-03-08
Attending: FAMILY MEDICINE
Payer: MEDICARE

## 2022-03-08 DIAGNOSIS — R26.2 DIFFICULTY IN WALKING: ICD-10-CM

## 2022-03-08 DIAGNOSIS — I61.5 NONTRAUMATIC INTRAVENTRICULAR INTRACEREBRAL HEMORRHAGE, UNSPECIFIED LATERALITY (HCC): ICD-10-CM

## 2022-03-08 DIAGNOSIS — I69.193: ICD-10-CM

## 2022-03-08 PROCEDURE — 97110 THERAPEUTIC EXERCISES: CPT

## 2022-03-08 PROCEDURE — 97112 NEUROMUSCULAR REEDUCATION: CPT

## 2022-03-08 NOTE — OP THERAPY DAILY TREATMENT
Outpatient Physical Therapy  DAILY TREATMENT     [facility] Outpatient Physical Therapy  [address]  [place] [address]  Phone:  [phone]  Fax:  [phone]    Date: 03/08/2022    Patient: Rey Medina  YOB: 1995  MRN: [id number]     Time Calculation    Start time: 1415  Stop time: 1500 Time Calculation (min): 45 minutes         Chief Complaint: Loss Of Balance, Difficulty Walking, and Back Problem    Visit #: 46    SUBJECTIVE:  Patient states that he is doing well today. No status change today .  OBJECTIVE:  Current objective measures:                 Therapeutic Exercises (CPT 07045):     1. Upright bike, Seat level 3, 2 min x 3 sets    2. Counter side step 10 feet, Cues for feet straight    3. Counter, rotation transfer, Walker to counter, Harder to turn to L side     4. Climbing ladder for hand eye coordiantation.       Therapeutic Exercise Summary: Notes that walking is slight better today walking to bike    Therapeutic Treatments and Modalities:     1. Neuromuscular Re-education (CPT 68169), See Below     Therapeutic Treatment and Modalities Summary: 5x STS: 33 secs with BUE EOM   Static unsupported stance with UEx2 w/FWW : 1min+  Gait 50 feet with FWW SBA to mod assist: 2:58min (2-3x loss of balance leaning backwards)  Seated Floor transfer with 8ft crawl x 2 and back to chair- 1:49      Time-based treatments/modalities:    Physical Therapy Timed Treatment Charges  Neuromusc re-ed, balance, coor, post minutes (CPT 24162): 25 minutes  Therapeutic exercise minutes (CPT 72788): 20 minutes    ASSESSMENT:   Response to treatment: He has visual tracking and hand eye coordination issues. Keep working safety of transfer going forward. .     PLAN/RECOMMENDATIONS:   Plan for treatment: therapy treatment to continue next visit.  Planned interventions for next visit: continue with current treatment.

## 2022-03-11 ENCOUNTER — PHYSICAL THERAPY (OUTPATIENT)
Dept: PHYSICAL THERAPY | Facility: MEDICAL CENTER | Age: 27
End: 2022-03-11
Attending: FAMILY MEDICINE
Payer: MEDICARE

## 2022-03-11 DIAGNOSIS — I69.193: ICD-10-CM

## 2022-03-11 DIAGNOSIS — R26.2 DIFFICULTY IN WALKING: ICD-10-CM

## 2022-03-11 PROCEDURE — 97116 GAIT TRAINING THERAPY: CPT

## 2022-03-11 PROCEDURE — 97110 THERAPEUTIC EXERCISES: CPT

## 2022-03-11 PROCEDURE — 97112 NEUROMUSCULAR REEDUCATION: CPT

## 2022-03-11 NOTE — OP THERAPY DAILY TREATMENT
Outpatient Physical Therapy  DAILY TREATMENT     [facility] Outpatient Physical Therapy  [address]  [place] [address]  Phone:  [phone]  Fax:  [phone]    Date: 03/11/2022    Patient: Rey Medina  YOB: 1995  MRN: [id number]     Time Calculation    Start time: 0800  Stop time: 0845 Time Calculation (min): 45 minutes         Chief Complaint: Difficulty Walking    Visit #: 47    SUBJECTIVE:  Patient states that he is doing well today. No status change today . They report when the weather is good they will go to [facility] or [facility] to practice walking.   OBJECTIVE:  Current objective measures:                 Therapeutic Exercises (CPT 35935):     1. Nu step , Seat 8 and UE 8, 10 mins     2. Gastroc stretch on slant board, 1 min     3. HS stretch on true cage, x 15 each side    4. Knee flexion/knees over toes ,  x 10 eachs side      Therapeutic Exercise Summary: Notes that walking is slight better today walking to bike    Therapeutic Treatments and Modalities:     1. Neuromuscular Re-education (CPT 98613), See Below     2. Gait Training (CPT 06347), Walking 50 feet from lobby to nu step , 3:30, subjective but better control no major LOB    Therapeutic Treatment and Modalities Summary: Cruising and side stepping around stair case   Up/down stairs     Time-based treatments/modalities:    Physical Therapy Timed Treatment Charges  Gait training minutes (CPT 35118): 10 minutes  Neuromusc re-ed, balance, coor, post minutes (CPT 60540): 20 minutes  Therapeutic exercise minutes (CPT 43083): 15 minutes    ASSESSMENT:   Response to treatment: Really like the moments of control and stability he shows with the walker in the last few sessions. We are still planning a taper and some ind time but will      PLAN/RECOMMENDATIONS:   Plan for treatment: therapy treatment to continue next visit.  Planned interventions for next visit: continue with  current treatment.

## 2022-03-16 ENCOUNTER — PHYSICAL THERAPY (OUTPATIENT)
Dept: PHYSICAL THERAPY | Facility: MEDICAL CENTER | Age: 27
End: 2022-03-16
Attending: FAMILY MEDICINE
Payer: MEDICARE

## 2022-03-16 DIAGNOSIS — R26.2 DIFFICULTY IN WALKING: ICD-10-CM

## 2022-03-16 DIAGNOSIS — I69.193: ICD-10-CM

## 2022-03-16 DIAGNOSIS — I61.5 NONTRAUMATIC INTRAVENTRICULAR INTRACEREBRAL HEMORRHAGE, UNSPECIFIED LATERALITY (HCC): ICD-10-CM

## 2022-03-16 PROCEDURE — 97110 THERAPEUTIC EXERCISES: CPT

## 2022-03-16 PROCEDURE — 97116 GAIT TRAINING THERAPY: CPT

## 2022-03-16 NOTE — OP THERAPY DAILY TREATMENT
Outpatient Physical Therapy  DAILY TREATMENT     [facility] Outpatient Physical Therapy  [address]  [place] [address]  Phone:  [phone]  Fax:  [phone]    Date: 03/16/2022    Patient: Rey Medina  YOB: 1995  MRN: [id number]     Time Calculation    Start time: 0845  Stop time: 0930 Time Calculation (min): 45 minutes         Chief Complaint: Difficulty Walking    Visit #: 48    SUBJECTIVE:  Patient states that he is doing well today.He shows good energy for todays session.   OBJECTIVE:  Current objective measures:                 Therapeutic Exercises (CPT 81697):     1. Nu step , Seat 8 and UE 8, 10 mins     2. Gastroc stretch on slant board, 1 min     3. HS stretch on true cage, x 15 each side    4. Knee flexion/knees over toes ,  x 10 eachs side      Therapeutic Exercise Summary: Notes that walking is slight better today walking to bike    Therapeutic Treatments and Modalities:     2. Gait Training (CPT 52606), Walking 50 feet from lobby to nu step , 3:37 today, consistently ave approx 30 sec faster than previous, Stair climbing step through pattern, 2 hha, attempted up with 1 hand, this is not safe    Time-based treatments/modalities:    Physical Therapy Timed Treatment Charges  Gait training minutes (CPT 97095): 10 minutes  Therapeutic exercise minutes (CPT 71340): 35 minutes    ASSESSMENT:   Response to treatment: Continues to shwo varied ability for standing control. Visual tracking is inconsistent and smoothness of UE control for assist is a barrier to     PLAN/RECOMMENDATIONS:   Plan for treatment: therapy treatment to continue next visit.  Planned interventions for next visit: continue with current treatment.

## 2022-03-18 ENCOUNTER — PHYSICAL THERAPY (OUTPATIENT)
Dept: PHYSICAL THERAPY | Facility: MEDICAL CENTER | Age: 27
End: 2022-03-18
Attending: FAMILY MEDICINE
Payer: MEDICARE

## 2022-03-18 DIAGNOSIS — I69.193: ICD-10-CM

## 2022-03-18 DIAGNOSIS — I61.5 NONTRAUMATIC INTRAVENTRICULAR INTRACEREBRAL HEMORRHAGE, UNSPECIFIED LATERALITY (HCC): ICD-10-CM

## 2022-03-18 DIAGNOSIS — R26.2 DIFFICULTY IN WALKING: ICD-10-CM

## 2022-03-18 PROCEDURE — 97116 GAIT TRAINING THERAPY: CPT

## 2022-03-18 PROCEDURE — 97110 THERAPEUTIC EXERCISES: CPT

## 2022-03-18 NOTE — OP THERAPY DAILY TREATMENT
Outpatient Physical Therapy  DAILY TREATMENT     [facility] Outpatient Physical Therapy  [address]  [place] [address]  Phone:  [phone]  Fax:  [phone]    Date: 03/18/2022    Patient: Rey Medina  YOB: 1995  MRN: [id number]     Time Calculation    Start time: 0800  Stop time: 0845 Time Calculation (min): 45 minutes         Chief Complaint: Difficulty Walking    Visit #: 49    SUBJECTIVE:  Patient states that he is doing well today.presents with father. Plans to walk the bert this weekend.    OBJECTIVE:  Current objective measures:                 Therapeutic Exercises (CPT 21552):     1. Nu step , Seat 8 and UE 8, 10 mins     2. Gastroc stretch on slant board, 1 min     3. HS stretch on true cage, x 15 each side    4. Knee flexion/knees over toes ,  x 10 eachs side    Therapeutic Treatments and Modalities:     2. Gait Training (CPT 99510), Walking 50 feet from lobby to nu step , 2:00 today, slight assist with steering the walker, Side step and turning transitions in the P bars, Working large step ups and control down     Time-based treatments/modalities:    Physical Therapy Timed Treatment Charges  Gait training minutes (CPT 06484): 20 minutes  Therapeutic exercise minutes (CPT 64527): 25 minutes    ASSESSMENT:   Response to treatment: Varied control with hand and eye control. Showed his father a drill to be done at a park for practice for transfers.     PLAN/RECOMMENDATIONS:   Plan for treatment: therapy treatment to continue next visit.  Planned interventions for next visit: continue with current treatment.

## 2022-03-25 ENCOUNTER — PHYSICAL THERAPY (OUTPATIENT)
Dept: PHYSICAL THERAPY | Facility: MEDICAL CENTER | Age: 27
End: 2022-03-25
Attending: FAMILY MEDICINE
Payer: MEDICARE

## 2022-03-25 DIAGNOSIS — R26.2 DIFFICULTY IN WALKING: ICD-10-CM

## 2022-03-25 DIAGNOSIS — I61.5 NONTRAUMATIC INTRAVENTRICULAR INTRACEREBRAL HEMORRHAGE, UNSPECIFIED LATERALITY (HCC): ICD-10-CM

## 2022-03-25 DIAGNOSIS — I69.193: ICD-10-CM

## 2022-03-25 PROCEDURE — 97110 THERAPEUTIC EXERCISES: CPT

## 2022-03-25 PROCEDURE — 97116 GAIT TRAINING THERAPY: CPT

## 2022-03-25 PROCEDURE — 97112 NEUROMUSCULAR REEDUCATION: CPT

## 2022-03-25 NOTE — OP THERAPY DAILY TREATMENT
Outpatient Physical Therapy  DAILY TREATMENT     [facility] Outpatient Physical Therapy  [address]  [place] [address]  Phone:  [phone]  Fax:  [phone]    Date: 03/25/2022    Patient: Rey Medina  YOB: 1995  MRN: [id number]     Time Calculation    Start time: 0800  Stop time: 0900 Time Calculation (min): 60 minutes         Chief Complaint: Difficulty Walking and Fall    Visit #: 50    SUBJECTIVE:  Patient states that he is doing well today.presents with father. No status change OBJECTIVE:  Current objective measures:                 Therapeutic Exercises (CPT 21664):     1. Nu step , Seat 8 and UE 8, 10 mins     2. Gastroc stretch on slant board, 1 min     3. HS stretch on true cage, x 15 each side    4. Standing row with walker, , 4 x 10 single arm 5#/5#/10#/15#, Needs CGA to body and to tabilize walker    5. Seated single arm row, 2 x 10 each 20#/25#    6. Seated leg curl, 3 x10 each 5#/10#/10#, L side weaker     Therapeutic Treatments and Modalities:     1. Neuromuscular Re-education (CPT 41480), Walking up down stairs with cues for visual tracking     2. Gait Training (CPT 38550), Walking 50 feet from lobby to nu step , 2:00 today, slight assist with steering the walker, Side step and turning transitions in the P bars, Working large step ups and control down     Time-based treatments/modalities:    Physical Therapy Timed Treatment Charges  Gait training minutes (CPT 39601): 15 minutes  Neuromusc re-ed, balance, coor, post minutes (CPT 76946): 15 minutes  Therapeutic exercise minutes (CPT 11072): 30 minutes    ASSESSMENT:   Response to treatment: He continues to work hard and has some good strength. His standing endurance is variable. We have a couple more visits scheduled then discussed with family need for DC/break due to plateau.      PLAN/RECOMMENDATIONS:   Plan for treatment: therapy treatment to continue next visit.  Planned  interventions for next visit: continue with current treatment.

## 2022-03-29 ENCOUNTER — PHYSICAL THERAPY (OUTPATIENT)
Dept: PHYSICAL THERAPY | Facility: MEDICAL CENTER | Age: 27
End: 2022-03-29
Attending: FAMILY MEDICINE
Payer: MEDICARE

## 2022-03-29 DIAGNOSIS — R26.2 DIFFICULTY IN WALKING: ICD-10-CM

## 2022-03-29 DIAGNOSIS — I69.193: ICD-10-CM

## 2022-03-29 DIAGNOSIS — I61.5 NONTRAUMATIC INTRAVENTRICULAR INTRACEREBRAL HEMORRHAGE, UNSPECIFIED LATERALITY (HCC): ICD-10-CM

## 2022-03-29 PROCEDURE — 97112 NEUROMUSCULAR REEDUCATION: CPT

## 2022-03-29 PROCEDURE — 97110 THERAPEUTIC EXERCISES: CPT

## 2022-03-29 NOTE — OP THERAPY DAILY TREATMENT
Outpatient Physical Therapy  DAILY TREATMENT     [facility] Outpatient Physical Therapy  [address]  [place] [address]  Phone:  [phone]  Fax:  [phone]    Date: 03/29/2022    Patient: Rey Medina  YOB: 1995  MRN: [id number]     Time Calculation    Start time: 0815  Stop time: 0900 Time Calculation (min): 45 minutes         Chief Complaint: Difficulty Walking and Loss Of Balance    Visit #: 51    SUBJECTIVE:  Patient states that he is doing well today.presents with father. No status change     OBJECTIVE:  Current objective measures:                 Therapeutic Exercises (CPT 69787):     1. Nu step , Seat 8 and UE 8, 10 mins     2. Gastroc stretch on slant board, 1 min     3. HS stretch on true cage, x 15 each side    5. Stall bar work, Alt step with hha x 2, Step up x 3 with hha x 2 , standing with eyes open and eyes closed , Marching progression, eye open/eyes closed     10. Seated KB work , 5#, Seated press 3 x 5 eac (cus for elbow to hit arm rest , 5#, KB halos around head 2 x 5 each direction, 5#, KB curl 2 x 10 each side       Therapeutic Exercise Summary: He reports R hand in stronger but it is certainly more ataxic with grab and reaching tasks.   The left is more steady to grab but poor during UE weight movements     Therapeutic Treatments and Modalities:     1. Neuromuscular Re-education (CPT 42535), Walking up down stairs with cues for visual tracking , Several transfers from wx to mat table, More steady with reach when reaching with L hand vs R hand    Time-based treatments/modalities:    Physical Therapy Timed Treatment Charges  Neuromusc re-ed, balance, coor, post minutes (CPT 55235): 15 minutes  Therapeutic exercise minutes (CPT 01143): 30 minutes    ASSESSMENT:   Response to treatment: He shouwed some need for UE use with hands and reaching.  We have a couple more visits scheduled then discussed with family need for DC/break  due to plateau.      PLAN/RECOMMENDATIONS:   Plan for treatment: therapy treatment to continue next visit.  Planned interventions for next visit: continue with current treatment.

## 2022-04-01 ENCOUNTER — PHYSICAL THERAPY (OUTPATIENT)
Dept: PHYSICAL THERAPY | Facility: MEDICAL CENTER | Age: 27
End: 2022-04-01
Attending: FAMILY MEDICINE
Payer: MEDICARE

## 2022-04-01 DIAGNOSIS — R26.2 DIFFICULTY IN WALKING: ICD-10-CM

## 2022-04-01 DIAGNOSIS — I69.193: ICD-10-CM

## 2022-04-01 DIAGNOSIS — I61.5 NONTRAUMATIC INTRAVENTRICULAR INTRACEREBRAL HEMORRHAGE, UNSPECIFIED LATERALITY (HCC): ICD-10-CM

## 2022-04-01 PROCEDURE — 97110 THERAPEUTIC EXERCISES: CPT

## 2022-04-01 PROCEDURE — 97112 NEUROMUSCULAR REEDUCATION: CPT

## 2022-04-01 NOTE — OP THERAPY DAILY TREATMENT
Outpatient Physical Therapy  DAILY TREATMENT     [facility] Outpatient Physical Therapy  [address]  [place] [address]  Phone:  [phone]  Fax:  [phone]    Date: 04/01/2022    Patient: Rey Medina  YOB: 1995  MRN: [id number]     Time Calculation    Start time: 0845  Stop time: 0930 Time Calculation (min): 45 minutes         Chief Complaint: Loss Of Balance    Visit #: 52    SUBJECTIVE:  Patient states that he is doing well today.presents with father. No status change. This is our last scheduled visit due to plateau and need for a break from therapy.     OBJECTIVE:  Current objective measures:  Test 3/4    5x STS: 33 secs with BUE EOM   Static unsupported stance with UEx2 w/FWW : 1min+  Static unsupported stance with UE x1 w/FWW: 1min+  Static unsupported stance with SBA: 1min   Seated transfer chair to wheelchair: with use of 2 UE 25% min assist/ 75% SBA  Gait 50 feet with FWW SBA to mod assist: 2:58min (2-3x loss of balance leaning backwards)  Seated Floor transfer with 8ft crawl x 2 and back to chair : 1:49    Tested 4/1/22   5x STS: 24 sec but very wild pushing legs into chair and use UE, need 2 trials to slow him down    Static unsupported stance with UEx2 w/FWW : 1min+ yes  Static unsupported stance with UE x1 w/FWW: 1min+ yes  Static unsupported stance with SBA: 4 sec, 16 sec, 46 sec   Seated transfer chair to wheelchair: with use of 2 UE closed to 50% SBA and CGA to keep LOB forward   Gait 50 feet with FWW SBA to mod assist: 2:28min,  3;13 second trial  (2-3x loss of balance leaning backwards)  Seated Floor transfer with 8ft crawl x 2 and back to chair : 1:47 today    Short Term Goals:   1. Be able to walk 50 feet with FWW with SBA to MIN A in 2: 40 min to progress for independence in walking Partially met, no consistent   2. Seated Floor transfer with 8ft crawl x 2 and back to chair in 1:40 secs for independence in home tasks. GOAL  NOT MET    Long Term Goals:    1. Establish HARRISON balance score No time, not likely appropriate  2. Transfer from chair to mat with setup, VC/TC and SBA only 3/4 trials GOAL NOT MET             Therapeutic Exercises (CPT 13930):     1. Nu step , Seat 8 and UE 8, 7 mins .25 mile, 1 lap     2. Gastroc stretch on slant board, 1 min       Time-based treatments/modalities:    Physical Therapy Timed Treatment Charges  Neuromusc re-ed, balance, coor, post minutes (CPT 07113): 30 minutes  Therapeutic exercise minutes (CPT 45737): 15 minutes    ASSESSMENT:   Response to treatment: He was more fatigued and wobbly with walking and ind standing today. We try and keep order of activities and conditions similar but his score waver and are not consistetnt.  Overall he is doing better per father and via therapist subjective observation but objectively it is not consistent. Discussed with family need for DC/break due to plateau.      PLAN/RECOMMENDATIONS:   Plan for treatment: DC to HEP. Formal report to follow.

## 2022-04-01 NOTE — OP THERAPY DISCHARGE SUMMARY
Outpatient Physical Therapy  DISCHARGE SUMMARY NOTE      [facility] Outpatient Physical Therapy  [address]  [place] [address]  Phone:  [phone]  Fax:  [phone]    Date of Visit: 04/01/2022    Patient: Rey Medina  YOB: 1995  MRN: [id number]     Referring Provider: Sherie Power M.D.  [address] [facility]  [address]  [place] [address]   Referring Diagnosis Other abnormalities of gait and mobility [R26.89];Difficulty in walking, not elsewhere classified [R26.2]         Functional Assessment Used        Your patient is being discharged from Physical Therapy with the following comments:   · Goals met  · Goals not met  · Progress plateau    Comments:  Rey was seen in physical therapy for evaluation and treatment of post stroke, ataxia, loss of balance and generalized weakness  Patient attended a total of 52 treatment sessions over from July 2021 to April 1 2022.     Patient initially presented with the following limitations of weakness, difficulty walking, loss of balance, safety issues. Treatments to address these limitations included repetitive gross motor skill work, balance and safety in transfer training. Parent/caregiver education. Home exercise program    Over the course of therapy patient worked towards the following goals, however, due to diagnosis and slow change in his presentation goals were unable to be achieved.     Therapist recommends that patient continues to have a break due to plateau and work on his gait and balance    Thank you for the opportunity to work with Rey and participate in their care. If I may be of any further assistance please feel free to contact me.     Test date 3/4/22   5x STS: 33 secs with BUE EOM   Static unsupported stance with UEx2 w/FWW : 1min+  Static unsupported stance with UE x1 w/FWW: 1min+  Static unsupported stance with SBA: 1min   Seated transfer chair to wheelchair: with use of 2 UE 25% min assist/ 75%  SBA  Gait 50 feet with FWW SBA to mod assist: 2:58min (2-3x loss of balance leaning backwards)  Seated Floor transfer with 8ft crawl x 2 and back to chair : 1:49    Tested 4/1/22   5x STS: 24 sec but very wild pushing legs into chair and use UE, need 2 trials to slow him down  To get this time  Static unsupported stance with UEx2 w/FWW : 1min+ yes  Static unsupported stance with UE x1 w/FWW: 1min+ yes  Static unsupported stance with SBA: 4 sec, 16 sec, 46 sec (more wobbly today)  Seated transfer chair to wheelchair: with use of 2 UE closed to 50% SBA and CGA to keep LOB forward   Gait 50 feet with FWW SBA to mod assist: 2:28min,  3;13 second trial  (2-3x loss of balance leaning backwards) LOB big enough to be falls if not help. Straight 50ff  Seated Floor transfer with 8ft crawl x 2 and back to chair : 1:47 today (SBA and to stabilize chair)    Short Term Goals:   1. Be able to walk 50 feet with FWW with SBA to MIN A in 2: 40 min to progress for independence in walking Partially met, no consistent   2. Seated Floor transfer with 8ft crawl x 2 and back to chair in 1:40 secs for independence in home tasks. GOAL NOT MET    Long Term Goals:    1. Establish HARRISON balance score No time, not likely appropriate  2. Transfer from chair to mat with setup, VC/TC and SBA only 3/4 trials GOAL NOT MET         Shukri Connolly, PT, DPT    Date: 4/1/2022

## 2022-04-04 RX ORDER — MONTELUKAST SODIUM 10 MG/1
10 TABLET ORAL
Qty: 90 TABLET | Refills: 0 | Status: SHIPPED | OUTPATIENT
Start: 2022-04-04 | End: 2023-03-14

## 2022-05-24 ENCOUNTER — OFFICE VISIT (OUTPATIENT)
Dept: MEDICAL GROUP | Facility: MEDICAL CENTER | Age: 27
End: 2022-05-24
Attending: FAMILY MEDICINE
Payer: MEDICARE

## 2022-05-24 VITALS
HEART RATE: 83 BPM | SYSTOLIC BLOOD PRESSURE: 112 MMHG | RESPIRATION RATE: 16 BRPM | TEMPERATURE: 98.7 F | DIASTOLIC BLOOD PRESSURE: 58 MMHG | OXYGEN SATURATION: 94 %

## 2022-05-24 DIAGNOSIS — Z23 NEED FOR VACCINATION: ICD-10-CM

## 2022-05-24 DIAGNOSIS — Z00.00 HEALTHCARE MAINTENANCE: ICD-10-CM

## 2022-05-24 DIAGNOSIS — R00.0 TACHYCARDIA: ICD-10-CM

## 2022-05-24 DIAGNOSIS — G47.00 INSOMNIA, UNSPECIFIED TYPE: ICD-10-CM

## 2022-05-24 DIAGNOSIS — R63.0 POOR APPETITE: ICD-10-CM

## 2022-05-24 DIAGNOSIS — I69.191 DYSPHAGIA FOLLOWING NONTRAUMATIC INTRACEREBRAL HEMORRHAGE: ICD-10-CM

## 2022-05-24 DIAGNOSIS — Z86.79 H/O SPONT INTRAPARENCHYMAL INTRACRANIAL HEMORRHAGE D/T CEREBRAL AVM: ICD-10-CM

## 2022-05-24 PROCEDURE — 90715 TDAP VACCINE 7 YRS/> IM: CPT

## 2022-05-24 PROCEDURE — 99213 OFFICE O/P EST LOW 20 MIN: CPT | Mod: 25 | Performed by: FAMILY MEDICINE

## 2022-05-24 PROCEDURE — 99214 OFFICE O/P EST MOD 30 MIN: CPT | Performed by: FAMILY MEDICINE

## 2022-05-24 RX ORDER — HYDROXYZINE HYDROCHLORIDE 25 MG/1
12.5-5 TABLET, FILM COATED ORAL NIGHTLY PRN
Qty: 60 TABLET | Refills: 3 | Status: SHIPPED | OUTPATIENT
Start: 2022-05-24 | End: 2022-06-15

## 2022-05-24 RX ORDER — MIRTAZAPINE 15 MG/1
15 TABLET, FILM COATED ORAL
Qty: 30 TABLET | Refills: 0 | Status: SHIPPED | OUTPATIENT
Start: 2022-05-24 | End: 2022-06-27 | Stop reason: SDUPTHER

## 2022-05-24 RX ORDER — PROPRANOLOL HYDROCHLORIDE 10 MG/1
10 TABLET ORAL 2 TIMES DAILY
COMMUNITY
End: 2022-05-24

## 2022-05-25 ENCOUNTER — TELEPHONE (OUTPATIENT)
Dept: MEDICAL GROUP | Facility: MEDICAL CENTER | Age: 27
End: 2022-05-25
Payer: MEDICARE

## 2022-05-25 NOTE — PROGRESS NOTES
Subjective:     CC:   Chief Complaint   Patient presents with   • Annual Wellness Visit   • Referral Needed     Home therapy         HPI:     Tachycardia  Mom has been slowly decreasing propranolol, previously at 20mg TID and now at 10mg BID almost a year ago. 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   He is walking with supervision with a walker but is still having a difficult time with balance and cannot walk alone. Mom is hoping for home PT to continue exercises.       Past Medical History:   Diagnosis Date   • COVID-19    • ICH (intracerebral hemorrhage) (HCC)    • Stroke (HCC)        Social History     Tobacco Use   • Smoking status: Never Smoker   • Smokeless tobacco: Never Used   Vaping Use   • Vaping Use: Never used   Substance Use Topics   • Alcohol use: Never   • Drug use: Never       Current Outpatient Medications Ordered in Epic   Medication Sig Dispense Refill   • mirtazapine (REMERON) 15 MG Tab Take 1 Tablet by mouth at bedtime. 30 Tablet 0   • hydrOXYzine HCl (ATARAX) 25 MG Tab Take 0.5-2 Tablets by mouth at bedtime as needed (insomnia). 60 Tablet 3   • Misc. Devices Misc Take 1 ensure once a day 1 Each 0   • montelukast (SINGULAIR) 10 MG Tab Take 1 Tablet by mouth 1 time a day as needed (to reduce amount of pills needing to be taken daily). 90 Tablet 0   • polyethylene glycol 3350 (MIRALAX) 17 GM/SCOOP Powder DISSOLVE 17 GRAMS OF POWDER IN LIQUID AND DRINK  ONCE DAILY AS NEEDED FOR CONSTIPATION 510 g 5   • NON SPECIFIED 1 ensure 1 time daily 60 Each 11   • NON SPECIFIED 4 cans of beneprotein (227g per can) per month 4 Each 11   • Omega-3 Fatty Acids (OMEGA 3 500) 500 MG Cap Take 1 Cap by mouth every day. Indications: Dietary Deficiency     • Magnesium 400  MG Cap Take 1 Cap by mouth every day. Indications: Malnutrition     • RA TURMERIC EXTRA STRENGTH PO Take 1,000 mg by mouth every day. Indications: supplement     • Cholecalciferol (VITAMIN D3) 2000 UNIT Cap Take 2,000 Units by mouth every day.     • non-formulary med Inhale 1 Inhalation 2 times a day as needed (shortness of breath). Amborxol HCL Mucosolvan  Indications: shorness of breath     • non-formulary med Take 1 Dose Pack by mouth every day. Emergen C     • Cyanocobalamin (B-12 PO) Take 1 Tab by mouth every morning.     • Misc. Devices Misc Incontinence supplies - adult diapers. Length of time needed - lifetime or 999 months. 999 Each 999     No current Epic-ordered facility-administered medications on file.       Allergies:  Vancomycin and Other misc        Objective:       Exam:  /58 (BP Location: Left arm, Patient Position: Sitting, BP Cuff Size: Adult)   Pulse 83   Temp 37.1 °C (98.7 °F) (Temporal)   Resp 16   SpO2 94%  There is no height or weight on file to calculate BMI.    Pulmonary: Clear to ausculation.  Normal effort. No rales, ronchi, or wheezing.  Cardiovascular: Regular rate and rhythm without murmur.   Neurologic: patient answers questions appropriately   Psych: Normal mood and affect. Alert and oriented x3. Judgment and insight is normal.      Labs:   None recent    Assessment & Plan:     26 y.o. male with the following -     1. Poor appetite  - mirtazapine (REMERON) 15 MG Tab; Take 1 Tablet by mouth at bedtime.  Dispense: 30 Tablet; Refill: 0  - Misc. Devices Misc; Take 1 ensure once a day  Dispense: 1 Each; Refill: 0  Pt's appetite is now back to normal and we do not see a need for continued mirtazapine. He does look like he has gained some weight, although this was difficult to determine without a scale. Pt appreciates the benefit for sleep. He is already decreased from 30mg per day. Mom will continue to decrease dosing until he is no longer taking it. Start hydroxyzine PRN for s  sleep.     2. Tachycardia  Mom has been slowly titrating him off of propranollo, which I think is an excellent idea as it was started for him for tachycardia in the setting of a developing pneumonia. Mom was given guidelines on how to taper off.     3. Dysphagia following nontraumatic intracerebral hemorrhage  Pt is swallowing and speaking extraordinarily well.     4. 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 Healthcare maintenance  - Lipid Profile; Future  - HEMOGLOBIN A1C; Future    6. Insomnia, unspecified type  - hydrOXYzine HCl (ATARAX) 25 MG Tab; Take 0.5-2 Tablets by mouth at bedtime as needed (insomnia).  Dispense: 60 Tablet; Refill: 3    7. Need for vaccination  - Tdap =>8yo IM      Return if symptoms worsen or fail to improve.    Please note that this dictation was created using voice recognition software. I have made every reasonable attempt to correct obvious errors, but I expect that there are errors of grammar and possibly content that I did not discover before finalizing the note.

## 2022-05-25 NOTE — ASSESSMENT & PLAN NOTE
Mom has been slowly decreasing propranolol, previously at 20mg TID and now at 10mg BID almost a year ago. No side effects    
Pt has improved significantly in speech and is participating in online forums.   He is walking with supervision with a walker but is still having a difficult time with balance and cannot walk alone. Mom is hoping for home PT to continue exercises.   
Pt is taking solid foods, mom thinks likely weight gain    
no fever/no chills

## 2022-05-25 NOTE — TELEPHONE ENCOUNTER
"·  paperwork received from care chest requiring provider signature.     · All appropriate fields completed by Medical Assistant: Yes    · Paperwork placed in \"MA to Provider\" folder/basket. Awaiting provider completion/signature.  "

## 2022-05-28 ENCOUNTER — HOSPITAL ENCOUNTER (OUTPATIENT)
Dept: LAB | Facility: MEDICAL CENTER | Age: 27
End: 2022-05-28
Attending: FAMILY MEDICINE
Payer: MEDICARE

## 2022-05-28 DIAGNOSIS — Z00.00 HEALTHCARE MAINTENANCE: ICD-10-CM

## 2022-05-28 LAB
CHOLEST SERPL-MCNC: 153 MG/DL (ref 100–199)
EST. AVERAGE GLUCOSE BLD GHB EST-MCNC: 108 MG/DL
HBA1C MFR BLD: 5.4 % (ref 4–5.6)
HDLC SERPL-MCNC: 49 MG/DL
LDLC SERPL CALC-MCNC: 86 MG/DL
TRIGL SERPL-MCNC: 92 MG/DL (ref 0–149)

## 2022-05-28 PROCEDURE — 80061 LIPID PANEL: CPT | Mod: GY

## 2022-05-28 PROCEDURE — 36415 COLL VENOUS BLD VENIPUNCTURE: CPT | Mod: GY

## 2022-05-28 PROCEDURE — 83036 HEMOGLOBIN GLYCOSYLATED A1C: CPT | Mod: GY

## 2022-06-08 ENCOUNTER — PATIENT MESSAGE (OUTPATIENT)
Dept: MEDICAL GROUP | Facility: MEDICAL CENTER | Age: 27
End: 2022-06-08
Payer: MEDICARE

## 2022-06-08 DIAGNOSIS — R15.9 INCONTINENCE OF FECES, UNSPECIFIED FECAL INCONTINENCE TYPE: ICD-10-CM

## 2022-06-08 RX ORDER — POLYETHYLENE GLYCOL 3350 17 G/17G
POWDER, FOR SOLUTION ORAL
Qty: 510 G | Refills: 5 | Status: SHIPPED | OUTPATIENT
Start: 2022-06-08 | End: 2023-04-17 | Stop reason: SDUPTHER

## 2022-06-15 ENCOUNTER — OFFICE VISIT (OUTPATIENT)
Dept: PHYSICAL MEDICINE AND REHAB | Facility: REHABILITATION | Age: 27
End: 2022-06-15
Payer: MEDICARE

## 2022-06-15 VITALS
DIASTOLIC BLOOD PRESSURE: 64 MMHG | RESPIRATION RATE: 15 BRPM | TEMPERATURE: 99.2 F | OXYGEN SATURATION: 100 % | HEART RATE: 90 BPM | SYSTOLIC BLOOD PRESSURE: 104 MMHG

## 2022-06-15 DIAGNOSIS — Z86.73 HISTORY OF STROKE: ICD-10-CM

## 2022-06-15 DIAGNOSIS — R47.1 DYSARTHRIA: ICD-10-CM

## 2022-06-15 DIAGNOSIS — I61.5 NONTRAUMATIC INTRAVENTRICULAR INTRACEREBRAL HEMORRHAGE, UNSPECIFIED LATERALITY (HCC): Primary | ICD-10-CM

## 2022-06-15 DIAGNOSIS — H53.9 VISION CHANGES: ICD-10-CM

## 2022-06-15 PROCEDURE — 99213 OFFICE O/P EST LOW 20 MIN: CPT | Performed by: PHYSICAL MEDICINE & REHABILITATION

## 2022-06-15 NOTE — PROGRESS NOTES
[facility]  [facility]  [address] [place] [address]  Ph: [phone]    FOLLOW UP PATIENT EVALUATION    Patient Name: Rey Medina   Patient : 1995  Patient Age: 26 y.o.   PCP: Nirmala Man M.D.    Examining Physician: Dr. 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   - On Propranolol, but weaning. Has not noticed any issues while weaning. This is the last week on it.   - He is still on Mirtazapine. He wants to increase it because he is not sleeping. Was prescribed hydroxyzine for sleep.   - He likes the Mirtazapine because it does make him sleepy, but wants to sleep more.   - Mom is back to work.   - Reduced Mirtazipine May 27th.   - Patient states Hydroxyzine does not work for him at all.   - Has not tried Melatonin. Doesn't recall effect of Trazodone.   - Still has some issues with being cross eyed.     Review of Systems:  All other pertinent positive review of systems are noted above in HPI.     Past Medical History:  Past Medical History:   Diagnosis Date   • COVID-19    • ICH (intracerebral hemorrhage) (HCC)    • Stroke (HCC)       Past Surgical History:   Procedure Laterality Date   • ANAL FISTULOTOMY     • PEG PLACEMENT     • TRACHEOSTOMY     •  SHUNT INSERTION          Current Outpatient Medications:   •  polyethylene glycol 3350 (MIRALAX) 17 GM/SCOOP Powder, DISSOLVE 17 GRAMS OF POWDER IN LIQUID AND DRINK  ONCE DAILY AS NEEDED FOR CONSTIPATION, Disp: 510 g, Rfl:  5  •  mirtazapine (REMERON) 15 MG Tab, Take 1 Tablet by mouth at bedtime. (Patient taking differently: Take 7.5 mg by mouth at bedtime.), Disp: 30 Tablet, Rfl: 0  •  Misc. Devices Misc, Take 1 ensure once a day, Disp: 1 Each, Rfl: 0  •  montelukast (SINGULAIR) 10 MG Tab, Take 1 Tablet by mouth 1 time a day as needed (to reduce amount of pills needing to be taken daily)., Disp: 90 Tablet, Rfl: 0  •  NON SPECIFIED, 1 ensure 1 time daily, Disp: 60 Each, Rfl: 11  •  Omega-3 Fatty Acids (OMEGA 3 500) 500 MG Cap, Take 1 Cap by mouth every day. Indications: Dietary Deficiency, Disp: , Rfl:   •  Magnesium 400 MG Cap, Take 1 Cap by mouth every day. Indications: Malnutrition, Disp: , Rfl:   •  RA TURMERIC EXTRA STRENGTH PO, Take 1,000 mg by mouth every day. Indications: supplement, Disp: , Rfl:   •  Cholecalciferol (VITAMIN D3) 2000 UNIT Cap, Take 2,000 Units by mouth every day., Disp: , Rfl:   •  non-formulary med, Take 1 Dose Pack by mouth every day. Emergen C, Disp: , Rfl:   •  Cyanocobalamin (B-12 PO), Take 1 Tab by mouth every morning., Disp: , Rfl:   •  Misc. Devices Misc, Incontinence supplies - adult diapers. Length of time needed - lifetime or 999 months., Disp: 999 Each, Rfl: 999  •  NON SPECIFIED, 4 cans of beneprotein (227g per can) per month, Disp: 4 Each, Rfl: 11  •  non-formulary med, Inhale 1 Inhalation 2 times a day as needed (shortness of breath). Amborxol HCL Mucosolvan  Indications: shorness of breath, Disp: , Rfl:   Allergies   Allergen Reactions   • Vancomycin Swelling     Lips  With red face and neck   • Other Misc Rash     Allergic to name band.        Past Social History:  Social History     Socioeconomic History   • Marital status: Single     Spouse name: Not on file   • Number of children: Not on file   • Years of education: Not on file   • Highest education level: Some college, no degree   Occupational History   • Not on file   Tobacco Use   • Smoking status: Never Smoker   • Smokeless tobacco:  Never Used   Vaping Use   • Vaping Use: Never used   Substance and Sexual Activity   • Alcohol use: Never   • Drug use: Never   • Sexual activity: Not Currently   Other Topics Concern   •  Service No   • Blood Transfusions No   • Caffeine Concern No   • Occupational Exposure No   • Hobby Hazards No   • Sleep Concern No   • Stress Concern No   • Weight Concern No   • Special Diet Yes   • Back Care No   • Exercise Yes   • Bike Helmet No   • Seat Belt Yes   • Self-Exams No   Social History Narrative    Lives with mom     Social Determinants of Health     Financial Resource Strain: Not on file   Food Insecurity: Not on file   Transportation Needs: Not on file   Physical Activity: Not on file   Stress: Not on file   Social Connections: Not on file   Intimate Partner Violence: Not on file   Housing Stability: Not on file        Family History:  Family History   Problem Relation Age of Onset   • Hypertension Mother    • Glasses Mother    • Thyroid Mother    • Diabetes Maternal Grandfather    • Stroke Maternal Grandfather    • Diabetes Paternal Grandmother    • Hypertension Maternal Uncle    • Heart Disease Neg Hx    • Cancer Neg Hx        Depression and Opioid Screening  PHQ-9:  Depression Screen (PHQ-2/PHQ-9) 4/15/2021 6/15/2021 7/15/2021   PHQ-2 Total Score - - -   PHQ-2 Total Score 0 0 0   PHQ-9 Total Score - - -     Interpretation of PHQ-9 Total Score   Score Severity   1-4 No Depression   5-9 Mild Depression   10-14 Moderate Depression   15-19 Moderately Severe Depression   20-27 Severe Depression     Opioid Risk Score: No value filed.  Interpretation of Opioid Risk Score   Score 0-3 = Low risk of abuse. Do UDS at least once per year.  Score 4-7 = Moderate risk of abuse. Do UDS 1-4 times per year.  Score 8+ = High risk of abuse. Refer to specialist.      OBJECTIVE:   Vital Signs:  Vitals:    06/15/22 1456   BP: 104/64   Pulse: 90   Resp: 15   Temp: 37.3 °C (99.2 °F)   SpO2: 100%        Pertinent Labs:  Lab  Results   Component Value Date/Time    SODIUM 137 12/12/2020 02:00 AM    POTASSIUM 3.5 (L) 12/12/2020 02:00 AM    CHLORIDE 100 12/12/2020 02:00 AM    CO2 20 12/12/2020 02:00 AM    GLUCOSE 72 12/12/2020 02:00 AM    BUN 10 12/12/2020 02:00 AM    CREATININE 0.66 12/12/2020 02:00 AM       Lab Results   Component Value Date/Time    HBA1C 5.4 05/28/2022 08:16 AM       Lab Results   Component Value Date/Time    WBC 7.2 12/12/2020 02:00 AM    RBC 4.99 12/12/2020 02:00 AM    HEMOGLOBIN 14.1 12/12/2020 02:00 AM    HEMATOCRIT 43.5 12/12/2020 02:00 AM    MCV 87.2 12/12/2020 02:00 AM    MCH 28.3 12/12/2020 02:00 AM    MCHC 32.4 (L) 12/12/2020 02:00 AM    MPV 11.1 12/12/2020 02:00 AM    NEUTSPOLYS 53.10 12/12/2020 02:00 AM    LYMPHOCYTES 34.80 12/12/2020 02:00 AM    MONOCYTES 8.60 12/12/2020 02:00 AM    EOSINOPHILS 2.10 12/12/2020 02:00 AM    BASOPHILS 1.10 12/12/2020 02:00 AM       Lab Results   Component Value Date/Time    ASTSGOT 17 12/12/2020 02:00 AM    ALTSGPT 38 12/12/2020 02:00 AM        Physical Exam:   GEN: No apparent distress  HEENT: Head normocephalic, atraumatic.  Sclera nonicteric bilaterally, no ocular discharge appreciated bilaterally.  CV: Extremities warm and well-perfused, no peripheral edema appreciated bilaterally.  PULMONARY: Breathing nonlabored on room air, no respiratory accessory muscle use.  Not requiring supplemental oxygen.  SKIN: No appreciable skin breakdown on exposed areas of skin.  PSYCH: Mood and affect within normal limits.  NEURO: Awake alert.  Dysarthric but conversational.  Logical thought content.  Answers questions appropriately.  Strength intact but still significantly dysmetric.  Poor balance.  Ataxic.    ASSESSMENT/PLAN: Rey Medina  is a 26 y.o. male with rehabilitation history significant for ICH secondary to AVM rupture on 4/21/2019 s/p AVM repair, hydrocephalus s/p  shunt who was admitted to [facility] from 2/5/2020 to 3/12/2020, here for hemorrhagic stroke follow-up.  The following plan was discussed with the patient who is in agreement.     Visit Diagnoses     ICD-10-CM   1. Nontraumatic intraventricular intracerebral hemorrhage, unspecified laterality (HCC)  I61.5   2. Vision changes  H53.9   3. History of stroke  Z86.73   4. Dysarthria  R47.1        Mom assists with history.    Rehab/Neuro:   1. 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  2.  No history of seizure, on Keppra.  Had EEG 5/21/2020. Keppra d/c.   3.  Poor attention secondary to ICH requiring need for neuro-stimulant; discontinued.  4.  Blurry vision after ICH, did have reading glasses prior to stroke.  Blurry vision persistent.  Also having problems with peripheral vision on the right. Resolved with new glasses rx.   5.  Irritability/agitation: Controlled on propranolol.  6.  Dysmetria  7.  Poor balance  -Neuro: Improving, slowly.  Largely stable.  Limited in his standing due to significant ataxia.    Irritability: Recommend stopping propranolol 10 mg altogether.  No issues with irritability.  Counseled to monitor heart rate for elevation.    Poor appetite/depression: Patient would like to increase mirtazapine because it helps him sleep, he is requesting going up to 22.5 mg.  He no longer is having issues with appetite or depression, I recommend that he remain on mirtazapine 7.5 mg and continue to wean as able.  Try melatonin at night.  He tried hydroxyzine and it did not help at all.  Patient self discontinued.  We could also try trazodone if melatonin does not assist with sleep.    The following are all stable:  Bladder: Status: Continent, volitional. Independent with bladder function in urinal   Bowel: Continent and volitional. Has BM on his own without suppository. Needs assistance with wiping. Med management: Continue Miralax daily.   GI: Feeding himself.  On regular diet.  Has difficulty due to  dysmetria.      Follow up: 6 months general reevaluation    Total time spent was 24 minutes.  Included in this time is the time spent preparing for the visit including record review, my exam and evaluation, counseling and education regarding that which is aforementioned in the assessment and plan.  Time was spent documenting into patient's electronic health record. Included this time as the time spent obtaining history from someone other than the patient.  Some of the time included occurred outside of charting time.  Discussion involved the patient and mom.    Please note that this dictation was created using voice recognition software. I have made every reasonable attempt to correct obvious errors but there may be errors of grammar and content that I may have overlooked prior to finalization of this note.    Dr. Mandy Luke DO, MS  Department of Physical Medicine & Rehabilitation  Neuro Rehabilitation Clinic  [facility]

## 2022-06-27 DIAGNOSIS — R63.0 POOR APPETITE: ICD-10-CM

## 2022-06-27 RX ORDER — MIRTAZAPINE 15 MG/1
15 TABLET, FILM COATED ORAL
Qty: 30 TABLET | Refills: 0 | Status: SHIPPED | OUTPATIENT
Start: 2022-06-27 | End: 2022-08-16 | Stop reason: SDUPTHER

## 2022-08-16 DIAGNOSIS — R63.0 POOR APPETITE: ICD-10-CM

## 2022-08-16 RX ORDER — MIRTAZAPINE 15 MG/1
30 TABLET, FILM COATED ORAL
Qty: 60 TABLET | Refills: 2 | Status: SHIPPED | OUTPATIENT
Start: 2022-08-16 | End: 2022-08-30

## 2022-08-30 ENCOUNTER — TELEMEDICINE (OUTPATIENT)
Dept: PHYSICAL MEDICINE AND REHAB | Facility: MEDICAL CENTER | Age: 27
End: 2022-08-30
Payer: MEDICARE

## 2022-08-30 DIAGNOSIS — G47.01 INSOMNIA DUE TO MEDICAL CONDITION: Primary | ICD-10-CM

## 2022-08-30 DIAGNOSIS — I61.5 NONTRAUMATIC INTRAVENTRICULAR INTRACEREBRAL HEMORRHAGE, UNSPECIFIED LATERALITY (HCC): ICD-10-CM

## 2022-08-30 PROCEDURE — 99214 OFFICE O/P EST MOD 30 MIN: CPT | Mod: 95 | Performed by: PHYSICAL MEDICINE & REHABILITATION

## 2022-08-30 RX ORDER — ZOLPIDEM TARTRATE 5 MG/1
5 TABLET ORAL
Qty: 30 TABLET | Refills: 0 | Status: SHIPPED | OUTPATIENT
Start: 2022-08-30 | End: 2022-09-21

## 2022-08-30 NOTE — OP THERAPY DAILY TREATMENT
Outpatient Occupational Therapy  DAILY TREATMENT     [facility] [address].  [address]  [place] [address]  Phone:  [phone]  Fax:  [phone]    Date: 08/31/2021    Patient: Rey Medina  YOB: 1995  MRN: [id number]     Time Calculation  Start time: 0145  Stop time: 0230 Time Calculation (min): 45 minutes         Chief Complaint: Extremity Weakness    Visit #: 8    SUBJECTIVE:  I cut my cake on my birthday    OBJECTIVE:  Current objective measures:   Strength Comments:  R=  64 lbs   L= 72 pounds     Pinch     R    L  Lateral  22    24  3 point  19    21     Tone, Sensation and Coordination:   Tone:     Right upper extremity muscle tone: Normal        Coordination   Upper extremity (left):     Fine motor: Impaired    Gross motor: Impaired    Slow alternating movements: Impaired    Rapid alternating movements: Impaired    Finger to finger: Impaired    Finger touch to nose: Impaired  Upper extremity (right):     Fine motor: Impaired    Gross motor: Impaired    Slow alternating movements: Impaired    Rapid alternating movements: Impaired    Finger to finger: Impaired    Finger touch to nose: Impaired      Coordination comments:   9 hole peg test   R= 1 minute and 35 seconds     L= 1 minute and 20 seconds        Therapeutic Exercises (CPT 62905):     1. Flex/ext exercise stick in horizontal and vertical position x 10 reps, 10 reps in each position.    2. 3 point prehension for pre-handwriting exercises     3. Wrist maze, for controlled wrist movement     4. Fine motor coordination/manipulation and dexterity exercises, grooved pegboard and card shuffling.      5. Pegboard solitaire to enhance prehension of dominant hand.      6. 3 point prehension for pre-handwriting exercises     7. Light resistance putty      Time-based treatments/modalities:  Therapeutic exercise minutes (CPT 38162): 45 minutes        Pain rating before treatment: 0  Pain rating  after treatment: 0    ASSESSMENT:   Response to treatment: increased strength, pinch and dexterity noted.      PLAN/RECOMMENDATIONS:   Plan for treatment: therapy treatment to continue next visit.  Planned interventions for next visit: continue with current treatment and therapeutic exercise (CPT 28601)        pt received at intake rm 10c AAO x 3. pt c/o RLQ pain and nausea x 3 days. pt denies sob, cp, v/d, fevers, chills, urinary symptoms, abnormal vaginal d/c. respiratiosn even and unlabored. 20g iv to top of right hand.

## 2022-08-30 NOTE — PROGRESS NOTES
[facility]  PM&R Neuro Rehabilitation Clinic  [facility] [place] [address]  Ph: [phone]    FOLLOW UP PATIENT EVALUATION      This evaluation was conducted via Zoom using secure and encrypted videoconferencing technology. The patient was in a private location in their home in the [facility].  The patient's identity was confirmed and verbal consent was obtained for this virtual visit.    Patient Name: Rey Medina   Patient : 1995  Patient Age: 27 y.o.   PCP: Nirmala Man M.D.    Examining Physician: Dr. 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   - Doesn't take too much naps during the day as well.   - He had been napping but stopped thinking it might make him sleep better.   - Is not wanting to work on his walking due to the fact that he wants to be able to sleep at night first.    Review of Systems:  All other pertinent positive review of systems are noted above in HPI.     Past Medical History:  Past Medical History:   Diagnosis Date    COVID-19     ICH (intracerebral hemorrhage) (HCC)     Stroke (HCC)       Past Surgical History:   Procedure Laterality Date    ANAL FISTULOTOMY      PEG PLACEMENT      TRACHEOSTOMY       SHUNT INSERTION          Current Outpatient Medications:     zolpidem (AMBIEN) 5 MG Tab, Take 1 Tablet by mouth at bedtime for 30 days., Disp: 30 Tablet, Rfl: 0    mirtazapine (REMERON) 15 MG Tab, Take 2 Tablets by mouth at bedtime.,  Disp: 60 Tablet, Rfl: 2    polyethylene glycol 3350 (MIRALAX) 17 GM/SCOOP Powder, DISSOLVE 17 GRAMS OF POWDER IN LIQUID AND DRINK  ONCE DAILY AS NEEDED FOR CONSTIPATION, Disp: 510 g, Rfl: 5    Misc. Devices Misc, Take 1 ensure once a day, Disp: 1 Each, Rfl: 0    montelukast (SINGULAIR) 10 MG Tab, Take 1 Tablet by mouth 1 time a day as needed (to reduce amount of pills needing to be taken daily)., Disp: 90 Tablet, Rfl: 0    NON SPECIFIED, 1 ensure 1 time daily, Disp: 60 Each, Rfl: 11    NON SPECIFIED, 4 cans of beneprotein (227g per can) per month, Disp: 4 Each, Rfl: 11    Omega-3 Fatty Acids (OMEGA 3 500) 500 MG Cap, Take 1 Cap by mouth every day. Indications: Dietary Deficiency, Disp: , Rfl:     Magnesium 400 MG Cap, Take 1 Cap by mouth every day. Indications: Malnutrition, Disp: , Rfl:     RA TURMERIC EXTRA STRENGTH PO, Take 1,000 mg by mouth every day. Indications: supplement, Disp: , Rfl:     Cholecalciferol (VITAMIN D3) 2000 UNIT Cap, Take 2,000 Units by mouth every day., Disp: , Rfl:     non-formulary med, Inhale 1 Inhalation 2 times a day as needed (shortness of breath). Amborxol HCL Mucosolvan  Indications: shorness of breath, Disp: , Rfl:     non-formulary med, Take 1 Dose Pack by mouth every day. Emergen C, Disp: , Rfl:     Cyanocobalamin (B-12 PO), Take 1 Tab by mouth every morning., Disp: , Rfl:     Misc. Devices Misc, Incontinence supplies - adult diapers. Length of time needed - lifetime or 999 months., Disp: 999 Each, Rfl: 999  Allergies   Allergen Reactions    Vancomycin Swelling     Lips  With red face and neck    Other Misc Rash     Allergic to name band.        Past Social History:  Social History     Socioeconomic History    Marital status: Single     Spouse name: Not on file    Number of children: Not on file    Years of education: Not on file    Highest education level: Some college, no degree   Occupational History    Not on file   Tobacco Use    Smoking status: Never    Smokeless tobacco:  Never   Vaping Use    Vaping Use: Never used   Substance and Sexual Activity    Alcohol use: Never    Drug use: Never    Sexual activity: Not Currently   Other Topics Concern     Service No    Blood Transfusions No    Caffeine Concern No    Occupational Exposure No    Hobby Hazards No    Sleep Concern No    Stress Concern No    Weight Concern No    Special Diet Yes    Back Care No    Exercise Yes    Bike Helmet No    Seat Belt Yes    Self-Exams No   Social History Narrative    Lives with mom     Social Determinants of Health     Financial Resource Strain: Not on file   Food Insecurity: Not on file   Transportation Needs: Not on file   Physical Activity: Not on file   Stress: Not on file   Social Connections: Not on file   Intimate Partner Violence: Not on file   Housing Stability: Not on file        Family History:  Family History   Problem Relation Age of Onset    Hypertension Mother     Glasses Mother     Thyroid Mother     Diabetes Maternal Grandfather     Stroke Maternal Grandfather     Diabetes Paternal Grandmother     Hypertension Maternal Uncle     Heart Disease Neg Hx     Cancer Neg Hx        Depression and Opioid Screening  PHQ-9:  Depression Screen (PHQ-2/PHQ-9) 4/15/2021 6/15/2021 7/15/2021   PHQ-2 Total Score - - -   PHQ-2 Total Score 0 0 0   PHQ-9 Total Score - - -     Interpretation of PHQ-9 Total Score   Score Severity   1-4 No Depression   5-9 Mild Depression   10-14 Moderate Depression   15-19 Moderately Severe Depression   20-27 Severe Depression     Opioid Risk Score: No value filed.  Interpretation of Opioid Risk Score   Score 0-3 = Low risk of abuse. Do UDS at least once per year.  Score 4-7 = Moderate risk of abuse. Do UDS 1-4 times per year.  Score 8+ = High risk of abuse. Refer to specialist.      OBJECTIVE:   Vital Signs:  There were no vitals filed for this visit.       Pertinent Labs:  Lab Results   Component Value Date/Time    SODIUM 137 12/12/2020 02:00 AM    POTASSIUM 3.5 (L)  12/12/2020 02:00 AM    CHLORIDE 100 12/12/2020 02:00 AM    CO2 20 12/12/2020 02:00 AM    GLUCOSE 72 12/12/2020 02:00 AM    BUN 10 12/12/2020 02:00 AM    CREATININE 0.66 12/12/2020 02:00 AM       Lab Results   Component Value Date/Time    HBA1C 5.4 05/28/2022 08:16 AM       Lab Results   Component Value Date/Time    WBC 7.2 12/12/2020 02:00 AM    RBC 4.99 12/12/2020 02:00 AM    HEMOGLOBIN 14.1 12/12/2020 02:00 AM    HEMATOCRIT 43.5 12/12/2020 02:00 AM    MCV 87.2 12/12/2020 02:00 AM    MCH 28.3 12/12/2020 02:00 AM    MCHC 32.4 (L) 12/12/2020 02:00 AM    MPV 11.1 12/12/2020 02:00 AM    NEUTSPOLYS 53.10 12/12/2020 02:00 AM    LYMPHOCYTES 34.80 12/12/2020 02:00 AM    MONOCYTES 8.60 12/12/2020 02:00 AM    EOSINOPHILS 2.10 12/12/2020 02:00 AM    BASOPHILS 1.10 12/12/2020 02:00 AM       Lab Results   Component Value Date/Time    ASTSGOT 17 12/12/2020 02:00 AM    ALTSGPT 38 12/12/2020 02:00 AM        Physical Exam:   GEN: No apparent distress  HEENT: Head normocephalic, atraumatic.  Sclera nonicteric bilaterally, no ocular discharge appreciated bilaterally.  PULMONARY: Breathing nonlabored on room air, no respiratory accessory muscle use.  Not requiring supplemental oxygen.  SKIN: No appreciable skin breakdown on exposed areas of skin.  PSYCH: Mood and affect within normal limits.  NEURO: Awake alert.  Answers questions appropriately.  Dysarthric.    ASSESSMENT/PLAN: Rey Medina  is a 27 y.o. 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 The following plan was discussed with the patient who is in agreement.     Visit Diagnoses     ICD-10-CM   1. Insomnia due to medical condition  G47.01   2. Nontraumatic intraventricular intracerebral hemorrhage, unspecified laterality (HCC)  I61.5        Mom assists with history.    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  2.  No history of seizure, on Keppra.  Had EEG 5/21/2020. Keppra d/c.   3.  Poor attention secondary to ICH requiring need for neuro-stimulant; discontinued.  4.  Blurry vision after ICH, did have reading glasses prior to stroke.  Blurry vision persistent.  Also having problems with peripheral vision on the right. Resolved with new glasses rx.   5.  Irritability/agitation: Controlled on propranolol.  6.  Dysmetria  7.  Poor balance  -Neuro: Stable.    Poor appetite/depression:   -Resolved  - Med management: Counseled on how to taper off of mirtazapine.    Insomnia:  -Persistent, not resolving, multiple medications tried such as melatonin, trazodone, mirtazapine.  - High risk med management: Prescription for Ambien 5 mg immediate release  -PDMP reviewed.  No risk of misuse.    Follow up: 3 weeks for medication reevaluation    Please note that this dictation was created using voice recognition software. I have made every reasonable attempt to correct obvious errors but there may be errors of grammar and content that I may have overlooked prior to finalization of this note.    Dr. Mandy Luke DO, MS  Department of Physical Medicine & Rehabilitation  Neuro Rehabilitation Clinic  [facility]

## 2022-09-21 ENCOUNTER — TELEMEDICINE (OUTPATIENT)
Dept: PHYSICAL MEDICINE AND REHAB | Facility: MEDICAL CENTER | Age: 27
End: 2022-09-21
Payer: MEDICARE

## 2022-09-21 DIAGNOSIS — G47.01 INSOMNIA DUE TO MEDICAL CONDITION: Primary | ICD-10-CM

## 2022-09-21 DIAGNOSIS — I61.5 NONTRAUMATIC INTRAVENTRICULAR INTRACEREBRAL HEMORRHAGE, UNSPECIFIED LATERALITY (HCC): ICD-10-CM

## 2022-09-21 DIAGNOSIS — Z86.73 HISTORY OF STROKE: ICD-10-CM

## 2022-09-21 PROCEDURE — 99214 OFFICE O/P EST MOD 30 MIN: CPT | Mod: 95 | Performed by: PHYSICAL MEDICINE & REHABILITATION

## 2022-09-21 RX ORDER — DIAZEPAM 2 MG/1
2-4 TABLET ORAL
Qty: 14 TABLET | Refills: 0 | Status: SHIPPED | OUTPATIENT
Start: 2022-09-21 | End: 2022-09-27

## 2022-09-21 NOTE — PROGRESS NOTES
[facility]  [facility]  [address] [place] [id number]  Ph: [phone]    FOLLOW UP PATIENT EVALUATION      This evaluation was conducted via Zoom using secure and encrypted videoconferencing technology. The patient was in a private location in their home in the [facility].  The patient's identity was confirmed and verbal consent was obtained for this virtual visit.    Patient Name: Rey Medina   Patient : 1995  Patient Age: 27 y.o.   PCP: Nirmala Man M.D.    Examining Physician: Dr. 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   - He is doing floor exercises.   - He has trouble sleeping ever since his awareness has improved.   - He is completely normal other than his balance and his speech.    Review of Systems:  All other pertinent positive review of systems are noted above in HPI.     Past Medical History:  Past Medical History:   Diagnosis Date    COVID-19     ICH (intracerebral hemorrhage) (HCC)     Stroke (HCC)       Past Surgical History:   Procedure Laterality Date    ANAL FISTULOTOMY      PEG PLACEMENT      TRACHEOSTOMY       SHUNT INSERTION          Current Outpatient Medications:     diazePAM (VALIUM) 2 MG Tab, Take 1-2 Tablets by mouth at bedtime as needed for Anxiety for up to 7 days., Disp: 14 Tablet, Rfl: 0    polyethylene glycol 3350 (MIRALAX) 17 GM/SCOOP Powder, DISSOLVE 17  GRAMS OF POWDER IN LIQUID AND DRINK  ONCE DAILY AS NEEDED FOR CONSTIPATION, Disp: 510 g, Rfl: 5    Misc. Devices Misc, Take 1 ensure once a day, Disp: 1 Each, Rfl: 0    montelukast (SINGULAIR) 10 MG Tab, Take 1 Tablet by mouth 1 time a day as needed (to reduce amount of pills needing to be taken daily)., Disp: 90 Tablet, Rfl: 0    NON SPECIFIED, 1 ensure 1 time daily, Disp: 60 Each, Rfl: 11    NON SPECIFIED, 4 cans of beneprotein (227g per can) per month, Disp: 4 Each, Rfl: 11    Omega-3 Fatty Acids (OMEGA 3 500) 500 MG Cap, Take 1 Cap by mouth every day. Indications: Dietary Deficiency, Disp: , Rfl:     Magnesium 400 MG Cap, Take 1 Cap by mouth every day. Indications: Malnutrition, Disp: , Rfl:     RA TURMERIC EXTRA STRENGTH PO, Take 1,000 mg by mouth every day. Indications: supplement, Disp: , Rfl:     Cholecalciferol (VITAMIN D3) 2000 UNIT Cap, Take 2,000 Units by mouth every day., Disp: , Rfl:     non-formulary med, Inhale 1 Inhalation 2 times a day as needed (shortness of breath). Amborxol HCL Mucosolvan  Indications: shorness of breath, Disp: , Rfl:     non-formulary med, Take 1 Dose Pack by mouth every day. Emergen C, Disp: , Rfl:     Cyanocobalamin (B-12 PO), Take 1 Tab by mouth every morning., Disp: , Rfl:     Misc. Devices Misc, Incontinence supplies - adult diapers. Length of time needed - lifetime or 999 months., Disp: 999 Each, Rfl: 999  Allergies   Allergen Reactions    Vancomycin Swelling     Lips  With red face and neck    Other Misc Rash     Allergic to name band.        Past Social History:  Social History     Socioeconomic History    Marital status: Single     Spouse name: Not on file    Number of children: Not on file    Years of education: Not on file    Highest education level: Some college, no degree   Occupational History    Not on file   Tobacco Use    Smoking status: Never    Smokeless tobacco: Never   Vaping Use    Vaping Use: Never used   Substance and Sexual Activity    Alcohol use:  Never    Drug use: Never    Sexual activity: Not Currently   Other Topics Concern     Service No    Blood Transfusions No    Caffeine Concern No    Occupational Exposure No    Hobby Hazards No    Sleep Concern No    Stress Concern No    Weight Concern No    Special Diet Yes    Back Care No    Exercise Yes    Bike Helmet No    Seat Belt Yes    Self-Exams No   Social History Narrative    Lives with mom     Social Determinants of Health     Financial Resource Strain: Not on file   Food Insecurity: Not on file   Transportation Needs: Not on file   Physical Activity: Not on file   Stress: Not on file   Social Connections: Not on file   Intimate Partner Violence: Not on file   Housing Stability: Not on file        Family History:  Family History   Problem Relation Age of Onset    Hypertension Mother     Glasses Mother     Thyroid Mother     Diabetes Maternal Grandfather     Stroke Maternal Grandfather     Diabetes Paternal Grandmother     Hypertension Maternal Uncle     Heart Disease Neg Hx     Cancer Neg Hx        Depression and Opioid Screening  PHQ-9:  Depression Screen (PHQ-2/PHQ-9) 4/15/2021 6/15/2021 7/15/2021   PHQ-2 Total Score - - -   PHQ-2 Total Score 0 0 0   PHQ-9 Total Score - - -     Interpretation of PHQ-9 Total Score   Score Severity   1-4 No Depression   5-9 Mild Depression   10-14 Moderate Depression   15-19 Moderately Severe Depression   20-27 Severe Depression     Opioid Risk Score: No value filed.  Interpretation of Opioid Risk Score   Score 0-3 = Low risk of abuse. Do UDS at least once per year.  Score 4-7 = Moderate risk of abuse. Do UDS 1-4 times per year.  Score 8+ = High risk of abuse. Refer to specialist.      OBJECTIVE:   Vital Signs:  There were no vitals filed for this visit.       Pertinent Labs:  Lab Results   Component Value Date/Time    SODIUM 137 12/12/2020 02:00 AM    POTASSIUM 3.5 (L) 12/12/2020 02:00 AM    CHLORIDE 100 12/12/2020 02:00 AM    CO2 20 12/12/2020 02:00 AM     GLUCOSE 72 12/12/2020 02:00 AM    BUN 10 12/12/2020 02:00 AM    CREATININE 0.66 12/12/2020 02:00 AM       Lab Results   Component Value Date/Time    HBA1C 5.4 05/28/2022 08:16 AM       Lab Results   Component Value Date/Time    WBC 7.2 12/12/2020 02:00 AM    RBC 4.99 12/12/2020 02:00 AM    HEMOGLOBIN 14.1 12/12/2020 02:00 AM    HEMATOCRIT 43.5 12/12/2020 02:00 AM    MCV 87.2 12/12/2020 02:00 AM    MCH 28.3 12/12/2020 02:00 AM    MCHC 32.4 (L) 12/12/2020 02:00 AM    MPV 11.1 12/12/2020 02:00 AM    NEUTSPOLYS 53.10 12/12/2020 02:00 AM    LYMPHOCYTES 34.80 12/12/2020 02:00 AM    MONOCYTES 8.60 12/12/2020 02:00 AM    EOSINOPHILS 2.10 12/12/2020 02:00 AM    BASOPHILS 1.10 12/12/2020 02:00 AM       Lab Results   Component Value Date/Time    ASTSGOT 17 12/12/2020 02:00 AM    ALTSGPT 38 12/12/2020 02:00 AM        Physical Exam:   GEN: No apparent distress  HEENT: Head normocephalic, atraumatic.  Sclera nonicteric bilaterally, no ocular discharge appreciated bilaterally.  PULMONARY: Breathing nonlabored on room air, no respiratory accessory muscle use. Not requiring supplemental oxygen.  SKIN: No appreciable skin breakdown on exposed areas of skin.  PSYCH: Mood and affect within normal limits.  NEURO: Awake alert. Conversational. Logical thought content.  Dysarthric.    ASSESSMENT/PLAN: Rey Medina  is a 27 y.o. 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 The following plan was discussed with the patient who is in agreement.     Visit Diagnoses     ICD-10-CM   1. Insomnia due to medical condition  G47.01   2. Nontraumatic intraventricular intracerebral hemorrhage, unspecified laterality (HCC)  I61.5   3. History of stroke  Z86.73        Mom assists with history.    Rehab/Neuro:   ICH secondary to AVM rupture on 4/21/2019 s/p AVM  repair, hydrocephalus s/p  shunt who was recently admitted to [facility] from 2/5/2020 to 3/12/2020.  2.  No history of seizure, on Keppra.  Had EEG 5/21/2020. Keppra d/c.   3.  Poor attention secondary to ICH requiring need for neuro-stimulant; discontinued.  4.  Blurry vision after ICH, did have reading glasses prior to stroke.  Blurry vision persistent.  Also having problems with peripheral vision on the right. Resolved with new glasses rx.   5.  Irritability/agitation: Off of propranolol.  6.  Dysmetria  7.  Poor balance  -Neuro: Stable.  Patient is essentially completely back to his prior state of consciousness, his current limitations include his balance/ataxic gait, and dysarthria.    Insomnia:  -Persistent, not resolving, multiple medications tried such as melatonin, trazodone, mirtazapine, Ambien.  -High risk med management: Prescription for Valium 2-4 mg at night as needed.  - PDMP reviewed.  7-day supply given.    Follow up: TBD based on efficacy of Valium.    Please note that this dictation was created using voice recognition software. I have made every reasonable attempt to correct obvious errors but there may be errors of grammar and content that I may have overlooked prior to finalization of this note.    Dr. Mandy Luke DO, MS  Department of Physical Medicine & Rehabilitation  Neuro Rehabilitation Clinic  [facility]

## 2022-09-26 ENCOUNTER — OFFICE VISIT (OUTPATIENT)
Dept: NEUROLOGY | Facility: MEDICAL CENTER | Age: 27
End: 2022-09-26
Attending: PSYCHIATRY & NEUROLOGY
Payer: MEDICARE

## 2022-09-26 VITALS
HEART RATE: 74 BPM | HEIGHT: 66 IN | DIASTOLIC BLOOD PRESSURE: 78 MMHG | WEIGHT: 145 LBS | OXYGEN SATURATION: 95 % | BODY MASS INDEX: 23.3 KG/M2 | SYSTOLIC BLOOD PRESSURE: 108 MMHG | TEMPERATURE: 98.4 F

## 2022-09-26 DIAGNOSIS — G47.09 OTHER INSOMNIA: ICD-10-CM

## 2022-09-26 DIAGNOSIS — Z86.79 H/O SPONT INTRAPARENCHYMAL INTRACRANIAL HEMORRHAGE D/T CEREBRAL AVM: ICD-10-CM

## 2022-09-26 PROCEDURE — 99212 OFFICE O/P EST SF 10 MIN: CPT | Performed by: PSYCHIATRY & NEUROLOGY

## 2022-09-26 PROCEDURE — 99214 OFFICE O/P EST MOD 30 MIN: CPT | Performed by: PSYCHIATRY & NEUROLOGY

## 2022-09-26 ASSESSMENT — PATIENT HEALTH QUESTIONNAIRE - PHQ9: CLINICAL INTERPRETATION OF PHQ2 SCORE: 0

## 2022-09-26 ASSESSMENT — FIBROSIS 4 INDEX: FIB4 SCORE: 0.21

## 2022-09-26 NOTE — PROGRESS NOTES
Chief Complaint   Patient presents with    Follow-Up     Movement Disorder     History of present illness:  Rey Medina 27 y.o. male presents today for cranial hemorrhage follow-up.   History is obtained from patient and mom.  and Patient is accompanied by Mom Gem.   She is his primary caretaker.     Duration/timing: acute onset 4/2019   Context: AVM with intraventricular hemorrhage: Patient was in the [facility] with acute onset of intraventricular hemorrhage secondary to AVM status post  shunt.  He has since been a long-term recovery but residual deficits include the following: Short term memory difficulties; was having difficulty with initating conversation but now he is doing it more often. Non ambulatory - stands with support also with transfers which is also improvement. Some constipation. ?Bladder incontinence with use of diaper - especially after tracheostomy removal he is less able to communicate that he has to use the restroom. No clear weakness. + Incoordination in the extremities.  Swallowing improving. He can feed himself though sloppy.  Learning to brush his teeth.  No clear seizures - Keppra was started in hospital and not removed? Previously some agitation.   Location: Posterior fossa  Quality: Hemorrhage  Severity: Severe  Modifying factors: None  Associated signs/symptoms: As above  Denies: weakness, numbness/tingling, depression, anxiety, loss of consciousness, seizures, abnormal movements and falls     Patient has tried:  -Amantadine - tried by Dr. ALBERTO, stopped by Dr. Luke (psych), since focus is better  -Keppra 500mg daily - decreased in 7/2020 by Dr. ALBERTO.  Patient did not tolerate doses less than 250 mg daily, to be continued.  -Speech therapy - with improvement, on thickener  -OT/PT - current for weakness   -Physiatrist Dr. Luke  -Neurosurgeon Dr. Walton -Next appointment in December 2020  -Zolpidem - no benefit  -Diazepam - no benefit, up to 4mg   -Melatonin - no  benefit  -Mirtazapine- no benefit    Subjective: Patient was last seen in neurology clinic on 7/2021.    AVM with IVH: improved overall.  Cognitively he is much more aware than he was last year.  His most challenging issues are speech and imbalance.  He needs assistance with ambulation, transferring to the toilet.  He does have a motorized wheelchair which helps him get around, feed himself.  He is able to read a book.    Insomnia: new - trouble going to sleep. Lays down at 9:30pm, wakes 7am - he is not able to sleep during the night. Max 2-4 hours of sleep. Per camera - he falls asleep at 30 min he is snoring and after 2-3 hours he is awake.  He had tried trazodone while in rehab with unclear benefit.  He has to take a break from all his therapies due to his insomnia.  He used to take naps but has since stopped without improvement.  He does not watch TV prior to bed.  Does not take caffeinated beverages.    Seizures??:  No paroxysmal spells, abnormal movements, unexplained incontinence, unexplained injuries, staring spells, gaps in time.       Past medical history:   Past Medical History:   Diagnosis Date    COVID-19     ICH (intracerebral hemorrhage) (HCC)     Stroke (HCC)        Past surgical history:   Past Surgical History:   Procedure Laterality Date    ANAL FISTULOTOMY      PEG PLACEMENT      TRACHEOSTOMY       SHUNT INSERTION         Family history:   Family History   Problem Relation Age of Onset    Hypertension Mother     Glasses Mother     Thyroid Mother     Diabetes Maternal Grandfather     Stroke Maternal Grandfather     Diabetes Paternal Grandmother     Hypertension Maternal Uncle     Heart Disease Neg Hx     Cancer Neg Hx        Social history:   Tobacco Use    Smoking status: Never Smoker    Smokeless tobacco: Never Used   Substance and Sexual Activity    Alcohol use: Never     Frequency: Never     Drinks per session: Patient refused     Binge frequency: Never    Drug use: Never     Current  "medications:   Current Outpatient Medications   Medication    diazePAM (VALIUM) 2 MG Tab    polyethylene glycol 3350 (MIRALAX) 17 GM/SCOOP Powder    Misc. Devices Misc    montelukast (SINGULAIR) 10 MG Tab    NON SPECIFIED    Omega-3 Fatty Acids (OMEGA 3 500) 500 MG Cap    Magnesium 400 MG Cap    RA TURMERIC EXTRA STRENGTH PO    Cholecalciferol (VITAMIN D3) 2000 UNIT Cap    non-formulary med    non-formulary med    Cyanocobalamin (B-12 PO)    Misc. Devices Misc     No current facility-administered medications for this visit.       Medication Allergy:  Allergies   Allergen Reactions    Vancomycin Swelling     Lips  With red face and neck    Other Misc Rash     Allergic to name band.       ROS: As per HPI    Physical examination:   Vitals:    09/26/22 1452   BP: 108/78   BP Location: Right arm   Patient Position: Sitting   BP Cuff Size: Adult   Pulse: 74   Temp: 36.9 °C (98.4 °F)   TempSrc: Temporal   SpO2: 95%   Weight: 65.8 kg (145 lb)   Height: 1.676 m (5' 6\")        General: Patient in well nourished in no apparent distress.  In his wheelchair.  Cardiovascular: No lower extremity edema.  Respiratory: Normal respiratory effort.   Skin: No appreciable signs of acute rashes or bruising.   Musculoskeletal: No signs of joint or muscle swelling.   Psychiatric: Pleasant.     NEUROLOGICAL EXAM:   Mental status: Awake, alert and fully oriented to person, place,purpose.  Attention is good.  Speech and language: Speech is scanning in nature.  He speaks in very short responses.  Follows commands well.  Cranial nerve exam:  II: Pupils are equally round and reactive to light. Visual fields are intact by confrontation -grossly.  Prior  III, IV, VI: EOMI -with evidence of nystagmus and difficulty with smooth pursuit, no diplopia, no ptosis.  Prior  V: Sensation to light touch is normal over V1-3 distributions bilaterally.  Prior  VII: Facial movements are symmetrical. There is no facial droop.   VIII: Hearing intact to soft speech " and finger rub bilaterally  IX: Dysarthria/scanning speech.  XI: Shoulder shrug are symmetrical and strong.  Prior  XII: Tongue protrudes midline.    Motor exam:  Some increase in muscle tone in the upper and lower extremities but not significantly.      Muscle strength: He is globally 5 out of 5 upper and lower extremity - except the left biceps is 5-/5    Sensory exam:  Intact to Light touch in bilateral upper and lower extremity.    Reflexes: Prior     Right  Left  Biceps   3/4  3/4  Triceps  3/4  3/4  Brachioradialis 3/4  3/4  Knee jerk  3/4  3/4  Ankle jerk  NT/4  NT/4       Coordination: Ataxia with finger-to-nose, prior  Gait: Nonambulatory    ANCILLARY DATA REVIEWED:   Lab Data Review:  Lab Results   Component Value Date/Time    WBC 7.2 12/12/2020 02:00 AM    RBC 4.99 12/12/2020 02:00 AM    HEMOGLOBIN 14.1 12/12/2020 02:00 AM    HEMATOCRIT 43.5 12/12/2020 02:00 AM    MCV 87.2 12/12/2020 02:00 AM    MCH 28.3 12/12/2020 02:00 AM    MCHC 32.4 (L) 12/12/2020 02:00 AM    MPV 11.1 12/12/2020 02:00 AM    NEUTSPOLYS 53.10 12/12/2020 02:00 AM    LYMPHOCYTES 34.80 12/12/2020 02:00 AM    MONOCYTES 8.60 12/12/2020 02:00 AM    EOSINOPHILS 2.10 12/12/2020 02:00 AM    BASOPHILS 1.10 12/12/2020 02:00 AM      Lab Results   Component Value Date/Time    SODIUM 137 12/12/2020 02:00 AM    POTASSIUM 3.5 (L) 12/12/2020 02:00 AM    CHLORIDE 100 12/12/2020 02:00 AM    CO2 20 12/12/2020 02:00 AM    GLUCOSE 72 12/12/2020 02:00 AM    BUN 10 12/12/2020 02:00 AM    CREATININE 0.66 12/12/2020 02:00 AM     Lab Results   Component Value Date/Time    ASTSGOT 17 11/14/2020 0520    ALTSGPT 35 11/14/2020 0520    ALKPHOSPHAT 126 (H) 11/14/2020 0520    ALBUMIN 4.2 11/14/2020 0520     Lab Results   Component Value Date/Time    HBA1C 5.4 05/28/2022 08:16 AM      EEG routine (Dr. ALBERTO) May 2020: This is a abnormal routine EEG recording in the awake and  Drowsy states.  Intermittent slowing suggests mild encephalopathy and is in correlation with  patient's history of intracerebral hemorrhage. There were no seizures recorded.Clinical correlation is recommended.  TSH1.61    EEG routine ([facility]) 7/2021: FIRDA can be seen in widespread cortical dysfunction/encephalopathic conditions or underlying structural lesions.  Recommend clinical correlation. If clinical suspicion is high for seizures recommend prolonged monitoring.    Imaging: None    Records reviewed: Notes reviewed. Patient was to stop keppra in 2021.     Dr. Luke 9/2022 note reviewed. Insomnia refractory to multiple medications. Given Valium. Doing well outside of balance and speech.       ASSESSMENT AND PLAN:    1. H/O spont intraparenchymal intracranial hemorrhage d/t cerebral AVM: Patient with a history of intraparenchymal hemorrhage in the posterior fossa secondary to AVM rupture in the [facility] status post  shunt with prolonged and slow recovery.  Unfortunately patient is suffered significant neurological sequela from the intracranial hemorrhage as documented per HPI but has made significant improvements with aggressive rehabilitation.  On review of his MRI of the brain given the location of the insult, there is no significant cortical involvement.  Patient was placed on Keppra 500 mg twice daily during admission with most recent repeat EEG in July 2021 consistent with FIRDA -patient was slowly weaned off of Keppra and has been off of Keppra since July 2021 without evidence of seizure.  He is not driving.  -Patient following Dr. Luke in neuro rehab  -Monitor clinically for seizures    2. 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 Following Dr. Luke.    3.  (ventriculoperitoneal) shunt status: Following Dr. Walton    4. Dysphagia following nontraumatic intracerebral hemorrhage, not addressed today: Following with speech, currently taking a hiatus    5. Insomnia, new: Unclear etiology, refractory to multiple medications tried by   Ti.  -Sleep medicine consult for possible sleep disordered breathing  -Discussed retrial of trazodone 50 mg tablet, 25 mg nightly for 2 weeks and if no benefit increase to 50 mg nightly -discussed via telephone 9/27/2022 2 with mom  -Dr. Luke agreeable to follow-up with him as he will not be seeing me for 1 year      FOLLOW-UP: Return in about 1 year (around 9/26/2023).   EDUCATION AND COUNSELING:  -I personally discussed the following with the patient:   Risks/benefits/side effects/alternatives of medication including but not limited to drowsiness, sedation, dizziness, increased risk for falls, cardiovascular effects (hypotension, cardiac arrhythmias, death), weight changes, GI side effects (gastritis, ulcers, bleeding, changes in appetite, pancreatitis, change in bowel habits), increased risk for depression, anxiety, suicide, psychosis and mood changes, avoid abrupt cessation of medication, and priapism, increased risk for bleeding. and Patient is agreeable to HIV/syphilis testing    This dictation was created using voice recognition software. I have made every reasonable attempt to avoid dictation errors, but this document may contain an error not identified before finalizing. If the error changes the accuracy of the document, I would appreciate it being brought to my attention. Thank you very much.     Jennifer Valiente MD  Neurology

## 2022-09-27 RX ORDER — TRAZODONE HYDROCHLORIDE 50 MG/1
TABLET ORAL
Qty: 30 TABLET | Refills: 2 | Status: SHIPPED | OUTPATIENT
Start: 2022-09-27 | End: 2022-12-14

## 2022-10-03 ENCOUNTER — OFFICE VISIT (OUTPATIENT)
Dept: SLEEP MEDICINE | Facility: MEDICAL CENTER | Age: 27
End: 2022-10-03
Payer: MEDICARE

## 2022-10-03 VITALS — BODY MASS INDEX: 24.11 KG/M2 | WEIGHT: 150 LBS | HEIGHT: 66 IN | RESPIRATION RATE: 16 BRPM

## 2022-10-03 DIAGNOSIS — G47.33 OSA (OBSTRUCTIVE SLEEP APNEA): ICD-10-CM

## 2022-10-03 DIAGNOSIS — G47.09 OTHER INSOMNIA: ICD-10-CM

## 2022-10-03 PROCEDURE — 99204 OFFICE O/P NEW MOD 45 MIN: CPT | Performed by: INTERNAL MEDICINE

## 2022-10-03 RX ORDER — ZOLPIDEM TARTRATE 5 MG/1
5 TABLET ORAL NIGHTLY PRN
Qty: 2 TABLET | Refills: 0 | Status: SHIPPED | OUTPATIENT
Start: 2022-10-03 | End: 2022-10-04

## 2022-10-03 ASSESSMENT — FIBROSIS 4 INDEX: FIB4 SCORE: 0.21

## 2022-10-03 NOTE — PROGRESS NOTES
CC: Evaluation of insomnia    HPI:  Rey Medina is a 27 y.o. male kindly referred by Dr. Jennifer Valiente MD for evaluation of insomnia.  He is accompanied by his mother Gem who is his primary caregiver.  He has taken zolpidem, diazepam, melatonin, trazodone, and mirtazapine without improvement in his insomnia.  He is currently taking trazodone 25 mg and will increase to 50 mg if need be.    Usually goes to bed at 9:30 PM and wakes up at 7 AM.  He gets at most 2 to 4 hours of sleep.  Per Raheem he falls asleep in 30 minutes, snores, then wakes up in 2 to 3 hours. Earlier in his recovery witness apneas noted.    His total At[place] score is normal 2 out of 24.  He has never previously had a sleep study performed and is not on PAP or O2 therapy.    He identifies Sleep onset insomnia, poor sleep quality, and insufficient sleep for 6 months or longer as his main problems.    He sleeps alone and has no pets in the bedroom.  He generally goes to bed at 9:30 PM and gets up at 7 AM. He does not nap.  He needs 8 hours of sleep to feel rested.      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  He continues to have memory problems, difficulty initiating conversation, not on ambulatory, bladder incontinence, extremity incoordination, but no seizures.    Past medical history also significant for mild seasonal asthma,never smoker, need for 2022 influenza vaccination, and 2020 SARS-CoV-2 infection.                  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       Past Medical History:   Diagnosis Date    Chickenpox     COVID-19     [place] measles     ICH (intracerebral hemorrhage) (HCC)     Stroke (HCC)         Past Surgical History:   Procedure Laterality Date    ANAL FISTULOTOMY      PEG PLACEMENT      TRACHEOSTOMY       SHUNT INSERTION         Family History   Problem Relation Age of Onset    Hypertension Mother     Glasses Mother     Thyroid Mother     Diabetes Maternal Grandfather     Stroke Maternal Grandfather     Diabetes Paternal Grandmother     Hypertension Maternal Uncle     Heart Disease Neg Hx     Cancer Neg Hx        Social History     Socioeconomic History    Marital status: Single     Spouse name: Not on file    Number of children: Not on file    Years of education: Not on file    Highest education level: Some college, no degree   Occupational History    Not on file   Tobacco Use    Smoking status: Never    Smokeless tobacco: Never   Vaping Use    Vaping Use: Never used   Substance and Sexual Activity    Alcohol use: Never    Drug use: Never    Sexual activity: Not Currently   Other Topics Concern     Service No    Blood Transfusions No    Caffeine Concern No    Occupational Exposure No    Hobby Hazards No    Sleep Concern No    Stress Concern No    Weight Concern No    Special Diet Yes    Back Care No    Exercise Yes    Bike Helmet No    Seat Belt Yes    Self-Exams No   Social History Narrative    Lives with mom     Social Determinants of Health     Financial Resource Strain: Not on file   Food Insecurity: Not on file   Transportation Needs: Not on file   Physical Activity: Not on file   Stress: Not on file   Social Connections: Not on file   Intimate Partner Violence: Not on file   Housing Stability: Not on file       Current Outpatient Medications   Medication Sig Dispense Refill    zolpidem (AMBIEN) 5 MG Tab Take 1 Tablet by mouth at  "bedtime as needed for Sleep for up to 1 day. 2 Tablet 0    traZODone (DESYREL) 50 MG Tab 1/2 tablet (25 mg) nightly for two weeks and if no benefit, ok to increase to 1 tablet (50mg) nightly. 30 Tablet 2    polyethylene glycol 3350 (MIRALAX) 17 GM/SCOOP Powder DISSOLVE 17 GRAMS OF POWDER IN LIQUID AND DRINK  ONCE DAILY AS NEEDED FOR CONSTIPATION 510 g 5    montelukast (SINGULAIR) 10 MG Tab Take 1 Tablet by mouth 1 time a day as needed (to reduce amount of pills needing to be taken daily). 90 Tablet 0    Omega-3 Fatty Acids (OMEGA 3 500) 500 MG Cap Take 1 Cap by mouth every day. Indications: Dietary Deficiency      Magnesium 400 MG Cap Take 1 Cap by mouth every day. Indications: Malnutrition      RA TURMERIC EXTRA STRENGTH PO Take 1,000 mg by mouth every day. Indications: supplement      Cholecalciferol (VITAMIN D3) 2000 UNIT Cap Take 2,000 Units by mouth every day.      Cyanocobalamin (B-12 PO) Take 1 Tab by mouth every morning.      Misc. Devices Misc Take 1 ensure once a day 1 Each 0    NON SPECIFIED 1 ensure 1 time daily 60 Each 11    non-formulary med Inhale 1 Inhalation 2 times a day as needed (shortness of breath). Amborxol HCL Mucosolvan  Indications: shorness of breath      non-formulary med Take 1 Dose Pack by mouth every day. Emergen C      Misc. Devices Misc Incontinence supplies - adult diapers. Length of time needed - lifetime or 999 months. 999 Each 999     No current facility-administered medications for this visit.    \"CURRENT RX\"    ALLERGIES: Vancomycin and Other misc    ROS    Constitutional: Denies fever, chills, sweats,  weight loss, fatigue.  Eyes: Denies vision loss, pain, drainage, double vision, glasses.  Ears/Nose/Mouth/Throat: Denies earache, difficulty hearing, rhinitis/nasal congestion, injury, recurrent sore throat, persistent hoarseness, decayed teeth/toothaches, ringing or buzzing in the ears.  Cardiovascular: Denies chest pain, tightness, palpitations, swelling in legs/feet, " "fainting, difficulty breathing when lying down but gets better when sitting up.   Respiratory: Denies shortness of breath, cough, sputum, wheezing, painful breathing, coughing up blood.   Sleep: per HPI  Gastrointestinal: Denies  difficulty swallowing, nausea, abdominal pain, diarrhea, constipation, heartburn.  Genitourinary: Denies  blood in urine, discharge, frequent urination.   Musculoskeletal: Denies painful joints, sore muscles, back pain.   Integumentary: Denies rashes, lumps, color changes.   Neurological: Denies frequent headaches,weakness, dizziness.    PHYSICAL EXAM      BP (P) 124/84 (BP Location: Right arm, Patient Position: Sitting, BP Cuff Size: Adult)   Pulse (P) 90   Resp 16   Ht 1.676 m (5' 6\")   Wt 68 kg (150 lb)   SpO2 (P) 95%   BMI 24.21 kg/m²   Appearance: Well-nourished, well-developed, no acute distress. Trach healed.  Eyes:  PERRLA, EOMI  ENMT: masked  Neck: Supple, trachea midline, no masses  Respiratory effort:  No intercostal retractions or use of accessory muscles  Lung auscultation:  No wheezes rhonchi rubs or rales  Cardiac:2/6 YINA  Abdomen:  No tenderness, no organomegaly  Musculoskeletal:  Grossly normal; gait and station normal; digits and nails normal  Skin:  No rashes, petechiae, cyanosis  Neurologic: without focal signs; oriented to person, time, place, and purpose; judgement intact  Psychiatric:  No depression, anxiety, agitation        Medical Decision Making:  The medical record was reviewed in its entirety including the referral notes, records from primary care, consultants notes, hospital records, labs, imaging, microbiology, immunology, and immunizations.  Care gaps identified and reviewed with the patient.    Diagnostic and titration nocturnal polysomnograms, home sleep apnea tests, continuous nocturnal oximetry results, multiple sleep latency tests, and compliance reports reviewed.        1. PAWEL (obstructive sleep apnea)  - zolpidem (AMBIEN) 5 MG Tab; Take 1 Tablet " by mouth at bedtime as needed for Sleep for up to 1 day.  Dispense: 2 Tablet; Refill: 0  - Polysomnography, 4 or More; Future    PLAN:   The patient has signs and symptoms consistent with obstructive sleep apnea hypopnea syndrome. Will schedule a nocturnal polysomnogram or a home sleep apnea test depending on signs and symptoms as well as insurance restrictions.    The risks of untreated sleep apnea were discussed with the patient at length. Patients with PAWEL are at increased risk of cardiovascular disease including systemic arterial hypertension, pulmonary arterial hypertension (transient or fixed), TIA, and an elevated risk of stroke, heart attack, sudden death, or arrhythmia between the hours of 11 PM and 6 AM. PAWEL patients have an increased risk of motor vehicle accidents, type 2 diabetes, GERD, repetitive mechanical trauma to pharyngeal muscles with inflammation and denervation, frequent EEG arousals leading to nonrestorative sleep, excessive daytime sleepiness, fatigue, depression, poor pain control, irritability, and lower quality of life.  The patient was advised to avoid driving a motor vehicle when drowsy.    Positive airway pressure will favorably impact many of the adverse conditions and effects provoked by PAWEL.    Have advised the patient to follow up with the appropriate healthcare practitioners for all other medical problems and issues. Discussed blood pressure management if needed. Discussed depression screening.    If the insomnia persists after any and all sleep apnea has been adequately addressed, then referral to [facility]/Dr. Radhika Shahid for is CBT-I would be in order.    Practice sleep hygiene by keeping a good sleep environment:    1.  Removing distractions such as television, computers and other engaging activities.  2.  Keep the sleep room dark, cool and quiet  3.  Commit to a consistent bedtime and wake up time  4.  Avoid staying in bed while awake for greater than 20-30  minutes. If unable to fall asleep during this time then recommend going to a different location in the house to engage in a relaxation activity prior to returning to bed.   5. Recommend finishing meals 3 hours before bedtime especially if you are prone to indigestion or heartburn.  6.  Avoid smoking for at least 3 hours before bedtime.  7.  Recommend engaging in regular physical activity daily (its ok to exercise prior to bedtime).  8.  Avoid stressful situations before bedtime  9.  Avoid napping during the daytime or limiting the nap to less than 20 minutes.  10. Go to bed only when tired.  11. Limited activities in bed to sleep and sex  12. Recommend engaging in relaxing activities prior to bed such as reading, writing, listening to calming music, meditation, stretching or taking a bath.         Return for after sleep study.    Total time 45 minutes

## 2022-10-17 ENCOUNTER — SLEEP STUDY (OUTPATIENT)
Dept: SLEEP MEDICINE | Facility: MEDICAL CENTER | Age: 27
End: 2022-10-17
Attending: INTERNAL MEDICINE
Payer: MEDICARE

## 2022-10-17 DIAGNOSIS — G47.33 OSA (OBSTRUCTIVE SLEEP APNEA): ICD-10-CM

## 2022-10-17 PROCEDURE — 95811 POLYSOM 6/>YRS CPAP 4/> PARM: CPT | Performed by: INTERNAL MEDICINE

## 2022-10-25 NOTE — PROCEDURES
MONTAGE: Standard    STUDY TYPE: Split Night      RECORDING TECHNIQUE:   After the scalp was prepared, gold plated electrodes were applied to the scalp according to the International 10-20 System. EEG (electroencephalogram) was continuously monitored from the O1-M2, O2-M1, C3-M2, C4-M1, F3-M2, and F4-M1. EOGs (electrooculograms) were monitored by electrodes placed at the left and right outer canthi. Chin EMG (electromyogram) was monitored by electrodes placed on the mentalis and sub-mentalis muscles. Nasal and oral airflow were monitored using a triple port thermocouple as well as oronasal pressure transducer. Respiratory effort was measured by inductive plethysmography technology employing abdominal and thoracic belts. Blood oxygen saturation and pulse were monitored by pulse oximetry. Heart rhythm was monitored by surface electrocardiogram. Leg EMG was studied using surface electrodes placed on left and right anterior tibialis. A microphone was used to monitor tracheal sounds and snoring. Body position was monitored and documented by technician observation.     SCORING CRITERIA:   A modification of the AASM manual for scoring of sleep and associated events was used. Obstructive apneas were scored by cessation of airflow for at least 10 seconds with continuing respiratory effort. Central apneas were scored by cessation of airflow for at least 10 seconds with no respiratory effort. Hypopneas were scored by a 30% or more reduction in airflow for at least 10 seconds accompanied by arterial oxygen desaturation of 3% or an arousal. For CMS (Medicare) patients, per AASM rule 1B, hypopneas are scored by 30% with mild reduction in airflow for at least 10 seconds accompanied by arterial saturation decreased at 4%.    DIAGNOSTIC  Study start time was 08:36:24 PM. Diagnostic recording time was 206 minutes with a total sleep time of 131 minutes resulting in a sleep efficiency of 63.59%%. Sleep latency from the start of the  study was 66 minutes and the latency from sleep to REM was 79 minutes. In total,5 arousals were scored for an arousal index of 2.3.  Respiratory:  There were a total of 64 apneas consisting of 9 obstructive apneas, 0 mixed apneas, and 55 central apneas. A total of 42 hypopneas were scored. The apnea index was 29.31 per hour and the hypopnea index was 19.24 per hour resulting in an overall AHI of 48.55. AHI during rem was 66.4 and AHI while supine was 48.55.  Oximetry:  There was a mean oxygen saturation of 94.0%. The minimum oxygen saturation during NREM sleep was84.0% and in REM was 88.0. Time spent during sleep with oxygen saturations <88% was 1.1 minutes.   Cardiac:  The highest heart rate seen while awake was 109 BPM while the highest heart rate during sleep was 100 BPM with an average sleeping heart rate of 80 BPM.  Limb Movements:  There were a total of 0 PLMs during sleep, which resulted in a PLM index of 0.0. There were 2 PLMs associated with arousals which resulted in a PLMS arousal index of 0.9.    TREATMENT:  Treatment recording time was 6h 0.5m (360 minutes) with a total sleep time of 4h 54.5m (294 minutes) resulting in a sleep efficiency of 81.7%. Sleep latency from the start of treatment was 00 minutes and REM latency from sleep onset was 1h 42.0m. The patient had 33 arousals in total for an arousal index of 6.7.  Respiratory:   There were 126 apneas in total consisting of 0 obstructive apneas, 126 central apneas, and 0 mixed apneas for an apnea index of 25.67. The patient had 16 hypopneas in total, which resulted in a hypopnea index of 3.26. The overall AHI was 28.93, with a REM AHI of 20.94, and a supine AHI of 28.93.   Oximetry:  The mean SaO2 during treatment was 95.0%. The minimum oxygen saturation in NREM was87.0 % and in REM was 84.0%. Patient spent 3.3 minutes of TST with SaO2 <88%.  Cardiac:  The highest heart rate during sleep was 100 BPM with an average sleeping heart rate of 82BPM.  Limb  Movements:  There were a total of 0 PLMS during titration sleep time that resulted in an index of 0.0. There were 9 PLMS associated with arousals. This resulted in a PLM arousal index of 1.8.    Titration:   CPAP was tried from 5 to 8cm H2O. BiPAP was tried from 9/5 to 15/8cm H2O. ASV was tried was tried from EPAP: 6, PS: 15/3 to EPAP: 7, PS: 15/3cm H2O.  This was a fully attended sleep study. This test was technically adequate.  The patient used a small Simplus mask and heated humidification.      ASSESSMENT:    Severe central  sleep apnea - AHI 48.5.  51.9% of the events were scored as central apneas.  Mild nocturnal desaturation - luis saturation 84% - saturations less than or equal to 88% for 0.8% of the TST  Ineffective CPAP and BiPAP titration  Successful ASV titration to a best pressure of EPAP of 7 cm water and pressure support 3/15 cm water with a resultant AHI of 0.0 and normal oximetry.  The titration included supine REM sleep.      RECOMMENDATION:    Recommend ASV EPAP 7 cm water and pressure support 3/15 cmH2O using a small Simplus mask and heated humidification followed by data card and clinical review in 6-8 weeks.          Dr. Rey Rashid MD

## 2022-10-26 PROBLEM — G47.31 CENTRAL SLEEP APNEA: Status: ACTIVE | Noted: 2022-10-26

## 2022-10-26 PROBLEM — G47.33 OSA (OBSTRUCTIVE SLEEP APNEA): Status: ACTIVE | Noted: 2022-10-26

## 2022-10-26 NOTE — PROGRESS NOTES
CC: Sleep study results    HPI:    Rey Medina is a 27 y.o. male kindly referred by Dr. Jennifer Valiente MD for evaluation of insomnia.  He is accompanied by his mother Gem who is his primary caregiver.  He has taken zolpidem, diazepam, melatonin, trazodone, and mirtazapine without improvement in his insomnia.  He is currently taking trazodone 25 mg and will increase to 50 mg if need be.    His sleep study was performed on 10/17/2022 and showed severe central sleep apnea with an AHI of 40.5.  51.9% of the events were scored as central apneas.  He also had mild nocturnal desaturation with a luis saturation of 84% and saturations less than or equal to 88% for 0.8% of the total sleep time.  He then had an ineffective CPAP and BiPAP titration  followed by an ASV titration to a best pressure of EPAP of 7, pressure support 3/15 cm water and resultant AHI of 0.0 and normal oximetry.  The titration included supine REM sleep.    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  He continues to have memory problems, not ambulatory, difficulty initiating conversation, bladder incontinence, extremity incoordination, but no seizures. 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    H/O spont intraparenchymal intracranial hemorrhage d/t cerebral AVM 08/11/2020    Tachycardia 02/17/2020    Dysphagia following nontraumatic intracerebral hemorrhage 01/24/2020    Urinary incontinence due to cognitive impairment 01/24/2020     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       Past Medical History:   Diagnosis Date    Chickenpox     COVID-19     [place] measles     ICH (intracerebral hemorrhage) (HCC)     Stroke (HCC)         Past Surgical History:   Procedure Laterality Date    ANAL FISTULOTOMY      PEG PLACEMENT      TRACHEOSTOMY       SHUNT INSERTION         Family History   Problem Relation Age of Onset    Hypertension Mother     Glasses Mother     Thyroid Mother     Diabetes Maternal Grandfather     Stroke Maternal Grandfather     Diabetes Paternal Grandmother     Hypertension Maternal Uncle     Heart Disease Neg Hx     Cancer Neg Hx        Social History     Socioeconomic History    Marital status: Single     Spouse name: Not on file    Number of children: Not on file    Years of education: Not on file    Highest education level: Some college, no degree   Occupational History    Not on file   Tobacco Use    Smoking status: Never    Smokeless tobacco: Never   Vaping Use    Vaping Use: Never used   Substance and Sexual Activity    Alcohol use: Never    Drug use: Never    Sexual activity: Not Currently   Other Topics Concern     Service No    Blood Transfusions No    Caffeine Concern No    Occupational Exposure No    Hobby Hazards No    Sleep Concern No    Stress Concern No    Weight Concern No    Special Diet Yes    Back Care No    Exercise Yes    Bike Helmet No    Seat Belt Yes    Self-Exams No   Social History Narrative    Lives with mom     Social Determinants of Health     Financial Resource Strain: Not on file   Food Insecurity: Not on file   Transportation Needs: Not on file   Physical Activity: Not on file   Stress: Not on file   Social Connections: Not on file   Intimate Partner Violence: Not on file   Housing Stability: Not on file       Current Outpatient Medications   Medication Sig Dispense Refill    traZODone (DESYREL) 50 MG Tab 1/2 tablet (25 mg) nightly for two weeks and if  "no benefit, ok to increase to 1 tablet (50mg) nightly. 30 Tablet 2    polyethylene glycol 3350 (MIRALAX) 17 GM/SCOOP Powder DISSOLVE 17 GRAMS OF POWDER IN LIQUID AND DRINK  ONCE DAILY AS NEEDED FOR CONSTIPATION 510 g 5    Misc. Devices Misc Take 1 ensure once a day 1 Each 0    montelukast (SINGULAIR) 10 MG Tab Take 1 Tablet by mouth 1 time a day as needed (to reduce amount of pills needing to be taken daily). 90 Tablet 0    NON SPECIFIED 1 ensure 1 time daily 60 Each 11    Omega-3 Fatty Acids (OMEGA 3 500) 500 MG Cap Take 1 Cap by mouth every day. Indications: Dietary Deficiency      Magnesium 400 MG Cap Take 1 Cap by mouth every day. Indications: Malnutrition      RA TURMERIC EXTRA STRENGTH PO Take 1,000 mg by mouth every day. Indications: supplement      Cholecalciferol (VITAMIN D3) 2000 UNIT Cap Take 2,000 Units by mouth every day.      non-formulary med Inhale 1 Inhalation 2 times a day as needed (shortness of breath). Amborxol HCL Mucosolvan  Indications: shorness of breath      non-formulary med Take 1 Dose Pack by mouth every day. Emergen C      Cyanocobalamin (B-12 PO) Take 1 Tab by mouth every morning.      Misc. Devices Misc Incontinence supplies - adult diapers. Length of time needed - lifetime or 999 months. 999 Each 999     No current facility-administered medications for this visit.    \"CURRENT RX\"    ALLERGIES: Vancomycin and Other misc    ROS    Constitutional: Denies fever, chills, sweats,  weight loss, fatigue  Cardiovascular: Denies chest pain, tightness, palpitations, swelling in legs/feet, fainting, difficulty breathing when lying down but gets better when sitting up.   Respiratory: Denies shortness of breath, cough, sputum, wheezing, painful breathing, coughing up blood.   Sleep: per HPI  Gastrointestinal: Denies  difficulty swallowing, nausea, abdominal pain, diarrhea, constipation, heartburn.  Musculoskeletal: Denies painful joints, sore muscles, back pain.        PHYSICAL EXAM      There " were no vitals taken for this visit.  Appearance: Well-nourished, well-developed, no acute distress  Eyes:  PERRLA, EOMI  ENMT: masked  Neck: Supple, trachea midline, no masses  Respiratory effort:  No intercostal retractions or use of accessory muscles  Lung auscultation:  No wheezes rhonchi rubs or rales  Cardiac: No murmurs, rubs, or gallops; regular rhythm, normal rate; no edema  Abdomen:  No tenderness, no organomegaly.  Musculoskeletal:  Grossly normal; gait and station normal; digits and nails normal  Skin:  No rashes, petechiae, cyanosis  Neurologic: without focal signs; oriented to person, time, place, and purpose; judgement intact  Psychiatric:  No depression, anxiety, agitation      Medical Decision Making       The medical record was reviewed in its entirety including the referral notes, records from primary care, consultants notes, hospital records, lab, imaging, microbiology, immunology, and immunizations.  Care gaps identified and reviewed with the patient.    Diagnostic and titration nocturnal polysomnogram's, home sleep apnea tests, continuous nocturnal oximetry results, multiple sleep latency tests, and compliance reports reviewed.  1. Central sleep apnea  - DME ASV    PLAN:   Sleep study was performed on 10/17/2022 and showed severe central sleep apnea with an AHI of 40.5.  51.9% of the events were scored as central apneas.  He also had mild nocturnal desaturation with a luis saturation of 84% and saturations less than or equal to 88% for 0.8% of the total sleep time.  He then had an ineffective CPAP and BiPAP titration  followed by an ASV titration to a best pressure of EPAP of 7, pressure support 3/15 cm water and resultant AHI of 0.0 and normal oximetry.  The titration included supine REM sleep.    Will order ASV EPAP of 7 and pressure 3/15 cm water using a small Simplus mask and heated humidification.    The risks of untreated sleep apnea were discussed with the patient at length. Patients  with PAWEL are at increased risk of cardiovascular disease including systemic arterial hypertension, pulmonary arterial hypertension (transient or fixed), TIA, and an elevated risk of stroke, heart attack, sudden death, or arrhythmia between the hours of 11 PM and 6 AM. PAWEL patients have an increased risk of motor vehicle accidents, type 2 diabetes, GERD, repetitive mechanical trauma to pharyngeal muscles with inflammation and denervation, frequent EEG arousals leading to nonrestorative sleep, excessive daytime sleepiness, fatigue, depression, poor pain control, irritability, and lower quality of life.  The patient was advised to avoid driving a motor vehicle when drowsy.    Positive airway pressure will favorably impact many of the adverse conditions and effects provoked by PAWEL.    Have advised the patient to follow up with the appropriate healthcare practitioners for all other medical problems and issues.    Return in about 10 weeks (around 1/9/2023).    Total time 30 minutes

## 2022-10-31 ENCOUNTER — OFFICE VISIT (OUTPATIENT)
Dept: SLEEP MEDICINE | Facility: MEDICAL CENTER | Age: 27
End: 2022-10-31
Payer: MEDICARE

## 2022-10-31 VITALS — BODY MASS INDEX: 24.11 KG/M2 | RESPIRATION RATE: 16 BRPM | WEIGHT: 150 LBS | HEIGHT: 66 IN

## 2022-10-31 DIAGNOSIS — G47.31 CENTRAL SLEEP APNEA: ICD-10-CM

## 2022-10-31 PROCEDURE — 99214 OFFICE O/P EST MOD 30 MIN: CPT | Performed by: INTERNAL MEDICINE

## 2022-10-31 ASSESSMENT — FIBROSIS 4 INDEX: FIB4 SCORE: 0.21

## 2022-11-10 ENCOUNTER — PATIENT MESSAGE (OUTPATIENT)
Dept: HEALTH INFORMATION MANAGEMENT | Facility: OTHER | Age: 27
End: 2022-11-10

## 2022-12-09 ENCOUNTER — OFFICE VISIT (OUTPATIENT)
Dept: SLEEP MEDICINE | Facility: MEDICAL CENTER | Age: 27
End: 2022-12-09
Payer: MEDICARE

## 2022-12-09 VITALS
SYSTOLIC BLOOD PRESSURE: 106 MMHG | WEIGHT: 150 LBS | HEART RATE: 89 BPM | DIASTOLIC BLOOD PRESSURE: 76 MMHG | BODY MASS INDEX: 24.11 KG/M2 | OXYGEN SATURATION: 95 % | HEIGHT: 66 IN | RESPIRATION RATE: 14 BRPM

## 2022-12-09 DIAGNOSIS — G47.31 COMPLEX SLEEP APNEA SYNDROME: ICD-10-CM

## 2022-12-09 DIAGNOSIS — G47.31 CENTRAL SLEEP APNEA: ICD-10-CM

## 2022-12-09 PROCEDURE — 99214 OFFICE O/P EST MOD 30 MIN: CPT | Performed by: PREVENTIVE MEDICINE

## 2022-12-09 ASSESSMENT — FIBROSIS 4 INDEX: FIB4 SCORE: 0.21

## 2022-12-09 NOTE — PROGRESS NOTES
CHIEF COMPLAINT: Compliance check  LAST SEEN:  10/31/22  HISTORY OF PRESENT ILLNESS:  Rey Medina is a 27 y.o.male   who is here today to follow-up.  He is accompanied by his mother Gem who is his primary caregiver.   He is currently taking trazodone for insomnia.   His sleep study was performed on 10/17/2022 and showed severe central sleep apnea with an AHI of 40.5.  51.9% of the events were scored as central apneas.  He also had mild nocturnal desaturation with a luis saturation of 84% and saturations less than or equal to 88% for 0.8% of the total sleep time.  He then had an ineffective CPAP and BiPAP titration  followed by an ASV titration to a best pressure of EPAP of 7, pressure support 3/15 cm water and resultant AHI of 0.0 and normal oximetry.  The titration included supine REM sleep.     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  He continues to have memory problems, not ambulatory, difficulty initiating conversation, bladder incontinence, extremity incoordination, but no seizures. Past medical history also significant for mild seasonal asthma,never smoker, need for 2022 influenza vaccination, and 2020 SARS-CoV-2 infection       COMPLIANCE DATA greater than 4 hours: 60%  Machine type: AirCurve 10 ASV  Date range:11/09/22-12/08/22  AHI: 8.4  TIME USED: 7hrs 59mins  PRESSURE SETTINGS: min EPAP 7 PS 15/3  LEAK: excessive at 75    This patient is using PAP therapy consistently and is benefiting from it.     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    H/O spont intraparenchymal intracranial hemorrhage d/t cerebral AVM 08/11/2020    Tachycardia 02/17/2020    Dysphagia following nontraumatic intracerebral hemorrhage 01/24/2020    Urinary incontinence due to cognitive impairment 01/24/2020    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     PAST SOCIAL HISTORY:  Past Surgical History:   Procedure Laterality Date    ANAL FISTULOTOMY      PEG PLACEMENT      TRACHEOSTOMY       SHUNT INSERTION       PAST FAMILY HISTORY:  Family History   Problem Relation Age of Onset    Hypertension Mother     Glasses Mother     Thyroid Mother     Diabetes Maternal Grandfather     Stroke Maternal Grandfather     Diabetes Paternal Grandmother     Hypertension Maternal Uncle     Heart Disease Neg Hx     Cancer Neg Hx      SOCIAL HISTORY:  Social History     Socioeconomic History    Marital status: Single     Spouse name: Not on file    Number of children: Not on file    Years of education: Not on file    Highest education level: Some college, no degree   Occupational History    Not on file   Tobacco Use    Smoking status: Never    Smokeless tobacco: Never   Vaping Use    Vaping Use: Never used   Substance and Sexual Activity    Alcohol use: Never    Drug use: Never    Sexual activity: Not Currently   Other Topics Concern     Service No    Blood Transfusions No    Caffeine Concern No    Occupational Exposure No    Hobby Hazards No    Sleep Concern No    Stress Concern No    Weight Concern No    Special Diet Yes    Back Care No    Exercise Yes    Bike Helmet No    Seat Belt Yes    Self-Exams No   Social History Narrative    Lives with mom     Social Determinants of Health     Financial Resource Strain: Not on file   Food Insecurity: Not on file   Transportation Needs: Not on file  "  Physical Activity: Not on file   Stress: Not on file   Social Connections: Not on file   Intimate Partner Violence: Not on file   Housing Stability: Not on file     ALLERGIES: Vancomycin and Other misc  MEDICATIONS:  Current Outpatient Medications   Medication Sig Dispense Refill    polyethylene glycol 3350 (MIRALAX) 17 GM/SCOOP Powder DISSOLVE 17 GRAMS OF POWDER IN LIQUID AND DRINK  ONCE DAILY AS NEEDED FOR CONSTIPATION 510 g 5    Misc. Devices Misc Take 1 ensure once a day 1 Each 0    montelukast (SINGULAIR) 10 MG Tab Take 1 Tablet by mouth 1 time a day as needed (to reduce amount of pills needing to be taken daily). 90 Tablet 0    NON SPECIFIED 1 ensure 1 time daily 60 Each 11    Omega-3 Fatty Acids (OMEGA 3 500) 500 MG Cap Take 1 Cap by mouth every day. Indications: Dietary Deficiency      Magnesium 400 MG Cap Take 1 Cap by mouth every day. Indications: Malnutrition      RA TURMERIC EXTRA STRENGTH PO Take 1,000 mg by mouth every day. Indications: supplement      Cholecalciferol (VITAMIN D3) 2000 UNIT Cap Take 2,000 Units by mouth every day.      non-formulary med Inhale 1 Inhalation 2 times a day as needed (shortness of breath). Amborxol HCL Mucosolvan  Indications: shorness of breath (Patient not taking: Reported on 12/14/2022)      non-formulary med Take 1 Dose Pack by mouth every day. Emergen C      Cyanocobalamin (B-12 PO) Take 1 Tab by mouth every morning.      Misc. Devices Misc Incontinence supplies - adult diapers. Length of time needed - lifetime or 999 months. 999 Each 999    propranolol (INDERAL) 20 MG Tab Take 20 mg by mouth every day.       No current facility-administered medications for this visit.    \"CURRENT RX\"    REVIEW OF SYSTEMS:  Constitutional: Denies weight loss  Eyes: Denies vision changes  Ears/Nose/Mouth/Throat: Denies rhinitis/nasal congestion, injury, decayed teeth/toothaches.  Cardiovascular: Denies chest pain, tightness, palpitations, swelling in legs/feet, difficulty breathing " "when lying down but gets better when sitting up.   Respiratory: Denies shortness of breath while awake,  Sleep: per HPI  Gastrointestinal: Denies  difficulty swallowing,  heartburn.  Genitourinary: REPORTS nocturia  Musculoskeletal: Denies painful joints, sore muscles, back pain.   Neurological: Denies frequent headaches,weakness, dizziness.    PHYSICAL EXAM/VITALS:  /76 (BP Location: Left arm, Patient Position: Sitting, BP Cuff Size: Large adult)   Pulse 89   Resp 14   Ht 1.676 m (5' 6\")   Wt 68 kg (150 lb)   SpO2 95%   BMI 24.21 kg/m²   Appearance: Well-nourished, well-developed,  looks stated age, no acute distress  Eyes:   EOMI  ENMT: MASKED   Neck: Supple, trachea midline  Respiratory effort:  No intercostal retractions or use of accessory muscles  Musculoskeletal:  Grossly normal; gait and station normal  Neurologic:  oriented to person, time, place, and purpose; judgement intact  Psychiatric:  No depression, anxiety, agitation    MEDICAL DECISION MAKING:  The medical record was reviewed as it pertains to this referral. This includes records from primary care,consultants notes, referral request, hospital records, labs and imaging. Any available diagnostic and titration nocturnal polysomnograms, home sleep apnea tests, continuous nocturnal oximetry results, multiple sleep latency tests, and recent compliance reports were reviewed with the patient.    ASSESSMENT/PLAN:  Rey Medina is a 27 y.o.male who is challenged somewhat on PAP therapy. He may need pressure changes beyond the mask fit.     1. Central sleep apnea  - DME Mask Fitting; Future    2. Complex sleep apnea syndrome  - DME Mask Fitting; Future        The risks of untreated sleep apnea were discussed with the patient at length. Patients with PAWEL are at increased risk of cardiovascular disease including coronary artery disease, systemic arterial hypertension, pulmonary arterial hypertension, cardiac arrhythmias, and stroke. PAWEL " patients have an increased risk of motor vehicle accidents, type 2 diabetes, chronic kidney disease, and non-alcoholic liver disease. The patient was advised to avoid driving a motor vehicle when drowsy.  Have advised the patient to follow up with the appropriate healthcare practitioners for all other medical problems and issues.    RETURN TO CLINIC: Return in about 7 weeks (around 1/27/2023) for Compliance check.    My total time spent caring for the patient on the day of the encounter was 40 minutes. This includes time spent on a thorough chart review including other physician notes, all sleep studies, as well as critical labs and pulmonary and cardiac studies.  Additionally, it includes a thorough discussion of good sleep hygiene and stimulus control, as well as  the need for consistency in terms of sleep preparation and practice.    Please note that this dictation was created using voice recognition software.  I have made every reasonable attempt to correct obvious errors, I expect that there are errors of grammar and possibly content that I did not discover before finalizing this note.

## 2022-12-14 ENCOUNTER — OFFICE VISIT (OUTPATIENT)
Dept: PHYSICAL MEDICINE AND REHAB | Facility: MEDICAL CENTER | Age: 27
End: 2022-12-14
Payer: MEDICARE

## 2022-12-14 VITALS
RESPIRATION RATE: 16 BRPM | TEMPERATURE: 99.2 F | SYSTOLIC BLOOD PRESSURE: 102 MMHG | OXYGEN SATURATION: 97 % | HEART RATE: 86 BPM | DIASTOLIC BLOOD PRESSURE: 68 MMHG

## 2022-12-14 DIAGNOSIS — R47.1 DYSARTHRIA: ICD-10-CM

## 2022-12-14 DIAGNOSIS — R47.01 APHASIA: ICD-10-CM

## 2022-12-14 DIAGNOSIS — G47.01 INSOMNIA DUE TO MEDICAL CONDITION: ICD-10-CM

## 2022-12-14 DIAGNOSIS — I61.5 NONTRAUMATIC INTRAVENTRICULAR INTRACEREBRAL HEMORRHAGE, UNSPECIFIED LATERALITY (HCC): Primary | ICD-10-CM

## 2022-12-14 DIAGNOSIS — Z86.73 HISTORY OF STROKE: ICD-10-CM

## 2022-12-14 PROCEDURE — 99214 OFFICE O/P EST MOD 30 MIN: CPT | Performed by: PHYSICAL MEDICINE & REHABILITATION

## 2022-12-14 RX ORDER — DOXEPIN HYDROCHLORIDE 10 MG/1
10 CAPSULE ORAL NIGHTLY
Qty: 14 CAPSULE | Refills: 0 | Status: SHIPPED | OUTPATIENT
Start: 2022-12-14 | End: 2022-12-28

## 2022-12-14 RX ORDER — PROPRANOLOL HYDROCHLORIDE 20 MG/1
20 TABLET ORAL
COMMUNITY
End: 2023-10-17 | Stop reason: SDUPTHER

## 2022-12-14 NOTE — PROGRESS NOTES
[facility]  PM&R Neuro Rehabilitation Clinic  [facility] [place] [address]  Ph: [phone]    FOLLOW UP PATIENT EVALUATION    Patient Name: Rey Medina   Patient : 1995  Patient Age: 27 y.o.   PCP: Nirmala Man M.D.    Examining Physician: Dr. 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  - Has seen sleep medicine and has ASV machine, but still not sleeping well.   - He was diagnosed with central sleep apnea which is severe.   - Ambien, mirtazapine, Trazodone, Melatonin, Valium did not help.   - Follow up with pulmonary in late . Might replace mask.   - ZHDBMM walker might be helpful.     Review of Systems:  All other pertinent positive review of systems are noted above in HPI.     Past Medical History:  Past Medical History:   Diagnosis Date    Chickenpox     COVID-19     [place] measles     ICH (intracerebral hemorrhage) (HCC)     Stroke (HCC)       Past Surgical History:   Procedure Laterality Date    ANAL FISTULOTOMY      PEG PLACEMENT      TRACHEOSTOMY       SHUNT INSERTION          Current Outpatient Medications:     propranolol (INDERAL) 20 MG Tab, Take 20 mg by mouth every day., Disp: , Rfl:     doxepin (SINEQUAN) 10 MG Cap, Take 1 Capsule by mouth every evening for 14 days., Disp: 14 Capsule, Rfl: 0    polyethylene glycol 3350 (MIRALAX) 17 GM/SCOOP Powder, DISSOLVE 17 GRAMS OF POWDER IN LIQUID AND DRINK  ONCE DAILY AS NEEDED FOR CONSTIPATION, Disp: 510 g, Rfl: 5    Misc. Devices Misc,  Take 1 ensure once a day, Disp: 1 Each, Rfl: 0    montelukast (SINGULAIR) 10 MG Tab, Take 1 Tablet by mouth 1 time a day as needed (to reduce amount of pills needing to be taken daily)., Disp: 90 Tablet, Rfl: 0    NON SPECIFIED, 1 ensure 1 time daily, Disp: 60 Each, Rfl: 11    Omega-3 Fatty Acids (OMEGA 3 500) 500 MG Cap, Take 1 Cap by mouth every day. Indications: Dietary Deficiency, Disp: , Rfl:     Magnesium 400 MG Cap, Take 1 Cap by mouth every day. Indications: Malnutrition, Disp: , Rfl:     RA TURMERIC EXTRA STRENGTH PO, Take 1,000 mg by mouth every day. Indications: supplement, Disp: , Rfl:     Cholecalciferol (VITAMIN D3) 2000 UNIT Cap, Take 2,000 Units by mouth every day., Disp: , Rfl:     non-formulary med, Take 1 Dose Pack by mouth every day. Emergen C, Disp: , Rfl:     Cyanocobalamin (B-12 PO), Take 1 Tab by mouth every morning., Disp: , Rfl:     Misc. Devices Misc, Incontinence supplies - adult diapers. Length of time needed - lifetime or 999 months., Disp: 999 Each, Rfl: 999    non-formulary med, Inhale 1 Inhalation 2 times a day as needed (shortness of breath). Amborxol HCL Mucosolvan  Indications: shorness of breath (Patient not taking: Reported on 12/14/2022), Disp: , Rfl:   Allergies   Allergen Reactions    Vancomycin Swelling     Lips  With red face and neck    Other Misc Rash     Allergic to name band.        Past Social History:  Social History     Socioeconomic History    Marital status: Single     Spouse name: Not on file    Number of children: Not on file    Years of education: Not on file    Highest education level: Some college, no degree   Occupational History    Not on file   Tobacco Use    Smoking status: Never    Smokeless tobacco: Never   Vaping Use    Vaping Use: Never used   Substance and Sexual Activity    Alcohol use: Never    Drug use: Never    Sexual activity: Not Currently   Other Topics Concern     Service No    Blood Transfusions No    Caffeine Concern No     Occupational Exposure No    Hobby Hazards No    Sleep Concern No    Stress Concern No    Weight Concern No    Special Diet Yes    Back Care No    Exercise Yes    Bike Helmet No    Seat Belt Yes    Self-Exams No   Social History Narrative    Lives with mom     Social Determinants of Health     Financial Resource Strain: Not on file   Food Insecurity: Not on file   Transportation Needs: Not on file   Physical Activity: Not on file   Stress: Not on file   Social Connections: Not on file   Intimate Partner Violence: Not on file   Housing Stability: Not on file        Family History:  Family History   Problem Relation Age of Onset    Hypertension Mother     Glasses Mother     Thyroid Mother     Diabetes Maternal Grandfather     Stroke Maternal Grandfather     Diabetes Paternal Grandmother     Hypertension Maternal Uncle     Heart Disease Neg Hx     Cancer Neg Hx        Depression and Opioid Screening  PHQ-9:      6/15/2021    11:30 AM 7/15/2021    11:30 AM 9/26/2022     3:00 PM   Depression Screen (PHQ-2/PHQ-9)   PHQ-2 Total Score 0 0 0     Interpretation of PHQ-9 Total Score   Score Severity   1-4 No Depression   5-9 Mild Depression   10-14 Moderate Depression   15-19 Moderately Severe Depression   20-27 Severe Depression     Opioid Risk Score: No value filed.  Interpretation of Opioid Risk Score   Score 0-3 = Low risk of abuse. Do UDS at least once per year.  Score 4-7 = Moderate risk of abuse. Do UDS 1-4 times per year.  Score 8+ = High risk of abuse. Refer to specialist.      OBJECTIVE:   Vital Signs:  Vitals:    12/14/22 1517   BP: 102/68   Pulse: 86   Resp: 16   Temp: 37.3 °C (99.2 °F)   SpO2: 97%        Pertinent Labs:  Lab Results   Component Value Date/Time    SODIUM 137 12/12/2020 02:00 AM    POTASSIUM 3.5 (L) 12/12/2020 02:00 AM    CHLORIDE 100 12/12/2020 02:00 AM    CO2 20 12/12/2020 02:00 AM    GLUCOSE 72 12/12/2020 02:00 AM    BUN 10 12/12/2020 02:00 AM    CREATININE 0.66 12/12/2020 02:00 AM       Lab  Results   Component Value Date/Time    HBA1C 5.4 05/28/2022 08:16 AM       Lab Results   Component Value Date/Time    WBC 7.2 12/12/2020 02:00 AM    RBC 4.99 12/12/2020 02:00 AM    HEMOGLOBIN 14.1 12/12/2020 02:00 AM    HEMATOCRIT 43.5 12/12/2020 02:00 AM    MCV 87.2 12/12/2020 02:00 AM    MCH 28.3 12/12/2020 02:00 AM    MCHC 32.4 (L) 12/12/2020 02:00 AM    MPV 11.1 12/12/2020 02:00 AM    NEUTSPOLYS 53.10 12/12/2020 02:00 AM    LYMPHOCYTES 34.80 12/12/2020 02:00 AM    MONOCYTES 8.60 12/12/2020 02:00 AM    EOSINOPHILS 2.10 12/12/2020 02:00 AM    BASOPHILS 1.10 12/12/2020 02:00 AM       Lab Results   Component Value Date/Time    ASTSGOT 17 12/12/2020 02:00 AM    ALTSGPT 38 12/12/2020 02:00 AM        Physical Exam:   GEN: No apparent distress  HEENT: Head normocephalic, atraumatic.  Sclera nonicteric bilaterally, no ocular discharge appreciated bilaterally.  CV: Extremities warm and well-perfused, no peripheral edema appreciated bilaterally.  PULMONARY: Breathing nonlabored on room air, no respiratory accessory muscle use.  Not requiring supplemental oxygen.  SKIN: No appreciable skin breakdown on exposed areas of skin.  PSYCH: Mood and affect within normal limits.  NEURO: Awake alert.  Answers questions appropriately.  Dysarthric.      ASSESSMENT/PLAN: Rey Medina  is a 27 y.o. male with rehabilitation history significant for ICH secondary to AVM rupture on 4/21/2019 s/p AVM repair, hydrocephalus s/p  shunt who was admitted to [facility] from 2/5/2020 to 3/12/2020, here for hemorrhagic stroke follow-up. The following plan was discussed with the patient who is in agreement.     Visit Diagnoses     ICD-10-CM   1. Nontraumatic intraventricular intracerebral hemorrhage, unspecified laterality (HCC)  I61.5   2. Insomnia due to medical condition  G47.01   3. History of stroke  Z86.73   4. Dysarthria  R47.1   5. Aphasia  R47.01       Mom assists with history.    Rehab/Neuro:   ICH secondary to AVM rupture on  4/21/2019 s/p AVM repair, hydrocephalus s/p  shunt who was recently admitted to [facility] from 2/5/2020 to 3/12/2020.  2.  No history of seizure, on Keppra.  Had EEG 5/21/2020. Keppra d/c.   3.  Poor attention secondary to ICH requiring need for neuro-stimulant; discontinued.  4.  Blurry vision after ICH, did have reading glasses prior to stroke.  Blurry vision persistent.  Also having problems with peripheral vision on the right. Resolved with new glasses rx.   5.  Irritability/agitation: Off of propranolol.  6.  Dysmetria  7.  Poor balance  -Neuro: Stable.  - Will look into getting patient a walker which provides support from all sides.  The model/company is [employer].    Insomnia:  - Medications tried: Melatonin, trazodone, Ambien, Valium, mirtazapine.  - High risk med management: Trial doxepin 10 mg nightly.    Follow up: TBD based on efficacy of doxepin.    Please note that this dictation was created using voice recognition software. I have made every reasonable attempt to correct obvious errors but there may be errors of grammar and content that I may have overlooked prior to finalization of this note.    Dr. Mandy Luke DO, MS  Department of Physical Medicine & Rehabilitation  Neuro Rehabilitation Clinic  [facility]

## 2023-02-01 ENCOUNTER — OFFICE VISIT (OUTPATIENT)
Dept: OPHTHALMOLOGY | Facility: MEDICAL CENTER | Age: 28
End: 2023-02-01
Payer: MEDICARE

## 2023-02-01 DIAGNOSIS — H49.9 OPHTHALMOPLEGIA: ICD-10-CM

## 2023-02-01 DIAGNOSIS — H52.13 MYOPIA OF BOTH EYES: ICD-10-CM

## 2023-02-01 DIAGNOSIS — Z86.79 H/O SPONT INTRAPARENCHYMAL INTRACRANIAL HEMORRHAGE D/T CEREBRAL AVM: ICD-10-CM

## 2023-02-01 PROCEDURE — 92060 SENSORIMOTOR EXAMINATION: CPT | Performed by: OPHTHALMOLOGY

## 2023-02-01 PROCEDURE — 99213 OFFICE O/P EST LOW 20 MIN: CPT | Performed by: OPHTHALMOLOGY

## 2023-02-01 ASSESSMENT — REFRACTION_MANIFEST
OS_SPHERE: -10.50
METHOD_AUTOREFRACTION: 1
OD_CYLINDER: +1.00
OS_CYLINDER: +2.75
OD_AXIS: 098
OS_AXIS: 071
OD_SPHERE: -8.75

## 2023-02-01 ASSESSMENT — SLIT LAMP EXAM - LIDS
COMMENTS: NORMAL
COMMENTS: NORMAL

## 2023-02-01 ASSESSMENT — REFRACTION_WEARINGRX
OD_AXIS: 106
OS_AXIS: 070
OD_AXIS: 100
OS_CYLINDER: +1.50
OD_SPHERE: -6.75
OD_SPHERE: -7.00
OS_SPHERE: -7.50
OS_AXIS: 068
OD_CYLINDER: +1.25
OD_CYLINDER: +0.75
OS_SPHERE: -7.50
SPECS_TYPE: SVL
OS_CYLINDER: +1.50

## 2023-02-01 ASSESSMENT — CONF VISUAL FIELD
OS_INFERIOR_NASAL_RESTRICTION: 0
OS_NORMAL: 1
OD_NORMAL: 1
OD_INFERIOR_NASAL_RESTRICTION: 0
OS_INFERIOR_TEMPORAL_RESTRICTION: 0
OD_SUPERIOR_TEMPORAL_RESTRICTION: 0
OS_SUPERIOR_NASAL_RESTRICTION: 0
OD_INFERIOR_TEMPORAL_RESTRICTION: 0
OS_SUPERIOR_TEMPORAL_RESTRICTION: 0
OD_SUPERIOR_NASAL_RESTRICTION: 0

## 2023-02-01 ASSESSMENT — VISUAL ACUITY
OD_CC: 20/40
OS_CC: 20/60
METHOD: SNELLEN - LINEAR

## 2023-02-01 ASSESSMENT — ENCOUNTER SYMPTOMS: BLURRED VISION: 1

## 2023-02-01 ASSESSMENT — EXTERNAL EXAM - LEFT EYE: OS_EXAM: NORMAL

## 2023-02-01 ASSESSMENT — TONOMETRY
OS_IOP_MMHG: 21
OD_IOP_MMHG: 20
IOP_METHOD: I-CARE

## 2023-02-01 ASSESSMENT — CUP TO DISC RATIO
OS_RATIO: 0.2
OD_RATIO: 0.2

## 2023-02-01 ASSESSMENT — EXTERNAL EXAM - RIGHT EYE: OD_EXAM: NORMAL

## 2023-02-01 NOTE — PROGRESS NOTES
Peds/Neuro Ophthalmology:   Everette Romero M.D.    Date & Time note created:    2/1/2023   10:34 AM     Referring MD / APRN:  Sherie Power M.D., No att. providers found    Patient ID:  Name:             Rey Medina   YOB: 1995  Age:                 27 y.o.  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 male   1 year follow up for Ophthalmoplegia. Pt mom is with mom today. Pt states vision is stable but mom states he has told her that vision is blurry sometimes. Pt wears glasses full time. Pt denies pain or discomfort OU. No Headaches.         Review of Systems:  Review of Systems   Eyes:  Positive for blurred vision.        Ophthalmoplegia   All other systems reviewed and are negative.    Past Medical History:   Past Medical History:   Diagnosis Date    Chickenpox     COVID-19     [place] measles     ICH (intracerebral hemorrhage) (HCC)     Stroke (HCC)        Past Surgical History:  Past Surgical History:   Procedure Laterality Date    ANAL FISTULOTOMY      PEG PLACEMENT      TRACHEOSTOMY       SHUNT INSERTION         Current Outpatient Medications:  Current Outpatient Medications   Medication Sig Dispense Refill    propranolol (INDERAL) 20 MG Tab Take 20 mg by mouth every day.      polyethylene glycol 3350 (MIRALAX) 17 GM/SCOOP Powder DISSOLVE 17 GRAMS OF POWDER IN LIQUID AND DRINK  ONCE DAILY AS NEEDED FOR CONSTIPATION 510 g 5    Misc. Devices Misc Take 1 ensure once a day 1 Each 0    montelukast (SINGULAIR) 10 MG Tab Take 1 Tablet by mouth 1 time a day as needed (to reduce amount of pills needing to be taken daily). 90 Tablet 0    NON SPECIFIED 1 ensure 1 time daily 60 Each 11    Omega-3 Fatty Acids (OMEGA 3 500) 500 MG Cap Take 1 Cap by mouth every day. Indications: Dietary Deficiency      Magnesium 400 MG Cap  Take 1 Cap by mouth every day. Indications: Malnutrition      RA TURMERIC EXTRA STRENGTH PO Take 1,000 mg by mouth every day. Indications: supplement      Cholecalciferol (VITAMIN D3) 2000 UNIT Cap Take 2,000 Units by mouth every day.      non-formulary med Inhale 1 Inhalation 2 times a day as needed (shortness of breath). Amborxol HCL Mucosolvan  Indications: shorness of breath (Patient not taking: Reported on 12/14/2022)      non-formulary med Take 1 Dose Pack by mouth every day. Emergen C      Cyanocobalamin (B-12 PO) Take 1 Tab by mouth every morning.      Misc. Devices Misc Incontinence supplies - adult diapers. Length of time needed - lifetime or 999 months. 999 Each 999     No current facility-administered medications for this visit.       Allergies:  Allergies   Allergen Reactions    Vancomycin Swelling     Lips  With red face and neck    Other Misc Rash     Allergic to name band.       Family History:  Family History   Problem Relation Age of Onset    Hypertension Mother     Glasses Mother     Thyroid Mother     Diabetes Maternal Grandfather     Stroke Maternal Grandfather     Diabetes Paternal Grandmother     Hypertension Maternal Uncle     Heart Disease Neg Hx     Cancer Neg Hx        Social History:  Social History     Socioeconomic History    Marital status: Single     Spouse name: Not on file    Number of children: Not on file    Years of education: Not on file    Highest education level: Some college, no degree   Occupational History    Not on file   Tobacco Use    Smoking status: Never    Smokeless tobacco: Never   Vaping Use    Vaping Use: Never used   Substance and Sexual Activity    Alcohol use: Never    Drug use: Never    Sexual activity: Not Currently   Other Topics Concern     Service No    Blood Transfusions No    Caffeine Concern No    Occupational Exposure No    Hobby Hazards No    Sleep Concern No    Stress Concern No    Weight Concern No    Special Diet Yes    Back Care No     Exercise Yes    Bike Helmet No    Seat Belt Yes    Self-Exams No   Social History Narrative    Lives with mom     Social Determinants of Health     Financial Resource Strain: Not on file   Food Insecurity: Not on file   Transportation Needs: Not on file   Physical Activity: Not on file   Stress: Not on file   Social Connections: Not on file   Intimate Partner Violence: Not on file   Housing Stability: Not on file          Physical Exam:  Physical Exam    Oriented x 3  Weight/BMI: There is no height or weight on file to calculate BMI.  There were no vitals taken for this visit.    Base Eye Exam       Visual Acuity (Snellen - Linear)         Right Left    Dist cc 20/40 20/60              Tonometry (I-CARE, 9:19 AM)         Right Left    Pressure 20 21              Visual Fields         Right Left     Full Full              Neuro/Psych       Oriented x3: Yes    Mood/Affect: Normal              Dilation       Both eyes: Tropicamide (MYDRIACYL) 1% ophthalmic solution, Phenylephrine (NEOSYNEPHRINE) ophthalmic solution 2.5%, Cyclopentolate (CYCLOGYL) 1% ophthalmic solution                   Strabismus Exam       Observations: Ortho      Distance Near Near +3DS N Bifocals                      -2 -2 -2   -2 -2 -2                       0  0   0  0                       0 0 0   0 0 0                Convergence retraction on down gaze, end gaze abducting nysagmus       Slit Lamp and Fundus Exam       External Exam         Right Left    External Normal Normal              Slit Lamp Exam         Right Left    Lids/Lashes Normal Normal    Conjunctiva/Sclera White and quiet White and quiet    Cornea Clear Clear    Anterior Chamber Deep and quiet Deep and quiet    Iris Round and reactive Round and reactive    Lens Clear Clear    Vitreous Normal Normal              Fundus Exam         Right Left    Disc Normal Normal    C/D Ratio 0.2 0.2    Macula Normal Normal    Vessels Normal Normal    Periphery Normal Normal                   Refraction       Wearing Rx         Sphere Cylinder Axis    Right -6.75 +0.75 106    Left -7.50 +1.50 068      Age: 1yr    Type: SVL              Wearing Rx #2         Sphere Cylinder Axis    Right -7.00 +1.25 100    Left -7.50 +1.50 070              Manifest Refraction (Auto)         Sphere Cylinder Axis    Right -8.75 +1.00 098    Left -10.50 +2.75 071              Final Rx         Sphere Cylinder Axis    Right -7.00 +1.25 100    Left -7.50 +1.50 070                    Pertinent Lab/Test/Imaging Review:      Assessment and Plan:     Ophthalmoplegia  8/25/2020 - Bilateral elevation deficit secondary to AVM / Stroke involving the area of the RIMLF  10/26/2021 - continued elevation deficit, some convergent retraction movement on upgaze and end horizontal nystagmus.   2/1/2023 -overall stable elevation deficit with convergence retraction movement and and horizontal gaze nystagmus    Myopia of both eyes  8/25/2020 - progressive myopia - will give glasses rx  10/26/2021 - pre cyclo was more myopic and could manifest improved acuity. Post cyclo less myopia. Thus may be over accommodating to control some of the nystagmus. Thus will give new rx based on pre cyclo manifest.   2/1/2023 -post cyclo over retinooscopy.  No change in Rx    H/O spont intraparenchymal intracranial hemorrhage d/t cerebral AVM  8/25/2020 - 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 Has some end gaze nystagmus worse to the right than the left as well as a bilateral elevation deficit, probably secondary to involvement of the RIMLF. There is no evidence of involvement of the third nucleus in that has good adduction and depression and the pupils are not fixed and dilated. In primary position is relatively orthotropic so no need for prisms or eye muscle surgery to further elevate the eyes. Also obtained OCT NFL thickness that was normal at 99 OD and 102 OS.   10.26.2021 - Nervus appear  healthy  2/1/2023 -overall stable although might have some improvement in the convergence retraction nystagmus on upgaze.        Everette Romero M.D.

## 2023-02-01 NOTE — ASSESSMENT & PLAN NOTE
8/25/2020 - Bilateral elevation deficit secondary to AVM / Stroke involving the area of the RIMLF  10/26/2021 - continued elevation deficit, some convergent retraction movement on upgaze and end horizontal nystagmus.   2/1/2023 -overall stable elevation deficit with convergence retraction movement and and horizontal gaze nystagmus

## 2023-02-01 NOTE — ASSESSMENT & PLAN NOTE
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 Has some end gaze nystagmus worse to the right than the left as well as a bilateral elevation deficit, probably secondary to involvement of the RIMLF. There is no evidence of involvement of the third nucleus in that has good adduction and depression and the pupils are not fixed and dilated. In primary position is relatively orthotropic so no need for prisms or eye muscle surgery to further elevate the eyes. Also obtained OCT NFL thickness that was normal at 99 OD and 102 OS.   10.26.2021 - Nervus appear healthy  2/1/2023 -overall stable although might have some improvement in the convergence retraction nystagmus on upgaze.

## 2023-02-01 NOTE — ASSESSMENT & PLAN NOTE
8/25/2020 - progressive myopia - will give glasses rx  10/26/2021 - pre cyclo was more myopic and could manifest improved acuity. Post cyclo less myopia. Thus may be over accommodating to control some of the nystagmus. Thus will give new rx based on pre cyclo manifest.   2/1/2023 -post cyclo over retinooscopy.  No change in Rx

## 2023-02-22 NOTE — PROGRESS NOTES
CC: Reevaluation of central sleep apnea and mask leak after mask re-fitting        HPI:  Rey Medina is a 27 y.o.male  kindly referred by Sherie Power M.D. and was last seen by Dr. Wilburn on 12/9/2022 when he had a significant leak and was referred for mask fitting.  On that date his residual AHI was slightly elevated at 8.4 on EPAP 7, pressure support 15/3 cm water    The interested reader is directed to his prior visits for a detailed accounting of this sleep history.    Suffice it to say he is 10/17/2022 sleep study showed severe central sleep apnea with an AHI of 48.5.  51.9% events were scored as central apneas.  He had mild nocturnal desaturation with a luis saturation of 84%-saturations less than or equal to 88% for 0.8% of the TST.  He then underwent a successful ASV titration to the aforementioned settings    Accompanied by his mother Melinda Mansfield.  The patient complains of insomnia.  Discussed insomnia measures and sleep hygiene principles with the patient and his mother.    Today, 2/27/2023, his 30-day compliance is 97% for 7 hours and 47 minutes.  His AHI is fairly well-controlled at 8 to.  His median leak is 8.6 but his 95th percentile leak is 48.8 L/min.    The patient reports improved symptoms using positive airway pressure.  Has experienced no significant problems with mask fit, mask leak, pressure tolerance, excessive airway dryness, nasal congestion, aerophagia, or chest pain.    Usually puts mask on at 9:30 and takes off at 6:30 AM.      Past medical history significant for the following:  He has a history of mild seasonal asthma, never smoker, intraventricular hemorrhage secondary to an AVM status post  shunt (Tracheostomy, PEG tube, and stereotactic radiosurgery).  He continues to have memory problems, difficulty initiating conversation, not on ambulatory, bladder incontinence, extremity incoordination, but no seizures.         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       Past Medical History:   Diagnosis Date    Chickenpox     COVID-19     [place] measles     ICH (intracerebral hemorrhage) (HCC)     Stroke (HCC)         Past Surgical History:   Procedure Laterality Date    ANAL FISTULOTOMY      PEG PLACEMENT      TRACHEOSTOMY       SHUNT INSERTION         Family History   Problem Relation Age of Onset    Hypertension Mother     Glasses Mother     Thyroid Mother     Diabetes Maternal Grandfather     Stroke Maternal Grandfather     Diabetes Paternal Grandmother     Hypertension Maternal Uncle     Heart Disease Neg Hx     Cancer Neg Hx        Social History     Socioeconomic History    Marital status: Single     Spouse name: Not on file    Number of children: Not on file    Years of education: Not on file    Highest education level: Some college, no degree   Occupational History    Not on file   Tobacco Use    Smoking status: Never    Smokeless tobacco: Never   Vaping Use    Vaping Use: Never used   Substance and Sexual Activity    Alcohol use: Never    Drug use: Never    Sexual activity: Not Currently   Other Topics Concern     Service No    Blood Transfusions No    Caffeine Concern No    Occupational Exposure No    Hobby Hazards No    Sleep Concern No    Stress Concern No    Weight Concern No    Special Diet Yes    Back Care No    Exercise Yes    Bike Helmet No    Seat Belt Yes    Self-Exams No   Social  "History Narrative    Lives with mom     Social Determinants of Health     Financial Resource Strain: Not on file   Food Insecurity: Not on file   Transportation Needs: Not on file   Physical Activity: Not on file   Stress: Not on file   Social Connections: Not on file   Intimate Partner Violence: Not on file   Housing Stability: Not on file       Current Outpatient Medications   Medication Sig Dispense Refill    propranolol (INDERAL) 20 MG Tab Take 20 mg by mouth every day.      polyethylene glycol 3350 (MIRALAX) 17 GM/SCOOP Powder DISSOLVE 17 GRAMS OF POWDER IN LIQUID AND DRINK  ONCE DAILY AS NEEDED FOR CONSTIPATION 510 g 5    Misc. Devices Misc Take 1 ensure once a day 1 Each 0    montelukast (SINGULAIR) 10 MG Tab Take 1 Tablet by mouth 1 time a day as needed (to reduce amount of pills needing to be taken daily). 90 Tablet 0    NON SPECIFIED 1 ensure 1 time daily 60 Each 11    Omega-3 Fatty Acids (OMEGA 3 500) 500 MG Cap Take 1 Cap by mouth every day. Indications: Dietary Deficiency      Magnesium 400 MG Cap Take 1 Cap by mouth every day. Indications: Malnutrition      RA TURMERIC EXTRA STRENGTH PO Take 1,000 mg by mouth every day. Indications: supplement      Cholecalciferol (VITAMIN D3) 2000 UNIT Cap Take 2,000 Units by mouth every day.      non-formulary med Take 1 Dose Pack by mouth every day. Emergen C      Cyanocobalamin (B-12 PO) Take 1 Tab by mouth every morning.      Misc. Devices Misc Incontinence supplies - adult diapers. Length of time needed - lifetime or 999 months. 999 Each 999    non-formulary med Inhale 1 Inhalation 2 times a day as needed (shortness of breath). Amborxol HCL Mucosolvan  Indications: shorness of breath (Patient not taking: Reported on 2/27/2023)       No current facility-administered medications for this visit.    \"CURRENT RX\"    ALLERGIES: Vancomycin and Other misc    ROS    Constitutional: Denies fever, chills, sweats,  weight loss, fatigue  Cardiovascular: Denies chest pain, " "tightness, palpitations, swelling in legs/feet, fainting, difficulty breathing when lying down but gets better when sitting up.   Respiratory: Denies shortness of breath, cough, sputum, wheezing, painful breathing, coughing up blood.   Sleep: per HPI  Gastrointestinal: Denies  difficulty swallowing, nausea, abdominal pain, diarrhea, constipation, heartburn.  Musculoskeletal: Denies painful joints, sore muscles, back pain.        PHYSICAL EXAM      /84 (BP Location: Left arm, Patient Position: Sitting, BP Cuff Size: Adult)   Pulse 97   Resp 16   Ht 1.676 m (5' 6\")   Wt 63.5 kg (140 lb)   SpO2 92%   BMI 22.60 kg/m²   Appearance: Well-nourished, well-developed, no acute distress  Eyes:  PERRLA, EOMI  ENMT: masked  Neck: Supple, trachea midline, no masses  Respiratory effort:  No intercostal retractions or use of accessory muscles  Lung auscultation:  No wheezes rhonchi rubs or rales  Cardiac: No murmurs, rubs, or gallops; regular rhythm, normal rate; no edema  Abdomen:  No tenderness, no organomegaly.  Musculoskeletal:  Grossly normal; gait and station normal; digits and nails normal  Skin:  No rashes, petechiae, cyanosis  Neurologic: without focal signs; oriented to person, time, place, and purpose; judgement intact  Psychiatric:  No depression, anxiety, agitation      Medical Decision Making       The medical record was reviewed in its entirety including the referral notes, records from primary care, consultants notes, hospital records, lab, imaging, microbiology, immunology, and immunizations.  Care gaps identified and reviewed with the patient.    Diagnostic and titration nocturnal polysomnogram's, home sleep apnea tests, continuous nocturnal oximetry results, multiple sleep latency tests, and compliance reports reviewed.  There are no diagnoses linked to this encounter.    PLAN:   Has been advised to continue the current PAP, clean equipment frequently, and get new mask and supplies as allowed by " insurance and DME. Advised about potential problems including nasal obstruction/stuffiness, mask leak or discomfort , frequent awakenings, chill or dryness of the upper airway, noise, inconvenience, and lack of benefit. Recommend an earlier appointment, if significant treatment barriers develop.    The risks of untreated sleep apnea were discussed with the patient at length. Patients with PAWEL are at increased risk of cardiovascular disease including systemic arterial hypertension, pulmonary arterial hypertension (transient or fixed), TIA, and an elevated risk of stroke, heart attack, sudden death, or arrhythmia between the hours of 11 PM and 6 AM. PAWEL patients have an increased risk of motor vehicle accidents, type 2 diabetes, GERD, repetitive mechanical trauma to pharyngeal muscles with inflammation and denervation, frequent EEG arousals leading to nonrestorative sleep, excessive daytime sleepiness, fatigue, depression, poor pain control, irritability, and lower quality of life.  The patient was advised to avoid driving a motor vehicle when drowsy.    Positive airway pressure will favorably impact many of the adverse conditions and effects provoked by PAWEL.    Have advised the patient to follow up with the appropriate healthcare practitioners for all other medical problems and issues.    Return in about 1 year (around 2/27/2024).    Total time 30 minutes    Accompanied by his mother Melinda Mansfield.  The patient complains of insomnia.  Discussed insomnia measures and sleep hygiene principles with the patient and his mother.

## 2023-02-27 ENCOUNTER — OFFICE VISIT (OUTPATIENT)
Dept: SLEEP MEDICINE | Facility: MEDICAL CENTER | Age: 28
End: 2023-02-27
Attending: INTERNAL MEDICINE
Payer: MEDICARE

## 2023-02-27 VITALS
RESPIRATION RATE: 16 BRPM | HEIGHT: 66 IN | HEART RATE: 97 BPM | WEIGHT: 140 LBS | BODY MASS INDEX: 22.5 KG/M2 | DIASTOLIC BLOOD PRESSURE: 84 MMHG | SYSTOLIC BLOOD PRESSURE: 124 MMHG | OXYGEN SATURATION: 92 %

## 2023-02-27 DIAGNOSIS — G47.31 CENTRAL SLEEP APNEA: ICD-10-CM

## 2023-02-27 PROCEDURE — 99214 OFFICE O/P EST MOD 30 MIN: CPT | Performed by: INTERNAL MEDICINE

## 2023-02-27 PROCEDURE — 99213 OFFICE O/P EST LOW 20 MIN: CPT | Performed by: INTERNAL MEDICINE

## 2023-03-13 DIAGNOSIS — T78.40XS ALLERGY, SEQUELA: ICD-10-CM

## 2023-03-14 RX ORDER — MONTELUKAST SODIUM 10 MG/1
TABLET ORAL
Qty: 90 TABLET | Refills: 1 | Status: SHIPPED | OUTPATIENT
Start: 2023-03-14 | End: 2023-09-05

## 2023-03-30 ENCOUNTER — APPOINTMENT (OUTPATIENT)
Dept: RADIOLOGY | Facility: MEDICAL CENTER | Age: 28
DRG: 854 | End: 2023-03-30
Attending: EMERGENCY MEDICINE
Payer: MEDICARE

## 2023-03-30 ENCOUNTER — HOSPITAL ENCOUNTER (INPATIENT)
Facility: MEDICAL CENTER | Age: 28
LOS: 2 days | DRG: 854 | End: 2023-04-01
Attending: EMERGENCY MEDICINE | Admitting: INTERNAL MEDICINE
Payer: MEDICARE

## 2023-03-30 DIAGNOSIS — K81.9 CHOLECYSTITIS: ICD-10-CM

## 2023-03-30 DIAGNOSIS — K81.0 ACUTE CHOLECYSTITIS: ICD-10-CM

## 2023-03-30 PROBLEM — K56.7 ILEUS (HCC): Status: ACTIVE | Noted: 2023-03-30

## 2023-03-30 PROBLEM — A41.9 SEPSIS (HCC): Status: ACTIVE | Noted: 2023-03-30

## 2023-03-30 PROBLEM — D75.1 POLYCYTHEMIA: Status: ACTIVE | Noted: 2023-03-30

## 2023-03-30 PROBLEM — E87.6 HYPOKALEMIA: Status: ACTIVE | Noted: 2023-03-30

## 2023-03-30 LAB
ALBUMIN SERPL BCP-MCNC: 4.7 G/DL (ref 3.2–4.9)
ALBUMIN/GLOB SERPL: 1.2 G/DL
ALP SERPL-CCNC: 107 U/L (ref 30–99)
ALT SERPL-CCNC: 25 U/L (ref 2–50)
ANION GAP SERPL CALC-SCNC: 13 MMOL/L (ref 7–16)
APPEARANCE UR: CLEAR
AST SERPL-CCNC: 11 U/L (ref 12–45)
BASOPHILS # BLD AUTO: 0.3 % (ref 0–1.8)
BASOPHILS # BLD: 0.05 K/UL (ref 0–0.12)
BILIRUB SERPL-MCNC: 0.4 MG/DL (ref 0.1–1.5)
BILIRUB UR QL STRIP.AUTO: NEGATIVE
BUN SERPL-MCNC: 9 MG/DL (ref 8–22)
CALCIUM ALBUM COR SERPL-MCNC: 9.1 MG/DL (ref 8.5–10.5)
CALCIUM SERPL-MCNC: 9.7 MG/DL (ref 8.4–10.2)
CHLORIDE SERPL-SCNC: 96 MMOL/L (ref 96–112)
CO2 SERPL-SCNC: 27 MMOL/L (ref 20–33)
COLOR UR: YELLOW
CREAT SERPL-MCNC: 0.76 MG/DL (ref 0.5–1.4)
EOSINOPHIL # BLD AUTO: 0.01 K/UL (ref 0–0.51)
EOSINOPHIL NFR BLD: 0.1 % (ref 0–6.9)
ERYTHROCYTE [DISTWIDTH] IN BLOOD BY AUTOMATED COUNT: 43.6 FL (ref 35.9–50)
GFR SERPLBLD CREATININE-BSD FMLA CKD-EPI: 125 ML/MIN/1.73 M 2
GLOBULIN SER CALC-MCNC: 3.8 G/DL (ref 1.9–3.5)
GLUCOSE SERPL-MCNC: 102 MG/DL (ref 65–99)
GLUCOSE UR STRIP.AUTO-MCNC: NEGATIVE MG/DL
HCT VFR BLD AUTO: 53.2 % (ref 42–52)
HGB BLD-MCNC: 17.8 G/DL (ref 14–18)
IMM GRANULOCYTES # BLD AUTO: 0.05 K/UL (ref 0–0.11)
IMM GRANULOCYTES NFR BLD AUTO: 0.3 % (ref 0–0.9)
INR PPP: 1.02 (ref 0.87–1.13)
KETONES UR STRIP.AUTO-MCNC: NEGATIVE MG/DL
LACTATE SERPL-SCNC: 1.7 MMOL/L (ref 0.5–2)
LEUKOCYTE ESTERASE UR QL STRIP.AUTO: NEGATIVE
LIPASE SERPL-CCNC: 28 U/L (ref 7–58)
LYMPHOCYTES # BLD AUTO: 1.5 K/UL (ref 1–4.8)
LYMPHOCYTES NFR BLD: 8.3 % (ref 22–41)
MCH RBC QN AUTO: 29 PG (ref 27–33)
MCHC RBC AUTO-ENTMCNC: 33.5 G/DL (ref 33.7–35.3)
MCV RBC AUTO: 86.8 FL (ref 81.4–97.8)
MICRO URNS: NORMAL
MONOCYTES # BLD AUTO: 1.68 K/UL (ref 0–0.85)
MONOCYTES NFR BLD AUTO: 9.3 % (ref 0–13.4)
NEUTROPHILS # BLD AUTO: 14.78 K/UL (ref 1.82–7.42)
NEUTROPHILS NFR BLD: 81.7 % (ref 44–72)
NITRITE UR QL STRIP.AUTO: NEGATIVE
NRBC # BLD AUTO: 0 K/UL
NRBC BLD-RTO: 0 /100 WBC
PH UR STRIP.AUTO: 8 [PH] (ref 5–8)
PLATELET # BLD AUTO: 326 K/UL (ref 164–446)
PMV BLD AUTO: 10.7 FL (ref 9–12.9)
POTASSIUM SERPL-SCNC: 3.5 MMOL/L (ref 3.6–5.5)
PROT SERPL-MCNC: 8.5 G/DL (ref 6–8.2)
PROT UR QL STRIP: NEGATIVE MG/DL
PROTHROMBIN TIME: 13.2 SEC (ref 12–14.6)
RBC # BLD AUTO: 6.13 M/UL (ref 4.7–6.1)
RBC UR QL AUTO: NEGATIVE
SODIUM SERPL-SCNC: 136 MMOL/L (ref 135–145)
SP GR UR STRIP.AUTO: <=1.005
WBC # BLD AUTO: 18.1 K/UL (ref 4.8–10.8)

## 2023-03-30 PROCEDURE — 700117 HCHG RX CONTRAST REV CODE 255: Performed by: EMERGENCY MEDICINE

## 2023-03-30 PROCEDURE — 83605 ASSAY OF LACTIC ACID: CPT

## 2023-03-30 PROCEDURE — 74177 CT ABD & PELVIS W/CONTRAST: CPT

## 2023-03-30 PROCEDURE — 36415 COLL VENOUS BLD VENIPUNCTURE: CPT

## 2023-03-30 PROCEDURE — 80053 COMPREHEN METABOLIC PANEL: CPT

## 2023-03-30 PROCEDURE — 770001 HCHG ROOM/CARE - MED/SURG/GYN PRIV*

## 2023-03-30 PROCEDURE — 99285 EMERGENCY DEPT VISIT HI MDM: CPT

## 2023-03-30 PROCEDURE — 85610 PROTHROMBIN TIME: CPT

## 2023-03-30 PROCEDURE — 83690 ASSAY OF LIPASE: CPT

## 2023-03-30 PROCEDURE — 81003 URINALYSIS AUTO W/O SCOPE: CPT

## 2023-03-30 PROCEDURE — 700105 HCHG RX REV CODE 258: Performed by: EMERGENCY MEDICINE

## 2023-03-30 PROCEDURE — 96374 THER/PROPH/DIAG INJ IV PUSH: CPT

## 2023-03-30 PROCEDURE — 99223 1ST HOSP IP/OBS HIGH 75: CPT | Mod: AI | Performed by: INTERNAL MEDICINE

## 2023-03-30 PROCEDURE — 76705 ECHO EXAM OF ABDOMEN: CPT

## 2023-03-30 PROCEDURE — 96375 TX/PRO/DX INJ NEW DRUG ADDON: CPT

## 2023-03-30 PROCEDURE — 87040 BLOOD CULTURE FOR BACTERIA: CPT

## 2023-03-30 PROCEDURE — 700101 HCHG RX REV CODE 250: Performed by: INTERNAL MEDICINE

## 2023-03-30 PROCEDURE — 700111 HCHG RX REV CODE 636 W/ 250 OVERRIDE (IP): Performed by: EMERGENCY MEDICINE

## 2023-03-30 PROCEDURE — 85025 COMPLETE CBC W/AUTO DIFF WBC: CPT

## 2023-03-30 RX ORDER — ONDANSETRON 2 MG/ML
4 INJECTION INTRAMUSCULAR; INTRAVENOUS EVERY 4 HOURS PRN
Status: DISCONTINUED | OUTPATIENT
Start: 2023-03-30 | End: 2023-04-01 | Stop reason: HOSPADM

## 2023-03-30 RX ORDER — ONDANSETRON 2 MG/ML
4 INJECTION INTRAMUSCULAR; INTRAVENOUS ONCE
Status: COMPLETED | OUTPATIENT
Start: 2023-03-30 | End: 2023-03-30

## 2023-03-30 RX ORDER — ACETAMINOPHEN 325 MG/1
650 TABLET ORAL EVERY 6 HOURS PRN
Status: DISCONTINUED | OUTPATIENT
Start: 2023-03-30 | End: 2023-04-01 | Stop reason: HOSPADM

## 2023-03-30 RX ORDER — METRONIDAZOLE 500 MG/100ML
500 INJECTION, SOLUTION INTRAVENOUS EVERY 8 HOURS
Status: DISCONTINUED | OUTPATIENT
Start: 2023-03-31 | End: 2023-04-01 | Stop reason: HOSPADM

## 2023-03-30 RX ORDER — LABETALOL HYDROCHLORIDE 5 MG/ML
10 INJECTION, SOLUTION INTRAVENOUS EVERY 4 HOURS PRN
Status: DISCONTINUED | OUTPATIENT
Start: 2023-03-30 | End: 2023-04-01 | Stop reason: HOSPADM

## 2023-03-30 RX ORDER — PROMETHAZINE HYDROCHLORIDE 25 MG/1
12.5-25 SUPPOSITORY RECTAL EVERY 4 HOURS PRN
Status: DISCONTINUED | OUTPATIENT
Start: 2023-03-30 | End: 2023-04-01 | Stop reason: HOSPADM

## 2023-03-30 RX ORDER — PROMETHAZINE HYDROCHLORIDE 25 MG/1
12.5-25 TABLET ORAL EVERY 4 HOURS PRN
Status: DISCONTINUED | OUTPATIENT
Start: 2023-03-30 | End: 2023-04-01 | Stop reason: HOSPADM

## 2023-03-30 RX ORDER — CEFTRIAXONE 1 G/1
1000 INJECTION, POWDER, FOR SOLUTION INTRAMUSCULAR; INTRAVENOUS ONCE
Status: COMPLETED | OUTPATIENT
Start: 2023-03-30 | End: 2023-03-30

## 2023-03-30 RX ORDER — AMOXICILLIN 250 MG
2 CAPSULE ORAL 2 TIMES DAILY
Status: DISCONTINUED | OUTPATIENT
Start: 2023-03-31 | End: 2023-04-01 | Stop reason: HOSPADM

## 2023-03-30 RX ORDER — OXYCODONE HYDROCHLORIDE 5 MG/1
5 TABLET ORAL
Status: DISCONTINUED | OUTPATIENT
Start: 2023-03-30 | End: 2023-04-01 | Stop reason: HOSPADM

## 2023-03-30 RX ORDER — ONDANSETRON 4 MG/1
4 TABLET, ORALLY DISINTEGRATING ORAL EVERY 4 HOURS PRN
Status: DISCONTINUED | OUTPATIENT
Start: 2023-03-30 | End: 2023-04-01 | Stop reason: HOSPADM

## 2023-03-30 RX ORDER — SODIUM CHLORIDE, SODIUM LACTATE, POTASSIUM CHLORIDE, AND CALCIUM CHLORIDE .6; .31; .03; .02 G/100ML; G/100ML; G/100ML; G/100ML
1000 INJECTION, SOLUTION INTRAVENOUS
Status: DISCONTINUED | OUTPATIENT
Start: 2023-03-30 | End: 2023-04-01 | Stop reason: HOSPADM

## 2023-03-30 RX ORDER — ASCORBIC ACID/MULTIVIT-MIN 1000 MG
EFFERVESCENT POWDER IN PACKET ORAL
COMMUNITY
End: 2023-05-31

## 2023-03-30 RX ORDER — POLYETHYLENE GLYCOL 3350 17 G/17G
1 POWDER, FOR SOLUTION ORAL
Status: DISCONTINUED | OUTPATIENT
Start: 2023-03-30 | End: 2023-04-01 | Stop reason: HOSPADM

## 2023-03-30 RX ORDER — OXYCODONE HYDROCHLORIDE 5 MG/1
2.5 TABLET ORAL
Status: DISCONTINUED | OUTPATIENT
Start: 2023-03-30 | End: 2023-04-01 | Stop reason: HOSPADM

## 2023-03-30 RX ORDER — MORPHINE SULFATE 4 MG/ML
2 INJECTION INTRAVENOUS
Status: DISCONTINUED | OUTPATIENT
Start: 2023-03-30 | End: 2023-04-01 | Stop reason: HOSPADM

## 2023-03-30 RX ORDER — SODIUM CHLORIDE AND POTASSIUM CHLORIDE 150; 900 MG/100ML; MG/100ML
INJECTION, SOLUTION INTRAVENOUS CONTINUOUS
Status: DISCONTINUED | OUTPATIENT
Start: 2023-03-30 | End: 2023-04-01 | Stop reason: HOSPADM

## 2023-03-30 RX ORDER — SODIUM CHLORIDE 9 MG/ML
INJECTION, SOLUTION INTRAVENOUS CONTINUOUS
Status: DISCONTINUED | OUTPATIENT
Start: 2023-03-30 | End: 2023-03-30

## 2023-03-30 RX ORDER — METRONIDAZOLE 500 MG/100ML
500 INJECTION, SOLUTION INTRAVENOUS EVERY 6 HOURS
Status: COMPLETED | OUTPATIENT
Start: 2023-03-31 | End: 2023-03-31

## 2023-03-30 RX ORDER — BISACODYL 10 MG
10 SUPPOSITORY, RECTAL RECTAL
Status: DISCONTINUED | OUTPATIENT
Start: 2023-03-30 | End: 2023-04-01 | Stop reason: HOSPADM

## 2023-03-30 RX ORDER — SODIUM CHLORIDE 9 MG/ML
30 INJECTION, SOLUTION INTRAVENOUS
Status: DISCONTINUED | OUTPATIENT
Start: 2023-03-30 | End: 2023-04-01 | Stop reason: HOSPADM

## 2023-03-30 RX ORDER — PROCHLORPERAZINE EDISYLATE 5 MG/ML
5-10 INJECTION INTRAMUSCULAR; INTRAVENOUS EVERY 4 HOURS PRN
Status: DISCONTINUED | OUTPATIENT
Start: 2023-03-30 | End: 2023-04-01 | Stop reason: HOSPADM

## 2023-03-30 RX ADMIN — IOHEXOL 100 ML: 350 INJECTION, SOLUTION INTRAVENOUS at 20:52

## 2023-03-30 RX ADMIN — CEFTRIAXONE SODIUM 1000 MG: 1 INJECTION, POWDER, FOR SOLUTION INTRAMUSCULAR; INTRAVENOUS at 21:30

## 2023-03-30 RX ADMIN — POTASSIUM CHLORIDE AND SODIUM CHLORIDE: 900; 150 INJECTION, SOLUTION INTRAVENOUS at 23:11

## 2023-03-30 RX ADMIN — ONDANSETRON 4 MG: 2 INJECTION INTRAMUSCULAR; INTRAVENOUS at 20:00

## 2023-03-30 RX ADMIN — SODIUM CHLORIDE: 9 INJECTION, SOLUTION INTRAVENOUS at 20:00

## 2023-03-30 ASSESSMENT — ENCOUNTER SYMPTOMS
SHORTNESS OF BREATH: 0
HEADACHES: 0
DIZZINESS: 0
COUGH: 0
LOSS OF CONSCIOUSNESS: 0
DEPRESSION: 0
MYALGIAS: 0
CONSTIPATION: 0
CHILLS: 0
WEAKNESS: 0
SPUTUM PRODUCTION: 0
PALPITATIONS: 0
TINGLING: 0
STRIDOR: 0
ABDOMINAL PAIN: 1
VOMITING: 1
FALLS: 0
DIARRHEA: 0
FEVER: 0
NAUSEA: 1

## 2023-03-30 ASSESSMENT — PAIN DESCRIPTION - PAIN TYPE
TYPE: ACUTE PAIN
TYPE: ACUTE PAIN

## 2023-03-31 ENCOUNTER — ANESTHESIA (OUTPATIENT)
Dept: SURGERY | Facility: MEDICAL CENTER | Age: 28
DRG: 854 | End: 2023-03-31
Payer: MEDICARE

## 2023-03-31 ENCOUNTER — PATIENT MESSAGE (OUTPATIENT)
Dept: HEALTH INFORMATION MANAGEMENT | Facility: OTHER | Age: 28
End: 2023-03-31

## 2023-03-31 ENCOUNTER — ANESTHESIA EVENT (OUTPATIENT)
Dept: SURGERY | Facility: MEDICAL CENTER | Age: 28
DRG: 854 | End: 2023-03-31
Payer: MEDICARE

## 2023-03-31 LAB
ALBUMIN SERPL BCP-MCNC: 3.5 G/DL (ref 3.2–4.9)
ALBUMIN/GLOB SERPL: 1.2 G/DL
ALP SERPL-CCNC: 83 U/L (ref 30–99)
ALT SERPL-CCNC: 16 U/L (ref 2–50)
ANION GAP SERPL CALC-SCNC: 10 MMOL/L (ref 7–16)
AST SERPL-CCNC: 8 U/L (ref 12–45)
BASOPHILS # BLD AUTO: 0.4 % (ref 0–1.8)
BASOPHILS # BLD: 0.06 K/UL (ref 0–0.12)
BILIRUB SERPL-MCNC: 0.5 MG/DL (ref 0.1–1.5)
BUN SERPL-MCNC: 7 MG/DL (ref 8–22)
CALCIUM ALBUM COR SERPL-MCNC: 8.8 MG/DL (ref 8.5–10.5)
CALCIUM SERPL-MCNC: 8.4 MG/DL (ref 8.4–10.2)
CHLORIDE SERPL-SCNC: 105 MMOL/L (ref 96–112)
CO2 SERPL-SCNC: 20 MMOL/L (ref 20–33)
CREAT SERPL-MCNC: 0.61 MG/DL (ref 0.5–1.4)
EOSINOPHIL # BLD AUTO: 0.07 K/UL (ref 0–0.51)
EOSINOPHIL NFR BLD: 0.4 % (ref 0–6.9)
ERYTHROCYTE [DISTWIDTH] IN BLOOD BY AUTOMATED COUNT: 44 FL (ref 35.9–50)
GFR SERPLBLD CREATININE-BSD FMLA CKD-EPI: 134 ML/MIN/1.73 M 2
GLOBULIN SER CALC-MCNC: 3 G/DL (ref 1.9–3.5)
GLUCOSE SERPL-MCNC: 104 MG/DL (ref 65–99)
HCT VFR BLD AUTO: 47.8 % (ref 42–52)
HGB BLD-MCNC: 15.8 G/DL (ref 14–18)
IMM GRANULOCYTES # BLD AUTO: 0.06 K/UL (ref 0–0.11)
IMM GRANULOCYTES NFR BLD AUTO: 0.4 % (ref 0–0.9)
LACTATE SERPL-SCNC: 1.5 MMOL/L (ref 0.5–2)
LYMPHOCYTES # BLD AUTO: 1.84 K/UL (ref 1–4.8)
LYMPHOCYTES NFR BLD: 11.2 % (ref 22–41)
MCH RBC QN AUTO: 29.1 PG (ref 27–33)
MCHC RBC AUTO-ENTMCNC: 33.1 G/DL (ref 33.7–35.3)
MCV RBC AUTO: 88 FL (ref 81.4–97.8)
MONOCYTES # BLD AUTO: 1.58 K/UL (ref 0–0.85)
MONOCYTES NFR BLD AUTO: 9.6 % (ref 0–13.4)
NEUTROPHILS # BLD AUTO: 12.8 K/UL (ref 1.82–7.42)
NEUTROPHILS NFR BLD: 78 % (ref 44–72)
NRBC # BLD AUTO: 0 K/UL
NRBC BLD-RTO: 0 /100 WBC
PATHOLOGY CONSULT NOTE: NORMAL
PLATELET # BLD AUTO: 254 K/UL (ref 164–446)
PMV BLD AUTO: 10.8 FL (ref 9–12.9)
POTASSIUM SERPL-SCNC: 3.7 MMOL/L (ref 3.6–5.5)
PROT SERPL-MCNC: 6.5 G/DL (ref 6–8.2)
RBC # BLD AUTO: 5.43 M/UL (ref 4.7–6.1)
SODIUM SERPL-SCNC: 135 MMOL/L (ref 135–145)
WBC # BLD AUTO: 16.4 K/UL (ref 4.8–10.8)

## 2023-03-31 PROCEDURE — 36415 COLL VENOUS BLD VENIPUNCTURE: CPT

## 2023-03-31 PROCEDURE — 700105 HCHG RX REV CODE 258: Performed by: INTERNAL MEDICINE

## 2023-03-31 PROCEDURE — 160035 HCHG PACU - 1ST 60 MINS PHASE I: Performed by: SURGERY

## 2023-03-31 PROCEDURE — 700101 HCHG RX REV CODE 250: Performed by: INTERNAL MEDICINE

## 2023-03-31 PROCEDURE — 88304 TISSUE EXAM BY PATHOLOGIST: CPT

## 2023-03-31 PROCEDURE — 160048 HCHG OR STATISTICAL LEVEL 1-5: Performed by: SURGERY

## 2023-03-31 PROCEDURE — 160041 HCHG SURGERY MINUTES - EA ADDL 1 MIN LEVEL 4: Performed by: SURGERY

## 2023-03-31 PROCEDURE — 160002 HCHG RECOVERY MINUTES (STAT): Performed by: SURGERY

## 2023-03-31 PROCEDURE — 700105 HCHG RX REV CODE 258: Performed by: SURGERY

## 2023-03-31 PROCEDURE — 700111 HCHG RX REV CODE 636 W/ 250 OVERRIDE (IP): Performed by: INTERNAL MEDICINE

## 2023-03-31 PROCEDURE — 700102 HCHG RX REV CODE 250 W/ 637 OVERRIDE(OP): Performed by: INTERNAL MEDICINE

## 2023-03-31 PROCEDURE — 160009 HCHG ANES TIME/MIN: Performed by: SURGERY

## 2023-03-31 PROCEDURE — 700101 HCHG RX REV CODE 250: Performed by: ANESTHESIOLOGY

## 2023-03-31 PROCEDURE — 83605 ASSAY OF LACTIC ACID: CPT

## 2023-03-31 PROCEDURE — 0FT44ZZ RESECTION OF GALLBLADDER, PERCUTANEOUS ENDOSCOPIC APPROACH: ICD-10-PCS | Performed by: SURGERY

## 2023-03-31 PROCEDURE — 94760 N-INVAS EAR/PLS OXIMETRY 1: CPT

## 2023-03-31 PROCEDURE — 80053 COMPREHEN METABOLIC PANEL: CPT

## 2023-03-31 PROCEDURE — 85025 COMPLETE CBC W/AUTO DIFF WBC: CPT

## 2023-03-31 PROCEDURE — 00790 ANES IPER UPR ABD NOS: CPT | Performed by: ANESTHESIOLOGY

## 2023-03-31 PROCEDURE — 700101 HCHG RX REV CODE 250: Performed by: SURGERY

## 2023-03-31 PROCEDURE — 770001 HCHG ROOM/CARE - MED/SURG/GYN PRIV*

## 2023-03-31 PROCEDURE — 99232 SBSQ HOSP IP/OBS MODERATE 35: CPT | Performed by: FAMILY MEDICINE

## 2023-03-31 PROCEDURE — 160029 HCHG SURGERY MINUTES - 1ST 30 MINS LEVEL 4: Performed by: SURGERY

## 2023-03-31 PROCEDURE — A9270 NON-COVERED ITEM OR SERVICE: HCPCS | Performed by: INTERNAL MEDICINE

## 2023-03-31 PROCEDURE — 700111 HCHG RX REV CODE 636 W/ 250 OVERRIDE (IP): Performed by: ANESTHESIOLOGY

## 2023-03-31 RX ORDER — HALOPERIDOL 5 MG/ML
1 INJECTION INTRAMUSCULAR
Status: DISCONTINUED | OUTPATIENT
Start: 2023-03-31 | End: 2023-03-31 | Stop reason: HOSPADM

## 2023-03-31 RX ORDER — METOPROLOL TARTRATE 1 MG/ML
1 INJECTION, SOLUTION INTRAVENOUS
Status: DISCONTINUED | OUTPATIENT
Start: 2023-03-31 | End: 2023-03-31 | Stop reason: HOSPADM

## 2023-03-31 RX ORDER — SODIUM CHLORIDE, SODIUM LACTATE, POTASSIUM CHLORIDE, CALCIUM CHLORIDE 600; 310; 30; 20 MG/100ML; MG/100ML; MG/100ML; MG/100ML
INJECTION, SOLUTION INTRAVENOUS CONTINUOUS
Status: ACTIVE | OUTPATIENT
Start: 2023-03-31 | End: 2023-03-31

## 2023-03-31 RX ORDER — MIDAZOLAM HYDROCHLORIDE 1 MG/ML
1 INJECTION INTRAMUSCULAR; INTRAVENOUS
Status: DISCONTINUED | OUTPATIENT
Start: 2023-03-31 | End: 2023-03-31 | Stop reason: HOSPADM

## 2023-03-31 RX ORDER — HYDROMORPHONE HYDROCHLORIDE 1 MG/ML
0.4 INJECTION, SOLUTION INTRAMUSCULAR; INTRAVENOUS; SUBCUTANEOUS
Status: DISCONTINUED | OUTPATIENT
Start: 2023-03-31 | End: 2023-03-31 | Stop reason: HOSPADM

## 2023-03-31 RX ORDER — ONDANSETRON 2 MG/ML
4 INJECTION INTRAMUSCULAR; INTRAVENOUS
Status: DISCONTINUED | OUTPATIENT
Start: 2023-03-31 | End: 2023-03-31 | Stop reason: HOSPADM

## 2023-03-31 RX ORDER — OXYCODONE HCL 5 MG/5 ML
5 SOLUTION, ORAL ORAL
Status: DISCONTINUED | OUTPATIENT
Start: 2023-03-31 | End: 2023-03-31 | Stop reason: HOSPADM

## 2023-03-31 RX ORDER — PHENYLEPHRINE HCL IN 0.9% NACL 0.5 MG/5ML
SYRINGE (ML) INTRAVENOUS PRN
Status: DISCONTINUED | OUTPATIENT
Start: 2023-03-31 | End: 2023-03-31 | Stop reason: SURG

## 2023-03-31 RX ORDER — MIDAZOLAM HYDROCHLORIDE 1 MG/ML
INJECTION INTRAMUSCULAR; INTRAVENOUS PRN
Status: DISCONTINUED | OUTPATIENT
Start: 2023-03-31 | End: 2023-03-31 | Stop reason: SURG

## 2023-03-31 RX ORDER — MEPERIDINE HYDROCHLORIDE 25 MG/ML
12.5 INJECTION INTRAMUSCULAR; INTRAVENOUS; SUBCUTANEOUS
Status: DISCONTINUED | OUTPATIENT
Start: 2023-03-31 | End: 2023-03-31 | Stop reason: HOSPADM

## 2023-03-31 RX ORDER — OXYCODONE HCL 5 MG/5 ML
10 SOLUTION, ORAL ORAL
Status: DISCONTINUED | OUTPATIENT
Start: 2023-03-31 | End: 2023-03-31 | Stop reason: HOSPADM

## 2023-03-31 RX ORDER — BUPIVACAINE HYDROCHLORIDE AND EPINEPHRINE 5; 5 MG/ML; UG/ML
INJECTION, SOLUTION EPIDURAL; INTRACAUDAL; PERINEURAL
Status: DISCONTINUED | OUTPATIENT
Start: 2023-03-31 | End: 2023-03-31 | Stop reason: HOSPADM

## 2023-03-31 RX ORDER — HYDROMORPHONE HYDROCHLORIDE 1 MG/ML
0.1 INJECTION, SOLUTION INTRAMUSCULAR; INTRAVENOUS; SUBCUTANEOUS
Status: DISCONTINUED | OUTPATIENT
Start: 2023-03-31 | End: 2023-03-31 | Stop reason: HOSPADM

## 2023-03-31 RX ORDER — CEFAZOLIN SODIUM 1 G/3ML
INJECTION, POWDER, FOR SOLUTION INTRAMUSCULAR; INTRAVENOUS PRN
Status: DISCONTINUED | OUTPATIENT
Start: 2023-03-31 | End: 2023-03-31 | Stop reason: SURG

## 2023-03-31 RX ORDER — NEOSTIGMINE METHYLSULFATE 1 MG/ML
INJECTION, SOLUTION INTRAVENOUS PRN
Status: DISCONTINUED | OUTPATIENT
Start: 2023-03-31 | End: 2023-03-31 | Stop reason: SURG

## 2023-03-31 RX ORDER — KETOROLAC TROMETHAMINE 30 MG/ML
INJECTION, SOLUTION INTRAMUSCULAR; INTRAVENOUS PRN
Status: DISCONTINUED | OUTPATIENT
Start: 2023-03-31 | End: 2023-03-31 | Stop reason: SURG

## 2023-03-31 RX ORDER — ROCURONIUM BROMIDE 10 MG/ML
INJECTION, SOLUTION INTRAVENOUS PRN
Status: DISCONTINUED | OUTPATIENT
Start: 2023-03-31 | End: 2023-03-31 | Stop reason: SURG

## 2023-03-31 RX ORDER — LABETALOL HYDROCHLORIDE 5 MG/ML
5 INJECTION, SOLUTION INTRAVENOUS
Status: DISCONTINUED | OUTPATIENT
Start: 2023-03-31 | End: 2023-03-31 | Stop reason: HOSPADM

## 2023-03-31 RX ORDER — SODIUM CHLORIDE, SODIUM LACTATE, POTASSIUM CHLORIDE, CALCIUM CHLORIDE 600; 310; 30; 20 MG/100ML; MG/100ML; MG/100ML; MG/100ML
INJECTION, SOLUTION INTRAVENOUS CONTINUOUS
Status: DISCONTINUED | OUTPATIENT
Start: 2023-03-31 | End: 2023-03-31 | Stop reason: HOSPADM

## 2023-03-31 RX ORDER — HYDROMORPHONE HYDROCHLORIDE 1 MG/ML
0.2 INJECTION, SOLUTION INTRAMUSCULAR; INTRAVENOUS; SUBCUTANEOUS
Status: DISCONTINUED | OUTPATIENT
Start: 2023-03-31 | End: 2023-03-31 | Stop reason: HOSPADM

## 2023-03-31 RX ORDER — ONDANSETRON 2 MG/ML
INJECTION INTRAMUSCULAR; INTRAVENOUS PRN
Status: DISCONTINUED | OUTPATIENT
Start: 2023-03-31 | End: 2023-03-31 | Stop reason: SURG

## 2023-03-31 RX ORDER — EPHEDRINE SULFATE 50 MG/ML
5 INJECTION, SOLUTION INTRAVENOUS
Status: DISCONTINUED | OUTPATIENT
Start: 2023-03-31 | End: 2023-03-31 | Stop reason: HOSPADM

## 2023-03-31 RX ORDER — ACETAMINOPHEN 500 MG
1000 TABLET ORAL EVERY 4 HOURS PRN
Status: CANCELLED | OUTPATIENT
Start: 2023-03-31

## 2023-03-31 RX ORDER — GLYCOPYRROLATE 0.2 MG/ML
INJECTION INTRAMUSCULAR; INTRAVENOUS PRN
Status: DISCONTINUED | OUTPATIENT
Start: 2023-03-31 | End: 2023-03-31 | Stop reason: SURG

## 2023-03-31 RX ORDER — DIPHENHYDRAMINE HYDROCHLORIDE 50 MG/ML
INJECTION INTRAMUSCULAR; INTRAVENOUS PRN
Status: DISCONTINUED | OUTPATIENT
Start: 2023-03-31 | End: 2023-03-31 | Stop reason: SURG

## 2023-03-31 RX ORDER — DIPHENHYDRAMINE HYDROCHLORIDE 50 MG/ML
12.5 INJECTION INTRAMUSCULAR; INTRAVENOUS
Status: DISCONTINUED | OUTPATIENT
Start: 2023-03-31 | End: 2023-03-31 | Stop reason: HOSPADM

## 2023-03-31 RX ADMIN — ACETAMINOPHEN 650 MG: 325 TABLET, FILM COATED ORAL at 11:14

## 2023-03-31 RX ADMIN — FENTANYL CITRATE 200 MCG: 50 INJECTION, SOLUTION INTRAMUSCULAR; INTRAVENOUS at 15:08

## 2023-03-31 RX ADMIN — SENNOSIDES AND DOCUSATE SODIUM 2 TABLET: 50; 8.6 TABLET ORAL at 17:31

## 2023-03-31 RX ADMIN — ROCURONIUM BROMIDE 30 MG: 10 INJECTION, SOLUTION INTRAVENOUS at 15:08

## 2023-03-31 RX ADMIN — KETOROLAC TROMETHAMINE 30 MG: 30 INJECTION, SOLUTION INTRAMUSCULAR at 15:42

## 2023-03-31 RX ADMIN — MIDAZOLAM HYDROCHLORIDE 2 MG: 1 INJECTION, SOLUTION INTRAMUSCULAR; INTRAVENOUS at 15:03

## 2023-03-31 RX ADMIN — PROPOFOL 150 MG: 10 INJECTION, EMULSION INTRAVENOUS at 15:08

## 2023-03-31 RX ADMIN — NEOSTIGMINE METHYLSULFATE 3.5 MG: 1 INJECTION INTRAVENOUS at 15:45

## 2023-03-31 RX ADMIN — SODIUM CHLORIDE, POTASSIUM CHLORIDE, SODIUM LACTATE AND CALCIUM CHLORIDE: 600; 310; 30; 20 INJECTION, SOLUTION INTRAVENOUS at 15:03

## 2023-03-31 RX ADMIN — GLYCOPYRROLATE 0.7 MG: 0.2 INJECTION INTRAMUSCULAR; INTRAVENOUS at 15:45

## 2023-03-31 RX ADMIN — DIPHENHYDRAMINE HYDROCHLORIDE 12.5 MG: 50 INJECTION, SOLUTION INTRAMUSCULAR; INTRAVENOUS at 15:22

## 2023-03-31 RX ADMIN — METRONIDAZOLE 500 MG: 500 INJECTION, SOLUTION INTRAVENOUS at 21:50

## 2023-03-31 RX ADMIN — METRONIDAZOLE 500 MG: 500 INJECTION, SOLUTION INTRAVENOUS at 05:16

## 2023-03-31 RX ADMIN — METRONIDAZOLE 500 MG: 500 INJECTION, SOLUTION INTRAVENOUS at 17:32

## 2023-03-31 RX ADMIN — SENNOSIDES AND DOCUSATE SODIUM 2 TABLET: 50; 8.6 TABLET ORAL at 05:16

## 2023-03-31 RX ADMIN — POTASSIUM CHLORIDE AND SODIUM CHLORIDE: 900; 150 INJECTION, SOLUTION INTRAVENOUS at 17:41

## 2023-03-31 RX ADMIN — CEFAZOLIN 2 G: 330 INJECTION, POWDER, FOR SOLUTION INTRAMUSCULAR; INTRAVENOUS at 15:19

## 2023-03-31 RX ADMIN — CEFTRIAXONE SODIUM 1000 MG: 1 INJECTION, POWDER, FOR SOLUTION INTRAMUSCULAR; INTRAVENOUS at 21:05

## 2023-03-31 RX ADMIN — Medication 200 MCG: at 15:17

## 2023-03-31 RX ADMIN — ONDANSETRON 4 MG: 2 INJECTION INTRAMUSCULAR; INTRAVENOUS at 15:22

## 2023-03-31 ASSESSMENT — COGNITIVE AND FUNCTIONAL STATUS - GENERAL
DAILY ACTIVITIY SCORE: 12
STANDING UP FROM CHAIR USING ARMS: A LOT
WALKING IN HOSPITAL ROOM: TOTAL
SUGGESTED CMS G CODE MODIFIER DAILY ACTIVITY: CL
EATING MEALS: A LITTLE
DRESSING REGULAR LOWER BODY CLOTHING: A LOT
SUGGESTED CMS G CODE MODIFIER MOBILITY: CL
MOVING FROM LYING ON BACK TO SITTING ON SIDE OF FLAT BED: A LOT
MOVING TO AND FROM BED TO CHAIR: A LOT
CLIMB 3 TO 5 STEPS WITH RAILING: TOTAL
TURNING FROM BACK TO SIDE WHILE IN FLAT BAD: A LOT
MOBILITY SCORE: 10
TOILETING: TOTAL
DRESSING REGULAR UPPER BODY CLOTHING: A LOT
PERSONAL GROOMING: A LITTLE
HELP NEEDED FOR BATHING: TOTAL

## 2023-03-31 ASSESSMENT — LIFESTYLE VARIABLES
HAVE PEOPLE ANNOYED YOU BY CRITICIZING YOUR DRINKING: NO
ON A TYPICAL DAY WHEN YOU DRINK ALCOHOL HOW MANY DRINKS DO YOU HAVE: 0
HAVE YOU EVER FELT YOU SHOULD CUT DOWN ON YOUR DRINKING: NO
HOW MANY TIMES IN THE PAST YEAR HAVE YOU HAD 5 OR MORE DRINKS IN A DAY: 0
TOTAL SCORE: 0
ALCOHOL_USE: NO
EVER HAD A DRINK FIRST THING IN THE MORNING TO STEADY YOUR NERVES TO GET RID OF A HANGOVER: NO
AVERAGE NUMBER OF DAYS PER WEEK YOU HAVE A DRINK CONTAINING ALCOHOL: 0
CONSUMPTION TOTAL: NEGATIVE
TOTAL SCORE: 0
TOTAL SCORE: 0
EVER FELT BAD OR GUILTY ABOUT YOUR DRINKING: NO

## 2023-03-31 ASSESSMENT — ENCOUNTER SYMPTOMS
CONSTIPATION: 1
CHILLS: 0
FEVER: 0
COUGH: 0
ABDOMINAL PAIN: 1

## 2023-03-31 ASSESSMENT — PAIN DESCRIPTION - PAIN TYPE
TYPE: ACUTE PAIN;SURGICAL PAIN
TYPE: SURGICAL PAIN

## 2023-03-31 ASSESSMENT — PAIN SCALES - GENERAL: PAIN_LEVEL: 0

## 2023-03-31 ASSESSMENT — FIBROSIS 4 INDEX
FIB4 SCORE: 0.19
FIB4 SCORE: 0.19

## 2023-03-31 NOTE — ED PROVIDER NOTES
ED Provider Note    CHIEF COMPLAINT  Chief Complaint   Patient presents with    Vomiting     Mother speaks for this patient, he has been vomiting every time he eats or drinks and has pain in his mid upper abd where he had a peg.He has had a small amount of urine output as well.         HPI/ROS    Rey Phill Medina is a 27 y.o. male who presents with epigastric pain.  Mom states the patient awoke this morning around 2 AM complaining of pain in the epigastric region where he had his gastrostomy tube.  Subsequently the patient developed vomiting throughout the afternoon.  He is also decreased urinary output.  He has not had any associated fevers.  He states the epigastric pain has persisted.  The patient unfortunately had a intracranial hemorrhage that was spontaneous in the past and he did require tracheostomy as well as gastrostomy and  shunt placement.  He does not have a headache.  He denies difficulties with urination.    PAST MEDICAL HISTORY   has a past medical history of Chickenpox, COVID-19, [place] measles, ICH (intracerebral hemorrhage) (HCC), and Stroke (HCC).    SURGICAL HISTORY   has a past surgical history that includes anal fistulotomy; peg placement; tracheostomy; and  shunt insertion.    FAMILY HISTORY  Family History   Problem Relation Age of Onset    Hypertension Mother     Glasses Mother     Thyroid Mother     Diabetes Maternal Grandfather     Stroke Maternal Grandfather     Diabetes Paternal Grandmother     Hypertension Maternal Uncle     Heart Disease Neg Hx     Cancer Neg Hx        SOCIAL HISTORY  Social History     Tobacco Use    Smoking status: Never    Smokeless tobacco: Never   Vaping Use    Vaping Use: Never used   Substance and Sexual Activity    Alcohol use: Never    Drug use: Never    Sexual activity: Not Currently       CURRENT MEDICATIONS  Home Medications       Reviewed by Tammi Tirado R.N. (Registered Nurse) on 03/30/23 at 1859  Med List Status: Not Addressed  "    Medication Last Dose Status   Cholecalciferol (VITAMIN D3) 2000 UNIT Cap  Active   Cyanocobalamin (B-12 PO)  Active   Magnesium 400 MG Cap  Active   Misc. Devices Misc  Active   Misc. Devices Misc  Active   montelukast (SINGULAIR) 10 MG Tab  Active   NON SPECIFIED  Active   non-formulary med  Active   non-formulary med  Active   Omega-3 Fatty Acids (OMEGA 3 500) 500 MG Cap  Active   polyethylene glycol 3350 (MIRALAX) 17 GM/SCOOP Powder  Active   propranolol (INDERAL) 20 MG Tab  Active   RA TURMERIC EXTRA STRENGTH PO  Active                    ALLERGIES  Allergies   Allergen Reactions    Vancomycin Swelling     Lips  With red face and neck    Other Misc Rash     Allergic to name band.       PHYSICAL EXAM  VITAL SIGNS: /88   Pulse 100   Temp 36.7 °C (98.1 °F) (Temporal)   Resp 16   Ht 1.676 m (5' 6\")   Wt 63.5 kg (140 lb)   SpO2 93%   BMI 22.60 kg/m²    General the patient appears uncomfortable    HEENT unremarkable    Pulmonary chest clear to auscultation bilaterally    Cardiovascular S1-S2 with a regular rate and rhythm    GI the patient has epigastric discomfort to deep palpation, slight distention, no rebound, no guarding.  The patient has normal bowel sounds    Skin no pallor    Neurologic examination is at his baseline according to [facility]    DIAGNOSTIC STUDIES   Results for orders placed or performed during the hospital encounter of 03/30/23   CBC WITH DIFFERENTIAL   Result Value Ref Range    WBC 18.1 (H) 4.8 - 10.8 K/uL    RBC 6.13 (H) 4.70 - 6.10 M/uL    Hemoglobin 17.8 14.0 - 18.0 g/dL    Hematocrit 53.2 (H) 42.0 - 52.0 %    MCV 86.8 81.4 - 97.8 fL    MCH 29.0 27.0 - 33.0 pg    MCHC 33.5 (L) 33.7 - 35.3 g/dL    RDW 43.6 35.9 - 50.0 fL    Platelet Count 326 164 - 446 K/uL    MPV 10.7 9.0 - 12.9 fL    Neutrophils-Polys 81.70 (H) 44.00 - 72.00 %    Lymphocytes 8.30 (L) 22.00 - 41.00 %    Monocytes 9.30 0.00 - 13.40 %    Eosinophils 0.10 0.00 - 6.90 %    Basophils 0.30 0.00 - 1.80 %    Immature " Granulocytes 0.30 0.00 - 0.90 %    Nucleated RBC 0.00 /100 WBC    Neutrophils (Absolute) 14.78 (H) 1.82 - 7.42 K/uL    Lymphs (Absolute) 1.50 1.00 - 4.80 K/uL    Monos (Absolute) 1.68 (H) 0.00 - 0.85 K/uL    Eos (Absolute) 0.01 0.00 - 0.51 K/uL    Baso (Absolute) 0.05 0.00 - 0.12 K/uL    Immature Granulocytes (abs) 0.05 0.00 - 0.11 K/uL    NRBC (Absolute) 0.00 K/uL   COMP METABOLIC PANEL   Result Value Ref Range    Sodium 136 135 - 145 mmol/L    Potassium 3.5 (L) 3.6 - 5.5 mmol/L    Chloride 96 96 - 112 mmol/L    Co2 27 20 - 33 mmol/L    Anion Gap 13.0 7.0 - 16.0    Glucose 102 (H) 65 - 99 mg/dL    Bun 9 8 - 22 mg/dL    Creatinine 0.76 0.50 - 1.40 mg/dL    Calcium 9.7 8.4 - 10.2 mg/dL    AST(SGOT) 11 (L) 12 - 45 U/L    ALT(SGPT) 25 2 - 50 U/L    Alkaline Phosphatase 107 (H) 30 - 99 U/L    Total Bilirubin 0.4 0.1 - 1.5 mg/dL    Albumin 4.7 3.2 - 4.9 g/dL    Total Protein 8.5 (H) 6.0 - 8.2 g/dL    Globulin 3.8 (H) 1.9 - 3.5 g/dL    A-G Ratio 1.2 g/dL   LIPASE   Result Value Ref Range    Lipase 28 7 - 58 U/L   CORRECTED CALCIUM   Result Value Ref Range    Correct Calcium 9.1 8.5 - 10.5 mg/dL   ESTIMATED GFR   Result Value Ref Range    GFR (CKD-EPI) 125 >60 mL/min/1.73 m 2       RADIOLOGY  CT-ABDOMEN-PELVIS WITH   Final Result         1.  Cholelithiasis with gallbladder wall thickening and adjacent hazy fat stranding, appearance suggests changes of cholecystitis. Further evaluation with right upper quadrant sonogram recommended.   2.  Mild fluid-filled prominence of small bowel, appearance suggests ileus and/or enteritis   3.  Trace perihepatic ascites          COURSE & MEDICAL DECISION MAKING    ED Observation Status? Yes; I am placing the patient in to an observation status due to a diagnostic uncertainty as well as therapeutic intensity. Patient placed in observation status at 7:32 PM, 3/30/2023.     Observation plan is as follows: Patient presents with epigastric pain and vomiting.  He will require CT imaging and  laboratory analysis.  He will also receive Zofran and IV fluids and will be admitted to the emergency department under observation status pending therapeutics as well as diagnostics as mentioned above.    2106 the patient was reexamined and continues have right upper quadrant discomfort and CT scan shows evidence of cholecystitis.  I suspect this is the source as he does have a leukocytosis.  The patient received Zofran and IV fluids and his nausea vomiting is controlled.  He does not appear septic.  I will contact the general surgeon for surgical intervention.  The patient will receive Rocephin and Flagyl.  The patient will require admission for surgical intervention.    Upon Reevaluation, the patient's condition has: not improved; and will be escalated to hospitalization.    Patient discharged from ED Observation status at 2107 (Time) 3/30/23 (Date).       FINAL DIAGNOSIS  Acute cholecystitis    Disposition  The patient will be admitted in stable condition       Electronically signed by: Rosendo Farnsworth M.D., 3/30/2023 7:29 PM

## 2023-03-31 NOTE — DISCHARGE PLANNING
KINZA Solo contacted pts mother to offer Community Care Management services.   Housing: No barriers. Pt lives with parents.  Transportation: No barriers. Pt's mom reports to take pt to and from his appointments.  Food: Pt's mom reports that pt used to receive SNAP but no longer qualifies due to mom receiving an income. Mom wishes to reapply. CHW sent [employer] information, in addition to SNAP contact information via [employer] message.  Finances: No barriers. Pt's mom reports pt receives Disability and also benefits a lot from being on Medicare.   PCP Follow up Appointment: CHW scheduled appointment for 04/14/23 at 5pm per pt's mom request.   KIZNA Solo provided CCM contact information via [employer]. CHW will follow up with pt post discharge.

## 2023-03-31 NOTE — ANESTHESIA PREPROCEDURE EVALUATION
Case: [id number] Date/Time: 03/31/23 1445    Procedure: CHOLECYSTECTOMY, LAPAROSCOPIC (Abdomen)    Anesthesia type: General    Location: [place] [address] / SURGERY [facility] ER    Surgeons: Maggie Chris M.D.          Relevant Problems   ANESTHESIA   (positive) PAWEL (obstructive sleep apnea)       Physical Exam    Airway   Mallampati: II  TM distance: >3 FB  Neck ROM: full       Cardiovascular - normal exam  Rhythm: regular  Rate: normal  (-) murmur     Dental - normal exam           Pulmonary - normal exam  Breath sounds clear to auscultation     Abdominal    Neurological - normal exam                 Anesthesia Plan    ASA 3   ASA physical status 3 criteria: other (comment)    Plan - general       Airway plan will be ETT          Induction: intravenous    Postoperative Plan: Postoperative administration of opioids is intended.    Pertinent diagnostic labs and testing reviewed    Informed Consent:    Anesthetic plan and risks discussed with patient.    Use of blood products discussed with: patient whom consented to blood products.       IVN BLEED

## 2023-03-31 NOTE — DISCHARGE PLANNING
Case Management Discharge Planning    Admission Date: 3/30/2023  GMLOS: 3.5  ALOS: 1    6-Clicks ADL Score: 12  6-Clicks Mobility Score: 10  PT and/or OT Eval ordered: No  Post-acute Referrals Ordered: NA  Post-acute Choice Obtained: NA  Has referral(s) been sent to post-acute provider:  DANDY      Anticipated Discharge Dispo: Discharge Disposition: Discharged to home/self care (01)    DME Needed: No    Action(s) Taken: Updated Provider/Nurse on Discharge Plan    No anticipated DC needs. RN CM will continue to follow.     Escalations Completed: None    Medically Clear: No    Next Steps: Medical clearance    Barriers to Discharge: Medical clearance

## 2023-03-31 NOTE — ANESTHESIA PROCEDURE NOTES
Airway    Date/Time: 3/31/2023 3:10 PM  Performed by: True Alfredo M.D.  Authorized by: True Alfredo M.D.     Location:  OR  Urgency:  Elective  Indications for Airway Management:  Anesthesia      Spontaneous Ventilation: absent    Sedation Level:  Deep  Preoxygenated: Yes    Patient Position:  Sniffing  Mask Difficulty Assessment:  1 - vent by mask  Final Airway Type:  Endotracheal airway  Final Endotracheal Airway:  ETT  Cuffed: Yes    Technique Used for Successful ETT Placement:  Direct laryngoscopy    Insertion Site:  Oral  Blade Type:  Gene  Laryngoscope Blade/Videolaryngoscope Blade Size:  3  ETT Size (mm):  6.5  Measured from:  Teeth  ETT to Teeth (cm):  25  Placement Verified by: auscultation and capnometry    Cormack-Lehane Classification:  Grade IIa - partial view of glottis  Number of Attempts at Approach:  1

## 2023-03-31 NOTE — ASSESSMENT & PLAN NOTE
This is Sepsis Present on admission  SIRS criteria identified on my evaluation include: Tachycardia, with heart rate greater than 90 BPM and Leukocytosis, with WBC greater than 12,000  Source is cholecystitis  Sepsis protocol initiated  Fluid resuscitation ordered per protocol  Crystalloid Fluid Administration: Fluid resuscitation ordered per standard protocol - 30 mL/kg per current or ideal body weight  IV antibiotics as appropriate for source of sepsis  Reassessment: I have reassessed the patient's hemodynamic status  Start IV Rocephin and Flagyl  Await culture results  Trend lactic acid level  Patient is at risk of worsening, closely monitor his hemodynamics  Await surgical recommendations

## 2023-03-31 NOTE — PROGRESS NOTES
Pt arrived via gurney, admitted to room 115 from ED, transferred to [facility] via slide board, pt non ambulatory. Pt is A&O x 4, RA. Pt denies pain, nausea and vomiting at this time. Pt oriented to room and call light. POC reviewed which includes NPO except sips with meds, IV fluids, IV abx, and pain management. Pulse ox on, SCDs on. Call light within reach, fall precautions in place, all needs met at this time.   At 2330, Urine specimen stat for UA sent to lab.

## 2023-03-31 NOTE — ASSESSMENT & PLAN NOTE
Patient does have decreased bowel sounds, changes suggesting ileus noted on CT scan  Likely due to inflammatory process of cholecystitis  Keep patient n.p.o.

## 2023-03-31 NOTE — H&P
Hospital Medicine History & Physical Note    Date of Service  3/30/2023    Primary Care Physician  Sherie Power M.D.    Consultants  general surgery    Specialist Names: Dr Pedersen    Code Status  Full Code    Chief Complaint  Chief Complaint   Patient presents with    Vomiting     Mother speaks for this patient, he has been vomiting every time he eats or drinks and has pain in his mid upper abd where he had a peg.He has had a small amount of urine output as well.       History of Presenting Illness  Rey Medina is a 27 y.o. male who presented 3/30/2023 with abdominal pain.  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  Today, patient began complaining of epigastric pain that is pressure, became severe.  He also then began having nausea and vomiting.  Symptoms persisted so he was brought to the emergency department.  CT scan done in the ER did show concern for cholecystitis as well as ileus.  Because of this, ERP did discuss the case with general surgery who will follow along but recommended a right upper quadrant ultrasound which is pending.  Patient's mother states he has complained over the past couple of months occasionally about similar pain but it resolved quickly.  He does have a  shunt placement however mentation is at baseline.  I did discuss the case including labs and imaging with the ER physician.    I discussed the plan of care with patient and family.    Review of Systems  Review of Systems   Constitutional:  Negative for chills, fever and malaise/fatigue.   HENT:  Negative for congestion.    Respiratory:  Negative for cough, sputum production, shortness of breath and stridor.    Cardiovascular:  Negative for chest pain, palpitations and leg swelling.   Gastrointestinal:  Positive for abdominal pain, nausea and  vomiting. Negative for constipation and diarrhea.   Genitourinary:  Negative for dysuria and urgency.   Musculoskeletal:  Negative for falls and myalgias.   Neurological:  Negative for dizziness, tingling, loss of consciousness, weakness and headaches.   Psychiatric/Behavioral:  Negative for depression and suicidal ideas.    All other systems reviewed and are negative.    Past Medical History   has a past medical history of Chickenpox, COVID-19, [place] measles, ICH (intracerebral hemorrhage) (HCC), and Stroke (HCC).    Surgical History   has a past surgical history that includes anal fistulotomy; peg placement; tracheostomy; and  shunt insertion.     Family History  family history includes Diabetes in his maternal grandfather and paternal grandmother; Glasses in his mother; Hypertension in his maternal uncle and mother; Stroke in his maternal grandfather; Thyroid in his mother.   Family history reviewed with patient. There is no family history that is pertinent to the chief complaint.     Social History   reports that he has never smoked. He has never used smokeless tobacco. He reports that he does not drink alcohol and does not use drugs.    Allergies  Allergies   Allergen Reactions    Vancomycin Swelling     Lips  With red face and neck    Other Misc Rash     Allergic to name band.       Medications  Prior to Admission Medications   Prescriptions Last Dose Informant Patient Reported? Taking?   Cholecalciferol (VITAMIN D3) 2000 UNIT Cap 3/30/2023 at AM  Yes No   Sig: Take 2,000 Units by mouth every day.   Cyanocobalamin (B-12 PO) 3/30/2023 at AM Family Member Yes No   Sig: Take 1 Tab by mouth every morning.   Magnesium 400 MG Cap 3/30/2023 at AM  Yes No   Sig: Take 1 Cap by mouth every day. Indications: Malnutrition   Misc. Devices Misc  Family Member No No   Sig: Incontinence supplies - adult diapers. Length of time needed - lifetime or 999 months.   Multiple Vitamins-Minerals (EMERGEN-C BLUE) Pack   Yes Yes    Sig: Take  by mouth.   NON SPECIFIED   No No   Si ensure 1 time daily   Omega-3 Fatty Acids (OMEGA 3 500) 500 MG Cap 3/30/2023 at AM  Yes No   Sig: Take 1 Cap by mouth every day. Indications: Dietary Deficiency   RA TURMERIC EXTRA STRENGTH PO 3/30/2023  Yes No   Sig: Take 1,000 mg by mouth every day. Indications: supplement   montelukast (SINGULAIR) 10 MG Tab > 1 week ago at prn  No No   Sig: TAKE 1 TABLET BY MOUTH ONCE DAILY AS NEEDED (TO REDUCE AMOUNT OF PILLS NEEDING TO BE TAKEN DAILY)   non-formulary med 3/30/2023 at AM  Yes No   Sig: Take 1 Dose Pack by mouth every day. Emergen C   polyethylene glycol 3350 (MIRALAX) 17 GM/SCOOP Powder 3/29/2023  No No   Sig: DISSOLVE 17 GRAMS OF POWDER IN LIQUID AND DRINK  ONCE DAILY AS NEEDED FOR CONSTIPATION   propranolol (INDERAL) 20 MG Tab 3/30/2023 at 1700  Yes No   Sig: Take 20 mg by mouth 1 time a day as needed (HR > 100).      Facility-Administered Medications: None       Physical Exam  Temp:  [36.7 °C (98.1 °F)] 36.7 °C (98.1 °F)  Pulse:  [] 100  Resp:  [16] 16  BP: (121-128)/(82-88) 128/82  SpO2:  [93 %-99 %] 97 %  Blood Pressure: 128/82   Temperature: 36.7 °C (98.1 °F)   Pulse: 100   Respiration: 16   Pulse Oximetry: 97 %       Physical Exam  Vitals and nursing note reviewed.   Constitutional:       General: He is not in acute distress.     Appearance: He is well-developed. He is not toxic-appearing or diaphoretic.   HENT:      Head: Normocephalic and atraumatic.      Right Ear: External ear normal.      Left Ear: External ear normal.      Nose: Nose normal. No congestion or rhinorrhea.      Mouth/Throat:      Mouth: Mucous membranes are dry.      Pharynx: No oropharyngeal exudate.   Eyes:      General:         Right eye: No discharge.         Left eye: No discharge.   Neck:      Trachea: No tracheal deviation.   Cardiovascular:      Rate and Rhythm: Normal rate and regular rhythm.      Heart sounds: No murmur heard.    No friction rub. No gallop.    Pulmonary:      Effort: Pulmonary effort is normal. No respiratory distress.      Breath sounds: Normal breath sounds. No stridor. No wheezing or rales.   Chest:      Chest wall: No tenderness.   Abdominal:      General: Bowel sounds are decreased. There is no distension.      Palpations: Abdomen is soft.      Tenderness: There is abdominal tenderness in the epigastric area.   Musculoskeletal:         General: No tenderness. Normal range of motion.      Cervical back: Normal range of motion and neck supple. No edema or erythema.      Right lower leg: No edema.      Left lower leg: No edema.   Lymphadenopathy:      Cervical: No cervical adenopathy.   Skin:     General: Skin is warm and dry.      Findings: No erythema or rash.   Neurological:      Mental Status: He is alert and oriented to person, place, and time.      Cranial Nerves: No cranial nerve deficit.   Psychiatric:         Mood and Affect: Mood normal.         Speech: Speech is delayed.         Behavior: Behavior is slowed.       Laboratory:  Recent Labs     03/30/23 1923   WBC 18.1*   RBC 6.13*   HEMOGLOBIN 17.8   HEMATOCRIT 53.2*   MCV 86.8   MCH 29.0   MCHC 33.5*   RDW 43.6   PLATELETCT 326   MPV 10.7     Recent Labs     03/30/23 1923   SODIUM 136   POTASSIUM 3.5*   CHLORIDE 96   CO2 27   GLUCOSE 102*   BUN 9   CREATININE 0.76   CALCIUM 9.7     Recent Labs     03/30/23 1923   ALTSGPT 25   ASTSGOT 11*   ALKPHOSPHAT 107*   TBILIRUBIN 0.4   LIPASE 28   GLUCOSE 102*         No results for input(s): NTPROBNP in the last 72 hours.      No results for input(s): TROPONINT in the last 72 hours.    Imaging:  CT-ABDOMEN-PELVIS WITH   Final Result         1.  Cholelithiasis with gallbladder wall thickening and adjacent hazy fat stranding, appearance suggests changes of cholecystitis. Further evaluation with right upper quadrant sonogram recommended.   2.  Mild fluid-filled prominence of small bowel, appearance suggests ileus and/or enteritis   3.  Trace  perihepatic ascites      US-RUQ    (Results Pending)       no X-Ray or EKG requiring interpretation    Assessment/Plan:  Justification for Admission Status  I anticipate this patient will require at least two midnights for appropriate medical management, necessitating inpatient admission because sepsis likely due to cholecystitis and ileus    Patient will need a Med/Surg bed on MEDICAL service .  The need is secondary to sepsis likely cholecystitis as well as ileus.    * Sepsis (HCC)- (present on admission)  Assessment & Plan  This is Sepsis Present on admission  SIRS criteria identified on my evaluation include: Tachycardia, with heart rate greater than 90 BPM and Leukocytosis, with WBC greater than 12,000  Source is cholecystitis  Sepsis protocol initiated  Fluid resuscitation ordered per protocol  Crystalloid Fluid Administration: Fluid resuscitation ordered per standard protocol - 30 mL/kg per current or ideal body weight  IV antibiotics as appropriate for source of sepsis  Reassessment: I have reassessed the patient's hemodynamic status  Start IV Rocephin and Flagyl  Await culture results  Trend lactic acid level  Patient is at risk of worsening, closely monitor his hemodynamics  Await surgical recommendations    Cholecystitis- (present on admission)  Assessment & Plan  Likely diagnosis however this was made with CT scan  Right upper quadrant ultrasound is currently pending  Causing sepsis  Start Rocephin and Flagyl  Await culture results  Await surgical recommendations    Ileus (HCC)- (present on admission)  Assessment & Plan  Patient does have decreased bowel sounds, changes suggesting ileus noted on CT scan  Likely due to inflammatory process of cholecystitis  Keep patient n.p.o.    Hypokalemia- (present on admission)  Assessment & Plan  Mild, Place IV potassium with IV fluids  Repeat BMP in the morning    Polycythemia- (present on admission)  Assessment & Plan  Does appear to be acute on chronic  Acute  aspect likely due to dehydration with chronic aspect more likely due to sleep apnea which is mention in the chart  Continuous pulse ox monitoring  Start IV fluids  Repeat BMP in the morning     (ventriculoperitoneal) shunt status- (present on admission)  Assessment & Plan  I did personally review his CT scan,  shunt goes off to the right side, nowhere near where his pain currently is, I do not think it is currently causing any issues  Mentation is at baseline per mother    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

## 2023-03-31 NOTE — ASSESSMENT & PLAN NOTE
On CT and RUQ u/s  Causing sepsis  Start Rocephin and Flagyl  Await culture results  CCY this afternoon

## 2023-03-31 NOTE — PROGRESS NOTES
4 Eyes Skin Assessment Completed by Erin RN and Benjamin RN.    Head WDL  Ears WDL  Nose WDL  Mouth WDL  Neck tracheostomy scar   Breast/Chest WDL  Shoulder Blades WDL  Spine Rashes generalized, normal at baseline  (R) Arm/Elbow/Hand WDL  (L) Arm/Elbow/Hand WDL  Abdomen Scar surgical  Groin WDL  Scrotum/Coccyx/Buttocks WDL  (R) Leg WDL  (L) Leg WDL  (R) Heel/Foot/Toe WDL  (L) Heel/Foot/Toe WDL          Devices In Places Pulse Ox, SCDs      Interventions In Place Waffle Overlay    Possible Skin Injury No    Pictures Uploaded Into Epic N/A  Wound Consult Placed N/A  RN Wound Prevention Protocol Ordered No

## 2023-03-31 NOTE — OP REPORT
Operative Report    Date: 3/31/2023    Surgeon: Maggie Chris M.D.    Assistant: GISEL Lemons    My assistant Markell Mayer was medically necessary for this procedure. He ran the laparoscope for the duration of the procedure, neccessary for my visualization of the surgical field. He also retracted the gallbladder cephalad and laterally, in order to facilitate my visualization of the hepatoduodenal ligament and critical view of safety. Finally, he also assisted in achievement of hemostasis and wound closure.     Anesthesiologist: Juni BETANCOURT    Preoperative Diagnosis:   K80.0 Calculus of gallbladder with acute cholecystitis    Postoperative Diagnosis: same    Procedure Performed: 73430 Laparoscopy, surgical; cholecystectomy    Indications: This is a 27 y.o. male who presented with abdominal pain. History, physical and diagnostic studies are consistent with acute cholecystitis.    Findings: acute cholecystitis.    Procedure in detail: The patient was brought to the operating room and was placed in the supine position where general endotracheal anesthesia was induced. SCDs were in place and functioning. Preoperative antibiotics of ancef were given before incision time. The patient's abdomen was prepped and draped in the usual sterile fashion.    After infiltration of local anesthetic, a skin incision was made to accommodate a 5 mm port supra-umbilically. We then used the Veress needle to access the peritoneum, and after saline drop test, we insufflated to 15 mmHg. We then placed the 5mm 30 degree camera and inspected the abdomen for evidence of trauma secondary to Veress needle or port placement, and found none.    Utilizing the 30-degree laparoscope with the patient in the reverse Trendelenburg position, and after infiltration of local anesthetic, an additional 11-mm port was inserted into the epigastrium just to the right of the midline. Then two 5-mm ports were inserted in the midclavicular and anterior axillary  lines, in the right upper quadrant, all under direct visualization.    The gallbladder was identified, it appeared dilated and inflamed. The gallbladder was retracted cephalad and caudally using gallbladder graspers. Using the [place], we were able to peel the overlying peritoneum off the cystic duct, and circumferentially dissect it. We used electrocautery to futher remove the peritoneum off the gallbladder. We then were able to further dissect Calot's triangle until we identified the cystic artery and cystic ducts, which were circumferrentially dissected. A critical view of safety was obtained.    We then doubly clipped the cystic duct distally and divided it. We then doubly clipped the cystic artery and divided it.Then, using electrocautery, we removed the gallbladder from the liver bed. After ensuring gallbladder bed hemostasis with cautery, we removed the gallbladder and placed it in an endocatch bag, and removed it from the epigastric port site.    The right upper quadrant was irrigated copiously with normal saline solution. We inspected the cystic duct and artery stumps, and found them to be intact. The liver bed was hemostatic.    The trocars were removed under direct visualization.     The skin of all incisions was closed with subcuticular suture.    All sponge, needle, and instrument counts were reported as correct at the end of the procedure. The patient tolerated the procedure well and left the operating room for the recovery room in stable and satisfactory condition.    Estimated Blood Loss: minimal     Specimens: gallbladder for permanent pathology    Complications: none apparent     Drains: none     Disposition: stable, extubated, to PACU    Maggie Chris M.D.  [facility]  [phone]

## 2023-03-31 NOTE — ASSESSMENT & PLAN NOTE
Does appear to be acute on chronic  Acute aspect likely due to dehydration with chronic aspect more likely due to sleep apnea which is mention in the chart  Continuous pulse ox monitoring  Start IV fluids  Repeat BMP in the morning

## 2023-03-31 NOTE — CARE PLAN
The patient is Stable - Low risk of patient condition declining or worsening    Shift Goals  Clinical Goals: Pts' pain will be tolerable during shift; pt's nausea & vomiting will be relieved; maintain on NPO except meds.  Patient Goals: Sleep and pain mgt.  Family Goals: Lab tests and update.    Progress made toward(s) clinical / shift goals:  Pt's pain was tolerable and no nausea and vomiting during shift; pt urinated via urinal at 500 ml overnight;   pt's latest Lactic acid 1.5 .    Problem: Knowledge Deficit - Standard  Goal: Patient and family/care givers will demonstrate understanding of plan of care, disease process/condition, diagnostic tests and medications  Outcome: Progressing     Problem: Physical Regulation  Goal: Diagnostic test results will improve  Outcome: Progressing     Patient is not progressing towards the following goals: N/A

## 2023-03-31 NOTE — ANESTHESIA TIME REPORT
Anesthesia Start and Stop Event Times     Date Time Event    3/31/2023 1454 Ready for Procedure     1503 Anesthesia Start     1557 Anesthesia Stop        Responsible Staff  03/31/23    Name Role Begin End    True Alfredo M.D. Anesth 1503 1557        Overtime Reason:  no overtime (within assigned shift)    Comments:

## 2023-03-31 NOTE — H&P
Surgical History and Physical    Date: 3/31/2023    Requesting Physician: Hospitalist service  PCP: Sherie Power M.D.  Attending Physician: Maggie Chris M.D. [facility]      HPI: This is a 27 y.o. male with PMH of AVM with resultant intracranial bleed, with  shunt and PEG placement who is presenting with complaints of epigastric abdominal pain, nausea and vomiting. The pain started yesterday, is in his epigastrium, described as sharp, severity is 6/10. It has been going on for about a day. He was evaluated and found to have acute cholecystitis, I was called for consultation.    Past Medical History:   Diagnosis Date    Chickenpox     COVID-19     Citizen of [place] measles     ICH (intracerebral hemorrhage) (HCC)     Stroke (HCC)        Past Surgical History:   Procedure Laterality Date    ANAL FISTULOTOMY      PEG PLACEMENT      TRACHEOSTOMY       SHUNT INSERTION         Current Facility-Administered Medications   Medication Dose Route Frequency Provider Last Rate Last Admin    senna-docusate (PERICOLACE or SENOKOT S) 8.6-50 MG per tablet 2 Tablet  2 Tablet Oral BID STEPHANIE HerediaO.   2 Tablet at 03/31/23 0516    And    polyethylene glycol/lytes (MIRALAX) PACKET 1 Packet  1 Packet Oral QDAY PRN Antonio Barron D.O.        And    magnesium hydroxide (MILK OF MAGNESIA) suspension 30 mL  30 mL Oral QDAY PRN Antonio Barron D.O.        And    bisacodyl (DULCOLAX) suppository 10 mg  10 mg Rectal QDAY PRN Antonio Barron D.O.        lactated ringers infusion (BOLUS)  1,000 mL Intravenous Once PRN Antonio Barron D.O.        0.9 % NaCl with KCl 20 mEq infusion   Intravenous Continuous Antonio Barron D.O. 125 mL/hr at 03/30/23 2311 New Bag at 03/30/23 2311    NS (BOLUS) infusion 1,905 mL  30 mL/kg Intravenous Once PRN Antonio Barron D.O.        acetaminophen (Tylenol) tablet 650 mg  650 mg Oral Q6HRS PRN Antonio Barron D.O.        Pharmacy Consult Request ...Pain Management Review 1 Each  1 Each Other  PHARMACY TO DOSE Antonio Barron D.O.        oxyCODONE immediate-release (ROXICODONE) tablet 2.5 mg  2.5 mg Oral Q3HRS PRN Antonio Barron D.O.        Or    oxyCODONE immediate-release (ROXICODONE) tablet 5 mg  5 mg Oral Q3HRS PRN Antonio Barron D.O.        Or    morphine 4 MG/ML injection 2 mg  2 mg Intravenous Q3HRS PRN STEPHANIE HerediaO.        cefTRIAXone (Rocephin) 1,000 mg in  mL IVPB  1,000 mg Intravenous Q24HR Antonio Barron D.O.        metroNIDAZOLE (Flagyl) IVPB 500 mg  500 mg Intravenous Q8HRS Antonio Barron D.O.   Stopped at 03/31/23 0616    labetalol (NORMODYNE/TRANDATE) injection 10 mg  10 mg Intravenous Q4HRS PRN STEPHANIE HerediaO.        ondansetron (ZOFRAN) syringe/vial injection 4 mg  4 mg Intravenous Q4HRS PRN Antonio Barron D.O.        ondansetron (ZOFRAN ODT) dispertab 4 mg  4 mg Oral Q4HRS PRN STEPHANIE HerediaO.        promethazine (PHENERGAN) tablet 12.5-25 mg  12.5-25 mg Oral Q4HRS PRN Antonio Barron D.O.        promethazine (PHENERGAN) suppository 12.5-25 mg  12.5-25 mg Rectal Q4HRS PRN STEPHANIE HerediaO.        prochlorperazine (COMPAZINE) injection 5-10 mg  5-10 mg Intravenous Q4HRS PRN REMY Heredia.O.           Social History     Socioeconomic History    Marital status: Single     Spouse name: Not on file    Number of children: Not on file    Years of education: Not on file    Highest education level: Some college, no degree   Occupational History    Not on file   Tobacco Use    Smoking status: Never    Smokeless tobacco: Never   Vaping Use    Vaping Use: Never used   Substance and Sexual Activity    Alcohol use: Never    Drug use: Never    Sexual activity: Not Currently   Other Topics Concern     Service No    Blood Transfusions No    Caffeine Concern No    Occupational Exposure No    Hobby Hazards No    Sleep Concern No    Stress Concern No    Weight Concern No    Special Diet Yes    Back Care No    Exercise Yes    Bike Helmet No    Seat Belt  Yes    Self-Exams No   Social History Narrative    Lives with mom     Social Determinants of Health     Financial Resource Strain: Not on file   Food Insecurity: Not on file   Transportation Needs: Not on file   Physical Activity: Not on file   Stress: Not on file   Social Connections: Not on file   Intimate Partner Violence: Not on file   Housing Stability: Not on file       Family History   Problem Relation Age of Onset    Hypertension Mother     Glasses Mother     Thyroid Mother     Diabetes Maternal Grandfather     Stroke Maternal Grandfather     Diabetes Paternal Grandmother     Hypertension Maternal Uncle     Heart Disease Neg Hx     Cancer Neg Hx        Allergies:  Vancomycin and Other misc    Review of Systems:  Constitutional: Negative for fever, chills, weight loss, malaise/fatigue and diaphoresis.   HENT: Negative for hearing loss, ear pain, nosebleeds, congestion, sore throat, neck pain, tinnitus and ear discharge.    Eyes: Negative for blurred vision, double vision, photophobia, pain, discharge and redness.   Respiratory: Negative for cough, hemoptysis, sputum production, shortness of breath, wheezing and stridor.    Cardiovascular: Negative for chest pain, palpitations, orthopnea, claudication, leg swelling and PND.   Gastrointestinal: Negative for heartburn, nausea, vomiting, abdominal pain, diarrhea, constipation, blood in stool and melena.   Genitourinary: Negative for dysuria, urgency, frequency, hematuria and flank pain.   Musculoskeletal: Negative for myalgias, back pain, joint pain and falls.   Skin: Negative for itching and rash.  Neurological: Negative for dizziness, tingling, tremors, sensory change, speech change, focal weakness, seizures, loss of consciousness, weakness and headaches.   Endo/Heme/Allergies: Negative for environmental allergies and polydipsia. Does not bruise/bleed easily.   Psychiatric/Behavioral: Negative for depression, suicidal ideas, hallucinations, memory loss and  "substance abuse. The patient is not nervous/anxious and does not have insomnia.    Physical Exam:  /81   Pulse 98   Temp 37.6 °C (99.6 °F) (Oral)   Resp 16   Ht 1.676 m (5' 5.98\")   Wt 63.5 kg (139 lb 15.9 oz)   SpO2 94%     Constitutional: he resting quietly.  Head: Normocephalic and atraumatic.   Neck: Normal range of motion. Neck supple. No JVD present. No tracheal deviation present. No thyromegaly present. + prior tracheostomy scar  Cardiovascular: Normal rate, regular rhythm, normal heart sounds and intact distal pulses.  Exam reveals no gallop and no friction rub.  No murmur heard.  Pulmonary/Chest: Effort normal and breath sounds normal. No stridor. No respiratory distress. he has no wheezes. he has no rales.   Abdominal: Soft. Bowel sounds are normal. he exhibits no distension and no mass. There is no tenderness. There is no rebound and no guarding. + scar from  shunt and PEG tube placement  Musculoskeletal: Normal range of motion. he exhibits no edema and no tenderness.   Skin: Skin is warm and dry. No rash noted. he is not diaphoretic. No erythema. No pallor.       Labs:  Recent Labs     03/30/23 1923 03/31/23  0246   WBC 18.1* 16.4*   RBC 6.13* 5.43   HEMOGLOBIN 17.8 15.8   HEMATOCRIT 53.2* 47.8   MCV 86.8 88.0   MCH 29.0 29.1   MCHC 33.5* 33.1*   RDW 43.6 44.0   PLATELETCT 326 254   MPV 10.7 10.8     Recent Labs     03/30/23 1923 03/31/23  0246   SODIUM 136 135   POTASSIUM 3.5* 3.7   CHLORIDE 96 105   CO2 27 20   GLUCOSE 102* 104*   BUN 9 7*   CREATININE 0.76 0.61   CALCIUM 9.7 8.4     Recent Labs     03/30/23 1923   INR 1.02     Recent Labs     03/30/23 1923 03/31/23  0246   ASTSGOT 11* 8*   ALTSGPT 25 16   TBILIRUBIN 0.4 0.5   ALKPHOSPHAT 107* 83   GLOBULIN 3.8* 3.0   INR 1.02  --        Radiology:   3/30/2023 9:41 PM     HISTORY/REASON FOR EXAM:  Abdominal pain     TECHNIQUE/EXAM DESCRIPTION AND NUMBER OF VIEWS:  Real-time sonography of the liver and biliary tree.     COMPARISON: " None     FINDINGS:     The liver is normal in contour. There is no evidence of solid mass lesion. The liver measures 16.65 cm. The liver appears echogenic.     Gallbladder sludge and nonmobile shadowing stone is seen.  The gallbladder is distended. The gallbladder wall thickness measures 3.90 mm. There is no pericholecystic fluid.     The common duct measures 6.00 mm.     The visualized pancreas is unremarkable.     The visualized aorta is normal in caliber.     Intrahepatic IVC is patent.     The portal vein is patent with hepatopetal flow. The MPV measures 0.72 cm cm.     The right kidney measures 10.03 cm. Small collection of ascites in Morison's pouch is seen.     IMPRESSION:        1.  Nonmobile shadowing stone in gallbladder sludge with gallbladder wall thickening, sonographically compatible with cholecystitis.  2.  Borderline common bile duct dilatation, consider causes of biliary obstruction with additional workup as clinically appropriate.  3.  Hepatomegaly and echogenic liver, compatible with fatty change versus fibrosis.  4.  Small free fluid collection in Morison's pouch      3/30/2023 8:26 PM     HISTORY/REASON FOR EXAM:  Vomiting, abdominal pain.     TECHNIQUE/EXAM DESCRIPTION:   CT scan of the abdomen and pelvis with contrast.     Contrast-enhanced helical scanning was obtained from the diaphragmatic domes through the pubic symphysis following the bolus administration of nonionic contrast without complication.     100 mL of Omnipaque 350 nonionic contrast was administered without complication.     Low dose optimization technique was utilized for this CT exam including automated exposure control and adjustment of the mA and/or kV according to patient size.     COMPARISON: No prior studies available.     FINDINGS:     Lower Chest: Linear densities in bilateral lung bases are seen favoring changes of atelectasis.     Liver: Normal.     Spleen: Unremarkable.     Pancreas: Unremarkable.     Gallbladder:  The gallbladder is distended. Calcified gallstones are seen. Gallbladder wall thickening is seen with adjacent hazy fat stranding.     Biliary: Nondilated.     Adrenal glands: Normal.     Kidneys: Unremarkable without hydronephrosis.     Bowel: Mild fluid-filled prominence of the small bowel is seen.     Lymph nodes: No adenopathy.     Vasculature: Unremarkable.     Peritoneum: Trace perihepatic ascites is seen. There is tube overlying the chest subcutaneous soft tissues which enters the upper abdomen, appearance favors ventriculostomy tube.     Musculoskeletal: No acute or destructive process.     Pelvis: No adenopathy. Small free fluid collection the pelvis is seen.     IMPRESSION:        1.  Cholelithiasis with gallbladder wall thickening and adjacent hazy fat stranding, appearance suggests changes of cholecystitis. Further evaluation with right upper quadrant sonogram recommended.  2.  Mild fluid-filled prominence of small bowel, appearance suggests ileus and/or enteritis  3.  Trace perihepatic ascites    The radiologist's interpretation of all images has been reviewed by me.     Assessment: This is a 27 y.o. male with acute cholecystitis.     An extensive procedures, alternative, risks and questions conference was held with the patient's mother, his POA in regard to the surgical treatment of cholecystitis. She was counseled regarding the benefits of the operation, namely, removal of gallbladder and alleviation of hisinfection and symptoms. She was made aware of the alternatives, including operative and non-operative management. The risks of bleeding, infection, damage to surrounding structures, need for reoperation, need for open operation, bile duct injury, stroke, MI, and death were discussed with the patient. She was given a chance to ask questions, and all her questions were answered. Discussion was undertaken with Layman's terms. She demonstrated adequate understanding, seemed pleased with the plan, and  wish to proceed. Consent was given in clear state of mind.  Consent to be signed.       Plan: laparoscopic cholecystectomy.     Thank you very much for this consultation.    Maggie Chris M.D.  [facility]  [phone]

## 2023-03-31 NOTE — ED TRIAGE NOTES
Chief Complaint   Patient presents with    Vomiting     Mother speaks for this patient, he has been vomiting every time he eats or drinks and has pain in his mid upper abd where he had a peg.He has had a small amount of urine output as well.

## 2023-03-31 NOTE — ED NOTES
Medication history reviewed with patient and mom. Med rec is complete   Allergies reviewed.   Patient denied any outpatient antibiotics in the last 30 days.   [facility]erred [facility] - [facility] [place]

## 2023-03-31 NOTE — OR NURSING
1555: To PACU from OR via bed,  respirations spontaneous but requires chin lift to maintain patent airway. HOB elevated to 30 degress, old trach scar noted. Pt usually uses CPAP but same not available for PACU use.  Plan to keep pt in PACU for full hour per [facility] protocol.  Abdo soft, lap sites x 4 w/o dressings or drainage.    1602: Rouses spontaneously, denies pain/nausea. DB+C.        1610: More awake, takes sips po water, O2 d/marbin per pt so RA trial commenced. HOB to 45 degrees.    1625: Continues to deny discomfort.    1640: No change in abdo assessment.     1655: Meets criteria to transfer to floor.

## 2023-03-31 NOTE — ANESTHESIA POSTPROCEDURE EVALUATION
Patient: Rey Medina    Procedure Summary     Date: 03/31/23 Room / Location:  OR  / SURGERY [facility]    Anesthesia Start: 1503 Anesthesia Stop: 1557    Procedure: CHOLECYSTECTOMY, LAPAROSCOPIC (Abdomen) Diagnosis:     Surgeons: Maggie Chris M.D. Responsible Provider: True Alfredo M.D.    Anesthesia Type: general ASA Status: 3          Final Anesthesia Type: general  Last vitals  BP   Blood Pressure: 121/71    Temp   37.4 °C (99.3 °F)    Pulse   (!) 108   Resp   18    SpO2   94 %      Anesthesia Post Evaluation    Patient location during evaluation: PACU  Patient participation: complete - patient participated  Level of consciousness: awake and alert  Pain score: 0    Airway patency: patent  Anesthetic complications: no  Cardiovascular status: hemodynamically stable  Respiratory status: acceptable  Hydration status: euvolemic    PONV: none          There were no known notable events for this encounter.     Nurse Pain Score: 0 (NPRS)

## 2023-03-31 NOTE — PROGRESS NOTES
Hospital Medicine Daily Progress Note    Date of Service  3/31/2023    Chief Complaint  Rey Medina is a 27 y.o. male admitted 3/30/2023 with abdominal pain    Hospital Course  This is a 26yo with a history of severe central sleep apnea, mild seasonal asthma, intraventricular hemorrhage secondary to an AVM s/p  shunt with subsequent memory problems and incoordination.  He presented to hospital with epigastric pain and associated nausea and vomiting - vitals were stable in the ER, and workup demonstrated a leukocytosis with CT showing cholecystitis and ileus vs enteritis, and RUQ u/s demonstrating cholecystitis and while rads called the CBD borderline dilated, it was only 6mm, and liver enzymes were not elevated.     Interval Problem Update  3/31 - Discussed CBD with surgery, no MRCP indicated.  Pt to go for CCY today, keep NPO. Continue Rocephin/Flagyl. Pt relates his last BM was Tuesday, still with some abdominal discomfort.      I have discussed this patient's plan of care and discharge plan at IDT rounds today with Case Management, Nursing, Nursing leadership, and other members of the IDT team.    Consultants/Specialty  general surgery    Code Status  Full Code    Disposition  Patient is not medically cleared for discharge.   Anticipate discharge to to home with close outpatient follow-up.  I have placed the appropriate orders for post-discharge needs.    Review of Systems  Review of Systems   Constitutional:  Negative for chills and fever.   Respiratory:  Negative for cough.    Cardiovascular:  Negative for chest pain and leg swelling.   Gastrointestinal:  Positive for abdominal pain and constipation.   All other systems reviewed and are negative.     Physical Exam  Temp:  [36.7 °C (98.1 °F)-37.6 °C (99.6 °F)] 37.6 °C (99.6 °F)  Pulse:  [] 99  Resp:  [16-17] 17  BP: (121-128)/(81-88) 121/88  SpO2:  [92 %-99 %] 92 %    Physical Exam  Vitals and nursing note reviewed.   Constitutional:        Appearance: Normal appearance.   HENT:      Head:      Comments: Trach scar     Mouth/Throat:      Mouth: Mucous membranes are moist.   Eyes:      Extraocular Movements: Extraocular movements intact.   Cardiovascular:      Rate and Rhythm: Normal rate and regular rhythm.   Pulmonary:      Effort: Pulmonary effort is normal. No respiratory distress.      Breath sounds: Normal breath sounds.   Abdominal:      General: Abdomen is flat. Bowel sounds are normal. There is no distension.      Palpations: Abdomen is soft.      Tenderness: There is no abdominal tenderness. There is no guarding or rebound.      Comments: Multiple scars     Musculoskeletal:         General: No swelling.   Skin:     General: Skin is warm and dry.   Neurological:      Mental Status: He is alert and oriented to person, place, and time. Mental status is at baseline.      Comments: Mild speech difficulty, good cognition   Psychiatric:         Mood and Affect: Mood normal.         Behavior: Behavior normal.       Fluids    Intake/Output Summary (Last 24 hours) at 3/31/2023 0742  Last data filed at 3/31/2023 0607  Gross per 24 hour   Intake 0 ml   Output 500 ml   Net -500 ml       Laboratory  Recent Labs     03/30/23 1923 03/31/23  0246   WBC 18.1* 16.4*   RBC 6.13* 5.43   HEMOGLOBIN 17.8 15.8   HEMATOCRIT 53.2* 47.8   MCV 86.8 88.0   MCH 29.0 29.1   MCHC 33.5* 33.1*   RDW 43.6 44.0   PLATELETCT 326 254   MPV 10.7 10.8     Recent Labs     03/30/23 1923 03/31/23  0246   SODIUM 136 135   POTASSIUM 3.5* 3.7   CHLORIDE 96 105   CO2 27 20   GLUCOSE 102* 104*   BUN 9 7*   CREATININE 0.76 0.61   CALCIUM 9.7 8.4     Recent Labs     03/30/23 1923   INR 1.02               Imaging  US-RUQ   Final Result         1.  Nonmobile shadowing stone in gallbladder sludge with gallbladder wall thickening, sonographically compatible with cholecystitis.   2.  Borderline common bile duct dilatation, consider causes of biliary obstruction with additional workup as  clinically appropriate.   3.  Hepatomegaly and echogenic liver, compatible with fatty change versus fibrosis.   4.  Small free fluid collection in Morison's pouch      CT-ABDOMEN-PELVIS WITH   Final Result         1.  Cholelithiasis with gallbladder wall thickening and adjacent hazy fat stranding, appearance suggests changes of cholecystitis. Further evaluation with right upper quadrant sonogram recommended.   2.  Mild fluid-filled prominence of small bowel, appearance suggests ileus and/or enteritis   3.  Trace perihepatic ascites           Assessment/Plan  * Sepsis (HCC)- (present on admission)  Assessment & Plan  This is Sepsis Present on admission  SIRS criteria identified on my evaluation include: Tachycardia, with heart rate greater than 90 BPM and Leukocytosis, with WBC greater than 12,000  Source is cholecystitis  Sepsis protocol initiated  Fluid resuscitation ordered per protocol  Crystalloid Fluid Administration: Fluid resuscitation ordered per standard protocol - 30 mL/kg per current or ideal body weight  IV antibiotics as appropriate for source of sepsis  Reassessment: I have reassessed the patient's hemodynamic status  Start IV Rocephin and Flagyl  Await culture results  Trend lactic acid level  Patient is at risk of worsening, closely monitor his hemodynamics  Await surgical recommendations    Cholecystitis- (present on admission)  Assessment & Plan  On CT and RUQ u/s  Causing sepsis  Start Rocephin and Flagyl  Await culture results  CCY this afternoon    Ileus (HCC)- (present on admission)  Assessment & Plan  Patient does have decreased bowel sounds, changes suggesting ileus noted on CT scan  Likely due to inflammatory process of cholecystitis  Keep patient n.p.o.    Hypokalemia- (present on admission)  Assessment & Plan  Replace as needed    Polycythemia- (present on admission)  Assessment & Plan  Does appear to be acute on chronic  Acute aspect likely due to dehydration with chronic aspect more  likely due to sleep apnea which is mention in the chart  Continuous pulse ox monitoring  Start IV fluids  Repeat BMP in the morning    PAWEL (obstructive sleep apnea)- (present on admission)  Assessment & Plan  - Mom to bring CPAP back to hospital      (ventriculoperitoneal) shunt status- (present on admission)  Assessment & Plan  I did personally review his CT scan,  shunt goes off to the right side, nowhere near where his pain currently is, I do not think it is currently causing any issues  Mentation is at baseline per mother    H/O spont intraparenchymal intracranial hemorrhage d/t cerebral AVM- (present on admission)  Assessment & Plan  - At his neurologic baseline     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 In review of yesterday's note (3/30/2023), there are no changes except as documented above.

## 2023-03-31 NOTE — ASSESSMENT & PLAN NOTE
I did personally review his CT scan,  shunt goes off to the right side, nowhere near where his pain currently is, I do not think it is currently causing any issues  Mentation is at baseline per mother

## 2023-04-01 ENCOUNTER — APPOINTMENT (OUTPATIENT)
Dept: RADIOLOGY | Facility: MEDICAL CENTER | Age: 28
DRG: 854 | End: 2023-04-01
Attending: FAMILY MEDICINE
Payer: MEDICARE

## 2023-04-01 VITALS
OXYGEN SATURATION: 91 % | DIASTOLIC BLOOD PRESSURE: 74 MMHG | SYSTOLIC BLOOD PRESSURE: 120 MMHG | TEMPERATURE: 98.8 F | WEIGHT: 139.99 LBS | BODY MASS INDEX: 22.5 KG/M2 | HEART RATE: 106 BPM | HEIGHT: 66 IN | RESPIRATION RATE: 16 BRPM

## 2023-04-01 LAB
ALBUMIN SERPL BCP-MCNC: 3.2 G/DL (ref 3.2–4.9)
ALBUMIN/GLOB SERPL: 1 G/DL
ALP SERPL-CCNC: 76 U/L (ref 30–99)
ALT SERPL-CCNC: 40 U/L (ref 2–50)
ANION GAP SERPL CALC-SCNC: 11 MMOL/L (ref 7–16)
AST SERPL-CCNC: 39 U/L (ref 12–45)
BASOPHILS # BLD AUTO: 0.8 % (ref 0–1.8)
BASOPHILS # BLD: 0.09 K/UL (ref 0–0.12)
BILIRUB SERPL-MCNC: 0.5 MG/DL (ref 0.1–1.5)
BUN SERPL-MCNC: 6 MG/DL (ref 8–22)
CALCIUM ALBUM COR SERPL-MCNC: 8.7 MG/DL (ref 8.5–10.5)
CALCIUM SERPL-MCNC: 8.1 MG/DL (ref 8.4–10.2)
CHLORIDE SERPL-SCNC: 104 MMOL/L (ref 96–112)
CO2 SERPL-SCNC: 20 MMOL/L (ref 20–33)
CREAT SERPL-MCNC: 0.74 MG/DL (ref 0.5–1.4)
EOSINOPHIL # BLD AUTO: 0.08 K/UL (ref 0–0.51)
EOSINOPHIL NFR BLD: 0.7 % (ref 0–6.9)
ERYTHROCYTE [DISTWIDTH] IN BLOOD BY AUTOMATED COUNT: 44.6 FL (ref 35.9–50)
GFR SERPLBLD CREATININE-BSD FMLA CKD-EPI: 126 ML/MIN/1.73 M 2
GLOBULIN SER CALC-MCNC: 3.1 G/DL (ref 1.9–3.5)
GLUCOSE SERPL-MCNC: 97 MG/DL (ref 65–99)
HCT VFR BLD AUTO: 46.1 % (ref 42–52)
HGB BLD-MCNC: 15.1 G/DL (ref 14–18)
IMM GRANULOCYTES # BLD AUTO: 0.03 K/UL (ref 0–0.11)
IMM GRANULOCYTES NFR BLD AUTO: 0.3 % (ref 0–0.9)
LYMPHOCYTES # BLD AUTO: 1.28 K/UL (ref 1–4.8)
LYMPHOCYTES NFR BLD: 11.2 % (ref 22–41)
MCH RBC QN AUTO: 28.8 PG (ref 27–33)
MCHC RBC AUTO-ENTMCNC: 32.8 G/DL (ref 33.7–35.3)
MCV RBC AUTO: 87.8 FL (ref 81.4–97.8)
MONOCYTES # BLD AUTO: 1.03 K/UL (ref 0–0.85)
MONOCYTES NFR BLD AUTO: 9 % (ref 0–13.4)
NEUTROPHILS # BLD AUTO: 8.91 K/UL (ref 1.82–7.42)
NEUTROPHILS NFR BLD: 78 % (ref 44–72)
NRBC # BLD AUTO: 0 K/UL
NRBC BLD-RTO: 0 /100 WBC
PLATELET # BLD AUTO: 224 K/UL (ref 164–446)
PMV BLD AUTO: 11 FL (ref 9–12.9)
POTASSIUM SERPL-SCNC: 3.7 MMOL/L (ref 3.6–5.5)
PROCALCITONIN SERPL-MCNC: 0.25 NG/ML
PROT SERPL-MCNC: 6.3 G/DL (ref 6–8.2)
RBC # BLD AUTO: 5.25 M/UL (ref 4.7–6.1)
SODIUM SERPL-SCNC: 135 MMOL/L (ref 135–145)
WBC # BLD AUTO: 11.4 K/UL (ref 4.8–10.8)

## 2023-04-01 PROCEDURE — 700101 HCHG RX REV CODE 250: Performed by: INTERNAL MEDICINE

## 2023-04-01 PROCEDURE — 36415 COLL VENOUS BLD VENIPUNCTURE: CPT

## 2023-04-01 PROCEDURE — 74018 RADEX ABDOMEN 1 VIEW: CPT

## 2023-04-01 PROCEDURE — 80053 COMPREHEN METABOLIC PANEL: CPT

## 2023-04-01 PROCEDURE — A9270 NON-COVERED ITEM OR SERVICE: HCPCS | Performed by: INTERNAL MEDICINE

## 2023-04-01 PROCEDURE — 85025 COMPLETE CBC W/AUTO DIFF WBC: CPT

## 2023-04-01 PROCEDURE — A9270 NON-COVERED ITEM OR SERVICE: HCPCS | Performed by: FAMILY MEDICINE

## 2023-04-01 PROCEDURE — 94760 N-INVAS EAR/PLS OXIMETRY 1: CPT

## 2023-04-01 PROCEDURE — 700102 HCHG RX REV CODE 250 W/ 637 OVERRIDE(OP): Performed by: INTERNAL MEDICINE

## 2023-04-01 PROCEDURE — 99239 HOSP IP/OBS DSCHRG MGMT >30: CPT | Performed by: FAMILY MEDICINE

## 2023-04-01 PROCEDURE — 84145 PROCALCITONIN (PCT): CPT

## 2023-04-01 PROCEDURE — 700102 HCHG RX REV CODE 250 W/ 637 OVERRIDE(OP): Performed by: FAMILY MEDICINE

## 2023-04-01 RX ORDER — LANOLIN ALCOHOL/MO/W.PET/CERES
400 CREAM (GRAM) TOPICAL DAILY
Status: DISCONTINUED | OUTPATIENT
Start: 2023-04-01 | End: 2023-04-01 | Stop reason: HOSPADM

## 2023-04-01 RX ORDER — OXYCODONE HYDROCHLORIDE 5 MG/1
2.5 TABLET ORAL EVERY 6 HOURS PRN
Qty: 6 TABLET | Refills: 0 | Status: SHIPPED | OUTPATIENT
Start: 2023-04-01 | End: 2023-04-04

## 2023-04-01 RX ORDER — AMOXICILLIN AND CLAVULANATE POTASSIUM 875; 125 MG/1; MG/1
1 TABLET, FILM COATED ORAL 2 TIMES DAILY
Qty: 6 TABLET | Refills: 0 | Status: SHIPPED | OUTPATIENT
Start: 2023-04-01 | End: 2023-04-04

## 2023-04-01 RX ORDER — BISACODYL 10 MG
10 SUPPOSITORY, RECTAL RECTAL
Qty: 7 SUPPOSITORY | Refills: 0 | Status: SHIPPED | OUTPATIENT
Start: 2023-04-01 | End: 2023-04-14

## 2023-04-01 RX ORDER — ONDANSETRON 4 MG/1
4 TABLET, ORALLY DISINTEGRATING ORAL EVERY 4 HOURS PRN
Qty: 10 TABLET | Refills: 0 | Status: SHIPPED | OUTPATIENT
Start: 2023-04-01 | End: 2023-04-14

## 2023-04-01 RX ORDER — DOCUSATE SODIUM 100 MG/1
100 CAPSULE, LIQUID FILLED ORAL 2 TIMES DAILY
Qty: 30 CAPSULE | Refills: 0 | Status: SHIPPED | OUTPATIENT
Start: 2023-04-01 | End: 2023-04-14

## 2023-04-01 RX ORDER — VITAMIN B COMPLEX
2000 TABLET ORAL DAILY
Status: DISCONTINUED | OUTPATIENT
Start: 2023-04-01 | End: 2023-04-01 | Stop reason: HOSPADM

## 2023-04-01 RX ORDER — PROPRANOLOL HYDROCHLORIDE 20 MG/1
20 TABLET ORAL
Status: DISCONTINUED | OUTPATIENT
Start: 2023-04-01 | End: 2023-04-01 | Stop reason: HOSPADM

## 2023-04-01 RX ADMIN — POTASSIUM CHLORIDE AND SODIUM CHLORIDE: 900; 150 INJECTION, SOLUTION INTRAVENOUS at 05:04

## 2023-04-01 RX ADMIN — PROPRANOLOL HYDROCHLORIDE 20 MG: 20 TABLET ORAL at 09:36

## 2023-04-01 RX ADMIN — SENNOSIDES AND DOCUSATE SODIUM 2 TABLET: 50; 8.6 TABLET ORAL at 05:05

## 2023-04-01 RX ADMIN — METRONIDAZOLE 500 MG: 500 INJECTION, SOLUTION INTRAVENOUS at 05:05

## 2023-04-01 RX ADMIN — ACETAMINOPHEN 650 MG: 325 TABLET, FILM COATED ORAL at 02:26

## 2023-04-01 RX ADMIN — Medication 10 MG: at 08:26

## 2023-04-01 RX ADMIN — Medication 2000 UNITS: at 09:36

## 2023-04-01 RX ADMIN — ACETAMINOPHEN 650 MG: 325 TABLET, FILM COATED ORAL at 09:36

## 2023-04-01 RX ADMIN — Medication 400 MG: at 09:36

## 2023-04-01 ASSESSMENT — PAIN DESCRIPTION - PAIN TYPE: TYPE: ACUTE PAIN

## 2023-04-01 NOTE — PROGRESS NOTES
"Surgical Progress Note:    POD# 1  S/P lap marge     No acute events. Pain well controlled. Tolerating diet.    PE:  /74   Pulse (!) 106 Comment: RN notfied  Temp 37.1 °C (98.8 °F) (Oral)   Resp 16   Ht 1.676 m (5' 5.98\")   Wt 63.5 kg (139 lb 15.9 oz)   SpO2 91%   BMI 22.61 kg/m²     I/O:   Intake/Output Summary (Last 24 hours) at 4/1/2023 1053  Last data filed at 4/1/2023 0845  Gross per 24 hour   Intake 1422 ml   Output 700 ml   Net 722 ml     UOP:  PO:  IV:    NAD  Abdomen soft, incisions c/d/I    Labs:  Recent Labs     03/30/23 1923 03/31/23 0246 04/01/23 0223   WBC 18.1* 16.4* 11.4*   RBC 6.13* 5.43 5.25   HEMOGLOBIN 17.8 15.8 15.1   HEMATOCRIT 53.2* 47.8 46.1   MCV 86.8 88.0 87.8   MCH 29.0 29.1 28.8   RDW 43.6 44.0 44.6   PLATELETCT 326 254 224   MPV 10.7 10.8 11.0   NEUTSPOLYS 81.70* 78.00* 78.00*   LYMPHOCYTES 8.30* 11.20* 11.20*   MONOCYTES 9.30 9.60 9.00   EOSINOPHILS 0.10 0.40 0.70   BASOPHILS 0.30 0.40 0.80     Recent Labs     03/30/23 1923 03/31/23 0246 04/01/23 0223   SODIUM 136 135 135   POTASSIUM 3.5* 3.7 3.7   CHLORIDE 96 105 104   CO2 27 20 20   GLUCOSE 102* 104* 97   BUN 9 7* 6*         A/P: POD# 1  S/P lap marge  - doing well  - Patient may discharge home when tolerating a regular diet, ambulating well, and pain well controlled with PO medications.   - f/u with me in one week  - d/c instructions as below      Maggie Chris M.D.  [facility]  [phone]        Laparoscopic Cholecystectomy D/C instructions:    1. DIET: Upon discharge from the hospital you may resume your normal preoperative diet. Depending on how you are feeling and whether you have nausea or not, you may wish to stay with a bland diet for the first few days. However, you can advance this as quickly as you feel ready.    2. ACTIVITIES: After discharge from the hospital, you may resume full routine activities. However, there should be no heavy lifting (greater than 15 pounds) and no strenuous " activities until after your follow-up visit. Otherwise, routine activities of daily living are acceptable.    3. DRIVING: You may drive whenever you are off pain medications and are able to perform the activities needed to drive, i.e. turning, bending, twisting, etc.    4. BATHING: You may get the wound wet at any time after leaving the hospital. You may shower, but do not submerge in a bath for at least a week.     5. BOWEL FUNCTION: A few patients, after this operation, will develop either frequent or loose stools after meals. This usually corrects itself after a few days, to a few weeks. If this occurs, do not worry; it is not unusual and will resolve. Much more common than loose stools, is constipation. The combination of pain medication and decreased activity level can cause constipation in otherwise normal patients. If you feel this is occurring, take a laxative (Milk of Magnesia, Ex-Lax, Senokot, etc.) until the problem has resolved.    6. PAIN MEDICATION: You will be given a prescription for pain medication at discharge. Please take these as directed. It is important to remember not to take medications on an empty stomach as this may cause nausea.     It is also helpful to take other non-narcotic over the counter medications to help with pain control and to decrease the need for narcotic medications. I recommend ibuprofen, taken every 8 hours, dosing as directed on the medication bottle.    It is useful to slowly decrease the number of narcotic pain medications you take starting the first day after surgery, as you are able. If you need a refill, Dr. Chris's office will provide you with one refill at your post-operative visit. After this, no more narcotic pain medications will be given.     7.CALL IF YOU HAVE: (1) Fevers to more than 1010 F, (2) Unusual chest or leg pain, (3) Drainage or fluid from incision that may be foul smelling, increased tenderness or soreness at the wound or the wound edges are no  longer together, redness or swelling at the incision site. Please do not hesitate to call with any other questions.     8. APPOINTMENT: Contact our office at [phone] for a follow-up appointment in 1 to 2 weeks following your procedure.    If you have any additional questions, please do not hesitate to call the office and speak to either myself or the physician on call.    Office address:  [address] [place] [address] [id number]  [place] [id number]    Maggie Chris M.D.  [facility]  [phone]

## 2023-04-01 NOTE — DISCHARGE SUMMARY
Discharge Summary    CHIEF COMPLAINT ON ADMISSION  Chief Complaint   Patient presents with    Vomiting     Mother speaks for this patient, he has been vomiting every time he eats or drinks and has pain in his mid upper abd where he had a peg.He has had a small amount of urine output as well.       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  He presented to hospital with epigastric pain and associated nausea and vomiting - vitals were stable in the ER, and workup demonstrated a leukocytosis with CT showing cholecystitis and ileus vs enteritis, and RUQ u/s demonstrating cholecystitis and while rads called the CBD borderline dilated, it was only 6mm, and liver enzymes were not elevated.     Patient underwent cholecystectomy with Dr Chris and did very well - he had some post operative tachycardia, but in speaking with pt and his mother, he does have episodes of tachycardia at home that he takes PRN propranolol for - once he received his home propranolol, his HR did decline to the 90s. He has had a post operative bowel movement and is feeling well for discharge home.       Therefore, he is discharged in good and stable condition to home with close outpatient follow-up.    The patient met 2-midnight criteria for an inpatient stay at the time of discharge.    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 This occurred in [place]      Managed in the [facility] until 11/2019, pt moved to [place]      2/5/2020-2/21/2020 admission to inpatient rehab for rehabilitation with       improvement. Trach decannulated at that time, now is healed and follows       with ENT      S/p PEG tube - inadvertently removed 08/2020, decision made not to replace       with strict precautions, 1:1 eating, thickening of thin liquids, doing       well since then           (ventriculoperitoneal) shunt status POA: Yes    PAWEL (obstructive sleep apnea) POA: Yes    Polycythemia POA: Yes    Hypokalemia POA: Yes    Ileus (HCC) POA: Yes    Cholecystitis POA: Yes  Resolved Problems:    * No resolved hospital problems. *      FOLLOW UP  Future Appointments   Date Time Provider Department Center   4/14/2023  5:00 PM Sherie Power M.D. [facility]   9/13/2023  2:00 PM Mustapha Flores D.O. [facility] None   2/7/2024  2:30 PM Everette Romero M.D. [facility] None     Maggie Chris M.D.  [address] [facility] [address]  [facility] [id number] [phone]    Call      Sherie Power M.D.  [address]  [facility] [address]  [phone]    Call        MEDICATIONS ON DISCHARGE     Medication List        START taking these medications        Instructions   amoxicillin-clavulanate 875-125 MG Tabs  Commonly known as: AUGMENTIN   Take 1 Tablet by mouth 2 times a day for 3 days.  Dose: 1 Tablet     bisacodyl 10 MG Supp  Commonly known as: DULCOLAX   Insert 1 Suppository into the rectum 1 time a day as needed (if Miralax ineffective).  Dose: 10 mg     docusate sodium 100 MG Caps  Commonly known as: COLACE   Take 1 Capsule by mouth 2 times a day for 15 days.  Dose: 100 mg     ondansetron 4 MG Tbdp  Commonly known as: ZOFRAN ODT   Take 1 Tablet by mouth every four hours as needed for Nausea/Vomiting.  Dose: 4 mg     oxyCODONE immediate-release 5 MG Tabs  Commonly known as: ROXICODONE   Take 0.5  Tablets by mouth every 6 hours as needed for Severe Pain for up to 3 days.  Dose: 2.5 mg            CONTINUE taking these medications        Instructions   B-12 PO   Take 1 Tab by mouth every morning.  Dose: 1 Tablet     Emergen-C Blue Pack   Take  by mouth.     Magnesium 400 MG Caps   Take 1 Cap by mouth every day. Indications: Malnutrition  Dose: 1 Capsule     Misc. Devices Misc   Incontinence supplies - adult diapers. Length of time needed - lifetime or 999 months.     montelukast 10 MG Tabs  Commonly known as: SINGULAIR   TAKE 1 TABLET BY MOUTH ONCE DAILY AS NEEDED (TO REDUCE AMOUNT OF PILLS NEEDING TO BE TAKEN DAILY)     NON SPECIFIED   1 ensure 1 time daily     non-formulary med   Take 1 Dose Pack by mouth every day. Emergen C  Dose: 1 Dose Pack     Omega 3 500 500 MG Caps   Take 1 Cap by mouth every day. Indications: Dietary Deficiency  Dose: 1 Capsule     polyethylene glycol 3350 17 GM/SCOOP Powd  Commonly known as: MIRALAX   DISSOLVE 17 GRAMS OF POWDER IN LIQUID AND DRINK  ONCE DAILY AS NEEDED FOR CONSTIPATION     propranolol 20 MG Tabs  Commonly known as: INDERAL   Take 20 mg by mouth 1 time a day as needed (HR > 100).  Dose: 20 mg     RA TURMERIC EXTRA STRENGTH PO   Take 1,000 mg by mouth every day. Indications: supplement  Dose: 1,000 mg     Vitamin D3 2000 UNIT Caps   Take 2,000 Units by mouth every day.  Dose: 2,000 Units              Allergies  Allergies   Allergen Reactions    Vancomycin Swelling     Lips  With red face and neck    Other Misc Rash     Allergic to name band.       DIET  Orders Placed This Encounter   Procedures    Diet Order Diet: Regular; Nutrient modifications: (optional): Low Fat     Standing Status:   Standing     Number of Occurrences:   1     Order Specific Question:   Diet:     Answer:   Regular [1]     Order Specific Question:   Nutrient modifications: (optional)     Answer:   Low Fat [5]       ACTIVITY  As tolerated.  Weight bearing as tolerated    CONSULTATIONS    Chris    PROCEDURES  Cholecystectomy    LABORATORY  Lab Results   Component Value Date    SODIUM 135 04/01/2023    POTASSIUM 3.7 04/01/2023    CHLORIDE 104 04/01/2023    CO2 20 04/01/2023    GLUCOSE 97 04/01/2023    BUN 6 (L) 04/01/2023    CREATININE 0.74 04/01/2023        Lab Results   Component Value Date    WBC 11.4 (H) 04/01/2023    HEMOGLOBIN 15.1 04/01/2023    HEMATOCRIT 46.1 04/01/2023    PLATELETCT 224 04/01/2023        Total time of the discharge process exceeds 35 minutes.

## 2023-04-01 NOTE — PROGRESS NOTES
Discharge order in place. Discharge instructions reviewed fully with pt and mother including follow-up appts, new meds, diets and education. All questions answered. IV removed and all belongings sent home with pt.

## 2023-04-01 NOTE — CARE PLAN
The patient is Stable - Low risk of patient condition declining or worsening    Shift Goals  Clinical Goals: monitor temp/HR; BM; pain control  Patient Goals: comfort  Family Goals: comfort    Progress made toward(s) clinical / shift goals:  pt had BM; pain controlled currently; pt resting comfortably no s/s of distress    Patient is not progressing towards the following goals:

## 2023-04-01 NOTE — DISCHARGE INSTRUCTIONS
Diet: Diet Order Diet: Regular; Nutrient modifications: (optional): Low Fat  Activity: As tolerated  Follow Up: Please see Dr Maggie Chris in her clinic in one week  Disposition:Home  Diagnosis: Sepsis (HCC)    Follow up with your Primary Care Provider Sherie Power M.D. as scheduled or sooner if your symptoms persist or worsen.  Return to Emergency Room for severe chest pain, shortness of breath, signs of a stroke, or any other emergencies.

## 2023-04-01 NOTE — CARE PLAN
The patient is Stable - Low risk of patient condition declining or worsening    Shift Goals  Clinical Goals: Pain Management  Patient Goals: Comfort  Family Goals: Lab tests and update.    Progress made toward(s) clinical / shift goals:    Problem: Pain - Standard  Goal: Alleviation of pain or a reduction in pain to the patient’s comfort goal  Outcome: Progressing     Problem: Knowledge Deficit - Standard  Goal: Patient and family/care givers will demonstrate understanding of plan of care, disease process/condition, diagnostic tests and medications  Outcome: Progressing     Problem: Fluid Volume  Goal: Fluid volume balance will be maintained  Outcome: Progressing     Problem: Skin Integrity  Goal: Skin integrity is maintained or improved  Outcome: Progressing       Patient is not progressing towards the following goals:

## 2023-04-06 ENCOUNTER — PATIENT OUTREACH (OUTPATIENT)
Dept: HEALTH INFORMATION MANAGEMENT | Facility: OTHER | Age: 28
End: 2023-04-06
Payer: MEDICARE

## 2023-04-06 NOTE — PROGRESS NOTES
Community Health Worker Intake  Social determinates of health intake complete.   Identified barriers to none.  Contact information provided to Rey Cantu Adam' mother Melinda.  Has PCP appointment scheduled for 04/14/2023 at 5pm  Inpatient/Outpatient assessments completed    CHW Germania contacted pt's mother, Melinda post discharge to follow up on resources provided. Melinda states that she has received resources and is looking into them. CHW asked Melinda if any other resources were needed at this time. Melinda reports no needs. CHW verified pts mother has CCM contact information and encouraged her to call if any resources were needed in the future. CHW will not continue to follow at this time.

## 2023-04-14 ENCOUNTER — OFFICE VISIT (OUTPATIENT)
Dept: MEDICAL GROUP | Facility: MEDICAL CENTER | Age: 28
End: 2023-04-14
Attending: FAMILY MEDICINE
Payer: MEDICARE

## 2023-04-14 VITALS
RESPIRATION RATE: 16 BRPM | TEMPERATURE: 98.1 F | BODY MASS INDEX: 24.86 KG/M2 | DIASTOLIC BLOOD PRESSURE: 66 MMHG | WEIGHT: 164 LBS | SYSTOLIC BLOOD PRESSURE: 108 MMHG | OXYGEN SATURATION: 94 % | HEIGHT: 68 IN | HEART RATE: 96 BPM

## 2023-04-14 DIAGNOSIS — Z09 HOSPITAL DISCHARGE FOLLOW-UP: ICD-10-CM

## 2023-04-14 DIAGNOSIS — G47.09 OTHER INSOMNIA: ICD-10-CM

## 2023-04-14 PROBLEM — A41.9 SEPSIS (HCC): Status: RESOLVED | Noted: 2023-03-30 | Resolved: 2023-04-14

## 2023-04-14 PROBLEM — K81.9 CHOLECYSTITIS: Status: RESOLVED | Noted: 2023-03-30 | Resolved: 2023-04-14

## 2023-04-14 PROCEDURE — 99213 OFFICE O/P EST LOW 20 MIN: CPT | Performed by: FAMILY MEDICINE

## 2023-04-14 ASSESSMENT — FIBROSIS 4 INDEX: FIB4 SCORE: 0.77

## 2023-04-14 NOTE — PROGRESS NOTES
Subjective:     CC:   Chief Complaint   Patient presents with    Hospital Follow-up         HPI:     Hospital discharge follow-up  Patient was recently admitted for abdominal pain, nausea, vomiting found to have abnormal CT abdomen imaging for cholecystitis versus ileus versus enteritis.  Surgery performed a lap marge, and he recovered well from it.  He has also already had his follow-up with Dr. Chris, pathology was acute on chronic cholecystitis.  His pain is controlled, eating normally, already eats a low-fat diet.    Other insomnia  Patient continues to have difficulty with sleep, is seeing sleep medicine.  Has also tried multiple sedatives from his physiatrist.  Sometimes does a little bit better if he takes his allergy medicines right before sleeping.        Past Medical History:   Diagnosis Date    Chickenpox     COVID-19     [place]oven[place] measles     ICH (intracerebral hemorrhage) (HCC)     Stroke (HCC)        Social History     Tobacco Use    Smoking status: Never    Smokeless tobacco: Never   Vaping Use    Vaping Use: Never used   Substance Use Topics    Alcohol use: Never    Drug use: Never       Current Outpatient Medications Ordered in Epic   Medication Sig Dispense Refill    Multiple Vitamins-Minerals (EMERGEN-C BLUE) Pack Take  by mouth.      montelukast (SINGULAIR) 10 MG Tab TAKE 1 TABLET BY MOUTH ONCE DAILY AS NEEDED (TO REDUCE AMOUNT OF PILLS NEEDING TO BE TAKEN DAILY) 90 Tablet 1    propranolol (INDERAL) 20 MG Tab Take 20 mg by mouth 1 time a day as needed (HR > 100).      polyethylene glycol 3350 (MIRALAX) 17 GM/SCOOP Powder DISSOLVE 17 GRAMS OF POWDER IN LIQUID AND DRINK  ONCE DAILY AS NEEDED FOR CONSTIPATION 510 g 5    Omega-3 Fatty Acids (OMEGA 3 500) 500 MG Cap Take 1 Cap by mouth every day. Indications: Dietary Deficiency      Magnesium 400 MG Cap Take 1 Cap by mouth every day. Indications: Malnutrition      Cholecalciferol (VITAMIN D3) 2000 UNIT Cap Take 2,000 Units by mouth every day.       "Cyanocobalamin (B-12 PO) Take 1 Tab by mouth every morning.      NON SPECIFIED 1 ensure 1 time daily 60 Each 11    RA TURMERIC EXTRA STRENGTH PO Take 1,000 mg by mouth every day. Indications: supplement      Misc. Devices Misc Incontinence supplies - adult diapers. Length of time needed - lifetime or 999 months. 999 Each 999     No current Epic-ordered facility-administered medications on file.       Allergies:  Vancomycin and Other misc        Objective:       Exam:  /66   Pulse 96   Temp 36.7 °C (98.1 °F)   Resp 16   Ht 1.727 m (5' 8\")   Wt 74.4 kg (164 lb)   SpO2 94%   BMI 24.94 kg/m²  Body mass index is 24.94 kg/m².    General: Normal appearing. No distress.  Pulmonary: Clear to ausculation.  Normal effort. No rales, ronchi, or wheezing.  Cardiovascular: Regular rate and rhythm without murmur.   Abdomen: Soft, nontender, nondistended.  Surgical incisions are healing well      Labs:   Reviewed hospitalization, surgery notes, discharge summary    Assessment & Plan:     27 y.o. male with the following -     1. Hospital discharge follow-up  Patient is doing well status post lap marge.  Reviewed pathology.  Has already seen surgery outpatient    2. Other insomnia  We will continue to follow-up with sleep medicine    Return in about 6 months (around 10/14/2023).    Please note that this dictation was created using voice recognition software. I have made every reasonable attempt to correct obvious errors, but I expect that there are errors of grammar and possibly content that I did not discover before finalizing the note.        "

## 2023-04-17 DIAGNOSIS — R15.9 INCONTINENCE OF FECES, UNSPECIFIED FECAL INCONTINENCE TYPE: ICD-10-CM

## 2023-04-17 PROBLEM — Z09 HOSPITAL DISCHARGE FOLLOW-UP: Status: ACTIVE | Noted: 2023-04-17

## 2023-04-17 PROBLEM — K56.7 ILEUS (HCC): Status: RESOLVED | Noted: 2023-03-30 | Resolved: 2023-04-17

## 2023-04-17 RX ORDER — POLYETHYLENE GLYCOL 3350 17 G/17G
POWDER, FOR SOLUTION ORAL
Qty: 510 G | Refills: 5 | Status: SHIPPED | OUTPATIENT
Start: 2023-04-17 | End: 2023-04-19 | Stop reason: SDUPTHER

## 2023-04-17 NOTE — ASSESSMENT & PLAN NOTE
Patient continues to have difficulty with sleep, is seeing sleep medicine.  Has also tried multiple sedatives from his physiatrist.  Sometimes does a little bit better if he takes his allergy medicines right before sleeping.

## 2023-04-17 NOTE — ASSESSMENT & PLAN NOTE
Patient was recently admitted for abdominal pain, nausea, vomiting found to have abnormal CT abdomen imaging for cholecystitis versus ileus versus enteritis.  Surgery performed a lap marge, and he recovered well from it.  He has also already had his follow-up with Dr. Chris, pathology was acute on chronic cholecystitis.  His pain is controlled, eating normally, already eats a low-fat diet.

## 2023-04-18 DIAGNOSIS — R15.9 INCONTINENCE OF FECES, UNSPECIFIED FECAL INCONTINENCE TYPE: ICD-10-CM

## 2023-04-18 RX ORDER — POLYETHYLENE GLYCOL 3350 17 G/17G
POWDER, FOR SOLUTION ORAL
Qty: 510 G | Refills: 5 | Status: CANCELLED | OUTPATIENT
Start: 2023-04-18

## 2023-04-19 DIAGNOSIS — R15.9 INCONTINENCE OF FECES, UNSPECIFIED FECAL INCONTINENCE TYPE: ICD-10-CM

## 2023-04-19 RX ORDER — POLYETHYLENE GLYCOL 3350 17 G/17G
POWDER, FOR SOLUTION ORAL
Qty: 510 G | Refills: 5 | Status: SHIPPED | OUTPATIENT
Start: 2023-04-19 | End: 2023-05-31

## 2023-04-27 NOTE — OP THERAPY DAILY TREATMENT
Outpatient Occupational Therapy  DAILY TREATMENT     [facility] [address]  [address]  [place] [address]  Phone:  [phone]  Fax:  [phone]    Date: 09/16/2021    Patient: Rey Medina  YOB: 1995  MRN: [id number]     Time Calculation  Start time: 0145  Stop time: 0230 Time Calculation (min): 45 minutes         Chief Complaint: Hand Weakness and Hand Problem    Visit #: 12    SUBJECTIVE:  Mom stated she needs to re-arrange the kitchen a bit more for better access for Rey to participate more in light meal prep.     OBJECTIVE:  Current objective measures:   Strength Comments:  R=  67 lbs   L= 72 pounds     Pinch     R    L  Lateral  22    24  3 point  19    24     Tone, Sensation and Coordination:   Tone:     Right upper extremity muscle tone: Normal     Coordination   Upper extremity (left):     Fine motor: Impaired    Gross motor: Impaired    Slow alternating movements: Impaired    Rapid alternating movements: Impaired    Finger to finger: Impaired    Finger touch to nose: Impaired  Upper extremity (right):     Fine motor: Impaired    Gross motor: Impaired    Slow alternating movements: Impaired    Rapid alternating movements: Impaired    Finger to finger: Impaired    Finger touch to nose: Impaired      Coordination comments:   9 hole peg test   R= 1 minute and 24 seconds     L= 1 minute and 20 seconds        Therapeutic Exercises (CPT 50742):     1. Flex/ext exercise stick in horizontal and vertical position x 10 reps, 10 reps in each position.    2. 3 point prehension for pre-handwriting exercises     3. Velcro roll for cylindrical grasp for B hands, 3 sets for each hand     4. Fine motor coordination/manipulation and dexterity exercises, 9 hole peg test and card shuffling    5. Digi-flex 9# for both hands, 10 reps with 5 second hold.        Time-based treatments/modalities:  Therapeutic exercise minutes (CPT 38346): 45 minutes        Pain  Spoke to Logan Riddle she is aware of her results providers message rating before treatment: 0  Pain rating after treatment: 0    ASSESSMENT:   Response to treatment: Motivated to participate in continued therapy.  OTR to make request for additional visits after the 4 remaining.      PLAN/RECOMMENDATIONS:   Plan for treatment: therapy treatment to continue next visit.  Planned interventions for next visit: continue with current treatment and therapeutic exercise (CPT 53188)

## 2023-05-01 NOTE — PROGRESS NOTES
[facility] declined by insurance. Referral placed to all outpatient therapy disciplines.   This was a shared visit with the [place]. I reviewed and verified the documentation and independently performed the documented:

## 2023-05-31 ENCOUNTER — APPOINTMENT (OUTPATIENT)
Dept: RADIOLOGY | Facility: MEDICAL CENTER | Age: 28
End: 2023-05-31
Attending: EMERGENCY MEDICINE
Payer: MEDICARE

## 2023-05-31 ENCOUNTER — HOSPITAL ENCOUNTER (EMERGENCY)
Facility: MEDICAL CENTER | Age: 28
End: 2023-05-31
Attending: EMERGENCY MEDICINE
Payer: MEDICARE

## 2023-05-31 VITALS
RESPIRATION RATE: 16 BRPM | HEART RATE: 82 BPM | DIASTOLIC BLOOD PRESSURE: 79 MMHG | TEMPERATURE: 97.7 F | OXYGEN SATURATION: 95 % | SYSTOLIC BLOOD PRESSURE: 118 MMHG

## 2023-05-31 DIAGNOSIS — T85.09XS: ICD-10-CM

## 2023-05-31 PROCEDURE — 74018 RADEX ABDOMEN 1 VIEW: CPT

## 2023-05-31 PROCEDURE — 70250 X-RAY EXAM OF SKULL: CPT

## 2023-05-31 PROCEDURE — 72040 X-RAY EXAM NECK SPINE 2-3 VW: CPT

## 2023-05-31 PROCEDURE — 71045 X-RAY EXAM CHEST 1 VIEW: CPT

## 2023-05-31 PROCEDURE — 99284 EMERGENCY DEPT VISIT MOD MDM: CPT

## 2023-05-31 PROCEDURE — 70450 CT HEAD/BRAIN W/O DYE: CPT

## 2023-05-31 RX ORDER — ACETAMINOPHEN 500 MG
500 TABLET ORAL EVERY 6 HOURS PRN
COMMUNITY

## 2023-05-31 RX ORDER — MULTIVIT WITH MINERALS/LUTEIN
1 TABLET ORAL DAILY
COMMUNITY

## 2023-06-01 ENCOUNTER — APPOINTMENT (OUTPATIENT)
Dept: ADMISSIONS | Facility: MEDICAL CENTER | Age: 28
End: 2023-06-01
Attending: NEUROLOGICAL SURGERY
Payer: MEDICARE

## 2023-06-01 NOTE — ED PROVIDER NOTES
ED Provider Note    CHIEF COMPLAINT  Chief Complaint   Patient presents with    Headache       EXTERNAL RECORDS REVIEWED  None    HPI/ROS  LIMITATION TO HISTORY   None  OUTSIDE HISTORIAN(S):  Family    Rey Medina is a 27 y.o. male who presents here for evaluation of a mild headache.  Patient was brought in by mom, who states that she was worried that his shunt was having some complications.  He did have some mild swelling at the scalp shunt reservoir, but he has no erythema no tenderness.  Patient has no vomiting, fever or chills.  He otherwise feels well.  No chest pain or shortness of breath.  No fall or trauma.    PAST MEDICAL HISTORY   has a past medical history of Chickenpox, COVID-19, [place] measles, ICH (intracerebral hemorrhage) (HCC), and Stroke (HCC).    SURGICAL HISTORY   has a past surgical history that includes anal fistulotomy; peg placement; tracheostomy;  shunt insertion; and marge by laparoscopy (N/A, 3/31/2023).    FAMILY HISTORY  Family History   Problem Relation Age of Onset    Hypertension Mother     Glasses Mother     Thyroid Mother     Diabetes Maternal Grandfather     Stroke Maternal Grandfather     Diabetes Paternal Grandmother     Hypertension Maternal Uncle     Heart Disease Neg Hx     Cancer Neg Hx        SOCIAL HISTORY  Social History     Tobacco Use    Smoking status: Never    Smokeless tobacco: Never   Vaping Use    Vaping Use: Never used   Substance and Sexual Activity    Alcohol use: Never    Drug use: Never    Sexual activity: Not Currently       CURRENT MEDICATIONS  Home Medications    **Home medications have not yet been reviewed for this encounter**         ALLERGIES  Allergies   Allergen Reactions    Vancomycin Swelling     Lips  With red face and neck    Other Misc Rash     Allergic to name band.       PHYSICAL EXAM  VITAL SIGNS: /85   Pulse 82   Temp 36.5 °C (97.7 °F) (Temporal)   Resp 16   SpO2 96%    Constitutional: Well developed, well nourished.  No acute distress.  HEENT: Normocephalic, atraumatic.  Shunt reservoir flat, no erythema, no tenderness to palpation.  Posterior pharynx clear and moist.  Eyes:  EOMI. Normal sclera.  Neck: Supple, Full range of motion, nontender.  Chest/Pulmonary: clear to ausculation. Symmetrical expansion.   Cardio: Regular rate and rhythm with no murmur.   Abdomen: Soft, nontender. No peritoneal signs. No guarding. No palpable masses.  Musculoskeletal: No deformity, no edema, neurovascular intact.   Neuro: Clear speech, appropriate, cooperative, cranial nerves II-XII grossly intact. Baseline extremity movement.   Psych: Normal mood and affect      DIAGNOSTIC STUDIES / PROCEDURES  None    RADIOLOGY  I have independently interpreted the diagnostic imaging associated with this visit and am waiting the final reading from the radiologist.   My preliminary interpretation is as follows: see below  Radiologist interpretation:   CT-HEAD W/O   Final Result         1.  No acute intracranial abnormality.   2.  Mild bilateral ventricular prominence but decreased since prior study with ventriculostomy tube in place.         DX-CERVICAL SPINE-2 OR 3 VIEWS   Final Result      1.  The abdominal portion of the catheter has fractured from prior exam.   2.  No evidence of kink or discontinuity at the head, neck, or chest.   3.  No acute cardiac or pulmonary abnormalities are identified.   4.  No evidence of bowel obstruction.      DX-CHEST-LIMITED (1 VIEW)   Final Result      1.  The abdominal portion of the catheter has fractured from prior exam.   2.  No evidence of kink or discontinuity at the head, neck, or chest.   3.  No acute cardiac or pulmonary abnormalities are identified.   4.  No evidence of bowel obstruction.      DX-SKULL-LIMITED 3-   Final Result      1.  The abdominal portion of the catheter has fractured from prior exam.   2.  No evidence of kink or discontinuity at the head, neck, or chest.   3.  No acute cardiac or pulmonary  abnormalities are identified.   4.  No evidence of bowel obstruction.      SO-GIKFOZR-3 VIEW   Final Result      1.  The abdominal portion of the catheter has fractured from prior exam.   2.  No evidence of kink or discontinuity at the head, neck, or chest.   3.  No acute cardiac or pulmonary abnormalities are identified.   4.  No evidence of bowel obstruction.            COURSE & MEDICAL DECISION MAKING  See below    INITIAL ASSESSMENT, COURSE AND PLAN  Care Narrative: This is a 27-year-old male here for evaluation of mild headache.  The patient is here with mom, who takes care of him.  Patient has no new focal deficits, no fever chills, and is well cared for.  I spoke to Dr. Dee, on for Dr. Walton.  We talked about the abdominal fx shunt, and he states to call Dr. Walton in the am for  follow up.     DISPOSITION AND DISCUSSIONS  I have discussed management of the patient with the following physicians and JEY's: None    Discussion of management with other QHP or appropriate source(s): None none    Escalation of care considered, and ultimately not performed:none    Barriers to care at this time, including but not limited to: Patient does not have established PCP.     Decision tools and prescription drugs considered including, but not limited to:  None none .    FINAL DIAGNOSIS  1. Other mechanical complication of ventricular intracranial shunt, sequela           Electronically signed by: Gelacio Hill D.O., 5/31/2023 8:08 PM

## 2023-06-01 NOTE — ED NOTES
Med Rec completed per discussion with patients family  Allergies reviewed  Completed augmentin course in April 2023        Anai Francois Pharm.D., [facility]  ER Clinical Pharmacist

## 2023-06-01 NOTE — ED TRIAGE NOTES
Chief Complaint   Patient presents with    Headache      Pt complains of HA, that started this morning. Pt has  shunt. Pt also reports swelling on  shunt site. No acute changes in mental status.

## 2023-06-01 NOTE — DISCHARGE INSTRUCTIONS
Please call Dr. Walton in the morning, and tell him you were in the department.  Dr. Dee will relay the message as well.

## 2023-06-02 ENCOUNTER — PRE-ADMISSION TESTING (OUTPATIENT)
Dept: ADMISSIONS | Facility: MEDICAL CENTER | Age: 28
End: 2023-06-02
Attending: NEUROLOGICAL SURGERY
Payer: MEDICARE

## 2023-06-05 ENCOUNTER — HOSPITAL ENCOUNTER (OUTPATIENT)
Facility: MEDICAL CENTER | Age: 28
End: 2023-06-05
Attending: NEUROLOGICAL SURGERY | Admitting: NEUROLOGICAL SURGERY
Payer: MEDICARE

## 2023-06-05 ENCOUNTER — ANESTHESIA EVENT (OUTPATIENT)
Dept: SURGERY | Facility: MEDICAL CENTER | Age: 28
End: 2023-06-05
Payer: MEDICARE

## 2023-06-05 ENCOUNTER — PHARMACY VISIT (OUTPATIENT)
Dept: PHARMACY | Facility: MEDICAL CENTER | Age: 28
End: 2023-06-05
Payer: COMMERCIAL

## 2023-06-05 ENCOUNTER — ANESTHESIA (OUTPATIENT)
Dept: SURGERY | Facility: MEDICAL CENTER | Age: 28
End: 2023-06-05
Payer: MEDICARE

## 2023-06-05 ENCOUNTER — APPOINTMENT (OUTPATIENT)
Dept: RADIOLOGY | Facility: MEDICAL CENTER | Age: 28
End: 2023-06-05
Attending: NEUROLOGICAL SURGERY
Payer: MEDICARE

## 2023-06-05 VITALS
HEIGHT: 66 IN | SYSTOLIC BLOOD PRESSURE: 122 MMHG | HEART RATE: 67 BPM | OXYGEN SATURATION: 92 % | BODY MASS INDEX: 25.23 KG/M2 | TEMPERATURE: 98.7 F | WEIGHT: 156.97 LBS | RESPIRATION RATE: 15 BRPM | DIASTOLIC BLOOD PRESSURE: 78 MMHG

## 2023-06-05 DIAGNOSIS — G89.18 POSTOPERATIVE PAIN: ICD-10-CM

## 2023-06-05 LAB
ABO + RH BLD: NORMAL
ABO GROUP BLD: NORMAL
ANION GAP SERPL CALC-SCNC: 13 MMOL/L (ref 7–16)
APTT PPP: 29.9 SEC (ref 24.7–36)
BLD GP AB SCN SERPL QL: NORMAL
BUN SERPL-MCNC: 9 MG/DL (ref 8–22)
CALCIUM SERPL-MCNC: 9 MG/DL (ref 8.5–10.5)
CHLORIDE SERPL-SCNC: 104 MMOL/L (ref 96–112)
CO2 SERPL-SCNC: 22 MMOL/L (ref 20–33)
CREAT SERPL-MCNC: 0.73 MG/DL (ref 0.5–1.4)
ERYTHROCYTE [DISTWIDTH] IN BLOOD BY AUTOMATED COUNT: 43.8 FL (ref 35.9–50)
GFR SERPLBLD CREATININE-BSD FMLA CKD-EPI: 126 ML/MIN/1.73 M 2
GLUCOSE SERPL-MCNC: 92 MG/DL (ref 65–99)
HCT VFR BLD AUTO: 50.5 % (ref 42–52)
HGB BLD-MCNC: 17.2 G/DL (ref 14–18)
INR PPP: 1.02 (ref 0.87–1.13)
MCH RBC QN AUTO: 28.8 PG (ref 27–33)
MCHC RBC AUTO-ENTMCNC: 34.1 G/DL (ref 32.3–36.5)
MCV RBC AUTO: 84.4 FL (ref 81.4–97.8)
PLATELET # BLD AUTO: 250 K/UL (ref 164–446)
PMV BLD AUTO: 10.7 FL (ref 9–12.9)
POTASSIUM SERPL-SCNC: 4.3 MMOL/L (ref 3.6–5.5)
PROTHROMBIN TIME: 13.3 SEC (ref 12–14.6)
RBC # BLD AUTO: 5.98 M/UL (ref 4.7–6.1)
RH BLD: NORMAL
SODIUM SERPL-SCNC: 139 MMOL/L (ref 135–145)
WBC # BLD AUTO: 8.4 K/UL (ref 4.8–10.8)

## 2023-06-05 PROCEDURE — 00220 ANES ICR PX CSF SHUNTING PX: CPT | Performed by: ANESTHESIOLOGY

## 2023-06-05 PROCEDURE — 80048 BASIC METABOLIC PNL TOTAL CA: CPT

## 2023-06-05 PROCEDURE — 700101 HCHG RX REV CODE 250: Mod: UD | Performed by: ANESTHESIOLOGY

## 2023-06-05 PROCEDURE — 700105 HCHG RX REV CODE 258: Mod: UD | Performed by: NEUROLOGICAL SURGERY

## 2023-06-05 PROCEDURE — 85027 COMPLETE CBC AUTOMATED: CPT

## 2023-06-05 PROCEDURE — 160048 HCHG OR STATISTICAL LEVEL 1-5: Performed by: NEUROLOGICAL SURGERY

## 2023-06-05 PROCEDURE — 160002 HCHG RECOVERY MINUTES (STAT): Performed by: NEUROLOGICAL SURGERY

## 2023-06-05 PROCEDURE — 700111 HCHG RX REV CODE 636 W/ 250 OVERRIDE (IP): Mod: UD | Performed by: NEUROLOGICAL SURGERY

## 2023-06-05 PROCEDURE — 86850 RBC ANTIBODY SCREEN: CPT

## 2023-06-05 PROCEDURE — 160035 HCHG PACU - 1ST 60 MINS PHASE I: Performed by: NEUROLOGICAL SURGERY

## 2023-06-05 PROCEDURE — 86900 BLOOD TYPING SEROLOGIC ABO: CPT

## 2023-06-05 PROCEDURE — 160036 HCHG PACU - EA ADDL 30 MINS PHASE I: Performed by: NEUROLOGICAL SURGERY

## 2023-06-05 PROCEDURE — 160042 HCHG SURGERY MINUTES - EA ADDL 1 MIN LEVEL 5: Performed by: NEUROLOGICAL SURGERY

## 2023-06-05 PROCEDURE — RXMED WILLOW AMBULATORY MEDICATION CHARGE: Performed by: NEUROLOGICAL SURGERY

## 2023-06-05 PROCEDURE — 700102 HCHG RX REV CODE 250 W/ 637 OVERRIDE(OP): Mod: UD | Performed by: ANESTHESIOLOGY

## 2023-06-05 PROCEDURE — 70450 CT HEAD/BRAIN W/O DYE: CPT

## 2023-06-05 PROCEDURE — 86901 BLOOD TYPING SEROLOGIC RH(D): CPT

## 2023-06-05 PROCEDURE — 700111 HCHG RX REV CODE 636 W/ 250 OVERRIDE (IP): Mod: UD | Performed by: ANESTHESIOLOGY

## 2023-06-05 PROCEDURE — 160046 HCHG PACU - 1ST 60 MINS PHASE II: Performed by: NEUROLOGICAL SURGERY

## 2023-06-05 PROCEDURE — 85610 PROTHROMBIN TIME: CPT

## 2023-06-05 PROCEDURE — 700101 HCHG RX REV CODE 250: Mod: UD | Performed by: NEUROLOGICAL SURGERY

## 2023-06-05 PROCEDURE — 110454 HCHG SHELL REV 250: Performed by: NEUROLOGICAL SURGERY

## 2023-06-05 PROCEDURE — 36415 COLL VENOUS BLD VENIPUNCTURE: CPT

## 2023-06-05 PROCEDURE — 160031 HCHG SURGERY MINUTES - 1ST 30 MINS LEVEL 5: Performed by: NEUROLOGICAL SURGERY

## 2023-06-05 PROCEDURE — A9270 NON-COVERED ITEM OR SERVICE: HCPCS | Mod: UD | Performed by: ANESTHESIOLOGY

## 2023-06-05 PROCEDURE — 160025 RECOVERY II MINUTES (STATS): Performed by: NEUROLOGICAL SURGERY

## 2023-06-05 PROCEDURE — 160009 HCHG ANES TIME/MIN: Performed by: NEUROLOGICAL SURGERY

## 2023-06-05 PROCEDURE — 85730 THROMBOPLASTIN TIME PARTIAL: CPT

## 2023-06-05 DEVICE — CATHETER DISTAL ANTIMICROBIAL: Type: IMPLANTABLE DEVICE | Status: FUNCTIONAL

## 2023-06-05 RX ORDER — MIDAZOLAM HYDROCHLORIDE 1 MG/ML
INJECTION INTRAMUSCULAR; INTRAVENOUS PRN
Status: DISCONTINUED | OUTPATIENT
Start: 2023-06-05 | End: 2023-06-05 | Stop reason: SURG

## 2023-06-05 RX ORDER — CEFAZOLIN SODIUM 1 G/3ML
INJECTION, POWDER, FOR SOLUTION INTRAMUSCULAR; INTRAVENOUS
Status: DISCONTINUED | OUTPATIENT
Start: 2023-06-05 | End: 2023-06-05 | Stop reason: HOSPADM

## 2023-06-05 RX ORDER — CEFAZOLIN SODIUM 1 G/3ML
INJECTION, POWDER, FOR SOLUTION INTRAMUSCULAR; INTRAVENOUS PRN
Status: DISCONTINUED | OUTPATIENT
Start: 2023-06-05 | End: 2023-06-05 | Stop reason: SURG

## 2023-06-05 RX ORDER — ROCURONIUM BROMIDE 10 MG/ML
INJECTION, SOLUTION INTRAVENOUS PRN
Status: DISCONTINUED | OUTPATIENT
Start: 2023-06-05 | End: 2023-06-05 | Stop reason: SURG

## 2023-06-05 RX ORDER — SODIUM CHLORIDE, SODIUM LACTATE, POTASSIUM CHLORIDE, CALCIUM CHLORIDE 600; 310; 30; 20 MG/100ML; MG/100ML; MG/100ML; MG/100ML
INJECTION, SOLUTION INTRAVENOUS CONTINUOUS
Status: DISCONTINUED | OUTPATIENT
Start: 2023-06-05 | End: 2023-06-05 | Stop reason: HOSPADM

## 2023-06-05 RX ORDER — CEPHALEXIN 500 MG/1
CAPSULE ORAL
Qty: 42 CAPSULE | Refills: 0 | Status: SHIPPED | OUTPATIENT
Start: 2023-06-05 | End: 2023-10-17

## 2023-06-05 RX ORDER — LIDOCAINE HYDROCHLORIDE 20 MG/ML
INJECTION, SOLUTION EPIDURAL; INFILTRATION; INTRACAUDAL; PERINEURAL PRN
Status: DISCONTINUED | OUTPATIENT
Start: 2023-06-05 | End: 2023-06-05 | Stop reason: SURG

## 2023-06-05 RX ORDER — MAGNESIUM HYDROXIDE 1200 MG/15ML
LIQUID ORAL
Status: COMPLETED | OUTPATIENT
Start: 2023-06-05 | End: 2023-06-05

## 2023-06-05 RX ORDER — CEPHALEXIN 500 MG/1
1000 CAPSULE ORAL 3 TIMES DAILY
Qty: 42 CAPSULE | Refills: 0 | Status: SHIPPED | OUTPATIENT
Start: 2023-06-05 | End: 2023-06-12

## 2023-06-05 RX ORDER — HALOPERIDOL 5 MG/ML
1 INJECTION INTRAMUSCULAR
Status: DISCONTINUED | OUTPATIENT
Start: 2023-06-05 | End: 2023-06-05 | Stop reason: HOSPADM

## 2023-06-05 RX ORDER — ONDANSETRON 2 MG/ML
INJECTION INTRAMUSCULAR; INTRAVENOUS PRN
Status: DISCONTINUED | OUTPATIENT
Start: 2023-06-05 | End: 2023-06-05 | Stop reason: SURG

## 2023-06-05 RX ORDER — ASCORBIC ACID 500 MG
1000 TABLET ORAL DAILY
Status: DISCONTINUED | OUTPATIENT
Start: 2023-06-06 | End: 2023-06-05 | Stop reason: HOSPADM

## 2023-06-05 RX ORDER — KETOROLAC TROMETHAMINE 30 MG/ML
INJECTION, SOLUTION INTRAMUSCULAR; INTRAVENOUS PRN
Status: DISCONTINUED | OUTPATIENT
Start: 2023-06-05 | End: 2023-06-05 | Stop reason: SURG

## 2023-06-05 RX ORDER — VITAMIN B COMPLEX
2000 TABLET ORAL DAILY
Status: DISCONTINUED | OUTPATIENT
Start: 2023-06-05 | End: 2023-06-05 | Stop reason: HOSPADM

## 2023-06-05 RX ORDER — HYDROCODONE BITARTRATE AND ACETAMINOPHEN 5; 325 MG/1; MG/1
TABLET ORAL
Qty: 28 TABLET | Refills: 0 | OUTPATIENT
Start: 2023-06-05 | End: 2023-10-17

## 2023-06-05 RX ORDER — PROPRANOLOL HYDROCHLORIDE 20 MG/1
20 TABLET ORAL
Status: DISCONTINUED | OUTPATIENT
Start: 2023-06-05 | End: 2023-06-05 | Stop reason: HOSPADM

## 2023-06-05 RX ORDER — OXYCODONE HCL 5 MG/5 ML
10 SOLUTION, ORAL ORAL
Status: COMPLETED | OUTPATIENT
Start: 2023-06-05 | End: 2023-06-05

## 2023-06-05 RX ORDER — BUPIVACAINE HYDROCHLORIDE AND EPINEPHRINE 5; 5 MG/ML; UG/ML
INJECTION, SOLUTION EPIDURAL; INTRACAUDAL; PERINEURAL
Status: DISCONTINUED | OUTPATIENT
Start: 2023-06-05 | End: 2023-06-05 | Stop reason: HOSPADM

## 2023-06-05 RX ORDER — ONDANSETRON 2 MG/ML
4 INJECTION INTRAMUSCULAR; INTRAVENOUS
Status: DISCONTINUED | OUTPATIENT
Start: 2023-06-05 | End: 2023-06-05 | Stop reason: HOSPADM

## 2023-06-05 RX ORDER — DEXAMETHASONE SODIUM PHOSPHATE 4 MG/ML
INJECTION, SOLUTION INTRA-ARTICULAR; INTRALESIONAL; INTRAMUSCULAR; INTRAVENOUS; SOFT TISSUE PRN
Status: DISCONTINUED | OUTPATIENT
Start: 2023-06-05 | End: 2023-06-05 | Stop reason: SURG

## 2023-06-05 RX ORDER — MONTELUKAST SODIUM 10 MG/1
10 TABLET ORAL
Status: DISCONTINUED | OUTPATIENT
Start: 2023-06-05 | End: 2023-06-05 | Stop reason: HOSPADM

## 2023-06-05 RX ORDER — DIPHENHYDRAMINE HYDROCHLORIDE 50 MG/ML
12.5 INJECTION INTRAMUSCULAR; INTRAVENOUS
Status: DISCONTINUED | OUTPATIENT
Start: 2023-06-05 | End: 2023-06-05 | Stop reason: HOSPADM

## 2023-06-05 RX ORDER — OXYCODONE HCL 5 MG/5 ML
5 SOLUTION, ORAL ORAL
Status: COMPLETED | OUTPATIENT
Start: 2023-06-05 | End: 2023-06-05

## 2023-06-05 RX ORDER — MEPERIDINE HYDROCHLORIDE 25 MG/ML
6.25 INJECTION INTRAMUSCULAR; INTRAVENOUS; SUBCUTANEOUS
Status: DISCONTINUED | OUTPATIENT
Start: 2023-06-05 | End: 2023-06-05 | Stop reason: HOSPADM

## 2023-06-05 RX ORDER — HYDROCODONE BITARTRATE AND ACETAMINOPHEN 5; 325 MG/1; MG/1
1 TABLET ORAL EVERY 6 HOURS PRN
Qty: 28 TABLET | Refills: 0 | Status: SHIPPED | OUTPATIENT
Start: 2023-06-05 | End: 2023-06-12

## 2023-06-05 RX ORDER — PHENYLEPHRINE HCL IN 0.9% NACL 0.5 MG/5ML
SYRINGE (ML) INTRAVENOUS PRN
Status: DISCONTINUED | OUTPATIENT
Start: 2023-06-05 | End: 2023-06-05 | Stop reason: SURG

## 2023-06-05 RX ORDER — ACETAMINOPHEN 500 MG
500 TABLET ORAL EVERY 6 HOURS PRN
Status: DISCONTINUED | OUTPATIENT
Start: 2023-06-05 | End: 2023-06-05 | Stop reason: HOSPADM

## 2023-06-05 RX ADMIN — PROPOFOL 200 MG: 10 INJECTION, EMULSION INTRAVENOUS at 14:09

## 2023-06-05 RX ADMIN — OXYCODONE HYDROCHLORIDE 5 MG: 5 SOLUTION ORAL at 16:48

## 2023-06-05 RX ADMIN — MIDAZOLAM 1 MG: 1 INJECTION, SOLUTION INTRAMUSCULAR; INTRAVENOUS at 14:06

## 2023-06-05 RX ADMIN — Medication 100 MCG: at 14:37

## 2023-06-05 RX ADMIN — LIDOCAINE HYDROCHLORIDE 80 MG: 20 INJECTION, SOLUTION EPIDURAL; INFILTRATION; INTRACAUDAL at 14:09

## 2023-06-05 RX ADMIN — ONDANSETRON 4 MG: 2 INJECTION INTRAMUSCULAR; INTRAVENOUS at 14:56

## 2023-06-05 RX ADMIN — DEXAMETHASONE SODIUM PHOSPHATE 8 MG: 4 INJECTION INTRA-ARTICULAR; INTRALESIONAL; INTRAMUSCULAR; INTRAVENOUS; SOFT TISSUE at 14:36

## 2023-06-05 RX ADMIN — FENTANYL CITRATE 50 MCG: 50 INJECTION, SOLUTION INTRAMUSCULAR; INTRAVENOUS at 14:14

## 2023-06-05 RX ADMIN — CEFAZOLIN 2 G: 1 INJECTION, POWDER, FOR SOLUTION INTRAMUSCULAR; INTRAVENOUS at 14:14

## 2023-06-05 RX ADMIN — SODIUM CHLORIDE, POTASSIUM CHLORIDE, SODIUM LACTATE AND CALCIUM CHLORIDE: 600; 310; 30; 20 INJECTION, SOLUTION INTRAVENOUS at 11:03

## 2023-06-05 RX ADMIN — ROCURONIUM BROMIDE 50 MG: 50 INJECTION, SOLUTION INTRAVENOUS at 14:09

## 2023-06-05 RX ADMIN — FENTANYL CITRATE 50 MCG: 50 INJECTION, SOLUTION INTRAMUSCULAR; INTRAVENOUS at 14:09

## 2023-06-05 RX ADMIN — KETOROLAC TROMETHAMINE 30 MG: 30 INJECTION, SOLUTION INTRAMUSCULAR; INTRAVENOUS at 15:03

## 2023-06-05 ASSESSMENT — PAIN DESCRIPTION - PAIN TYPE
TYPE: SURGICAL PAIN

## 2023-06-05 ASSESSMENT — FIBROSIS 4 INDEX: FIB4 SCORE: 0.77

## 2023-06-05 ASSESSMENT — PAIN SCALES - GENERAL: PAIN_LEVEL: 0

## 2023-06-05 NOTE — ANESTHESIA PREPROCEDURE EVALUATION
Case: [id number] Date/Time: 06/05/23 1415    Procedure: STEALTH GUIDED DISTAL VENTRICULOPERITONEAL SHUNT REVISION    Pre-op diagnosis: HYDROCEPHALUS    Location: [facility] OR 10 / SURGERY [facility]    Surgeons: Daniel Walton M.D.          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    Pertinent diagnostic labs and testing reviewed    Informed Consent:    Anesthetic plan and risks discussed with patient and mother.    Use of blood products discussed with: whom consented to blood products.

## 2023-06-05 NOTE — OP REPORT
Operative Report    Date: 06/05/23     Surgeon: Rodrigo Hernandez M.D.     Co-surgeon: Dr. Galindo    Pre-operative Diagnosis: Need for  shunt revision    Post-operative Diagnosis: Same    Procedure: Open repair  shunt abdominal portion    ASA Classification: II.    Indications: This is a 27 y.o. male who presented with symptoms of increased cranial pressure need for  shunt.    Findings:  shunt in good position the abdomen draining well    Wound Classification: Class I, I, Clean..    Procedure in detail: The patient was seen and examined in the preoperative holding area.  The risks benefits and alternatives of the procedure were discussed with the patient who wished to proceed with the procedure as described.  The patient was transferred to the operating room placed in supine position and all pressure points were properly padded.  General endotracheal anesthesia was induced and preoperative antibiotics were given per SCIP protocol.  Patient's abdomen was prepped with ChloraPrep and draped in the normal sterile fashion.  A timeout was performed confirming correct patient, correct procedure, and that all necessary equipment was in the room.      This procedure performed in conjunction with Dr. Walton of neurosurgery.    The break was noted to be in the epigastric area so we made incision over previous incision and dissected down to the  shunt to self able to withdraw this into the abdominal incision and it was flowing freely with cranial fluid.  We then attached the abdominal portion of the shunt to this using a adapter and secured in place with 2-0 silk.  We then entered the abdominal cavity and dropped this into the abdominal cavity and reduced it in its entirety.  We then closed the wound in multiple layers and cover the wound with Dermabond.    The patient was awakened from general anesthetic, and was taken to the recovery room in stable condition.    Sponge and needle counts were correct at the end  of the case.     Specimen: None    EBL: Less than 5 cc for this portion of the procedure    Dispo: stable, extubated, to PACU    Rodrigo Hernandez M.D.  [facility]  [phone]

## 2023-06-05 NOTE — ANESTHESIA PROCEDURE NOTES
Airway    Date/Time: 6/5/2023 2:13 PM    Performed by: Aster Ramos M.D.  Authorized by: Aster Ramos M.D.    Location:  OR  Urgency:  Elective  Difficult Airway: No    Indications for Airway Management:  Anesthesia      Spontaneous Ventilation: absent    Sedation Level:  Deep  Preoxygenated: Yes    Patient Position:  Sniffing  MILS Maintained Throughout: No    Mask Difficulty Assessment:  1 - vent by mask  Final Airway Type:  Endotracheal airway  Final Endotracheal Airway:  ETT  Cuffed: Yes    Technique Used for Successful ETT Placement:  Direct laryngoscopy  Devices/Methods Used in Placement:  Intubating stylet    Insertion Site:  Oral  Blade Type:  Perry  Laryngoscope Blade/Videolaryngoscope Blade Size:  2  ETT Size (mm):  7.0  Placement Verified by: capnometry    Cormack-Lehane Classification:  Grade I - full view of glottis  Number of Attempts at Approach:  1

## 2023-06-05 NOTE — ANESTHESIA TIME REPORT
Anesthesia Start and Stop Event Times     Date Time Event    6/5/2023 1348 Ready for Procedure     1405 Anesthesia Start     1520 Anesthesia Stop        Responsible Staff  06/05/23    Name Role Begin End    Aster Ramos M.D. Anesth 1405 1520        Overtime Reason:  overtime    Comments:

## 2023-06-05 NOTE — ANESTHESIA POSTPROCEDURE EVALUATION
Patient: Rey Medina    Procedure Summary     Date: 06/05/23 Room / Location: [place] [address] / SURGERY [facility]    Anesthesia Start: 1405 Anesthesia Stop: 1520    Procedure: STEALTH GUIDED DISTAL VENTRICULOPERITONEAL SHUNT REVISION- ABDOMINAL PART (Left: Head) Diagnosis: (HYDROCEPHALUS)    Surgeons: Daniel Walton M.D. Responsible Provider: Aster Ramos M.D.    Anesthesia Type: general ASA Status: 2          Final Anesthesia Type: general  Last vitals  BP   Blood Pressure: 109/61    Temp   36.9 °C (98.4 °F)    Pulse   68   Resp   12    SpO2   100 %      Anesthesia Post Evaluation    Patient location during evaluation: PACU  Patient participation: complete - patient participated  Level of consciousness: awake and alert  Pain score: 0    Airway patency: patent  Anesthetic complications: no  Cardiovascular status: adequate and hemodynamically stable  Respiratory status: acceptable and face mask  Hydration status: acceptable    PONV: none          No notable events documented.     Nurse Pain Score: 0 (NPRS)

## 2023-06-05 NOTE — OP REPORT
NEUROSURGERY OPERATIVE NOTE    DATE:  3:09 PM   TIME:  6/5/2023    Patient name:  Rey Medina  MRN:  [id number]    Procedure: Distal shunt revision, open placement peritoneal catheter with general surgery (Dr. Rodrigo Hernandez MD)    Surgeon:  Daniel Walton MD PhD  CoSurgeon: Dr. Rodrigo Hernandez MD General Surgery    Anesthesia:  GETA    Diagnosis: Ventriculoperitoneal shunt malfunction    Indication: 27-year-old male who had a  shunt placed in 2016 following ruptured cerebral AVM while in the [facility].  He developed a headache last week, imaging demonstrated disconnection of the abdominal catheter.  This is new compared to imaging from January obtained for routine surveillance.  Given this, distal revision is planned.    Procedure:  The patient was identified in the holding area; the operative site was marked, and consent obtained.  They were intubated by anesthesia service.  Two gram Ancef was administered intravenously.  His head was registered to the Queryly intraoperative guidance system.  His head, neck and torso were prepped with hair clipping, chlorhexidine scrub, betadine scrub, and Chloroprep.  Sterile drapes were applied including a layer of Ioban.    The shunt catheter at the lower rib edge was able to be palpated.  This was underlying an incision from the previous surgery.  The incision was opened sharply, and the shunt catheter dissected free from surrounding tissue by Dr. Hernandez, general surgery.  A new peritoneal catheter (Arres, [employer]) was spliced to the pre-existing proximal catheter using a straight inline connector, the connector was secured to the catheters using 0 silk ties.  Intraperitoneal access was then obtained by Dr. Hernandez, see separately dictated note.  The peritoneal catheter was placed intraperitoneally.  Brisk flow of clear CSF was noted prior to placement the peritoneal catheter in the peritoneal cavity.  Closure was performed by Dr. Hernandez, see separately dictated note.   Final counts were correct.    EBL:  Minimal  Specimen:  None  Drains:  None  Complications:  None apparent at end of procedure.

## 2023-06-05 NOTE — OR NURSING
Pt arrived via bed from OR w/ RN and anesthesia. VSS. Report received. Orders reviewed and initiated. Incision c/d/I.

## 2023-06-06 NOTE — DISCHARGE INSTRUCTIONS
Keep incision clean and dry.   No dressing required over incision.   OK to shower and pat dry.   Do not soak incision in bath, hot tub, etc.   Take over the counter stool softener daily with pain medication.   Do not lift anything over 10 pounds.   No repetitive bending, lifting, twisting, movements.   No Aspirin or anticoagulants until cleared by Neurosurgery.   No driving if taking narcotic medication.   Follow up with [facility] in 2 weeks as scheduled,       Or call for an appointment [phone]     Rx meds to beds (norco 5mg/ keflex)    If any questions arise, call your provider.  If your provider is not available, please feel free to call the Surgical Center at [phone].    MEDICATIONS: Resume taking daily medication.  Take prescribed pain medication with food.  If no medication is prescribed, you may take non-aspirin pain medication if needed.  PAIN MEDICATION CAN BE VERY CONSTIPATING.  Take a stool softener or laxative such as senokot, pericolace, or milk of magnesia if needed.    Last pain medication given at 4:45pm    What to Expect Post Anesthesia    Rest and take it easy for the first 24 hours.  A responsible adult is recommended to remain with you during that time.  It is normal to feel sleepy.  We encourage you to not do anything that requires balance, judgment or coordination.    FOR 24 HOURS DO NOT:  Drive, operate machinery or run household appliances.  Drink beer or alcoholic beverages.  Make important decisions or sign legal documents.    To avoid nausea, slowly advance diet as tolerated, avoiding spicy or greasy foods for the first day.  Add more substantial food to your diet according to your provider's instructions.  INCREASE FLUIDS AND FIBER TO AVOID CONSTIPATION.    MILD FLU-LIKE SYMPTOMS ARE NORMAL.  YOU MAY EXPERIENCE GENERALIZED MUSCLE ACHES, THROAT IRRITATION, HEADACHE AND/OR SOME NAUSEA.    Diet    Resume pre-operative diet upon discharge from the hospital. Depending on how  you are feeling and whether you have nausea or not, you may wish to stay with a bland diet for the first few days. However, you can advance your diet as quickly as you feel ready.

## 2023-06-06 NOTE — OR NURSING
1730 Arrived from PACU AXO, Pt's VSS; denies N/V; states pain is at tolerable level. Dermabond  CDI to ABD.    1755 D/c orders received. IV dc'd. Pt changed into clothing with assistance. Discharge reviewed, Pt and family verbalized understanding and questions answered. Patient states ready to d/c home. Pt dc'd in w/c with CNA.

## 2023-06-22 ENCOUNTER — OFFICE VISIT (OUTPATIENT)
Dept: PHYSICAL MEDICINE AND REHAB | Facility: MEDICAL CENTER | Age: 28
End: 2023-06-22
Payer: MEDICARE

## 2023-06-22 VITALS
WEIGHT: 150 LBS | TEMPERATURE: 97.7 F | HEIGHT: 66 IN | DIASTOLIC BLOOD PRESSURE: 64 MMHG | HEART RATE: 78 BPM | SYSTOLIC BLOOD PRESSURE: 100 MMHG | BODY MASS INDEX: 24.11 KG/M2 | OXYGEN SATURATION: 98 %

## 2023-06-22 DIAGNOSIS — I61.5 NONTRAUMATIC INTRAVENTRICULAR INTRACEREBRAL HEMORRHAGE, UNSPECIFIED LATERALITY (HCC): Primary | ICD-10-CM

## 2023-06-22 DIAGNOSIS — Z86.73 HISTORY OF STROKE: ICD-10-CM

## 2023-06-22 DIAGNOSIS — R47.1 DYSARTHRIA: ICD-10-CM

## 2023-06-22 PROCEDURE — 3074F SYST BP LT 130 MM HG: CPT | Performed by: PHYSICAL MEDICINE & REHABILITATION

## 2023-06-22 PROCEDURE — 99213 OFFICE O/P EST LOW 20 MIN: CPT | Performed by: PHYSICAL MEDICINE & REHABILITATION

## 2023-06-22 PROCEDURE — 3078F DIAST BP <80 MM HG: CPT | Performed by: PHYSICAL MEDICINE & REHABILITATION

## 2023-06-22 ASSESSMENT — FIBROSIS 4 INDEX: FIB4 SCORE: 0.69

## 2023-06-22 NOTE — PROGRESS NOTES
[facility]  [facility]  [address] [place] [address]  Ph: [phone]    FOLLOW UP PATIENT EVALUATION    Patient Name: Rey Medina   Patient : 1995  Patient Age: 27 y.o.   PCP: Nirmala Man M.D.    Examining Physician: Dr. 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 from 2020 to 3/12/2020 and is presenting to PM&R clinic for a FOLLOW UP outpatient evaluation with the following chief complaint/s:    Chief Complaint: Need for referral     Accompanied by Today: MomMelinda  Interval History:     Records reviewed: Patient had gallbladder removed and his shunt tip was damaged. He then had to go to OR to have shunt fixed 23.     They are interested in aquatic therapy. They have watched some videos about other stroke patients benefiting from aquatic therapy. Sleep is still an issue. He is using ASV machine and the physician said the sleep apnea has improved a lot. He had headache when the shunt was not working, but the headache is gone now. He is sleeping 3-5 hours. Melinda notices that if he eats a light meal before 6 o'clock it helps the sleep. Montelukast is being used as needed. Mentally he is doing a lot better. He is able to transfer from bed to wheelchair on his own using a grab bar. He uses electric chair within the house. He practices standing 30 min in the morning and in the afternoon.     Review of Systems:  All other pertinent positive review of systems are noted above in HPI.     Past Medical History:  Past Medical History:   Diagnosis Date    Asthma since birth    Mild Asthma    Chickenpox     COVID-19     French measles     Hypertension     ICH (intracerebral hemorrhage) (HCC)     Sleep apnea 10/31/2022    currently using ASV Machine     Stroke (HCC)     Urinary bladder disorder 2020      Past Surgical History:   Procedure Laterality Date    THORACIC FUSION O-ARM Left 6/5/2023    Procedure: STEALTH GUIDED DISTAL VENTRICULOPERITONEAL SHUNT REVISION- ABDOMINAL PART;  Surgeon: Daniel Walton M.D.;  Location: SURGERY [facility];  Service: Neurosurgery    ROSALINO BY LAPAROSCOPY N/A 03/31/2023    Procedure: CHOLECYSTECTOMY, LAPAROSCOPIC;  Surgeon: Maggie Chris M.D.;  Location: SURGERY [facility];  Service: General    ANAL FISTULOTOMY      OTHER  06/2019    PEG Tube insertion/ SRS Procedure/ Shunt    PEG PLACEMENT      removed 2020    TRACHEOSTOMY       SHUNT INSERTION          Current Outpatient Medications:     cephALEXin (KEFLEX) 500 MG Cap, Take 2 capsules every 8 hours by oral route as directed for 7 days., Disp: 42 Capsule, Rfl: 0    HYDROcodone-acetaminophen (NORCO) 5-325 MG Tab per tablet, Take 1 tablet every 6 hours by oral route as needed for 7 days., Disp: 28 Tablet, Rfl: 0    Cyanocobalamin (VITAMIN B12 PO), Take 1 Tablet by mouth every morning., Disp: , Rfl:     acetaminophen (TYLENOL) 500 MG Tab, Take 500 mg by mouth every 6 hours as needed for Mild Pain (headache)., Disp: , Rfl:     Ascorbic Acid (VITAMIN C) 1000 MG Tab, Take 1 Tablet by mouth every day., Disp: , Rfl:     montelukast (SINGULAIR) 10 MG Tab, TAKE 1 TABLET BY MOUTH ONCE DAILY AS NEEDED (TO REDUCE AMOUNT OF PILLS NEEDING TO BE TAKEN DAILY) (Patient taking differently: Take 10 mg by mouth 1 time a day as needed (allergies).), Disp: 90 Tablet, Rfl: 1    propranolol (INDERAL) 20 MG Tab, Take 20 mg by mouth 1 time a day as needed (HR > 100)., Disp: , Rfl:     Cholecalciferol (VITAMIN D3) 2000 UNIT Cap, Take 2,000 Units by mouth every day., Disp: , Rfl:   Allergies   Allergen Reactions    Vancomycin Swelling     Lips  With red face and neck    Other Misc Rash     Allergic to name band of unknown medication causing a rash        Past Social History:  Social History      Socioeconomic History    Marital status: Single     Spouse name: Not on file    Number of children: Not on file    Years of education: Not on file    Highest education level: Some college, no degree   Occupational History    Not on file   Tobacco Use    Smoking status: Never    Smokeless tobacco: Never   Vaping Use    Vaping Use: Never used   Substance and Sexual Activity    Alcohol use: Never    Drug use: Never    Sexual activity: Not Currently   Other Topics Concern     Service No    Blood Transfusions No    Caffeine Concern No    Occupational Exposure No    Hobby Hazards No    Sleep Concern No    Stress Concern No    Weight Concern No    Special Diet Yes    Back Care No    Exercise Yes    Bike Helmet No    Seat Belt Yes    Self-Exams No   Social History Narrative    Lives with mom     Social Determinants of Health     Financial Resource Strain: Low Risk  (3/31/2023)    Overall Financial Resource Strain (CARDIA)     Difficulty of Paying Living Expenses: Not very hard   Food Insecurity: No Food Insecurity (3/31/2023)    Hunger Vital Sign     Worried About Running Out of Food in the Last Year: Never true     Ran Out of Food in the Last Year: Never true   Transportation Needs: No Transportation Needs (3/31/2023)    PRAPARE - Transportation     Lack of Transportation (Medical): No     Lack of Transportation (Non-Medical): No   Physical Activity: Sufficiently Active (3/31/2023)    Exercise Vital Sign     Days of Exercise per Week: 5 days     Minutes of Exercise per Session: 40 min   Stress: No Stress Concern Present (3/31/2023)    [employer] - Occupational Stress Questionnaire     Feeling of Stress : Not at all   Social Connections: Socially Isolated (3/31/2023)    Social Connection and Isolation Panel [NHANES]     Frequency of Communication with Friends and Family: Twice a week     Frequency of Social Gatherings with Friends and Family: Once a week     Attends Zoroastrian  Services: Never     Active Member of Clubs or Organizations: No     Attends Club or Organization Meetings: Never     Marital Status: Never    Intimate Partner Violence: Not on file   Housing Stability: Low Risk  (3/31/2023)    Housing Stability Vital Sign     Unable to Pay for Housing in the Last Year: No     Number of Places Lived in the Last Year: 2     Unstable Housing in the Last Year: No        Family History:  Family History   Problem Relation Age of Onset    Hypertension Mother     Glasses Mother     Thyroid Mother     Hyperlipidemia Mother     Diabetes Maternal Grandfather     Stroke Maternal Grandfather     Hypertension Maternal Grandfather     Diabetes Paternal Grandmother     Hypertension Maternal Uncle     Hypertension Maternal Grandmother     Heart Disease Neg Hx     Cancer Neg Hx        Depression and Opioid Screening  PHQ-9:      7/15/2021    11:30 AM 9/26/2022     3:00 PM 3/30/2023    10:30 PM   Depression Screen (PHQ-2/PHQ-9)   PHQ-2 Total Score   0   PHQ-2 Total Score 0 0      Interpretation of PHQ-9 Total Score   Score Severity   1-4 No Depression   5-9 Mild Depression   10-14 Moderate Depression   15-19 Moderately Severe Depression   20-27 Severe Depression     Opioid Risk Score: No value filed.  Interpretation of Opioid Risk Score   Score 0-3 = Low risk of abuse. Do UDS at least once per year.  Score 4-7 = Moderate risk of abuse. Do UDS 1-4 times per year.  Score 8+ = High risk of abuse. Refer to specialist.      OBJECTIVE:   Vital Signs:  Vitals:    06/22/23 0709   BP: 100/64   Pulse: 78   Temp: 36.5 °C (97.7 °F)   SpO2: 98%        Pertinent Labs:  Lab Results   Component Value Date/Time    SODIUM 139 06/05/2023 11:00 AM    POTASSIUM 4.3 06/05/2023 11:00 AM    CHLORIDE 104 06/05/2023 11:00 AM    CO2 22 06/05/2023 11:00 AM    GLUCOSE 92 06/05/2023 11:00 AM    BUN 9 06/05/2023 11:00 AM    CREATININE 0.73 06/05/2023 11:00 AM       Lab Results   Component Value Date/Time    HBA1C 5.4  05/28/2022 08:16 AM       Lab Results   Component Value Date/Time    WBC 8.4 06/05/2023 11:00 AM    RBC 5.98 06/05/2023 11:00 AM    HEMOGLOBIN 17.2 06/05/2023 11:00 AM    HEMATOCRIT 50.5 06/05/2023 11:00 AM    MCV 84.4 06/05/2023 11:00 AM    MCH 28.8 06/05/2023 11:00 AM    MCHC 34.1 06/05/2023 11:00 AM    MPV 10.7 06/05/2023 11:00 AM    NEUTSPOLYS 78.00 (H) 04/01/2023 02:23 AM    LYMPHOCYTES 11.20 (L) 04/01/2023 02:23 AM    MONOCYTES 9.00 04/01/2023 02:23 AM    EOSINOPHILS 0.70 04/01/2023 02:23 AM    BASOPHILS 0.80 04/01/2023 02:23 AM       Lab Results   Component Value Date/Time    ASTSGOT 39 04/01/2023 02:23 AM    ALTSGPT 40 04/01/2023 02:23 AM        Physical Exam:   GEN: No apparent distress  HEENT: Head normocephalic, atraumatic.  Sclera nonicteric bilaterally, no ocular discharge appreciated bilaterally.  CV: Extremities warm and well-perfused, no peripheral edema appreciated bilaterally.  PULMONARY: Breathing nonlabored on room air, no respiratory accessory muscle use.  Not requiring supplemental oxygen.  SKIN: No appreciable skin breakdown on exposed areas of skin.  PSYCH: Mood and affect within normal limits.  NEURO: Awake alert. Dysarthric, answers simple questions appropriately. Ataxic gait.       ASSESSMENT/PLAN: Rey Medina  is a 27 y.o. male with rehabilitation history significant for ICH secondary to AVM rupture on 4/21/2019 s/p AVM repair, hydrocephalus s/p  shunt who was admitted to [facility] from 2/5/2020 to 3/12/2020, here for hemorrhagic stroke follow-up. The following plan was discussed with the patient who is in agreement.     Visit Diagnoses     ICD-10-CM   1. Nontraumatic intraventricular intracerebral hemorrhage, unspecified laterality (HCC)  I61.5   2. History of stroke  Z86.73   3. Dysarthria  R47.1       Mom assists with history.    Rehab/Neuro:   ICH secondary to AVM rupture on 4/21/2019 s/p AVM repair, hydrocephalus s/p  shunt who was recently admitted to [facility] from  2/5/2020 to 3/12/2020.  2.  No history of seizure, on Keppra.  Had EEG 5/21/2020. Keppra d/c.   3.  Poor attention secondary to ICH requiring need for neuro-stimulant; discontinued.  4.  Blurry vision after ICH, did have reading glasses prior to stroke.  Blurry vision persistent.  Also having problems with peripheral vision on the right. Resolved with new glasses rx.   5.  Irritability/agitation: Off of propranolol.  6.  Dysmetria  7.  Poor balance  -Neuro: Neuro stable.  - Mom still looking into getting a specialized walker.  It is difficult to find a company which excepts insurance.  The model/company is [email or web address].  She will let us know if she finds 1 which is suitable.  -Referral: Aquatic therapy    Insomnia:  - Medications tried: Melatonin, trazodone, Ambien, Valium, mirtazapine.  - Continue follow-up with sleep medicine    Follow up: As needed    Total time spent was 21 minutes.  Included in this time is the time spent preparing for the visit including record review, my exam and evaluation, counseling and education regarding that which is aforementioned in the assessment and plan.  Time was spent documenting into patient's electronic health record.  Time was spent ordering the appropriate labs, tests, procedures, referrals, medications.  Including this time was also the time spent in care coordination. Included this time as the time spent obtaining history from someone other than the patient.  Some of the time included occurred outside of charting time.  Discussion involved the patient and mom.      Please note that this dictation was created using voice recognition software. I have made every reasonable attempt to correct obvious errors but there may be errors of grammar and content that I may have overlooked prior to finalization of this note.    Dr. Mandy Luke DO, MS  Department of Physical Medicine & Rehabilitation  Neuro Rehabilitation Clinic  [facility]

## 2023-07-27 DIAGNOSIS — G47.01 INSOMNIA DUE TO MEDICAL CONDITION: ICD-10-CM

## 2023-08-02 NOTE — ASSESSMENT & PLAN NOTE
Replace as needed   Hydroxychloroquine Pregnancy And Lactation Text: This medication has been shown to cause fetal harm but it isn't assigned a Pregnancy Risk Category. There are small amounts excreted in breast milk.

## 2023-09-01 DIAGNOSIS — T78.40XS ALLERGY, SEQUELA: ICD-10-CM

## 2023-09-05 RX ORDER — MONTELUKAST SODIUM 10 MG/1
10 TABLET ORAL
Qty: 90 TABLET | Refills: 0 | Status: SHIPPED | OUTPATIENT
Start: 2023-09-05

## 2023-09-05 NOTE — TELEPHONE ENCOUNTER
Montelukast Sodium 10 MG Oral Tablet    Received request via: Pharmacy    Was the patient seen in the last year in this department? Yes    Does the patient have an active prescription (recently filled or refills available) for medication(s) requested?  Yes    Does the patient have long-term Plus and need 100 day supply (blood pressure, diabetes and cholesterol meds only)? Patient does not have SCP

## 2023-10-17 ENCOUNTER — OFFICE VISIT (OUTPATIENT)
Dept: NEUROLOGY | Facility: MEDICAL CENTER | Age: 28
End: 2023-10-17
Attending: PSYCHIATRY & NEUROLOGY
Payer: MEDICARE

## 2023-10-17 VITALS
BODY MASS INDEX: 24.91 KG/M2 | HEIGHT: 66 IN | TEMPERATURE: 98.6 F | DIASTOLIC BLOOD PRESSURE: 68 MMHG | WEIGHT: 155 LBS | OXYGEN SATURATION: 94 % | SYSTOLIC BLOOD PRESSURE: 118 MMHG | HEART RATE: 69 BPM

## 2023-10-17 DIAGNOSIS — R00.0 TACHYCARDIA: ICD-10-CM

## 2023-10-17 DIAGNOSIS — G47.09 OTHER INSOMNIA: ICD-10-CM

## 2023-10-17 DIAGNOSIS — G11.9 CEREBELLAR ATAXIA (HCC): ICD-10-CM

## 2023-10-17 PROBLEM — Z09 HOSPITAL DISCHARGE FOLLOW-UP: Status: RESOLVED | Noted: 2023-04-17 | Resolved: 2023-10-17

## 2023-10-17 PROCEDURE — 3074F SYST BP LT 130 MM HG: CPT | Performed by: INTERNAL MEDICINE

## 2023-10-17 PROCEDURE — 3078F DIAST BP <80 MM HG: CPT | Performed by: INTERNAL MEDICINE

## 2023-10-17 PROCEDURE — 99215 OFFICE O/P EST HI 40 MIN: CPT | Performed by: INTERNAL MEDICINE

## 2023-10-17 PROCEDURE — 99212 OFFICE O/P EST SF 10 MIN: CPT | Performed by: INTERNAL MEDICINE

## 2023-10-17 RX ORDER — PSEUDOEPHEDRINE HCL 30 MG
TABLET ORAL
COMMUNITY

## 2023-10-17 RX ORDER — PROPRANOLOL HYDROCHLORIDE 20 MG/1
20 TABLET ORAL
Qty: 90 TABLET | Refills: 3 | Status: SHIPPED | OUTPATIENT
Start: 2023-10-17 | End: 2024-04-14

## 2023-10-17 RX ORDER — ESZOPICLONE 1 MG/1
1 TABLET, FILM COATED ORAL
Qty: 90 TABLET | Refills: 1 | Status: SHIPPED | OUTPATIENT
Start: 2023-10-17 | End: 2024-04-14

## 2023-10-17 ASSESSMENT — FIBROSIS 4 INDEX: FIB4 SCORE: 0.72

## 2023-10-17 NOTE — PROGRESS NOTES
[facility]  [address] [place] [address], [address]. [place] Patel [address]  Phone: [phone] Patient Name: Rey Medina  : 1995  MRN: [id number]     ASSESSMENT / PLAN       Rey Medina is a 28 y.o. 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  Mirtazapine 30mg - didn't help  Diazepam 2 and 4mg - didn't help  Ambien 5mg- didn't help  Melatonin 5mg - didn't help  - START melatonin 5mg tablets. Take 1 hour before bedtime, at a consistent time each night. Look for supplements that are just melatonin tablets, and have a label that shows USP certification. Max dose is 15mg per night.   - START Lunesta 1mg nightly as needed. Can increase to two tablets nightly if still not helping sleep.   - follow up with sleep medicine about ASV fitting/comfort    Tachycardia  Intermittent tachycardia -120, asymptomatic without pain or anxiety.  Propanolol not causing any side effects.  - Continue propanolol 10mg or 20mg as needed    - Return in about 6 months (around 2024).    Kobe Kemp DO  Neurology, Movement Disorders Specialist    BILLING DOCUMENTATION: I spent 45 minutes reviewing the medical record, interviewing and examining the patient, discussing diagnosis and treatment, and coordinating care.        HISTORY OF PRESENT ILLNESS      Rey Medina is a 28 y.o. 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    Previously seen by Dr. Jennifer Valiente in . Followed by Dr. Luke (PM&R), Dr. Walton (NSGY for  Shunt), Dr. Rashid (sleep medicine), Dr. Romero (neuro-ophth)     Insomnia  Sleep initiation and maintenance is an issue  Mirtazapine 30mg - didn't help  Diazepam 2 and 4mg - didn't help  Ambien  5mg- didn't help  Melatonin 5mg - didn't help    Using ASV machine due to central sleep apnea: uncomfortable, not using it now.     Cerebellar ataxia  Aquatic therapy and a new standing walker has been helpful in improving his coordination.   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   Off of Keppra since July 2021 without evidence of seizure.  He is not driving.  6/2023: had gallbladder removed and his shunt tip was damaged. He then had to go to OR to have shunt fixed      Past Medical History:   Diagnosis Date    Asthma since birth    Mild Asthma    Chickenpox     COVID-19     [place]ali measles     Hypertension 2019    ICH (intracerebral hemorrhage) (HCC)     Sleep apnea 10/31/2022    currently using ASV Machine    Stroke (HCC)     Urinary bladder disorder 2020       Past Surgical History:   Procedure Laterality Date    THORACIC FUSION O-ARM Left 6/5/2023    Procedure: STEALTH GUIDED DISTAL VENTRICULOPERITONEAL SHUNT REVISION- ABDOMINAL PART;  Surgeon: Daniel Walton M.D.;  Location: SURGERY [facility];  Service: Neurosurgery    ROSALINO BY LAPAROSCOPY N/A 03/31/2023    Procedure: CHOLECYSTECTOMY, LAPAROSCOPIC;  Surgeon: Maggie Chris M.D.;  Location: SURGERY [facility];  Service: General    ANAL FISTULOTOMY      OTHER  06/2019    PEG Tube insertion/ SRS Procedure/ Shunt    PEG PLACEMENT      removed 2020    TRACHEOSTOMY       SHUNT INSERTION         Family History   Problem Relation Age of Onset    Hypertension Mother     Glasses Mother     Thyroid Mother     Hyperlipidemia Mother     Diabetes Maternal Grandfather     Stroke Maternal Grandfather     Hypertension Maternal Grandfather     Diabetes Paternal Grandmother     Hypertension Maternal Uncle     Hypertension Maternal Grandmother     Heart Disease Neg Hx     Cancer Neg Hx        Social History     Socioeconomic History     Marital status: Single     Spouse name: Not on file    Number of children: Not on file    Years of education: Not on file    Highest education level: Some college, no degree   Occupational History    Not on file   Tobacco Use    Smoking status: Never    Smokeless tobacco: Never   Vaping Use    Vaping Use: Never used   Substance and Sexual Activity    Alcohol use: Never    Drug use: Never    Sexual activity: Not Currently   Other Topics Concern     Service No    Blood Transfusions No    Caffeine Concern No    Occupational Exposure No    Hobby Hazards No    Sleep Concern No    Stress Concern No    Weight Concern No    Special Diet Yes    Back Care No    Exercise Yes    Bike Helmet No    Seat Belt Yes    Self-Exams No   Social History Narrative    Lives with mom     Social Determinants of Health     Financial Resource Strain: Low Risk  (3/31/2023)    Overall Financial Resource Strain (CARDIA)     Difficulty of Paying Living Expenses: Not very hard   Food Insecurity: No Food Insecurity (3/31/2023)    Hunger Vital Sign     Worried About Running Out of Food in the Last Year: Never true     Ran Out of Food in the Last Year: Never true   Transportation Needs: No Transportation Needs (3/31/2023)    PRAPARE - Transportation     Lack of Transportation (Medical): No     Lack of Transportation (Non-Medical): No   Physical Activity: Sufficiently Active (3/31/2023)    Exercise Vital Sign     Days of Exercise per Week: 5 days     Minutes of Exercise per Session: 40 min   Stress: No Stress Concern Present (3/31/2023)    [employer] - Occupational Stress Questionnaire     Feeling of Stress : Not at all   Social Connections: Socially Isolated (3/31/2023)    Social Connection and Isolation Panel [NHANES]     Frequency of Communication with Friends and Family: Twice a week     Frequency of Social Gatherings with Friends and Family: Once a week     Attends [employer] Services: Never     Active  "Member of Clubs or Organizations: No     Attends Club or Organization Meetings: Never     Marital Status: Never    Intimate Partner Violence: Not on file   Housing Stability: Low Risk  (3/31/2023)    Housing Stability Vital Sign     Unable to Pay for Housing in the Last Year: No     Number of Places Lived in the Last Year: 2     Unstable Housing in the Last Year: No       Current Outpatient Medications   Medication    docusate sodium 100 MG Cap    eszopiclone (LUNESTA) 1 MG Tab tablet    propranolol (INDERAL) 20 MG Tab    montelukast (SINGULAIR) 10 MG Tab    Cyanocobalamin (VITAMIN B12 PO)    acetaminophen (TYLENOL) 500 MG Tab    Ascorbic Acid (VITAMIN C) 1000 MG Tab    Cholecalciferol (VITAMIN D3) 2000 UNIT Cap     No current facility-administered medications for this visit.       Allergies   Allergen Reactions    Vancomycin Swelling     Lips  With red face and neck    Other Misc Rash     Allergic to name band of unknown medication causing a rash       OBJECTIVE      Vitals:    10/17/23 0935   BP: 118/68   BP Location: Right arm   Patient Position: Sitting   BP Cuff Size: Adult   Pulse: 69   Temp: 37 °C (98.6 °F)   TempSrc: Temporal   SpO2: 94%   Weight: 70.3 kg (155 lb)   Height: 1.676 m (5' 6\")     Physical exam  Strength: 5/5 in arms and legs  Reflexes: 1+ throughout    Scale for Assessment and Rating of Ataxia (EVELYNE)  1. Gait    Proband is asked  (1) to walk at a safe distance parallel to a wall including a half-turn (turn around to face the opposite direction of gait) and  (2) to walk in tandem (heels to toes) without support. 0 Normal, no difficulties in walking, turning and walking tandem (up to one misstep allowed)  1 Slight difficulties, only visible when walking 10 consecutive steps in tandem  2 Clearly abnormal, tandem walking >10 steps not possible  3 Considerable staggering, difficulties in half-turn, but without support  4 Marked staggering, intermittent support of the wall required  5 Severe " staggering, permanent support of one stick or light support by one arm required  6 Walking > 10 m only with strong support (two special sticks or stroller or accompanying person)  7 Walking < 10 m only with strong support (two special sticks or stroller or accompanying person)  8 Unable to walk, even supported -   2. Stance    Proband is asked to stand for 10 seconds (no shoes, eyes open). Rate best of three trials.  (1) in natural position,  (2) with feet together in parallel (big toes touching each other) and  (3) in tandem (both feet on one line, no space between heel and toe). 0 Normal, able to  tandem for > 10 s  1 Able to stand with feet together without sway, but not in tandem for > 10s  2 Able to stand with feet together for > 10 s, but only with sway  3 Able to stand for > 10 s without support in natural position, but not with feet together  4 Able to stand for > 10 s in natural position only with intermittent support  5 Able to stand > 10 s in natural position only with constant support of one arm  6 Unable to stand for > 10 s even with constant support of one arm -   3. Sitting    Proband is asked to sit on an examination bed without support of feet, eyes open and arms outstretched to the front. 0 Normal, no difficulties sitting >10 sec  1 Slight difficulties, intermittent sway  2 Constant sway, but able to sit > 10 s without support  3 Able to sit for > 10 s only with intermittent support  4 Unable to sit for >10 s without continuous support -   4. Speech    Speech is assessed during normal conversation.   0 Normal  1 Suggestion of speech disturbance  2 Impaired speech, but easy to understand  3 Occasional words difficult to understand  4 Many words difficult to understand  5 Only single words understandable  6 Speech unintelligible / anarthria 4   5. Finger lizett    Proband sits comfortably. If necessary, support of feet and trunk is allowed. Examiner sits in front of proband and performs 5  consecutive sudden and fast pointing movements in unpredictable directions in a frontal plane, at about 50 % of proband¥s reach. Movements have an amplitude of 30 cm and a frequency of 1 movement every 2 s. Proband is asked to follow the movements with his index finger, as fast and precisely as possible. Average performance of last 3 movements is rated.    Rated separately for each side 0 No dysmetria  1 Dysmetria, under/ overshooting target <5 cm  2 Dysmetria, under/ overshooting target < 15 cm  3 Dysmetria, under/ overshooting target > 15 cm  4 Unable to perform 5 pointing movements R1  L2   6. Nose Finger Test    Proband sits comfortably. If necessary, support of feet and trunk is allowed.  Proband is asked to point repeatedly with his index finger from his nose to examiner's finger which is in front of the proband at about 90 % of proband's reach. Movements are performed at moderate speed. Average performance of movements is rated according to the amplitude of the kinetic tremor.    Rated separately for each side 0 No tremor  1 Tremor with an amplitude < 2 cm  2 Tremor with an amplitude < 5 cm  3 Tremor with an amplitude > 5 cm  4 Unable to perform 5 pointing movements R1  L1   7. Fast alternating hand movements    Proband sits comfortably. If necessary, support of feet and trunk is allowed.  Proband is asked to perform 10 cycles of repetitive alternation of pro- and supinations of the hand on his/her thigh as fast and as precise as possible. Movement is demonstrated by examiner at a speed of approx. 10 cycles within 7 s.  Exact times for movement execution have to be taken.    Rated separately for each side 0 Normal, no irregularities (performs <10s)  1 Slightly irregular (performs <10s)  2 Clearly irregular, single movements difficult to distinguish or relevant interruptions, but performs <10s  3 Very irregular, single movements difficult to distinguish or relevant interruptions,  performs >10s   4 Unable to  complete 10 cycles R2  L3   8. Heel-shin slide    Proband lies on examination bed, without sight of his legs.  Proband is asked to lift one leg, point with the heel to the opposite knee, slide down along the shin to the ankle, and lay the leg back on the examination bed.  The task is performed 3 times. Slide-down movements should be performed within 1 s.  If proband slides down without contact to shin in all three trials, rate 4.    Rated separately for each side 0 Normal  1 Slightly abnormal, contact to shin maintained  2 Clearly abnormal, goes off shin up to 3 times during 3 cycles  3 Severely abnormal, goes off shin 4 or more times during 3 cycles  4 Unable to perform the task Approx:  R2-3  L2-3       DATA / RESULTS        CT Head 6/2023:   1.  Similar, mild dilation of the ventricular system. Left parieto-occipital approach  shunt catheter with tip in the right ventricle.  2.  Bilateral cerebellar encephalomalacia.  3.  No evidence of acute intracranial hemorrhage.    MRA Head w/o 12/2020:   There is no residual AVM in the posterior fossa. However tiny AVM/early draining veins can be missed in this study. If needed, this can be further evaluated with catheter angiogram.    MRI Brain 12/2020:  1.  Postsurgical changes in the posterior fossa history of AVM rupture and surgical treatment. There is no large abnormal flow voids identified. However the possibility of small residual AVM cannot be assessed in this study. If needed this can be further   with the catheter angiogram.  2.  Stable ventriculoperitoneal shunt without any hydrocephalus.  3.  Mild cerebral volume loss.  4.  No acute infarct or hemorrhage.    EEG routine (Dr. ALBERTO) May 2020:   This is a abnormal routine EEG recording in the awake and  Drowsy states.  Intermittent slowing suggests mild encephalopathy and is in correlation with patient's history of intracerebral hemorrhage. There were no seizures recorded.Clinical correlation is recommended.      EEG routine (Rocco) 7/2021: FIRDA can be seen in widespread cortical dysfunction/encephalopathic conditions or underlying structural lesions.  Recommend clinical correlation. If clinical suspicion is high for seizures recommend prolonged monitoring.    PROCEDURE     N/A

## 2023-10-17 NOTE — PATIENT INSTRUCTIONS
Insomnia  - START melatonin 5mg tablets. Take 1 hour before bedtime, at a consistent time each night. Look for supplements that are just melatonin tablets, and have a label that shows USP certification. Max dose is 15mg per night.   - Lunesta 1mg nightly as needed. Can increase to two tablets nightly if still not helping sleep.   - National Ataxia Foundation ataxia.org    Tachycardia  - propanolol 10mg or 20mg as needed

## 2023-10-19 ENCOUNTER — TELEPHONE (OUTPATIENT)
Dept: NEUROLOGY | Facility: MEDICAL CENTER | Age: 28
End: 2023-10-19
Payer: MEDICARE

## 2023-10-20 PROBLEM — G11.9 CEREBELLAR ATAXIA (HCC): Status: ACTIVE | Noted: 2023-10-20

## 2024-01-31 ENCOUNTER — TELEMEDICINE (OUTPATIENT)
Dept: MEDICAL GROUP | Facility: MEDICAL CENTER | Age: 29
End: 2024-01-31
Attending: FAMILY MEDICINE
Payer: MEDICARE

## 2024-01-31 VITALS — RESPIRATION RATE: 14 BRPM | HEIGHT: 66 IN | BODY MASS INDEX: 24.91 KG/M2 | WEIGHT: 155 LBS

## 2024-01-31 DIAGNOSIS — Z86.79 H/O SPONT INTRAPARENCHYMAL INTRACRANIAL HEMORRHAGE D/T CEREBRAL AVM: ICD-10-CM

## 2024-01-31 DIAGNOSIS — G91.9 HYDROCEPHALUS, UNSPECIFIED TYPE (HCC): ICD-10-CM

## 2024-01-31 DIAGNOSIS — S06.9XAS COGNITIVE AND NEUROBEHAVIORAL DYSFUNCTION FOLLOWING BRAIN INJURY (HCC): ICD-10-CM

## 2024-01-31 DIAGNOSIS — G31.89 COGNITIVE AND NEUROBEHAVIORAL DYSFUNCTION FOLLOWING BRAIN INJURY (HCC): ICD-10-CM

## 2024-01-31 DIAGNOSIS — Z99.3 WHEELCHAIR DEPENDENT: ICD-10-CM

## 2024-01-31 DIAGNOSIS — G11.9 CEREBELLAR ATAXIA (HCC): ICD-10-CM

## 2024-01-31 DIAGNOSIS — F09 COGNITIVE AND NEUROBEHAVIORAL DYSFUNCTION FOLLOWING BRAIN INJURY (HCC): ICD-10-CM

## 2024-01-31 PROCEDURE — 99214 OFFICE O/P EST MOD 30 MIN: CPT | Performed by: FAMILY MEDICINE

## 2024-01-31 ASSESSMENT — FIBROSIS 4 INDEX: FIB4 SCORE: 0.72

## 2024-01-31 NOTE — ASSESSMENT & PLAN NOTE
Mom is requesting continued therapies at home   He is unable to ambulate independently, however he has had significant progress since starting therapy. Previously wasn't even able to walk.   Right side is a little stronger   Mom has noticed decreased progress since stopping home therapies and home exercises are not sufficient.  Patient is mostly homebound due to mobility issues and is an increased fall risk when he leaves the house   Also has unclear speech, but much impreoved from previous. Also requesting speech therapy

## 2024-01-31 NOTE — PROGRESS NOTES
Telemedicine: Established Patient   This evaluation was conducted via Zoom using secure and encrypted videoconferencing technology. The patient was in their home in the [facility].    The patient's identity was confirmed and verbal consent was obtained for this virtual visit.    Subjective:   CC:   Chief Complaint   Patient presents with    Follow-Up       Rey Medina is a 28 y.o. 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 Previously wasn't even able to walk.   Right side is a little stronger   Mom has noticed decreased progress since stopping home therapies and home exercises are not sufficient.  Patient is mostly homebound due to mobility issues and is an increased fall risk when he leaves the house   Also has unclear speech, but much impreoved from previous. Also requesting speech therapy          Allergies   Allergen Reactions    Vancomycin Swelling     Lips  With red face and neck    Other Misc Rash     Allergic to name band of unknown medication causing a rash       Current medicines (including changes today)  Current Outpatient Medications   Medication Sig Dispense Refill    docusate sodium 100 MG Cap       eszopiclone (LUNESTA) 1 MG Tab tablet Take 1 Tablet by mouth at bedtime for 180 days. 90 Tablet 1    propranolol (INDERAL) 20 MG Tab Take 1 Tablet by mouth 1 time a day as needed (HR > 100) for up to 180 days. 90 Tablet 3    montelukast (SINGULAIR) 10 MG Tab Take 1 Tablet by mouth 1 time a day as needed (allergies). 90 Tablet 0    Cyanocobalamin (VITAMIN B12 PO) Take 1 Tablet by mouth every morning.      acetaminophen (TYLENOL) 500 MG Tab Take 500 mg by mouth every 6 hours as needed for Mild Pain (headache).      Ascorbic Acid (VITAMIN C) 1000 MG Tab Take 1  "Tablet by mouth every day.      Cholecalciferol (VITAMIN D3) 2000 UNIT Cap Take 2,000 Units by mouth every day.       No current facility-administered medications for this visit.       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 08/11/2020    Tachycardia 02/17/2020    Dysphagia following nontraumatic intracerebral hemorrhage 01/24/2020    Urinary incontinence due to cognitive impairment 01/24/2020    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/24/2020       Family History   Problem Relation Age of Onset    Hypertension Mother     Glasses Mother     Thyroid Mother     Hyperlipidemia Mother     Diabetes Maternal Grandfather     Stroke Maternal Grandfather     Hypertension Maternal Grandfather     Diabetes Paternal Grandmother     Hypertension Maternal Uncle     Hypertension Maternal Grandmother     Heart Disease Neg Hx     Cancer Neg Hx        He  has a past medical history of Asthma (since birth), Chickenpox, COVID-19, [place] measles, Hypertension (2019), ICH (intracerebral hemorrhage) (HCC), Sleep apnea (10/31/2022), Stroke (HCC), and Urinary bladder disorder (2020).  He  has a past surgical history that includes anal fistulotomy; peg placement; tracheostomy;  shunt insertion; marge by laparoscopy (N/A, 03/31/2023); other (06/2019); and thoracic fusion o-arm (Left, 6/5/2023).       Objective:   Resp 14   Ht 1.676 m (5' 6\")   Wt 70.3 kg (155 lb)   BMI 25.02 kg/m²     Physical " Exam  Constitutional: Alert, no distress, well-groomed.  Respiratory: Unlabored respiratory effort, no cough.  MSK: in wheelchair   Psych: normal affect and mood.    Data:  Reviewed prior therapy notes      Assessment and Plan:   The following treatment plan was discussed:     1. 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 Hydrocephalus, unspecified type (HCC)    3. 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 Cerebellar ataxia (HCC)    5. 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 Continue with current defined treatment plan: continue threapies. Follow-up at least annually.  Diagnosis: G11.9 - Cerebellar ataxia (HCC)  Assessment and plan: Chronic, stable. Continue with current defined treatment plan: continue with therapies. Follow-up at least annually.  Diagnosis: G91.9 - Hydrocephalus, unspecified (HCC)  Assessment and plan: Chronic, stable, as based on today's assessment and impact on other conditions evaluated today. Continue with current treatment plan: f/u with nsgy Follow-up with specialist as directed, but at least annually.  Last edited 01/31/24 13:34 PST by Sherie Power M.D.           Follow-up: Return if symptoms worsen or fail to improve.

## 2024-03-11 ENCOUNTER — APPOINTMENT (OUTPATIENT)
Dept: OPHTHALMOLOGY | Facility: MEDICAL CENTER | Age: 29
End: 2024-03-11
Payer: MEDICARE

## 2024-04-07 DIAGNOSIS — R15.9 INCONTINENCE OF FECES, UNSPECIFIED FECAL INCONTINENCE TYPE: ICD-10-CM

## 2024-04-08 NOTE — TELEPHONE ENCOUNTER
Received request via: Pharmacy    Was the patient seen in the last year in this department? Yes    Does the patient have an active prescription (recently filled or refills available) for medication(s) requested? No    Pharmacy Name: [facility] [address]    Does the patient have longterm Plus and need 100 day supply (blood pressure, diabetes and cholesterol meds only)? Patient does not have SCP

## 2024-04-09 RX ORDER — POLYETHYLENE GLYCOL 3350 17 G/17G
POWDER, FOR SOLUTION ORAL
Qty: 510 G | Refills: 11 | Status: SHIPPED | OUTPATIENT
Start: 2024-04-09

## 2024-04-18 ENCOUNTER — APPOINTMENT (OUTPATIENT)
Dept: NEUROLOGY | Facility: MEDICAL CENTER | Age: 29
End: 2024-04-18
Attending: INTERNAL MEDICINE
Payer: MEDICARE

## 2024-05-01 ENCOUNTER — TELEPHONE (OUTPATIENT)
Dept: NEUROLOGY | Facility: MEDICAL CENTER | Age: 29
End: 2024-05-01
Payer: MEDICARE

## 2024-05-03 ENCOUNTER — APPOINTMENT (OUTPATIENT)
Dept: NEUROLOGY | Facility: MEDICAL CENTER | Age: 29
End: 2024-05-03
Attending: INTERNAL MEDICINE
Payer: MEDICARE

## 2024-05-03 VITALS
HEART RATE: 83 BPM | HEIGHT: 66 IN | SYSTOLIC BLOOD PRESSURE: 123 MMHG | BODY MASS INDEX: 25.02 KG/M2 | DIASTOLIC BLOOD PRESSURE: 75 MMHG | OXYGEN SATURATION: 97 %

## 2024-05-03 DIAGNOSIS — G11.9 CEREBELLAR ATAXIA (HCC): ICD-10-CM

## 2024-05-03 DIAGNOSIS — G47.09 OTHER INSOMNIA: Primary | ICD-10-CM

## 2024-05-03 PROCEDURE — 99213 OFFICE O/P EST LOW 20 MIN: CPT | Mod: 95 | Performed by: INTERNAL MEDICINE

## 2024-05-03 RX ORDER — DOXEPIN HYDROCHLORIDE 10 MG/1
10 CAPSULE ORAL NIGHTLY
Qty: 90 CAPSULE | Refills: 3 | Status: SHIPPED | OUTPATIENT
Start: 2024-05-03 | End: 2025-05-03

## 2024-05-03 NOTE — PATIENT INSTRUCTIONS
- melatonin 10mg - 20mg tablets nightly  - doxepin 10mg night. OK to take with the melatonin  - can try trazodone 100mg or suvorexant next   Standing/Walking/Toileting

## 2024-05-03 NOTE — PROGRESS NOTES
[facility]  [address] [place] [address], [address]. [place] Patel [address]  Phone: [phone] Patient Name: Rey Medina  : 1995  MRN: [id number]     ASSESSMENT / PLAN       Rey Medina is a 28 y.o. 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  - melatonin 10mg - 20mg tablets nightly  - doxepin 10mg night. OK to take with the melatonin  - can try trazodone 100mg or suvorexant next    Tachycardia  Intermittent tachycardia -120, asymptomatic without pain or anxiety.  Propanolol not causing any side effects.  - Continue propanolol 10mg or 20mg as needed    - Return in about 6 months (around 11/3/2024).    Kobe Kemp DO  Neurology, Movement Disorders Specialist    BILLING DOCUMENTATION:   I spent 20 minutes reviewing the medical record, interviewing and examining the patient, discussing diagnosis and treatment, and coordinating care.  This evaluation was conducted via Zoom using secure and encrypted videoconferencing technology. The patient was in their home in the [facility].    The patient's identity was confirmed and verbal consent was obtained for this virtual visit.          HISTORY OF PRESENT ILLNESS      Rey Medina is a 28 y.o. 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    Previously seen by Dr. Jennifer Valiente in . Followed by Dr. Luke (PM&R), Dr. Walton (NSGY for  Shunt), Dr. Rashid (sleep medicine), Dr. 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   Stopped Keppra since 2021 without evidence of seizure.  He is not driving.  2023: had gallbladder removed and  his shunt tip was damaged. He then had to go to OR to have shunt fixed    Cerebellar ataxia  Aquatic therapy and a new standing walker has been helpful in improving his coordination.   No weakness or spasticity that is bothersome  - doing 6d/week with     Insomnia  Previously using ASV machine due to central sleep apnea: uncomfortable, not using it now.     Sleep initiation and maintenance is an issue:  Mirtazapine 30mg - didn't help  Diazepam 2 and 4mg - didn't help  Ambien 5mg- didn't help  Melatonin 5mg - didn't help  Trazodone 50mg - didn't help  Lunesta - didn't help  Melatonin 10mg - didn't help        Past Medical History:   Diagnosis Date    Asthma since birth    Mild Asthma    Chickenpox     COVID-19     Chinese measles     Hypertension 2019    ICH (intracerebral hemorrhage) (HCC)     Sleep apnea 10/31/2022    currently using ASV Machine    Stroke (HCC)     Urinary bladder disorder 2020       Past Surgical History:   Procedure Laterality Date    THORACIC FUSION O-ARM Left 6/5/2023    Procedure: STEALTH GUIDED DISTAL VENTRICULOPERITONEAL SHUNT REVISION- ABDOMINAL PART;  Surgeon: Daniel Walton M.D.;  Location: SURGERY [facility];  Service: Neurosurgery    ROSALINO BY LAPAROSCOPY N/A 03/31/2023    Procedure: CHOLECYSTECTOMY, LAPAROSCOPIC;  Surgeon: Maggie Chris M.D.;  Location: SURGERY [facility];  Service: General    ANAL FISTULOTOMY      OTHER  06/2019    PEG Tube insertion/ SRS Procedure/ Shunt    PEG PLACEMENT      removed 2020    TRACHEOSTOMY       SHUNT INSERTION         Family History   Problem Relation Age of Onset    Hypertension Mother     Glasses Mother     Thyroid Mother     Hyperlipidemia Mother     Diabetes Maternal Grandfather     Stroke Maternal Grandfather     Hypertension Maternal Grandfather     Diabetes Paternal Grandmother     Hypertension Maternal Uncle     Hypertension Maternal Grandmother     Heart Disease Neg Hx     Cancer Neg Hx        Social History      Socioeconomic History    Marital status: Single     Spouse name: Not on file    Number of children: Not on file    Years of education: Not on file    Highest education level: Some college, no degree   Occupational History    Not on file   Tobacco Use    Smoking status: Never    Smokeless tobacco: Never   Vaping Use    Vaping Use: Never used   Substance and Sexual Activity    Alcohol use: Never    Drug use: Never    Sexual activity: Not Currently   Other Topics Concern     Service No    Blood Transfusions No    Caffeine Concern No    Occupational Exposure No    Hobby Hazards No    Sleep Concern No    Stress Concern No    Weight Concern No    Special Diet Yes    Back Care No    Exercise Yes    Bike Helmet No    Seat Belt Yes    Self-Exams No   Social History Narrative    Lives with mom     Social Determinants of Health     Financial Resource Strain: Low Risk  (3/31/2023)    Overall Financial Resource Strain (CARDIA)     Difficulty of Paying Living Expenses: Not very hard   Food Insecurity: No Food Insecurity (3/31/2023)    Hunger Vital Sign     Worried About Running Out of Food in the Last Year: Never true     Ran Out of Food in the Last Year: Never true   Transportation Needs: No Transportation Needs (3/31/2023)    PRAPARE - Transportation     Lack of Transportation (Medical): No     Lack of Transportation (Non-Medical): No   Physical Activity: Sufficiently Active (3/31/2023)    Exercise Vital Sign     Days of Exercise per Week: 5 days     Minutes of Exercise per Session: 40 min   Stress: No Stress Concern Present (3/31/2023)    [employer] - Occupational Stress Questionnaire     Feeling of Stress : Not at all   Social Connections: Socially Isolated (3/31/2023)    Social Connection and Isolation Panel [NHANES]     Frequency of Communication with Friends and Family: Twice a week     Frequency of Social Gatherings with Friends and Family: Once a week     Attends Anabaptism  "Services: Never     Active Member of Clubs or Organizations: No     Attends Club or Organization Meetings: Never     Marital Status: Never    Intimate Partner Violence: Not on file   Housing Stability: Low Risk  (3/31/2023)    Housing Stability Vital Sign     Unable to Pay for Housing in the Last Year: No     Number of Places Lived in the Last Year: 2     Unstable Housing in the Last Year: No       Current Outpatient Medications   Medication    polyethylene glycol 3350 (MIRALAX) 17 GM/SCOOP Powder    docusate sodium 100 MG Cap    montelukast (SINGULAIR) 10 MG Tab    Cyanocobalamin (VITAMIN B12 PO)    acetaminophen (TYLENOL) 500 MG Tab    Ascorbic Acid (VITAMIN C) 1000 MG Tab    Cholecalciferol (VITAMIN D3) 2000 UNIT Cap     No current facility-administered medications for this visit.       Allergies   Allergen Reactions    Vancomycin Swelling     Lips  With red face and neck    Other Misc Rash     Allergic to name band of unknown medication causing a rash       OBJECTIVE      Vitals:    05/03/24 1602   BP: 123/75   Pulse: 83   SpO2: 97%   Height: 1.676 m (5' 6\")     Physical exam  Speech slightly dysarthric, in wheelchair. Moving both arms antigravity.      Previous exam below:  Strength: 5/5 in arms and legs  Reflexes: 1+ throughout    Scale for Assessment and Rating of Ataxia (EVELYNE)  1. Gait    Proband is asked  (1) to walk at a safe distance parallel to a wall including a half-turn (turn around to face the opposite direction of gait) and  (2) to walk in tandem (heels to toes) without support. 0 Normal, no difficulties in walking, turning and walking tandem (up to one misstep allowed)  1 Slight difficulties, only visible when walking 10 consecutive steps in tandem  2 Clearly abnormal, tandem walking >10 steps not possible  3 Considerable staggering, difficulties in half-turn, but without support  4 Marked staggering, intermittent support of the wall required  5 Severe staggering, permanent support of one " stick or light support by one arm required  6 Walking > 10 m only with strong support (two special sticks or stroller or accompanying person)  7 Walking < 10 m only with strong support (two special sticks or stroller or accompanying person)  8 Unable to walk, even supported -   2. Stance    Proband is asked to stand for 10 seconds (no shoes, eyes open). Rate best of three trials.  (1) in natural position,  (2) with feet together in parallel (big toes touching each other) and  (3) in tandem (both feet on one line, no space between heel and toe). 0 Normal, able to  tandem for > 10 s  1 Able to stand with feet together without sway, but not in tandem for > 10s  2 Able to stand with feet together for > 10 s, but only with sway  3 Able to stand for > 10 s without support in natural position, but not with feet together  4 Able to stand for > 10 s in natural position only with intermittent support  5 Able to stand > 10 s in natural position only with constant support of one arm  6 Unable to stand for > 10 s even with constant support of one arm -   3. Sitting    Proband is asked to sit on an examination bed without support of feet, eyes open and arms outstretched to the front. 0 Normal, no difficulties sitting >10 sec  1 Slight difficulties, intermittent sway  2 Constant sway, but able to sit > 10 s without support  3 Able to sit for > 10 s only with intermittent support  4 Unable to sit for >10 s without continuous support -   4. Speech    Speech is assessed during normal conversation.   0 Normal  1 Suggestion of speech disturbance  2 Impaired speech, but easy to understand  3 Occasional words difficult to understand  4 Many words difficult to understand  5 Only single words understandable  6 Speech unintelligible / anarthria 4   5. Finger lizett    Proband sits comfortably. If necessary, support of feet and trunk is allowed. Examiner sits in front of proband and performs 5 consecutive sudden and fast pointing  movements in unpredictable directions in a frontal plane, at about 50 % of proband¥s reach. Movements have an amplitude of 30 cm and a frequency of 1 movement every 2 s. Proband is asked to follow the movements with his index finger, as fast and precisely as possible. Average performance of last 3 movements is rated.    Rated separately for each side 0 No dysmetria  1 Dysmetria, under/ overshooting target <5 cm  2 Dysmetria, under/ overshooting target < 15 cm  3 Dysmetria, under/ overshooting target > 15 cm  4 Unable to perform 5 pointing movements R1  L2   6. Nose Finger Test    Proband sits comfortably. If necessary, support of feet and trunk is allowed.  Proband is asked to point repeatedly with his index finger from his nose to examiner's finger which is in front of the proband at about 90 % of proband's reach. Movements are performed at moderate speed. Average performance of movements is rated according to the amplitude of the kinetic tremor.    Rated separately for each side 0 No tremor  1 Tremor with an amplitude < 2 cm  2 Tremor with an amplitude < 5 cm  3 Tremor with an amplitude > 5 cm  4 Unable to perform 5 pointing movements R1  L1   7. Fast alternating hand movements    Proband sits comfortably. If necessary, support of feet and trunk is allowed.  Proband is asked to perform 10 cycles of repetitive alternation of pro- and supinations of the hand on his/her thigh as fast and as precise as possible. Movement is demonstrated by examiner at a speed of approx. 10 cycles within 7 s.  Exact times for movement execution have to be taken.    Rated separately for each side 0 Normal, no irregularities (performs <10s)  1 Slightly irregular (performs <10s)  2 Clearly irregular, single movements difficult to distinguish or relevant interruptions, but performs <10s  3 Very irregular, single movements difficult to distinguish or relevant interruptions,  performs >10s   4 Unable to complete 10 cycles R2  L3   8.  Heel-shin slide    Proband lies on examination bed, without sight of his legs.  Proband is asked to lift one leg, point with the heel to the opposite knee, slide down along the shin to the ankle, and lay the leg back on the examination bed.  The task is performed 3 times. Slide-down movements should be performed within 1 s.  If proband slides down without contact to shin in all three trials, rate 4.    Rated separately for each side 0 Normal  1 Slightly abnormal, contact to shin maintained  2 Clearly abnormal, goes off shin up to 3 times during 3 cycles  3 Severely abnormal, goes off shin 4 or more times during 3 cycles  4 Unable to perform the task Approx:  R2-3  L2-3       DATA / RESULTS      25-Hydroxy   Vitamin D 25   Date Value Ref Range Status   02/06/2020 30 30 - 100 ng/mL Final     Comment:     Adult Ranges:   <20 ng/mL - Deficiency  20-29 ng/mL - Insufficiency   ng/mL - Sufficiency  The [employer] Centaur Vitamin D Assay is standardized to the  [facility] reference measurement procedures, a  reference method for the Vitamin D Standardization Program  (VDSP).  The VDSP aligns patient results among 25 (OH)  Vitamin D methods.       Vitamin B12 -True Cobalamin   Date Value Ref Range Status   03/06/2020 611 211 - 911 pg/mL Final     TSH   Date Value Ref Range Status   11/14/2020 1.610 0.380 - 5.330 uIU/mL Final     Comment:     Please note new reference ranges effective 12/19/2017 12:30 PM  Pregnant Females, 1st Trimester  0.050-3.700  Pregnant Females, 2nd Trimester  0.310-4.350  Pregnant Females, 3rd Trimester  0.410-5.180       Glycohemoglobin   Date Value Ref Range Status   05/28/2022 5.4 4.0 - 5.6 % Final     Comment:     Increased risk for diabetes:  5.7 -6.4%  Diabetes:  >6.4%  Glycemic control for adults with diabetes:  <7.0%    The above interpretations are per ADA guidelines.  Diagnosis  of diabetes mellitus on the basis of elevated Hemoglobin A1c  should be confirmed by repeating the Hb A1c  test.       LDL   Date Value Ref Range Status   05/28/2022 86 <100 mg/dL Final      CT Head 6/2023:   1.  Similar, mild dilation of the ventricular system. Left parieto-occipital approach  shunt catheter with tip in the right ventricle.  2.  Bilateral cerebellar encephalomalacia.  3.  No evidence of acute intracranial hemorrhage.    MRA Head w/o 12/2020:   There is no residual AVM in the posterior fossa. However tiny AVM/early draining veins can be missed in this study. If needed, this can be further evaluated with catheter angiogram.    MRI Brain 12/2020:  1.  Postsurgical changes in the posterior fossa history of AVM rupture and surgical treatment. There is no large abnormal flow voids identified. However the possibility of small residual AVM cannot be assessed in this study. If needed this can be further   with the catheter angiogram.  2.  Stable ventriculoperitoneal shunt without any hydrocephalus.  3.  Mild cerebral volume loss.  4.  No acute infarct or hemorrhage.    EEG routine (Dr. ALBERTO) May 2020:   This is a abnormal routine EEG recording in the awake and  Drowsy states.  Intermittent slowing suggests mild encephalopathy and is in correlation with patient's history of intracerebral hemorrhage. There were no seizures recorded.Clinical correlation is recommended.     EEG routine (Rocco) 7/2021: FIRDA can be seen in widespread cortical dysfunction/encephalopathic conditions or underlying structural lesions.  Recommend clinical correlation. If clinical suspicion is high for seizures recommend prolonged monitoring.    PROCEDURE     N/A

## 2024-05-16 ENCOUNTER — PATIENT MESSAGE (OUTPATIENT)
Dept: NEUROLOGY | Facility: MEDICAL CENTER | Age: 29
End: 2024-05-16
Payer: MEDICARE

## 2024-05-16 DIAGNOSIS — G47.01 INSOMNIA DUE TO MEDICAL CONDITION: ICD-10-CM

## 2024-05-30 RX ORDER — ESZOPICLONE 2 MG/1
2 TABLET, FILM COATED ORAL NIGHTLY
Qty: 90 TABLET | Refills: 1 | Status: SHIPPED | OUTPATIENT
Start: 2024-05-30 | End: 2024-11-26

## 2024-10-06 SDOH — HEALTH STABILITY: PHYSICAL HEALTH: ON AVERAGE, HOW MANY DAYS PER WEEK DO YOU ENGAGE IN MODERATE TO STRENUOUS EXERCISE (LIKE A BRISK WALK)?: 5 DAYS

## 2024-10-06 SDOH — ECONOMIC STABILITY: INCOME INSECURITY: IN THE LAST 12 MONTHS, WAS THERE A TIME WHEN YOU WERE NOT ABLE TO PAY THE MORTGAGE OR RENT ON TIME?: NO

## 2024-10-06 SDOH — HEALTH STABILITY: PHYSICAL HEALTH: ON AVERAGE, HOW MANY MINUTES DO YOU ENGAGE IN EXERCISE AT THIS LEVEL?: 50 MIN

## 2024-10-06 SDOH — ECONOMIC STABILITY: INCOME INSECURITY: HOW HARD IS IT FOR YOU TO PAY FOR THE VERY BASICS LIKE FOOD, HOUSING, MEDICAL CARE, AND HEATING?: SOMEWHAT HARD

## 2024-10-06 SDOH — ECONOMIC STABILITY: FOOD INSECURITY: WITHIN THE PAST 12 MONTHS, THE FOOD YOU BOUGHT JUST DIDN'T LAST AND YOU DIDN'T HAVE MONEY TO GET MORE.: PATIENT DECLINED

## 2024-10-06 SDOH — ECONOMIC STABILITY: FOOD INSECURITY: WITHIN THE PAST 12 MONTHS, YOU WORRIED THAT YOUR FOOD WOULD RUN OUT BEFORE YOU GOT MONEY TO BUY MORE.: PATIENT DECLINED

## 2024-10-06 ASSESSMENT — LIFESTYLE VARIABLES
SKIP TO QUESTIONS 9-10: 1
HOW OFTEN DO YOU HAVE SIX OR MORE DRINKS ON ONE OCCASION: NEVER
HOW MANY STANDARD DRINKS CONTAINING ALCOHOL DO YOU HAVE ON A TYPICAL DAY: PATIENT DOES NOT DRINK
AUDIT-C TOTAL SCORE: 0
HOW OFTEN DO YOU HAVE A DRINK CONTAINING ALCOHOL: NEVER

## 2024-10-06 ASSESSMENT — SOCIAL DETERMINANTS OF HEALTH (SDOH)
HOW MANY DRINKS CONTAINING ALCOHOL DO YOU HAVE ON A TYPICAL DAY WHEN YOU ARE DRINKING: PATIENT DOES NOT DRINK
HOW OFTEN DO YOU HAVE A DRINK CONTAINING ALCOHOL: NEVER
HOW OFTEN DO YOU ATTENT MEETINGS OF THE CLUB OR ORGANIZATION YOU BELONG TO?: NEVER
HOW OFTEN DO YOU GET TOGETHER WITH FRIENDS OR RELATIVES?: ONCE A WEEK
HOW OFTEN DO YOU ATTEND CHURCH OR RELIGIOUS SERVICES?: NEVER
HOW OFTEN DO YOU GET TOGETHER WITH FRIENDS OR RELATIVES?: ONCE A WEEK
HOW OFTEN DO YOU ATTEND CHURCH OR RELIGIOUS SERVICES?: NEVER
IN A TYPICAL WEEK, HOW MANY TIMES DO YOU TALK ON THE PHONE WITH FAMILY, FRIENDS, OR NEIGHBORS?: PATIENT DECLINED
ARE YOU MARRIED, WIDOWED, DIVORCED, SEPARATED, NEVER MARRIED, OR LIVING WITH A PARTNER?: NEVER MARRIED
DO YOU BELONG TO ANY CLUBS OR ORGANIZATIONS SUCH AS CHURCH GROUPS UNIONS, FRATERNAL OR ATHLETIC GROUPS, OR SCHOOL GROUPS?: NO
IN A TYPICAL WEEK, HOW MANY TIMES DO YOU TALK ON THE PHONE WITH FAMILY, FRIENDS, OR NEIGHBORS?: PATIENT DECLINED
HOW HARD IS IT FOR YOU TO PAY FOR THE VERY BASICS LIKE FOOD, HOUSING, MEDICAL CARE, AND HEATING?: SOMEWHAT HARD
ARE YOU MARRIED, WIDOWED, DIVORCED, SEPARATED, NEVER MARRIED, OR LIVING WITH A PARTNER?: NEVER MARRIED
DO YOU BELONG TO ANY CLUBS OR ORGANIZATIONS SUCH AS CHURCH GROUPS UNIONS, FRATERNAL OR ATHLETIC GROUPS, OR SCHOOL GROUPS?: NO
WITHIN THE PAST 12 MONTHS, YOU WORRIED THAT YOUR FOOD WOULD RUN OUT BEFORE YOU GOT THE MONEY TO BUY MORE: PATIENT DECLINED
HOW OFTEN DO YOU ATTENT MEETINGS OF THE CLUB OR ORGANIZATION YOU BELONG TO?: NEVER
HOW OFTEN DO YOU HAVE SIX OR MORE DRINKS ON ONE OCCASION: NEVER
IN THE PAST 12 MONTHS, HAS THE ELECTRIC, GAS, OIL, OR WATER COMPANY THREATENED TO SHUT OFF SERVICE IN YOUR HOME?: NO

## 2024-10-09 ENCOUNTER — OFFICE VISIT (OUTPATIENT)
Dept: MEDICAL GROUP | Facility: MEDICAL CENTER | Age: 29
End: 2024-10-09
Attending: FAMILY MEDICINE
Payer: MEDICARE

## 2024-10-09 VITALS
HEART RATE: 80 BPM | DIASTOLIC BLOOD PRESSURE: 80 MMHG | WEIGHT: 143 LBS | HEIGHT: 66 IN | TEMPERATURE: 98.9 F | OXYGEN SATURATION: 95 % | SYSTOLIC BLOOD PRESSURE: 112 MMHG | RESPIRATION RATE: 14 BRPM | BODY MASS INDEX: 22.98 KG/M2

## 2024-10-09 DIAGNOSIS — Z23 NEED FOR VACCINATION: ICD-10-CM

## 2024-10-09 DIAGNOSIS — Z00.00 MEDICARE ANNUAL WELLNESS VISIT, INITIAL: Primary | ICD-10-CM

## 2024-10-09 DIAGNOSIS — Z11.4 ENCOUNTER FOR SCREENING FOR HIV: ICD-10-CM

## 2024-10-09 DIAGNOSIS — Z00.00 ENCOUNTER FOR ANNUAL WELLNESS VISIT: ICD-10-CM

## 2024-10-09 DIAGNOSIS — I69.122 DYSARTHRIA FOLLOWING NONTRAUMATIC INTRACEREBRAL HEMORRHAGE: ICD-10-CM

## 2024-10-09 DIAGNOSIS — Z11.59 NEED FOR HEPATITIS C SCREENING TEST: ICD-10-CM

## 2024-10-09 DIAGNOSIS — Z86.79 H/O SPONT INTRAPARENCHYMAL INTRACRANIAL HEMORRHAGE D/T CEREBRAL AVM: ICD-10-CM

## 2024-10-09 PROCEDURE — 99213 OFFICE O/P EST LOW 20 MIN: CPT | Mod: 25 | Performed by: FAMILY MEDICINE

## 2024-10-09 PROCEDURE — 3074F SYST BP LT 130 MM HG: CPT | Performed by: FAMILY MEDICINE

## 2024-10-09 PROCEDURE — 3079F DIAST BP 80-89 MM HG: CPT | Performed by: FAMILY MEDICINE

## 2024-10-09 PROCEDURE — G0438 PPPS, INITIAL VISIT: HCPCS | Performed by: FAMILY MEDICINE

## 2024-10-09 PROCEDURE — 90471 IMMUNIZATION ADMIN: CPT

## 2024-10-09 ASSESSMENT — ENCOUNTER SYMPTOMS: GENERAL WELL-BEING: FAIR

## 2024-10-09 ASSESSMENT — ACTIVITIES OF DAILY LIVING (ADL): BATHING_REQUIRES_ASSISTANCE: 0

## 2024-10-09 ASSESSMENT — FIBROSIS 4 INDEX: FIB4 SCORE: 0.74

## 2024-10-09 ASSESSMENT — PATIENT HEALTH QUESTIONNAIRE - PHQ9: CLINICAL INTERPRETATION OF PHQ2 SCORE: 0

## 2024-10-19 ENCOUNTER — HOSPITAL ENCOUNTER (OUTPATIENT)
Dept: LAB | Facility: MEDICAL CENTER | Age: 29
End: 2024-10-19
Attending: FAMILY MEDICINE
Payer: MEDICARE

## 2024-10-19 DIAGNOSIS — Z86.79 H/O SPONT INTRAPARENCHYMAL INTRACRANIAL HEMORRHAGE D/T CEREBRAL AVM: ICD-10-CM

## 2024-10-19 DIAGNOSIS — Z11.59 NEED FOR HEPATITIS C SCREENING TEST: ICD-10-CM

## 2024-10-19 DIAGNOSIS — Z11.4 ENCOUNTER FOR SCREENING FOR HIV: ICD-10-CM

## 2024-10-19 DIAGNOSIS — I69.122 DYSARTHRIA FOLLOWING NONTRAUMATIC INTRACEREBRAL HEMORRHAGE: ICD-10-CM

## 2024-10-19 LAB
ALBUMIN SERPL BCP-MCNC: 4.3 G/DL (ref 3.2–4.9)
ALBUMIN/GLOB SERPL: 1.2 G/DL
ALP SERPL-CCNC: 92 U/L (ref 30–99)
ALT SERPL-CCNC: 28 U/L (ref 2–50)
ANION GAP SERPL CALC-SCNC: 9 MMOL/L (ref 7–16)
AST SERPL-CCNC: 15 U/L (ref 12–45)
BILIRUB SERPL-MCNC: 0.5 MG/DL (ref 0.1–1.5)
BUN SERPL-MCNC: 8 MG/DL (ref 8–22)
CALCIUM ALBUM COR SERPL-MCNC: 8.8 MG/DL (ref 8.5–10.5)
CALCIUM SERPL-MCNC: 9 MG/DL (ref 8.5–10.5)
CHLORIDE SERPL-SCNC: 104 MMOL/L (ref 96–112)
CHOLEST SERPL-MCNC: 154 MG/DL (ref 100–199)
CO2 SERPL-SCNC: 24 MMOL/L (ref 20–33)
CREAT SERPL-MCNC: 0.71 MG/DL (ref 0.5–1.4)
ERYTHROCYTE [DISTWIDTH] IN BLOOD BY AUTOMATED COUNT: 43.2 FL (ref 35.9–50)
FASTING STATUS PATIENT QL REPORTED: NORMAL
GFR SERPLBLD CREATININE-BSD FMLA CKD-EPI: 126 ML/MIN/1.73 M 2
GLOBULIN SER CALC-MCNC: 3.5 G/DL (ref 1.9–3.5)
GLUCOSE SERPL-MCNC: 94 MG/DL (ref 65–99)
HCT VFR BLD AUTO: 51.2 % (ref 42–52)
HCV AB SER QL: NORMAL
HDLC SERPL-MCNC: 52 MG/DL
HGB BLD-MCNC: 17.4 G/DL (ref 14–18)
HIV 1+2 AB+HIV1 P24 AG SERPL QL IA: NORMAL
LDLC SERPL CALC-MCNC: 84 MG/DL
MCH RBC QN AUTO: 29.6 PG (ref 27–33)
MCHC RBC AUTO-ENTMCNC: 34 G/DL (ref 32.3–36.5)
MCV RBC AUTO: 87.2 FL (ref 81.4–97.8)
PLATELET # BLD AUTO: 284 K/UL (ref 164–446)
PMV BLD AUTO: 10.6 FL (ref 9–12.9)
POTASSIUM SERPL-SCNC: 4.3 MMOL/L (ref 3.6–5.5)
PROT SERPL-MCNC: 7.8 G/DL (ref 6–8.2)
RBC # BLD AUTO: 5.87 M/UL (ref 4.7–6.1)
SODIUM SERPL-SCNC: 137 MMOL/L (ref 135–145)
TRIGL SERPL-MCNC: 92 MG/DL (ref 0–149)
WBC # BLD AUTO: 7.2 K/UL (ref 4.8–10.8)

## 2024-10-19 PROCEDURE — 86803 HEPATITIS C AB TEST: CPT

## 2024-10-19 PROCEDURE — 80053 COMPREHEN METABOLIC PANEL: CPT

## 2024-10-19 PROCEDURE — 85027 COMPLETE CBC AUTOMATED: CPT

## 2024-10-19 PROCEDURE — 36415 COLL VENOUS BLD VENIPUNCTURE: CPT

## 2024-10-19 PROCEDURE — 80061 LIPID PANEL: CPT

## 2024-10-19 PROCEDURE — G0475 HIV COMBINATION ASSAY: HCPCS | Mod: GA

## 2025-02-27 ENCOUNTER — APPOINTMENT (OUTPATIENT)
Dept: OPHTHALMOLOGY | Facility: MEDICAL CENTER | Age: 30
End: 2025-02-27
Payer: MEDICARE

## 2025-03-20 ENCOUNTER — APPOINTMENT (OUTPATIENT)
Dept: OPHTHALMOLOGY | Facility: MEDICAL CENTER | Age: 30
End: 2025-03-20
Payer: MEDICARE

## 2025-03-20 DIAGNOSIS — H49.9 OPHTHALMOPLEGIA: ICD-10-CM

## 2025-03-20 DIAGNOSIS — H53.9 VISUAL DISTURBANCE: ICD-10-CM

## 2025-03-20 DIAGNOSIS — Z86.79 H/O SPONT INTRAPARENCHYMAL INTRACRANIAL HEMORRHAGE D/T CEREBRAL AVM: ICD-10-CM

## 2025-03-20 DIAGNOSIS — G11.9 CEREBELLAR ATAXIA (HCC): ICD-10-CM

## 2025-03-20 DIAGNOSIS — H52.13 MYOPIA OF BOTH EYES: ICD-10-CM

## 2025-03-20 ASSESSMENT — CUP TO DISC RATIO
OD_RATIO: 0.6
OS_RATIO: 0.6

## 2025-03-20 ASSESSMENT — REFRACTION_MANIFEST
OS_AXIS: 063
METHOD_AUTOREFRACTION: 1
OD_CYLINDER: +1.50
OD_AXIS: 095
OS_SPHERE: -10.25
OS_CYLINDER: +2.00
OD_SPHERE: -9.00

## 2025-03-20 ASSESSMENT — TONOMETRY
IOP_METHOD: I-CARE
OS_IOP_MMHG: 13
OD_IOP_MMHG: 15

## 2025-03-20 ASSESSMENT — VISUAL ACUITY
OD_CC: 20/50
OS_CC: 20/40
OS_CC: J2
OD_CC: J2
METHOD: SNELLEN - LINEAR
OD_CC+: -1
CORRECTION_TYPE: GLASSES
OS_CC+: -1

## 2025-03-20 ASSESSMENT — CONF VISUAL FIELD
OD_NORMAL: 1
OS_SUPERIOR_TEMPORAL_RESTRICTION: 0
OS_SUPERIOR_NASAL_RESTRICTION: 0
OS_INFERIOR_NASAL_RESTRICTION: 0
OS_INFERIOR_TEMPORAL_RESTRICTION: 0
OD_INFERIOR_NASAL_RESTRICTION: 0
OD_INFERIOR_TEMPORAL_RESTRICTION: 0
OS_NORMAL: 1
OD_SUPERIOR_TEMPORAL_RESTRICTION: 0
OD_SUPERIOR_NASAL_RESTRICTION: 0

## 2025-03-20 ASSESSMENT — REFRACTION_WEARINGRX
SPECS_TYPE: SVL
OD_AXIS: 117
OS_AXIS: 070
OS_SPHERE: -7.50
OD_CYLINDER: +1.25
OS_CYLINDER: +1.50
OD_SPHERE: -7.00

## 2025-03-20 ASSESSMENT — EXTERNAL EXAM - RIGHT EYE: OD_EXAM: NORMAL

## 2025-03-20 ASSESSMENT — SLIT LAMP EXAM - LIDS
COMMENTS: NORMAL
COMMENTS: NORMAL

## 2025-03-20 ASSESSMENT — EXTERNAL EXAM - LEFT EYE: OS_EXAM: NORMAL

## 2025-03-20 NOTE — ASSESSMENT & PLAN NOTE
8/25/2020 - progressive myopia. Given rx  10/26/2021 -  new rx based on pre cyclo manifest  2/1/2023 -post cyclo over retinooscopy.  No change in Rx  3/20/25: mild increase in myopia pre cyclo. VA stable, pt denies eye strain. Discussed referral to optometry for updated. Rx. Pt and mom decided to not obtain updated rx. RTC in 1 year. Discussed signs and symptoms of retinal detachment

## 2025-03-20 NOTE — ASSESSMENT & PLAN NOTE
4/21/19: Patient was in the [facility] when he suffered a ICH and IVH 2/2 spontaneous AVM rupture in the posterior fossa. S/p  shunt (left pareto occipital approach) due to hydrocephalus  2/5/20-2/21/20: admitted to [facility] and underwent trachea decannulation  CTH 6/5/23: stable   Follows with Dr Kemp and Dr Walton

## 2025-03-20 NOTE — PROGRESS NOTES
Peds/Neuro Ophthalmology:   Jeff Villarreal M.D.    Date & Time note created:    3/20/2025   2:46 PM     Referring MD / APRN:  Sherie Power M.D., No att. providers found    Patient ID:  Name:             Rey Medina   YOB: 1995  Age:                 29 y.o.  male   MRN:               [id number]    Chief Complaint/Reason for Visit:     Other (New pt eval for ophthalmoplegia /)      History of Present Illness:      Rey Medina is a former patient of Dr Romero who presents for annual exam.     He was last seen by Dr Romero 2/1/23. 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 He additionally follows with Dr Kemp. Exam demonstrated VA 20/40 OD 20/60 OS, supraduction deficit OU, convergence retraction nystagmus on downgaze and end gaze horizontal nystagmus. 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 He required a  shunt (L parieto occipital approach). His recovery was complicated with concerns for seizure activity. EEG July 2021 however was negative for ictal activity and appeared more consistent with FIRDA    Rey reports images appear crossed and overlapping but denies any terry double vision. The overlapping images is present in each eye individually and is constant. He states this began immediately following his AVM rupture. He otherwise denies any vision changes         Review of Systems:  ROS    Past Medical History:   Past Medical History:   Diagnosis Date    Asthma since birth    Mild Asthma    Chickenpox     COVID-19     [place] measles     Hypertension 2019    ICH (intracerebral hemorrhage) (HCC)     Sleep apnea 10/31/2022    currently using ASV Machine    Stroke (HCC)     Urinary bladder disorder 2020       Past Surgical History:  Past Surgical History:   Procedure Laterality Date    THORACIC FUSION O-ARM Left 6/5/2023    Procedure: STEALTH GUIDED DISTAL VENTRICULOPERITONEAL SHUNT REVISION- ABDOMINAL PART;   Surgeon: Daniel Walton M.D.;  Location: SURGERY [facility];  Service: Neurosurgery    ROSALINO BY LAPAROSCOPY N/A 03/31/2023    Procedure: CHOLECYSTECTOMY, LAPAROSCOPIC;  Surgeon: Maggie Chris M.D.;  Location: SURGERY [facility];  Service: General    ANAL FISTULOTOMY      OTHER  06/2019    PEG Tube insertion/ SRS Procedure/ Shunt    PEG PLACEMENT      removed 2020    TRACHEOSTOMY       SHUNT INSERTION         Current Outpatient Medications:  Current Outpatient Medications   Medication Sig Dispense Refill    doxepin (SINEQUAN) 10 MG Cap Take 1 Capsule by mouth every evening. For sleep 90 Capsule 3    polyethylene glycol 3350 (MIRALAX) 17 GM/SCOOP Powder MIX 17 GRAMS OF POWDER IN 8 OUNCES OF LIQUID AND DRINK ONCE DAILY AS NEEDED FOR  CONSTIPATION 510 g 11    docusate sodium 100 MG Cap       montelukast (SINGULAIR) 10 MG Tab Take 1 Tablet by mouth 1 time a day as needed (allergies). 90 Tablet 0    Cyanocobalamin (VITAMIN B12 PO) Take 1 Tablet by mouth every morning.      acetaminophen (TYLENOL) 500 MG Tab Take 500 mg by mouth every 6 hours as needed for Mild Pain (headache).      Ascorbic Acid (VITAMIN C) 1000 MG Tab Take 1 Tablet by mouth every day.      Cholecalciferol (VITAMIN D3) 2000 UNIT Cap Take 2,000 Units by mouth every day.       No current facility-administered medications for this visit.       Allergies:  Allergies   Allergen Reactions    Vancomycin Swelling     Lips  With red face and neck    Other Misc Rash     Allergic to name band of unknown medication causing a rash       Family History:  Family History   Problem Relation Age of Onset    Hypertension Mother     Glasses Mother     Thyroid Mother     Hyperlipidemia Mother     Diabetes Maternal Grandfather     Stroke Maternal Grandfather     Hypertension Maternal Grandfather     Diabetes Paternal Grandmother     Hypertension Maternal Uncle     Hypertension Maternal Grandmother     Heart Disease Neg Hx     Cancer Neg Hx        Social  History:  Social History     Socioeconomic History    Marital status: Single     Spouse name: Not on file    Number of children: Not on file    Years of education: Not on file    Highest education level: Some college, no degree   Occupational History    Not on file   Tobacco Use    Smoking status: Never    Smokeless tobacco: Never   Vaping Use    Vaping status: Never Used   Substance and Sexual Activity    Alcohol use: Never    Drug use: Never    Sexual activity: Not Currently   Other Topics Concern     Service No    Blood Transfusions No    Caffeine Concern No    Occupational Exposure No    Hobby Hazards No    Sleep Concern No    Stress Concern No    Weight Concern No    Special Diet Yes    Back Care No    Exercise Yes    Bike Helmet No    Seat Belt Yes    Self-Exams No   Social History Narrative    Lives with mom     Social Drivers of Health     Financial Resource Strain: Medium Risk (10/6/2024)    Overall Financial Resource Strain (CARDIA)     Difficulty of Paying Living Expenses: Somewhat hard   Food Insecurity: Patient Declined (10/6/2024)    Hunger Vital Sign     Worried About Running Out of Food in the Last Year: Patient declined     Ran Out of Food in the Last Year: Patient declined   Transportation Needs: No Transportation Needs (10/6/2024)    PRAPARE - Transportation     Lack of Transportation (Medical): No     Lack of Transportation (Non-Medical): No   Physical Activity: Sufficiently Active (10/6/2024)    Exercise Vital Sign     Days of Exercise per Week: 5 days     Minutes of Exercise per Session: 50 min   Stress: No Stress Concern Present (10/6/2024)    [employer] [place] [employer] - Occupational Stress Questionnaire     Feeling of Stress : Not at all   Social Connections: Unknown (10/6/2024)    Social Connection and Isolation Panel [NHANES]     Frequency of Communication with Friends and Family: Patient declined     Frequency of Social Gatherings with Friends and Family: Once a week      Attends Restorationism Services: Never     Active Member of Clubs or Organizations: No     Attends Club or Organization Meetings: Never     Marital Status: Never    Intimate Partner Violence: Not on file   Housing Stability: Unknown (10/6/2024)    Housing Stability Vital Sign     Unable to Pay for Housing in the Last Year: No     Number of Times Moved in the Last Year: Not on file     Homeless in the Last Year: No          Physical Exam:  Physical Exam    Oriented x 3  Weight/BMI: There is no height or weight on file to calculate BMI.  There were no vitals taken for this visit.    Base Eye Exam       Visual Acuity (Snellen - Linear)         Right Left    Dist cc 20/50 -1 20/40 -1    Dist ph cc NI NI    Near cc J2 J2      Correction: Glasses   Pt reports ghosting of images with each eye alone   No resolution with artificial tears or pinhole              Tonometry (i-care, 1:25 PM)         Right Left    Pressure 15 13              Pupils         Dark Light Shape React APD    Right 4 3 Round Brisk None    Left 4 3 Round Brisk None              Visual Fields         Right Left     Full Full              Neuro/Psych       Oriented x3: Yes    Mood/Affect: Normal   Dysarthria                  Additional Tests       Color         Right Left    [place]sh[place]ara 9/9 8/9              Stereo       Fly: -    Animals: 0/3    Circles: 0/9                  Strabismus Exam         - - 60 - -   - - 60 - -                       - -  - -  ET 2-4 - -  - -                       - - - - - -   - - - - - -                Alignment testing difficult to frequent saccadic intrusions. Denies any diplopia   Supraduction deficit, able to partially overcome with VOR  Convergence retraction nystagmus in either eye   Difficulty maintaining fixation, frequent saccadic intrusions        Slit Lamp and Fundus Exam       External Exam         Right Left    External Normal Normal              Slit Lamp Exam         Right Left    Lids/Lashes Normal Normal     Conjunctiva/Sclera White and quiet White and quiet    Cornea Clear Clear    Anterior Chamber Deep and quiet Deep and quiet    Iris Round and reactive Round and reactive    Lens Clear Clear              Fundus Exam         Right Left    Disc pink, sharp disc margins pink, sharp disc margins    C/D Ratio 0.6 0.6    Macula Normal Normal                  Refraction       Wearing Rx         Sphere Cylinder Axis    Right -7.00 +1.25 117    Left -7.50 +1.50 070      Age: 1yr    Type: SVL              Manifest Refraction (Auto)         Sphere Cylinder Axis    Right -9.00 +1.50 095    Left -10.25 +2.00 063                    Pertinent Lab/Test/Imaging Review:  MRI brain wo 5/21/20  1.  Cerebellar atrophy with encephalomalacia, gliosis and hemosiderin staining within the vermis and posterior brainstem consistent with prior hemorrhage.  2.  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  3.  Stable left posterior parietal approach ventriculoperitoneal shunt catheter. Stable mild ventriculomegaly. No transependymal flow CSF.  4.  Mild cerebral atrophy.  5.  No acute intracranial abnormality.    CTH 6/5/23  1.  Similar, mild dilation of the ventricular system. Left parieto-occipital approach  shunt catheter with tip in the right ventricle.  2.  Bilateral cerebellar encephalomalacia.  3.  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 S/p  shunt (left pareto occipital approach) due to hydrocephalus  2/5/20-2/21/20: admitted to [facility] and underwent trachea decannulation  CTH 6/5/23: stable   Follows with Dr Kemp and Dr Walton    Myopia of both eyes  8/25/2020 - progressive myopia. Given rx  10/26/2021 -  new rx based on pre cyclo manifest  2/1/2023 -post  cyclo over retinooscopy.  No change in Rx  3/20/25: mild increase in myopia pre cyclo. VA stable, pt denies eye strain. Discussed referral to optometry for updated. Rx. Pt and mom decided to not obtain updated rx. RTC in 1 year. Discussed signs and symptoms of retinal detachment    Ophthalmoplegia  8/25/2020 : Bilateral elevation deficit secondary to AVM / Stroke involving the area of the RIMLF  10/26/2021: continued elevation deficit, some convergent retraction movement on upgaze and end horizontal nystagmus.   2/1/2023: overall stable elevation deficit with convergence retraction movement and and horizontal gaze nystagmus  3/20/25: Supraduction deficit OU, able to partially overcome with VOR. Convergence retraction nystagmus still present on down gaze. Saccadic intrusions on pursuit. Horizontal gaze evoked nystagmus.     Plan:   Efferent visual exam appears stable to prior and is secondary to the prior AVM rupture and subsequent encephalomalacia. Suspect some of the ghosting/overlapping images endorsed by patient is actually a manifestation of oscillopsia. He reports the visual disturbance is present in each eye alone and it does not improve with pinhole or drops. Constant since Avm rupture. Will continue to monitor     Visual disturbance  Reports overlapping images that is constant and present in both eyes. This started immediately following AVM rupture. No difference whether rx is on or off. Denies any dry eye symptoms    Exam 3/20/25: stable VA, 9/9 color plates OD 8/9 OS. CF grossly full. No APD. Supraduction deficit OU, partially overcome with VOR. Frequent saccadic intrusions, horizontal gaze evoked nystagmus. Convergence retraction nystagmus on down gaze. Optic nerves cupped with sharp disc margins.     Plan:   Exam demonstrates normal appearing optic nerves and macula without evidence of an optic neuropathy as cause for the visual disturbances. There is no subjective or objective binocular dysfunction. Pt  additionally reports ghosting/overlapping images is present with each eye individually. Disturbance does not improve with pinhole or artificial tears, therefore less suspicious for surface etiology. I question whether the overlapping images is a subtle oscillopsia from his nystagmus. Onset of symptoms corresponds to AVM rupture. Discussed findings with patient and mom. Will plan for repeat exam in 1 years time        Jeff Villarreal M.D.    72 total minutes were spent reviewing imaging, records, examining the patient and documenting.

## 2025-03-20 NOTE — ASSESSMENT & PLAN NOTE
8/25/2020 : Bilateral elevation deficit secondary to AVM / Stroke involving the area of the RIMLF  10/26/2021: continued elevation deficit, some convergent retraction movement on upgaze and end horizontal nystagmus.   2/1/2023: overall stable elevation deficit with convergence retraction movement and and horizontal gaze nystagmus  3/20/25: Supraduction deficit OU, able to partially overcome with VOR. Convergence retraction nystagmus still present on down gaze. Saccadic intrusions on pursuit. Horizontal gaze evoked nystagmus.     Plan:   Efferent visual exam appears stable to prior and is secondary to the prior AVM rupture and subsequent encephalomalacia. Suspect some of the ghosting/overlapping images endorsed by patient is actually a manifestation of oscillopsia. He reports the visual disturbance is present in each eye alone and it does not improve with pinhole or drops. Constant since Avm rupture. Will continue to monitor

## 2025-03-20 NOTE — ASSESSMENT & PLAN NOTE
Reports overlapping images that is constant and present in both eyes. This started immediately following AVM rupture. No difference whether rx is on or off. Denies any dry eye symptoms    Exam 3/20/25: stable VA, 9/9 color plates OD 8/9 OS. CF grossly full. No APD. Supraduction deficit OU, partially overcome with VOR. Frequent saccadic intrusions, horizontal gaze evoked nystagmus. Convergence retraction nystagmus on down gaze. Optic nerves cupped with sharp disc margins.     Plan:   Exam demonstrates normal appearing optic nerves and macula without evidence of an optic neuropathy as cause for the visual disturbances. There is no subjective or objective binocular dysfunction. Pt additionally reports ghosting/overlapping images is present with each eye individually. Disturbance does not improve with pinhole or artificial tears, therefore less suspicious for surface etiology. I question whether the overlapping images is a subtle oscillopsia from his nystagmus. Onset of symptoms corresponds to AVM rupture. Discussed findings with patient and mom. Will plan for repeat exam in 1 years time

## 2025-04-20 NOTE — REHAB-PHARMACY IDT TEAM NOTE
Pharmacy   Pharmacy  Antibiotics/Antifungals/Antivirals:  Medication:      Active Orders (From admission, onward)    Ordered     Ordering Provider       Sat Feb 15, 2020  3:38 PM    02/15/20 1538  ampicillin/sulbactam (UNASYN) 3 g in  mL IVPB  EVERY 6 HOURS      Slava Negro M.D.        Route:         IV  Stop Date:  02/22/20  Reason: Sepsis  Antihypertensives/Cardiac:  Medication:    Orders (72h ago, onward)     Start     Ordered    02/15/20 1600  metoprolol (LOPRESSOR) tablet 25 mg  TWICE DAILY      02/15/20 1538    02/05/20 1200  ivabradine (CORLANOR) tablet 5 mg  DAILY,   Status:  Discontinued      02/05/20 1141    02/05/20 1141  hydrALAZINE (APRESOLINE) tablet 25 mg  EVERY 8 HOURS PRN      02/05/20 1141              Patient Vitals for the past 24 hrs:   BP Pulse   02/17/20 1530 140/91 (!) 114   02/17/20 1105 -- (!) 112   02/17/20 1008 141/98 (!) 117   02/17/20 0551 146/86 (!) 112   02/17/20 0246 -- (!) 104   02/17/20 0207 138/79 (!) 135   02/16/20 1800 132/88 (!) 118     Anticoagulation:  Medication: None                                 .    Other key medications: A review of the medication list reveals no issues at this time. Patient is currently on antihypertensive(s). Recommend home blood pressure monitoring/recording if antihypertensive(s) regimen(s) continue.    Section completed by: Nemesio Chris [facility]   R flank pain and dark colored urine x 1 week

## 2025-05-22 DIAGNOSIS — R15.9 INCONTINENCE OF FECES, UNSPECIFIED FECAL INCONTINENCE TYPE: ICD-10-CM

## 2025-05-22 NOTE — TELEPHONE ENCOUNTER
Received request via: Pharmacy    Was the patient seen in the last year in this department? Yes    Does the patient have an active prescription (recently filled or refills available) for medication(s) requested? No    Pharmacy Name: [facility] [place]    Does the patient have care home Plus and need 100-day supply? (This applies to ALL medications) Patient does not have SCP

## 2025-05-30 RX ORDER — POLYETHYLENE GLYCOL 3350 17 G/17G
POWDER, FOR SOLUTION ORAL
Qty: 510 G | Refills: 3 | Status: SHIPPED | OUTPATIENT
Start: 2025-05-30

## 2025-06-11 ENCOUNTER — OFFICE VISIT (OUTPATIENT)
Dept: MEDICAL GROUP | Facility: MEDICAL CENTER | Age: 30
End: 2025-06-11
Attending: FAMILY MEDICINE
Payer: MEDICARE

## 2025-06-11 VITALS
WEIGHT: 150 LBS | HEIGHT: 66 IN | SYSTOLIC BLOOD PRESSURE: 116 MMHG | RESPIRATION RATE: 16 BRPM | TEMPERATURE: 97.8 F | DIASTOLIC BLOOD PRESSURE: 80 MMHG | HEART RATE: 74 BPM | OXYGEN SATURATION: 97 % | BODY MASS INDEX: 24.11 KG/M2

## 2025-06-11 DIAGNOSIS — G11.9 CEREBELLAR ATAXIA (HCC): Primary | ICD-10-CM

## 2025-06-11 DIAGNOSIS — Z86.79 H/O SPONT INTRAPARENCHYMAL INTRACRANIAL HEMORRHAGE D/T CEREBRAL AVM: ICD-10-CM

## 2025-06-11 PROCEDURE — 3074F SYST BP LT 130 MM HG: CPT | Performed by: FAMILY MEDICINE

## 2025-06-11 PROCEDURE — 99213 OFFICE O/P EST LOW 20 MIN: CPT | Performed by: FAMILY MEDICINE

## 2025-06-11 PROCEDURE — 3079F DIAST BP 80-89 MM HG: CPT | Performed by: FAMILY MEDICINE

## 2025-06-11 ASSESSMENT — FIBROSIS 4 INDEX: FIB4 SCORE: 0.3

## 2025-06-11 ASSESSMENT — PATIENT HEALTH QUESTIONNAIRE - PHQ9: CLINICAL INTERPRETATION OF PHQ2 SCORE: 0

## 2025-06-11 NOTE — PROGRESS NOTES
"  Subjective:     CC:   Chief Complaint   Patient presents with    Paperwork     Walker          HPI:     Patient and mom are here for completion of paperwork and consideration of a U Step Walker   He is ambulatory with a standard walker but needs a gait belt with assistance due to significant difficulty with falls. He is unable to ambulate alone. Currently he is mostly using a wheelchair in the home and is striving towards independent ambulation.   He is not receiving any home physical therapy but he does have a  who works with him 3 times per week.   He has completed multiple rounds of PT with significant improvement but is still unable ambulate independently.     Pt had intracranial hemorrhage to the posterior fossa following AVM rupture in 2019 and s/p AVM repair and  shunt for hydrocephalus.   He was diagnosed with subsequent cerebellar ataxia needing substantial assistance with MRADLs.   Needs assistance with transfers, bathing, dressing, toileting.         Past Medical History[1]    Social History[2]    Current Medications and Prescriptions Ordered in Epic[3]    Allergies:  Vancomycin and Other misc        Objective:       Exam:  /80 (BP Location: Left arm, Patient Position: Sitting, BP Cuff Size: Adult)   Pulse 74   Temp 36.6 °C (97.8 °F) (Temporal)   Resp 16   Ht 1.676 m (5' 6\")   Wt 68 kg (150 lb)   SpO2 97%   BMI 24.21 kg/m²  Body mass index is 24.21 kg/m².    Constitutional: Alert, no distress, well-groomed.  Respiratory: Unlabored respiratory effort, no cough.  MSK: moves all extremities.  Psych: normal affect and mood.      Data reviewed:   None     Assessment & Plan:     29 y.o. male with the following -     1. Cerebellar ataxia (HCC)  2. H/O spont intraparenchymal intracranial hemorrhage d/t cerebral AVM  Paperwork completed to obtain Ustep walking stabilizer.         Return in about 7 months (around 1/11/2026).    Please note that this dictation was created using voice " recognition software. I have made every reasonable attempt to correct obvious errors, but I expect that there are errors of grammar and possibly content that I did not discover before finalizing the note.             [1]   Past Medical History:  Diagnosis Date    Asthma since birth    Mild Asthma    Chickenpox     COVID-19     [place] measles     Hypertension 2019    ICH (intracerebral hemorrhage) (HCC)     Sleep apnea 10/31/2022    currently using ASV Machine    Stroke (HCC)     Urinary bladder disorder 2020   [2]   Social History  Tobacco Use    Smoking status: Never    Smokeless tobacco: Never   Vaping Use    Vaping status: Never Used   Substance Use Topics    Alcohol use: Never    Drug use: Never   [3]   Current Outpatient Medications Ordered in Epic   Medication Sig Dispense Refill    polyethylene glycol 3350 (MIRALAX) 17 GM/SCOOP Powder MIX 17 GRAMS OF POWDER IN 8 OUNCES OF LIQUID AND DRINK ONCE DAILY AS NEEDED FOR  CONSTIPATION 510 g 3    docusate sodium 100 MG Cap       montelukast (SINGULAIR) 10 MG Tab Take 1 Tablet by mouth 1 time a day as needed (allergies). 90 Tablet 0    Cyanocobalamin (VITAMIN B12 PO) Take 1 Tablet by mouth every morning.      acetaminophen (TYLENOL) 500 MG Tab Take 500 mg by mouth every 6 hours as needed for Mild Pain (headache).      Ascorbic Acid (VITAMIN C) 1000 MG Tab Take 1 Tablet by mouth every day.      Cholecalciferol (VITAMIN D3) 2000 UNIT Cap Take 2,000 Units by mouth every day.       No current [facility]-ordered facility-administered medications on file.

## (undated) DEVICE — TUBE CONNECTING SUCTION - CLEAR PLASTIC STERILE 72 IN (50EA/CA)

## (undated) DEVICE — NEEDLE INSUFFLATION FOR STEP - (12/BX)

## (undated) DEVICE — COVER LIGHT HANDLE ALC PLUS DISP (18EA/BX)

## (undated) DEVICE — SUTURE 2-0 SILK 12 X 18" (36PK/BX)"

## (undated) DEVICE — SUTURE GENERAL

## (undated) DEVICE — TUBING SETDISPOS HIGH FLOW II - (10/BX)

## (undated) DEVICE — DRAPE STRLE REG TOWEL 18X24 - (10/BX 4BX/CA)"

## (undated) DEVICE — SUTURE 3-0 ETHILON FS-1 - (36/BX) 30 INCH

## (undated) DEVICE — GOWN WARMING STANDARD FLEX - (30/CA)

## (undated) DEVICE — KIT SURGIFLO W/OUT THROMBIN - (6EA/CA)

## (undated) DEVICE — SUCTION INSTRUMENT YANKAUER BULBOUS TIP W/O VENT (50EA/CA)

## (undated) DEVICE — Device

## (undated) DEVICE — SHUNT CSF CATHETER CONNECTOR ST

## (undated) DEVICE — GLOVE BIOGEL SZ 6.5 SURGICAL PF LTX (50PR/BX 4BX/CA)

## (undated) DEVICE — CLIP MED LG INTNL HRZN TI ESCP - (20/BX)

## (undated) DEVICE — GLOVESZ 8.5 BIOGEL PI MICRO - PF LF (50PR/BX)

## (undated) DEVICE — TROCAR SEPARATOR 5X55 - 6/BX

## (undated) DEVICE — SLEEVE, VASO, THIGH, MED

## (undated) DEVICE — GLOVE SIZE 7.0 SURGEON ACCELERATOR FREE GREEN (50PR/BX 4BX/CA)

## (undated) DEVICE — DRAPE SURGICAL U 77X120 - (10/CA)

## (undated) DEVICE — PAD LAP STERILE 18 X 18 - (5/PK 40PK/CA)

## (undated) DEVICE — TRAY SRGPRP PVP IOD WT PRP - (20/CA)

## (undated) DEVICE — NEEDLE, DISP 16G X 1-1/2 BLUNT

## (undated) DEVICE — LACTATED RINGERS INJ 1000 ML - (14EA/CA 60CA/PF)

## (undated) DEVICE — CHLORAPREP 26 ML APPLICATOR - ORANGE TINT(25/CA)

## (undated) DEVICE — ANTI-FOG SOLUTION - 60BTL/CA

## (undated) DEVICE — FORCEPS IRRIGATING 8 X 1.5MM (5EA/BX)

## (undated) DEVICE — CANISTER SUCTION 3000ML MECHANICAL FILTER AUTO SHUTOFF MEDI-VAC NONSTERILE LF DISP  (40EA/CA)

## (undated) DEVICE — SET TUBING PNEUMOCLEAR HIGH FLOW SMOKE EVACUATION (10EA/BX)

## (undated) DEVICE — TROCARCANN&SEAL 5X55 ZTHREAD - 12/BX

## (undated) DEVICE — SET EXTENSION WITH 2 PORTS (48EA/CA) ***PART #2C8610 IS A SUBSTITUTE*****

## (undated) DEVICE — BAG RETRIEVAL 10ML (10EA/BX)

## (undated) DEVICE — GLOVE PROTEXIS PI MICRO SZ 8.5 (200PR/CA)

## (undated) DEVICE — SYSTEM CLEARIFY VISUALIZATION (10EA/PK)

## (undated) DEVICE — POINTER TRACKER 1-PACK

## (undated) DEVICE — CANNULA W/SEAL 5X100 Z-THRE - ADED KII (12/BX)

## (undated) DEVICE — TROCAR 5X100 BLADED Z-THREAD - KII (6/BX)

## (undated) DEVICE — SODIUM CHL IRRIGATION 0.9% 1000ML (12EA/CA)

## (undated) DEVICE — SUTURE 3-0 VICRYL PLUS SH - 8X 18 INCH (12/BX)

## (undated) DEVICE — SUTURE 4-0 MONOCRYL PLUS PS-2 - 27 INCH (36/BX)

## (undated) DEVICE — NEEDLE INSFL 120MM 14GA VRRS - (20/BX)

## (undated) DEVICE — TUBING CLEARLINK DUO-VENT - C-FLO (48EA/CA)

## (undated) DEVICE — SCISSORS 5MM CVD (6EA/BX)

## (undated) DEVICE — ELECTRODE DUAL RETURN W/ CORD - (50/PK)

## (undated) DEVICE — TOWEL STOP TIMEOUT SAFETY FLAG (40EA/CA)

## (undated) DEVICE — GLOVE SZ 7.5 BIOGEL PI MICRO - PF LF (50PR/BX)

## (undated) DEVICE — PACK NEURO - (2EA/CA)

## (undated) DEVICE — TRACKER ENT PATIENT

## (undated) DEVICE — SET LEADWIRE 5 LEAD BEDSIDE DISPOSABLE ECG (1SET OF 5/EA)

## (undated) DEVICE — TUBING FILTER STRYKER

## (undated) DEVICE — WATER IRRIGATION STERILE 1000ML (12EA/CA)

## (undated) DEVICE — TROCAR 5X100 NON BLADED Z-TH - READ KII (6/BX)

## (undated) DEVICE — BLADE CLIPPER FITS 2501 CLIPPER (BLUE)  (20EA/CA)

## (undated) DEVICE — BLADE SURGICAL CLIPPER - (50EA/CA)

## (undated) DEVICE — DERMABOND ADVANCED - (12EA/BX)

## (undated) DEVICE — BOVIE NEEDLE TIP 3CM COLORADO